# Patient Record
Sex: MALE | Race: WHITE | Employment: OTHER
[De-identification: names, ages, dates, MRNs, and addresses within clinical notes are randomized per-mention and may not be internally consistent; named-entity substitution may affect disease eponyms.]

---

## 2017-01-17 RX ORDER — MIDAZOLAM HYDROCHLORIDE 1 MG/ML
.5-6 INJECTION, SOLUTION INTRAMUSCULAR; INTRAVENOUS
Status: CANCELLED | OUTPATIENT
Start: 2017-01-18

## 2017-01-17 RX ORDER — SODIUM CHLORIDE 0.9 % (FLUSH) 0.9 %
5-10 SYRINGE (ML) INJECTION AS NEEDED
Status: CANCELLED | OUTPATIENT
Start: 2017-01-18

## 2017-01-18 ENCOUNTER — SURGERY (OUTPATIENT)
Age: 62
End: 2017-01-18

## 2017-01-18 ENCOUNTER — APPOINTMENT (OUTPATIENT)
Dept: GENERAL RADIOLOGY | Age: 62
End: 2017-01-18
Attending: PHYSICAL MEDICINE & REHABILITATION
Payer: OTHER MISCELLANEOUS

## 2017-01-18 ENCOUNTER — HOSPITAL ENCOUNTER (OUTPATIENT)
Age: 62
Setting detail: OUTPATIENT SURGERY
Discharge: HOME | End: 2017-01-18
Attending: PHYSICAL MEDICINE & REHABILITATION | Admitting: PHYSICAL MEDICINE & REHABILITATION
Payer: OTHER MISCELLANEOUS

## 2017-01-18 VITALS
DIASTOLIC BLOOD PRESSURE: 85 MMHG | WEIGHT: 305 LBS | HEART RATE: 72 BPM | BODY MASS INDEX: 39.14 KG/M2 | RESPIRATION RATE: 16 BRPM | OXYGEN SATURATION: 95 % | TEMPERATURE: 98.2 F | HEIGHT: 74 IN | SYSTOLIC BLOOD PRESSURE: 146 MMHG

## 2017-01-18 LAB — GLUCOSE BLD STRIP.AUTO-MCNC: 122 MG/DL (ref 70–110)

## 2017-01-18 PROCEDURE — 99144 HC MOD CS >5 YRS 1ST 30 MINS: CPT | Performed by: PHYSICAL MEDICINE & REHABILITATION

## 2017-01-18 PROCEDURE — 77030029505: Performed by: PHYSICAL MEDICINE & REHABILITATION

## 2017-01-18 PROCEDURE — 74011250636 HC RX REV CODE- 250/636

## 2017-01-18 PROCEDURE — 77030020508 HC PD GRND GENRTR BAYL -A: Performed by: PHYSICAL MEDICINE & REHABILITATION

## 2017-01-18 PROCEDURE — 99152 MOD SED SAME PHYS/QHP 5/>YRS: CPT | Performed by: PHYSICAL MEDICINE & REHABILITATION

## 2017-01-18 PROCEDURE — 82962 GLUCOSE BLOOD TEST: CPT

## 2017-01-18 PROCEDURE — 76010000009 HC PAIN MGT 0 TO 30 MIN PROC: Performed by: PHYSICAL MEDICINE & REHABILITATION

## 2017-01-18 PROCEDURE — 74011000250 HC RX REV CODE- 250: Performed by: PHYSICAL MEDICINE & REHABILITATION

## 2017-01-18 RX ORDER — SODIUM CHLORIDE 0.9 % (FLUSH) 0.9 %
5-10 SYRINGE (ML) INJECTION AS NEEDED
Status: DISCONTINUED | OUTPATIENT
Start: 2017-01-18 | End: 2017-01-18 | Stop reason: HOSPADM

## 2017-01-18 RX ORDER — LIDOCAINE HYDROCHLORIDE 10 MG/ML
INJECTION, SOLUTION EPIDURAL; INFILTRATION; INTRACAUDAL; PERINEURAL AS NEEDED
Status: DISCONTINUED | OUTPATIENT
Start: 2017-01-18 | End: 2017-01-18 | Stop reason: HOSPADM

## 2017-01-18 RX ORDER — FENTANYL CITRATE 50 UG/ML
25-400 INJECTION, SOLUTION INTRAMUSCULAR; INTRAVENOUS
Status: DISCONTINUED | OUTPATIENT
Start: 2017-01-18 | End: 2017-01-18 | Stop reason: HOSPADM

## 2017-01-18 RX ORDER — MIDAZOLAM HYDROCHLORIDE 1 MG/ML
.5-6 INJECTION, SOLUTION INTRAMUSCULAR; INTRAVENOUS
Status: DISCONTINUED | OUTPATIENT
Start: 2017-01-18 | End: 2017-01-18 | Stop reason: HOSPADM

## 2017-01-18 RX ADMIN — FENTANYL CITRATE 25 MCG: 50 INJECTION, SOLUTION INTRAMUSCULAR; INTRAVENOUS at 07:39

## 2017-01-18 RX ADMIN — Medication 5 ML: at 07:34

## 2017-01-18 RX ADMIN — LIDOCAINE HYDROCHLORIDE 10 ML: 10 INJECTION, SOLUTION EPIDURAL; INFILTRATION; INTRACAUDAL; PERINEURAL at 07:39

## 2017-01-18 RX ADMIN — MIDAZOLAM HYDROCHLORIDE 1 MG: 1 INJECTION, SOLUTION INTRAMUSCULAR; INTRAVENOUS at 07:34

## 2017-01-18 RX ADMIN — FENTANYL CITRATE 50 MCG: 50 INJECTION, SOLUTION INTRAMUSCULAR; INTRAVENOUS at 07:34

## 2017-01-18 NOTE — PERIOP NOTES
Patient tolerated procedure well without distress noted. No swelling, redness, or bleeding from injection sites. No redness or burns and skin intact from grounding pad. Patient transferred via wheelchair to recovery room in stable condition. VSS.

## 2017-01-18 NOTE — H&P
18 Jan 2017, 7:25AM.   Patient seen and examined prior to procedure. Chart and data reviewed. No significant interval changes from previous evaluation noted. Stable for procedure as intended and discussed.  [facility]

## 2017-01-18 NOTE — DISCHARGE INSTRUCTIONS
[facility]      Post Procedures Instructions    *Resume Diet and Activity as tolerated. Rest for the remainder of the day. *You may fell worse before you feel better as the numbing medications wear off before the steroids take effect if used for your procedures. *Do not use affected extremity until numbness or loss of sensation has completely resolved without assistance. *DO NOT DRIVE, operate machinery/heavey equipment for 24 hours. *DO NOT DRINK ALCOHOL for 24 hours as it may interact with the sedation if you received it and also thins your blood and may cause you to bleed. *WAIT 24 hours before starting back ANY Blood thinning medications:   (Heparin, Coumadin, Warfarin, Lovenox, Plavix, Aggrenox)    *Resume Pre-Procedure Medications as prescribed except Blood Thinners unless directed by your Physician or Cardiologist.     *Avoid Hot tubs and Heating pad for 24 hours to prevent dissipation of medications, you may shower to remove bandages and remaining prep residue on the skin. * If you develop a Headache, drink plenty of fluids including beverages with caffeine (Coffee, Mt. Dew etc.) and rest.  If the headache persists longer than 24 hoursor intensifies - Please call [facility] ([facility]) [phone]      * If you are DIABETIC, check your blood sugar three times a day for the next three days, the steroids will increase your blood sugar. If your blood sugar is greater than 400 have someone drive you to the nearest [address]. * If you experience any of the following problems, call the [facility] [phone] between 8:00 am - 4:30pm or After Hours [phone].     Shortness of breath    Fever of 101 F or higher    Nausea / Vomiting (not normal to you)    Increasing stiffness in the neck    Weakness or numbness in the arms or legs that is not resolving    Prolonged and increasing pain > than 4 days    ANYTHING OUT of the ORDINARY TO YOU    If YOU are experiencing a severe reaction / complication that you have never had before post procedure, call 911 or go to the nearest emergency room! All patients must have a  for transportation [place] regardless if you do or do not receive sedation. DISCHARGE SUMMARY from Nurse      PATIENT INSTRUCTIONS:    After Oral  or intravenous sedation, for 24 hours or while taking prescription Narcotics:  · Limit your activities  · Do not drive and operate hazardous machinery  · Do not make important personal or business decisions  · Do  not drink alcoholic beverages  · If you have not urinated within 8 hours after discharge, please contact your surgeon on call. Report the following to your surgeon:  · Excessive pain, swelling, redness or odor of or around the surgical area  · Temperature over 101  · Nausea and vomiting lasting longer than 4 hours or if unable to take medications  · Any signs of decreased circulation or nerve impairment to extremity: change in color, persistent  numbness, tingling, coldness or increase pain  · Any questions        What to do at Home:  Recommended activity: Activity as tolerated, NO DRIVING FOR 24 Hours post injection          *  Please give a list of your current medications to your Primary Care Provider. *  Please update this list whenever your medications are discontinued, doses are      changed, or new medications (including over-the-counter products) are added. *  Please carry medication information at all times in case of emergency situations. These are general instructions for a healthy lifestyle:    No smoking/ No tobacco products/ Avoid exposure to second hand smoke    Surgeon General's Warning:  Quitting smoking now greatly reduces serious risk to your health.     Obesity, smoking, and sedentary lifestyle greatly increases your risk for illness    A healthy diet, regular physical exercise & weight monitoring are important for maintaining a healthy lifestyle    You may be retaining fluid if you have a history of heart failure or if you experience any of the following symptoms:  Weight gain of 3 pounds or more overnight or 5 pounds in a week, increased swelling in our hands or feet or shortness of breath while lying flat in bed. Please call your doctor as soon as you notice any of these symptoms; do not wait until your next office visit. Recognize signs and symptoms of STROKE:    F-face looks uneven    A-arms unable to move or move unevenly    S-speech slurred or non-existent    T-time-call 911 as soon as signs and symptoms begin-DO NOT go       Back to bed or wait to see if you get better-TIME IS BRAIN.

## 2017-01-18 NOTE — PROCEDURES
[facility]    THERMOCOAGULATION OF LUMBAR MEDIAL BRANCH  PROCEDURE REPORT      PATIENT:  Aram William  YOB: 1955  DATE OF SERVICE:  1/18/2017  [facility]    PRE-PROCEDURE DIAGNOSIS:  See Above    POST-PROCEDURE DIAGNOSIS:  See Above    PROCEDURE:      1. Right radiofrequency thermocoagulation of lumbar medial branch nerves, L3/L4, L4/L5, L5/S1 (72291, 64636 x2)  2. Fluoroscopic needle guidance (spinal) (97256)  3. Supervision of moderate sedation (49433)  4. Additional 15 minutes of supervised moderate sedation (37130)    ANESTHESIA:  Local with moderate IV sedation. See Medication Administration Record for specific medications and dosage. COMPLICATIONS: None. PHYSICIAN:  Cari Del Real MD    PRE-PROCEDURE NOTE:  Pre-procedural assessment of the patient was performed including a limited history and physical examination. The details of the procedure were discussed with the patient, including the risks, benefits and alternative options and an informed consent was obtained. The patients NPO status, if necessary for the specific procedure and/or administration of moderate intravenous sedation, if utilized, and availability of a responsible adult to escort the patient following the procedure were confirmed. A peripheral intravenous cannula was placed without difficulty and lactated Ringers solution administered. See nursing notes for details. PROCEDURE NOTE:  The patient was brought to the procedure suite and positioned on the fluoroscopy table in the prone position. Physiologic monitors were applied and supplemental oxygen was administered via nasal cannula. The skin was prepped in the standard surgical fashion and sterile drapes were applied over the procedure site.  Please refer to the Flowsheet for documentation of the patients vital signs and the Medication Administration Record for any oral and/or intravenous sedation administered prior to or during the procedure. 1% Lidocaine was utilized for local anesthesia. Under 10-15 degree ipsilateral oblique fluoroscopic guidance a 15cm 18gauge radiofrequency needle with a 10 mm curved active tip was advanced to the junction of the superior articular process and transverse process of each vertebral level immediately inferior to the above-mentioned dorsal rami medial branch nerves. Under AP fluoroscopic guidance, a similar needle was then placed over the superior margin of the sacral ala to thermocoagulate the L5 medial branch nerve. After each individual needle was placed, sensory and motor testing, at 50 Hz and 2 Hz, respectively, were performed which elicited ipsilateral deep local back discomfort without evidence of motor stimulation in the ipsilateral gluteal muscles or extremity. Following this, 1 mL of lidocaine 2% was injected after the negative aspiration of blood, air or CSF. Final correct needle placement was then confirmed by viewing each needle in AP and lateral fluoroscopic views in addition to repeat sensory and motor testing which, again, elicited ipsilateral deep local back discomfort without evidence of motor stimulation in the ipsilateral gluteal muscles or extremity. Medial branch nerve radiofrequency thermocoagulation was then performed at each level for 120 seconds at 80° centigrade x 1 cycle. Following this, 1/3ml  to 1/2ml of a mixture of lidocaine 1% admixed with dexamethasone 2mg [10mg/ml] was injected through each radiofrequency needle after negative aspiration and before removing each needle. Then, all needles were removed intact. The area was thoroughly cleaned and sterile bandages applied as necessary. The patient tolerated the procedure well without complication and the vital signs remained stable throughout the procedure.     POST-PROCEDURE COURSE:   The patient was escorted from the procedure suite in satisfactory condition and recovered per facility protocol based on the type of procedure performed and/or the sedation utilized. The patient did not experience any adverse events and remained hemodynamically stable during the post-procedure period. DISCHARGE NOTE:  Upon discharge, the patient was able to tolerate fluids and was in no acute distress. The patient was oriented to person, place and time and vital signs were stable. Appropriate post-procedure instructions were provided and explained to the patient in detail and all questions were answered.                     Citlaly Burr MD 1/18/2017 10:10 AM

## 2017-01-18 NOTE — PERIOP NOTES
Armband removed and placed in shredder. Dressing to injection site dry and intact. Patient alert, oriented, denies complaints of pain. Able to ambulate with ride to vehicle.

## 2017-02-06 RX ORDER — ISOSORBIDE MONONITRATE 60 MG/1
TABLET, EXTENDED RELEASE ORAL
Qty: 90 TAB | Refills: 0 | Status: SHIPPED | OUTPATIENT
Start: 2017-02-06 | End: 2017-04-05 | Stop reason: SDUPTHER

## 2017-02-08 ENCOUNTER — OFFICE VISIT (OUTPATIENT)
Dept: PAIN MANAGEMENT | Age: 62
End: 2017-02-08

## 2017-02-08 VITALS
DIASTOLIC BLOOD PRESSURE: 75 MMHG | SYSTOLIC BLOOD PRESSURE: 148 MMHG | RESPIRATION RATE: 19 BRPM | WEIGHT: 314 LBS | HEIGHT: 74 IN | HEART RATE: 80 BPM | BODY MASS INDEX: 40.3 KG/M2

## 2017-02-08 DIAGNOSIS — G89.29 CHRONIC MIDLINE LOW BACK PAIN WITH SCIATICA, SCIATICA LATERALITY UNSPECIFIED: Primary | ICD-10-CM

## 2017-02-08 DIAGNOSIS — M96.1 POSTLAMINECTOMY SYNDROME, LUMBAR REGION: ICD-10-CM

## 2017-02-08 DIAGNOSIS — Z86.59 HISTORY OF DEPRESSION: ICD-10-CM

## 2017-02-08 DIAGNOSIS — M54.40 CHRONIC MIDLINE LOW BACK PAIN WITH SCIATICA, SCIATICA LATERALITY UNSPECIFIED: Primary | ICD-10-CM

## 2017-02-08 DIAGNOSIS — Z98.1 S/P LUMBAR SPINAL FUSION: ICD-10-CM

## 2017-02-08 DIAGNOSIS — M51.36 DDD (DEGENERATIVE DISC DISEASE), LUMBAR: ICD-10-CM

## 2017-02-08 DIAGNOSIS — Z98.890 S/P LUMBAR LAMINECTOMY: ICD-10-CM

## 2017-02-08 DIAGNOSIS — M54.16 LUMBAR NERVE ROOT IMPINGEMENT: ICD-10-CM

## 2017-02-08 DIAGNOSIS — M62.830 BACK MUSCLE SPASM: ICD-10-CM

## 2017-02-08 DIAGNOSIS — M51.34 DDD (DEGENERATIVE DISC DISEASE), THORACIC: ICD-10-CM

## 2017-02-08 DIAGNOSIS — G89.4 CHRONIC PAIN SYNDROME: ICD-10-CM

## 2017-02-08 DIAGNOSIS — M47.816 LUMBAR FACET ARTHROPATHY: ICD-10-CM

## 2017-02-08 DIAGNOSIS — K66.0 ABDOMINAL ADHESIONS: ICD-10-CM

## 2017-02-08 DIAGNOSIS — M54.17 LUMBOSACRAL RADICULOPATHY AT S1: ICD-10-CM

## 2017-02-08 DIAGNOSIS — S22.000S THORACIC COMPRESSION FRACTURE, SEQUELA: ICD-10-CM

## 2017-02-08 RX ORDER — DULOXETIN HYDROCHLORIDE 60 MG/1
60 CAPSULE, DELAYED RELEASE ORAL DAILY
Qty: 90 CAP | Refills: 2 | Status: SHIPPED | OUTPATIENT
Start: 2017-02-08 | End: 2017-05-04 | Stop reason: SDUPTHER

## 2017-02-08 RX ORDER — MORPHINE SULFATE 50 MG/1
50 CAPSULE, EXTENDED RELEASE ORAL EVERY 12 HOURS
Qty: 60 CAP | Refills: 0 | Status: SHIPPED | OUTPATIENT
Start: 2017-03-07 | End: 2017-02-08 | Stop reason: SDUPTHER

## 2017-02-08 RX ORDER — DIAZEPAM 5 MG/1
5 TABLET ORAL
Qty: 30 TAB | Refills: 2 | Status: SHIPPED | OUTPATIENT
Start: 2017-02-08 | End: 2017-05-04 | Stop reason: SDUPTHER

## 2017-02-08 RX ORDER — GABAPENTIN 800 MG/1
800 TABLET ORAL 3 TIMES DAILY
Qty: 90 TAB | Refills: 5 | Status: SHIPPED | OUTPATIENT
Start: 2017-02-08 | End: 2017-05-04 | Stop reason: SDUPTHER

## 2017-02-08 RX ORDER — MORPHINE SULFATE 50 MG/1
50 CAPSULE, EXTENDED RELEASE ORAL EVERY 12 HOURS
Qty: 60 CAP | Refills: 0 | Status: SHIPPED | OUTPATIENT
Start: 2017-04-06 | End: 2017-05-04 | Stop reason: SDUPTHER

## 2017-02-08 RX ORDER — MORPHINE SULFATE 50 MG/1
50 CAPSULE, EXTENDED RELEASE ORAL EVERY 12 HOURS
Qty: 60 CAP | Refills: 0 | Status: SHIPPED | OUTPATIENT
Start: 2017-02-08 | End: 2017-02-08 | Stop reason: SDUPTHER

## 2017-02-08 NOTE — PROGRESS NOTES
Nursing Notes    Patient presents to the office today in follow-up. Reviewed medications with counts as follows:    Rx Date filled Qty Dispensed Pill Count Last Dose Short   Diazepam 5 mg 12/19/16 30 8 Last night no   Morphine er 50 mg 2/2/17 60 59 This am no   nucynta 100 mg 2/2/17 120 119 This am no   Mr. William has a reminder for a \"due or due soon\" health maintenance. I have asked that he contact his primary care provider for follow-up on this health maintenance. POC UDS was not performed in office today    Any new labs or imaging since last appointment? NO    Have you been to an emergency room (ER) or urgent care clinic since your last visit? NO            Have you been hospitalized since your last visit? NO     If yes, where, when, and reason for visit? Have you seen or consulted any other health care providers outside of the [address] [place] [address]  since your last visit? NO     If yes, where, when, and reason for visit?

## 2017-02-08 NOTE — MR AVS SNAPSHOT
Visit Information Date & Time Provider Department Dept. Phone Encounter #  
 2/8/2017  9:00 AM Holli Leyva MD [facility] [id number] Follow-up Instructions Return in about 9 weeks (around 4/12/2017). Follow-up and Disposition History Your Appointments 4/5/2017  8:20 AM  
ROUTINE CARE with Tye Vang MD  
[facility] Appt Note: 4 m fu anxiety dm/htn  
 [address] [address] [address] [address] [place] [address] [id number] Upcoming Health Maintenance Date Due COLONOSCOPY 2/19/1973 ZOSTER VACCINE AGE 60> 5/24/2017* HEMOGLOBIN A1C Q6M 6/7/2017 MICROALBUMIN Q1 6/13/2017 FOOT EXAM Q1 8/8/2017 EYE EXAM RETINAL OR DILATED Q1 10/5/2017 LIPID PANEL Q1 12/7/2017 DTaP/Tdap/Td series (2 - Td) 8/8/2026 *Topic was postponed. The date shown is not the original due date. Allergies as of 2/8/2017  Review Complete On: 2/8/2017 By: Holli Leyva MD  
  
 Severity Noted Reaction Type Reactions Opana [Oxymorphone]  02/19/2013    Other (comments) Feels like bug crawling under skin and drowziness Current Immunizations  Reviewed on 12/7/2016 Name Date Influenza Vaccine (Quad) PF 12/7/2016 Pneumococcal Conjugate (PCV-13) 8/8/2016 Tdap 8/8/2016 Not reviewed this visit You Were Diagnosed With   
  
 Codes Comments Chronic midline low back pain with sciatica, sciatica laterality unspecified    -  Primary ICD-10-CM: M54.40, G89.29 ICD-9-CM: 724.2, 724.3, 338.29 Chronic pain syndrome     ICD-10-CM: G89.4 ICD-9-CM: 338.4 DDD (degenerative disc disease), lumbar     ICD-10-CM: M51.36 
ICD-9-CM: 722.52 Lumbar facet arthropathy (HCC)     ICD-10-CM: M12.88 ICD-9-CM: 721.3 S/P lumbar laminectomy     ICD-10-CM: B60.391 ICD-9-CM: V45.89   
 S/P lumbar spinal fusion     ICD-10-CM: Z98.1 ICD-9-CM: V45.4 Lumbar nerve root impingement     ICD-10-CM: M54.16 
ICD-9-CM: 724.4 Lumbosacral radiculopathy at S1     ICD-10-CM: M54.17 ICD-9-CM: 724.4 DDD (degenerative disc disease), thoracic     ICD-10-CM: M51.34 
ICD-9-CM: 722.51 Thoracic compression fracture, sequela     ICD-10-CM: S22.000S ICD-9-CM: 905.1 Back muscle spasm     ICD-10-CM: M18.226 ICD-9-CM: 724.8 Abdominal adhesions     ICD-10-CM: K66.0 ICD-9-CM: 568.0 Postlaminectomy syndrome, lumbar region     ICD-10-CM: M96.1 ICD-9-CM: 722.83 History of depression     ICD-10-CM: Z86.59 
ICD-9-CM: V11.8 Vitals BP Pulse Resp Height(growth percentile) Weight(growth percentile) BMI  
 148/75 80 19 6' 2\" (1.88 m) 314 lb (142.4 kg) 40.32 kg/m2 Smoking Status Never Smoker Vitals History BMI and BSA Data Body Mass Index Body Surface Area  
 40.32 kg/m 2 2.73 m 2 Preferred Pharmacy Pharmacy Name Phone [facility] [phone] Your Updated Medication List  
  
   
This list is accurate as of: 2/8/17  9:42 AM.  Always use your most recent med list.  
  
  
  
  
 ALPRAZolam 0.5 mg tablet Commonly known as:  Igor Cape Take 1 Tab by mouth every twelve (12) hours as needed for Anxiety (Do not take when Valium is taken). Max Daily Amount: 1 mg. Back Brace Misc 1 Device by Does Not Apply route daily as needed for Pain. One, lumbosacral orthosis/back brace with metal struts      Medically necessary buPROPion  mg SR tablet Commonly known as:  WELLBUTRIN SR  
TAKE 1 TABLET TWICE DAILY  
  
 diazePAM 5 mg tablet Commonly known as:  VALIUM Take 1 Tab by mouth nightly as needed for Sleep for up to 30 days. DULoxetine 60 mg capsule Commonly known as:  CYMBALTA Take 1 Cap by mouth daily. gabapentin 800 mg tablet Commonly known as:  NEURONTIN Take 1 Tab by mouth three (3) times daily for 30 days. isosorbide mononitrate ER 60 mg CR tablet Commonly known as:  IMDUR  
TAKE 1 TABLET EVERY MORNING  
  
 metFORMIN 500 mg tablet Commonly known as:  GLUCOPHAGE Take 1 Tab by mouth three (3) times daily (with meals). metoprolol succinate 50 mg XL tablet Commonly known as:  TOPROL-XL  
TAKE 1 TABLET EVERY DAY Morphine 50 mg ER capsule Commonly known as:  RACHAEL Take 1 Cap by mouth every twelve (12) hours for 30 days. Max Daily Amount: 2 Caps. For chronic pain. Start taking on:  3/7/2017  
  
 multivitamin tablet Commonly known as:  ONE A DAY Take 1 Tab by mouth daily. nitroglycerin 0.4 mg SL tablet Commonly known as:  NITROSTAT  
1 Tab by SubLINGual route every five (5) minutes as needed. Use for Chest Pain ; Call MD if > 3 tablets required  
  
 ondansetron hcl 4 mg tablet Commonly known as:  ZOFRAN (AS HYDROCHLORIDE) Take 1 Tab by mouth every eight (8) hours as needed for Nausea. pramipexole 0.5 mg tablet Commonly known as:  MIRAPEX TAKE 1 TABLET THREE TIMES DAILY  
  
 simvastatin 10 mg tablet Commonly known as:  ZOCOR  
TAKE 1 TABLET EVERY NIGHT  
  
 tapentadol 100 mg tablet Commonly known as:  Damion Jill Take 100 mg by mouth four (4) times daily as needed for Pain. Max Daily Amount: 400 mg. Start taking on:  3/7/2017  
  
 zolpidem 10 mg tablet Commonly known as:  AMBIEN Take 1 Tab by mouth nightly as needed for Sleep. Max Daily Amount: 10 mg.  
  
  
  
  
Prescriptions Printed Refills  
 diazePAM (VALIUM) 5 mg tablet 2 Sig: Take 1 Tab by mouth nightly as needed for Sleep for up to 30 days. Class: Print Route: Oral  
 tapentadol (NUCYNTA) 100 mg tablet 0 Starting on: 3/7/2017 Sig: Take 100 mg by mouth four (4) times daily as needed for Pain. Max Daily Amount: 400 mg. Class: Print  Route: Oral  
 Morphine (RACHAEL) 50 mg ER capsule 0  
 Starting on: 3/7/2017 Sig: Take 1 Cap by mouth every twelve (12) hours for 30 days. Max Daily Amount: 2 Caps. For chronic pain. Class: Print Route: Oral  
  
Prescriptions Sent to Pharmacy Refills DULoxetine (CYMBALTA) 60 mg capsule 2 Sig: Take 1 Cap by mouth daily. Class: Normal  
 Pharmacy: [address] [place] [address], [address] [place] [address] Ph #: [phone] Route: Oral  
 gabapentin (NEURONTIN) 800 mg tablet 5 Sig: Take 1 Tab by mouth three (3) times daily for 30 days. Class: Normal  
 Pharmacy: [address] [place] [address], [address] [place] [address] Ph #: [phone] Route: Oral  
  
Follow-up Instructions Return in about 9 weeks (around 4/12/2017). Introducing [facility] SERVICES! Dear Neeta Bryant: Thank you for requesting a [employer] account. Our records indicate that you already have an active [employer] account. You can access your account anytime at [email or web address] Did you know that you can access your hospital and ER discharge instructions at any time in [employer]? You can also review all of your test results from your hospital stay or ER visit. Additional Information If you have questions, please visit the Frequently Asked Questions section of the [employer] website at [email or web address] Remember, [employer] is NOT to be used for urgent needs. For medical emergencies, dial 911. Now available from your iPhone and Android! Please provide this summary of care documentation to your next provider. Your primary care clinician is listed as [address]. If you have any questions after today's visit, please call [phone].

## 2017-02-08 NOTE — PROGRESS NOTES
HISTORY OF PRESENT ILLNESS  Himanshu William is a 64 y.o. male. HPI Comments: Meds help with pain control and quality of life. No new side effects reported today. No new medical problems reported today. Visit survey reviewed, and will be scanned. Recent Average pain level (out of 10). --   9  Chief complaint low back pain  Chronic pain  Using Cymbalta 60 mg once a day  Diazepam 5 mg in the evening as needed  Nucynta 100 mg 4 times a day as needed  Extended release morphine 50 mg twice a day  Gabapentin 800 mg 3 times a day  Today's visit is a 3 month follow-up  Ambien, Xanax prescribed by primary care physician  Medication helps improve general activity, mood, walking, sleep, enjoyment of life  We reviewed medications again today. He cannot recall any specific medication, opioid or non-opioid which was more effective than her current medications therefore no plans on change. However, we did discuss possibly switching the Nucynta to a fast acting morphine equivalent. He will let me know at the next visit if he would like to make this change. Review of Systems   Constitutional: Negative for chills and fever. HENT: Negative for ear discharge and ear pain. Eyes: Negative for pain and discharge. Respiratory: Negative for hemoptysis and wheezing. Gastrointestinal: Negative for blood in stool and melena. Genitourinary: Negative for dysuria and hematuria. Musculoskeletal: Positive for back pain and myalgias. Negative for neck pain. Skin: Negative for itching and rash. Neurological: Negative for seizures and loss of consciousness. Psychiatric/Behavioral: Negative for hallucinations and suicidal ideas. Physical Exam   Constitutional: He appears well-developed and well-nourished. He is cooperative. He does not have a sickly appearance. HENT:   Head: Normocephalic and atraumatic. Right Ear: External ear normal. No drainage. Left Ear: External ear normal. No drainage.    Nose: Nose normal. Eyes: Lids are normal. Right eye exhibits no discharge. Left eye exhibits no discharge. Right conjunctiva has no hemorrhage. Left conjunctiva has no hemorrhage. Neck: Neck supple. No tracheal deviation present. No thyroid mass present. Pulmonary/Chest: Effort normal. No respiratory distress. Neurological: He is alert. No cranial nerve deficit. Skin: Skin is intact. No rash noted. Psychiatric: His speech is normal. His affect is not angry. He does not express inappropriate judgment. Nursing note and vitals reviewed. ASSESSMENT and PLAN  Encounter Diagnoses   Name Primary?  Chronic midline low back pain with sciatica, sciatica laterality unspecified Yes    Chronic pain syndrome     DDD (degenerative disc disease), lumbar     Lumbar facet arthropathy (HCC)     S/P lumbar laminectomy     S/P lumbar spinal fusion     Lumbar nerve root impingement     Lumbosacral radiculopathy at S1     DDD (degenerative disc disease), thoracic     Thoracic compression fracture, sequela     Back muscle spasm     Abdominal adhesions     Postlaminectomy syndrome, lumbar region     History of depression    No signs of addiction or diversion. The primary Dxes. ,including pain are controlled. Pain/Pain control/Meds and Quality Of Life have been reviewed. Nonpharmacologic therapy and non-opioid pharmacologic therapy are always considered. If opioid therapy is prescribed, this is only if the expected benefits for both pain and function are anticipated to outweigh risks. Possible changes to treatment plan considered. Support/education given as needed. Today-medications are as listed. No significant changes to medications. Follow up -- 3 months.

## 2017-04-05 ENCOUNTER — HOSPITAL ENCOUNTER (OUTPATIENT)
Dept: LAB | Age: 62
Discharge: HOME | End: 2017-04-05
Payer: MEDICARE

## 2017-04-05 ENCOUNTER — OFFICE VISIT (OUTPATIENT)
Dept: FAMILY MEDICINE CLINIC | Age: 62
End: 2017-04-05

## 2017-04-05 VITALS
TEMPERATURE: 98.2 F | HEIGHT: 74 IN | WEIGHT: 310 LBS | DIASTOLIC BLOOD PRESSURE: 70 MMHG | HEART RATE: 75 BPM | SYSTOLIC BLOOD PRESSURE: 136 MMHG | RESPIRATION RATE: 16 BRPM | OXYGEN SATURATION: 97 % | BODY MASS INDEX: 39.78 KG/M2

## 2017-04-05 DIAGNOSIS — I10 ESSENTIAL HYPERTENSION: ICD-10-CM

## 2017-04-05 DIAGNOSIS — E53.8 B12 DEFICIENCY: ICD-10-CM

## 2017-04-05 DIAGNOSIS — Z12.11 SCREENING FOR COLON CANCER: ICD-10-CM

## 2017-04-05 DIAGNOSIS — E11.9 DIABETES MELLITUS WITHOUT COMPLICATION (HCC): ICD-10-CM

## 2017-04-05 DIAGNOSIS — J18.9 CAP (COMMUNITY ACQUIRED PNEUMONIA): Primary | ICD-10-CM

## 2017-04-05 DIAGNOSIS — F41.9 ANXIETY: ICD-10-CM

## 2017-04-05 LAB
ALT SERPL-CCNC: 48 U/L (ref 16–61)
AST SERPL W P-5'-P-CCNC: 17 U/L (ref 15–37)
BASOPHILS # BLD AUTO: 0 K/UL (ref 0–0.06)
BASOPHILS # BLD: 0 % (ref 0–2)
CHOLEST SERPL-MCNC: 141 MG/DL
DIFFERENTIAL METHOD BLD: ABNORMAL
EOSINOPHIL # BLD: 0 K/UL (ref 0–0.4)
EOSINOPHIL NFR BLD: 0 % (ref 0–5)
ERYTHROCYTE [DISTWIDTH] IN BLOOD BY AUTOMATED COUNT: 15.6 % (ref 11.6–14.5)
EST. AVERAGE GLUCOSE BLD GHB EST-MCNC: 192 MG/DL
HBA1C MFR BLD: 8.3 % (ref 4.2–5.6)
HCT VFR BLD AUTO: 39 % (ref 36–48)
HDLC SERPL-MCNC: 61 MG/DL (ref 40–60)
HDLC SERPL: 2.3 {RATIO} (ref 0–5)
HGB BLD-MCNC: 11.8 G/DL (ref 13–16)
LDLC SERPL CALC-MCNC: 55.6 MG/DL (ref 0–100)
LIPID PROFILE,FLP: ABNORMAL
LYMPHOCYTES # BLD AUTO: 13 % (ref 21–52)
LYMPHOCYTES # BLD: 1.7 K/UL (ref 0.9–3.6)
MCH RBC QN AUTO: 26.8 PG (ref 24–34)
MCHC RBC AUTO-ENTMCNC: 30.3 G/DL (ref 31–37)
MCV RBC AUTO: 88.6 FL (ref 74–97)
MONOCYTES # BLD: 0.8 K/UL (ref 0.05–1.2)
MONOCYTES NFR BLD AUTO: 7 % (ref 3–10)
NEUTS SEG # BLD: 10.3 K/UL (ref 1.8–8)
NEUTS SEG NFR BLD AUTO: 80 % (ref 40–73)
PLATELET # BLD AUTO: 313 K/UL (ref 135–420)
PMV BLD AUTO: 10.1 FL (ref 9.2–11.8)
RBC # BLD AUTO: 4.4 M/UL (ref 4.7–5.5)
TRIGL SERPL-MCNC: 122 MG/DL (ref ?–150)
VIT B12 SERPL-MCNC: 875 PG/ML (ref 211–911)
VLDLC SERPL CALC-MCNC: 24.4 MG/DL
WBC # BLD AUTO: 12.8 K/UL (ref 4.6–13.2)

## 2017-04-05 PROCEDURE — 80061 LIPID PANEL: CPT | Performed by: FAMILY MEDICINE

## 2017-04-05 PROCEDURE — 85025 COMPLETE CBC W/AUTO DIFF WBC: CPT | Performed by: FAMILY MEDICINE

## 2017-04-05 PROCEDURE — 84460 ALANINE AMINO (ALT) (SGPT): CPT | Performed by: FAMILY MEDICINE

## 2017-04-05 PROCEDURE — 36415 COLL VENOUS BLD VENIPUNCTURE: CPT | Performed by: FAMILY MEDICINE

## 2017-04-05 PROCEDURE — 83036 HEMOGLOBIN GLYCOSYLATED A1C: CPT | Performed by: FAMILY MEDICINE

## 2017-04-05 PROCEDURE — 84450 TRANSFERASE (AST) (SGOT): CPT | Performed by: FAMILY MEDICINE

## 2017-04-05 PROCEDURE — 82607 VITAMIN B-12: CPT | Performed by: FAMILY MEDICINE

## 2017-04-05 RX ORDER — ISOSORBIDE MONONITRATE 60 MG/1
TABLET, EXTENDED RELEASE ORAL
Qty: 90 TAB | Refills: 3 | Status: SHIPPED | OUTPATIENT
Start: 2017-04-05 | End: 2017-04-14 | Stop reason: SDUPTHER

## 2017-04-05 RX ORDER — PRAMIPEXOLE DIHYDROCHLORIDE 0.5 MG/1
TABLET ORAL
Qty: 270 TAB | Refills: 3 | Status: SHIPPED | OUTPATIENT
Start: 2017-04-05 | End: 2017-04-14 | Stop reason: SDUPTHER

## 2017-04-05 RX ORDER — SIMVASTATIN 10 MG/1
TABLET, FILM COATED ORAL
Qty: 90 TAB | Refills: 3 | Status: SHIPPED | OUTPATIENT
Start: 2017-04-05 | End: 2017-04-14 | Stop reason: SDUPTHER

## 2017-04-05 RX ORDER — ONDANSETRON 4 MG/1
4 TABLET, FILM COATED ORAL
Qty: 20 TAB | Refills: 0 | Status: SHIPPED | OUTPATIENT
Start: 2017-04-05 | End: 2018-07-03

## 2017-04-05 RX ORDER — ZOLPIDEM TARTRATE 10 MG/1
10 TABLET ORAL
Qty: 90 TAB | Refills: 0 | Status: SHIPPED | OUTPATIENT
Start: 2017-04-05 | End: 2017-06-08 | Stop reason: SDUPTHER

## 2017-04-05 RX ORDER — METOPROLOL SUCCINATE 50 MG/1
TABLET, EXTENDED RELEASE ORAL
Qty: 90 TAB | Refills: 3 | Status: SHIPPED | OUTPATIENT
Start: 2017-04-05 | End: 2017-04-14 | Stop reason: SDUPTHER

## 2017-04-05 RX ORDER — METFORMIN HYDROCHLORIDE 500 MG/1
500 TABLET ORAL
Qty: 270 TAB | Refills: 3 | Status: SHIPPED | OUTPATIENT
Start: 2017-04-05 | End: 2017-04-14 | Stop reason: SDUPTHER

## 2017-04-05 RX ORDER — BUPROPION HYDROCHLORIDE 150 MG/1
TABLET, EXTENDED RELEASE ORAL
Qty: 180 TAB | Refills: 3 | Status: SHIPPED | OUTPATIENT
Start: 2017-04-05 | End: 2017-04-14 | Stop reason: SDUPTHER

## 2017-04-05 RX ORDER — ALPRAZOLAM 0.5 MG/1
0.5 TABLET ORAL
Qty: 60 TAB | Refills: 2 | Status: SHIPPED | OUTPATIENT
Start: 2017-04-05 | End: 2017-08-02 | Stop reason: ALTCHOICE

## 2017-04-05 NOTE — PATIENT INSTRUCTIONS
Orlistat (By mouth)   Orlistat (OR-li-stat)  Helps you reach and maintain a healthy weight. Brand Name(s):Christoph Xenical   There may be other brand names for this medicine. When This Medicine Should Not Be Used: This medicine is not right for everyone. Do not use this medicine if you had an allergic reaction to orlistat or if you are pregnant. How to Use This Medicine:   Capsule  · Follow the instructions on the medicine label if you are using this medicine without a prescription. · Take this medicine during each main meal or up to 1 hour after a meal.  · This medicine works by keeping your body from absorbing some of the fat in your meal. If you skip a meal or eat a meal that does not contain any fat, do not take the medicine. It will not do anything. · Eat a well-balanced, reduced-calorie meal plan. Divide the amount of protein, fats, and carbohydrates evenly among your 3 daily meals. · Read and follow the patient instructions that come with this medicine. Talk to your doctor or pharmacist if you have any questions. · Missed dose: You may take this medicine up to 1 hour after the meal. If more than 1 hour has passed, skip your dose and just take your next dose with your next meal. Do not use extra medicine to make up for a missed dose. · Store the medicine in a closed container at room temperature, away from heat, moisture, and direct light. Drugs and Foods to Avoid:   Ask your doctor or pharmacist before using any other medicine, including over-the-counter medicines, vitamins, and herbal products. · Some foods and medicines can affect how orlistat works. Tell your doctor if you use medicine to treat diabetes (such as insulin), medicine to treat seizures, or a blood thinner (such as warfarin). · Cyclosporine: Take at least 3 hours after you take orlistat. · Levothyroxine: Take at least 4 hours before or after you take orlistat.   · Multivitamin supplement: Take at least 2 hours before or after you take orlistat, such as at bedtime. Warnings While Using This Medicine:   · It is not safe to take this medicine during pregnancy. It could harm an unborn baby. Tell your doctor right away if you become pregnant. · Tell your doctor if you are breastfeeding, or if you have kidney disease, liver disease, digestion problems, diabetes, seizures, underactive thyroid, or an eating disorder (such as anorexia or bulimia). · This medicine may cause the following problems:  ¨ Liver damage  ¨ Higher risk of kidney stones  ¨ Gallbladder or bile flow problems, gallstones  · Bowel side effects can be worse if you eat a meal that is more than 30% fat. The undigested fat comes out in your bowel movements. To reduce side effects, divide your daily intake of fat evenly over your 3 main meals each day. · Take a multivitamin supplement that contains vitamins A, D, E, and K with beta carotene. Orlistat keeps your body from absorbing some of the vitamins from food. · Keep all medicine out of the reach of children. Never share your medicine with anyone.   Possible Side Effects While Using This Medicine:   Call your doctor right away if you notice any of these side effects:  · Allergic reaction: Itching or hives, swelling in your face or hands, swelling or tingling in your mouth or throat, chest tightness, trouble breathing  · Dark urine or pale stools, nausea, vomiting, loss of appetite, stomach pain, yellow skin or eyes  · Decrease in how much or how often you urinate, sharp back pain just below the ribs, or painful urination  · Severe diarrhea  · Severe stomach pain with nausea and vomiting  If you notice these less serious side effects, talk with your doctor:   · Fever, chills, cough, runny or stuffy nose, sore throat, and body aches  · Increases in bowel movements, loss of bowel control, or the need to have a bowel movement right away  · Passing gas with oily discharge  · Passing oil or fat with your bowel movements or between bowel movements  If you notice other side effects that you think are caused by this medicine, tell your doctor. Call your doctor for medical advice about side effects. You may report side effects to FDA at 8-594-ZAF-4581  © 2016 [address] is for End User's use only and may not be sold, redistributed or otherwise used for commercial purposes. The above information is an  only. It is not intended as medical advice for individual conditions or treatments. Talk to your doctor, nurse or pharmacist before following any medical regimen to see if it is safe and effective for you. Dextromethorphan (By mouth)   Dextromethorphan (dex-troe-meth-OR-fan)  Treats a cough. Brand Name(s): Babee Cof Syrup, Children's Robitussin Cough Long Acting, Children's Triaminic Long Acting Cough, Cough DM, Creo-Terpin, Creomulsion Cough, Creomulsion For Children, Delsym, Dexalone, Dextromethorphan Polistirex, EntexPAC, Father Jacob's Medicine, [facility] Cough DM ER, [facility] Cough Relief, [facility]ighbor Tussin   There may be other brand names for this medicine. When This Medicine Should Not Be Used: You should not use this medicine if you have had an allergic reaction to dextromethorphan. You should not use this medicine if you are also using an MAO inhibitor (such as Parnate®, Nardil®, or Marplan®) or if you have used one of these medicines in the past 2 weeks. Do not give any over-the-counter (OTC) cough and cold medicine to a baby or child under 3years old. Using these medicines in very young children might cause serious or possibly life-threatening side effects. How to Use This Medicine:   Chewable Tablet, Lozenge, Liquid, Thin Sheet, Tablet, Capsule, Liquid Filled Capsule  · Your doctor will tell you how much medicine to use. Do not use more than directed.   · Follow the instructions on the medicine label if you are using this medicine without a prescription. · You may take this medicine with or without food. · The chewable tablet can be chewed or crushed or swallowed whole. Drink plenty of water after swallowing the tablet. · Slowly dissolve the lozenge in your mouth. · Shake the oral liquid well just before each use. Measure the oral liquid medicine with a marked measuring spoon, oral syringe, or medicine cup. If a dose is missed:   · Take the missed dose as soon as you remember. It is not usually a concern if you miss one dose. · Skip the missed dose if it is almost time for your next dose. · You should not use two doses at the same time. How to Store and Dispose of This Medicine:   · Store at room temperature, away from heat, light, and moisture. Do not freeze the oral liquid. · Keep all medicine away from children. Drugs and Foods to Avoid:      Ask your doctor or pharmacist before using any other medicine, including over-the-counter medicines, vitamins, and herbal products. Warnings While Using This Medicine:   · Make sure your doctor knows if you are pregnant or breastfeeding, or if you have asthma or liver disease. · This medicine may make you dizzy or drowsy. Avoid driving, using machines, or doing anything else that could be dangerous if you are not alert. Possible Side Effects While Using This Medicine: If you notice these less serious side effects, talk with your doctor:  · Mild drowsiness, dizziness, nervousness, or restlessness  · Nausea, vomiting, stomach pain  If you notice other side effects that you think are caused by this medicine, tell your doctor. Call your doctor for medical advice about side effects. You may report side effects to FDA at 2-341-FDA-3754  © 2016 [address] is for End User's use only and may not be sold, redistributed or otherwise used for commercial purposes. The above information is an  only.  It is not intended as medical advice for individual conditions or treatments. Talk to your doctor, nurse or pharmacist before following any medical regimen to see if it is safe and effective for you.

## 2017-04-05 NOTE — PROGRESS NOTES
1. Have you been to the ER, urgent care clinic since your last visit? Hospitalized since your last visit? Yes [facility] Emergency Room    2. Have you seen or consulted any other health care providers outside of the [address] [place] [address] since your last visit? Include any pap smears or colon screening.  No

## 2017-04-05 NOTE — MR AVS SNAPSHOT
Visit Information Date & Time Provider Department Dept. Phone Encounter #  
 4/5/2017  8:20 AM Juanis Barrios, [address] [id number] Follow-up Instructions Return in about 4 months (around 8/5/2017) for dm/htn/chol. Your Appointments 5/4/2017  9:00 AM  
Follow Up with Theo Smith MD  
[facility] [address] [facility]) Appt Note: return in [facility] [address] [place] [id number]  
[phone] [facility] [id number] Upcoming Health Maintenance Date Due ZOSTER VACCINE AGE 60> 5/24/2017* COLONOSCOPY 8/23/2017* HEMOGLOBIN A1C Q6M 6/7/2017 MICROALBUMIN Q1 6/13/2017 FOOT EXAM Q1 8/8/2017 EYE EXAM RETINAL OR DILATED Q1 10/5/2017 LIPID PANEL Q1 12/7/2017 DTaP/Tdap/Td series (2 - Td) 8/8/2026 *Topic was postponed. The date shown is not the original due date. Allergies as of 4/5/2017  Review Complete On: 4/5/2017 By: Juanis Barrios MD  
  
 Severity Noted Reaction Type Reactions Opana [Oxymorphone]  02/19/2013    Other (comments) Feels like bug crawling under skin and drowziness Current Immunizations  Reviewed on 12/7/2016 Name Date Influenza Vaccine (Quad) PF 12/7/2016 Pneumococcal Conjugate (PCV-13) 8/8/2016 Tdap 8/8/2016 Not reviewed this visit You Were Diagnosed With   
  
 Codes Comments CAP (community acquired pneumonia)    -  Primary ICD-10-CM: J18.9 ICD-9-CM: 466 Anxiety     ICD-10-CM: F41.9 ICD-9-CM: 300.00 Essential hypertension     ICD-10-CM: I10 
ICD-9-CM: 401.9 Diabetes mellitus without complication ([facility]     QGH-91-HO: E11.9 ICD-9-CM: 250.00   
 B12 deficiency     ICD-10-CM: E53.8 ICD-9-CM: 266.2 Screening for colon cancer     ICD-10-CM: Z12.11 ICD-9-CM: V76.51 Vitals BP Pulse Temp Resp Height(growth percentile) Weight(growth percentile) 136/70 75 98.2 °F (36.8 °C) (Oral) 16 6' 2\" (1.88 m) 310 lb (140.6 kg) SpO2 BMI Smoking Status 97% 39.8 kg/m2 Never Smoker Vitals History BMI and BSA Data Body Mass Index Body Surface Area  
 39.8 kg/m 2 2.71 m 2 Preferred Pharmacy Pharmacy Name Phone [facility], [place]  [phone] Your Updated Medication List  
  
   
This list is accurate as of: 4/5/17  9:28 AM.  Always use your most recent med list.  
  
  
  
  
 ALPRAZolam 0.5 mg tablet Commonly known as:  Benny Fuse Take 1 Tab by mouth every twelve (12) hours as needed for Anxiety (Do not take when Valium is taken). Max Daily Amount: 1 mg. Back Brace Misc 1 Device by Does Not Apply route daily as needed for Pain. One, lumbosacral orthosis/back brace with metal struts      Medically necessary buPROPion  mg SR tablet Commonly known as:  WELLBUTRIN SR  
TAKE 1 TABLET TWICE DAILY DULoxetine 60 mg capsule Commonly known as:  CYMBALTA Take 1 Cap by mouth daily. IRON (DRIED) PO Take  by mouth.  
  
 isosorbide mononitrate ER 60 mg CR tablet Commonly known as:  IMDUR  
TAKE 1 TABLET EVERY MORNING  
  
 metFORMIN 500 mg tablet Commonly known as:  GLUCOPHAGE Take 1 Tab by mouth three (3) times daily (with meals). metoprolol succinate 50 mg XL tablet Commonly known as:  TOPROL-XL  
TAKE 1 TABLET EVERY DAY Morphine 50 mg ER capsule Commonly known as:  RACHAEL Take 1 Cap by mouth every twelve (12) hours for 30 days. Max Daily Amount: 2 Caps. For chronic pain. Start taking on:  4/6/2017  
  
 multivitamin tablet Commonly known as:  ONE A DAY Take 1 Tab by mouth daily. nitroglycerin 0.4 mg SL tablet Commonly known as:  NITROSTAT  
1 Tab by SubLINGual route every five (5) minutes as needed. Use for Chest Pain ; Call MD if > 3 tablets required  
  
 ondansetron hcl 4 mg tablet Commonly known as:  ZOFRAN (AS HYDROCHLORIDE) Take 1 Tab by mouth every eight (8) hours as needed for Nausea. pramipexole 0.5 mg tablet Commonly known as:  MIRAPEX TAKE 1 TABLET THREE TIMES DAILY  
  
 simvastatin 10 mg tablet Commonly known as:  ZOCOR  
TAKE 1 TABLET EVERY NIGHT  
  
 tapentadol 100 mg tablet Commonly known as:  Scarlet Edu Take 100 mg by mouth four (4) times daily as needed for Pain. Max Daily Amount: 400 mg. Start taking on:  4/6/2017  
  
 zolpidem 10 mg tablet Commonly known as:  AMBIEN Take 1 Tab by mouth nightly as needed for Sleep. Max Daily Amount: 10 mg.  
  
  
  
  
Prescriptions Printed Refills  
 zolpidem (AMBIEN) 10 mg tablet 0 Sig: Take 1 Tab by mouth nightly as needed for Sleep. Max Daily Amount: 10 mg.  
 Class: Print Route: Oral  
 ALPRAZolam (XANAX) 0.5 mg tablet 2 Sig: Take 1 Tab by mouth every twelve (12) hours as needed for Anxiety (Do not take when Valium is taken). Max Daily Amount: 1 mg. Class: Print Route: Oral  
  
Prescriptions Sent to Pharmacy Refills  
 isosorbide mononitrate ER (IMDUR) 60 mg CR tablet 3 Sig: TAKE 1 TABLET EVERY MORNING Class: Normal  
 Pharmacy: [facility] Ph #: [phone]  
 metFORMIN (GLUCOPHAGE) 500 mg tablet 3 Sig: Take 1 Tab by mouth three (3) times daily (with meals). Class: Normal  
 Pharmacy: [address] Ph #: [phone] Route: Oral  
 pramipexole (MIRAPEX) 0.5 mg tablet 3 Sig: TAKE 1 TABLET THREE TIMES DAILY Class: Normal  
 Pharmacy: [facility] Ph #: [phone]  
 simvastatin (ZOCOR) 10 mg tablet 3 Sig: TAKE 1 TABLET EVERY NIGHT Class: Normal  
 Pharmacy: [facility] Ph #: [phone]  
 buPROPion SR (WELLBUTRIN SR) 150 mg SR tablet 3 Sig: TAKE 1 TABLET TWICE DAILY Class: Normal  
 Pharmacy: [facility] [address] [place] [address] #: [phone]  
 metoprolol succinate (TOPROL-XL) 50 mg XL tablet 3 Sig: TAKE 1 TABLET EVERY DAY Class: Normal  
 Pharmacy: [facility] [address] [place] [address] #: [phone]  
 ondansetron hcl (ZOFRAN, AS HYDROCHLORIDE,) 4 mg tablet 0 Sig: Take 1 Tab by mouth every eight (8) hours as needed for Nausea. Class: Normal  
 Pharmacy: [address] #: [phone] Route: Oral  
  
We Performed the Following REFERRAL TO COLON AND RECTAL SURGERY [REF17 Custom] Comments:  
 Please evaluate patient for colonoscopy follow up. Follow-up Instructions Return in about 4 months (around 8/5/2017) for dm/htn/chol. To-Do List   
 04/05/2017 Lab:  ALT   
  
 04/05/2017 Lab:  AST   
  
 04/05/2017 Lab:  CBC WITH AUTOMATED DIFF   
  
 04/05/2017 Lab:  HEMOGLOBIN A1C WITH EAG   
  
 04/05/2017 Lab:  LIPID PANEL   
  
 04/05/2017 Lab:  VITAMIN B12   
  
 04/12/2017 Imaging:  XR CHEST PA LAT Referral Information Referral ID Referred By Referred To  
  
 [id number] MD [place] [id number] [address] [facility] [place], [address] [place] [address] Str. Phone: [phone] Fax: [phone] Visits Status Start Date End Date 1 New Request 4/5/17 4/5/18 If your referral has a status of pending review or denied, additional information will be sent to support the outcome of this decision. Patient Instructions Orlistat (By mouth) Orlistat (OR-li-stat) Helps you reach and maintain a healthy weight. Brand Name(s):Christoph, Xenical  
There may be other brand names for this medicine. When This Medicine Should Not Be Used: This medicine is not right for everyone.  Do not use this medicine if you had an allergic reaction to orlistat or if you are pregnant. How to Use This Medicine:  
Capsule · Follow the instructions on the medicine label if you are using this medicine without a prescription. · Take this medicine during each main meal or up to 1 hour after a meal. 
· This medicine works by keeping your body from absorbing some of the fat in your meal. If you skip a meal or eat a meal that does not contain any fat, do not take the medicine. It will not do anything. · Eat a well-balanced, reduced-calorie meal plan. Divide the amount of protein, fats, and carbohydrates evenly among your 3 daily meals. · Read and follow the patient instructions that come with this medicine. Talk to your doctor or pharmacist if you have any questions. · Missed dose: You may take this medicine up to 1 hour after the meal. If more than 1 hour has passed, skip your dose and just take your next dose with your next meal. Do not use extra medicine to make up for a missed dose. · Store the medicine in a closed container at room temperature, away from heat, moisture, and direct light. Drugs and Foods to Avoid: Ask your doctor or pharmacist before using any other medicine, including over-the-counter medicines, vitamins, and herbal products. · Some foods and medicines can affect how orlistat works. Tell your doctor if you use medicine to treat diabetes (such as insulin), medicine to treat seizures, or a blood thinner (such as warfarin). · Cyclosporine: Take at least 3 hours after you take orlistat. · Levothyroxine: Take at least 4 hours before or after you take orlistat. · Multivitamin supplement: Take at least 2 hours before or after you take orlistat, such as at bedtime. Warnings While Using This Medicine: · It is not safe to take this medicine during pregnancy. It could harm an unborn baby. Tell your doctor right away if you become pregnant.  
· Tell your doctor if you are breastfeeding, or if you have kidney disease, liver disease, digestion problems, diabetes, seizures, underactive thyroid, or an eating disorder (such as anorexia or bulimia). · This medicine may cause the following problems: 
¨ Liver damage ¨ Higher risk of kidney stones ¨ Gallbladder or bile flow problems, gallstones · Bowel side effects can be worse if you eat a meal that is more than 30% fat. The undigested fat comes out in your bowel movements. To reduce side effects, divide your daily intake of fat evenly over your 3 main meals each day. · Take a multivitamin supplement that contains vitamins A, D, E, and K with beta carotene. Orlistat keeps your body from absorbing some of the vitamins from food. · Keep all medicine out of the reach of children. Never share your medicine with anyone. Possible Side Effects While Using This Medicine:  
Call your doctor right away if you notice any of these side effects: · Allergic reaction: Itching or hives, swelling in your face or hands, swelling or tingling in your mouth or throat, chest tightness, trouble breathing · Dark urine or pale stools, nausea, vomiting, loss of appetite, stomach pain, yellow skin or eyes · Decrease in how much or how often you urinate, sharp back pain just below the ribs, or painful urination · Severe diarrhea · Severe stomach pain with nausea and vomiting If you notice these less serious side effects, talk with your doctor: · Fever, chills, cough, runny or stuffy nose, sore throat, and body aches · Increases in bowel movements, loss of bowel control, or the need to have a bowel movement right away · Passing gas with oily discharge · Passing oil or fat with your bowel movements or between bowel movements If you notice other side effects that you think are caused by this medicine, tell your doctor. Call your doctor for medical advice about side effects. You may report side effects to FDA at 5-321-QRO-7155 © 2016 [address] is for End User's use only and may not be sold, redistributed or otherwise used for commercial purposes. The above information is an  only. It is not intended as medical advice for individual conditions or treatments. Talk to your doctor, nurse or pharmacist before following any medical regimen to see if it is safe and effective for you. Dextromethorphan (By mouth) Dextromethorphan (dex-troe-meth-OR-fan) Treats a cough. Brand Name(s): Babee Cof Syrup, Children's Robitussin Cough Long Acting, Children's Triaminic Long Acting Cough, Cough DM, Creo-Terpin, Creomulsion Cough, Creomulsion For Children, Delsym, Dexalone, Dextromethorphan Polistirex, EntexPAC, Father Jacob's Medicine, [facility] Cough DM ER, [facility] Cough Relief, [facility] Tussin There may be other brand names for this medicine. When This Medicine Should Not Be Used: You should not use this medicine if you have had an allergic reaction to dextromethorphan. You should not use this medicine if you are also using an MAO inhibitor (such as Parnate®, Nardil®, or Marplan®) or if you have used one of these medicines in the past 2 weeks. Do not give any over-the-counter (OTC) cough and cold medicine to a baby or child under 3years old. Using these medicines in very young children might cause serious or possibly life-threatening side effects. How to Use This Medicine:  
Chewable Tablet, Lozenge, Liquid, Thin Sheet, Tablet, Capsule, Liquid Filled Capsule · Your doctor will tell you how much medicine to use. Do not use more than directed. · Follow the instructions on the medicine label if you are using this medicine without a prescription. · You may take this medicine with or without food. · The chewable tablet can be chewed or crushed or swallowed whole. Drink plenty of water after swallowing the tablet. · Slowly dissolve the lozenge in your mouth. · Shake the oral liquid well just before each use. Measure the oral liquid medicine with a marked measuring spoon, oral syringe, or medicine cup. If a dose is missed: · Take the missed dose as soon as you remember. It is not usually a concern if you miss one dose. · Skip the missed dose if it is almost time for your next dose. · You should not use two doses at the same time. How to Store and Dispose of This Medicine: · Store at room temperature, away from heat, light, and moisture. Do not freeze the oral liquid. · Keep all medicine away from children. Drugs and Foods to Avoid: Ask your doctor or pharmacist before using any other medicine, including over-the-counter medicines, vitamins, and herbal products. Warnings While Using This Medicine: · Make sure your doctor knows if you are pregnant or breastfeeding, or if you have asthma or liver disease. · This medicine may make you dizzy or drowsy. Avoid driving, using machines, or doing anything else that could be dangerous if you are not alert. Possible Side Effects While Using This Medicine: If you notice these less serious side effects, talk with your doctor: · Mild drowsiness, dizziness, nervousness, or restlessness · Nausea, vomiting, stomach pain If you notice other side effects that you think are caused by this medicine, tell your doctor. Call your doctor for medical advice about side effects. You may report side effects to FDA at 1-509-FDA-8188 © 2016 [address] is for End User's use only and may not be sold, redistributed or otherwise used for commercial purposes. The above information is an  only. It is not intended as medical advice for individual conditions or treatments. Talk to your doctor, nurse or pharmacist before following any medical regimen to see if it is safe and effective for you. Introducing [facility]! Dear Sunday Renner: Thank you for requesting a [employer] account. Our records indicate that you already have an active [employer] account. You can access your account anytime at [email or web address] [email or web address] Did you know that you can access your hospital and ER discharge instructions at any time in [employer]? You can also review all of your test results from your hospital stay or ER visit. Additional Information If you have questions, please visit the Frequently Asked Questions section of the [employer] website at [email or web address] Remember, [employer] is NOT to be used for urgent needs. For medical emergencies, dial 911. Now available from your iPhone and Android! Please provide this summary of care documentation to your next provider. Your primary care clinician is listed as [address]. If you have any questions after today's visit, please call [phone].

## 2017-04-05 NOTE — PROGRESS NOTES
HISTORY OF PRESENT ILLNESS  Dl William is a 58 y.o. male. HPI  Patient is here today for evaluation and treatment of: Pneumonia/Diabetes/Hypertension/Anxiety    Pneumonia: Pt develooped a pneumonia early march and was treated at [facility]. He is better but has a residual Cough; After his initial visit to [facility] he returned to [facility] for continued sx. He  was sent to [facility] to rule out a PE. He ruled out at that time. CTA reviewed. Does not think tessalon has helped with the residual cough. Diabetes:   Blood sugars have been stable; He has had values in to 90s- 100s; last A1c was controlled. Hypertension: BP is slightly elevated initially. Better at second check. BPs at pain management have been in the 845C systolic. Wants to lose weight;  States he lost 50 pounds with phentermine before he hurt his back. Anxiety: pt continues to have anxiety. On meds as listed below;  States that he has been told he has low B12;  Has started taking iron for a low blood count. Current Outpatient Prescriptions:     isosorbide mononitrate ER (IMDUR) 60 mg CR tablet, TAKE 1 TABLET EVERY MORNING, Disp: 90 Tab, Rfl: 3    zolpidem (AMBIEN) 10 mg tablet, Take 1 Tab by mouth nightly as needed for Sleep. Max Daily Amount: 10 mg., Disp: 90 Tab, Rfl: 0    ALPRAZolam (XANAX) 0.5 mg tablet, Take 1 Tab by mouth every twelve (12) hours as needed for Anxiety (Do not take when Valium is taken). Max Daily Amount: 1 mg., Disp: 60 Tab, Rfl: 2    metFORMIN (GLUCOPHAGE) 500 mg tablet, Take 1 Tab by mouth three (3) times daily (with meals). , Disp: 270 Tab, Rfl: 3    pramipexole (MIRAPEX) 0.5 mg tablet, TAKE 1 TABLET THREE TIMES DAILY, Disp: 270 Tab, Rfl: 3    simvastatin (ZOCOR) 10 mg tablet, TAKE 1 TABLET EVERY NIGHT, Disp: 90 Tab, Rfl: 3    buPROPion SR (WELLBUTRIN SR) 150 mg SR tablet, TAKE 1 TABLET TWICE DAILY, Disp: 180 Tab, Rfl: 3    metoprolol succinate (TOPROL-XL) 50 mg XL tablet, TAKE 1 TABLET EVERY DAY, Disp: 90 Tab, Rfl: 3    ondansetron hcl (ZOFRAN, AS HYDROCHLORIDE,) 4 mg tablet, Take 1 Tab by mouth every eight (8) hours as needed for Nausea., Disp: 20 Tab, Rfl: 0    FERROUS SULFATE, DRIED (IRON, DRIED, PO), Take  by mouth., Disp: , Rfl:     DULoxetine (CYMBALTA) 60 mg capsule, Take 1 Cap by mouth daily. , Disp: 90 Cap, Rfl: 2    [START ON 4/6/2017] tapentadol (NUCYNTA) 100 mg tablet, Take 100 mg by mouth four (4) times daily as needed for Pain. Max Daily Amount: 400 mg., Disp: 120 Tab, Rfl: 0    [START ON 4/6/2017] Morphine (RACHAEL) 50 mg ER capsule, Take 1 Cap by mouth every twelve (12) hours for 30 days. Max Daily Amount: 2 Caps. For chronic pain., Disp: 60 Cap, Rfl: 0    nitroglycerin (NITROSTAT) 0.4 mg SL tablet, 1 Tab by SubLINGual route every five (5) minutes as needed. Use for Chest Pain ; Call MD if > 3 tablets required, Disp: 1 Bottle, Rfl: 0    Back Brace misc, 1 Device by Does Not Apply route daily as needed for Pain. One, lumbosacral orthosis/back brace with metal struts      Medically necessary, Disp: 1 Device, Rfl: 0    multivitamin (ONE A DAY) tablet, Take 1 Tab by mouth daily. , Disp: , Rfl:       PMH,  Meds, Allergies, Family History, Social history reviewed    Review of Systems   Constitutional: Negative for chills and fever. Respiratory: Positive for cough. Negative for shortness of breath and wheezing. Cardiovascular: Negative for chest pain and palpitations.        Physical Exam   Visit Vitals    /70    Pulse 75    Temp 98.2 °F (36.8 °C) (Oral)    Resp 16    Ht 6' 2\" (1.88 m)    Wt 310 lb (140.6 kg)    SpO2 97%    BMI 39.8 kg/m2     General appearance: alert, cooperative, no distress, appears stated age  Neck: supple, symmetrical, trachea midline, no adenopathy, thyroid: not enlarged, symmetric, no tenderness/mass/nodules, no carotid bruit and no JVD  Lungs: clear to auscultation bilaterally  Heart: regular rate and rhythm, S1, S2 normal, no murmur, click, rub or gallop  Extremities: extremities normal, atraumatic, no edema      Lab Results   Component Value Date/Time    Glucose 149 03/12/2017 04:28 PM    Glucose (POC) 122 01/18/2017 07:03 AM     Lab Results   Component Value Date/Time    Hemoglobin A1c 6.6 12/07/2016 09:27 AM     Lab Results   Component Value Date/Time    Sodium 139 03/12/2017 04:28 PM    Potassium 4.8 03/12/2017 04:28 PM    Chloride 103 03/12/2017 04:28 PM    CO2 25 03/12/2017 04:05 PM    CO2, TOTAL 24 03/12/2017 04:28 PM    Anion gap 8 12/07/2016 09:27 AM    Glucose 149 03/12/2017 04:28 PM    BUN 20 03/12/2017 04:28 PM    Creatinine 1.1 03/12/2017 04:28 PM    BUN/Creatinine ratio 16 12/07/2016 09:27 AM    GFR est AA >60 03/12/2017 04:05 PM    GFR est non-AA >60 03/12/2017 04:05 PM    Calcium 9.5 03/12/2017 04:05 PM     Lab Results   Component Value Date/Time    Cholesterol, total 98 12/07/2016 09:27 AM    HDL Cholesterol 38 12/07/2016 09:27 AM    LDL, calculated 39 12/07/2016 09:27 AM    VLDL, calculated 21 12/07/2016 09:27 AM    Triglyceride 105 12/07/2016 09:27 AM    CHOL/HDL Ratio 2.6 12/07/2016 09:27 AM           ASSESSMENT and PLAN    ICD-10-CM ICD-9-CM    1. CAP (community acquired pneumonia)- new J18.9 486 XR CHEST PA LAT   2. Anxiety- stable F41.9 300.00    3. Essential hypertension- stable I10 401.9 LIPID PANEL   4. Diabetes mellitus without complication ([facility] [address]- stable E11.9 250.00 HEMOGLOBIN A1C WITH EAG      LIPID PANEL      AST      ALT   5. B12 deficiency- ? status E53.8 266.2 VITAMIN B12      CBC WITH AUTOMATED DIFF   6.  Screening for colon cancer Z12.11 V76.51 REFERRAL TO COLON AND RECTAL SURGERY       As above,    treatment plan as listed below  Orders Placed This Encounter    XR CHEST PA LAT    HEMOGLOBIN A1C WITH EAG    LIPID PANEL    AST    ALT    VITAMIN B12    CBC WITH AUTOMATED DIFF    REFERRAL TO COLON AND RECTAL SURGERY    isosorbide mononitrate ER (IMDUR) 60 mg CR tablet    zolpidem (AMBIEN) 10 mg tablet    ALPRAZolam Molly Garcia) 0.5 mg tablet    metFORMIN (GLUCOPHAGE) 500 mg tablet    pramipexole (MIRAPEX) 0.5 mg tablet    simvastatin (ZOCOR) 10 mg tablet    buPROPion SR (WELLBUTRIN SR) 150 mg SR tablet    metoprolol succinate (TOPROL-XL) 50 mg XL tablet    ondansetron hcl (ZOFRAN, AS HYDROCHLORIDE,) 4 mg tablet    FERROUS SULFATE, DRIED (IRON, DRIED, PO)     Consider Christoph otc  Consider delsym for residual cough from pneumonia  meds and labs as ordered  Follow-up Disposition:  Return in about 4 months (around 8/5/2017) for dm/htn/chol. An After Visit Summary was printed and given to the patient. Colonoscopy/ colo rectal referral  This has been fully explained to the patient, who indicates understanding.

## 2017-04-17 RX ORDER — SIMVASTATIN 10 MG/1
TABLET, FILM COATED ORAL
Qty: 90 TAB | Refills: 1 | Status: SHIPPED | OUTPATIENT
Start: 2017-04-17 | End: 2017-08-02 | Stop reason: SDUPTHER

## 2017-04-17 RX ORDER — METFORMIN HYDROCHLORIDE 500 MG/1
TABLET ORAL
Qty: 270 TAB | Refills: 1 | Status: SHIPPED | OUTPATIENT
Start: 2017-04-17 | End: 2017-08-02 | Stop reason: SDUPTHER

## 2017-04-17 RX ORDER — ISOSORBIDE MONONITRATE 60 MG/1
TABLET, EXTENDED RELEASE ORAL
Qty: 90 TAB | Refills: 0 | Status: SHIPPED | OUTPATIENT
Start: 2017-04-17 | End: 2017-06-20 | Stop reason: SDUPTHER

## 2017-04-17 RX ORDER — BUPROPION HYDROCHLORIDE 150 MG/1
TABLET, EXTENDED RELEASE ORAL
Qty: 180 TAB | Refills: 1 | Status: SHIPPED | OUTPATIENT
Start: 2017-04-17 | End: 2017-08-02 | Stop reason: SDUPTHER

## 2017-04-17 RX ORDER — METOPROLOL SUCCINATE 50 MG/1
TABLET, EXTENDED RELEASE ORAL
Qty: 90 TAB | Refills: 1 | Status: SHIPPED | OUTPATIENT
Start: 2017-04-17 | End: 2017-08-02 | Stop reason: SDUPTHER

## 2017-04-17 RX ORDER — PRAMIPEXOLE DIHYDROCHLORIDE 0.5 MG/1
TABLET ORAL
Qty: 270 TAB | Refills: 1 | Status: SHIPPED | OUTPATIENT
Start: 2017-04-17 | End: 2017-08-02 | Stop reason: SDUPTHER

## 2017-04-18 ENCOUNTER — TELEPHONE (OUTPATIENT)
Dept: FAMILY MEDICINE CLINIC | Age: 62
End: 2017-04-18

## 2017-04-20 ENCOUNTER — HOSPITAL ENCOUNTER (OUTPATIENT)
Dept: GENERAL RADIOLOGY | Age: 62
Discharge: HOME | End: 2017-04-20
Payer: MEDICARE

## 2017-04-20 DIAGNOSIS — J18.9 CAP (COMMUNITY ACQUIRED PNEUMONIA): ICD-10-CM

## 2017-04-20 PROCEDURE — 71020 XR CHEST PA LAT: CPT

## 2017-04-28 ENCOUNTER — TELEPHONE (OUTPATIENT)
Dept: FAMILY MEDICINE CLINIC | Age: 62
End: 2017-04-28

## 2017-04-28 NOTE — TELEPHONE ENCOUNTER
Pt called stating they need the nurse of Dr. Yareli Coello to call  [phone] they need to ok getting his food from [facility].  Please call pt if any questions [phone]

## 2017-05-01 NOTE — TELEPHONE ENCOUNTER
Spoke with patient to clarify request. Patient is approved for a program through insurance company to receive 20 days worth of food a month due to patient is a diabetic. Patient states that program representative needs the provider to confirm that patient is a diabetic. Will inform Dr. Shannan Sheldon. Patient verbalized understanding.

## 2017-05-02 NOTE — TELEPHONE ENCOUNTER
Spoke with Irene Russell, permission to release on file, to inform that phone number [phone] is for [facility] and representative could not find an order for the patient. Representative stated no food items were processed from this company. Mrs. William verbalized understanding and will call back with needed information.

## 2017-05-02 NOTE — TELEPHONE ENCOUNTER
Spoke with Margarita Lopez with [employer] that could not find an order for patient and stated that no meal plans are provided with this company. Will inform patient.

## 2017-05-04 ENCOUNTER — OFFICE VISIT (OUTPATIENT)
Dept: PAIN MANAGEMENT | Age: 62
End: 2017-05-04

## 2017-05-04 VITALS — HEART RATE: 84 BPM | DIASTOLIC BLOOD PRESSURE: 78 MMHG | RESPIRATION RATE: 19 BRPM | SYSTOLIC BLOOD PRESSURE: 130 MMHG

## 2017-05-04 DIAGNOSIS — G89.29 CHRONIC MIDLINE LOW BACK PAIN WITH SCIATICA, SCIATICA LATERALITY UNSPECIFIED: ICD-10-CM

## 2017-05-04 DIAGNOSIS — Z79.899 ENCOUNTER FOR LONG-TERM (CURRENT) USE OF MEDICATIONS: ICD-10-CM

## 2017-05-04 DIAGNOSIS — Z98.890 S/P LUMBAR LAMINECTOMY: ICD-10-CM

## 2017-05-04 DIAGNOSIS — Z98.1 S/P LUMBAR SPINAL FUSION: ICD-10-CM

## 2017-05-04 DIAGNOSIS — M47.816 LUMBAR FACET ARTHROPATHY: ICD-10-CM

## 2017-05-04 DIAGNOSIS — M51.36 DDD (DEGENERATIVE DISC DISEASE), LUMBAR: ICD-10-CM

## 2017-05-04 DIAGNOSIS — M54.16 LUMBAR NERVE ROOT IMPINGEMENT: ICD-10-CM

## 2017-05-04 DIAGNOSIS — G89.4 CHRONIC PAIN SYNDROME: Primary | ICD-10-CM

## 2017-05-04 DIAGNOSIS — F41.1 GAD (GENERALIZED ANXIETY DISORDER): ICD-10-CM

## 2017-05-04 DIAGNOSIS — M51.34 DDD (DEGENERATIVE DISC DISEASE), THORACIC: ICD-10-CM

## 2017-05-04 DIAGNOSIS — K59.09 CONSTIPATION, CHRONIC: ICD-10-CM

## 2017-05-04 DIAGNOSIS — M96.1 POSTLAMINECTOMY SYNDROME, LUMBAR REGION: ICD-10-CM

## 2017-05-04 DIAGNOSIS — M54.17 LUMBOSACRAL RADICULOPATHY AT S1: ICD-10-CM

## 2017-05-04 DIAGNOSIS — Z86.59 HISTORY OF DEPRESSION: ICD-10-CM

## 2017-05-04 DIAGNOSIS — M54.40 CHRONIC MIDLINE LOW BACK PAIN WITH SCIATICA, SCIATICA LATERALITY UNSPECIFIED: ICD-10-CM

## 2017-05-04 LAB
ALCOHOL UR POC: NORMAL
AMPHETAMINES UR POC: NEGATIVE
BARBITURATES UR POC: NEGATIVE
BENZODIAZEPINES UR POC: NORMAL
BUPRENORPHINE UR POC: NORMAL
CANNABINOIDS UR POC: NEGATIVE
CARISOPRODOL UR POC: NORMAL
COCAINE UR POC: NEGATIVE
FENTANYL UR POC: NORMAL
MDMA/ECSTASY UR POC: NEGATIVE
METHADONE UR POC: NEGATIVE
METHAMPHETAMINE UR POC: NEGATIVE
METHYLPHENIDATE UR POC: NEGATIVE
OPIATES UR POC: NORMAL
OXYCODONE UR POC: NEGATIVE
PHENCYCLIDINE UR POC: NEGATIVE
PROPOXYPHENE UR POC: NORMAL
TRAMADOL UR POC: NORMAL
TRICYCLICS UR POC: NEGATIVE

## 2017-05-04 RX ORDER — MORPHINE SULFATE 15 MG/1
15 TABLET ORAL
Qty: 180 TAB | Refills: 0 | Status: SHIPPED | OUTPATIENT
Start: 2017-05-04 | End: 2017-05-04

## 2017-05-04 RX ORDER — MORPHINE SULFATE 15 MG/1
15 TABLET ORAL
Qty: 120 TAB | Refills: 0 | Status: SHIPPED | OUTPATIENT
Start: 2017-05-04 | End: 2017-05-04

## 2017-05-04 RX ORDER — NALOXONE HYDROCHLORIDE 4 MG/.1ML
4 SPRAY NASAL AS NEEDED
Qty: 1 BOX | Refills: 0 | Status: SHIPPED | OUTPATIENT
Start: 2017-05-04

## 2017-05-04 RX ORDER — MORPHINE SULFATE 15 MG/1
15 TABLET ORAL
Qty: 120 TAB | Refills: 0 | Status: SHIPPED | OUTPATIENT
Start: 2017-06-03 | End: 2017-05-04 | Stop reason: SDUPTHER

## 2017-05-04 RX ORDER — MORPHINE SULFATE 15 MG/1
15 TABLET ORAL
Qty: 120 TAB | Refills: 0 | Status: SHIPPED | OUTPATIENT
Start: 2017-07-02 | End: 2017-07-27 | Stop reason: SDUPTHER

## 2017-05-04 RX ORDER — MORPHINE SULFATE 50 MG/1
50 CAPSULE, EXTENDED RELEASE ORAL EVERY 12 HOURS
Qty: 60 CAP | Refills: 0 | Status: SHIPPED | OUTPATIENT
Start: 2017-07-02 | End: 2017-07-27 | Stop reason: SDUPTHER

## 2017-05-04 RX ORDER — DULOXETIN HYDROCHLORIDE 60 MG/1
60 CAPSULE, DELAYED RELEASE ORAL DAILY
Qty: 90 CAP | Refills: 2 | Status: SHIPPED | OUTPATIENT
Start: 2017-05-04 | End: 2017-07-27 | Stop reason: SDUPTHER

## 2017-05-04 RX ORDER — DIAZEPAM 5 MG/1
5 TABLET ORAL
Qty: 30 TAB | Refills: 2 | Status: SHIPPED | OUTPATIENT
Start: 2017-05-04 | End: 2017-07-27 | Stop reason: SDUPTHER

## 2017-05-04 RX ORDER — GABAPENTIN 800 MG/1
800 TABLET ORAL 3 TIMES DAILY
Qty: 90 TAB | Refills: 5 | Status: SHIPPED | OUTPATIENT
Start: 2017-05-04 | End: 2017-07-27 | Stop reason: SDUPTHER

## 2017-05-04 RX ORDER — MORPHINE SULFATE 50 MG/1
50 CAPSULE, EXTENDED RELEASE ORAL EVERY 12 HOURS
Qty: 60 CAP | Refills: 0 | Status: SHIPPED | OUTPATIENT
Start: 2017-06-03 | End: 2017-07-03

## 2017-05-04 RX ORDER — MORPHINE SULFATE 50 MG/1
50 CAPSULE, EXTENDED RELEASE ORAL EVERY 12 HOURS
Qty: 60 CAP | Refills: 0 | Status: SHIPPED | OUTPATIENT
Start: 2017-05-04 | End: 2017-07-27 | Stop reason: SDUPTHER

## 2017-05-04 NOTE — MR AVS SNAPSHOT
Visit Information Date & Time Provider Department Dept. Phone Encounter #  
 5/4/2017  9:00 AM Justino Cristobal MD [address] [place] [address] for Pain Management [id number] Follow-up Instructions Return in about 3 months (around 8/4/2017). Your Appointments 8/2/2017  8:20 AM  
[facility] Appt Note: fu on dm ,htn, chol [address] [address] 2 [place] [address] [address] Upcoming Health Maintenance Date Due ZOSTER VACCINE AGE 60> 5/24/2017* COLONOSCOPY 8/23/2017* MICROALBUMIN Q1 6/13/2017 INFLUENZA AGE 9 TO ADULT 8/1/2017 FOOT EXAM Q1 8/8/2017 HEMOGLOBIN A1C Q6M 10/5/2017 EYE EXAM RETINAL OR DILATED Q1 10/5/2017 LIPID PANEL Q1 4/5/2018 DTaP/Tdap/Td series (2 - Td) 8/8/2026 *Topic was postponed. The date shown is not the original due date. Allergies as of 5/4/2017  Review Complete On: 5/4/2017 By: Justino Cristobal MD  
  
 Severity Noted Reaction Type Reactions Opana [Oxymorphone]  02/19/2013    Other (comments) Feels like bug crawling under skin and drowziness Current Immunizations  Reviewed on 12/7/2016 Name Date Influenza Vaccine (Quad) PF 12/7/2016 Pneumococcal Conjugate (PCV-13) 8/8/2016 Tdap 8/8/2016 Not reviewed this visit You Were Diagnosed With   
  
 Codes Comments Chronic pain syndrome    -  Primary ICD-10-CM: G89.4 ICD-9-CM: 338.4 Encounter for long-term (current) use of medications     ICD-10-CM: Z79.899 ICD-9-CM: V58.69   
 DDD (degenerative disc disease), lumbar     ICD-10-CM: M51.36 
ICD-9-CM: 722.52 Lumbar facet arthropathy (HCC)     ICD-10-CM: M12.88 ICD-9-CM: 721.3 S/P lumbar laminectomy     ICD-10-CM: H53.152 ICD-9-CM: V45.89 S/P lumbar spinal fusion     ICD-10-CM: Z98.1 ICD-9-CM: V45.4 Lumbar nerve root impingement     ICD-10-CM: M54.16 
ICD-9-CM: 724.4 Lumbosacral radiculopathy at S1     ICD-10-CM: M54.17 ICD-9-CM: 724.4 DDD (degenerative disc disease), thoracic     ICD-10-CM: M51.34 
ICD-9-CM: 722.51   
 TRUE (generalized anxiety disorder)     ICD-10-CM: F41.1 ICD-9-CM: 300.02 Constipation, chronic     ICD-10-CM: K59.09 
ICD-9-CM: 564.00 Postlaminectomy syndrome, lumbar region     ICD-10-CM: M96.1 ICD-9-CM: 722.83 History of depression     ICD-10-CM: Z86.59 
ICD-9-CM: V11.8 Chronic midline low back pain with sciatica, sciatica laterality unspecified     ICD-10-CM: M54.40, G89.29 ICD-9-CM: 724.2, 724.3, 338.29 Vitals BP Pulse Resp Smoking Status 130/78 84 19 Never Smoker Vitals History Preferred Pharmacy Pharmacy Name Phone [address]  [address] [place] [address] [phone] Your Updated Medication List  
  
   
This list is accurate as of: 5/4/17 10:33 AM.  Always use your most recent med list.  
  
  
  
  
 ALPRAZolam 0.5 mg tablet Commonly known as:  [place] Agnieszka Take 1 Tab by mouth every twelve (12) hours as needed for Anxiety (Do not take when Valium is taken). Max Daily Amount: 1 mg. Back Brace Misc 1 Device by Does Not Apply route daily as needed for Pain. One, lumbosacral orthosis/back brace with metal struts      Medically necessary buPROPion  mg SR tablet Commonly known as:  WELLBUTRIN SR  
TAKE 1 TABLET TWICE DAILY  
  
 diazePAM 5 mg tablet Commonly known as:  VALIUM Take 1 Tab by mouth nightly as needed for Sleep for up to 30 days. DULoxetine 60 mg capsule Commonly known as:  CYMBALTA Take 1 Cap by mouth daily. gabapentin 800 mg tablet Commonly known as:  NEURONTIN Take 1 Tab by mouth three (3) times daily for 30 days. IRON (DRIED) PO Take  by mouth.  
  
 isosorbide mononitrate ER 60 mg CR tablet Commonly known as:  IMDUR  
 TAKE 1 TABLET EVERY MORNING  
  
 metFORMIN 500 mg tablet Commonly known as:  GLUCOPHAGE  
TAKE 1 TABLET THREE TIMES DAILY WITH MEALS  
  
 metoprolol succinate 50 mg XL tablet Commonly known as:  TOPROL-XL  
TAKE 1 TABLET EVERY DAY  
  
 * Morphine 50 mg ER capsule Commonly known as:  RACHAEL Take 1 Cap by mouth every twelve (12) hours for 30 days. Max Daily Amount: 2 Caps. For chronic pain. * Morphine 50 mg ER capsule Commonly known as:  RACHAEL Take 1 Cap by mouth every twelve (12) hours for 30 days. Max Daily Amount: 2 Caps. For chronic pain. Start taking on:  6/3/2017 * Morphine 50 mg ER capsule Commonly known as:  RACHAEL Take 1 Cap by mouth every twelve (12) hours for 30 days. Max Daily Amount: 2 Caps. For chronic pain. Start taking on:  7/2/2017 * morphine IR 15 mg tablet Commonly known as:  MS IR Take 1 Tab by mouth four (4) times daily as needed for Pain. Max Daily Amount: 60 mg. Start taking on:  7/2/2017  
  
 multivitamin tablet Commonly known as:  ONE A DAY Take 1 Tab by mouth daily. naloxone 4 mg/actuation Spry 4 mg by Nasal route as needed. nitroglycerin 0.4 mg SL tablet Commonly known as:  NITROSTAT  
1 Tab by SubLINGual route every five (5) minutes as needed. Use for Chest Pain ; Call MD if > 3 tablets required  
  
 ondansetron hcl 4 mg tablet Commonly known as:  ZOFRAN (AS HYDROCHLORIDE) Take 1 Tab by mouth every eight (8) hours as needed for Nausea. pramipexole 0.5 mg tablet Commonly known as:  MIRAPEX TAKE 1 TABLET THREE TIMES DAILY  
  
 simvastatin 10 mg tablet Commonly known as:  ZOCOR  
TAKE 1 TABLET EVERY NIGHT  
  
 tapentadol 100 mg tablet Commonly known as:  Pat Mayans Take 100 mg by mouth four (4) times daily as needed for Pain. Max Daily Amount: 400 mg.  
  
 zolpidem 10 mg tablet Commonly known as:  AMBIEN Take 1 Tab by mouth nightly as needed for Sleep. Max Daily Amount: 10 mg. * Notice: This list has 4 medication(s) that are the same as other medications prescribed for you. Read the directions carefully, and ask your doctor or other care provider to review them with you. Prescriptions Printed Refills  
 diazePAM (VALIUM) 5 mg tablet 2 Sig: Take 1 Tab by mouth nightly as needed for Sleep for up to 30 days. Class: Print Route: Oral  
 Morphine (RACHAEL) 50 mg ER capsule 0 Sig: Take 1 Cap by mouth every twelve (12) hours for 30 days. Max Daily Amount: 2 Caps. For chronic pain. Class: Print Route: Oral  
 Morphine (RACHAEL) 50 mg ER capsule 0 Starting on: 2017 Sig: Take 1 Cap by mouth every twelve (12) hours for 30 days. Max Daily Amount: 2 Caps. For chronic pain. Class: Print Route: Oral  
 Morphine (RACHAEL) 50 mg ER capsule 0 Starting on: 6/3/2017 Sig: Take 1 Cap by mouth every twelve (12) hours for 30 days. Max Daily Amount: 2 Caps. For chronic pain. Class: Print Route: Oral  
 naloxone 4 mg/actuation spry 0 Si mg by Nasal route as needed. Class: Print Route: Nasal  
 morphine IR (MS IR) 15 mg tablet 0 Starting on: 2017 Sig: Take 1 Tab by mouth four (4) times daily as needed for Pain. Max Daily Amount: 60 mg.  
 Class: Print Route: Oral  
  
Prescriptions Sent to Pharmacy Refills DULoxetine (CYMBALTA) 60 mg capsule 2 Sig: Take 1 Cap by mouth daily. Class: Normal  
 Pharmacy: [address] [place] [address] Ph #: [phone] Route: Oral  
 gabapentin (NEURONTIN) 800 mg tablet 5 Sig: Take 1 Tab by mouth three (3) times daily for 30 days. Class: Normal  
 Pharmacy: [address] [place] [address] Ph #: [phone] Route: Oral  
  
We Performed the Following AMB POC DRUG SCREEN () [ [facility]] DRUG SCREEN [[id number] Custom] Follow-up Instructions Return in about 3 months (around 8/4/2017). Introducing [facility]! Dear Marely Giraldo: Thank you for requesting a [employer] account. Our records indicate that you have previously registered for a [employer] account but its currently inactive. Please call our [employer] support line at [phone]. Additional Information If you have questions, please visit the Frequently Asked Questions section of the [employer] website at [email or web address] Remember, [employer] is NOT to be used for urgent needs. For medical emergencies, dial 911. Now available from your iPhone and Android! Please provide this summary of care documentation to your next provider. Your primary care clinician is listed as [address]. If you have any questions after today's visit, please call [phone].

## 2017-05-04 NOTE — PROGRESS NOTES
Nursing Notes    Patient presents to the office today in follow-up. Reviewed medications with counts as follows:    Rx Date filled Qty Dispensed Pill Count Last Dose Short   nucynta 100 mg 4/13/17 120 120 Last night no   Morphine er 50mg 4/13/17 60 25 today no   Diazepam 5 mg 3/15/17 30 48 2 days ago no   Mr. William has a reminder for a \"due or due soon\" health maintenance. I have asked that he contact his primary care provider for follow-up on this health maintenance. POC UDS was performed in office today    Any new labs or imaging since last appointment? YES  Chest xray     Have you been to an emergency room (ER) or urgent care clinic since your last visit? YES          [facility] ER for an acute cough    Have you been hospitalized since your last visit? NO     If yes, where, when, and reason for visit? Have you seen or consulted any other health care providers outside of the [address] [place] [address]  since your last visit?   YES     If yes, where, when, and reason for visit?   [facility]

## 2017-05-04 NOTE — PROGRESS NOTES
HISTORY OF PRESENT ILLNESS  Trace William is a 58 y.o. male. HPI Comments: Meds help with pain control and quality of life. No new side effects reported today. Visit survey reviewed and will be scanned.  reviewed. Recent average level of pain(out of 10)-9  Chief complaint chronic pain  Low back pain with symptoms in the both legs, left greater than right  We have previously discussed some changes and he would like to follow through with those changes at this time  He has been using Nucynta 100 mg 4 times a day as needed but does not feel it is helpful. We previously discussed and we will make to change today. Stop Nucynta, start immediate release morphine 15 mg 4 times a day as needed  Continue long-acting morphine 50 mg twice a day  Continue Cymbalta and gabapentin  Continue diazepam 5 mg, to help with muscle spasms and sleep in the evening/nighttime  He previously had mentioned some urinary incontinence issues, he had mentioned this quite a while ago. Today he reminded me that these problems started after spine surgery. It is my understanding that surgeon and other physicians were aware of this. Today he told me his urinary incontinence is progressively deteriorated. I emphasized the importance that he should see a spine surgeon and a neurologist as soon as possible. Alprazolam, Ambien prescribed by different clinic            Review of Systems   Constitutional: Negative for chills and fever. HENT: Negative for ear discharge and ear pain. Eyes: Negative for pain and discharge. Respiratory: Negative for hemoptysis and wheezing. Gastrointestinal: Negative for blood in stool and melena. Genitourinary: Negative for dysuria and hematuria. Musculoskeletal: Positive for back pain and myalgias. Negative for neck pain. Skin: Negative for itching and rash. Neurological: Negative for seizures and loss of consciousness.    Psychiatric/Behavioral: Negative for hallucinations and suicidal ideas.       Physical Exam   Constitutional: He appears well-developed and well-nourished. He is cooperative. He does not have a sickly appearance. HENT:   Head: Normocephalic and atraumatic. Right Ear: External ear normal. No drainage. Left Ear: External ear normal. No drainage. Nose: Nose normal.   Eyes: Lids are normal. Right eye exhibits no discharge. Left eye exhibits no discharge. Right conjunctiva has no hemorrhage. Left conjunctiva has no hemorrhage. Neck: Neck supple. No tracheal deviation present. No thyroid mass present. Pulmonary/Chest: Effort normal. No respiratory distress. Neurological: He is alert. No cranial nerve deficit. Skin: Skin is intact. No rash noted. Psychiatric: His speech is normal. His affect is not angry. He does not express inappropriate judgment. Nursing note and vitals reviewed. ASSESSMENT and PLAN  Encounter Diagnoses   Name Primary?  Chronic pain syndrome Yes    Encounter for long-term (current) use of medications     DDD (degenerative disc disease), lumbar     Lumbar facet arthropathy (HCC)     S/P lumbar laminectomy     S/P lumbar spinal fusion     Lumbar nerve root impingement     Lumbosacral radiculopathy at S1     DDD (degenerative disc disease), thoracic     TRUE (generalized anxiety disorder)     Constipation, chronic     Postlaminectomy syndrome, lumbar region     History of depression     Chronic midline low back pain with sciatica, sciatica laterality unspecified     medications including changes as reviewed above  Follow-up 2 months  -Because the patient's current regimen places him/her at increased risk for possible overdose, a prescription for naloxone is being provided. The patient understands that this medication is only to be used in the setting of a possible overdose and that inadvertent use of this medication could precipitate overt withdrawal.  I discussed naloxone with the patient today.   In addition, the patient has received the naloxone instruction sheet. --Urine test or oral swab today. Also, the prescription monitoring program was reviewed today. The majority of today's visit was spent counseling and coordinating care. Total visit time-40 minutes. -Dragon medical dictation software was used for portions of this report. Unintended errors may occur.

## 2017-05-31 NOTE — TELEPHONE ENCOUNTER
Pt called stating he needs test strips for one touch ultra he is checking his blood sugar twice a day since it had been high, pt wants to use [employer] a 90 day supply if possible please advise [phone] (Routing comment)

## 2017-06-01 NOTE — TELEPHONE ENCOUNTER
Spoke with patient to inform that medication order for test strips was sent to [facility]. Patient verbalized understanding.

## 2017-06-11 RX ORDER — ZOLPIDEM TARTRATE 10 MG/1
TABLET ORAL
Qty: 90 TAB | Refills: 0 | Status: SHIPPED | OUTPATIENT
Start: 2017-06-11 | End: 2017-10-23 | Stop reason: SDUPTHER

## 2017-06-13 ENCOUNTER — TELEPHONE (OUTPATIENT)
Dept: FAMILY MEDICINE CLINIC | Age: 62
End: 2017-06-13

## 2017-06-14 DIAGNOSIS — E11.9 CONTROLLED TYPE 2 DIABETES MELLITUS WITHOUT COMPLICATION, UNSPECIFIED LONG TERM INSULIN USE STATUS: Primary | ICD-10-CM

## 2017-06-15 ENCOUNTER — OFFICE VISIT (OUTPATIENT)
Dept: PAIN MANAGEMENT | Age: 62
End: 2017-06-15

## 2017-06-15 VITALS
SYSTOLIC BLOOD PRESSURE: 132 MMHG | WEIGHT: 300 LBS | BODY MASS INDEX: 38.52 KG/M2 | DIASTOLIC BLOOD PRESSURE: 64 MMHG | HEART RATE: 61 BPM

## 2017-06-15 DIAGNOSIS — G89.4 CHRONIC PAIN SYNDROME: Primary | ICD-10-CM

## 2017-06-15 DIAGNOSIS — M47.816 LUMBAR FACET ARTHROPATHY: ICD-10-CM

## 2017-06-15 DIAGNOSIS — M96.1 LUMBAR POST-LAMINECTOMY SYNDROME: ICD-10-CM

## 2017-06-15 DIAGNOSIS — M51.36 DDD (DEGENERATIVE DISC DISEASE), LUMBAR: ICD-10-CM

## 2017-06-15 DIAGNOSIS — M47.817 LUMBAR AND SACRAL OSTEOARTHRITIS: ICD-10-CM

## 2017-06-15 RX ORDER — BLOOD-GLUCOSE METER
EACH MISCELLANEOUS
Qty: 1 EACH | Refills: 0 | Status: SHIPPED | OUTPATIENT
Start: 2017-06-15 | End: 2018-06-07 | Stop reason: SDUPTHER

## 2017-06-15 RX ORDER — LANCETS
EACH MISCELLANEOUS
Qty: 1 EACH | Refills: 11 | Status: SHIPPED | OUTPATIENT
Start: 2017-06-15 | End: 2018-08-29 | Stop reason: SDUPTHER

## 2017-06-15 NOTE — PROGRESS NOTES
[facility]  Interventional Pain Management Consultation History & Physical    PATIENT NAME:  Adelso William     YOB: 1955    DATE OF SERVICE:   6/15/2017      CHIEF COMPLAINT:  No chief complaint on file. REASON FOR VISIT:   Foster Slaughter presents to the pain clinic today for follow on evaluation and to consider interventional pain management options as indicated for the type and location of the pain the patient is presenting with. HISTORY OF PRESENT ILLNESS: Patient returns for follow-up evaluation and to discuss interventional procedures as we may be able to offer him at this time. He continues to have recurring severe low back pain and bilateral radiating leg pain. Pain radiates down the right leg to the level of the knee, pain radiates down left leg to the level of the foot. He endorses aching throbbing burning sharp shooting pain. Pain is constant. Pain is increased with any axial loading including prolonged sitting standing and walking. Pain is also increased with lumbar facet loading including twisting and turning his low back, extension and flexion of his low back. He has had a number of interventional procedures in the past.  We have done a series of up to 5 lumbar radiofrequency neurotomy procedures which gives him very good relief, although for rather limited. Of time. He is also previously had a number of epidural steroid injections and other interventional pain procedures that have not provided any durable pain relief. He underwent L4-5 decompression and fusion in the past.  He has hardware at these levels. He is also previously participated in physical therapy with little benefit. He states that his medication regimen is not offering sufficient pain relief at this point. In January 2015, he underwent spinal cord stimulator trial with Dr. Jesica Delgado.   This was a single 16 contact [employer] lead placed to the top of T7, just to the left of midline. This reportedly covered all of his pain areas at that time, which was January 2015. However he reported after his trial that he only obtained 30% pain relief, and so permanent implant was not offered at that time. He has had continued unrelenting low back and leg pain. His low back and leg pain have progressed. He is now incapacitated with low back and bilateral radiating leg pain. Lumbar radiofrequency neurotomy procedures are offering relief, however rather short-lived relief. We reviewed his lumbar MRI, this was dated June 23, 2015. This is significant for posterolateral interbody fusion at L4-5 via midline laminectomy. Bulging of lumbar disc is noted, disc protrusions, facet and ligamentum hypertrophy. Moderate neuroforaminal stenoses are noted. Scar tissues look noted at the L4-5 laminectomy site and along the left lateral thecal sac. Contacting dorsal margin of the left L5 nerve root. Progressive degenerative changes L3-4 since previous lumbar MRI in 2010 are noted. Moderate constriction of the thecal sac and moderate left foraminal stenosis is noted at L3-4. ASSESSMENT/OPTIONS: as follows. We discussed options. This gentleman has intractable and severe, incapacitating, low back and bilateral radiating leg pain. His pain symptoms have progressed, especially since the last time he had spinal cord stimulator trial in January 2015 per. We have been able to temporize his pain of his low back with a series of lumbar radiofrequency neurotomy procedures at bilateral L3-4, L4-5, L5-S1 levels. However, this offers temporary relief, and his pain has always returned. His low back and leg pain are progressing, they are now constant and severe as well as incapacitating as previously stated. I have reviewed this gentlemen's previous thoracolumbar spinal cord stimulator trial by Dr. Jostin Evans.   Dr. Jostin Evans inserted 16 contact [employer] lead to the top of T7 to the left of midline. This covered this gentlemen's pain entirely, although it offered only 30% pain relief. Therefore permanent implant at that time was not considered. However in the interval period, since January 2015, this gentlemen's pain symptoms have progressively worsened. I believe he would do better with 216 contact [employer] leads placed to the top of T7 approximately midline. I believe to leads would offer more comprehensive and complete pain relief, and this should push him over the 50% pain relief threshold to warrant spinal cord stimulator permanent placement. I believe this is the best alternative insofar as interventional procedures are concerned that I can offer him that may offer a durable amount of pain relief. We will try to get this procedure approved and covered by his insurance company. I will also in anticipation of possible spinal cord stimulator retrial, place a pain psychology consult with Dr. Jourdan Montemayor. MRI Results (most recent):    Results from [place] encounter on 06/23/15   MRI LUMB SPINE W WO CONT   Narrative MR LUMBAR SPINE WITH AND WITHOUT CONTRAST    CPT CODE: 83512    HISTORY: Back pain, left leg pain, prior back surgery 2012. COMPARISON: MRI lumbar spine 4/15/2010. TECHNIQUE: Lumbar spine scanned with axial and sagittal T1W scans, axial and  sagittal T2W scans, and with post gadolinium axial and sagittal T1W scans. 20 cc  intravenous Magnevist was administered for this exam.        FINDINGS:     Patient has had posterolateral interbody fusion at L4/L5 and midline laminectomy  at the previous examination. Resultant decompression of the spinal canal at the  L4/L5 level. No evidence of postoperative collection or arachnoiditis. Mildly exaggerated lumbar lordosis. Small vertebral body hemangiomata are again  noted at L5. Mild loss of height at T12, nonacute.  Minimal degenerative type I  endplate change anteriorly at T11/T12. Conus medullaris is normal in caliber and signal intensity and terminates  normally at lower T12/L1 level. On the sagittal images, there is a disc protrusion with deformation of the  ventral conus at T11/T12. Deretha Mates  may be mildly stenotic. T12/L1: Central canal and neural foramina are patent. L1/L2: Mild bulging of the disc. Mild degenerative facet hypertrophy and mild  ligamentum flavum hypertrophy    . Mild encroachment upon the central canal and  foramina. L2/L3: Mild bulging of the disc with eccentric protrusion towards the far left  lateral margin of the disc. Mild to moderate degenerative facet and ligamentum  flavum hypertrophy. Mild constriction of the thecal sac. Mild to moderate left  and mild right foraminal stenosis. L3/L4: Broad-based disc protrusion and moderate degenerative facet and  ligamentum flavum hypertrophy. Moderate constriction of the thecal sac. Moderate  left and mild right foraminal stenosis. L4/L5: Fused segment. Central canal is patent at midline laminectomy. Foramina  are adequate. There are small amount of scar tissue at the laminectomy bed and  along the left lateral thecal sac, contacting the dorsal margin of the left L5  nerve root. No mass effect. L5/S1: Minimal disc protrusion without impact upon the thecal sac. Central canal  and foramina are patent. Impression IMPRESSION:    Interval improvement of spinal stenosis at L4/L5 status post posterolateral  interbody fusion and laminectomy. Minimal benign-appearing scar tissue. Progressive degenerative changes at L3/L4 since 2010 with moderate constriction  of the thecal sac and moderate left foraminal stenosis. Degenerative changes at additional levels are similar to the prior examination,  as detailed above.                     PAST MEDICAL HISTORY:   The patient  has a past medical history of Abdominal adhesions (7/30/2014); Back muscle spasm (7/30/2014); Back pain, lumbosacral; Cellulitis; Constipation, chronic (7/30/2014); DDD (degenerative disc disease), lumbar (7/30/2014); DDD (degenerative disc disease), thoracic (7/30/2014); Depression; Diabetes ([facility] [address] DM (diabetes mellitus) ([facility] [address] (7/30/2014); Frequent falls (7/30/2014); TRUE (generalized anxiety disorder) (7/30/2014); High cholesterol; HTN (hypertension) (7/30/2014); Hyperlipidemia (7/30/2014); Hypertension; Insomnia secondary to chronic pain (7/30/2014); LBP radiating to both legs (7/30/2014); Lumbar facet arthropathy ([facility] [address] (7/30/2014); Lumbar nerve root impingement (7/30/2014); Lumbosacral radiculopathy at S1 (7/30/2014); Major depression (7/30/2014); Nausea & vomiting; Neurological disorder; MANISH (obstructive sleep apnea) (7/30/2014); S/P lumbar laminectomy (7/30/2014); S/P lumbar spinal fusion (7/30/2014); Small vessel disease ([facility] [address] Thoracic compression fracture ([facility] [address] (7/30/2014); and Unspecified sleep apnea. PAST SURGICAL HISTORY:   The patient  has a past surgical history that includes hernia repair (1992); vasectomy (1985); appendectomy; back surgery; and other surgical.    CURRENT MEDICATIONS:   The patient has a current medication list which includes the following prescription(s): zolpidem, glucose blood vi test strips, duloxetine, morphine, morphine, naloxone, morphine ir, pramipexole, simvastatin, metoprolol succinate, bupropion sr, metformin, isosorbide mononitrate er, alprazolam, ondansetron hcl, ferrous sulfate, dried, tapentadol, nitroglycerin, back brace, and multivitamin. ALLERGIES:     Allergies   Allergen Reactions    Opana [Oxymorphone] Other (comments)     Feels like bug crawling under skin and drowziness       FAMILY HISTORY:   The patient family history includes Diabetes in his mother and sister; Heart Attack in his father; Heart Failure in his mother; Stroke in his brother. SOCIAL HISTORY:   The patient  reports that he has never smoked.  He has never used smokeless tobacco. The patient  reports that he does not drink alcohol. He also  reports that he does not use illicit drugs. REVIEW OF SYSTEMS:    The patient denies fever, chills, weight loss (Constitutional), rash, itching (Skin), tinnitus, congestion (HENT), blurred vision, photophobia (Eyes), palpitations, orthopnea (Cardiovascular), hemoptysis, wheezing (Respiratory), nausea, vomiting, diarrhea (Gastrointestinal), dysuria, hematuria, urgency (Genitourinary), bowel or bladder incontinence, loss of consciousness (Neurologic), suicidal or homicidal ideation or hallucinations (Psychiatric). Denies swelling, axillary or groin masses (Lymphatic). PHYSICAL EXAM:  VS:   Visit Vitals    /64    Pulse 61    Wt 136.1 kg (300 lb)    BMI 38.52 kg/m2     General: Heavyset, body habitus consistent with recorded height and weight and the calculated BMI. Apparent distress due to low back and bilateral leg pain. .   Head: Normocephalic, atraumatic. Skin: Inspection of the skin reveals no rashes, lesions or infection. CV: Regular rate. No murmurs or rubs noted. No peripheral edema noted. Pulm: Respirations are even and unlabored. Extr: No clubbing, cyanosis, or edema noted. Musculoskeletal:  1. Cervical spine - Full ROM. No paraspinous tenderness at any level. There is no scoliosis, asymmetry, or musculoskeletal defect. 2. Thoracic spine - Full ROM. No paraspinous tenderness at any level. There is no scoliosis, asymmetry, or musculoskeletal defect. 3. Lumbar spine -markedly decreased range of motion all axes . Paraspinous tenderness throughout the lumbar spine . SI joints are nontender bilaterally. There is no scoliosis, asymmetry, or musculoskeletal defect. 4. Right upper extremity - Full ROM. 5/5 muscle strength in all muscle groups. No pain or tenderness in shoulder, elbow, wrist, or hand. 5. Left upper extremity - Full ROM. 5/5 muscle strength in all muscle groups.   No pain or tenderness in shoulder, elbow, wrist, or hand. 6. Right lower extremity - Full ROM. 5/5 muscle strength in all muscle groups. No pain, tenderness, or swelling in the hip, knee, ankle or foot. 7. Left lower extremity - Full ROM. 5/5 muscle strength in all muscle groups. No pain, tenderness, or swelling in the hip, knee, ankle or foot. Neurological:  1. Mental Status - Alert, awake and oriented. Speech is clear and appropriate. 2. Cranial Nerves - Extraocular muscles intact bilaterally. Cranial nerves II-XII grossly intact bilaterally. 3. Gait - antalgic   4. Reflexes - 2+ and symmetric throughout. 5. Sensation - Intact to light touch and pin prick. 6. Provocative Tests -  Straight leg raise negative bilaterally. Psychological:  1. Mood and affect - Appropriate. 2. Speech - Appropriate. 3. Though content - Appropriate. 4. Judgment - Appropriate. ASSESSMENT:      ICD-10-CM ICD-9-CM    1. Chronic pain syndrome G89.4 338.4    2. Lumbar post-laminectomy syndrome M96.1 722.83    3. DDD (degenerative disc disease), lumbar M51.36 722.52    4. Lumbar facet arthropathy (HCC) M12.88 721.3    5. Lumbar and sacral osteoarthritis M47.817 721.3            PLAN:    1.    I have thoroughly discussed the risks and benefits, indications, contraindications, and side effects of any and procedures that were mentioned at today's patient visit. I have used a skeleton model to explain all procedures, as well as to provide added emphasis regarding procedures and as well for patient education purposes. I have answered all questions in great detail, and I have obtained verbal confirmation for all procedures planned with the patient. 3.    I have reviewed in great detail today the patient's MRI and other imaging studies with the patient. I have explained to the patient their condition using both actual recent and relevant images insofar as I am able to obtain actual images.  I have used a skeleton model for added emphasis as well as patient education. 4.    I have advised patient to have a primary care provider continue to care for their health maintenance and general medical conditions. 5,    I have placed appropriate referrals to specialty care providers as I have deemed necessary through today's clinical consultation with the patient. 5.    I have explained to the patient that if any significant side effects, issues, problems, concerns, or perceived complications may have arisen at around the time of the patient's procedures, they should either call the pain management clinic or go to the emergency room immediately for medical provider evaluation. 6.   I have encouraged all patients to call the pain management clinic with any questions or concerns that they may have pertaining to their procedures. DISPOSITION:   The patients condition and plan were discussed at length and all questions were answered. The patient agrees with the plan. A total of 25 minutes was spent with the patient of which over half of the time was spent counseling the patient. Malu Resendiz MD 6/15/2017 9:17 AM    Note: Although these clinic notes were documented by the provider at the time of the exam, they have not been proofed and are subject to transcription variance.

## 2017-06-16 ENCOUNTER — TELEPHONE (OUTPATIENT)
Dept: FAMILY MEDICINE CLINIC | Age: 62
End: 2017-06-16

## 2017-06-16 NOTE — TELEPHONE ENCOUNTER
Pt in office to pu rx and is requesting a new rx be sent out for the accucheck. Insurance does not cover and he is also taking it 2x daily written for once daily.

## 2017-06-16 NOTE — TELEPHONE ENCOUNTER
Spoke with patient wife. Requesting new dm supplies for accuchek brand. Advised that accuchek brand was sent to the mail order yesterday. She verbalized understanding.

## 2017-06-20 RX ORDER — ISOSORBIDE MONONITRATE 60 MG/1
TABLET, EXTENDED RELEASE ORAL
Qty: 90 TAB | Refills: 0 | Status: SHIPPED | OUTPATIENT
Start: 2017-06-20 | End: 2017-08-02 | Stop reason: SDUPTHER

## 2017-07-27 ENCOUNTER — OFFICE VISIT (OUTPATIENT)
Dept: FAMILY MEDICINE CLINIC | Age: 62
End: 2017-07-27

## 2017-07-27 ENCOUNTER — TELEPHONE (OUTPATIENT)
Dept: FAMILY MEDICINE CLINIC | Age: 62
End: 2017-07-27

## 2017-07-27 ENCOUNTER — OFFICE VISIT (OUTPATIENT)
Dept: PAIN MANAGEMENT | Age: 62
End: 2017-07-27

## 2017-07-27 VITALS
BODY MASS INDEX: 39.53 KG/M2 | WEIGHT: 308 LBS | HEART RATE: 77 BPM | HEIGHT: 74 IN | SYSTOLIC BLOOD PRESSURE: 122 MMHG | TEMPERATURE: 98.3 F | OXYGEN SATURATION: 92 % | RESPIRATION RATE: 16 BRPM | DIASTOLIC BLOOD PRESSURE: 82 MMHG

## 2017-07-27 VITALS
HEIGHT: 74 IN | DIASTOLIC BLOOD PRESSURE: 78 MMHG | WEIGHT: 308 LBS | BODY MASS INDEX: 39.53 KG/M2 | SYSTOLIC BLOOD PRESSURE: 123 MMHG | TEMPERATURE: 97.8 F | RESPIRATION RATE: 18 BRPM | HEART RATE: 76 BPM

## 2017-07-27 DIAGNOSIS — M96.1 POSTLAMINECTOMY SYNDROME, LUMBAR REGION: ICD-10-CM

## 2017-07-27 DIAGNOSIS — M54.17 LUMBOSACRAL RADICULOPATHY AT S1: ICD-10-CM

## 2017-07-27 DIAGNOSIS — M47.816 LUMBAR FACET ARTHROPATHY: ICD-10-CM

## 2017-07-27 DIAGNOSIS — M51.34 DDD (DEGENERATIVE DISC DISEASE), THORACIC: ICD-10-CM

## 2017-07-27 DIAGNOSIS — M96.1 LUMBAR POST-LAMINECTOMY SYNDROME: ICD-10-CM

## 2017-07-27 DIAGNOSIS — M51.36 DDD (DEGENERATIVE DISC DISEASE), LUMBAR: ICD-10-CM

## 2017-07-27 DIAGNOSIS — J01.00 ACUTE NON-RECURRENT MAXILLARY SINUSITIS: ICD-10-CM

## 2017-07-27 DIAGNOSIS — G89.4 CHRONIC PAIN SYNDROME: ICD-10-CM

## 2017-07-27 DIAGNOSIS — H10.011 ACUTE FOLLICULAR CONJUNCTIVITIS OF RIGHT EYE: Primary | ICD-10-CM

## 2017-07-27 DIAGNOSIS — Z98.1 S/P LUMBAR SPINAL FUSION: ICD-10-CM

## 2017-07-27 DIAGNOSIS — Z98.890 S/P LUMBAR LAMINECTOMY: ICD-10-CM

## 2017-07-27 DIAGNOSIS — M47.817 LUMBAR AND SACRAL OSTEOARTHRITIS: ICD-10-CM

## 2017-07-27 DIAGNOSIS — G89.29 CHRONIC MIDLINE LOW BACK PAIN WITH SCIATICA, SCIATICA LATERALITY UNSPECIFIED: Primary | ICD-10-CM

## 2017-07-27 DIAGNOSIS — J40 BRONCHITIS: ICD-10-CM

## 2017-07-27 DIAGNOSIS — Z86.59 HISTORY OF DEPRESSION: ICD-10-CM

## 2017-07-27 DIAGNOSIS — M54.16 LUMBAR NERVE ROOT IMPINGEMENT: ICD-10-CM

## 2017-07-27 DIAGNOSIS — M54.40 CHRONIC MIDLINE LOW BACK PAIN WITH SCIATICA, SCIATICA LATERALITY UNSPECIFIED: Primary | ICD-10-CM

## 2017-07-27 RX ORDER — GENTAMICIN SULFATE 3 MG/ML
1 SOLUTION/ DROPS OPHTHALMIC 3 TIMES DAILY
Qty: 1 BOTTLE | Refills: 0 | Status: SHIPPED | OUTPATIENT
Start: 2017-07-27 | End: 2017-08-06

## 2017-07-27 RX ORDER — DIAZEPAM 5 MG/1
5 TABLET ORAL
Qty: 30 TAB | Refills: 1 | Status: SHIPPED | OUTPATIENT
Start: 2017-08-26 | End: 2017-08-02 | Stop reason: ALTCHOICE

## 2017-07-27 RX ORDER — CEFUROXIME AXETIL 500 MG/1
500 TABLET ORAL 2 TIMES DAILY
Qty: 20 TAB | Refills: 0 | Status: SHIPPED | OUTPATIENT
Start: 2017-07-27 | End: 2017-08-02 | Stop reason: ALTCHOICE

## 2017-07-27 RX ORDER — GABAPENTIN 800 MG/1
800 TABLET ORAL 3 TIMES DAILY
Qty: 90 TAB | Refills: 5 | Status: SHIPPED | OUTPATIENT
Start: 2017-07-27 | End: 2017-10-26 | Stop reason: SDUPTHER

## 2017-07-27 RX ORDER — DULOXETIN HYDROCHLORIDE 60 MG/1
60 CAPSULE, DELAYED RELEASE ORAL DAILY
Qty: 90 CAP | Refills: 2 | Status: SHIPPED | OUTPATIENT
Start: 2017-07-27 | End: 2017-10-26 | Stop reason: SDUPTHER

## 2017-07-27 RX ORDER — MORPHINE SULFATE 50 MG/1
50 CAPSULE, EXTENDED RELEASE ORAL EVERY 12 HOURS
Qty: 60 CAP | Refills: 0 | Status: SHIPPED | OUTPATIENT
Start: 2017-09-25 | End: 2017-10-25

## 2017-07-27 RX ORDER — MORPHINE SULFATE 50 MG/1
50 CAPSULE, EXTENDED RELEASE ORAL EVERY 12 HOURS
Qty: 60 CAP | Refills: 0 | Status: SHIPPED | OUTPATIENT
Start: 2017-08-26 | End: 2017-09-25

## 2017-07-27 RX ORDER — MORPHINE SULFATE 15 MG/1
15 TABLET ORAL
Qty: 180 TAB | Refills: 0 | Status: SHIPPED | OUTPATIENT
Start: 2017-08-26 | End: 2018-11-01 | Stop reason: ALTCHOICE

## 2017-07-27 RX ORDER — MORPHINE SULFATE 15 MG/1
15 TABLET ORAL
Qty: 120 TAB | Refills: 0 | Status: SHIPPED | OUTPATIENT
Start: 2017-09-25 | End: 2017-10-26 | Stop reason: SDUPTHER

## 2017-07-27 RX ORDER — HYDROCODONE POLISTIREX AND CHLORPHENIRAMINE POLISTIREX 10; 8 MG/5ML; MG/5ML
5 SUSPENSION, EXTENDED RELEASE ORAL
Qty: 120 ML | Refills: 0 | Status: SHIPPED | OUTPATIENT
Start: 2017-07-27 | End: 2017-08-02 | Stop reason: ALTCHOICE

## 2017-07-27 RX ORDER — PREDNISONE 20 MG/1
20 TABLET ORAL 2 TIMES DAILY
Qty: 10 TAB | Refills: 0 | Status: SHIPPED | OUTPATIENT
Start: 2017-07-27 | End: 2017-08-02 | Stop reason: ALTCHOICE

## 2017-07-27 NOTE — PROGRESS NOTES
HISTORY OF PRESENT ILLNESS  Hedy William is a 58 y.o. male. Pt presents today with a very red eye and productive cough. He is coughing up very dark green sputum. His eyes are crusted over in the am.  The cough is keeping him awake. HPI    Review of Systems   Constitutional: Negative for chills and fever. HENT: Positive for congestion. Respiratory: Positive for cough, sputum production and shortness of breath. Negative for wheezing. Cardiovascular: Negative. Musculoskeletal: Positive for back pain. Skin: Negative. Visit Vitals    /82 (BP 1 Location: Left arm, BP Patient Position: Sitting)    Pulse 77    Temp 98.3 °F (36.8 °C) (Oral)    Resp 16    Ht 6' 2\" (1.88 m)    Wt 308 lb (139.7 kg)    SpO2 92%    BMI 39.54 kg/m2       Physical Exam   Constitutional: He appears well-developed. No distress. Eyes: Right eye exhibits hordeolum (right lower eyelid at the medial aspect. The erythema radiates down the cheek along the nasal fold. ). Right eye exhibits no discharge. Left eye exhibits no discharge. No scleral icterus. Neck: Normal range of motion. Cardiovascular: Normal rate, regular rhythm and normal heart sounds. No murmur heard. Pulmonary/Chest: Effort normal. No respiratory distress. He has decreased breath sounds in the right lower field and the left lower field. ASSESSMENT and PLAN    ICD-10-CM ICD-9-CM    1. Acute follicular conjunctivitis of right eye H10.011 372.02 gentamicin (GARAMYCIN) 0.3 % ophthalmic solution   2. Acute non-recurrent maxillary sinusitis J01.00 461.0 cefUROXime (CEFTIN) 500 mg tablet   3. Bronchitis J40 490 predniSONE (DELTASONE) 20 mg tablet     PLAN:  Eyes: warm compresses. Gentamycin 1 gtt three times a day. He is to call if the redness beneath his eye persist or worsens. Cough:  Pt advised to take both prednisone tablets for a total of 40mg in the am with food for 5 days.     Pt understands on how to take all medications as ordered. He will call if he has any concerns. He states he is Not allergic to any codiene products. Pt given after visit summary.

## 2017-07-27 NOTE — MR AVS SNAPSHOT
Visit Information Date & Time Provider Department Dept. Phone Encounter #  
 7/27/2017 12:15 PM Mattie Zuñiga NP [address] [place] [address] [phone] Follow-up Instructions Return if symptoms worsen or fail to improve. Your Appointments 8/2/2017  8:20 AM  
ROUTINE CARE with Coleman Wiseman MD  
9[facility] ([facility]) Appt Note: fu on dm ,htn, chol [address] [address] 2 Ja Rector [address] [address]  
  
    
 10/26/2017  8:30 AM  
Follow Up with Feli Wen MD  
[address] for Pain Management [facility] Appt Note: Return in about 3 months (around 10/27/2017). [address] [place] [id number]  
[phone] [facility] [phone] Upcoming Health Maintenance Date Due ZOSTER VACCINE AGE 60> 12/19/2014 MICROALBUMIN Q1 6/13/2017 FOOT EXAM Q1 8/8/2017 COLONOSCOPY 8/23/2017* INFLUENZA AGE 9 TO ADULT 8/1/2017 HEMOGLOBIN A1C Q6M 10/5/2017 EYE EXAM RETINAL OR DILATED Q1 10/5/2017 LIPID PANEL Q1 4/5/2018 DTaP/Tdap/Td series (2 - Td) 8/8/2026 *Topic was postponed. The date shown is not the original due date. Allergies as of 7/27/2017  Review Complete On: 7/27/2017 By: Mattie Zuñiga NP Severity Noted Reaction Type Reactions Opana [Oxymorphone]  02/19/2013    Other (comments) Feels like bug crawling under skin and drowziness Current Immunizations  Reviewed on 12/7/2016 Name Date Influenza Vaccine (Quad) PF 12/7/2016 Pneumococcal Conjugate (PCV-13) 8/8/2016 Tdap 8/8/2016 Not reviewed this visit You Were Diagnosed With   
  
 Codes Comments Acute follicular conjunctivitis of right eye    -  Primary ICD-10-CM: H10.011 ICD-9-CM: 372.02 Acute non-recurrent maxillary sinusitis     ICD-10-CM: J01.00 ICD-9-CM: 461.0 Bronchitis     ICD-10-CM: J40 ICD-9-CM: 744 Vitals BP Pulse Temp Resp Height(growth percentile) Weight(growth percentile) 122/82 (BP 1 Location: Left arm, BP Patient Position: Sitting) 77 98.3 °F (36.8 °C) (Oral) 16 6' 2\" (1.88 m) 308 lb (139.7 kg) SpO2 BMI Smoking Status 92% 39.54 kg/m2 Never Smoker Vitals History BMI and BSA Data Body Mass Index Body Surface Area  
 39.54 kg/m 2 2.7 m 2 Preferred Pharmacy Pharmacy Name Phone Isa Santana [address] [place] [phone] [facility] [address] [phone] Your Updated Medication List  
  
   
This list is accurate as of: 7/27/17  1:12 PM.  Always use your most recent med list.  
  
  
  
  
 ALPRAZolam 0.5 mg tablet Commonly known as:  Mary Stai Take 1 Tab by mouth every twelve (12) hours as needed for Anxiety (Do not take when Valium is taken). Max Daily Amount: 1 mg. Back Brace Misc 1 Device by Does Not Apply route daily as needed for Pain. One, lumbosacral orthosis/back brace with metal struts      Medically necessary Blood-Glucose Meter Misc Commonly known as:  Kat Plush Check blood sugar daily buPROPion  mg SR tablet Commonly known as:  WELLBUTRIN SR  
TAKE 1 TABLET TWICE DAILY  
  
 cefUROXime 500 mg tablet Commonly known as:  CEFTIN Take 1 Tab by mouth two (2) times a day. diazePAM 5 mg tablet Commonly known as:  VALIUM Take 1 Tab by mouth nightly as needed for Sleep for up to 30 days. Start taking on:  8/26/2017 DULoxetine 60 mg capsule Commonly known as:  CYMBALTA Take 1 Cap by mouth daily. gabapentin 800 mg tablet Commonly known as:  NEURONTIN Take 1 Tab by mouth three (3) times daily for 30 days. gentamicin 0.3 % ophthalmic solution Commonly known as:  GARAMYCIN Administer 1 Drop to both eyes three (3) times daily for 10 days. * glucose blood VI test strips strip Commonly known as:  ASCENSIA AUTODISC VI, ONE TOUCH ULTRA TEST VI Blood sugar check daily * glucose blood VI test strips strip Commonly known as:  [address] Check blood sugar daily IRON (DRIED) PO Take  by mouth.  
  
 isosorbide mononitrate ER 60 mg CR tablet Commonly known as:  IMDUR  
TAKE 1 TABLET EVERY MORNING Lancets Misc Commonly known as:  ACCU-CHEK FASTCLIX Check blood sugar daily  
  
 metFORMIN 500 mg tablet Commonly known as:  GLUCOPHAGE  
TAKE 1 TABLET THREE TIMES DAILY WITH MEALS  
  
 metoprolol succinate 50 mg XL tablet Commonly known as:  TOPROL-XL  
TAKE 1 TABLET EVERY DAY  
  
 * Morphine 50 mg ER capsule Commonly known as:  RACHAEL Take 1 Cap by mouth every twelve (12) hours for 30 days. Max Daily Amount: 2 Caps. For chronic pain. Start taking on:  8/26/2017 * morphine IR 15 mg tablet Commonly known as:  MS IR Take 1 Tab by mouth four (4) times daily as needed for Pain. Max Daily Amount: 60 mg. Start taking on:  8/26/2017 * Morphine 50 mg ER capsule Commonly known as:  RACHAEL Take 1 Cap by mouth every twelve (12) hours for 30 days. Max Daily Amount: 2 Caps. For chronic pain. Start taking on:  9/25/2017 * morphine IR 15 mg tablet Commonly known as:  MS IR Take 1 Tab by mouth four (4) times daily as needed for Pain. Max Daily Amount: 60 mg. Start taking on:  9/25/2017  
  
 multivitamin tablet Commonly known as:  ONE A DAY Take 1 Tab by mouth daily. naloxone 4 mg/actuation Spry 4 mg by Nasal route as needed. nitroglycerin 0.4 mg SL tablet Commonly known as:  NITROSTAT  
1 Tab by SubLINGual route every five (5) minutes as needed. Use for Chest Pain ; Call MD if > 3 tablets required OMEPRAZOLE (BULK)  
  
 ondansetron hcl 4 mg tablet Commonly known as:  ZOFRAN (AS HYDROCHLORIDE) Take 1 Tab by mouth every eight (8) hours as needed for Nausea. pramipexole 0.5 mg tablet Commonly known as:  MIRAPEX TAKE 1 TABLET THREE TIMES DAILY predniSONE 20 mg tablet Commonly known as:  Lennart Spearing Take 1 Tab by mouth two (2) times a day. simvastatin 10 mg tablet Commonly known as:  ZOCOR  
TAKE 1 TABLET EVERY NIGHT  
  
 tapentadol 100 mg tablet Commonly known as:  Hobert Miguel Angel Take 100 mg by mouth four (4) times daily as needed for Pain. Max Daily Amount: 400 mg.  
  
 zolpidem 10 mg tablet Commonly known as:  AMBIEN  
TAKE 1 TABLET BY MOUTH AT BEDTIME AS NEEDED FOR SLEEP * Notice: This list has 6 medication(s) that are the same as other medications prescribed for you. Read the directions carefully, and ask your doctor or other care provider to review them with you. Prescriptions Sent to Pharmacy Refills  
 gentamicin (GARAMYCIN) 0.3 % ophthalmic solution 0 Sig: Administer 1 Drop to both eyes three (3) times daily for 10 days. Class: Normal  
 Pharmacy: [address] [place] [address] Ph #: [phone] Route: Both Eyes  
 cefUROXime (CEFTIN) 500 mg tablet 0 Sig: Take 1 Tab by mouth two (2) times a day. Class: Normal  
 Pharmacy: [address] [place] [address] Ph #: [phone] Route: Oral  
 predniSONE (DELTASONE) 20 mg tablet 0 Sig: Take 1 Tab by mouth two (2) times a day. Class: Normal  
 Pharmacy: [address] [place] [address] Ph #: [phone] Route: Oral  
  
Follow-up Instructions Return if symptoms worsen or fail to improve. Patient Instructions Eyes:  Warm compresses. Gentamycin 1 drop three times a day for 10 days. Prednisone 20mg tablet: take both tablets in the morning for 5 days. This will help the cough. Call with any concerns. Return to the office if symptoms worsen. Introducing [facility]! Dear Darin Line: Thank you for requesting a [employer] account. Our records indicate that you have previously registered for a [employer] account but its currently inactive. Please call our [employer] support line at [phone]. Additional Information If you have questions, please visit the Frequently Asked Questions section of the [employer] website at [email or web address] Remember, [employer] is NOT to be used for urgent needs. For medical emergencies, dial 911. Now available from your iPhone and Android! Please provide this summary of care documentation to your next provider. Your primary care clinician is listed as [address]. If you have any questions after today's visit, please call [phone].

## 2017-07-27 NOTE — TELEPHONE ENCOUNTER
[facility] needs to have Dx code for Tussinex script. Also they need to have scripts that were sent to mail order pharmacy sent to them instead. [phone].

## 2017-07-27 NOTE — PROGRESS NOTES
HISTORY OF PRESENT ILLNESS  Deena William is a 58 y.o. male. HPI Comments: Meds help with pain control and quality of life. No new side effects reported today. Visit survey reviewed and will be scanned.  reviewed. Recent average level of pain(out of 10)-9  Chief complaint low back pain with symptoms in the legs  Chronic pain syndrome  Using Cymbalta, gabapentin  Diazepam as needed  Extended release morphine 50 mg twice a day  Immediate release morphine 15 mg 4 times a day as needed  Xanax Ambien prescribed by his primary care physician. He reports he does not use these every day. Medication helps improve general activity, mood, walking, sleep, enjoyment of life  He also reports, he currently has a month's supply of morphine exam the Valium. Measuring clinical outcomes of chronic pain patients. This was reviewed today. The survey will be scanned. Please see the survey for details. Total score-8    Back Pain    Associated symptoms include leg pain. Pertinent negatives include no fever and no dysuria. Leg Pain    Associated symptoms include back pain. Pertinent negatives include no itching and no neck pain. Knee Pain         Review of Systems   Constitutional: Negative for chills and fever. HENT: Negative for ear discharge and ear pain. Eyes: Negative for pain and discharge. Respiratory: Negative for hemoptysis and wheezing. Gastrointestinal: Negative for blood in stool and melena. Genitourinary: Negative for dysuria and hematuria. Musculoskeletal: Positive for back pain and myalgias. Negative for neck pain. Skin: Negative for itching and rash. Neurological: Negative for seizures and loss of consciousness. Psychiatric/Behavioral: Negative for hallucinations and suicidal ideas. Physical Exam   Constitutional: He appears well-developed and well-nourished. He is cooperative. He does not have a sickly appearance. HENT:   Head: Normocephalic and atraumatic.    Right Ear: External ear normal. No drainage. Left Ear: External ear normal. No drainage. Nose: Nose normal.   Eyes: Lids are normal. Right eye exhibits no discharge. Left eye exhibits no discharge. Right conjunctiva has no hemorrhage. Left conjunctiva has no hemorrhage. Neck: Neck supple. No tracheal deviation present. No thyroid mass present. Pulmonary/Chest: Effort normal. No respiratory distress. Neurological: He is alert. No cranial nerve deficit. Skin: Skin is intact. No rash noted. Psychiatric: His speech is normal. His affect is not angry. He does not express inappropriate judgment. Nursing note and vitals reviewed. ASSESSMENT and PLAN  Encounter Diagnoses   Name Primary?  Chronic midline low back pain with sciatica, sciatica laterality unspecified Yes    Chronic pain syndrome     DDD (degenerative disc disease), lumbar     Lumbar facet arthropathy     S/P lumbar laminectomy     S/P lumbar spinal fusion     Lumbar nerve root impingement     Lumbosacral radiculopathy at S1     DDD (degenerative disc disease), thoracic     Postlaminectomy syndrome, lumbar region     History of depression     Lumbar post-laminectomy syndrome     Lumbar and sacral osteoarthritis    No indicators for recent medication misuse.  reviewed. Pain Meds and Quality Of Life have been reviewed. Nonpharmacologic therapy and non-opioid pharmacologic therapy were considered. If opioid therapy is prescribed, this is only if the expected benefits are anticipated to outweigh risks. Possible changes to treatment plan considered. Support/education given as needed. Today-medications are as listed. No significant changes to medications. Follow up --3 months.

## 2017-07-27 NOTE — PATIENT INSTRUCTIONS
Eyes:  Warm compresses. Gentamycin 1 drop three times a day for 10 days. Prednisone 20mg tablet: take both tablets in the morning for 5 days. This will help the cough. Call with any concerns. Return to the office if symptoms worsen.

## 2017-07-27 NOTE — PROGRESS NOTES
Nursing Notes    Patient presents to the office today in follow-up. Reviewed medications with counts as follows:    Rx Date filled Qty Dispensed Pill Count Last Dose Short   dizepam 5 mg 5/5/17 30 96 2 nights ago No. 1 refill remaining   Morphine ir 15 mg 7/26/17 120 130 yesterday no   Morphine er 50 mg 7/26/17 60 65 This am no   Mr. William has a reminder for a \"due or due soon\" health maintenance. I have asked that he contact his primary care provider for follow-up on this health maintenance. POC UDS was not performed in office today    Any new labs or imaging since last appointment? NO    Have you been to an emergency room (ER) or urgent care clinic since your last visit? NO            Have you been hospitalized since your last visit? NO     If yes, where, when, and reason for visit? Have you seen or consulted any other health care providers outside of the [address] [place] [address]  since your last visit? YES     If yes, where, when, and reason for visit?    psychologist

## 2017-07-27 NOTE — MR AVS SNAPSHOT
Visit Information Date & Time Provider Department Dept. Phone Encounter #  
 7/27/2017  8:30 AM Marques Vasquez MD [facility] [phone] Follow-up Instructions Return in about 3 months (around 10/27/2017). Follow-up and Disposition History Your Appointments 8/2/2017  8:20 AM  
ROUTINE CARE with Javier Talbot MD  
975 [facility] ([facility]) Appt Note: fu on dm ,htn, chol [address] [address] 2 Ja Nebo [address] [address] Upcoming Health Maintenance Date Due ZOSTER VACCINE AGE 60> 12/19/2014 MICROALBUMIN Q1 6/13/2017 FOOT EXAM Q1 8/8/2017 COLONOSCOPY 8/23/2017* INFLUENZA AGE 9 TO ADULT 8/1/2017 HEMOGLOBIN A1C Q6M 10/5/2017 EYE EXAM RETINAL OR DILATED Q1 10/5/2017 LIPID PANEL Q1 4/5/2018 DTaP/Tdap/Td series (2 - Td) 8/8/2026 *Topic was postponed. The date shown is not the original due date. Allergies as of 7/27/2017  Review Complete On: 7/27/2017 By: Marques Vasquez MD  
  
 Severity Noted Reaction Type Reactions Opana [Oxymorphone]  02/19/2013    Other (comments) Feels like bug crawling under skin and drowziness Current Immunizations  Reviewed on 12/7/2016 Name Date Influenza Vaccine (Quad) PF 12/7/2016 Pneumococcal Conjugate (PCV-13) 8/8/2016 Tdap 8/8/2016 Not reviewed this visit You Were Diagnosed With   
  
 Codes Comments Chronic midline low back pain with sciatica, sciatica laterality unspecified    -  Primary ICD-10-CM: M54.40, G89.29 ICD-9-CM: 724.2, 724.3, 338.29 Chronic pain syndrome     ICD-10-CM: G89.4 ICD-9-CM: 338.4 DDD (degenerative disc disease), lumbar     ICD-10-CM: M51.36 
ICD-9-CM: 722.52 Lumbar facet arthropathy     ICD-10-CM: M12.88 ICD-9-CM: 721.3 S/P lumbar laminectomy     ICD-10-CM: T73.275 ICD-9-CM: V45.89   
 S/P lumbar spinal fusion     ICD-10-CM: Z98.1 ICD-9-CM: V45.4 Lumbar nerve root impingement     ICD-10-CM: M54.16 
ICD-9-CM: 724.4 Lumbosacral radiculopathy at S1     ICD-10-CM: M54.17 ICD-9-CM: 724.4 DDD (degenerative disc disease), thoracic     ICD-10-CM: M51.34 
ICD-9-CM: 722.51 Postlaminectomy syndrome, lumbar region     ICD-10-CM: M96.1 ICD-9-CM: 722.83 History of depression     ICD-10-CM: Z86.59 
ICD-9-CM: V11.8 Lumbar post-laminectomy syndrome     ICD-10-CM: M96.1 ICD-9-CM: 722.83 Lumbar and sacral osteoarthritis     ICD-10-CM: U12.395 ICD-9-CM: 721.3 Vitals BP Pulse Temp Resp Height(growth percentile) Weight(growth percentile) 123/78 76 97.8 °F (36.6 °C) 18 6' 2\" (1.88 m) 308 lb (139.7 kg) BMI Smoking Status 39.54 kg/m2 Never Smoker Vitals History BMI and BSA Data Body Mass Index Body Surface Area  
 39.54 kg/m 2 2.7 m 2 Preferred Pharmacy Pharmacy Name [address] - [facility] [address] [place] [address] [phone] Your Updated Medication List  
  
   
This list is accurate as of: 7/27/17  8:50 AM.  Always use your most recent med list.  
  
  
  
  
 ALPRAZolam 0.5 mg tablet Commonly known as:  Donata Carton Take 1 Tab by mouth every twelve (12) hours as needed for Anxiety (Do not take when Valium is taken). Max Daily Amount: 1 mg. Back Brace Misc 1 Device by Does Not Apply route daily as needed for Pain. One, lumbosacral orthosis/back brace with metal struts      Medically necessary Blood-Glucose Meter Misc Commonly known as:  Abhijit Brambila Check blood sugar daily buPROPion  mg SR tablet Commonly known as:  WELLBUTRIN SR  
TAKE 1 TABLET TWICE DAILY  
  
 diazePAM 5 mg tablet Commonly known as:  VALIUM Take 1 Tab by mouth nightly as needed for Sleep for up to 30 days. Start taking on:  8/26/2017 DULoxetine 60 mg capsule Commonly known as:  CYMBALTA Take 1 Cap by mouth daily. gabapentin 800 mg tablet Commonly known as:  NEURONTIN Take 1 Tab by mouth three (3) times daily for 30 days. * glucose blood VI test strips strip Commonly known as:  ASCENSIA AUTODISC VI, ONE TOUCH ULTRA TEST VI Blood sugar check daily * glucose blood VI test strips strip Commonly known as:  [address] Check blood sugar daily IRON (DRIED) PO Take  by mouth.  
  
 isosorbide mononitrate ER 60 mg CR tablet Commonly known as:  IMDUR  
TAKE 1 TABLET EVERY MORNING Lancets Misc Commonly known as:  ACCU-CHEK FASTCLIX Check blood sugar daily  
  
 metFORMIN 500 mg tablet Commonly known as:  GLUCOPHAGE  
TAKE 1 TABLET THREE TIMES DAILY WITH MEALS  
  
 metoprolol succinate 50 mg XL tablet Commonly known as:  TOPROL-XL  
TAKE 1 TABLET EVERY DAY  
  
 * Morphine 50 mg ER capsule Commonly known as:  RACHAEL Take 1 Cap by mouth every twelve (12) hours for 30 days. Max Daily Amount: 2 Caps. For chronic pain. Start taking on:  8/26/2017 * morphine IR 15 mg tablet Commonly known as:  MS IR Take 1 Tab by mouth four (4) times daily as needed for Pain. Max Daily Amount: 60 mg. Start taking on:  8/26/2017 * Morphine 50 mg ER capsule Commonly known as:  RACHAEL Take 1 Cap by mouth every twelve (12) hours for 30 days. Max Daily Amount: 2 Caps. For chronic pain. Start taking on:  9/25/2017 * morphine IR 15 mg tablet Commonly known as:  MS IR Take 1 Tab by mouth four (4) times daily as needed for Pain. Max Daily Amount: 60 mg. Start taking on:  9/25/2017  
  
 multivitamin tablet Commonly known as:  ONE A DAY Take 1 Tab by mouth daily. naloxone 4 mg/actuation Spry 4 mg by Nasal route as needed. nitroglycerin 0.4 mg SL tablet Commonly known as:  NITROSTAT  
1 Tab by SubLINGual route every five (5) minutes as needed.   Use for Chest Pain ; Call MD if > 3 tablets required  
  
 ondansetron hcl 4 mg tablet Commonly known as:  ZOFRAN (AS HYDROCHLORIDE) Take 1 Tab by mouth every eight (8) hours as needed for Nausea. pramipexole 0.5 mg tablet Commonly known as:  MIRAPEX TAKE 1 TABLET THREE TIMES DAILY  
  
 simvastatin 10 mg tablet Commonly known as:  ZOCOR  
TAKE 1 TABLET EVERY NIGHT  
  
 tapentadol 100 mg tablet Commonly known as:  Roberto Buchanan Take 100 mg by mouth four (4) times daily as needed for Pain. Max Daily Amount: 400 mg.  
  
 zolpidem 10 mg tablet Commonly known as:  AMBIEN  
TAKE 1 TABLET BY MOUTH AT BEDTIME AS NEEDED FOR SLEEP * Notice: This list has 6 medication(s) that are the same as other medications prescribed for you. Read the directions carefully, and ask your doctor or other care provider to review them with you. Prescriptions Printed Refills Morphine (RACHAEL) 50 mg ER capsule 0 Starting on: 9/25/2017 Sig: Take 1 Cap by mouth every twelve (12) hours for 30 days. Max Daily Amount: 2 Caps. For chronic pain. Class: Print Route: Oral  
 morphine IR (MS IR) 15 mg tablet 0 Starting on: 9/25/2017 Sig: Take 1 Tab by mouth four (4) times daily as needed for Pain. Max Daily Amount: 60 mg.  
 Class: Print Route: Oral  
 diazePAM (VALIUM) 5 mg tablet 1 Starting on: 8/26/2017 Sig: Take 1 Tab by mouth nightly as needed for Sleep for up to 30 days. Class: Print Route: Oral  
 Morphine (RACHAEL) 50 mg ER capsule 0 Starting on: 8/26/2017 Sig: Take 1 Cap by mouth every twelve (12) hours for 30 days. Max Daily Amount: 2 Caps. For chronic pain. Class: Print Route: Oral  
 morphine IR (MS IR) 15 mg tablet 0 Starting on: 8/26/2017 Sig: Take 1 Tab by mouth four (4) times daily as needed for Pain. Max Daily Amount: 60 mg.  
 Class: Print Route: Oral  
  
Prescriptions Sent to Pharmacy Refills  DULoxetine (CYMBALTA) 60 mg capsule 2  
 Sig: Take 1 Cap by mouth daily. Class: Normal  
 Pharmacy: [address] [place] [address], [address] [place] [address] Ph #: [phone] Route: Oral  
 gabapentin (NEURONTIN) 800 mg tablet 5 Sig: Take 1 Tab by mouth three (3) times daily for 30 days. Class: Normal  
 Pharmacy: [address] [place] [address], [address] [place] [address] Ph #: [phone] Route: Oral  
  
Follow-up Instructions Return in about 3 months (around 10/27/2017). Introducing [facility]! Dear Brielle Reaves: Thank you for requesting a [employer] account. Our records indicate that you have previously registered for a [employer] account but its currently inactive. Please call our [employer] support line at [phone]. Additional Information If you have questions, please visit the Frequently Asked Questions section of the [employer] website at [email or web address] Remember, [employer] is NOT to be used for urgent needs. For medical emergencies, dial 911. Now available from your iPhone and Android! Please provide this summary of care documentation to your next provider. Your primary care clinician is listed as [address]. If you have any questions after today's visit, please call [phone].

## 2017-07-27 NOTE — PROGRESS NOTES
1. Have you been to the ER, urgent care clinic since your last visit? Hospitalized since your last visit? NO    2. Have you seen or consulted any other health care providers outside of the [facility] since your last visit? Include any pap smears or colon screening. NO    3. Vaccines? dNO    4.  Rx updated yes

## 2017-08-02 ENCOUNTER — OFFICE VISIT (OUTPATIENT)
Dept: FAMILY MEDICINE CLINIC | Age: 62
End: 2017-08-02

## 2017-08-02 ENCOUNTER — HOSPITAL ENCOUNTER (OUTPATIENT)
Dept: LAB | Age: 62
Discharge: HOME | End: 2017-08-02
Payer: MEDICARE

## 2017-08-02 VITALS
TEMPERATURE: 98.5 F | OXYGEN SATURATION: 97 % | RESPIRATION RATE: 16 BRPM | BODY MASS INDEX: 39.78 KG/M2 | DIASTOLIC BLOOD PRESSURE: 72 MMHG | HEIGHT: 74 IN | HEART RATE: 65 BPM | SYSTOLIC BLOOD PRESSURE: 138 MMHG | WEIGHT: 310 LBS

## 2017-08-02 DIAGNOSIS — M25.562 CHRONIC PAIN OF BOTH KNEES: ICD-10-CM

## 2017-08-02 DIAGNOSIS — I10 ESSENTIAL HYPERTENSION: ICD-10-CM

## 2017-08-02 DIAGNOSIS — E11.9 DIABETES MELLITUS WITHOUT COMPLICATION (HCC): ICD-10-CM

## 2017-08-02 DIAGNOSIS — E78.00 PURE HYPERCHOLESTEROLEMIA: ICD-10-CM

## 2017-08-02 DIAGNOSIS — F41.1 GAD (GENERALIZED ANXIETY DISORDER): ICD-10-CM

## 2017-08-02 DIAGNOSIS — G89.29 CHRONIC PAIN OF BOTH KNEES: ICD-10-CM

## 2017-08-02 DIAGNOSIS — M25.561 CHRONIC PAIN OF BOTH KNEES: ICD-10-CM

## 2017-08-02 DIAGNOSIS — E11.9 DIABETES MELLITUS WITHOUT COMPLICATION (HCC): Primary | ICD-10-CM

## 2017-08-02 LAB
ALT SERPL-CCNC: 37 U/L (ref 16–61)
ANION GAP BLD CALC-SCNC: 10 MMOL/L (ref 3–18)
AST SERPL W P-5'-P-CCNC: 20 U/L (ref 15–37)
BUN SERPL-MCNC: 26 MG/DL (ref 7–18)
BUN/CREAT SERPL: 21 (ref 12–20)
CALCIUM SERPL-MCNC: 10 MG/DL (ref 8.5–10.1)
CHLORIDE SERPL-SCNC: 100 MMOL/L (ref 100–108)
CHOLEST SERPL-MCNC: 116 MG/DL
CO2 SERPL-SCNC: 30 MMOL/L (ref 21–32)
CREAT SERPL-MCNC: 1.22 MG/DL (ref 0.6–1.3)
EST. AVERAGE GLUCOSE BLD GHB EST-MCNC: 183 MG/DL
GLUCOSE SERPL-MCNC: 138 MG/DL (ref 74–99)
HBA1C MFR BLD: 8 % (ref 4.2–5.6)
HDLC SERPL-MCNC: 48 MG/DL (ref 40–60)
HDLC SERPL: 2.4 {RATIO} (ref 0–5)
LDLC SERPL CALC-MCNC: 39.2 MG/DL (ref 0–100)
LIPID PROFILE,FLP: NORMAL
POTASSIUM SERPL-SCNC: 5.1 MMOL/L (ref 3.5–5.5)
SODIUM SERPL-SCNC: 140 MMOL/L (ref 136–145)
TRIGL SERPL-MCNC: 144 MG/DL (ref ?–150)
VLDLC SERPL CALC-MCNC: 28.8 MG/DL

## 2017-08-02 PROCEDURE — 84450 TRANSFERASE (AST) (SGOT): CPT | Performed by: FAMILY MEDICINE

## 2017-08-02 PROCEDURE — 83036 HEMOGLOBIN GLYCOSYLATED A1C: CPT | Performed by: FAMILY MEDICINE

## 2017-08-02 PROCEDURE — 80061 LIPID PANEL: CPT | Performed by: FAMILY MEDICINE

## 2017-08-02 PROCEDURE — 80048 BASIC METABOLIC PNL TOTAL CA: CPT | Performed by: FAMILY MEDICINE

## 2017-08-02 PROCEDURE — 84460 ALANINE AMINO (ALT) (SGPT): CPT | Performed by: FAMILY MEDICINE

## 2017-08-02 PROCEDURE — 82043 UR ALBUMIN QUANTITATIVE: CPT | Performed by: FAMILY MEDICINE

## 2017-08-02 PROCEDURE — 36415 COLL VENOUS BLD VENIPUNCTURE: CPT | Performed by: FAMILY MEDICINE

## 2017-08-02 RX ORDER — CEFUROXIME AXETIL 500 MG/1
500 TABLET ORAL 2 TIMES DAILY
COMMUNITY
Start: 2017-07-27 | End: 2017-08-02

## 2017-08-02 RX ORDER — BUPROPION HYDROCHLORIDE 150 MG/1
TABLET, EXTENDED RELEASE ORAL
Qty: 180 TAB | Refills: 1 | Status: SHIPPED | OUTPATIENT
Start: 2017-08-02 | End: 2018-06-06 | Stop reason: SDUPTHER

## 2017-08-02 RX ORDER — ISOSORBIDE MONONITRATE 60 MG/1
TABLET, EXTENDED RELEASE ORAL
Qty: 90 TAB | Refills: 0 | Status: SHIPPED | OUTPATIENT
Start: 2017-08-02 | End: 2017-12-15 | Stop reason: SDUPTHER

## 2017-08-02 RX ORDER — PHENTERMINE HYDROCHLORIDE 37.5 MG/1
37.5 TABLET ORAL
Qty: 30 TAB | Refills: 2 | Status: SHIPPED | OUTPATIENT
Start: 2017-08-02 | End: 2019-01-18

## 2017-08-02 RX ORDER — METFORMIN HYDROCHLORIDE 500 MG/1
TABLET ORAL
Qty: 270 TAB | Refills: 1 | Status: SHIPPED | OUTPATIENT
Start: 2017-08-02 | End: 2017-10-25 | Stop reason: DRUGHIGH

## 2017-08-02 RX ORDER — SIMVASTATIN 10 MG/1
TABLET, FILM COATED ORAL
Qty: 90 TAB | Refills: 1 | Status: SHIPPED | OUTPATIENT
Start: 2017-08-02 | End: 2018-02-20 | Stop reason: SDUPTHER

## 2017-08-02 RX ORDER — PRAMIPEXOLE DIHYDROCHLORIDE 0.5 MG/1
TABLET ORAL
Qty: 270 TAB | Refills: 1 | Status: SHIPPED | OUTPATIENT
Start: 2017-08-02 | End: 2018-08-29 | Stop reason: SDUPTHER

## 2017-08-02 RX ORDER — BUSPIRONE HYDROCHLORIDE 10 MG/1
10 TABLET ORAL 2 TIMES DAILY
Qty: 60 TAB | Refills: 6 | Status: SHIPPED | OUTPATIENT
Start: 2017-08-02 | End: 2018-08-29 | Stop reason: SDUPTHER

## 2017-08-02 RX ORDER — METOPROLOL SUCCINATE 50 MG/1
TABLET, EXTENDED RELEASE ORAL
Qty: 90 TAB | Refills: 1 | Status: SHIPPED | OUTPATIENT
Start: 2017-08-02 | End: 2018-07-17 | Stop reason: SDUPTHER

## 2017-08-02 NOTE — PROGRESS NOTES
HISTORY OF PRESENT ILLNESS  Mauri William is a 58 y.o. male. HPI  Patient is here today for evaluation and treatment of: Diabetes/Hypertension/Cholesterol problem    Diabetes:  States blood sugars are controlled at home; Last A1c was as below. ( was on steroids at the time )  States home readings have been in the 90s to low one hundreds. Lab Results   Component Value Date/Time    Hemoglobin A1c 8.3 04/05/2017 09:30 AM     Has recently been on steroids as well;     Hypertension:  Likely more elevated due to current increase in pain as below. Cholesterol: he is on zocor, tolerates med well;     Has had a lot of pain recently. He is having a nerve root stimulator on 8/23/2017. He is on chronic pain meds. He is also on 2 anxiolytics. I have informed pt that a change in treatment plan is in order. He apparently has been aware as well. He has been placed on narcan. Apparently has had  Recent Psych eval in preparation for his spinal cord stimulator to be placed. Requests  a referral to Dr. Yuridia Paredes. Has bilateral knee pain; may swell at times;  Gives way. Pt has fallen due to knee instability; Sx present X 6 months or more. Pt also requests med to help with weight loss; Pt tried kristopher OTC states this was not helpful;  BP is better controlled today. PMH,  Meds, Allergies, Family History, Social history reviewed        Current Outpatient Prescriptions:     cefUROXime (CEFTIN) 500 mg tablet, Take 500 mg by mouth two (2) times a day., Disp: , Rfl:     DULoxetine (CYMBALTA) 60 mg capsule, Take 1 Cap by mouth daily. , Disp: 90 Cap, Rfl: 2    [START ON 9/25/2017] Morphine (RACHAEL) 50 mg ER capsule, Take 1 Cap by mouth every twelve (12) hours for 30 days. Max Daily Amount: 2 Caps. For chronic pain., Disp: 60 Cap, Rfl: 0    [START ON 9/25/2017] morphine IR (MS IR) 15 mg tablet, Take 1 Tab by mouth four (4) times daily as needed for Pain.  Max Daily Amount: 60 mg., Disp: 120 Tab, Rfl: 0    gabapentin (NEURONTIN) 800 mg tablet, Take 1 Tab by mouth three (3) times daily for 30 days. , Disp: 90 Tab, Rfl: 5    [START ON 8/26/2017] diazePAM (VALIUM) 5 mg tablet, Take 1 Tab by mouth nightly as needed for Sleep for up to 30 days. , Disp: 30 Tab, Rfl: 1    [START ON 8/26/2017] Morphine (RACHAEL) 50 mg ER capsule, Take 1 Cap by mouth every twelve (12) hours for 30 days. Max Daily Amount: 2 Caps. For chronic pain., Disp: 60 Cap, Rfl: 0    [START ON 8/26/2017] morphine IR (MS IR) 15 mg tablet, Take 1 Tab by mouth four (4) times daily as needed for Pain. Max Daily Amount: 60 mg., Disp: 180 Tab, Rfl: 0    OMEPRAZOLE, BULK,, , Disp: , Rfl:     gentamicin (GARAMYCIN) 0.3 % ophthalmic solution, Administer 1 Drop to both eyes three (3) times daily for 10 days. , Disp: 1 Bottle, Rfl: 0    isosorbide mononitrate ER (IMDUR) 60 mg CR tablet, TAKE 1 TABLET EVERY MORNING, Disp: 90 Tab, Rfl: 0    glucose blood VI test strips (ACCU-CHEK SMARTVIEW TEST STRIP) strip, Check blood sugar daily, Disp: 100 Strip, Rfl: 3    Blood-Glucose Meter (ACCU-CHEK GABRIELLE) misc, Check blood sugar daily, Disp: 1 Each, Rfl: 0    Lancets (ACCU-CHEK FASTCLIX) misc, Check blood sugar daily, Disp: 1 Each, Rfl: 11    zolpidem (AMBIEN) 10 mg tablet, TAKE 1 TABLET BY MOUTH AT BEDTIME AS NEEDED FOR SLEEP, Disp: 90 Tab, Rfl: 0    naloxone 4 mg/actuation spry, 4 mg by Nasal route as needed. , Disp: 1 Box, Rfl: 0    pramipexole (MIRAPEX) 0.5 mg tablet, TAKE 1 TABLET THREE TIMES DAILY, Disp: 270 Tab, Rfl: 1    simvastatin (ZOCOR) 10 mg tablet, TAKE 1 TABLET EVERY NIGHT, Disp: 90 Tab, Rfl: 1    metoprolol succinate (TOPROL-XL) 50 mg XL tablet, TAKE 1 TABLET EVERY DAY, Disp: 90 Tab, Rfl: 1    buPROPion SR (WELLBUTRIN SR) 150 mg SR tablet, TAKE 1 TABLET TWICE DAILY, Disp: 180 Tab, Rfl: 1    metFORMIN (GLUCOPHAGE) 500 mg tablet, TAKE 1 TABLET THREE TIMES DAILY WITH MEALS, Disp: 270 Tab, Rfl: 1    ALPRAZolam (XANAX) 0.5 mg tablet, Take 1 Tab by mouth every twelve (12) hours as needed for Anxiety (Do not take when Valium is taken). Max Daily Amount: 1 mg., Disp: 60 Tab, Rfl: 2    ondansetron hcl (ZOFRAN, AS HYDROCHLORIDE,) 4 mg tablet, Take 1 Tab by mouth every eight (8) hours as needed for Nausea., Disp: 20 Tab, Rfl: 0    FERROUS SULFATE, DRIED (IRON, DRIED, PO), Take  by mouth., Disp: , Rfl:     nitroglycerin (NITROSTAT) 0.4 mg SL tablet, 1 Tab by SubLINGual route every five (5) minutes as needed. Use for Chest Pain ; Call MD if > 3 tablets required, Disp: 1 Bottle, Rfl: 0    Back Brace misc, 1 Device by Does Not Apply route daily as needed for Pain. One, lumbosacral orthosis/back brace with metal struts      Medically necessary, Disp: 1 Device, Rfl: 0    multivitamin (ONE A DAY) tablet, Take 1 Tab by mouth daily. , Disp: , Rfl:     glucose blood VI test strips (ASCENSIA AUTODISC VI, ONE TOUCH ULTRA TEST VI) strip, Blood sugar check daily, Disp: 100 Strip, Rfl: 3    tapentadol (NUCYNTA) 100 mg tablet, Take 100 mg by mouth four (4) times daily as needed for Pain. Max Daily Amount: 400 mg., Disp: 120 Tab, Rfl: 0    Review of Systems   Constitutional: Negative for chills and fever. Cardiovascular: Negative for chest pain and palpitations. Musculoskeletal: Positive for joint pain (knees, back). Physical Exam   Visit Vitals    /72    Pulse 65    Temp 98.5 °F (36.9 °C) (Oral)    Resp 16    Ht 6' 2\" (1.88 m)    Wt 310 lb (140.6 kg)    SpO2 97%    BMI 39.8 kg/m2     General appearance: alert, cooperative, no distress, appears stated age  Neck: supple, symmetrical, trachea midline, no adenopathy, thyroid: not enlarged, symmetric, no tenderness/mass/nodules, no carotid bruit and no JVD  Lungs: clear to auscultation bilaterally  Heart: regular rate and rhythm, S1, S2 normal, no murmur, click, rub or gallop  Extremities: extremities normal, atraumatic, no cyanosis or edema; some crepitus at bilateral knees; No joint laxity appreciated.    Lab Results Component Value Date/Time    Cholesterol, total 141 04/05/2017 09:30 AM    HDL Cholesterol 61 04/05/2017 09:30 AM    LDL, calculated 55.6 04/05/2017 09:30 AM    VLDL, calculated 24.4 04/05/2017 09:30 AM    Triglyceride 122 04/05/2017 09:30 AM    CHOL/HDL Ratio 2.3 04/05/2017 09:30 AM       Lab Results   Component Value Date/Time    Sodium 139 03/12/2017 04:28 PM    Potassium 4.8 03/12/2017 04:28 PM    Chloride 103 03/12/2017 04:28 PM    CO2 25 03/12/2017 04:05 PM    CO2, TOTAL 24 03/12/2017 04:28 PM    Anion gap 8 12/07/2016 09:27 AM    Glucose 149 03/12/2017 04:28 PM    BUN 20 03/12/2017 04:28 PM    Creatinine 1.1 03/12/2017 04:28 PM    BUN/Creatinine ratio 16 12/07/2016 09:27 AM    GFR est AA >60 03/12/2017 04:05 PM    GFR est non-AA >60 03/12/2017 04:05 PM    Calcium 9.5 03/12/2017 04:05 PM             ASSESSMENT and PLAN    ICD-10-CM ICD-9-CM    1. Diabetes mellitus without complication (HCC) Q69.1 250.00  DIABETES FOOT EXAM      HEMOGLOBIN A1C WITH EAG      MICROALBUMIN, UR, RAND W/ MICROALBUMIN/CREA RATIO   2. Essential hypertension O60 148.3 METABOLIC PANEL, BASIC   3. Pure hypercholesterolemia E78.00 272.0 LIPID PANEL      AST      ALT   4. Chronic pain of both knees- new M25.561 719.46 REFERRAL TO ORTHOPEDICS    M25.562 338.29     G89.29     5.  TRUE (generalized anxiety disorder)- stable F41.1 300.02 busPIRone (BUSPAR) 10 mg tablet     As above:    above all stable unless otherwise noted   treatment plan as listed below  Orders Placed This Encounter    HEMOGLOBIN A1C WITH EAG    MICROALBUMIN, UR, RAND W/ MICROALBUMIN/CREA RATIO    LIPID PANEL    AST    METABOLIC PANEL, BASIC    ALT    REFERRAL TO ORTHOPEDICS     DIABETES FOOT EXAM    cefUROXime (CEFTIN) 500 mg tablet    isosorbide mononitrate ER (IMDUR) 60 mg CR tablet    pramipexole (MIRAPEX) 0.5 mg tablet    simvastatin (ZOCOR) 10 mg tablet    metoprolol succinate (TOPROL-XL) 50 mg XL tablet    buPROPion SR (WELLBUTRIN SR) 150 mg SR tablet    metFORMIN (GLUCOPHAGE) 500 mg tablet    busPIRone (BUSPAR) 10 mg tablet    phentermine (ADIPEX-P) 37.5 mg tablet     Wean valium and xanax; last xanax was 2 days ago; may take may Q3d x1 then Q4 days X1 then stop  Ortho referral as ordered  Phentermine ordered for temporary use to assist with weight loss; Add buspar as ordered. Labs as ordered  Follow-up Disposition:  Return in about 3 months (around 11/2/2017) for knees, anxiety. An After Visit Summary was printed and given to the patient. This has been fully explained to the patient, who indicates understanding.

## 2017-08-02 NOTE — MR AVS SNAPSHOT
Visit Information Date & Time Provider Department Dept. Phone Encounter #  
 8/2/2017  8:20 AM Darin Groves, [address] [id number] Follow-up Instructions Return in about 3 months (around 11/2/2017) for knees, anxiety. Your Appointments 10/26/2017  8:30 AM  
Follow Up with Kai Norton MD  
[facility] [address] [facility]) Appt Note: Return in about 3 months (around 10/27/2017). [address] [place] [id number]  
[phone] [facility] [address] [id number] Upcoming Health Maintenance Date Due  
 FOOT EXAM Q1 8/8/2017 COLONOSCOPY 8/23/2017* INFLUENZA AGE 9 TO ADULT 10/24/2017* ZOSTER VACCINE AGE 60> 12/21/2017* HEMOGLOBIN A1C Q6M 10/5/2017 EYE EXAM RETINAL OR DILATED Q1 10/5/2017 LIPID PANEL Q1 4/5/2018 MICROALBUMIN Q1 8/2/2018 DTaP/Tdap/Td series (2 - Td) 8/8/2026 *Topic was postponed. The date shown is not the original due date. Allergies as of 8/2/2017  Review Complete On: 8/2/2017 By: Tammy Edwards LPN Severity Noted Reaction Type Reactions Opana [Oxymorphone]  02/19/2013    Other (comments) Feels like bug crawling under skin and drowziness Current Immunizations  Reviewed on 12/7/2016 Name Date Influenza Vaccine (Quad) PF 12/7/2016 Pneumococcal Conjugate (PCV-13) 8/8/2016 Tdap 8/8/2016 Not reviewed this visit You Were Diagnosed With   
  
 Codes Comments Diabetes mellitus without complication ([facility] [address]    -  Primary ICD-10-CM: E11.9 ICD-9-CM: 250.00 Essential hypertension     ICD-10-CM: I10 
ICD-9-CM: 401.9 Pure hypercholesterolemia     ICD-10-CM: E78.00 ICD-9-CM: 272.0 Chronic pain of both knees     ICD-10-CM: M25.561, M25.562, G89.29 ICD-9-CM: 719.46, 338.29 TRUE (generalized anxiety disorder)     ICD-10-CM: F41.1 ICD-9-CM: 300.02 Vitals BP Pulse Temp Resp Height(growth percentile) Weight(growth percentile) 138/72 65 98.5 °F (36.9 °C) (Oral) 16 6' 2\" (1.88 m) 310 lb (140.6 kg) SpO2 BMI Smoking Status 97% 39.8 kg/m2 Never Smoker Vitals History BMI and BSA Data Body Mass Index Body Surface Area  
 39.8 kg/m 2 2.71 m 2 Preferred Pharmacy Pharmacy Name Phone Isa Santana [address] [place] [address] [facility] [address] [phone] Your Updated Medication List  
  
   
This list is accurate as of: 8/2/17  9:16 AM.  Always use your most recent med list.  
  
  
  
  
 Back Brace Misc 1 Device by Does Not Apply route daily as needed for Pain. One, lumbosacral orthosis/back brace with metal struts      Medically necessary Blood-Glucose Meter Misc Commonly known as:  Cleotha Marus Check blood sugar daily buPROPion  mg SR tablet Commonly known as:  WELLBUTRIN SR  
TAKE 1 TABLET TWICE DAILY  
  
 busPIRone 10 mg tablet Commonly known as:  BUSPAR Take 1 Tab by mouth two (2) times a day. CEFTIN 500 mg tablet Generic drug:  cefUROXime Take 500 mg by mouth two (2) times a day. DULoxetine 60 mg capsule Commonly known as:  CYMBALTA Take 1 Cap by mouth daily. gabapentin 800 mg tablet Commonly known as:  NEURONTIN Take 1 Tab by mouth three (3) times daily for 30 days. gentamicin 0.3 % ophthalmic solution Commonly known as:  GARAMYCIN Administer 1 Drop to both eyes three (3) times daily for 10 days. glucose blood VI test strips strip Commonly known as:  [address] Check blood sugar daily IRON (DRIED) PO Take  by mouth.  
  
 isosorbide mononitrate ER 60 mg CR tablet Commonly known as:  IMDUR  
TAKE 1 TABLET EVERY MORNING Lancets Misc Commonly known as:  ACCU-CHEK FASTCLIX Check blood sugar daily  
  
 metFORMIN 500 mg tablet Commonly known as:  GLUCOPHAGE  
 TAKE 1 TABLET THREE TIMES DAILY WITH MEALS  
  
 metoprolol succinate 50 mg XL tablet Commonly known as:  TOPROL-XL  
TAKE 1 TABLET EVERY DAY  
  
 * Morphine 50 mg ER capsule Commonly known as:  RACHAEL Take 1 Cap by mouth every twelve (12) hours for 30 days. Max Daily Amount: 2 Caps. For chronic pain. Start taking on:  8/26/2017 * morphine IR 15 mg tablet Commonly known as:  MS IR Take 1 Tab by mouth four (4) times daily as needed for Pain. Max Daily Amount: 60 mg. Start taking on:  8/26/2017 * Morphine 50 mg ER capsule Commonly known as:  RACHAEL Take 1 Cap by mouth every twelve (12) hours for 30 days. Max Daily Amount: 2 Caps. For chronic pain. Start taking on:  9/25/2017 * morphine IR 15 mg tablet Commonly known as:  MS IR Take 1 Tab by mouth four (4) times daily as needed for Pain. Max Daily Amount: 60 mg. Start taking on:  9/25/2017  
  
 multivitamin tablet Commonly known as:  ONE A DAY Take 1 Tab by mouth daily. naloxone 4 mg/actuation Spry 4 mg by Nasal route as needed. nitroglycerin 0.4 mg SL tablet Commonly known as:  NITROSTAT  
1 Tab by SubLINGual route every five (5) minutes as needed. Use for Chest Pain ; Call MD if > 3 tablets required OMEPRAZOLE (BULK)  
  
 ondansetron hcl 4 mg tablet Commonly known as:  ZOFRAN (AS HYDROCHLORIDE) Take 1 Tab by mouth every eight (8) hours as needed for Nausea. phentermine 37.5 mg tablet Commonly known as:  ADIPEX-P Take 1 Tab by mouth every morning. Max Daily Amount: 37.5 mg.  
  
 pramipexole 0.5 mg tablet Commonly known as:  MIRAPEX TAKE 1 TABLET THREE TIMES DAILY  
  
 simvastatin 10 mg tablet Commonly known as:  ZOCOR  
TAKE 1 TABLET EVERY NIGHT  
  
 zolpidem 10 mg tablet Commonly known as:  AMBIEN  
TAKE 1 TABLET BY MOUTH AT BEDTIME AS NEEDED FOR SLEEP * Notice:   This list has 4 medication(s) that are the same as other medications prescribed for you. Read the directions carefully, and ask your doctor or other care provider to review them with you. Prescriptions Printed Refills  
 phentermine (ADIPEX-P) 37.5 mg tablet 2 Sig: Take 1 Tab by mouth every morning. Max Daily Amount: 37.5 mg.  
 Class: Print Route: Oral  
  
Prescriptions Sent to Pharmacy Refills  
 isosorbide mononitrate ER (IMDUR) 60 mg CR tablet 0 Sig: TAKE 1 TABLET EVERY MORNING Class: Normal  
 Pharmacy: [address] [place] [address] Ph #: [phone]  
 pramipexole (MIRAPEX) 0.5 mg tablet 1 Sig: TAKE 1 TABLET THREE TIMES DAILY Class: Normal  
 Pharmacy: [address] [place] [address] Ph #: [phone]  
 simvastatin (ZOCOR) 10 mg tablet 1 Sig: TAKE 1 TABLET EVERY NIGHT Class: Normal  
 Pharmacy: [address] [place] [address] Ph #: [phone]  
 metoprolol succinate (TOPROL-XL) 50 mg XL tablet 1 Sig: TAKE 1 TABLET EVERY DAY Class: Normal  
 Pharmacy: [address] [place] [address] Ph #: [phone]  
 buPROPion SR [facility] SR) 150 mg SR tablet 1 Sig: TAKE 1 TABLET TWICE DAILY Class: Normal  
 Pharmacy: [address] [place] [address] Ph #: [phone]  
 metFORMIN (GLUCOPHAGE) 500 mg tablet 1 Sig: TAKE 1 TABLET THREE TIMES DAILY WITH MEALS Class: Normal  
 Pharmacy: [address] [place] [address] Ph #: [phone]  
 busPIRone (BUSPAR) 10 mg tablet 6 Sig: Take 1 Tab by mouth two (2) times a day. Class: Normal  
 Pharmacy: [address] [place] [address] Ph #: [phone] Route: Oral  
  
We Performed the Following  DIABETES FOOT EXAM [7 Custom] REFERRAL TO ORTHOPEDICS [GRR678 Custom] Comments: Please evaluate for knee pain Follow-up Instructions Return in about 3 months (around 11/2/2017) for knees, anxiety. To-Do List   
 08/02/2017 Lab:  ALT   
  
 08/02/2017 Lab:  AST   
  
 08/02/2017 Lab:  HEMOGLOBIN A1C WITH EAG   
  
 08/02/2017 Lab:  LIPID PANEL   
  
 08/02/2017 Lab:  METABOLIC PANEL, BASIC   
  
 08/02/2017 Lab:  MICROALBUMIN, UR, RAND W/ MICROALBUMIN/CREA RATIO Referral Information Referral ID Referred By Referred To  
  
 [id number] Comfort Holm MD   
   [address] [facility]   
   [address] [facility] Rosalia Verao, [address] Phone: [phone] Fax: [phone] Visits Status Start Date End Date 1 New Request 8/2/17 8/2/18 If your referral has a status of pending review or denied, additional information will be sent to support the outcome of this decision. Patient Instructions Anxiety Disorder: Care Instructions Your Care Instructions Anxiety is a normal reaction to stress. Difficult situations can cause you to have symptoms such as sweaty palms and a nervous feeling. In an anxiety disorder, the symptoms are far more severe. Constant worry, muscle tension, trouble sleeping, nausea and diarrhea, and other symptoms can make normal daily activities difficult or impossible. These symptoms may occur for no reason, and they can affect your work, school, or social life. Medicines, counseling, and self-care can all help. Follow-up care is a key part of your treatment and safety. Be sure to make and go to all appointments, and call your doctor if you are having problems. It's also a good idea to know your test results and keep a list of the medicines you take. How can you care for yourself at home? · Take medicines exactly as directed. Call your doctor if you think you are having a problem with your medicine. · Go to your counseling sessions and follow-up appointments. · Recognize and accept your anxiety. Then, when you are in a situation that makes you anxious, say to yourself, \"This is not an emergency. I feel uncomfortable, but I am not in danger. I can keep going even if I feel anxious. \" · Be kind to your body: ¨ Relieve tension with exercise or a massage. ¨ Get enough rest. 
¨ Avoid alcohol, caffeine, nicotine, and illegal drugs. They can increase your anxiety level and cause sleep problems. ¨ Learn and do relaxation techniques. See below for more about these techniques. · Engage your mind. Get out and do something you enjoy. Go to a funny movie, or take a walk or hike. Plan your day. Having too much or too little to do can make you anxious. · Keep a record of your symptoms. Discuss your fears with a good friend or family member, or join a support group for people with similar problems. Talking to others sometimes relieves stress. · Get involved in social groups, or volunteer to help others. Being alone sometimes makes things seem worse than they are. · Get at least 30 minutes of exercise on most days of the week to relieve stress. Walking is a good choice. You also may want to do other activities, such as running, swimming, cycling, or playing tennis or team sports. Relaxation techniques Do relaxation exercises 10 to 20 minutes a day. You can play soothing, relaxing music while you do them, if you wish. · Tell others in your house that you are going to do your relaxation exercises. Ask them not to disturb you. · Find a comfortable place, away from all distractions and noise. · Lie down on your back, or sit with your back straight. · Focus on your breathing. Make it slow and steady. · Breathe in through your nose. Breathe out through either your nose or mouth. · Breathe deeply, filling up the area between your navel and your rib cage. Breathe so that your belly goes up and down. · Do not hold your breath. · Breathe like this for 5 to 10 minutes. Notice the feeling of calmness throughout your whole body. As you continue to breathe slowly and deeply, relax by doing the following for another 5 to 10 minutes: · Tighten and relax each muscle group in your body. You can begin at your toes and work your way up to your head. · Imagine your muscle groups relaxing and becoming heavy. · Empty your mind of all thoughts. · Let yourself relax more and more deeply. · Become aware of the state of calmness that surrounds you. · When your relaxation time is over, you can bring yourself back to alertness by moving your fingers and toes and then your hands and feet and then stretching and moving your entire body. Sometimes people fall asleep during relaxation, but they usually wake up shortly afterward. · Always give yourself time to return to full alertness before you drive a car or do anything that might cause an accident if you are not fully alert. Never play a relaxation tape while you drive a car. When should you call for help? Call 911 anytime you think you may need emergency care. For example, call if: 
· You feel you cannot stop from hurting yourself or someone else. Keep the numbers for these national suicide hotlines: 7-507-606-TALK (2-141.190.9793) and 4-178-ACBICXI (9-552.834.3153). If you or someone you know talks about suicide or feeling hopeless, get help right away. Watch closely for changes in your health, and be sure to contact your doctor if: 
· You have anxiety or fear that affects your life. · You have symptoms of anxiety that are new or different from those you had before. Where can you learn more? Go to http://iza-poornima.info/. Enter P754 in the search box to learn more about \"Anxiety Disorder: Care Instructions. \" Current as of: July 26, 2016 Content Version: 11.3 © 5426-7989 [employer].  Care instructions adapted under license by [address] (which disclaims liability or warranty for this information). If you have questions about a medical condition or this instruction, always ask your healthcare professional. [address] any warranty or liability for your use of this information. Introducing [facility]! Dear Estevan Pandey: Thank you for requesting a Upptalk account. Our records indicate that you have previously registered for a Upptalk account but its currently inactive. Please call our Upptalk support line at [phone]. Additional Information If you have questions, please visit the Frequently Asked Questions section of the Upptalk website at [email or web address] Remember, Upptalk is NOT to be used for urgent needs. For medical emergencies, dial 911. Now available from your iPhone and Android! Please provide this summary of care documentation to your next provider. Your primary care clinician is listed as [address]. If you have any questions after today's visit, please call [phone].

## 2017-08-02 NOTE — PATIENT INSTRUCTIONS

## 2017-08-02 NOTE — PROGRESS NOTES
1. Have you been to the ER, urgent care clinic since your last visit? Hospitalized since your last visit? No    2. Have you seen or consulted any other health care providers outside of the [address] [place] [address] since your last visit? Include any pap smears or colon screening.  No

## 2017-08-03 LAB
CREAT UR-MCNC: 177.73 MG/DL (ref 30–125)
MICROALBUMIN UR-MCNC: 0.8 MG/DL (ref 0–3)
MICROALBUMIN/CREAT UR-RTO: 5 MG/G (ref 0–30)

## 2017-08-11 RX ORDER — MIDAZOLAM HYDROCHLORIDE 1 MG/ML
.5-6 INJECTION, SOLUTION INTRAMUSCULAR; INTRAVENOUS
Status: CANCELLED | OUTPATIENT
Start: 2017-08-23

## 2017-08-11 RX ORDER — SODIUM CHLORIDE 0.9 % (FLUSH) 0.9 %
5-10 SYRINGE (ML) INJECTION AS NEEDED
Status: CANCELLED | OUTPATIENT
Start: 2017-08-23

## 2017-08-23 ENCOUNTER — HOSPITAL ENCOUNTER (OUTPATIENT)
Age: 62
Setting detail: OUTPATIENT SURGERY
Discharge: HOME | End: 2017-08-23
Attending: PHYSICAL MEDICINE & REHABILITATION | Admitting: PHYSICAL MEDICINE & REHABILITATION
Payer: OTHER MISCELLANEOUS

## 2017-08-23 ENCOUNTER — APPOINTMENT (OUTPATIENT)
Dept: GENERAL RADIOLOGY | Age: 62
End: 2017-08-23
Attending: PHYSICAL MEDICINE & REHABILITATION
Payer: OTHER MISCELLANEOUS

## 2017-08-23 VITALS
SYSTOLIC BLOOD PRESSURE: 112 MMHG | TEMPERATURE: 98.3 F | HEART RATE: 70 BPM | DIASTOLIC BLOOD PRESSURE: 62 MMHG | RESPIRATION RATE: 16 BRPM | OXYGEN SATURATION: 93 % | BODY MASS INDEX: 38.76 KG/M2 | WEIGHT: 302 LBS | HEIGHT: 74 IN

## 2017-08-23 LAB — GLUCOSE BLD STRIP.AUTO-MCNC: 111 MG/DL (ref 70–110)

## 2017-08-23 PROCEDURE — 77030031139 HC SUT VCRL2 J&J -A: Performed by: PHYSICAL MEDICINE & REHABILITATION

## 2017-08-23 PROCEDURE — C1778 LEAD, NEUROSTIMULATOR: HCPCS | Performed by: PHYSICAL MEDICINE & REHABILITATION

## 2017-08-23 PROCEDURE — 74011250636 HC RX REV CODE- 250/636: Performed by: PHYSICAL MEDICINE & REHABILITATION

## 2017-08-23 PROCEDURE — 77030003029 HC SUT VCRL J&J -B: Performed by: PHYSICAL MEDICINE & REHABILITATION

## 2017-08-23 PROCEDURE — 76010000011 HC PAIN MGT 61 TO 90 MIN PROC: Performed by: PHYSICAL MEDICINE & REHABILITATION

## 2017-08-23 PROCEDURE — 77030015107 HC KT TRL STIM BLT BATTRY BSC -A: Performed by: PHYSICAL MEDICINE & REHABILITATION

## 2017-08-23 PROCEDURE — 77030003679 HC NDL SPN TERU -A: Performed by: PHYSICAL MEDICINE & REHABILITATION

## 2017-08-23 PROCEDURE — 77030003672 HC NDL SPN HALY -A: Performed by: PHYSICAL MEDICINE & REHABILITATION

## 2017-08-23 PROCEDURE — 77030029177: Performed by: PHYSICAL MEDICINE & REHABILITATION

## 2017-08-23 PROCEDURE — 74011250636 HC RX REV CODE- 250/636

## 2017-08-23 PROCEDURE — 77030002933 HC SUT MCRYL J&J -A: Performed by: PHYSICAL MEDICINE & REHABILITATION

## 2017-08-23 PROCEDURE — 77030018673: Performed by: PHYSICAL MEDICINE & REHABILITATION

## 2017-08-23 PROCEDURE — 77030002996 HC SUT SLK J&J -A: Performed by: PHYSICAL MEDICINE & REHABILITATION

## 2017-08-23 PROCEDURE — 82962 GLUCOSE BLOOD TEST: CPT

## 2017-08-23 PROCEDURE — 99153 MOD SED SAME PHYS/QHP EA: CPT

## 2017-08-23 PROCEDURE — 77030002986 HC SUT PROL J&J -A: Performed by: PHYSICAL MEDICINE & REHABILITATION

## 2017-08-23 PROCEDURE — 99152 MOD SED SAME PHYS/QHP 5/>YRS: CPT

## 2017-08-23 DEVICE — LEAD TRIAL KT PERC 1X16 -- SC-2316-50E: Type: IMPLANTABLE DEVICE | Status: FUNCTIONAL

## 2017-08-23 RX ORDER — LIDOCAINE HYDROCHLORIDE 10 MG/ML
INJECTION, SOLUTION EPIDURAL; INFILTRATION; INTRACAUDAL; PERINEURAL AS NEEDED
Status: DISCONTINUED | OUTPATIENT
Start: 2017-08-23 | End: 2017-08-23 | Stop reason: HOSPADM

## 2017-08-23 RX ORDER — MIDAZOLAM HYDROCHLORIDE 1 MG/ML
.5-6 INJECTION, SOLUTION INTRAMUSCULAR; INTRAVENOUS
Status: DISCONTINUED | OUTPATIENT
Start: 2017-08-23 | End: 2017-08-23 | Stop reason: HOSPADM

## 2017-08-23 RX ORDER — MIDAZOLAM HYDROCHLORIDE 1 MG/ML
INJECTION, SOLUTION INTRAMUSCULAR; INTRAVENOUS AS NEEDED
Status: DISCONTINUED | OUTPATIENT
Start: 2017-08-23 | End: 2017-08-23 | Stop reason: HOSPADM

## 2017-08-23 RX ORDER — SODIUM CHLORIDE 0.9 % (FLUSH) 0.9 %
5-10 SYRINGE (ML) INJECTION AS NEEDED
Status: DISCONTINUED | OUTPATIENT
Start: 2017-08-23 | End: 2017-08-23 | Stop reason: HOSPADM

## 2017-08-23 RX ORDER — FENTANYL CITRATE 50 UG/ML
INJECTION, SOLUTION INTRAMUSCULAR; INTRAVENOUS AS NEEDED
Status: DISCONTINUED | OUTPATIENT
Start: 2017-08-23 | End: 2017-08-23 | Stop reason: HOSPADM

## 2017-08-23 RX ORDER — METHYLPREDNISOLONE 4 MG/1
TABLET ORAL
Qty: 1 DOSE PACK | Refills: 0 | Status: SHIPPED | OUTPATIENT
Start: 2017-08-23 | End: 2017-08-23 | Stop reason: CLARIF

## 2017-08-23 RX ORDER — METHYLPREDNISOLONE 4 MG/1
TABLET ORAL
Qty: 1 DOSE PACK | Refills: 0 | Status: SHIPPED | OUTPATIENT
Start: 2017-08-23 | End: 2017-11-02 | Stop reason: ALTCHOICE

## 2017-08-23 NOTE — H&P (VIEW-ONLY)
HISTORY OF PRESENT ILLNESS  Chetan William is a 58 y.o. male. HPI  Patient is here today for evaluation and treatment of: Diabetes/Hypertension/Cholesterol problem    Diabetes:  States blood sugars are controlled at home; Last A1c was as below. ( was on steroids at the time )  States home readings have been in the 90s to low one hundreds. Lab Results   Component Value Date/Time    Hemoglobin A1c 8.3 04/05/2017 09:30 AM     Has recently been on steroids as well;     Hypertension:  Likely more elevated due to current increase in pain as below. Cholesterol: he is on zocor, tolerates med well;     Has had a lot of pain recently. He is having a nerve root stimulator on 8/23/2017. He is on chronic pain meds. He is also on 2 anxiolytics. I have informed pt that a change in treatment plan is in order. He apparently has been aware as well. He has been placed on narcan. Apparently has had  Recent Psych eval in preparation for his spinal cord stimulator to be placed. Requests  a referral to Dr. Gale Burkitt. Has bilateral knee pain; may swell at times;  Gives way. Pt has fallen due to knee instability; Sx present X 6 months or more. Pt also requests med to help with weight loss; Pt tried kristopher OTC states this was not helpful;  BP is better controlled today. PMH,  Meds, Allergies, Family History, Social history reviewed        Current Outpatient Prescriptions:     cefUROXime (CEFTIN) 500 mg tablet, Take 500 mg by mouth two (2) times a day., Disp: , Rfl:     DULoxetine (CYMBALTA) 60 mg capsule, Take 1 Cap by mouth daily. , Disp: 90 Cap, Rfl: 2    [START ON 9/25/2017] Morphine (RACHAEL) 50 mg ER capsule, Take 1 Cap by mouth every twelve (12) hours for 30 days. Max Daily Amount: 2 Caps. For chronic pain., Disp: 60 Cap, Rfl: 0    [START ON 9/25/2017] morphine IR (MS IR) 15 mg tablet, Take 1 Tab by mouth four (4) times daily as needed for Pain.  Max Daily Amount: 60 mg., Disp: 120 Tab, Rfl: 0    gabapentin (NEURONTIN) 800 mg tablet, Take 1 Tab by mouth three (3) times daily for 30 days. , Disp: 90 Tab, Rfl: 5    [START ON 8/26/2017] diazePAM (VALIUM) 5 mg tablet, Take 1 Tab by mouth nightly as needed for Sleep for up to 30 days. , Disp: 30 Tab, Rfl: 1    [START ON 8/26/2017] Morphine (RACHAEL) 50 mg ER capsule, Take 1 Cap by mouth every twelve (12) hours for 30 days. Max Daily Amount: 2 Caps. For chronic pain., Disp: 60 Cap, Rfl: 0    [START ON 8/26/2017] morphine IR (MS IR) 15 mg tablet, Take 1 Tab by mouth four (4) times daily as needed for Pain. Max Daily Amount: 60 mg., Disp: 180 Tab, Rfl: 0    OMEPRAZOLE, BULK,, , Disp: , Rfl:     gentamicin (GARAMYCIN) 0.3 % ophthalmic solution, Administer 1 Drop to both eyes three (3) times daily for 10 days. , Disp: 1 Bottle, Rfl: 0    isosorbide mononitrate ER (IMDUR) 60 mg CR tablet, TAKE 1 TABLET EVERY MORNING, Disp: 90 Tab, Rfl: 0    glucose blood VI test strips (ACCU-CHEK SMARTVIEW TEST STRIP) strip, Check blood sugar daily, Disp: 100 Strip, Rfl: 3    Blood-Glucose Meter (ACCU-CHEK GABRIELLE) misc, Check blood sugar daily, Disp: 1 Each, Rfl: 0    Lancets (ACCU-CHEK FASTCLIX) misc, Check blood sugar daily, Disp: 1 Each, Rfl: 11    zolpidem (AMBIEN) 10 mg tablet, TAKE 1 TABLET BY MOUTH AT BEDTIME AS NEEDED FOR SLEEP, Disp: 90 Tab, Rfl: 0    naloxone 4 mg/actuation spry, 4 mg by Nasal route as needed. , Disp: 1 Box, Rfl: 0    pramipexole (MIRAPEX) 0.5 mg tablet, TAKE 1 TABLET THREE TIMES DAILY, Disp: 270 Tab, Rfl: 1    simvastatin (ZOCOR) 10 mg tablet, TAKE 1 TABLET EVERY NIGHT, Disp: 90 Tab, Rfl: 1    metoprolol succinate (TOPROL-XL) 50 mg XL tablet, TAKE 1 TABLET EVERY DAY, Disp: 90 Tab, Rfl: 1    buPROPion SR (WELLBUTRIN SR) 150 mg SR tablet, TAKE 1 TABLET TWICE DAILY, Disp: 180 Tab, Rfl: 1    metFORMIN (GLUCOPHAGE) 500 mg tablet, TAKE 1 TABLET THREE TIMES DAILY WITH MEALS, Disp: 270 Tab, Rfl: 1    ALPRAZolam (XANAX) 0.5 mg tablet, Take 1 Tab by mouth every twelve (12) hours as needed for Anxiety (Do not take when Valium is taken). Max Daily Amount: 1 mg., Disp: 60 Tab, Rfl: 2    ondansetron hcl (ZOFRAN, AS HYDROCHLORIDE,) 4 mg tablet, Take 1 Tab by mouth every eight (8) hours as needed for Nausea., Disp: 20 Tab, Rfl: 0    FERROUS SULFATE, DRIED (IRON, DRIED, PO), Take  by mouth., Disp: , Rfl:     nitroglycerin (NITROSTAT) 0.4 mg SL tablet, 1 Tab by SubLINGual route every five (5) minutes as needed. Use for Chest Pain ; Call MD if > 3 tablets required, Disp: 1 Bottle, Rfl: 0    Back Brace misc, 1 Device by Does Not Apply route daily as needed for Pain. One, lumbosacral orthosis/back brace with metal struts      Medically necessary, Disp: 1 Device, Rfl: 0    multivitamin (ONE A DAY) tablet, Take 1 Tab by mouth daily. , Disp: , Rfl:     glucose blood VI test strips (ASCENSIA AUTODISC VI, ONE TOUCH ULTRA TEST VI) strip, Blood sugar check daily, Disp: 100 Strip, Rfl: 3    tapentadol (NUCYNTA) 100 mg tablet, Take 100 mg by mouth four (4) times daily as needed for Pain. Max Daily Amount: 400 mg., Disp: 120 Tab, Rfl: 0    Review of Systems   Constitutional: Negative for chills and fever. Cardiovascular: Negative for chest pain and palpitations. Musculoskeletal: Positive for joint pain (knees, back). Physical Exam   Visit Vitals    /72    Pulse 65    Temp 98.5 °F (36.9 °C) (Oral)    Resp 16    Ht 6' 2\" (1.88 m)    Wt 310 lb (140.6 kg)    SpO2 97%    BMI 39.8 kg/m2     General appearance: alert, cooperative, no distress, appears stated age  Neck: supple, symmetrical, trachea midline, no adenopathy, thyroid: not enlarged, symmetric, no tenderness/mass/nodules, no carotid bruit and no JVD  Lungs: clear to auscultation bilaterally  Heart: regular rate and rhythm, S1, S2 normal, no murmur, click, rub or gallop  Extremities: extremities normal, atraumatic, no cyanosis or edema; some crepitus at bilateral knees; No joint laxity appreciated.    Lab Results Component Value Date/Time    Cholesterol, total 141 04/05/2017 09:30 AM    HDL Cholesterol 61 04/05/2017 09:30 AM    LDL, calculated 55.6 04/05/2017 09:30 AM    VLDL, calculated 24.4 04/05/2017 09:30 AM    Triglyceride 122 04/05/2017 09:30 AM    CHOL/HDL Ratio 2.3 04/05/2017 09:30 AM       Lab Results   Component Value Date/Time    Sodium 139 03/12/2017 04:28 PM    Potassium 4.8 03/12/2017 04:28 PM    Chloride 103 03/12/2017 04:28 PM    CO2 25 03/12/2017 04:05 PM    CO2, TOTAL 24 03/12/2017 04:28 PM    Anion gap 8 12/07/2016 09:27 AM    Glucose 149 03/12/2017 04:28 PM    BUN 20 03/12/2017 04:28 PM    Creatinine 1.1 03/12/2017 04:28 PM    BUN/Creatinine ratio 16 12/07/2016 09:27 AM    GFR est AA >60 03/12/2017 04:05 PM    GFR est non-AA >60 03/12/2017 04:05 PM    Calcium 9.5 03/12/2017 04:05 PM             ASSESSMENT and PLAN    ICD-10-CM ICD-9-CM    1. Diabetes mellitus without complication (HCC) L58.9 250.00  DIABETES FOOT EXAM      HEMOGLOBIN A1C WITH EAG      MICROALBUMIN, UR, RAND W/ MICROALBUMIN/CREA RATIO   2. Essential hypertension Q25 159.9 METABOLIC PANEL, BASIC   3. Pure hypercholesterolemia E78.00 272.0 LIPID PANEL      AST      ALT   4. Chronic pain of both knees- new M25.561 719.46 REFERRAL TO ORTHOPEDICS    M25.562 338.29     G89.29     5.  TRUE (generalized anxiety disorder)- stable F41.1 300.02 busPIRone (BUSPAR) 10 mg tablet     As above:    above all stable unless otherwise noted   treatment plan as listed below  Orders Placed This Encounter    HEMOGLOBIN A1C WITH EAG    MICROALBUMIN, UR, RAND W/ MICROALBUMIN/CREA RATIO    LIPID PANEL    AST    METABOLIC PANEL, BASIC    ALT    REFERRAL TO ORTHOPEDICS     DIABETES FOOT EXAM    cefUROXime (CEFTIN) 500 mg tablet    isosorbide mononitrate ER (IMDUR) 60 mg CR tablet    pramipexole (MIRAPEX) 0.5 mg tablet    simvastatin (ZOCOR) 10 mg tablet    metoprolol succinate (TOPROL-XL) 50 mg XL tablet    buPROPion SR (WELLBUTRIN SR) 150 mg SR tablet    metFORMIN (GLUCOPHAGE) 500 mg tablet    busPIRone (BUSPAR) 10 mg tablet    phentermine (ADIPEX-P) 37.5 mg tablet     Wean valium and xanax; last xanax was 2 days ago; may take may Q3d x1 then Q4 days X1 then stop  Ortho referral as ordered  Phentermine ordered for temporary use to assist with weight loss; Add buspar as ordered. Labs as ordered  Follow-up Disposition:  Return in about 3 months (around 11/2/2017) for knees, anxiety. An After Visit Summary was printed and given to the patient. This has been fully explained to the patient, who indicates understanding.

## 2017-08-23 NOTE — DISCHARGE INSTRUCTIONS
[address] [place] [address] for Pain Management      Post Procedures Instructions    *Resume Diet and Activity as tolerated. Rest for the remainder of the day. *You may fell worse before you feel better as the numbing medications wear off before the steroids take effect if used for your procedures. *Do not use affected extremity until numbness or loss of sensation has completely resolved without assistance. *DO NOT DRIVE, operate machinery/heavey equipment for 24 hours. *DO NOT DRINK ALCOHOL for 24 hours as it may interact with the sedation if you received it and also thins your blood and may cause you to bleed. *WAIT 24 hours before starting back ANY Blood thinning medications:   (Heparin, Coumadin, Warfarin, Lovenox, Plavix, Aggrenox)    *Resume Pre-Procedure Medications as prescribed except Blood Thinners unless directed by your Physician or Cardiologist.     *Avoid Hot tubs and Heating pad for 24 hours to prevent dissipation of medications, you may shower to remove bandages and remaining prep residue on the skin. * If you develop a Headache, drink plenty of fluids including beverages with caffeine (Coffee, Mt. Dew etc.) and rest.  If the headache persists longer than 24 hoursor intensifies - Please call [facility] ([facility]) [phone]      * If you are DIABETIC, check your blood sugar three times a day for the next three days, the steroids will increase your blood sugar. If your blood sugar is greater than 400 have someone drive you to the nearest [address]. * If you experience any of the following problems, call the [facility] [phone] between 8:00 am - 4:30pm or After Hours [phone].     Shortness of breath    Fever of 101 F or higher    Nausea / Vomiting (not normal to you)    Increasing stiffness in the neck    Weakness or numbness in the arms or legs that is not resolving    Prolonged and increasing pain > than 4 days    ANYTHING OUT of the ORDINARY TO YOU    If YOU are experiencing a severe reaction / complication that you have never had before post procedure, call 911 or go to the nearest emergency room! All patients must have a  for transportation [place] regardless if you do or do not receive sedation. DISCHARGE SUMMARY from Nurse      PATIENT INSTRUCTIONS:    After Oral  or intravenous sedation, for 24 hours or while taking prescription Narcotics:  · Limit your activities  · Do not drive and operate hazardous machinery  · Do not make important personal or business decisions  · Do  not drink alcoholic beverages  · If you have not urinated within 8 hours after discharge, please contact your surgeon on call. Report the following to your surgeon:  · Excessive pain, swelling, redness or odor of or around the surgical area  · Temperature over 101  · Nausea and vomiting lasting longer than 4 hours or if unable to take medications  · Any signs of decreased circulation or nerve impairment to extremity: change in color, persistent  numbness, tingling, coldness or increase pain  · Any questions        What to do at Home:  Recommended activity: Activity as tolerated, NO DRIVING FOR 24 Hours post injection          *  Please give a list of your current medications to your Primary Care Provider. *  Please update this list whenever your medications are discontinued, doses are      changed, or new medications (including over-the-counter products) are added. *  Please carry medication information at all times in case of emergency situations. These are general instructions for a healthy lifestyle:    No smoking/ No tobacco products/ Avoid exposure to second hand smoke    Surgeon General's Warning:  Quitting smoking now greatly reduces serious risk to your health.     Obesity, smoking, and sedentary lifestyle greatly increases your risk for illness    A healthy diet, regular physical exercise & weight monitoring are important for maintaining a healthy lifestyle    You may be retaining fluid if you have a history of heart failure or if you experience any of the following symptoms:  Weight gain of 3 pounds or more overnight or 5 pounds in a week, increased swelling in our hands or feet or shortness of breath while lying flat in bed. Please call your doctor as soon as you notice any of these symptoms; do not wait until your next office visit. Recognize signs and symptoms of STROKE:    F-face looks uneven    A-arms unable to move or move unevenly    S-speech slurred or non-existent    T-time-call 911 as soon as signs and symptoms begin-DO NOT go       Back to bed or wait to see if you get better-TIME IS BRAIN. [employer] Activation    Thank you for requesting access to [employer]. Please follow the instructions below to securely access and download your online medical record. [employer] allows you to send messages to your doctor, view your test results, renew your prescriptions, schedule appointments, and more. How Do I Sign Up? 1. In your internet browser, go to [email or web address]  2. Click on the First Time User? Click Here link in the Sign In box. You will be redirect to the New Member Sign Up page. 3. Enter your [employer] Access Code exactly as it appears below. You will not need to use this code after youve completed the sign-up process. If you do not sign up before the expiration date, you must request a new code. [employer] Access Code: [email or web address]  Expires: 2017 10:30 AM (This is the date your [employer] access code will )    4. Enter the last four digits of your Social Security Number (xxxx) and Date of Birth (mm/dd/yyyy) as indicated and click Submit. You will be taken to the next sign-up page. 5. Create a [employer] ID. This will be your [employer] login ID and cannot be changed, so think of one that is secure and easy to remember. 6. Create a [employer] password.  You can change your password at any time. 7. Enter your Password Reset Question and Answer. This can be used at a later time if you forget your password. 8. Enter your e-mail address. You will receive e-mail notification when new information is available in [address] 9. Click Sign Up. You can now view and download portions of your medical record. 10. Click the Download Summary menu link to download a portable copy of your medical information. Additional Information    If you have questions, please visit the Frequently Asked Questions section of the [employer] website at [email or web address] Remember, [employer] is NOT to be used for urgent needs. For medical emergencies, dial 911.

## 2017-08-23 NOTE — PROCEDURES
[facility]  SPINAL CORD STIMULATOR TRIAL    PROCEDURE REPORT      PATIENT:  Nikita William  YOB: 1955  DATE OF SERVICE:  8/23/2017  SITE:  [facility]. [facility]ARCHI [facility] Special Procedures Suite    PRE-PROCEDURE DIAGNOSIS:  See Above    POST-PROCEDURE DIAGNOSIS:  See Above                PROCEDURE:    1. Percutaneous implantation of epidural spinal cord stimulator neuroelectrode lead trial (63650 x1)  2. Electronic analysis of spinal cord stimulator with programming first hour (93117)  3. Supervision of moderate sedation (first 30 minutes) (07578)  4. Supervision of moderate sedation (each additional 15 minutes) (99145 x4)    ANESTHESIA:  Local with moderate IV sedation. See Medication Administration Record for specific medications and dosage. PREOPERATIVE ANTIBIOTICS:  Cefazolin 3 gm IV   COMPLICATIONS: None. PHYSICIAN:  Guru Guan MD    PRE-PROCEDURE NOTE:  Pre-procedural assessment of the patient was performed including a limited history and physical examination. The details of the procedure were discussed with the patient, including the risks, benefits and alternative options and an informed consent was obtained. The patients NPO status, if necessary for the specific procedure and/or administration of moderate intravenous sedation, if utilized, and availability of a responsible adult to escort the patient following the procedure were confirmed. A peripheral intravenous cannula was placed without difficulty and lactated Ringers solution administered. See nursing notes for details. PROCEDURE NOTE:  The patient was brought to the procedure suite and positioned on the fluoroscopy table in the prone position. Physiologic monitors were applied and supplemental oxygen was administered via nasal cannula. The skin was prepped in the standard surgical fashion and sterile drapes were applied over the procedure site.  Please refer to the Flowsheet for documentation of the patients vital signs and the Medication Administration Record for any oral and/or intravenous sedation administered prior to or during the procedure. 1% Lidocaine was utilized for local subcutaneous anesthesia and also used for anesthesia of deeper tissue planes. Under AP fluoroscopic guidance, a 14-gauge 3.5-inch modified curved Tuohy RX Coude needle was advanced from a paramedian approach toward the L1-2 vertebral interspace until the ligamentum flavum was engaged. The epidural space was entered using a loss of resistance technique with preservative-free normal saline. Aspiration was negative for blood and CSF. Next, a 16-contact [employer] neuroelectrode was advanced without resistance under AP and lateral fluoroscopic guidance to the top of T7 interspace midline. Stimulation of the desired neural elements was undertaken with the assistance of the company representative. Appropriate coverage was reported in all of the patients painful areas. Stimulation was reported to be pleasant and tolerable and not noted in any anterior portions of the thorax or abdomen. Next, the Tuohy needles were removed under AP fluoroscopic guidance confirming no movement of the neuroelectrode lead within the epidural space. The lead was anchored in place with a 'Stayfix' neuroelectrode adhesive pad followed by sterile Benzoin and multiple Steri-strips. The area was covered with sterile 4x4 gauze dressings and two large Tegaderms. The patient tolerated the procedure well, vital signs remained stable throughout the procedure and there were no complications. POST-PROCEDURE COURSE:  The patient was escorted from the procedure suite in satisfactory condition and recovered per facility protocol based on the type of procedure performed and/or the sedation utilized.  The patient did not experience any adverse events and remained hemodynamically stable during the post-procedure period. The patient was observed in the recovery area for approximately 60 minutes prior to discharge. During that recovery period, neurostimulator programming was performed over 45 minutes. One to four programs were created utilizing 2 to 8 neuroelectrode contacts to provide neurostimulation. Throughout those programs, the pulse frequency ranged from 40-80 Hz, the pulse amplitude ranged from 0.1 to 10.5 mV, and the pulse duration (width) ranged from 330 to 450 microseconds. The patient was discharged in good condition with a prescription for oral antibiotics and with appropriate post procedure instructions. The patient will be followed-up as directed. Prior to discharge, patient was given a script for Kelfex 500mg tabs, 1 tab po bid for 7 days. #14 tabs, 0 refills. DISCHARGE NOTE:  Upon discharge, the patient was able to tolerate fluids and was in no acute distress. The patient was oriented to person, place and time and vital signs were stable. Appropriate post-procedure instructions were provided and explained to the patient in detail and all questions were answered. Prior to discharge from the recovery room, patient did complain of right posterior and inferior foot pain which he described as a burning in his bottom of the right foot. This is a new pain for him. In retrospect, as I was initially advancing the spinal cord stimulator lead, I did note that the lead inadvertently advanced into the L5-S1 right neural foramen. I promptly backed out the stimulator lead and readvanced it in a more midline fashion. I suspect that this has caused his right foot burning pain. I believe this is more of a nerve root irritation or a transient neuropraxia. Patient does take gabapentin 800 mg tablet 3-4 times daily. I have asked him for the time being to assure that he is taking 4 tablets daily. I will also call in a Medrol Dosepak for him, and follow-up closely.   He is otherwise doing well and with no apparent complication at this time.      Wendy Stephenson MD 8/23/2017 2:28 PM

## 2017-08-23 NOTE — INTERVAL H&P NOTE
H&P Update:  Mauri William was seen and examined. History and physical has been reviewed. The patient has been examined.  There have been no significant clinical changes since the completion of the originally dated History and Physical.    Signed By: Max Trejo MD     August 23, 2017 10:18 AM

## 2017-08-24 ENCOUNTER — TELEPHONE (OUTPATIENT)
Dept: PAIN MANAGEMENT | Age: 62
End: 2017-08-24

## 2017-08-24 NOTE — TELEPHONE ENCOUNTER
24 Aug 2017, 18:30PM.  I just spoke to Mr. William. He states that he feels a spinal cord stimulator trial is going better for him this time than his previous spinal cord stimulator trial.  He is getting greater than 50% relief of his low back and leg pain at this point, with the exception of his right foot that is still severely bothering him. His right foot has burning lancinating pain which has essentially persisted since I placed a spinal cord stimulator lead. As I have stated to him, I feel that when I initially advanced the stimulator lead, it veered off to the right and irritated the right sacral nerve root S1. He has been able to start his Medrol Dosepak at prescribed dosage recommendations. He has increased his gabapentin 800 mg tablets from 3 to 4 tablets daily. I believe his right-sided S1 neuritis will improve over the next day or so. He otherwise denies bowel or bladder changes, he denies increase in low back pain. He denies nausea vomiting, numbness or weakness of the legs. He otherwise feels that he is doing very well with spinal cord stimulator trial.  No complications noted at this time other than right foot neuritis as stated above. We will follow.  [facility]

## 2017-08-28 ENCOUNTER — TELEPHONE (OUTPATIENT)
Dept: PAIN MANAGEMENT | Age: 62
End: 2017-08-28

## 2017-08-28 NOTE — TELEPHONE ENCOUNTER
28 Aug 2017, 17:15PM. I just spoke to Mr William. He is feeling much better, much greater than 50% improvement of his low back pain. Very excited re stim trial this time around. His right foot burning pian is almost completely gone. Denies any significant complications or issues, ie- no n/v, fevers or chills, b/b changes, numbness or weakness. He will have stim pulled on Wednesday afternoon. Doing well and with no complcitions at this point.  [facility]

## 2017-08-30 ENCOUNTER — OFFICE VISIT (OUTPATIENT)
Dept: PAIN MANAGEMENT | Age: 62
End: 2017-08-30

## 2017-08-30 VITALS
WEIGHT: 300 LBS | HEART RATE: 67 BPM | DIASTOLIC BLOOD PRESSURE: 74 MMHG | BODY MASS INDEX: 38.5 KG/M2 | HEIGHT: 74 IN | RESPIRATION RATE: 14 BRPM | SYSTOLIC BLOOD PRESSURE: 140 MMHG | TEMPERATURE: 98.4 F

## 2017-08-30 DIAGNOSIS — M47.816 LUMBAR FACET ARTHROPATHY: ICD-10-CM

## 2017-08-30 DIAGNOSIS — M54.17 LUMBOSACRAL RADICULOPATHY AT S1: ICD-10-CM

## 2017-08-30 DIAGNOSIS — G89.4 CHRONIC PAIN SYNDROME: Primary | ICD-10-CM

## 2017-08-30 DIAGNOSIS — M51.36 DDD (DEGENERATIVE DISC DISEASE), LUMBAR: ICD-10-CM

## 2017-08-30 DIAGNOSIS — M47.817 LUMBAR AND SACRAL OSTEOARTHRITIS: ICD-10-CM

## 2017-08-30 DIAGNOSIS — M54.40 CHRONIC MIDLINE LOW BACK PAIN WITH SCIATICA, SCIATICA LATERALITY UNSPECIFIED: ICD-10-CM

## 2017-08-30 DIAGNOSIS — M96.1 LUMBAR POST-LAMINECTOMY SYNDROME: ICD-10-CM

## 2017-08-30 DIAGNOSIS — G89.29 CHRONIC MIDLINE LOW BACK PAIN WITH SCIATICA, SCIATICA LATERALITY UNSPECIFIED: ICD-10-CM

## 2017-08-30 NOTE — PATIENT INSTRUCTIONS

## 2017-08-30 NOTE — PROGRESS NOTES
[address] [facility]  Interventional Pain Management Consultation History & Physical    PATIENT NAME:  Fidel William     YOB: 1955    DATE OF SERVICE:   8/30/2017      CHIEF COMPLAINT:  Back Pain      REASON FOR VISIT:   Fidel William presents to the pain clinic today for follow on evaluation and to consider interventional pain management options as indicated for the type and location of the pain the patient is presenting with. HISTORY OF PRESENT ILLNESS:   Patient returns for follow-up evaluation after his spinal cord stimulator trial which has just completed today. He states that he had up to 70% pain relief of his low back and leg pain, bilateral leg pain. He is very pleased with the results of spinal cord stimulator trial and requests permanent placement. He denies any complications or side effects, including nausea vomiting, fevers chills, numbness or weakness of the legs, excessive back pain, or bowel or bladder changes. Stimulator lead removed, tip intact. No signs or symptoms of induration erythema or infection noted. Sterile bandage applied. ASSESSMENT/OPTIONS: as follows. I will place a consult to Dr. Irene Hall MD, [facility], for permanent placement per patient request.  He can otherwise follow-up back in our clinic at any time. He is doing quite well and with no complications noted at this time. MRI Results (most recent):    Results from [place] encounter on 06/23/15   MRI LUMB SPINE W WO CONT   Narrative MR LUMBAR SPINE WITH AND WITHOUT CONTRAST    CPT CODE: 59059    HISTORY: Back pain, left leg pain, prior back surgery 2012. COMPARISON: MRI lumbar spine 4/15/2010. TECHNIQUE: Lumbar spine scanned with axial and sagittal T1W scans, axial and  sagittal T2W scans, and with post gadolinium axial and sagittal T1W scans.  20 cc  intravenous Magnevist was administered for this exam.        FINDINGS:     Patient has had posterolateral interbody fusion at L4/L5 and midline laminectomy  at the previous examination. Resultant decompression of the spinal canal at the  L4/L5 level. No evidence of postoperative collection or arachnoiditis. Mildly exaggerated lumbar lordosis. Small vertebral body hemangiomata are again  noted at L5. Mild loss of height at T12, nonacute. Minimal degenerative type I  endplate change anteriorly at T11/T12. Conus medullaris is normal in caliber and signal intensity and terminates  normally at lower T12/L1 level. On the sagittal images, there is a disc protrusion with deformation of the  ventral conus at T11/T12. Serjio Stager  may be mildly stenotic. T12/L1: Central canal and neural foramina are patent. L1/L2: Mild bulging of the disc. Mild degenerative facet hypertrophy and mild  ligamentum flavum hypertrophy    . Mild encroachment upon the central canal and  foramina. L2/L3: Mild bulging of the disc with eccentric protrusion towards the far left  lateral margin of the disc. Mild to moderate degenerative facet and ligamentum  flavum hypertrophy. Mild constriction of the thecal sac. Mild to moderate left  and mild right foraminal stenosis. L3/L4: Broad-based disc protrusion and moderate degenerative facet and  ligamentum flavum hypertrophy. Moderate constriction of the thecal sac. Moderate  left and mild right foraminal stenosis. L4/L5: Fused segment. Central canal is patent at midline laminectomy. Foramina  are adequate. There are small amount of scar tissue at the laminectomy bed and  along the left lateral thecal sac, contacting the dorsal margin of the left L5  nerve root. No mass effect. L5/S1: Minimal disc protrusion without impact upon the thecal sac. Central canal  and foramina are patent.                  Impression IMPRESSION:    Interval improvement of spinal stenosis at L4/L5 status post posterolateral  interbody fusion and laminectomy. Minimal benign-appearing scar tissue. Progressive degenerative changes at L3/L4 since 2010 with moderate constriction  of the thecal sac and moderate left foraminal stenosis. Degenerative changes at additional levels are similar to the prior examination,  as detailed above. PAST MEDICAL HISTORY:   The patient  has a past medical history of Abdominal adhesions (7/30/2014); Back muscle spasm (7/30/2014); Back pain, lumbosacral; Cellulitis; Constipation, chronic (7/30/2014); DDD (degenerative disc disease), lumbar (7/30/2014); DDD (degenerative disc disease), thoracic (7/30/2014); Depression; Diabetes ([facility] [address] DM (diabetes mellitus) ([facility] [address] (7/30/2014); Frequent falls (7/30/2014); TRUE (generalized anxiety disorder) (7/30/2014); High cholesterol; HTN (hypertension) (7/30/2014); Hyperlipidemia (7/30/2014); Hypertension; Insomnia secondary to chronic pain (7/30/2014); LBP radiating to both legs (7/30/2014); Lumbar facet arthropathy (7/30/2014); Lumbar nerve root impingement (7/30/2014); Lumbosacral radiculopathy at S1 (7/30/2014); Major depression (7/30/2014); Nausea & vomiting; Neurological disorder; MANISH (obstructive sleep apnea) (7/30/2014); S/P lumbar laminectomy (7/30/2014); S/P lumbar spinal fusion (7/30/2014); Small vessel disease; Thoracic compression fracture ([facility] [address] (7/30/2014); and Unspecified sleep apnea.     PAST SURGICAL HISTORY:   The patient  has a past surgical history that includes hernia repair (1992); vasectomy (1985); appendectomy; back surgery; and other surgical.    CURRENT MEDICATIONS:   The patient has a current medication list which includes the following prescription(s): methylprednisolone, isosorbide mononitrate er, pramipexole, simvastatin, metoprolol succinate, bupropion sr, metformin, buspirone, phentermine, duloxetine, morphine, morphine ir, morphine, morphine ir, omeprazole, glucose blood vi test strips, blood-glucose meter, lancets, zolpidem, naloxone, ondansetron hcl, ferrous sulfate, dried, nitroglycerin, back brace, and multivitamin. ALLERGIES:     Allergies   Allergen Reactions    Opana [Oxymorphone] Other (comments)     Feels like bug crawling under skin and drowziness       FAMILY HISTORY:   The patient family history includes Diabetes in his mother and sister; Heart Attack in his father; Heart Failure in his mother; Stroke in his brother. SOCIAL HISTORY:   The patient  reports that he has never smoked. He has never used smokeless tobacco. The patient  reports that he does not drink alcohol. He also  reports that he does not use illicit drugs. REVIEW OF SYSTEMS:    The patient denies fever, chills, weight loss (Constitutional), rash, itching (Skin), tinnitus, congestion (HENT), blurred vision, photophobia (Eyes), palpitations, orthopnea (Cardiovascular), hemoptysis, wheezing (Respiratory), nausea, vomiting, diarrhea (Gastrointestinal), dysuria, hematuria, urgency (Genitourinary), bowel or bladder incontinence, loss of consciousness (Neurologic), suicidal or homicidal ideation or hallucinations (Psychiatric). Denies swelling, axillary or groin masses (Lymphatic). PHYSICAL EXAM:  VS:   Visit Vitals    /74    Pulse 67    Temp 98.4 °F (36.9 °C)    Resp 14    Ht 6' 2\" (1.88 m)    Wt 136.1 kg (300 lb)    BMI 38.52 kg/m2     General: Well-developed and well-nourished. Body habitus consistent with recorded height and weight and the calculated BMI. Apparent distress due to low back and leg pain. Head: Normocephalic, atraumatic. Skin: Inspection of the skin reveals no rashes, lesions or infection. CV: Regular rate. No murmurs or rubs noted. No peripheral edema noted. Pulm: Respirations are even and unlabored. Extr: No clubbing, cyanosis, or edema noted. Musculoskeletal:  1. Cervical spine -decreased ROM. No paraspinous tenderness at any level. There is no scoliosis, asymmetry, or musculoskeletal defect.   2. Thoracic spine -decreased ROM. No paraspinous tenderness at any level. There is no scoliosis, asymmetry, or musculoskeletal defect. 3. Lumbar spine -markedly decreased ROM. Paraspinous tenderness bilaterally . SI joints are nontender bilaterally. There is no scoliosis, asymmetry, or musculoskeletal defect. 4. Right upper extremity - Full ROM. 5/5 muscle strength in all muscle groups. No pain or tenderness in shoulder, elbow, wrist, or hand. 5. Left upper extremity - Full ROM. 5/5 muscle strength in all muscle groups. No pain or tenderness in shoulder, elbow, wrist, or hand. 6. Right lower extremity - Full ROM. 5/5 muscle strength in all muscle groups. No pain, tenderness, or swelling in the hip, knee, ankle or foot. 7. Left lower extremity - Full ROM. 5/5 muscle strength in all muscle groups. No pain, tenderness, or swelling in the hip, knee, ankle or foot. Neurological:  1. Mental Status - Alert, awake and oriented. Speech is clear and appropriate. 2. Cranial Nerves - Extraocular muscles intact bilaterally. Cranial nerves II-XII grossly intact bilaterally. 3. Gait - antalgic   4. Reflexes - 2+ and symmetric throughout. 5. Sensation - Intact to light touch and pin prick. 6. Provocative Tests - Straight leg raise negative bilaterally. Psychological:  1. Mood and affect - Appropriate. 2. Speech - Appropriate. 3. Though content - Appropriate. 4. Judgment - Appropriate. ASSESSMENT:      ICD-10-CM ICD-9-CM    1. Chronic pain syndrome G89.4 338.4 REFERRAL TO NEUROSURGERY   2. Lumbar post-laminectomy syndrome M96.1 722.83 REFERRAL TO NEUROSURGERY   3. Lumbar and sacral osteoarthritis M47.817 721.3 REFERRAL TO NEUROSURGERY   4. DDD (degenerative disc disease), lumbar M51.36 722.52 REFERRAL TO NEUROSURGERY   5. Lumbar facet arthropathy M12.88 721.3 REFERRAL TO NEUROSURGERY   6. Lumbosacral radiculopathy at S1 M54.17 724.4 REFERRAL TO NEUROSURGERY   7.  Chronic midline low back pain with sciatica, sciatica laterality unspecified M54.40 724.2 REFERRAL TO NEUROSURGERY    G89.29 724.3      338.29            PLAN:    1.    I have thoroughly discussed the risks and benefits, indications, contraindications, and side effects of any and procedures that were mentioned at today's patient visit. I have used a skeleton model to explain all procedures, as well as to provide added emphasis regarding procedures and as well for patient education purposes. I have answered all questions in great detail, and I have obtained verbal confirmation for all procedures planned with the patient. 3.    I have reviewed in great detail today the patient's MRI and other imaging studies with the patient. I have explained to the patient their condition using both actual recent and relevant images insofar as I am able to obtain actual images. I have used a skeleton model for added emphasis as well as patient education. 4.    I have advised patient to have a primary care provider continue to care for their health maintenance and general medical conditions. 5,    I have placed appropriate referrals to specialty care providers as I have deemed necessary through today's clinical consultation with the patient. 5.    I have explained to the patient that if any significant side effects, issues, problems, concerns, or perceived complications may have arisen at around the time of the patient's procedures, they should either call the pain management clinic or go to the emergency room immediately for medical provider evaluation. 6.   I have encouraged all patients to call the pain management clinic with any questions or concerns that they may have pertaining to their procedures. DISPOSITION:   The patients condition and plan were discussed at length and all questions were answered. The patient agrees with the plan. A total of 15 minutes was spent with the patient of which over half of the time was spent counseling the patient.      Rafi Gallegos Daniel Almanzar MD 8/30/2017 5:05 PM    Note: Although these clinic notes were documented by the provider at the time of the exam, they have not been proofed and are subject to transcription variance.

## 2017-10-23 ENCOUNTER — TELEPHONE (OUTPATIENT)
Dept: FAMILY MEDICINE CLINIC | Age: 62
End: 2017-10-23

## 2017-10-23 NOTE — TELEPHONE ENCOUNTER
Pt has the wrong metformin rx. He needs metformin ER 500mg tablet. Stated that no one has contacted him back but there is no encounter in system. Also needs clarification on how many he should be taking a day,   Please advise.

## 2017-10-24 RX ORDER — ZOLPIDEM TARTRATE 10 MG/1
TABLET ORAL
Qty: 90 TAB | Refills: 0 | Status: SHIPPED | OUTPATIENT
Start: 2017-10-24 | End: 2017-11-02 | Stop reason: SDUPTHER

## 2017-10-25 RX ORDER — METFORMIN HYDROCHLORIDE 500 MG/1
1500 TABLET, EXTENDED RELEASE ORAL
Qty: 90 TAB | Refills: 6 | Status: SHIPPED | OUTPATIENT
Start: 2017-10-25 | End: 2017-11-02 | Stop reason: SDUPTHER

## 2017-10-25 NOTE — PROGRESS NOTES
Per chart review, pt was on metformin ER in 2012. Will change metformin rx to 500 mg; 3 tabs po at dinner as per orders.  To be notified by nursing

## 2017-10-26 ENCOUNTER — OFFICE VISIT (OUTPATIENT)
Dept: PAIN MANAGEMENT | Age: 62
End: 2017-10-26

## 2017-10-26 VITALS
DIASTOLIC BLOOD PRESSURE: 71 MMHG | SYSTOLIC BLOOD PRESSURE: 116 MMHG | WEIGHT: 300 LBS | HEART RATE: 80 BPM | BODY MASS INDEX: 38.52 KG/M2 | RESPIRATION RATE: 20 BRPM | TEMPERATURE: 96 F

## 2017-10-26 DIAGNOSIS — M51.36 DDD (DEGENERATIVE DISC DISEASE), LUMBAR: ICD-10-CM

## 2017-10-26 DIAGNOSIS — M54.16 LUMBAR NERVE ROOT IMPINGEMENT: ICD-10-CM

## 2017-10-26 DIAGNOSIS — M51.34 DDD (DEGENERATIVE DISC DISEASE), THORACIC: ICD-10-CM

## 2017-10-26 DIAGNOSIS — M96.1 POSTLAMINECTOMY SYNDROME, LUMBAR REGION: ICD-10-CM

## 2017-10-26 DIAGNOSIS — M96.1 LUMBAR POST-LAMINECTOMY SYNDROME: ICD-10-CM

## 2017-10-26 DIAGNOSIS — Z86.59 HISTORY OF DEPRESSION: ICD-10-CM

## 2017-10-26 DIAGNOSIS — G89.29 CHRONIC MIDLINE LOW BACK PAIN WITH SCIATICA, SCIATICA LATERALITY UNSPECIFIED: Primary | ICD-10-CM

## 2017-10-26 DIAGNOSIS — Z79.899 ENCOUNTER FOR LONG-TERM (CURRENT) USE OF HIGH-RISK MEDICATION: ICD-10-CM

## 2017-10-26 DIAGNOSIS — M47.816 LUMBAR FACET ARTHROPATHY: ICD-10-CM

## 2017-10-26 DIAGNOSIS — G89.4 CHRONIC PAIN SYNDROME: ICD-10-CM

## 2017-10-26 DIAGNOSIS — Z98.890 S/P LUMBAR LAMINECTOMY: ICD-10-CM

## 2017-10-26 DIAGNOSIS — M54.40 CHRONIC MIDLINE LOW BACK PAIN WITH SCIATICA, SCIATICA LATERALITY UNSPECIFIED: Primary | ICD-10-CM

## 2017-10-26 DIAGNOSIS — M47.817 LUMBAR AND SACRAL OSTEOARTHRITIS: ICD-10-CM

## 2017-10-26 DIAGNOSIS — M54.17 LUMBOSACRAL RADICULOPATHY AT S1: ICD-10-CM

## 2017-10-26 DIAGNOSIS — Z98.1 S/P LUMBAR SPINAL FUSION: ICD-10-CM

## 2017-10-26 LAB
ALCOHOL UR POC: NORMAL
AMPHETAMINES UR POC: NEGATIVE
BARBITURATES UR POC: NEGATIVE
BENZODIAZEPINES UR POC: NORMAL
BUPRENORPHINE UR POC: NORMAL
CANNABINOIDS UR POC: NEGATIVE
CARISOPRODOL UR POC: NORMAL
COCAINE UR POC: NEGATIVE
FENTANYL UR POC: NORMAL
MDMA/ECSTASY UR POC: NEGATIVE
METHADONE UR POC: NEGATIVE
METHAMPHETAMINE UR POC: NEGATIVE
METHYLPHENIDATE UR POC: NEGATIVE
OPIATES UR POC: NORMAL
OXYCODONE UR POC: NEGATIVE
PHENCYCLIDINE UR POC: NORMAL
PROPOXYPHENE UR POC: NORMAL
TRAMADOL UR POC: NORMAL
TRICYCLICS UR POC: NEGATIVE

## 2017-10-26 RX ORDER — MORPHINE SULFATE 30 MG/1
30 TABLET, FILM COATED, EXTENDED RELEASE ORAL EVERY 12 HOURS
Qty: 60 TAB | Refills: 0 | Status: SHIPPED | OUTPATIENT
Start: 2017-11-25 | End: 2017-10-26 | Stop reason: SDUPTHER

## 2017-10-26 RX ORDER — MORPHINE SULFATE 15 MG/1
15 TABLET ORAL
Qty: 90 TAB | Refills: 0 | Status: SHIPPED | OUTPATIENT
Start: 2017-12-24 | End: 2017-11-02 | Stop reason: SDUPTHER

## 2017-10-26 RX ORDER — MORPHINE SULFATE 30 MG/1
30 TABLET, FILM COATED, EXTENDED RELEASE ORAL EVERY 12 HOURS
Qty: 60 TAB | Refills: 0 | Status: SHIPPED | OUTPATIENT
Start: 2017-10-26 | End: 2017-10-26 | Stop reason: SDUPTHER

## 2017-10-26 RX ORDER — DULOXETIN HYDROCHLORIDE 60 MG/1
60 CAPSULE, DELAYED RELEASE ORAL DAILY
Qty: 90 CAP | Refills: 2 | Status: SHIPPED | OUTPATIENT
Start: 2017-10-26 | End: 2020-04-14

## 2017-10-26 RX ORDER — MORPHINE SULFATE 15 MG/1
15 TABLET ORAL
Qty: 90 TAB | Refills: 0 | Status: SHIPPED | OUTPATIENT
Start: 2017-10-26 | End: 2017-10-26 | Stop reason: SDUPTHER

## 2017-10-26 RX ORDER — MORPHINE SULFATE 15 MG/1
15 TABLET ORAL
Qty: 90 TAB | Refills: 0 | Status: SHIPPED | OUTPATIENT
Start: 2017-11-25 | End: 2017-10-26 | Stop reason: SDUPTHER

## 2017-10-26 RX ORDER — GABAPENTIN 800 MG/1
800 TABLET ORAL 3 TIMES DAILY
Qty: 90 TAB | Refills: 5 | Status: SHIPPED | OUTPATIENT
Start: 2017-10-26 | End: 2018-02-12 | Stop reason: SDUPTHER

## 2017-10-26 RX ORDER — MORPHINE SULFATE 30 MG/1
30 TABLET, FILM COATED, EXTENDED RELEASE ORAL EVERY 12 HOURS
Qty: 60 TAB | Refills: 0 | Status: SHIPPED | OUTPATIENT
Start: 2017-12-24 | End: 2018-02-12 | Stop reason: SDUPTHER

## 2017-10-26 NOTE — PROGRESS NOTES
HISTORY OF PRESENT ILLNESS  Cora William is a 58 y.o. male. HPI Comments: Visit survey reviewed  Chief complaint- chronic pain syndrome- low back pain  Recent placement of spinal cord stimulator. He reports some improvement and would like to decrease, slightly, long-acting and short-acting morphine. I have made adjustments, decreasing. Please see prescriptions for details. Gabapentin and Cymbalta remain the same. He is usually seen about every 3 months    No new significant side effects reported  Medication continues to help improve quality of life. Medications reviewed including risk and benefits. Recent average level of pain-7    Measurement of clinical outcome for chronic pain patient/ SPAASMS Score Card-            Information reviewed and will be scanned. Total score today-9      Review of Systems   Constitutional: Negative for chills and fever. HENT: Negative for ear discharge and ear pain. Eyes: Negative for pain and discharge. Respiratory: Negative for hemoptysis and wheezing. Gastrointestinal: Negative for blood in stool and melena. Genitourinary: Negative for dysuria and hematuria. Musculoskeletal: Positive for back pain and myalgias. Negative for neck pain. Skin: Negative for itching and rash. Neurological: Negative for seizures and loss of consciousness. Psychiatric/Behavioral: Negative for hallucinations and suicidal ideas. Physical Exam   Constitutional: He appears well-developed and well-nourished. He is cooperative. He does not have a sickly appearance. HENT:   Head: Normocephalic and atraumatic. Right Ear: External ear normal. No drainage. Left Ear: External ear normal. No drainage. Nose: Nose normal.   Eyes: Lids are normal. Right eye exhibits no discharge. Left eye exhibits no discharge. Right conjunctiva has no hemorrhage. Left conjunctiva has no hemorrhage. Neck: Neck supple. No tracheal deviation present. No thyroid mass present. Pulmonary/Chest: Effort normal. No respiratory distress. Neurological: He is alert. No cranial nerve deficit. Skin: Skin is intact. No rash noted. Psychiatric: His speech is normal. His affect is not angry. He does not express inappropriate judgment. Nursing note and vitals reviewed. ASSESSMENT and PLAN  Encounter Diagnoses   Name Primary?  Chronic midline low back pain with sciatica, sciatica laterality unspecified Yes    Encounter for long-term (current) use of high-risk medication     Chronic pain syndrome     Lumbar post-laminectomy syndrome     Lumbar and sacral osteoarthritis     History of depression     Postlaminectomy syndrome, lumbar region     DDD (degenerative disc disease), lumbar     Lumbar facet arthropathy     S/P lumbar laminectomy     S/P lumbar spinal fusion     Lumbar nerve root impingement     Lumbosacral radiculopathy at S1     DDD (degenerative disc disease), thoracic    No indicators for recent medication misuse.  reviewed. Pain Meds and Quality Of Life have been reviewed. Nonpharmacologic therapy and non-opioid pharmacologic therapy were considered. If opioid therapy is prescribed, this is only if the expected benefits are anticipated to outweigh risks. Possible changes to treatment plan considered. Support/education given as needed. Today-medications are as listed. No significant changes to medications. Follow up -- 3 months.    --Urine test or oral swab today. Also, the prescription monitoring program was reviewed today. The majority of today's visit was spent counseling and coordinating care. Total visit time-40 minutes. -Dragon medical dictation software was used for portions of this report. Unintended errors may occur.

## 2017-10-26 NOTE — TELEPHONE ENCOUNTER
Spoke with Dr. Brittany Valdivia on this. This patient's previous PCP (Dr. Emmanuel Burton) was prescribing metformin XR for him. The patient did not notice that he had been getting the regular from our office for a long time until just recently and wanted it changed. I spoke with Dr. Brittany Valdivia about this and per Dr. Brittany Valdivia, the medication was changed and sent to pt pharmacy. This matter is resolved.

## 2017-10-26 NOTE — MR AVS SNAPSHOT
Visit Information Date & Time Provider Department Dept. Phone Encounter #  
 10/26/2017  8:30 AM Christiano Daley MD [facility] [phone] Follow-up Instructions Return in about 3 months (around 1/26/2018). Your Appointments 11/2/2017  8:00 AM  
Office Visit with Camilo Lui NP [facility] ([facility]SEFIN[facility]) Appt Note: fu on knees, anxiety [address] [address] [facility] [address]  
[phone]  
  
   
 [address] Upcoming Health Maintenance Date Due COLONOSCOPY 2/19/1973 INFLUENZA AGE 9 TO ADULT 8/1/2017 EYE EXAM RETINAL OR DILATED Q1 10/5/2017 ZOSTER VACCINE AGE 60> 12/21/2017* HEMOGLOBIN A1C Q6M 2/2/2018 FOOT EXAM Q1 8/2/2018 MICROALBUMIN Q1 8/2/2018 LIPID PANEL Q1 8/2/2018 DTaP/Tdap/Td series (2 - Td) 8/8/2026 *Topic was postponed. The date shown is not the original due date. Allergies as of 10/26/2017  Review Complete On: 10/26/2017 By: Christiano Daley MD  
  
 Severity Noted Reaction Type Reactions Opana [Oxymorphone]  02/19/2013    Other (comments) Feels like bug crawling under skin and drowziness Current Immunizations  Reviewed on 12/7/2016 Name Date Influenza Vaccine (Quad) PF 12/7/2016 Pneumococcal Conjugate (PCV-13) 8/8/2016 Tdap 8/8/2016 Not reviewed this visit You Were Diagnosed With   
  
 Codes Comments Chronic midline low back pain with sciatica, sciatica laterality unspecified    -  Primary ICD-10-CM: M54.40, G89.29 ICD-9-CM: 724.2, 724.3, 338.29 Encounter for long-term (current) use of high-risk medication     ICD-10-CM: Z79.899 ICD-9-CM: V58.69 Chronic pain syndrome     ICD-10-CM: G89.4 ICD-9-CM: 338.4 Lumbar post-laminectomy syndrome     ICD-10-CM: M96.1 ICD-9-CM: 722.83 Lumbar and sacral osteoarthritis     ICD-10-CM: J57.095 ICD-9-CM: 721.3 History of depression     ICD-10-CM: Z86.59 
ICD-9-CM: V11.8 Postlaminectomy syndrome, lumbar region     ICD-10-CM: M96.1 ICD-9-CM: 722.83 DDD (degenerative disc disease), lumbar     ICD-10-CM: M51.36 
ICD-9-CM: 722.52 Lumbar facet arthropathy     ICD-10-CM: M12.88 ICD-9-CM: 721.3 S/P lumbar laminectomy     ICD-10-CM: Z03.580 ICD-9-CM: V45.89 S/P lumbar spinal fusion     ICD-10-CM: Z98.1 ICD-9-CM: V45.4 Lumbar nerve root impingement     ICD-10-CM: M54.16 
ICD-9-CM: 724.4 Lumbosacral radiculopathy at S1     ICD-10-CM: M54.17 ICD-9-CM: 724.4 DDD (degenerative disc disease), thoracic     ICD-10-CM: M51.34 
ICD-9-CM: 722.51 Vitals BP Pulse Temp Resp Weight(growth percentile) BMI  
 116/71 80 96 °F (35.6 °C) 20 300 lb (136.1 kg) 38.52 kg/m2 Smoking Status Never Smoker BMI and BSA Data Body Mass Index Body Surface Area 38.52 kg/m 2 2.67 m 2 Preferred Pharmacy Pharmacy Name Phone [facility], [address] [place] [address] [phone] Your Updated Medication List  
  
   
This list is accurate as of: 10/26/17  9:32 AM.  Always use your most recent med list.  
  
  
  
  
 Back Brace Misc 1 Device by Does Not Apply route daily as needed for Pain. One, lumbosacral orthosis/back brace with metal struts      Medically necessary Blood-Glucose Meter Misc Commonly known as:  Deljamiee Edu Check blood sugar daily buPROPion  mg SR tablet Commonly known as:  WELLBUTRIN SR  
TAKE 1 TABLET TWICE DAILY  
  
 busPIRone 10 mg tablet Commonly known as:  BUSPAR Take 1 Tab by mouth two (2) times a day. DULoxetine 60 mg capsule Commonly known as:  CYMBALTA Take 1 Cap by mouth daily. gabapentin 800 mg tablet Commonly known as:  NEURONTIN Take 1 Tab by mouth three (3) times daily for 30 days. glucose blood VI test strips strip Commonly known as:  [address] Check blood sugar daily IRON (DRIED) PO Take  by mouth.  
  
 isosorbide mononitrate ER 60 mg CR tablet Commonly known as:  IMDUR  
TAKE 1 TABLET EVERY MORNING Lancets Misc Commonly known as:  ACCU-CHEK FASTCLIX Check blood sugar daily  
  
 metFORMIN  mg tablet Commonly known as:  GLUCOPHAGE XR Take 3 Tabs by mouth daily (with dinner). methylPREDNISolone 4 mg tablet Commonly known as:  Vielka Treynor 1 Medrol Dosepak as directed.   
  
 metoprolol succinate 50 mg XL tablet Commonly known as:  TOPROL-XL  
TAKE 1 TABLET EVERY DAY  
  
 * morphine IR 15 mg tablet Commonly known as:  MS IR Take 1 Tab by mouth four (4) times daily as needed for Pain. Max Daily Amount: 60 mg.  
  
 * morphine IR 15 mg tablet Commonly known as:  MS IR Take 1 Tab by mouth three (3) times daily as needed for Pain. Max Daily Amount: 45 mg. Start taking on:  12/24/2017 * morphine CR 30 mg CR tablet Commonly known as:  MS CONTIN Take 1 Tab by mouth every twelve (12) hours. Max Daily Amount: 60 mg. Start taking on:  12/24/2017  
  
 multivitamin tablet Commonly known as:  ONE A DAY Take 1 Tab by mouth daily. naloxone 4 mg/actuation nasal spray Commonly known as:  NARCAN  
4 mg by Nasal route as needed. nitroglycerin 0.4 mg SL tablet Commonly known as:  NITROSTAT  
1 Tab by SubLINGual route every five (5) minutes as needed. Use for Chest Pain ; Call MD if > 3 tablets required OMEPRAZOLE (BULK)  
  
 ondansetron hcl 4 mg tablet Commonly known as:  ZOFRAN (AS HYDROCHLORIDE) Take 1 Tab by mouth every eight (8) hours as needed for Nausea. phentermine 37.5 mg tablet Commonly known as:  ADIPEX-P Take 1 Tab by mouth every morning. Max Daily Amount: 37.5 mg.  
  
 pramipexole 0.5 mg tablet Commonly known as:  MIRAPEX TAKE 1 TABLET THREE TIMES DAILY  
  
 simvastatin 10 mg tablet Commonly known as:  ZOCOR  
TAKE 1 TABLET EVERY NIGHT  
  
 zolpidem 10 mg tablet Commonly known as:  AMBIEN  
TAKE 1 TABLET BY MOUTH EVERY NIGHT AS NEEDED FOR SLEEP. MAX DAILY AMOUNT: 10 MG  
  
 * Notice: This list has 3 medication(s) that are the same as other medications prescribed for you. Read the directions carefully, and ask your doctor or other care provider to review them with you. Prescriptions Printed Refills  
 morphine IR (MS IR) 15 mg tablet 0 Starting on: 12/24/2017 Sig: Take 1 Tab by mouth three (3) times daily as needed for Pain. Max Daily Amount: 45 mg.  
 Class: Print Route: Oral  
 morphine CR (MS CONTIN) 30 mg CR tablet 0 Starting on: 12/24/2017 Sig: Take 1 Tab by mouth every twelve (12) hours. Max Daily Amount: 60 mg.  
 Class: Print Route: Oral  
  
Prescriptions Sent to Pharmacy Refills DULoxetine (CYMBALTA) 60 mg capsule 2 Sig: Take 1 Cap by mouth daily. Class: Normal  
 Pharmacy: [address] #: [phone] Route: Oral  
 gabapentin (NEURONTIN) 800 mg tablet 5 Sig: Take 1 Tab by mouth three (3) times daily for 30 days. Class: Normal  
 Pharmacy: [address] #: [phone] Route: Oral  
  
We Performed the Following AMB POC DRUG SCREEN () [ [facility]] DRUG SCREEN [[id number] Custom] Follow-up Instructions Return in about 3 months (around 1/26/2018). Introducing [facility]! Altagracia Reyes introduces [employer] patient portal. Now you can access parts of your medical record, email your doctor's office, and request medication refills online. 1. In your internet browser, go to [email or web address] 2. Click on the First Time User? Click Here link in the Sign In box. You will see the New Member Sign Up page. 3. Enter your Cashually Access Code exactly as it appears below. You will not need to use this code after youve completed the sign-up process. If you do not sign up before the expiration date, you must request a new code. · Cashually Access Code: [email or web address] Expires: 11/21/2017 10:30 AM 
 
4. Enter the last four digits of your Social Security Number (xxxx) and Date of Birth (mm/dd/yyyy) as indicated and click Submit. You will be taken to the next sign-up page. 5. Create a Cashually ID. This will be your Cashually login ID and cannot be changed, so think of one that is secure and easy to remember. 6. Create a Cashually password. You can change your password at any time. 7. Enter your Password Reset Question and Answer. This can be used at a later time if you forget your password. 8. Enter your e-mail address. You will receive e-mail notification when new information is available in [address] 9. Click Sign Up. You can now view and download portions of your medical record. 10. Click the Download Summary menu link to download a portable copy of your medical information. If you have questions, please visit the Frequently Asked Questions section of the Cashually website. Remember, Cashually is NOT to be used for urgent needs. For medical emergencies, dial 911. Now available from your iPhone and Android! Please provide this summary of care documentation to your next provider. Your primary care clinician is listed as [address]. If you have any questions after today's visit, please call [phone].

## 2017-10-26 NOTE — PROGRESS NOTES
Nursing Notes    Patient presents to the office today in follow-up. Patient rates his pain at 7/10 on the numerical pain scale. Reviewed medications with counts as follows:    Rx Date filled Qty Dispensed Pill Count Last Dose Short     Valium 5mg 10/10/17 30 27 Last night  No    Morphine sulfate 15mg IR 09/30/17 120 56 This am  No    Morphine sulfate 50mg ER 09/30/17 60 20 This am  No                             Comments:     POC UDS was performed in office today    Any new labs or imaging since last appointment? YES; back x-ray     Have you been to an emergency room (ER) or urgent care clinic since your last visit? NO            Have you been hospitalized since your last visit? NO     If yes, where, when, and reason for visit? Have you seen or consulted any other health care providers outside of the [address] [place] [address]  since your last visit? YES     If yes, where, when, and reason for visit? Anesthesiology         HM deferred to pcp.

## 2017-11-02 ENCOUNTER — OFFICE VISIT (OUTPATIENT)
Dept: FAMILY MEDICINE CLINIC | Age: 62
End: 2017-11-02

## 2017-11-02 VITALS
WEIGHT: 305.6 LBS | BODY MASS INDEX: 39.22 KG/M2 | HEIGHT: 74 IN | TEMPERATURE: 98 F | DIASTOLIC BLOOD PRESSURE: 76 MMHG | RESPIRATION RATE: 20 BRPM | OXYGEN SATURATION: 98 % | SYSTOLIC BLOOD PRESSURE: 137 MMHG | HEART RATE: 77 BPM

## 2017-11-02 DIAGNOSIS — Z23 ENCOUNTER FOR IMMUNIZATION: ICD-10-CM

## 2017-11-02 DIAGNOSIS — F51.01 PRIMARY INSOMNIA: Primary | ICD-10-CM

## 2017-11-02 DIAGNOSIS — E11.9 DIABETES MELLITUS WITHOUT COMPLICATION (HCC): ICD-10-CM

## 2017-11-02 RX ORDER — ZOLPIDEM TARTRATE 10 MG/1
10 TABLET ORAL
Qty: 90 TAB | Refills: 0 | Status: SHIPPED | OUTPATIENT
Start: 2017-11-02 | End: 2018-11-01 | Stop reason: SDUPTHER

## 2017-11-02 RX ORDER — DIAZEPAM 5 MG/1
5 TABLET ORAL
COMMUNITY
End: 2019-01-14

## 2017-11-02 RX ORDER — METFORMIN HYDROCHLORIDE 500 MG/1
1500 TABLET, EXTENDED RELEASE ORAL
Qty: 270 TAB | Refills: 0 | Status: SHIPPED | OUTPATIENT
Start: 2017-11-02 | End: 2018-02-20 | Stop reason: SDUPTHER

## 2017-11-02 NOTE — MR AVS SNAPSHOT
Visit Information Date & Time Provider Department Dept. Phone Encounter #  
 11/2/2017  8:00 AM Kayla Atkinson  [facility] [id number] Your Appointments 1/25/2018  8:00 AM  
Office Visit with Valentín Mendoza MD  
[facility]) Appt Note: Return in about 3 months (around 1/26/2018). [address] [facility] [address]  
[phone]  [phone] Upcoming Health Maintenance Date Due COLONOSCOPY 2/19/1973 INFLUENZA AGE 9 TO ADULT 8/1/2017 EYE EXAM RETINAL OR DILATED Q1 10/5/2017 ZOSTER VACCINE AGE 60> 12/21/2017* HEMOGLOBIN A1C Q6M 2/2/2018 FOOT EXAM Q1 8/2/2018 MICROALBUMIN Q1 8/2/2018 LIPID PANEL Q1 8/2/2018 DTaP/Tdap/Td series (2 - Td) 8/8/2026 *Topic was postponed. The date shown is not the original due date. Allergies as of 11/2/2017  Review Complete On: 11/2/2017 By: Kayla Atkinson NP Severity Noted Reaction Type Reactions Opana [Oxymorphone]  02/19/2013    Other (comments) Feels like bug crawling under skin and drowziness Current Immunizations  Reviewed on 12/7/2016 Name Date Influenza Vaccine (Quad) PF  Incomplete, 12/7/2016 Pneumococcal Conjugate (PCV-13) 8/8/2016 Tdap 8/8/2016 Not reviewed this visit You Were Diagnosed With   
  
 Codes Comments Primary insomnia    -  Primary ICD-10-CM: F51.01 
ICD-9-CM: 307.42 Encounter for immunization     ICD-10-CM: A18 ICD-9-CM: V03.89 Diabetes mellitus without complication ([facility]     RWJ-26-EQ: E11.9 ICD-9-CM: 250.00 Vitals BP Pulse Temp Resp Height(growth percentile) Weight(growth percentile)  
 137/76 (BP 1 Location: Left arm, BP Patient Position: Sitting) 77 98 °F (36.7 °C) (Oral) 20 6' 2\" (1.88 m) 305 lb 9.6 oz (138.6 kg) SpO2 BMI Smoking Status 98% 39.24 kg/m2 Never Smoker BMI and BSA Data Body Mass Index Body Surface Area  
 39.24 kg/m 2 2.69 m 2 Preferred Pharmacy Pharmacy Name Phone [facility] Santana [address] [place] [address] [address] [phone] Your Updated Medication List  
  
   
This list is accurate as of: 11/2/17  8:53 AM.  Always use your most recent med list.  
  
  
  
  
 Back Brace Misc 1 Device by Does Not Apply route daily as needed for Pain. One, lumbosacral orthosis/back brace with metal struts      Medically necessary Blood-Glucose Meter Misc Commonly known as:  Malika Norman Check blood sugar daily buPROPion  mg SR tablet Commonly known as:  WELLBUTRIN SR  
TAKE 1 TABLET TWICE DAILY  
  
 busPIRone 10 mg tablet Commonly known as:  BUSPAR Take 1 Tab by mouth two (2) times a day. diazePAM 5 mg tablet Commonly known as:  VALIUM Take 5 mg by mouth every six (6) hours as needed for Anxiety. DULoxetine 60 mg capsule Commonly known as:  CYMBALTA Take 1 Cap by mouth daily. gabapentin 800 mg tablet Commonly known as:  NEURONTIN Take 1 Tab by mouth three (3) times daily for 30 days. glucose blood VI test strips strip Commonly known as:  [address] Check blood sugar daily IRON (DRIED) PO Take  by mouth.  
  
 isosorbide mononitrate ER 60 mg CR tablet Commonly known as:  IMDUR  
TAKE 1 TABLET EVERY MORNING Lancets Misc Commonly known as:  ACCU-CHEK FASTCLIX Check blood sugar daily  
  
 metFORMIN  mg tablet Commonly known as:  GLUCOPHAGE XR Take 3 Tabs by mouth daily (with dinner). metoprolol succinate 50 mg XL tablet Commonly known as:  TOPROL-XL  
TAKE 1 TABLET EVERY DAY  
  
 * morphine IR 15 mg tablet Commonly known as:  MS IR Take 1 Tab by mouth four (4) times daily as needed for Pain. Max Daily Amount: 60 mg.  
  
 * morphine CR 30 mg CR tablet Commonly known as:  MS CONTIN  
 Take 1 Tab by mouth every twelve (12) hours. Max Daily Amount: 60 mg. Start taking on:  12/24/2017  
  
 multivitamin tablet Commonly known as:  ONE A DAY Take 1 Tab by mouth daily. naloxone 4 mg/actuation nasal spray Commonly known as:  NARCAN  
4 mg by Nasal route as needed. nitroglycerin 0.4 mg SL tablet Commonly known as:  NITROSTAT  
1 Tab by SubLINGual route every five (5) minutes as needed. Use for Chest Pain ; Call MD if > 3 tablets required OMEPRAZOLE (BULK)  
  
 ondansetron hcl 4 mg tablet Commonly known as:  ZOFRAN (AS HYDROCHLORIDE) Take 1 Tab by mouth every eight (8) hours as needed for Nausea. phentermine 37.5 mg tablet Commonly known as:  ADIPEX-P Take 1 Tab by mouth every morning. Max Daily Amount: 37.5 mg.  
  
 pramipexole 0.5 mg tablet Commonly known as:  MIRAPEX TAKE 1 TABLET THREE TIMES DAILY  
  
 simvastatin 10 mg tablet Commonly known as:  ZOCOR  
TAKE 1 TABLET EVERY NIGHT  
  
 zolpidem 10 mg tablet Commonly known as:  AMBIEN Take 1 Tab by mouth nightly. Max Daily Amount: 10 mg.  
  
 * Notice: This list has 2 medication(s) that are the same as other medications prescribed for you. Read the directions carefully, and ask your doctor or other care provider to review them with you. Prescriptions Printed Refills  
 zolpidem (AMBIEN) 10 mg tablet 0 Sig: Take 1 Tab by mouth nightly. Max Daily Amount: 10 mg.  
 Class: Print Route: Oral  
  
Prescriptions Sent to Pharmacy Refills  
 metFORMIN ER (GLUCOPHAGE XR) 500 mg tablet 0 Sig: Take 3 Tabs by mouth daily (with dinner). Class: Normal  
 Pharmacy: [address] [place] [address], [address] [place] [address] #: [phone] Route: Oral  
  
We Performed the Following INFLUENZA VIRUS VAC QUAD,SPLIT,PRESV FREE SYRINGE IM [id number] CPT(R)] Introducing [facility]!    
 Mateo Martínez introduces [employer] patient portal. Now you can access parts of your medical record, email your doctor's office, and request medication refills online. 1. In your internet browser, go to [email or web address] 2. Click on the First Time User? Click Here link in the Sign In box. You will see the New Member Sign Up page. 3. Enter your Fluid Access Code exactly as it appears below. You will not need to use this code after youve completed the sign-up process. If you do not sign up before the expiration date, you must request a new code. · Fluid Access Code: [email or web address] Expires: 11/21/2017 10:30 AM 
 
4. Enter the last four digits of your Social Security Number (xxxx) and Date of Birth (mm/dd/yyyy) as indicated and click Submit. You will be taken to the next sign-up page. 5. Create a Fluid ID. This will be your Fluid login ID and cannot be changed, so think of one that is secure and easy to remember. 6. Create a Fluid password. You can change your password at any time. 7. Enter your Password Reset Question and Answer. This can be used at a later time if you forget your password. 8. Enter your e-mail address. You will receive e-mail notification when new information is available in [address] 9. Click Sign Up. You can now view and download portions of your medical record. 10. Click the Download Summary menu link to download a portable copy of your medical information. If you have questions, please visit the Frequently Asked Questions section of the Fluid website. Remember, Fluid is NOT to be used for urgent needs. For medical emergencies, dial 911. Now available from your iPhone and Android! Please provide this summary of care documentation to your next provider. Your primary care clinician is listed as [address]. If you have any questions after today's visit, please call [phone].

## 2017-11-02 NOTE — PROGRESS NOTES
1. Have you been to the ER, urgent care clinic since your last visit? Hospitalized since your last visit? No    2. Have you seen or consulted any other health care providers outside of the [address] [place] [address] since your last visit? Include any pap smears or colon screening.  No

## 2017-11-02 NOTE — PROGRESS NOTES
HISTORY OF PRESENT ILLNESS  Uday William is a 58 y.o. male. Patient presents for refill on medications. He has questions regarding Prostate    HPI  Pt states he is stable on his medications. He states his glucose was 120  His wife has a recent MI    ROS  Visit Vitals    /76 (BP 1 Location: Left arm, BP Patient Position: Sitting)    Pulse 77    Temp 98 °F (36.7 °C) (Oral)    Resp 20    Ht 6' 2\" (1.88 m)    Wt 305 lb 9.6 oz (138.6 kg)    SpO2 98%    BMI 39.24 kg/m2       Physical Exam    ASSESSMENT and PLAN    ICD-10-CM ICD-9-CM    1. Primary insomnia F51.01 307.42 zolpidem (AMBIEN) 10 mg tablet   2. Encounter for immunization Z23 V03.89 INFLUENZA VIRUS VAC QUAD,SPLIT,PRESV FREE SYRINGE IM   3. Diabetes mellitus without complication (HCC) P11.7 250.00 metFORMIN ER (GLUCOPHAGE XR) 500 mg tablet     PLAN:  We discussed prostate, BPH and his concerns regarding prostate cancer. I reviewed his labs and did not see that he has had this done. He is scheduled for OV and labs in February. He will ask Dr Jeremy Pinto then if this should be done. Pt was given his script for his sleep medicine. He will schedule an appointment for his weight loss medicine with Dr Jeremy Pinto. Pt was given after visit summary.

## 2017-12-19 RX ORDER — ISOSORBIDE MONONITRATE 60 MG/1
TABLET, EXTENDED RELEASE ORAL
Qty: 90 TAB | Refills: 0 | Status: SHIPPED | OUTPATIENT
Start: 2017-12-19 | End: 2018-02-20 | Stop reason: SDUPTHER

## 2018-02-12 ENCOUNTER — OFFICE VISIT (OUTPATIENT)
Dept: PAIN MANAGEMENT | Age: 63
End: 2018-02-12

## 2018-02-12 VITALS
DIASTOLIC BLOOD PRESSURE: 80 MMHG | RESPIRATION RATE: 16 BRPM | TEMPERATURE: 97.8 F | SYSTOLIC BLOOD PRESSURE: 135 MMHG | HEART RATE: 92 BPM | WEIGHT: 305 LBS | BODY MASS INDEX: 39.16 KG/M2

## 2018-02-12 DIAGNOSIS — M47.816 LUMBAR FACET ARTHROPATHY: ICD-10-CM

## 2018-02-12 DIAGNOSIS — M51.34 DDD (DEGENERATIVE DISC DISEASE), THORACIC: ICD-10-CM

## 2018-02-12 DIAGNOSIS — M54.40 CHRONIC MIDLINE LOW BACK PAIN WITH SCIATICA, SCIATICA LATERALITY UNSPECIFIED: ICD-10-CM

## 2018-02-12 DIAGNOSIS — G89.4 CHRONIC PAIN SYNDROME: ICD-10-CM

## 2018-02-12 DIAGNOSIS — M54.16 LUMBAR NERVE ROOT IMPINGEMENT: ICD-10-CM

## 2018-02-12 DIAGNOSIS — M47.817 LUMBAR AND SACRAL OSTEOARTHRITIS: ICD-10-CM

## 2018-02-12 DIAGNOSIS — G89.29 CHRONIC MIDLINE LOW BACK PAIN WITH SCIATICA, SCIATICA LATERALITY UNSPECIFIED: ICD-10-CM

## 2018-02-12 DIAGNOSIS — M54.17 LUMBOSACRAL RADICULOPATHY AT S1: ICD-10-CM

## 2018-02-12 DIAGNOSIS — M51.36 DDD (DEGENERATIVE DISC DISEASE), LUMBAR: ICD-10-CM

## 2018-02-12 DIAGNOSIS — M96.1 LUMBAR POST-LAMINECTOMY SYNDROME: ICD-10-CM

## 2018-02-12 RX ORDER — MORPHINE SULFATE 15 MG/1
15 TABLET, FILM COATED, EXTENDED RELEASE ORAL EVERY 12 HOURS
Qty: 60 TAB | Refills: 0 | Status: SHIPPED | OUTPATIENT
Start: 2018-04-12 | End: 2018-05-15 | Stop reason: SDUPTHER

## 2018-02-12 RX ORDER — MORPHINE SULFATE 15 MG/1
15 TABLET ORAL
Qty: 90 TAB | Refills: 0 | Status: SHIPPED | OUTPATIENT
Start: 2018-02-12 | End: 2018-11-01 | Stop reason: ALTCHOICE

## 2018-02-12 RX ORDER — MORPHINE SULFATE 15 MG/1
15 TABLET, FILM COATED, EXTENDED RELEASE ORAL EVERY 12 HOURS
Qty: 60 TAB | Refills: 0 | Status: SHIPPED | OUTPATIENT
Start: 2018-03-13 | End: 2018-04-12

## 2018-02-12 RX ORDER — GABAPENTIN 800 MG/1
800 TABLET ORAL 3 TIMES DAILY
Qty: 90 TAB | Refills: 5 | Status: SHIPPED | OUTPATIENT
Start: 2018-02-12 | End: 2018-03-14

## 2018-02-12 RX ORDER — MORPHINE SULFATE 15 MG/1
15 TABLET ORAL
Qty: 90 TAB | Refills: 0 | Status: SHIPPED | OUTPATIENT
Start: 2018-04-12 | End: 2018-05-15 | Stop reason: SDUPTHER

## 2018-02-12 RX ORDER — MORPHINE SULFATE 15 MG/1
15 TABLET, FILM COATED, EXTENDED RELEASE ORAL EVERY 12 HOURS
Qty: 60 TAB | Refills: 0 | Status: SHIPPED | OUTPATIENT
Start: 2018-02-12 | End: 2018-03-14

## 2018-02-12 RX ORDER — MORPHINE SULFATE 15 MG/1
15 TABLET ORAL
Qty: 90 TAB | Refills: 0 | Status: SHIPPED | OUTPATIENT
Start: 2018-03-13 | End: 2018-11-01 | Stop reason: ALTCHOICE

## 2018-02-12 NOTE — PATIENT INSTRUCTIONS
1. Continue current plan with no evidence of addiction or diversion. Stable on current medication without adverse events. 2. Refill and adjust morphine ER 30 mg down to 15 mg every 12 hours. 3. Refill morphine IR 15 mg up to 3 times daily on an as-needed basis only. 4. Discontinue Valium. Please discuss further treatment/recommendations regarding sleep and anxiety with your PCP. 5. Naloxone 4 mg nasal spray for opioid induced respiratory depression emergency only. 6. Discussed risks of addiction, dependency, and opioid induced hyperalgesia.    7. Return to clinic in 3 months

## 2018-02-12 NOTE — MR AVS SNAPSHOT
[address] [facility] [address] 
[phone] Patient: Inez Ramirez MRN: [id number] WHK:[id number] Visit Information Date & Time Provider Department Dept. Phone Encounter #  
 2/12/2018  8:40 AM Mich Lechuga [address] [place] [address] for Pain Management  Follow-up Instructions Return in about 3 months (around 5/12/2018). Upcoming Health Maintenance Date Due COLONOSCOPY 2/19/1973 ZOSTER VACCINE AGE 60> 12/19/2014 EYE EXAM RETINAL OR DILATED Q1 10/5/2017 HEMOGLOBIN A1C Q6M 2/2/2018 FOOT EXAM Q1 8/2/2018 MICROALBUMIN Q1 8/2/2018 LIPID PANEL Q1 8/2/2018 DTaP/Tdap/Td series (2 - Td) 8/8/2026 Allergies as of 2/12/2018  Review Complete On: 2/12/2018 By: MOHSEN Jewell Severity Noted Reaction Type Reactions Opana [Oxymorphone]  02/19/2013    Other (comments) Feels like bug crawling under skin and drowziness Current Immunizations  Reviewed on 12/7/2016 Name Date Influenza Vaccine (Quad) PF 11/2/2017, 12/7/2016 Pneumococcal Conjugate (PCV-13) 8/8/2016 Tdap 8/8/2016 Not reviewed this visit You Were Diagnosed With   
  
 Codes Comments DDD (degenerative disc disease), lumbar     ICD-10-CM: M51.36 
ICD-9-CM: 722.52 Lumbar facet arthropathy     ICD-10-CM: M12.88 ICD-9-CM: 721.3 Lumbar nerve root impingement     ICD-10-CM: M54.16 
ICD-9-CM: 724.4 Lumbosacral radiculopathy at S1     ICD-10-CM: M54.17 ICD-9-CM: 724.4 DDD (degenerative disc disease), thoracic     ICD-10-CM: M51.34 
ICD-9-CM: 722.51 Chronic midline low back pain with sciatica, sciatica laterality unspecified     ICD-10-CM: M54.40, G89.29 ICD-9-CM: 724.2, 724.3, 338.29 Chronic pain syndrome     ICD-10-CM: G89.4 ICD-9-CM: 338.4 Lumbar post-laminectomy syndrome     ICD-10-CM: M96.1 ICD-9-CM: 722.83   
 Lumbar and sacral osteoarthritis     ICD-10-CM: U79.156 ICD-9-CM: 721.3 Vitals BP Pulse Temp Resp Weight(growth percentile) BMI  
 135/80 (BP 1 Location: Right arm, BP Patient Position: Sitting) 92 97.8 °F (36.6 °C) 16 305 lb (138.3 kg) 39.16 kg/m2 Smoking Status Never Smoker Vitals History BMI and BSA Data Body Mass Index Body Surface Area  
 39.16 kg/m 2 2.69 m 2 Preferred Pharmacy Pharmacy Name Phone Isa Santana [address] [place], [place] [address] [facility] [address] [place] [address] [phone] Your Updated Medication List  
  
   
This list is accurate as of: 2/12/18  9:33 AM.  Always use your most recent med list.  
  
  
  
  
 Back Brace Misc 1 Device by Does Not Apply route daily as needed for Pain. One, lumbosacral orthosis/back brace with metal struts      Medically necessary Blood-Glucose Meter Misc Commonly known as:  Kenn Reyna Check blood sugar daily buPROPion  mg SR tablet Commonly known as:  WELLBUTRIN SR  
TAKE 1 TABLET TWICE DAILY  
  
 busPIRone 10 mg tablet Commonly known as:  BUSPAR Take 1 Tab by mouth two (2) times a day. diazePAM 5 mg tablet Commonly known as:  VALIUM Take 5 mg by mouth every six (6) hours as needed for Anxiety. DULoxetine 60 mg capsule Commonly known as:  CYMBALTA Take 1 Cap by mouth daily. gabapentin 800 mg tablet Commonly known as:  NEURONTIN Take 1 Tab by mouth three (3) times daily for 30 days. glucose blood VI test strips strip Commonly known as:  [address] Check blood sugar daily IRON (DRIED) PO Take  by mouth.  
  
 isosorbide mononitrate ER 60 mg CR tablet Commonly known as:  IMDUR  
TAKE 1 TABLET EVERY MORNING Lancets Misc Commonly known as:  ACCU-CHEK FASTCLIX Check blood sugar daily  
  
 metFORMIN  mg tablet Commonly known as:  GLUCOPHAGE XR  
 Take 3 Tabs by mouth daily (with dinner). metoprolol succinate 50 mg XL tablet Commonly known as:  TOPROL-XL  
TAKE 1 TABLET EVERY DAY  
  
 * morphine IR 15 mg tablet Commonly known as:  MS IR Take 1 Tab by mouth four (4) times daily as needed for Pain. Max Daily Amount: 60 mg.  
  
 * morphine CR 15 mg CR tablet Commonly known as:  MS CONTIN Take 1 Tab by mouth every twelve (12) hours for 30 days. Max Daily Amount: 30 mg.  
  
 * morphine IR 15 mg tablet Commonly known as:  MS IR Take 1 Tab by mouth three (3) times daily as needed for Pain. Max Daily Amount: 45 mg.  
  
 * morphine CR 15 mg CR tablet Commonly known as:  MS CONTIN Take 1 Tab by mouth every twelve (12) hours for 30 days. Max Daily Amount: 30 mg. Start taking on:  3/13/2018 * morphine IR 15 mg tablet Commonly known as:  MS IR Take 1 Tab by mouth three (3) times daily as needed for Pain. Max Daily Amount: 45 mg. Start taking on:  3/13/2018 * morphine CR 15 mg CR tablet Commonly known as:  MS CONTIN Take 1 Tab by mouth every twelve (12) hours for 30 days. Max Daily Amount: 30 mg. Start taking on:  4/12/2018 * morphine IR 15 mg tablet Commonly known as:  MS IR Take 1 Tab by mouth three (3) times daily as needed for Pain. Max Daily Amount: 45 mg. Start taking on:  4/12/2018  
  
 multivitamin tablet Commonly known as:  ONE A DAY Take 1 Tab by mouth daily. naloxone 4 mg/actuation nasal spray Commonly known as:  NARCAN  
4 mg by Nasal route as needed. nitroglycerin 0.4 mg SL tablet Commonly known as:  NITROSTAT  
1 Tab by SubLINGual route every five (5) minutes as needed. Use for Chest Pain ; Call MD if > 3 tablets required OMEPRAZOLE (BULK)  
  
 ondansetron hcl 4 mg tablet Commonly known as:  ZOFRAN (AS HYDROCHLORIDE) Take 1 Tab by mouth every eight (8) hours as needed for Nausea. phentermine 37.5 mg tablet Commonly known as:  ADIPEX-P  
 Take 1 Tab by mouth every morning. Max Daily Amount: 37.5 mg.  
  
 pramipexole 0.5 mg tablet Commonly known as:  MIRAPEX TAKE 1 TABLET THREE TIMES DAILY  
  
 simvastatin 10 mg tablet Commonly known as:  ZOCOR  
TAKE 1 TABLET EVERY NIGHT  
  
 zolpidem 10 mg tablet Commonly known as:  AMBIEN Take 1 Tab by mouth nightly. Max Daily Amount: 10 mg.  
  
 * Notice: This list has 7 medication(s) that are the same as other medications prescribed for you. Read the directions carefully, and ask your doctor or other care provider to review them with you. Prescriptions Printed Refills  
 morphine CR (MS CONTIN) 15 mg CR tablet 0 Sig: Take 1 Tab by mouth every twelve (12) hours for 30 days. Max Daily Amount: 30 mg.  
 Class: Print Route: Oral  
 morphine CR (MS CONTIN) 15 mg CR tablet 0 Starting on: 3/13/2018 Sig: Take 1 Tab by mouth every twelve (12) hours for 30 days. Max Daily Amount: 30 mg.  
 Class: Print Route: Oral  
 morphine CR (MS CONTIN) 15 mg CR tablet 0 Starting on: 4/12/2018 Sig: Take 1 Tab by mouth every twelve (12) hours for 30 days. Max Daily Amount: 30 mg.  
 Class: Print Route: Oral  
 morphine IR (MS IR) 15 mg tablet 0 Sig: Take 1 Tab by mouth three (3) times daily as needed for Pain. Max Daily Amount: 45 mg.  
 Class: Print Route: Oral  
 morphine IR (MS IR) 15 mg tablet 0 Starting on: 3/13/2018 Sig: Take 1 Tab by mouth three (3) times daily as needed for Pain. Max Daily Amount: 45 mg.  
 Class: Print Route: Oral  
 morphine IR (MS IR) 15 mg tablet 0 Starting on: 4/12/2018 Sig: Take 1 Tab by mouth three (3) times daily as needed for Pain. Max Daily Amount: 45 mg.  
 Class: Print Route: Oral  
  
Prescriptions Sent to Pharmacy Refills  
 gabapentin (NEURONTIN) 800 mg tablet 5 Sig: Take 1 Tab by mouth three (3) times daily for 30 days.   
 Class: Normal  
 Pharmacy: [address]  #: [phone] Route: Oral  
  
Follow-up Instructions Return in about 3 months (around 5/12/2018). Patient Instructions 1. Continue current plan with no evidence of addiction or diversion. Stable on current medication without adverse events. 2. Refill and adjust morphine ER 30 mg down to 50 mg every 12 hours. 3. Refill morphine IR 15 mg up to 3 times daily on an as-needed basis only. 4. Discontinue Valium. Please discuss further treatment/recommendations regarding sleep and anxiety with your PCP. 5. Naloxone 4 mg nasal spray for opioid induced respiratory depression emergency only. 6. Discussed risks of addiction, dependency, and opioid induced hyperalgesia. 7. Return to clinic in 3 months Introducing [facility]! Dear Brit Morrow: Thank you for requesting a [email or web address] account. Our records indicate that you already have an active [email or web address] account. You can access your account anytime at [email or web address] Did you know that you can access your hospital and ER discharge instructions at any time in [email or web address]? You can also review all of your test results from your hospital stay or ER visit. Additional Information If you have questions, please visit the Frequently Asked Questions section of the [email or web address] website at [email or web address] Remember, [email or web address] is NOT to be used for urgent needs. For medical emergencies, dial 911. Now available from your iPhone and Android! Please provide this summary of care documentation to your next provider. Your primary care clinician is listed as [address]. If you have any questions after today's visit, please call [phone].

## 2018-02-12 NOTE — PROGRESS NOTES
Nursing Notes    Patient presents to the office today in follow-up. Patient rates his pain at 8/10 on the numerical pain scale. Reviewed medications with counts as follows:    Rx Date filled Qty Dispensed Pill Count Last Dose Short   MS. Contin 30 mg ER 12/30/17 60 0 Last pm no   Valium 5 mg 10/10/17 30 8 Sat pm no   Ms. Contin IR 12/30/17 90 0 Sat pm no         Comments: Patient is here today for a follow up appt today he states his pain level today is a 8  He states he left his MS. Contin Ir bottle at home and it is sd empty     POC UDS was not performed in office today    Any new labs or imaging since last appointment? NO    Have you been to an emergency room (ER) or urgent care clinic since your last visit? NO            Have you been hospitalized since your last visit? NO     If yes, where, when, and reason for visit? Have you seen or consulted any other health care providers outside of the [facility]  since your last visit? NO     If yes, where, when, and reason for visit? HM deferred to pcp.

## 2018-02-12 NOTE — PROGRESS NOTES
HISTORY OF PRESENT ILLNESS  [place] William is a 58 y.o. male    HPI: Mr. Wali Friedman  returns today for f/u of Chronic low back pain. Prior lumbar fusion 2012. Current spinal cord stimulator with good improvement. Prior lumbar injection with some improvement. Today is my first visit with Mr. Wali Friedman. He  Reports that his pain has increased since his last visit. His pain began to increase in December. He has f/u with [place] who made some adjustments to his SCS with minimal improvement so far. He report significant sweating with his morphine. He has tried Dilaudid in the past which worked well at first but then became tolerant. We will adjust his morphine ER down to 15 mg every 12 hours. We discussed other options if this adjustment fails to improve his symptoms. He is otherwise doing well. I will have him follow-up in 3 months for further evaluation recommendation. Current medication management includes Morphine ER 15 mg every 12 hours and morphine IR 15 mg 4 times daily as needed. Ambien from his PCP. Medications are helping with pain control and quality of life. His pain is 8/10 with medication and 10/10 without. Pt describes pain as stabbing and aching. Aggravating factors include standing and most movements. Relieved with rest, medication, and avoiding painful activities. Current treatment is helping to improve general activity, mood, sleep, and enjoyment of life    Mr. William is tolerating medications well. He is able to stay more active with less discomfort with these current doses. The patient reports an average of 20% pain relief with current treatment/medications. Pill counts are appropriate. He is informed of side effects, risks, and benefits of this regimen, and emphasizes that he derives a significant improvement in functionality and quality of life, and notes that non-opioid medications and therapies in the past have not offered significant benefit.     He does report some sweating. He  is otherwise doing well with no other complaints today. He denies any adverse events including nausea, vomiting, dizziness, increased constipation, hallucinations, or seizures. Because the patient's current regimen places him/her at increased risk for possible overdose, a prescription for naloxone nasal spray has been provided. The patient understands that this medication is only to be used in the setting of a possible overdose and that inadvertent use of this medication could precipitate overt withdrawal.         Allergies   Allergen Reactions    Opana [Oxymorphone] Other (comments)     Feels like bug crawling under skin and drowziness       Past Surgical History:   Procedure Laterality Date    HX APPENDECTOMY      HX BACK SURGERY      Lumbar fusion    HX HERNIA REPAIR  1992    HX OTHER SURGICAL      EMG - S1 disorder    HX VASECTOMY  1985         Review of Systems   Constitutional: Positive for diaphoresis. Negative for chills and fever. HENT: Negative for ear discharge and ear pain. Eyes: Negative for pain and discharge. Respiratory: Negative for hemoptysis and wheezing. Gastrointestinal: Negative for blood in stool and melena. Genitourinary: Negative for dysuria and hematuria. Musculoskeletal: Positive for back pain and myalgias. Negative for neck pain. Skin: Negative for itching and rash. Neurological: Negative for seizures and loss of consciousness. Psychiatric/Behavioral: Negative for hallucinations and suicidal ideas. Physical Exam   Constitutional: He is oriented to person, place, and time and well-developed, well-nourished, and in no distress. No distress. obese   HENT:   Head: Normocephalic and atraumatic. Eyes: EOM are normal.   Pulmonary/Chest: Effort normal.   Neurological: He is alert and oriented to person, place, and time. Gait (using a cane) abnormal.   Skin: Skin is dry. No rash noted. No erythema.    Psychiatric: Mood, memory, affect and judgment normal.   Nursing note and vitals reviewed. ASSESSMENT:    1. DDD (degenerative disc disease), lumbar    2. Lumbar facet arthropathy    3. Lumbar nerve root impingement    4. Lumbosacral radiculopathy at S1    5. DDD (degenerative disc disease), thoracic    6. Chronic midline low back pain with sciatica, sciatica laterality unspecified    7. Chronic pain syndrome    8. Lumbar post-laminectomy syndrome    9. Lumbar and sacral osteoarthritis         [place] Prescription Monitoring Program was reviewed which does not demonstrate aberrancies and/or inconsistencies with regard to the historical prescribing of controlled medications to this patient by other providers. PLAN / Pt Instructions:  1. Continue current plan with no evidence of addiction or diversion. Stable on current medication without adverse events. 2. Refill and adjust morphine ER 30 mg down to 15 mg every 12 hours. 3. Refill morphine IR 15 mg up to 3 times daily on an as-needed basis only. 4. Refill gabapentin 800 mg every 8 hours. 5. Discontinue Valium. Please discuss further treatment/recommendations regarding sleep and anxiety with your PCP. 6. Naloxone 4 mg nasal spray for opioid induced respiratory depression emergency only. 7. Discussed risks of addiction, dependency, and opioid induced hyperalgesia. 8. Return to clinic in 3 months      Medications Ordered Today   Medications    morphine CR (MS CONTIN) 15 mg CR tablet     Sig: Take 1 Tab by mouth every twelve (12) hours for 30 days. Max Daily Amount: 30 mg. Dispense:  60 Tab     Refill:  0    morphine CR (MS CONTIN) 15 mg CR tablet     Sig: Take 1 Tab by mouth every twelve (12) hours for 30 days. Max Daily Amount: 30 mg. Dispense:  60 Tab     Refill:  0    morphine CR (MS CONTIN) 15 mg CR tablet     Sig: Take 1 Tab by mouth every twelve (12) hours for 30 days. Max Daily Amount: 30 mg.      Dispense:  60 Tab     Refill:  0    morphine IR (MS IR) 15 mg tablet Sig: Take 1 Tab by mouth three (3) times daily as needed for Pain. Max Daily Amount: 45 mg. Dispense:  90 Tab     Refill:  0    morphine IR (MS IR) 15 mg tablet     Sig: Take 1 Tab by mouth three (3) times daily as needed for Pain. Max Daily Amount: 45 mg. Dispense:  90 Tab     Refill:  0    morphine IR (MS IR) 15 mg tablet     Sig: Take 1 Tab by mouth three (3) times daily as needed for Pain. Max Daily Amount: 45 mg. Dispense:  90 Tab     Refill:  0    gabapentin (NEURONTIN) 800 mg tablet     Sig: Take 1 Tab by mouth three (3) times daily for 30 days. Dispense:  90 Tab     Refill:  5         DISPOSITION     Pain medications are prescribed with the objective of pain relief and improved physical and psychosocial function in this patient.  Pain Meds and Quality Of Life have been reviewed. Nonpharmacologic therapy and non-opioid pharmacologic therapy have been considered. Opioid therapy is only prescribed if the expected benefits are anticipated to outweigh risks.  Counseled patient on proper use of prescribed medications.  Reviewed with patient self-help tools, home exercise, and lifestyle changes to assist the patient in self-management of symptoms.  Reviewed with patient the treatment plan, goals of treatment plan, and limitations of treatment plan, to include the potential for side effects from medications and procedures. If side effects occur, it is the responsibility of the patient to inform the clinic so that a change in the treatment plan can be made in a safe manner. The patient is advised that stopping prescribed medication may cause an increase in symptoms and possible medication withdrawal symptoms. The patient is informed an emergency room evaluation may be necessary if this occurs. Spent 25 minutes with patient today which more than 50% of that time was spent on counseling and coordination of care.         Dylan Leonard 2/12/2018        Note: Please excuse any typographical errors. Voice recognition software was used for this note and may cause mistakes.

## 2018-02-20 DIAGNOSIS — E11.9 DIABETES MELLITUS WITHOUT COMPLICATION (HCC): ICD-10-CM

## 2018-02-20 RX ORDER — ISOSORBIDE MONONITRATE 60 MG/1
TABLET, EXTENDED RELEASE ORAL
Qty: 90 TAB | Refills: 0 | Status: SHIPPED | OUTPATIENT
Start: 2018-02-20 | End: 2018-06-06 | Stop reason: SDUPTHER

## 2018-02-20 RX ORDER — METFORMIN HYDROCHLORIDE 500 MG/1
1500 TABLET, EXTENDED RELEASE ORAL
Qty: 270 TAB | Refills: 3 | Status: SHIPPED | OUTPATIENT
Start: 2018-02-20 | End: 2018-11-01 | Stop reason: SDUPTHER

## 2018-02-20 RX ORDER — METFORMIN HYDROCHLORIDE 500 MG/1
TABLET ORAL
Qty: 270 TAB | Refills: 1 | OUTPATIENT
Start: 2018-02-20

## 2018-02-20 RX ORDER — SIMVASTATIN 10 MG/1
TABLET, FILM COATED ORAL
Qty: 90 TAB | Refills: 1 | Status: SHIPPED | OUTPATIENT
Start: 2018-02-20 | End: 2018-08-22 | Stop reason: SDUPTHER

## 2018-05-15 ENCOUNTER — TELEPHONE (OUTPATIENT)
Dept: PAIN MANAGEMENT | Age: 63
End: 2018-05-15

## 2018-05-15 ENCOUNTER — OFFICE VISIT (OUTPATIENT)
Dept: PAIN MANAGEMENT | Age: 63
End: 2018-05-15

## 2018-05-15 VITALS
WEIGHT: 301 LBS | TEMPERATURE: 97.4 F | HEART RATE: 88 BPM | RESPIRATION RATE: 18 BRPM | BODY MASS INDEX: 38.63 KG/M2 | DIASTOLIC BLOOD PRESSURE: 85 MMHG | SYSTOLIC BLOOD PRESSURE: 149 MMHG | HEIGHT: 74 IN

## 2018-05-15 DIAGNOSIS — M51.34 DDD (DEGENERATIVE DISC DISEASE), THORACIC: ICD-10-CM

## 2018-05-15 DIAGNOSIS — M54.40 CHRONIC MIDLINE LOW BACK PAIN WITH SCIATICA, SCIATICA LATERALITY UNSPECIFIED: ICD-10-CM

## 2018-05-15 DIAGNOSIS — M47.817 LUMBAR AND SACRAL OSTEOARTHRITIS: ICD-10-CM

## 2018-05-15 DIAGNOSIS — M54.17 LUMBOSACRAL RADICULOPATHY AT S1: ICD-10-CM

## 2018-05-15 DIAGNOSIS — M96.1 POSTLAMINECTOMY SYNDROME, LUMBAR REGION: ICD-10-CM

## 2018-05-15 DIAGNOSIS — G89.4 CHRONIC PAIN SYNDROME: ICD-10-CM

## 2018-05-15 DIAGNOSIS — G89.29 CHRONIC MIDLINE LOW BACK PAIN WITH SCIATICA, SCIATICA LATERALITY UNSPECIFIED: ICD-10-CM

## 2018-05-15 DIAGNOSIS — M47.816 LUMBAR FACET ARTHROPATHY: ICD-10-CM

## 2018-05-15 DIAGNOSIS — M96.1 LUMBAR POST-LAMINECTOMY SYNDROME: ICD-10-CM

## 2018-05-15 DIAGNOSIS — M51.36 DDD (DEGENERATIVE DISC DISEASE), LUMBAR: ICD-10-CM

## 2018-05-15 DIAGNOSIS — Z79.899 ENCOUNTER FOR LONG-TERM (CURRENT) USE OF HIGH-RISK MEDICATION: Primary | ICD-10-CM

## 2018-05-15 DIAGNOSIS — M54.16 LUMBAR NERVE ROOT IMPINGEMENT: ICD-10-CM

## 2018-05-15 LAB
ALCOHOL UR POC: NORMAL
AMPHETAMINES UR POC: NEGATIVE
BARBITURATES UR POC: NORMAL
BENZODIAZEPINES UR POC: NORMAL
BUPRENORPHINE UR POC: NEGATIVE
CANNABINOIDS UR POC: NEGATIVE
CARISOPRODOL UR POC: NORMAL
COCAINE UR POC: NEGATIVE
FENTANYL UR POC: NORMAL
MDMA/ECSTASY UR POC: NORMAL
METHADONE UR POC: NEGATIVE
METHAMPHETAMINE UR POC: NORMAL
METHYLPHENIDATE UR POC: NORMAL
OPIATES UR POC: NORMAL
OXYCODONE UR POC: NEGATIVE
PHENCYCLIDINE UR POC: NORMAL
PROPOXYPHENE UR POC: NORMAL
TRAMADOL UR POC: NORMAL
TRICYCLICS UR POC: NORMAL

## 2018-05-15 RX ORDER — MORPHINE SULFATE 15 MG/1
15 TABLET, FILM COATED, EXTENDED RELEASE ORAL EVERY 12 HOURS
Qty: 60 TAB | Refills: 0 | Status: SHIPPED | OUTPATIENT
Start: 2018-06-30 | End: 2018-05-15 | Stop reason: SDUPTHER

## 2018-05-15 RX ORDER — MORPHINE SULFATE 15 MG/1
15 TABLET, FILM COATED, EXTENDED RELEASE ORAL EVERY 12 HOURS
Qty: 60 TAB | Refills: 0 | Status: SHIPPED | OUTPATIENT
Start: 2018-06-01 | End: 2018-05-15 | Stop reason: SDUPTHER

## 2018-05-15 RX ORDER — MORPHINE SULFATE 15 MG/1
15 TABLET ORAL
Qty: 90 TAB | Refills: 0 | Status: SHIPPED | OUTPATIENT
Start: 2018-06-01 | End: 2018-05-15 | Stop reason: SDUPTHER

## 2018-05-15 RX ORDER — MORPHINE SULFATE 15 MG/1
15 TABLET ORAL
Qty: 90 TAB | Refills: 0 | Status: SHIPPED | OUTPATIENT
Start: 2018-07-29 | End: 2018-11-01 | Stop reason: ALTCHOICE

## 2018-05-15 RX ORDER — MORPHINE SULFATE 15 MG/1
15 TABLET, FILM COATED, EXTENDED RELEASE ORAL EVERY 12 HOURS
Qty: 60 TAB | Refills: 0 | Status: SHIPPED | OUTPATIENT
Start: 2018-07-29 | End: 2018-08-28

## 2018-05-15 RX ORDER — MORPHINE SULFATE 15 MG/1
15 TABLET ORAL
Qty: 90 TAB | Refills: 0 | Status: SHIPPED | OUTPATIENT
Start: 2018-06-30 | End: 2018-05-15 | Stop reason: SDUPTHER

## 2018-05-15 NOTE — MR AVS SNAPSHOT
[address] [facility] [address] 
[phone] Patient: Katie Leigh MRN: [id number] HQS:[id number] Visit Information Date & Time Provider Department Dept. Phone Encounter #  
 5/15/2018  8:40 AM [facility] [phone] Follow-up Instructions Return in about 3 months (around 8/15/2018). Upcoming Health Maintenance Date Due COLONOSCOPY 2/19/1973 ZOSTER VACCINE AGE 60> 12/19/2014 EYE EXAM RETINAL OR DILATED Q1 10/5/2017 HEMOGLOBIN A1C Q6M 2/2/2018 MEDICARE YEARLY EXAM 4/12/2018 Influenza Age 5 to Adult 8/1/2018 FOOT EXAM Q1 8/2/2018 MICROALBUMIN Q1 8/2/2018 LIPID PANEL Q1 8/2/2018 DTaP/Tdap/Td series (2 - Td) 8/8/2026 Allergies as of 5/15/2018  Review Complete On: 5/15/2018 By: MOHSEN Mora Severity Noted Reaction Type Reactions Opana [Oxymorphone]  02/19/2013    Other (comments) Feels like bug crawling under skin and drowziness Current Immunizations  Reviewed on 12/7/2016 Name Date Influenza Vaccine (Quad) PF 11/2/2017, 12/7/2016 Pneumococcal Conjugate (PCV-13) 8/8/2016 Tdap 8/8/2016 Not reviewed this visit You Were Diagnosed With   
  
 Codes Comments Encounter for long-term (current) use of high-risk medication    -  Primary ICD-10-CM: Q03.359 ICD-9-CM: V58.69 Chronic midline low back pain with sciatica, sciatica laterality unspecified     ICD-10-CM: M54.40, G89.29 ICD-9-CM: 724.2, 724.3, 338.29   
 DDD (degenerative disc disease), lumbar     ICD-10-CM: M51.36 
ICD-9-CM: 722.52 Lumbar facet arthropathy (HCC)     ICD-10-CM: M46.96 
ICD-9-CM: 721.3 Lumbar nerve root impingement     ICD-10-CM: M54.16 
ICD-9-CM: 724.4 Lumbosacral radiculopathy at S1     ICD-10-CM: M54.17 ICD-9-CM: 724.4  DDD (degenerative disc disease), thoracic     ICD-10-CM: M51.34 
 ICD-9-CM: 722.51 Postlaminectomy syndrome, lumbar region     ICD-10-CM: M96.1 ICD-9-CM: 722.83 Chronic pain syndrome     ICD-10-CM: G89.4 ICD-9-CM: 338.4 Lumbar post-laminectomy syndrome     ICD-10-CM: M96.1 ICD-9-CM: 722.83 Lumbar and sacral osteoarthritis     ICD-10-CM: I80.900 ICD-9-CM: 721.3 Vitals BP Pulse Temp Resp Height(growth percentile) Weight(growth percentile) 149/85 (BP 1 Location: Right arm, BP Patient Position: Sitting) 88 97.4 °F (36.3 °C) 18 6' 2\" (1.88 m) 301 lb (136.5 kg) BMI Smoking Status 38.65 kg/m2 Never Smoker BMI and BSA Data Body Mass Index Body Surface Area  
 38.65 kg/m 2 2.67 m 2 Preferred Pharmacy Pharmacy Name Phone [address] [place] [address] [phone] Your Updated Medication List  
  
   
This list is accurate as of 5/15/18  9:45 AM.  Always use your most recent med list.  
  
  
  
  
 Back Brace Misc 1 Device by Does Not Apply route daily as needed for Pain. One, lumbosacral orthosis/back brace with metal struts      Medically necessary Blood-Glucose Meter Misc Commonly known as:  Mayur Acuna Check blood sugar daily buPROPion  mg SR tablet Commonly known as:  WELLBUTRIN SR  
TAKE 1 TABLET TWICE DAILY  
  
 busPIRone 10 mg tablet Commonly known as:  BUSPAR Take 1 Tab by mouth two (2) times a day. diazePAM 5 mg tablet Commonly known as:  VALIUM Take 5 mg by mouth every six (6) hours as needed for Anxiety. DULoxetine 60 mg capsule Commonly known as:  CYMBALTA Take 1 Cap by mouth daily. glucose blood VI test strips strip Commonly known as:  [address] Check blood sugar daily IRON (DRIED) PO Take  by mouth.  
  
 isosorbide mononitrate ER 60 mg CR tablet Commonly known as:  IMDUR  
TAKE 1 TABLET EVERY MORNING Lancets Misc Commonly known as:  ACCU-CHEK FASTCLIX Check blood sugar daily  
  
 metFORMIN  mg tablet Commonly known as:  GLUCOPHAGE XR Take 3 Tabs by mouth daily (with dinner). metoprolol succinate 50 mg XL tablet Commonly known as:  TOPROL-XL  
TAKE 1 TABLET EVERY DAY  
  
 * morphine IR 15 mg tablet Commonly known as:  MS IR Take 1 Tab by mouth four (4) times daily as needed for Pain. Max Daily Amount: 60 mg.  
  
 * morphine IR 15 mg tablet Commonly known as:  MS IR Take 1 Tab by mouth three (3) times daily as needed for Pain. Max Daily Amount: 45 mg.  
  
 * morphine IR 15 mg tablet Commonly known as:  MS IR Take 1 Tab by mouth three (3) times daily as needed for Pain. Max Daily Amount: 45 mg.  
  
 * morphine CR 15 mg CR tablet Commonly known as:  MS CONTIN Take 1 Tab by mouth every twelve (12) hours for 30 days. Max Daily Amount: 30 mg. Start taking on:  7/29/2018 * morphine IR 15 mg tablet Commonly known as:  MS IR Take 1 Tab by mouth three (3) times daily as needed for Pain. Max Daily Amount: 45 mg. Start taking on:  7/29/2018  
  
 multivitamin tablet Commonly known as:  ONE A DAY Take 1 Tab by mouth daily. naloxone 4 mg/actuation nasal spray Commonly known as:  NARCAN  
4 mg by Nasal route as needed. nitroglycerin 0.4 mg SL tablet Commonly known as:  NITROSTAT  
1 Tab by SubLINGual route every five (5) minutes as needed. Use for Chest Pain ; Call MD if > 3 tablets required OMEPRAZOLE (BULK)  
  
 ondansetron hcl 4 mg tablet Commonly known as:  Nolia Folk Take 1 Tab by mouth every eight (8) hours as needed for Nausea. phentermine 37.5 mg tablet Commonly known as:  ADIPEX-P Take 1 Tab by mouth every morning. Max Daily Amount: 37.5 mg.  
  
 pramipexole 0.5 mg tablet Commonly known as:  MIRAPEX TAKE 1 TABLET THREE TIMES DAILY  
  
 simvastatin 10 mg tablet Commonly known as:  ZOCOR  
TAKE 1 TABLET EVERY NIGHT zolpidem 10 mg tablet Commonly known as:  AMBIEN Take 1 Tab by mouth nightly. Max Daily Amount: 10 mg.  
  
 * Notice: This list has 5 medication(s) that are the same as other medications prescribed for you. Read the directions carefully, and ask your doctor or other care provider to review them with you. Prescriptions Printed Refills  
 morphine CR (MS CONTIN) 15 mg CR tablet 0 Starting on: 7/29/2018 Sig: Take 1 Tab by mouth every twelve (12) hours for 30 days. Max Daily Amount: 30 mg.  
 Class: Print Route: Oral  
 morphine IR (MS IR) 15 mg tablet 0 Starting on: 7/29/2018 Sig: Take 1 Tab by mouth three (3) times daily as needed for Pain. Max Daily Amount: 45 mg.  
 Class: Print Route: Oral  
  
We Performed the Following AMB POC DRUG SCREEN () [ [facility]] DRUG SCREEN [[id number] Custom] Follow-up Instructions Return in about 3 months (around 8/15/2018). Patient Instructions 1. Continue current plan with no evidence of addiction or diversion. Stable on current medication without adverse events. 2. Refill  morphine ER 15 mg every 12 hours. 3. Refill morphine IR 15 mg up to 3 times daily on an as-needed basis only. Plan to begin tapering down next visit. 4. Continue gabapentin 800 mg every 8 hours. 5 refills 5. Please discuss Xanax with your PCP as soon as possible as we were no longer be able to prescribe opioid medications while you are concurrently using benzodiazepine. 6. Naloxone 4 mg nasal spray for opioid induced respiratory depression emergency only. 7. Discussed risks of addiction, dependency, and opioid induced hyperalgesia. 8. Please remember to call at least 4-5 business days prior to your medication refill. 9. Return to clinic in 3 months. Please call and cancel your appointment and pain management agreement if you do decide to transfer your care. Introducing [facility]! Dear Ike Joseph: Thank you for requesting a [email or web address] account. Our records indicate that you already have an active [email or web address] account. You can access your account anytime at [email or web address] Did you know that you can access your hospital and ER discharge instructions at any time in [email or web address]? You can also review all of your test results from your hospital stay or ER visit. Additional Information If you have questions, please visit the Frequently Asked Questions section of the [email or web address] website at [email or web address] Remember, [email or web address] is NOT to be used for urgent needs. For medical emergencies, dial 911. Now available from your iPhone and Android! Please provide this summary of care documentation to your next provider. Your primary care clinician is listed as [address]. If you have any questions after today's visit, please call [phone].

## 2018-05-15 NOTE — PROGRESS NOTES
Nursing Notes    Patient presents to the office today in follow-up. Patient rates his pain at 8/10 on the numerical pain scale. Reviewed medications with counts as follows:    Rx Date filled Qty Dispensed Pill Count Last Dose Short   Ms. Contin  ER 15 mg 05/02/18 60 32 This am  no   Ms. Contin IR 15 mg 05/02/18 90 47 This am no         Comments: Patient is here today for a follow up le today he states his pain level today is an 8  He states the pain in his leg is unbearable and he thinks he had built up a tolerance to the Morphine. POC UDS was performed in office today per verbal order per [facility]    Any new labs or imaging since last appointment? NO    Have you been to an emergency room (ER) or urgent care clinic since your last visit? NO            Have you been hospitalized since your last visit? NO     If yes, where, when, and reason for visit? Have you seen or consulted any other health care providers outside of the [address] [place] [address]  since your last visit? NO     If yes, where, when, and reason for visit? Mr. Mamie Oswald has a reminder for a \"due or due soon\" health maintenance. I have asked that he contact his primary care provider for follow-up on this health maintenance.

## 2018-05-15 NOTE — PROGRESS NOTES
HISTORY OF PRESENT ILLNESS  Mauri William is a 61 y.o. male    HPI: Mr. Sondra Mendoza  returns today for f/u of chronic low back pain. Prior lumbar fusion 2012. Current spinal cord stimulator with good improvement. Prior lumbar injection with some improvement. Mr. Sondra Mendoza continues to do well with his recent spinal cord stimulator. He does still complain of minimal improvement with his current regimen. He also reports new symptoms the recent began in his right lower extremity. He reports that he is overcompensating on his right side which is causing pain in his right lower extremity. He is using a cane which we discussed in detail today. I have recommended that he follow-up with Dr. Bruce Belle next visit for further evaluation and recommendation. Currently receives  Xanax from his PCP for sleep. This was previously switched from Ambien. I have asked him to discuss alternative options for sleep with his PCP as soon as possible. We had a lengthy conversation about the departure of his previous providers who recently left our practice. I explained to him that moving forward we will be focusing on a more conservative and non-opioid plan of care. This will result in changes to his treatment. We will begin tapering his morphine IR next visit. Current medication management includes Morphine ER 15 mg every 12 hours and morphine IR 15 mg 4 times daily as needed. Xanax from his PCP. Medications are helping with pain control and quality of life. His pain is 8/10 with medication and 10/10 without. Pt describes pain as stabbing and aching. Aggravating factors include standing and most movements. Relieved with rest, medication, and avoiding painful activities. Current treatment is helping to improve general activity, mood, sleep, and enjoyment of life    Mr. William is tolerating medications well. He is able to stay more active with less discomfort with these current doses.  The patient reports an average of 20% pain relief with current treatment/medications. He is informed of side effects, risks, and benefits of this regimen, and emphasizes that he derives a significant improvement in functionality and quality of life, and notes that non-opioid medications and therapies in the past have not offered significant benefit. He does report some sweating. He  is otherwise doing well with no other complaints today. He denies any adverse events including nausea, vomiting, dizziness, increased constipation, hallucinations, or seizures. Because the patient's current regimen places him/her at increased risk for possible overdose, a prescription for naloxone nasal spray has been provided. The patient understands that this medication is only to be used in the setting of a possible overdose and that inadvertent use of this medication could precipitate overt withdrawal.    POC UDS today. Confirmation pending. Allergies   Allergen Reactions    Opana [Oxymorphone] Other (comments)     Feels like bug crawling under skin and drowziness       Past Surgical History:   Procedure Laterality Date    HX APPENDECTOMY      HX BACK SURGERY      Lumbar fusion    HX HERNIA REPAIR  1992    HX OTHER SURGICAL      EMG - S1 disorder    HX VASECTOMY  1985         Review of Systems   Constitutional: Positive for diaphoresis. Negative for chills and fever. HENT: Negative for ear discharge and ear pain. Eyes: Negative for pain and discharge. Respiratory: Negative for hemoptysis and wheezing. Gastrointestinal: Negative for blood in stool and melena. Genitourinary: Negative for dysuria and hematuria. Musculoskeletal: Positive for back pain and myalgias. Negative for neck pain. Skin: Negative for itching and rash. Neurological: Negative for seizures and loss of consciousness. Psychiatric/Behavioral: Negative for hallucinations and suicidal ideas.        Physical Exam   Constitutional: He is oriented to person, place, and time and well-developed, well-nourished, and in no distress. No distress. obese   HENT:   Head: Normocephalic and atraumatic. Eyes: EOM are normal.   Pulmonary/Chest: Effort normal.   Neurological: He is alert and oriented to person, place, and time. Gait (using a cane) abnormal.   Skin: Skin is dry. No rash noted. No erythema. Psychiatric: Mood, memory, affect and judgment normal.   Nursing note and vitals reviewed. ASSESSMENT:    1. Encounter for long-term (current) use of high-risk medication    2. Chronic midline low back pain with sciatica, sciatica laterality unspecified    3. DDD (degenerative disc disease), lumbar    4. Lumbar facet arthropathy ([address]    5. Lumbar nerve root impingement    6. Lumbosacral radiculopathy at S1    7. DDD (degenerative disc disease), thoracic    8. Postlaminectomy syndrome, lumbar region    9. Chronic pain syndrome    10. Lumbar post-laminectomy syndrome    11. Lumbar and sacral osteoarthritis         [place] Prescription Monitoring Program was reviewed which does not demonstrate aberrancies and/or inconsistencies with regard to the historical prescribing of controlled medications to this patient by other providers. PLAN / Pt Instructions:  1. Continue current plan with no evidence of addiction or diversion. Stable on current medication without adverse events. 2. Refill  morphine ER 15 mg every 12 hours. 3. Refill morphine IR 15 mg up to 3 times daily on an as-needed basis only. Plan to begin tapering down next visit. 4. Continue gabapentin 800 mg every 8 hours. 5 refills  5. Please discuss Xanax with your PCP as soon as possible as we were no longer be able to prescribe opioid medications while you are concurrently using benzodiazepine. 6. Naloxone 4 mg nasal spray for opioid induced respiratory depression emergency only. 7. Discussed risks of addiction, dependency, and opioid induced hyperalgesia.    8. Please remember to call at least 4-5 business days prior to your medication refill. 9. Return to clinic in 3 months with Dr. Samy Emmanuel. Medications Ordered Today   Medications    DISCONTD: morphine CR (MS CONTIN) 15 mg CR tablet     Sig: Take 1 Tab by mouth every twelve (12) hours for 30 days. Max Daily Amount: 30 mg. Dispense:  60 Tab     Refill:  0    DISCONTD: morphine IR (MS IR) 15 mg tablet     Sig: Take 1 Tab by mouth three (3) times daily as needed for Pain. Max Daily Amount: 45 mg. Dispense:  90 Tab     Refill:  0    DISCONTD: morphine CR (MS CONTIN) 15 mg CR tablet     Sig: Take 1 Tab by mouth every twelve (12) hours for 30 days. Max Daily Amount: 30 mg. Dispense:  60 Tab     Refill:  0    DISCONTD: morphine IR (MS IR) 15 mg tablet     Sig: Take 1 Tab by mouth three (3) times daily as needed for Pain. Max Daily Amount: 45 mg. Dispense:  90 Tab     Refill:  0    morphine CR (MS CONTIN) 15 mg CR tablet     Sig: Take 1 Tab by mouth every twelve (12) hours for 30 days. Max Daily Amount: 30 mg. Dispense:  60 Tab     Refill:  0    morphine IR (MS IR) 15 mg tablet     Sig: Take 1 Tab by mouth three (3) times daily as needed for Pain. Max Daily Amount: 45 mg. Dispense:  90 Tab     Refill:  0         DISPOSITION     Pain medications are prescribed with the objective of pain relief and improved physical and psychosocial function in this patient.  Pain Meds and Quality Of Life have been reviewed. Nonpharmacologic therapy and non-opioid pharmacologic therapy have been considered. Opioid therapy is only prescribed if the expected benefits are anticipated to outweigh risks.  Counseled patient on proper use of prescribed medications.  Reviewed with patient self-help tools, home exercise, and lifestyle changes to assist the patient in self-management of symptoms.    Reviewed with patient the treatment plan, goals of treatment plan, and limitations of treatment plan, to include the potential for side effects from medications and procedures. If side effects occur, it is the responsibility of the patient to inform the clinic so that a change in the treatment plan can be made in a safe manner. The patient is advised that stopping prescribed medication may cause an increase in symptoms and possible medication withdrawal symptoms. The patient is informed an emergency room evaluation may be necessary if this occurs. Spent 25 minutes with patient today which more than 50% of that time was spent on counseling and coordination of care. Dylan Baker 5/15/2018        Note: Please excuse any typographical errors. Voice recognition software was used for this note and may cause mistakes.

## 2018-05-15 NOTE — TELEPHONE ENCOUNTER
Patient called the Nurse triage line this morning, requesting a referral to  for his knee. Will route to provider. Please advise.

## 2018-05-15 NOTE — PATIENT INSTRUCTIONS
1. Continue current plan with no evidence of addiction or diversion. Stable on current medication without adverse events. 2. Refill  morphine ER 15 mg every 12 hours. 3. Refill morphine IR 15 mg up to 3 times daily on an as-needed basis only. Plan to begin tapering down next visit. 4. Continue gabapentin 800 mg every 8 hours. 5 refills  5. Please discuss Xanax with your PCP as soon as possible as we were no longer be able to prescribe opioid medications while you are concurrently using benzodiazepine. 6. Naloxone 4 mg nasal spray for opioid induced respiratory depression emergency only. 7. Discussed risks of addiction, dependency, and opioid induced hyperalgesia. 8. Please remember to call at least 4-5 business days prior to your medication refill. 9. Return to clinic in 3 months. Please call and cancel your appointment and pain management agreement if you do decide to transfer your care.

## 2018-06-06 DIAGNOSIS — E11.9 CONTROLLED TYPE 2 DIABETES MELLITUS WITHOUT COMPLICATION (HCC): ICD-10-CM

## 2018-06-07 RX ORDER — BLOOD-GLUCOSE METER
EACH MISCELLANEOUS
Qty: 1 EACH | Refills: 0 | Status: SHIPPED | OUTPATIENT
Start: 2018-06-07 | End: 2020-06-17 | Stop reason: CLARIF

## 2018-06-08 RX ORDER — BUPROPION HYDROCHLORIDE 150 MG/1
TABLET, EXTENDED RELEASE ORAL
Qty: 180 TAB | Refills: 1 | Status: SHIPPED | OUTPATIENT
Start: 2018-06-08 | End: 2018-11-01 | Stop reason: SDUPTHER

## 2018-06-08 RX ORDER — ISOSORBIDE MONONITRATE 60 MG/1
TABLET, EXTENDED RELEASE ORAL
Qty: 90 TAB | Refills: 1 | Status: SHIPPED | OUTPATIENT
Start: 2018-06-08 | End: 2018-11-01 | Stop reason: SDUPTHER

## 2018-06-08 RX ORDER — BLOOD SUGAR DIAGNOSTIC
STRIP MISCELLANEOUS
Qty: 100 STRIP | Refills: 3 | Status: SHIPPED | OUTPATIENT
Start: 2018-06-08 | End: 2019-12-06 | Stop reason: SDUPTHER

## 2018-07-03 ENCOUNTER — HOSPITAL ENCOUNTER (EMERGENCY)
Age: 63
Discharge: HOME | End: 2018-07-03
Attending: EMERGENCY MEDICINE | Admitting: EMERGENCY MEDICINE
Payer: MEDICARE

## 2018-07-03 ENCOUNTER — APPOINTMENT (OUTPATIENT)
Dept: CT IMAGING | Age: 63
End: 2018-07-03
Attending: PHYSICIAN ASSISTANT
Payer: MEDICARE

## 2018-07-03 VITALS
RESPIRATION RATE: 16 BRPM | WEIGHT: 300 LBS | HEART RATE: 87 BPM | HEIGHT: 74 IN | BODY MASS INDEX: 38.5 KG/M2 | TEMPERATURE: 97.5 F | OXYGEN SATURATION: 97 % | DIASTOLIC BLOOD PRESSURE: 74 MMHG | SYSTOLIC BLOOD PRESSURE: 122 MMHG

## 2018-07-03 DIAGNOSIS — R19.7 VOMITING AND DIARRHEA: Primary | ICD-10-CM

## 2018-07-03 DIAGNOSIS — R79.89 ELEVATED SERUM CREATININE: ICD-10-CM

## 2018-07-03 DIAGNOSIS — R11.10 VOMITING AND DIARRHEA: Primary | ICD-10-CM

## 2018-07-03 DIAGNOSIS — R73.9 HYPERGLYCEMIA: ICD-10-CM

## 2018-07-03 DIAGNOSIS — K76.0 HEPATIC STEATOSIS: ICD-10-CM

## 2018-07-03 LAB
ALBUMIN SERPL-MCNC: 3.9 G/DL (ref 3.4–5)
ALBUMIN/GLOB SERPL: 0.9 {RATIO} (ref 0.8–1.7)
ALP SERPL-CCNC: 121 U/L (ref 45–117)
ALT SERPL-CCNC: 41 U/L (ref 16–61)
ANION GAP SERPL CALC-SCNC: 9 MMOL/L (ref 3–18)
APPEARANCE UR: CLEAR
AST SERPL-CCNC: 27 U/L (ref 15–37)
BACTERIA URNS QL MICRO: ABNORMAL /HPF
BASOPHILS # BLD: 0 K/UL (ref 0–0.1)
BASOPHILS NFR BLD: 0 % (ref 0–2)
BILIRUB SERPL-MCNC: 0.7 MG/DL (ref 0.2–1)
BILIRUB UR QL: NEGATIVE
BUN SERPL-MCNC: 37 MG/DL (ref 7–18)
BUN/CREAT SERPL: 23 (ref 12–20)
CALCIUM SERPL-MCNC: 8.7 MG/DL (ref 8.5–10.1)
CHLORIDE SERPL-SCNC: 103 MMOL/L (ref 100–108)
CO2 SERPL-SCNC: 23 MMOL/L (ref 21–32)
COLOR UR: YELLOW
CREAT SERPL-MCNC: 1.62 MG/DL (ref 0.6–1.3)
DIFFERENTIAL METHOD BLD: ABNORMAL
EOSINOPHIL # BLD: 0.1 K/UL (ref 0–0.4)
EOSINOPHIL NFR BLD: 1 % (ref 0–5)
EPITH CASTS URNS QL MICRO: ABNORMAL /LPF (ref 0–5)
ERYTHROCYTE [DISTWIDTH] IN BLOOD BY AUTOMATED COUNT: 14 % (ref 11.6–14.5)
GLOBULIN SER CALC-MCNC: 4.5 G/DL (ref 2–4)
GLUCOSE BLD STRIP.AUTO-MCNC: 257 MG/DL (ref 70–110)
GLUCOSE SERPL-MCNC: 262 MG/DL (ref 74–99)
GLUCOSE UR STRIP.AUTO-MCNC: >1000 MG/DL
HCT VFR BLD AUTO: 40.6 % (ref 36–48)
HGB BLD-MCNC: 13.6 G/DL (ref 13–16)
HGB UR QL STRIP: NEGATIVE
KETONES UR QL STRIP.AUTO: NEGATIVE MG/DL
LEUKOCYTE ESTERASE UR QL STRIP.AUTO: NEGATIVE
LIPASE SERPL-CCNC: 108 U/L (ref 73–393)
LYMPHOCYTES # BLD: 1.5 K/UL (ref 0.9–3.6)
LYMPHOCYTES NFR BLD: 15 % (ref 21–52)
MCH RBC QN AUTO: 27.8 PG (ref 24–34)
MCHC RBC AUTO-ENTMCNC: 33.5 G/DL (ref 31–37)
MCV RBC AUTO: 82.9 FL (ref 74–97)
MONOCYTES # BLD: 1.3 K/UL (ref 0.05–1.2)
MONOCYTES NFR BLD: 13 % (ref 3–10)
NEUTS SEG # BLD: 7 K/UL (ref 1.8–8)
NEUTS SEG NFR BLD: 71 % (ref 40–73)
NITRITE UR QL STRIP.AUTO: NEGATIVE
PH UR STRIP: 5 [PH] (ref 5–8)
PLATELET # BLD AUTO: 269 K/UL (ref 135–420)
PMV BLD AUTO: 9.9 FL (ref 9.2–11.8)
POTASSIUM SERPL-SCNC: 4.2 MMOL/L (ref 3.5–5.5)
PROT SERPL-MCNC: 8.4 G/DL (ref 6.4–8.2)
PROT UR STRIP-MCNC: ABNORMAL MG/DL
RBC # BLD AUTO: 4.9 M/UL (ref 4.7–5.5)
RBC #/AREA URNS HPF: ABNORMAL /HPF (ref 0–5)
SODIUM SERPL-SCNC: 135 MMOL/L (ref 136–145)
SP GR UR REFRACTOMETRY: >1.03 (ref 1–1.03)
UROBILINOGEN UR QL STRIP.AUTO: 0.2 EU/DL (ref 0.2–1)
WBC # BLD AUTO: 9.8 K/UL (ref 4.6–13.2)
WBC URNS QL MICRO: ABNORMAL /HPF (ref 0–4)

## 2018-07-03 PROCEDURE — 74177 CT ABD & PELVIS W/CONTRAST: CPT

## 2018-07-03 PROCEDURE — 96374 THER/PROPH/DIAG INJ IV PUSH: CPT

## 2018-07-03 PROCEDURE — 96361 HYDRATE IV INFUSION ADD-ON: CPT

## 2018-07-03 PROCEDURE — 87086 URINE CULTURE/COLONY COUNT: CPT | Performed by: PHYSICIAN ASSISTANT

## 2018-07-03 PROCEDURE — 74011636320 HC RX REV CODE- 636/320: Performed by: PHYSICIAN ASSISTANT

## 2018-07-03 PROCEDURE — 82962 GLUCOSE BLOOD TEST: CPT

## 2018-07-03 PROCEDURE — 74011250636 HC RX REV CODE- 250/636: Performed by: PHYSICIAN ASSISTANT

## 2018-07-03 PROCEDURE — 80053 COMPREHEN METABOLIC PANEL: CPT | Performed by: PHYSICIAN ASSISTANT

## 2018-07-03 PROCEDURE — 74011636320 HC RX REV CODE- 636/320: Performed by: EMERGENCY MEDICINE

## 2018-07-03 PROCEDURE — 81001 URINALYSIS AUTO W/SCOPE: CPT | Performed by: PHYSICIAN ASSISTANT

## 2018-07-03 PROCEDURE — 83690 ASSAY OF LIPASE: CPT | Performed by: PHYSICIAN ASSISTANT

## 2018-07-03 PROCEDURE — 99283 EMERGENCY DEPT VISIT LOW MDM: CPT

## 2018-07-03 PROCEDURE — 85025 COMPLETE CBC W/AUTO DIFF WBC: CPT | Performed by: PHYSICIAN ASSISTANT

## 2018-07-03 RX ORDER — ONDANSETRON 2 MG/ML
4 INJECTION INTRAMUSCULAR; INTRAVENOUS
Status: COMPLETED | OUTPATIENT
Start: 2018-07-03 | End: 2018-07-03

## 2018-07-03 RX ORDER — ONDANSETRON 4 MG/1
4 TABLET, FILM COATED ORAL
Qty: 12 TAB | Refills: 0 | Status: SHIPPED | OUTPATIENT
Start: 2018-07-03 | End: 2018-11-01 | Stop reason: SDUPTHER

## 2018-07-03 RX ADMIN — ONDANSETRON 4 MG: 2 INJECTION, SOLUTION INTRAMUSCULAR; INTRAVENOUS at 09:48

## 2018-07-03 RX ADMIN — SODIUM CHLORIDE 1000 ML: 900 INJECTION, SOLUTION INTRAVENOUS at 09:48

## 2018-07-03 RX ADMIN — IOPAMIDOL 80 ML: 612 INJECTION, SOLUTION INTRAVENOUS at 11:51

## 2018-07-03 RX ADMIN — SODIUM CHLORIDE 1000 ML: 900 INJECTION, SOLUTION INTRAVENOUS at 12:05

## 2018-07-03 RX ADMIN — DIATRIZOATE MEGLUMINE AND DIATRIZOATE SODIUM 30 ML: 660; 100 LIQUID ORAL; RECTAL at 09:57

## 2018-07-03 NOTE — ED TRIAGE NOTES
The patient presents for evaluation of intermittent vomiting x two weeks and diarrhea x three days. He is also complaining of generalized weakness.

## 2018-07-03 NOTE — ED PROVIDER NOTES
EMERGENCY DEPARTMENT HISTORY AND PHYSICAL EXAM    9:30 AM      Date: 7/3/2018  Patient Name: Rocael Martinez    History of Presenting Illness     Chief Complaint   Patient presents with    Vomiting    Diarrhea         History Provided By: Patient    Chief Complaint: abdominal pain, n/v/d  Duration:  Weeks  Timing:  Progressive  Location: diffuse abdomen  Quality: Cramping  Severity: Moderate  Modifying Factors: none  Associated Symptoms: fatigue      Additional History (Context): Rocael Martinez is a 61 y.o. male with hx of HTN, HLD, DM, TRUE, depression, chronic back pain; past surgical hx of appendectomy, bowel resection who presents with c/o diffuse abdominal pain associated with intermittent emesis and diarrhea x 2 weeks with progression of symptoms x 3 days. Pt notes between 4-5 episodes of emesis daily, worse first thing in the morning. Notes diarrhea since Sunday \"every time I move\". Notes \"it feels like when I had a bowel obstruction\". Denies fever/chills, chest pain, dyspnea, dysuria, hematuria, constipation. Denies recent abx use or sick contacts. Notes he has tried oral Zofran without relief. PCP: Kevin Wetzel MD    Current Facility-Administered Medications   Medication Dose Route Frequency Provider Last Rate Last Dose    sodium chloride 0.9 % bolus infusion 1,000 mL  1,000 mL IntraVENous ONCE Carolina Szymanski 1,000 mL/hr at 07/03/18 1205 1,000 mL at 07/03/18 1205     Current Outpatient Prescriptions   Medication Sig Dispense Refill    ACCU-CHEK SMARTVIEW TEST STRIP strip CHECK BLOOD SUGAR EVERY  Strip 3    buPROPion SR (WELLBUTRIN SR) 150 mg SR tablet TAKE 1 TABLET TWICE DAILY 180 Tab 1    isosorbide mononitrate ER (IMDUR) 60 mg CR tablet TAKE 1 TABLET EVERY MORNING 90 Tab 1    Blood-Glucose Meter (ACCU-CHEK GABRIELLE) misc Check blood sugar daily 1 Each 0    [START ON 7/29/2018] morphine CR (MS CONTIN) 15 mg CR tablet Take 1 Tab by mouth every twelve (12) hours for 30 days.  Max Daily Amount: 30 mg. 60 Tab 0    [START ON 7/29/2018] morphine IR (MS IR) 15 mg tablet Take 1 Tab by mouth three (3) times daily as needed for Pain. Max Daily Amount: 45 mg. 90 Tab 0    simvastatin (ZOCOR) 10 mg tablet TAKE 1 TABLET EVERY NIGHT 90 Tab 1    metFORMIN ER (GLUCOPHAGE XR) 500 mg tablet Take 3 Tabs by mouth daily (with dinner). 270 Tab 3    morphine IR (MS IR) 15 mg tablet Take 1 Tab by mouth three (3) times daily as needed for Pain. Max Daily Amount: 45 mg. 90 Tab 0    morphine IR (MS IR) 15 mg tablet Take 1 Tab by mouth three (3) times daily as needed for Pain. Max Daily Amount: 45 mg. 90 Tab 0    diazePAM (VALIUM) 5 mg tablet Take 5 mg by mouth every six (6) hours as needed for Anxiety.  zolpidem (AMBIEN) 10 mg tablet Take 1 Tab by mouth nightly. Max Daily Amount: 10 mg. 90 Tab 0    DULoxetine (CYMBALTA) 60 mg capsule Take 1 Cap by mouth daily. 90 Cap 2    pramipexole (MIRAPEX) 0.5 mg tablet TAKE 1 TABLET THREE TIMES DAILY 270 Tab 1    metoprolol succinate (TOPROL-XL) 50 mg XL tablet TAKE 1 TABLET EVERY DAY 90 Tab 1    busPIRone (BUSPAR) 10 mg tablet Take 1 Tab by mouth two (2) times a day. 60 Tab 6    phentermine (ADIPEX-P) 37.5 mg tablet Take 1 Tab by mouth every morning. Max Daily Amount: 37.5 mg. 30 Tab 2    morphine IR (MS IR) 15 mg tablet Take 1 Tab by mouth four (4) times daily as needed for Pain. Max Daily Amount: 60 mg. 180 Tab 0    OMEPRAZOLE, BULK,       Lancets (ACCU-CHEK FASTCLIX) misc Check blood sugar daily 1 Each 11    naloxone 4 mg/actuation spry 4 mg by Nasal route as needed. 1 Box 0    ondansetron hcl (ZOFRAN, AS HYDROCHLORIDE,) 4 mg tablet Take 1 Tab by mouth every eight (8) hours as needed for Nausea. 20 Tab 0    FERROUS SULFATE, DRIED (IRON, DRIED, PO) Take  by mouth.  nitroglycerin (NITROSTAT) 0.4 mg SL tablet 1 Tab by SubLINGual route every five (5) minutes as needed.   Use for Chest Pain ; Call MD if > 3 tablets required 1 Bottle 0    Back Brace mis 1 Device by Does Not Apply route daily as needed for Pain. One, lumbosacral orthosis/back brace with metal struts      Medically necessary 1 Device 0    multivitamin (ONE A DAY) tablet Take 1 Tab by mouth daily.          Past History     Past Medical History:  Past Medical History:   Diagnosis Date    Abdominal adhesions 7/30/2014    Back muscle spasm 7/30/2014    Back pain, lumbosacral     Cellulitis     Constipation, chronic 7/30/2014    DDD (degenerative disc disease), lumbar 7/30/2014    DDD (degenerative disc disease), thoracic 7/30/2014    Depression     Diabetes ([address]     DM (diabetes mellitus) ([address] 7/30/2014    Frequent falls 7/30/2014    TRUE (generalized anxiety disorder) 7/30/2014    High cholesterol     HTN (hypertension) 7/30/2014    Hyperlipidemia 7/30/2014    Hypertension     Insomnia secondary to chronic pain 7/30/2014    LBP radiating to both legs 7/30/2014    Lumbar facet arthropathy (HCC) 7/30/2014    Lumbar nerve root impingement 7/30/2014    Lumbosacral radiculopathy at S1 7/30/2014    Major depression 7/30/2014    Nausea & vomiting     Neurological disorder     sciatica    MANISH (obstructive sleep apnea) 7/30/2014    S/P lumbar laminectomy 7/30/2014    S/P lumbar spinal fusion 7/30/2014    Small vessel disease     Thoracic compression fracture ([address] 7/30/2014    Unspecified sleep apnea     USES CPAP EVERY NIGHT       Past Surgical History:  Past Surgical History:   Procedure Laterality Date    HX APPENDECTOMY      HX BACK SURGERY      Lumbar fusion    HX HERNIA REPAIR  1992    HX OTHER SURGICAL      EMG - S1 disorder    HX VASECTOMY  1985       Family History:  Family History   Problem Relation Age of Onset    Diabetes Mother     Heart Failure Mother     Heart Attack Father     Diabetes Sister     Stroke Brother        Social History:  Social History   Substance Use Topics    Smoking status: Never Smoker    Smokeless tobacco: Never Used    Alcohol use No Allergies: Allergies   Allergen Reactions    Opana [Oxymorphone] Other (comments)     Feels like bug crawling under skin and drowziness         Review of Systems       Review of Systems   Constitutional: Positive for fatigue. Negative for chills and fever. Respiratory: Negative for shortness of breath. Cardiovascular: Negative for chest pain. Gastrointestinal: Positive for abdominal pain, diarrhea, nausea and vomiting. Negative for anal bleeding and blood in stool. Skin: Negative for rash. Neurological: Negative for weakness. All other systems reviewed and are negative. Physical Exam     Visit Vitals    /88 (BP 1 Location: Left arm, BP Patient Position: At rest)    Pulse (!) 102    Temp 96.8 °F (36 °C)    Resp 18    Ht 6' 2\" (1.88 m)    Wt 136.1 kg (300 lb)    SpO2 95%    BMI 38.52 kg/m2         Physical Exam   Constitutional: He appears well-developed and well-nourished. No distress. HENT:   Head: Normocephalic and atraumatic. Neck: Normal range of motion. Neck supple. Cardiovascular: Normal rate, regular rhythm and normal heart sounds. Exam reveals no gallop and no friction rub. No murmur heard. Pulmonary/Chest: Effort normal and breath sounds normal. No respiratory distress. He has no wheezes. He has no rales. Abdominal: Soft. Bowel sounds are normal. He exhibits no distension and no mass. There is tenderness (mild diffuse tenderness throughout abdomen). There is no rebound and no guarding. Midline scar below umbilicus     Neurological: He is alert. Skin: Skin is warm. No rash noted. He is not diaphoretic. Nursing note and vitals reviewed.         Diagnostic Study Results     Labs -  Recent Results (from the past 12 hour(s))   GLUCOSE, POC    Collection Time: 07/03/18  9:37 AM   Result Value Ref Range    Glucose (POC) 257 (H) 70 - 110 mg/dL   CBC WITH AUTOMATED DIFF    Collection Time: 07/03/18  9:38 AM   Result Value Ref Range    WBC 9.8 4.6 - 13.2 K/uL RBC 4.90 4.70 - 5.50 M/uL    HGB 13.6 13.0 - 16.0 g/dL    HCT 40.6 36.0 - 48.0 %    MCV 82.9 74.0 - 97.0 FL    MCH 27.8 24.0 - 34.0 PG    MCHC 33.5 31.0 - 37.0 g/dL    RDW 14.0 11.6 - 14.5 %    PLATELET 000 666 - 133 K/uL    MPV 9.9 9.2 - 11.8 FL    NEUTROPHILS 71 40 - 73 %    LYMPHOCYTES 15 (L) 21 - 52 %    MONOCYTES 13 (H) 3 - 10 %    EOSINOPHILS 1 0 - 5 %    BASOPHILS 0 0 - 2 %    ABS. NEUTROPHILS 7.0 1.8 - 8.0 K/UL    ABS. LYMPHOCYTES 1.5 0.9 - 3.6 K/UL    ABS. MONOCYTES 1.3 (H) 0.05 - 1.2 K/UL    ABS. EOSINOPHILS 0.1 0.0 - 0.4 K/UL    ABS. BASOPHILS 0.0 0.0 - 0.1 K/UL    DF AUTOMATED     METABOLIC PANEL, COMPREHENSIVE    Collection Time: 07/03/18  9:38 AM   Result Value Ref Range    Sodium 135 (L) 136 - 145 mmol/L    Potassium 4.2 3.5 - 5.5 mmol/L    Chloride 103 100 - 108 mmol/L    CO2 23 21 - 32 mmol/L    Anion gap 9 3.0 - 18 mmol/L    Glucose 262 (H) 74 - 99 mg/dL    BUN 37 (H) 7.0 - 18 MG/DL    Creatinine 1.62 (H) 0.6 - 1.3 MG/DL    BUN/Creatinine ratio 23 (H) 12 - 20      GFR est AA 52 (L) >60 ml/min/1.73m2    GFR est non-AA 43 (L) >60 ml/min/1.73m2    Calcium 8.7 8.5 - 10.1 MG/DL    Bilirubin, total 0.7 0.2 - 1.0 MG/DL    ALT (SGPT) 41 16 - 61 U/L    AST (SGOT) 27 15 - 37 U/L    Alk.  phosphatase 121 (H) 45 - 117 U/L    Protein, total 8.4 (H) 6.4 - 8.2 g/dL    Albumin 3.9 3.4 - 5.0 g/dL    Globulin 4.5 (H) 2.0 - 4.0 g/dL    A-G Ratio 0.9 0.8 - 1.7     LIPASE    Collection Time: 07/03/18  9:38 AM   Result Value Ref Range    Lipase 108 73 - 393 U/L   URINALYSIS W/ RFLX MICROSCOPIC    Collection Time: 07/03/18 12:07 PM   Result Value Ref Range    Color YELLOW      Appearance CLEAR      Specific gravity >1.030 (H) 1.005 - 1.030    pH (UA) 5.0 5.0 - 8.0      Protein TRACE (A) NEG mg/dL    Glucose >1000 (A) NEG mg/dL    Ketone NEGATIVE  NEG mg/dL    Bilirubin NEGATIVE  NEG      Blood NEGATIVE  NEG      Urobilinogen 0.2 0.2 - 1.0 EU/dL    Nitrites NEGATIVE  NEG      Leukocyte Esterase NEGATIVE  NEG     URINE MICROSCOPIC ONLY    Collection Time: 07/03/18 12:07 PM   Result Value Ref Range    WBC 4 to 6 0 - 4 /hpf    RBC NONE 0 - 5 /hpf    Epithelial cells 1+ 0 - 5 /lpf    Bacteria FEW (A) NEG /hpf       Radiologic Studies -   CT ABD PELV W CONT   IMPRESSION:     1. No acute diagnostic abnormality to explain patient's pain. 2. Moderate hepatic steatosis. 3. No CT evidence of cholelithiasis or biliary dilatation. 4. Small hiatal hernia. Medical Decision Making   I am the first provider for this patient. I reviewed the vital signs, available nursing notes, past medical history, past surgical history, family history and social history. Vital Signs-Reviewed the patient's vital signs. Pulse Oximetry Analysis -  95 on room air     Records Reviewed: Nursing Notes and Old Medical Records (Time of Review: 9:30 AM)    ED Course: Progress Notes, Reevaluation, and Consults:  12:49 PM Reviewed results with patient. Resting comfortably, tolerating PO. Discussed need for close outpatient follow-up with PMD and GI physician for further evaluation, referral provided. Discussed strict return precautions, including fever, vomiting, or any other medical concerns. Provider Notes (Medical Decision Making): 60 yo M who presents due to intermittent nausea/emesis x 2 weeks associated with diarrhea x days. Afebrile, VS stable, looks well. Mild diffuse tenderness on examination. No rebound/guarding. No leukocytosis. Noted elevated Creatinine, 2L IVF administered in ED. Discussed importance of outpatient follow-up for repeat BMP. CT abd/pelvis with oral and IV contrast demonstrates no sign of obstruction or acute process. Urine culture sent, patient asymptomatic. Stable for d/c with close outpatient follow-up with PMD and GI physician. Diagnosis     Clinical Impression:   1. Vomiting and diarrhea    2. Elevated serum creatinine    3. Hyperglycemia    4.  Hepatic steatosis        Disposition: home     Follow-up Information     Follow up With Details Comments Contact Info    [facility] EMERGENCY DEPT  If symptoms worsen [address], MD In 2 days  [id number]  [facility] [address] [facility]      Jennifer Jackson MD Schedule an appointment as soon as possible for a visit gastroenterologist  [address] [place] [address]  [address]  [address] [facility]  [phone]             Patient's Medications   Start Taking    No medications on file   Continue Taking    ACCU-CHEK SMARTVIEW TEST STRIP STRIP    CHECK BLOOD SUGAR EVERY DAY    BACK BRACE MISC    1 Device by Does Not Apply route daily as needed for Pain. One, lumbosacral orthosis/back brace with metal struts      Medically necessary    BLOOD-GLUCOSE METER (ACCU-CHEK GABRIELLE) MISC    Check blood sugar daily    BUPROPION SR (WELLBUTRIN SR) 150 MG SR TABLET    TAKE 1 TABLET TWICE DAILY    BUSPIRONE (BUSPAR) 10 MG TABLET    Take 1 Tab by mouth two (2) times a day. DIAZEPAM (VALIUM) 5 MG TABLET    Take 5 mg by mouth every six (6) hours as needed for Anxiety. DULOXETINE (CYMBALTA) 60 MG CAPSULE    Take 1 Cap by mouth daily. FERROUS SULFATE, DRIED (IRON, DRIED, PO)    Take  by mouth. ISOSORBIDE MONONITRATE ER (IMDUR) 60 MG CR TABLET    TAKE 1 TABLET EVERY MORNING    LANCETS (ACCU-CHEK FASTCLIX) MISC    Check blood sugar daily    METFORMIN ER (GLUCOPHAGE XR) 500 MG TABLET    Take 3 Tabs by mouth daily (with dinner). METOPROLOL SUCCINATE (TOPROL-XL) 50 MG XL TABLET    TAKE 1 TABLET EVERY DAY    MORPHINE CR (MS CONTIN) 15 MG CR TABLET    Take 1 Tab by mouth every twelve (12) hours for 30 days. Max Daily Amount: 30 mg. MORPHINE IR (MS IR) 15 MG TABLET    Take 1 Tab by mouth four (4) times daily as needed for Pain. Max Daily Amount: 60 mg. MORPHINE IR (MS IR) 15 MG TABLET    Take 1 Tab by mouth three (3) times daily as needed for Pain. Max Daily Amount: 45 mg. MORPHINE IR (MS IR) 15 MG TABLET    Take 1 Tab by mouth three (3) times daily as needed for Pain. Max Daily Amount: 45 mg. MORPHINE IR (MS IR) 15 MG TABLET    Take 1 Tab by mouth three (3) times daily as needed for Pain. Max Daily Amount: 45 mg. MULTIVITAMIN (ONE A DAY) TABLET    Take 1 Tab by mouth daily. NALOXONE 4 MG/ACTUATION SPRY    4 mg by Nasal route as needed. NITROGLYCERIN (NITROSTAT) 0.4 MG SL TABLET    1 Tab by SubLINGual route every five (5) minutes as needed. Use for Chest Pain ; Call MD if > 3 tablets required    OMEPRAZOLE, BULK,        ONDANSETRON HCL (ZOFRAN, AS HYDROCHLORIDE,) 4 MG TABLET    Take 1 Tab by mouth every eight (8) hours as needed for Nausea. PHENTERMINE (ADIPEX-P) 37.5 MG TABLET    Take 1 Tab by mouth every morning. Max Daily Amount: 37.5 mg. PRAMIPEXOLE (MIRAPEX) 0.5 MG TABLET    TAKE 1 TABLET THREE TIMES DAILY    SIMVASTATIN (ZOCOR) 10 MG TABLET    TAKE 1 TABLET EVERY NIGHT    ZOLPIDEM (AMBIEN) 10 MG TABLET    Take 1 Tab by mouth nightly. Max Daily Amount: 10 mg. These Medications have changed    No medications on file   Stop Taking    No medications on file     [address] acting as a scribe for and in the presence of Rocky Arellano PA-C        July 03, 2018 at 12:30 PM       Provider Attestation:      I personally performed the services described in the documentation, reviewed the documentation, as recorded by the scribe in my presence, and it accurately and completely records my words and actions.  July 03, 2018 at 12:30 PM -  Rocky Arellano PA-C

## 2018-07-03 NOTE — Clinical Note
Take medication as prescribed. Follow-up with your primary care physician in 2 days for reassessment. Bring the results from this visit with your for their review. Return to the ED for any new, worsening, or persistent symptoms, including fever, vomiting , or any other medical concerns.

## 2018-07-03 NOTE — DISCHARGE INSTRUCTIONS
Diarrhea: Care Instructions  Your Care Instructions    Diarrhea is loose, watery stools (bowel movements). The exact cause is often hard to find. Sometimes diarrhea is your body's way of getting rid of what caused an upset stomach. Viruses, food poisoning, and many medicines can cause diarrhea. Some people get diarrhea in response to emotional stress, anxiety, or certain foods. Almost everyone has diarrhea now and then. It usually isn't serious, and your stools will return to normal soon. The important thing to do is replace the fluids you have lost, so you can prevent dehydration. The doctor has checked you carefully, but problems can develop later. If you notice any problems or new symptoms, get medical treatment right away. Follow-up care is a key part of your treatment and safety. Be sure to make and go to all appointments, and call your doctor if you are having problems. It's also a good idea to know your test results and keep a list of the medicines you take. How can you care for yourself at home? · Watch for signs of dehydration, which means your body has lost too much water. Dehydration is a serious condition and should be treated right away. Signs of dehydration are:  ¨ Increasing thirst and dry eyes and mouth. ¨ Feeling faint or lightheaded. ¨ Darker urine, and a smaller amount of urine than normal.  · To prevent dehydration, drink plenty of fluids, enough so that your urine is light yellow or clear like water. Choose water and other caffeine-free clear liquids until you feel better. If you have kidney, heart, or liver disease and have to limit fluids, talk with your doctor before you increase the amount of fluids you drink. · Begin eating small amounts of mild foods the next day, if you feel like it. ¨ Try yogurt that has live cultures of Lactobacillus. (Check the label.)  ¨ Avoid spicy foods, fruits, alcohol, and caffeine until 48 hours after all symptoms are gone.   ¨ Avoid chewing gum that contains sorbitol. ¨ Avoid dairy products (except for yogurt with Lactobacillus) while you have diarrhea and for 3 days after symptoms are gone. · The doctor may recommend that you take over-the-counter medicine, such as loperamide (Imodium), if you still have diarrhea after 6 hours. Read and follow all instructions on the label. Do not use this medicine if you have bloody diarrhea, a high fever, or other signs of serious illness. Call your doctor if you think you are having a problem with your medicine. When should you call for help? Call 911 anytime you think you may need emergency care. For example, call if:  ? · You passed out (lost consciousness). ? · Your stools are maroon or very bloody. ?Call your doctor now or seek immediate medical care if:  ? · You are dizzy or lightheaded, or you feel like you may faint. ? · Your stools are black and look like tar, or they have streaks of blood. ? · You have new or worse belly pain. ? · You have symptoms of dehydration, such as:  ¨ Dry eyes and a dry mouth. ¨ Passing only a little dark urine. ¨ Feeling thirstier than usual.   ? · You have a new or higher fever. ? Watch closely for changes in your health, and be sure to contact your doctor if:  ? · Your diarrhea is getting worse. ? · You see pus in the diarrhea. ? · You are not getting better after 2 days (48 hours). Where can you learn more? Go to http://iza-poornima.info/. Enter M087 in the search box to learn more about \"Diarrhea: Care Instructions. \"  Current as of: March 20, 2017  Content Version: 11.4  © 6308-9459 [employer]. Care instructions adapted under license by [employer] (which disclaims liability or warranty for this information). If you have questions about a medical condition or this instruction, always ask your healthcare professional. [address] any warranty or liability for your use of this information. Nausea and Vomiting: Care Instructions  Your Care Instructions    When you are nauseated, you may feel weak and sweaty and notice a lot of saliva in your mouth. Nausea often leads to vomiting. Most of the time you do not need to worry about nausea and vomiting, but they can be signs of other illnesses. Two common causes of nausea and vomiting are stomach flu and food poisoning. Nausea and vomiting from viral stomach flu will usually start to improve within 24 hours. Nausea and vomiting from food poisoning may last from 12 to 48 hours. The doctor has checked you carefully, but problems can develop later. If you notice any problems or new symptoms, get medical treatment right away. Follow-up care is a key part of your treatment and safety. Be sure to make and go to all appointments, and call your doctor if you are having problems. It's also a good idea to know your test results and keep a list of the medicines you take. How can you care for yourself at home? · To prevent dehydration, drink plenty of fluids, enough so that your urine is light yellow or clear like water. Choose water and other caffeine-free clear liquids until you feel better. If you have kidney, heart, or liver disease and have to limit fluids, talk with your doctor before you increase the amount of fluids you drink. · Rest in bed until you feel better. · When you are able to eat, try clear soups, mild foods, and liquids until all symptoms are gone for 12 to 48 hours. Other good choices include dry toast, crackers, cooked cereal, and gelatin dessert, such as Jell-O. When should you call for help? Call 911 anytime you think you may need emergency care. For example, call if:  ? · You passed out (lost consciousness). ?Call your doctor now or seek immediate medical care if:  ? · You have symptoms of dehydration, such as:  ¨ Dry eyes and a dry mouth. ¨ Passing only a little dark urine.   ¨ Feeling thirstier than usual.   ? · You have new or worsening belly pain. ? · You have a new or higher fever. ? · You vomit blood or what looks like coffee grounds. ? Watch closely for changes in your health, and be sure to contact your doctor if:  ? · You have ongoing nausea and vomiting. ? · Your vomiting is getting worse. ? · Your vomiting lasts longer than 2 days. ? · You are not getting better as expected. Where can you learn more? Go to http://iza-poornima.info/. Enter 25 [id number] in the search box to learn more about \"Nausea and Vomiting: Care Instructions. \"  Current as of: March 20, 2017  Content Version: 11.4  © 7459-4770 [employer]. Care instructions adapted under license by [employer] (which disclaims liability or warranty for this information). If you have questions about a medical condition or this instruction, always ask your healthcare professional. [place] [address] any warranty or liability for your use of this information. Learning About High Blood Sugar  What is high blood sugar? Your body turns the food you eat into glucose (sugar), which it uses for energy. But if your body isn't able to use the sugar right away, it can build up in your blood and lead to high blood sugar. When the amount of sugar in your blood stays too high for too much of the time, you may have diabetes. Diabetes is a disease that can cause serious health problems. The good news is that lifestyle changes may help you get your blood sugar back to normal and avoid or delay diabetes. What causes high blood sugar? Sugar (glucose) can build up in your blood if you:  · Are overweight. · Have a family history of diabetes. · Take certain medicines, such as steroids. What are the symptoms? Having high blood sugar may not cause any symptoms at all. Or it may make you feel very thirsty or very hungry. You may also urinate more often than usual, have blurry vision, or lose weight without trying.   How is high blood sugar treated? You can take steps to lower your blood sugar level if you understand what makes it get higher. Your doctor may want you to learn how to test your blood sugar level at home. Then you can see how illness, stress, or different kinds of food or medicine raise or lower your blood sugar level. Other tests may be needed to see if you have diabetes. How can you prevent high blood sugar? · Watch your weight. If you're overweight, losing just a small amount of weight may help. Reducing fat around your waist is most important. · Limit the amount of calories, sweets, and unhealthy fat you eat. Ask your doctor if a dietitian can help you. A registered dietitian can help you create meal plans that fit your lifestyle. · Get at least 30 minutes of exercise on most days of the week. Exercise helps control your blood sugar. It also helps you maintain a healthy weight. Walking is a good choice. You also may want to do other activities, such as running, swimming, cycling, or playing tennis or team sports. · If your doctor prescribed medicines, take them exactly as prescribed. Call your doctor if you think you are having a problem with your medicine. You will get more details on the specific medicines your doctor prescribes. Follow-up care is a key part of your treatment and safety. Be sure to make and go to all appointments, and call your doctor if you are having problems. It's also a good idea to know your test results and keep a list of the medicines you take. Where can you learn more? Go to http://iza-poornima.info/. Enter O108 in the search box to learn more about \"Learning About High Blood Sugar. \"  Current as of: March 13, 2017  Content Version: 11.4  © 9272-0339 [employer]. Care instructions adapted under license by [employer] (which disclaims liability or warranty for this information).  If you have questions about a medical condition or this instruction, always ask your healthcare professional. [place] [address] any warranty or liability for your use of this information. T-VIPSharOurpalm Activation    Thank you for requesting access to Immedia. Please follow the instructions below to securely access and download your online medical record. Immedia allows you to send messages to your doctor, view your test results, renew your prescriptions, schedule appointments, and more. How Do I Sign Up? 1. In your internet browser, go to [email or web address]  2. Click on the First Time User? Click Here link in the Sign In box. You will be redirect to the New Member Sign Up page. 3. Enter your Immedia Access Code exactly as it appears below. You will not need to use this code after youve completed the sign-up process. If you do not sign up before the expiration date, you must request a new code. Immedia Access Code: Activation code not generated  Current Immedia Status: Active (This is the date your Immedia access code will )    4. Enter the last four digits of your Social Security Number (xxxx) and Date of Birth (mm/dd/yyyy) as indicated and click Submit. You will be taken to the next sign-up page. 5. Create a Immedia ID. This will be your Immedia login ID and cannot be changed, so think of one that is secure and easy to remember. 6. Create a Immedia password. You can change your password at any time. 7. Enter your Password Reset Question and Answer. This can be used at a later time if you forget your password. 8. Enter your e-mail address. You will receive e-mail notification when new information is available in [address] 9. Click Sign Up. You can now view and download portions of your medical record. 10. Click the Download Summary menu link to download a portable copy of your medical information.     Additional Information    If you have questions, please visit the Frequently Asked Questions section of the Immedia website at [email or web address] Remember, [employer] is NOT to be used for urgent needs. For medical emergencies, dial 911.

## 2018-07-04 LAB
BACTERIA SPEC CULT: NORMAL
SERVICE CMNT-IMP: NORMAL

## 2018-08-16 ENCOUNTER — OFFICE VISIT (OUTPATIENT)
Dept: VASCULAR SURGERY | Age: 63
End: 2018-08-16

## 2018-08-16 VITALS
HEART RATE: 100 BPM | RESPIRATION RATE: 20 BRPM | HEIGHT: 74 IN | WEIGHT: 300 LBS | SYSTOLIC BLOOD PRESSURE: 140 MMHG | DIASTOLIC BLOOD PRESSURE: 80 MMHG | BODY MASS INDEX: 38.5 KG/M2

## 2018-08-16 DIAGNOSIS — M79.605 LEG PAIN, BILATERAL: ICD-10-CM

## 2018-08-16 DIAGNOSIS — M79.604 LEG PAIN, BILATERAL: ICD-10-CM

## 2018-08-16 DIAGNOSIS — I73.9 PAD (PERIPHERAL ARTERY DISEASE) (HCC): Primary | ICD-10-CM

## 2018-08-16 RX ORDER — GABAPENTIN 800 MG/1
TABLET ORAL
COMMUNITY
End: 2020-04-14

## 2018-08-16 NOTE — PROGRESS NOTES
1. Have you been to an emergency room or urgent care clinic since your last visit? no    Hospitalized since your last visit? If yes, where, when, and reason for visit? no  2. Have you seen or consulted any other health care providers outside of the [facility] since your last visit including any procedures, health maintenance items.  If yes, where, when and reason for visit? no

## 2018-08-16 NOTE — MR AVS SNAPSHOT
[address] [facility] [address] 
[phone] Patient: Naa Woodward MRN: [id number] ZBH:[id number] Visit Information Date & Time Provider Department Dept. Phone Encounter #  
 8/16/2018  2:30 PM Todd Coronado, [address] [facility] [phone] Your Appointments 8/21/2018  2:45 PM  
PROCEDURE with [facility] NONIMAGING [facility] ([place] [facility]) Appt Note: leg art knaak [facility] [facility] [address] [facility]  
[phone] 2300 [facility] [address] [facility]  
  
    
 9/6/2018  9:15 AM  
Follow Up with [facility] ([place] [facility]) Appt Note: follow up after leg art [facility] [facility] [address] [address] [facility]  
[phone] 2300 [facility] [place] [address] [facility] [address] Upcoming Health Maintenance Date Due COLONOSCOPY 2/19/1973 ZOSTER VACCINE AGE 60> 12/19/2014 EYE EXAM RETINAL OR DILATED Q1 10/5/2017 HEMOGLOBIN A1C Q6M 2/2/2018 Influenza Age 5 to Adult 8/1/2018 FOOT EXAM Q1 8/2/2018 MICROALBUMIN Q1 8/2/2018 LIPID PANEL Q1 8/2/2018 DTaP/Tdap/Td series (2 - Td) 8/8/2026 Allergies as of 8/16/2018  Review Complete On: 8/16/2018 By: Gene Connolly Severity Noted Reaction Type Reactions Opana [Oxymorphone]  02/19/2013    Other (comments) Feels like bug crawling under skin and drowziness Current Immunizations  Reviewed on 12/7/2016 Name Date Influenza Vaccine (Quad) PF 11/2/2017, 12/7/2016 Pneumococcal Conjugate (PCV-13) 8/8/2016 Tdap 8/8/2016 Not reviewed this visit You Were Diagnosed With   
  
 Codes Comments PAD (peripheral artery disease) (HCC)    -  Primary ICD-10-CM: I73.9 ICD-9-CM: 443.9 Leg pain, bilateral     ICD-10-CM: M79.604, M79.605 ICD-9-CM: 729.5 Vitals BP Pulse Resp Height(growth percentile) Weight(growth percentile) BMI  
 140/80 (BP 1 Location: Left arm, BP Patient Position: Sitting) 100 20 6' 2\" (1.88 m) 300 lb (136.1 kg) 38.52 kg/m2 Smoking Status Never Smoker Vitals History BMI and BSA Data Body Mass Index Body Surface Area 38.52 kg/m 2 2.67 m 2 Preferred Pharmacy Pharmacy Name Phone Isa Santana [address] [place] [address] [address] [place] [address] [phone] Your Updated Medication List  
  
   
This list is accurate as of 8/16/18  3:19 PM.  Always use your most recent med list.  
  
  
  
  
 ACCU-CHEK SMARTVIEW TEST STRIP strip Generic drug:  glucose blood VI test strips CHECK BLOOD SUGAR EVERY DAY Back Brace Misc 1 Device by Does Not Apply route daily as needed for Pain. One, lumbosacral orthosis/back brace with metal struts      Medically necessary Blood-Glucose Meter Misc Commonly known as:  Jesus Chuck Check blood sugar daily buPROPion  mg SR tablet Commonly known as:  WELLBUTRIN SR  
TAKE 1 TABLET TWICE DAILY  
  
 busPIRone 10 mg tablet Commonly known as:  BUSPAR Take 1 Tab by mouth two (2) times a day. diazePAM 5 mg tablet Commonly known as:  VALIUM Take 5 mg by mouth every six (6) hours as needed for Anxiety. DULoxetine 60 mg capsule Commonly known as:  CYMBALTA Take 1 Cap by mouth daily. gabapentin 800 mg tablet Commonly known as:  NEURONTIN Take  by mouth three (3) times daily. IRON (DRIED) PO Take  by mouth.  
  
 isosorbide mononitrate ER 60 mg CR tablet Commonly known as:  IMDUR  
TAKE 1 TABLET EVERY MORNING Lancets Misc Commonly known as:  ACCU-CHEK FASTCLIX LANCING DEV Check blood sugar daily  
  
 metFORMIN  mg tablet Commonly known as:  GLUCOPHAGE XR Take 3 Tabs by mouth daily (with dinner). metoprolol succinate 50 mg XL tablet Commonly known as:  TOPROL-XL Take 1 Tab by mouth daily. APPOINTMENT REQUIRED BEFORE NEXT REFILL. * morphine IR 15 mg tablet Commonly known as:  MS IR Take 1 Tab by mouth four (4) times daily as needed for Pain. Max Daily Amount: 60 mg.  
  
 * morphine IR 15 mg tablet Commonly known as:  MS IR Take 1 Tab by mouth three (3) times daily as needed for Pain. Max Daily Amount: 45 mg.  
  
 * morphine IR 15 mg tablet Commonly known as:  MS IR Take 1 Tab by mouth three (3) times daily as needed for Pain. Max Daily Amount: 45 mg.  
  
 * morphine CR 15 mg CR tablet Commonly known as:  MS CONTIN Take 1 Tab by mouth every twelve (12) hours for 30 days. Max Daily Amount: 30 mg.  
  
 * morphine IR 15 mg tablet Commonly known as:  MS IR Take 1 Tab by mouth three (3) times daily as needed for Pain. Max Daily Amount: 45 mg.  
  
 multivitamin tablet Commonly known as:  ONE A DAY Take 1 Tab by mouth daily. naloxone 4 mg/actuation nasal spray Commonly known as:  NARCAN  
4 mg by Nasal route as needed. nitroglycerin 0.4 mg SL tablet Commonly known as:  NITROSTAT  
1 Tab by SubLINGual route every five (5) minutes as needed. Use for Chest Pain ; Call MD if > 3 tablets required OMEPRAZOLE (BULK)  
  
 ondansetron hcl 4 mg tablet Commonly known as:  Glorianne Ou Take 1 Tab by mouth every eight (8) hours as needed for Nausea. phentermine 37.5 mg tablet Commonly known as:  ADIPEX-P Take 1 Tab by mouth every morning. Max Daily Amount: 37.5 mg.  
  
 pramipexole 0.5 mg tablet Commonly known as:  MIRAPEX TAKE 1 TABLET THREE TIMES DAILY  
  
 simvastatin 10 mg tablet Commonly known as:  ZOCOR  
TAKE 1 TABLET EVERY NIGHT  
  
 zolpidem 10 mg tablet Commonly known as:  AMBIEN Take 1 Tab by mouth nightly. Max Daily Amount: 10 mg.  
  
 * Notice: This list has 5 medication(s) that are the same as other medications prescribed for you.  Read the directions carefully, and ask your doctor or other care provider to review them with you. To-Do List   
 08/16/2018 Vascular/US:  LOWER EXT ART PVR MULT LEVEL SEG PRESSURES Introducing [facility]! Dear Sophie Kirk: Thank you for requesting a Squrl account. Our records indicate that you already have an active Squrl account. You can access your account anytime at [email or web address] Did you know that you can access your hospital and ER discharge instructions at any time in Squrl? You can also review all of your test results from your hospital stay or ER visit. Additional Information If you have questions, please visit the Frequently Asked Questions section of the Squrl website at [email or web address] Remember, Squrl is NOT to be used for urgent needs. For medical emergencies, dial 911. Now available from your iPhone and Android! Please provide this summary of care documentation to your next provider. Your primary care clinician is listed as [address]. If you have any questions after today's visit, please call [phone].

## 2018-08-27 RX ORDER — SIMVASTATIN 10 MG/1
TABLET, FILM COATED ORAL
Qty: 90 TAB | Refills: 1 | Status: SHIPPED | OUTPATIENT
Start: 2018-08-27 | End: 2019-04-04 | Stop reason: SDUPTHER

## 2018-09-06 ENCOUNTER — OFFICE VISIT (OUTPATIENT)
Dept: VASCULAR SURGERY | Age: 63
End: 2018-09-06

## 2018-09-06 VITALS
WEIGHT: 300 LBS | RESPIRATION RATE: 20 BRPM | HEART RATE: 90 BPM | HEIGHT: 74 IN | BODY MASS INDEX: 38.5 KG/M2 | DIASTOLIC BLOOD PRESSURE: 86 MMHG | SYSTOLIC BLOOD PRESSURE: 144 MMHG

## 2018-09-06 DIAGNOSIS — I87.2 STASIS DERMATITIS OF BOTH LEGS: ICD-10-CM

## 2018-09-06 DIAGNOSIS — R60.0 BILATERAL LEG EDEMA: ICD-10-CM

## 2018-09-06 DIAGNOSIS — I87.2 VENOUS (PERIPHERAL) INSUFFICIENCY: Primary | ICD-10-CM

## 2018-09-06 NOTE — PROGRESS NOTES
Mr. Greg Soriano a self-referral a couple of weeks ago. He has had chronic leg pain, which we do know there is a lot related to his spinal disease. But now with a spinal stimulator, it is really been difficult to discern or differentiate leg pain. He has had a general description of pain with walking, relieved with rest.  He has numbness, he has been told he has small vessel disease. He is a diabetic. But he also has days where he will have a fair amount of leg edema, and then has noticed some discoloration, described as either redness or brownish discoloration, mostly in the lower legs. He has had on and off small sores on his legs and feet, which have delayed healing. Though he has not given it as much concern as his wife, they both just have an awareness to want to be sure circulation is okay, as he does have direct family members who have had PAD and limb loss. And with his notable risk factors, they just want reassurance of his perfusion status. I do note there are a couple of venous Dopplers on file, those of which have been negative for DVT and no signs of any type of obvious reflux. He himself also has not had any problems with varicose veins. He does have compression stockings that he can wear, but admits he does not use them regularly      So we did send him for arterial testing which he is here for discussion of results today, as follows: Lower Extremity Arterial Findings   VARSHA   The right resting VARSHA is normal. The left resting VARSHA is normal. The right anterior tibial artery and posterior tibial artery has biphasic waveforms. The right common femoral artery and popliteal artery has triphasic waveforms. Right toe PPG is normal. The left anterior tibial artery and posterior tibial artery has biphasic waveforms. The left common femoral artery and popliteal artery has triphasic waveforms. Left toe PPG is normal.        So I did reassure him this is a normal arterial study.   Since he is a diabetic, I have recommended getting established with a podiatrist for preventative care, of which she has not yet done, but acknowledges the need to do so. I gave him a couple of names of practices to try to seek out    I did reinforce the benefits of regular compression therapy, regular elevation, regular range of motion exercises, and a skin care regimen, to help with his leg swelling and subsequent symptoms. I did note that swelling can be multifactorial.  There does not appear to be a direct vascular cause. But nonetheless management is equally the same, as the regimen stated above. If symptoms get worse or unmanageable with the above modalities, I told him we can reevaluate and consider in abuse and consider repeat ultrasound.     At this point, we will suggest follow-up as needed

## 2018-09-06 NOTE — PROGRESS NOTES
1. Have you been to an emergency room or urgent care clinic since your last visit? No  Hospitalized since your last visit? If yes, where, when, and reason for visit? No   2. Have you seen or consulted any other health care providers outside of the [facility] since your last visit including any procedures, health maintenance items. If yes, where, when and reason for visit?

## 2018-09-06 NOTE — MR AVS SNAPSHOT
[address] [id number] [address] 
[phone] Patient: Jerry Thornton MRN: [id number] KED:0/23/1816 Visit Information Date & Time Provider Department Dept. Phone Encounter #  
 9/6/2018  9:15 AM [facility] [phone] [id number] Follow-up Instructions Return if symptoms worsen or fail to improve. Upcoming Health Maintenance Date Due COLONOSCOPY 2/19/1973 ZOSTER VACCINE AGE 60> 12/19/2014 EYE EXAM RETINAL OR DILATED Q1 10/5/2017 HEMOGLOBIN A1C Q6M 2/2/2018 Influenza Age 5 to Adult 8/1/2018 FOOT EXAM Q1 8/2/2018 MICROALBUMIN Q1 8/2/2018 LIPID PANEL Q1 8/2/2018 MEDICARE YEARLY EXAM 8/16/2018 DTaP/Tdap/Td series (2 - Td) 8/8/2026 Allergies as of 9/6/2018  Review Complete On: 9/6/2018 By: Antolin Richards LPN Severity Noted Reaction Type Reactions Opana [Oxymorphone]  02/19/2013    Other (comments) Feels like bug crawling under skin and drowziness Current Immunizations  Reviewed on 12/7/2016 Name Date Influenza Vaccine (Quad) PF 11/2/2017, 12/7/2016 Pneumococcal Conjugate (PCV-13) 8/8/2016 Tdap 8/8/2016 Not reviewed this visit Vitals BP Pulse Resp Height(growth percentile) Weight(growth percentile) BMI  
 144/86 (BP 1 Location: Left arm, BP Patient Position: Sitting) 90 20 6' 2\" (1.88 m) 300 lb (136.1 kg) 38.52 kg/m2 Smoking Status Never Smoker Vitals History BMI and BSA Data Body Mass Index Body Surface Area 38.52 kg/m 2 2.67 m 2 Preferred Pharmacy Pharmacy Name Phone [address] [facility] [address] [phone] Your Updated Medication List  
  
   
This list is accurate as of 9/6/18  9:43 AM.  Always use your most recent med list.  
  
  
  
  
 [address] Generic drug:  Lancets CHECK BLOOD SUGAR EVERY DAY  
  
 ACCU-CHEK SMARTVIEW TEST STRIP strip Generic drug:  glucose blood VI test strips CHECK BLOOD SUGAR EVERY DAY Back Brace Misc 1 Device by Does Not Apply route daily as needed for Pain. One, lumbosacral orthosis/back brace with metal struts      Medically necessary Blood-Glucose Meter Misc Commonly known as:  Othelia Diaz Check blood sugar daily buPROPion  mg SR tablet Commonly known as:  WELLBUTRIN SR  
TAKE 1 TABLET TWICE DAILY  
  
 busPIRone 10 mg tablet Commonly known as:  BUSPAR Take 1 Tab by mouth two (2) times a day. diazePAM 5 mg tablet Commonly known as:  VALIUM Take 5 mg by mouth every six (6) hours as needed for Anxiety. DULoxetine 60 mg capsule Commonly known as:  CYMBALTA Take 1 Cap by mouth daily. gabapentin 800 mg tablet Commonly known as:  NEURONTIN Take  by mouth three (3) times daily. IRON (DRIED) PO Take  by mouth.  
  
 isosorbide mononitrate ER 60 mg CR tablet Commonly known as:  IMDUR  
TAKE 1 TABLET EVERY MORNING  
  
 metFORMIN  mg tablet Commonly known as:  GLUCOPHAGE XR Take 3 Tabs by mouth daily (with dinner). metoprolol succinate 50 mg XL tablet Commonly known as:  TOPROL-XL Take 1 Tab by mouth daily. APPOINTMENT REQUIRED BEFORE NEXT REFILL. * morphine IR 15 mg tablet Commonly known as:  MS IR Take 1 Tab by mouth four (4) times daily as needed for Pain. Max Daily Amount: 60 mg.  
  
 * morphine IR 15 mg tablet Commonly known as:  MS IR Take 1 Tab by mouth three (3) times daily as needed for Pain. Max Daily Amount: 45 mg.  
  
 * morphine IR 15 mg tablet Commonly known as:  MS IR Take 1 Tab by mouth three (3) times daily as needed for Pain. Max Daily Amount: 45 mg.  
  
 * morphine IR 15 mg tablet Commonly known as:  MS IR Take 1 Tab by mouth three (3) times daily as needed for Pain. Max Daily Amount: 45 mg. multivitamin tablet Commonly known as:  ONE A DAY Take 1 Tab by mouth daily. naloxone 4 mg/actuation nasal spray Commonly known as:  NARCAN  
4 mg by Nasal route as needed. nitroglycerin 0.4 mg SL tablet Commonly known as:  NITROSTAT  
1 Tab by SubLINGual route every five (5) minutes as needed. Use for Chest Pain ; Call MD if > 3 tablets required OMEPRAZOLE (BULK)  
  
 ondansetron hcl 4 mg tablet Commonly known as:  Eveline Awad Take 1 Tab by mouth every eight (8) hours as needed for Nausea. phentermine 37.5 mg tablet Commonly known as:  ADIPEX-P Take 1 Tab by mouth every morning. Max Daily Amount: 37.5 mg.  
  
 pramipexole 0.5 mg tablet Commonly known as:  MIRAPEX TAKE 1 TABLET THREE TIMES DAILY  
  
 simvastatin 10 mg tablet Commonly known as:  ZOCOR  
TAKE 1 TABLET EVERY NIGHT  
  
 zolpidem 10 mg tablet Commonly known as:  AMBIEN Take 1 Tab by mouth nightly. Max Daily Amount: 10 mg.  
  
 * Notice: This list has 4 medication(s) that are the same as other medications prescribed for you. Read the directions carefully, and ask your doctor or other care provider to review them with you. Follow-up Instructions Return if symptoms worsen or fail to improve. Patient Instructions For management of leg swelling/pain, incorporate these 4 \"E's\" into your daily routine: 
 
 
· Elastic: Wear compression stockings during the day to help relieve symptoms. Talk to your doctor about which ones to get and where to get them. · Elevate: Prop up your legs at or above the level of your heart when possible. This helps keep the blood from pooling in your lower legs and improves blood flow to the rest of your body. I generally encourage 5 minute intervals every 2 hours, or 15-20 minutes every 3-4 hours. · Avoid sitting and standing for long periods. This puts added stress on your veins. · Exercise: Get regular exercise, and control your weight. Walk, bicycle, or swim to improve blood flow in your legs. Even do simple range of motion exercises including foot/ankle circles, flexion/extension. · Emollient: as you experience swelling, the skin often becomes dry. Keep legs moisturized daily. Good products include Gold Bond, Aveeno, Eucerin, Neutrogena. You may also use petroleum base such as vaseline or bag balm. Some patients choose oils such as olive, E, lanolin. Cocoa Butter is another favorite. Look for labels such as cream or ointment, as lotions do not provide as good hydration. For times of itching, may use hydrocortisone cream (cortaid). Introducing [facility]! Dear Alyssa Valera: Thank you for requesting a Monitor account. Our records indicate that you already have an active Monitor account. You can access your account anytime at [email or web address] Did you know that you can access your hospital and ER discharge instructions at any time in Monitor? You can also review all of your test results from your hospital stay or ER visit. Additional Information If you have questions, please visit the Frequently Asked Questions section of the Monitor website at [email or web address] Remember, Monitor is NOT to be used for urgent needs. For medical emergencies, dial 911. Now available from your iPhone and Android! Please provide this summary of care documentation to your next provider. Your primary care clinician is listed as [address]. If you have any questions after today's visit, please call [phone].

## 2018-10-25 ENCOUNTER — CLINICAL SUPPORT (OUTPATIENT)
Dept: FAMILY MEDICINE CLINIC | Age: 63
End: 2018-10-25

## 2018-10-25 DIAGNOSIS — Z23 ENCOUNTER FOR IMMUNIZATION: ICD-10-CM

## 2018-10-25 NOTE — PROGRESS NOTES
VORB: Administer Flu./Malaika Lemus MD  [address] , [facility]  Patient received FLU vaccine 0.5ml in Right deltoid. Tolerated well. No signs or symptoms of distress noted. Most current VIS given and consent signed. he was observed for 10 min post injection. There were no reactions observed.

## 2018-11-01 ENCOUNTER — HOSPITAL ENCOUNTER (OUTPATIENT)
Dept: LAB | Age: 63
Discharge: HOME | End: 2018-11-01

## 2018-11-01 ENCOUNTER — HOSPITAL ENCOUNTER (OUTPATIENT)
Dept: LAB | Age: 63
Discharge: HOME | End: 2018-11-01
Payer: MEDICARE

## 2018-11-01 ENCOUNTER — OFFICE VISIT (OUTPATIENT)
Dept: FAMILY MEDICINE CLINIC | Age: 63
End: 2018-11-01

## 2018-11-01 VITALS
TEMPERATURE: 99 F | RESPIRATION RATE: 20 BRPM | SYSTOLIC BLOOD PRESSURE: 151 MMHG | OXYGEN SATURATION: 96 % | WEIGHT: 300 LBS | DIASTOLIC BLOOD PRESSURE: 84 MMHG | HEART RATE: 76 BPM | BODY MASS INDEX: 38.5 KG/M2 | HEIGHT: 74 IN

## 2018-11-01 DIAGNOSIS — F51.01 PRIMARY INSOMNIA: ICD-10-CM

## 2018-11-01 DIAGNOSIS — R06.09 DYSPNEA ON EXERTION: Primary | ICD-10-CM

## 2018-11-01 DIAGNOSIS — Z86.59 HISTORY OF DEPRESSION: ICD-10-CM

## 2018-11-01 DIAGNOSIS — I10 ESSENTIAL HYPERTENSION: ICD-10-CM

## 2018-11-01 DIAGNOSIS — E11.9 DIABETES MELLITUS WITHOUT COMPLICATION (HCC): ICD-10-CM

## 2018-11-01 PROCEDURE — 82043 UR ALBUMIN QUANTITATIVE: CPT | Performed by: NURSE PRACTITIONER

## 2018-11-01 PROCEDURE — 99001 SPECIMEN HANDLING PT-LAB: CPT | Performed by: NURSE PRACTITIONER

## 2018-11-01 RX ORDER — ISOSORBIDE MONONITRATE 60 MG/1
TABLET, EXTENDED RELEASE ORAL
Qty: 90 TAB | Refills: 2 | Status: SHIPPED | OUTPATIENT
Start: 2018-11-01 | End: 2020-02-25 | Stop reason: SDUPTHER

## 2018-11-01 RX ORDER — METFORMIN HYDROCHLORIDE 500 MG/1
1500 TABLET, EXTENDED RELEASE ORAL
Qty: 270 TAB | Refills: 3 | Status: SHIPPED | OUTPATIENT
Start: 2018-11-01 | End: 2018-12-07 | Stop reason: SDUPTHER

## 2018-11-01 RX ORDER — ONDANSETRON 4 MG/1
4 TABLET, FILM COATED ORAL
Qty: 12 TAB | Refills: 0 | Status: SHIPPED | OUTPATIENT
Start: 2018-11-01 | End: 2019-01-18

## 2018-11-01 RX ORDER — NITROGLYCERIN 0.4 MG/1
0.4 TABLET SUBLINGUAL
Qty: 1 BOTTLE | Refills: 0 | Status: SHIPPED | OUTPATIENT
Start: 2018-11-01 | End: 2019-04-11 | Stop reason: SDUPTHER

## 2018-11-01 RX ORDER — METOPROLOL SUCCINATE 50 MG/1
50 TABLET, EXTENDED RELEASE ORAL DAILY
Qty: 90 TAB | Refills: 1 | Status: SHIPPED | OUTPATIENT
Start: 2018-11-01 | End: 2019-08-05 | Stop reason: SDUPTHER

## 2018-11-01 RX ORDER — BUPROPION HYDROCHLORIDE 150 MG/1
TABLET, EXTENDED RELEASE ORAL
Qty: 180 TAB | Refills: 2 | Status: SHIPPED | OUTPATIENT
Start: 2018-11-01 | End: 2019-01-25 | Stop reason: SDUPTHER

## 2018-11-01 RX ORDER — ZOLPIDEM TARTRATE 10 MG/1
10 TABLET ORAL
Qty: 90 TAB | Refills: 0 | Status: SHIPPED | OUTPATIENT
Start: 2018-11-01 | End: 2019-05-10 | Stop reason: SDUPTHER

## 2018-11-01 NOTE — PROGRESS NOTES
HPI:  Mark Anthony Salter is a 61 y.o. male who presents today for a routine follow up for Diabetes, HLD and HTN. Pt denies headache, blurred vision and dizziness. Pt states he has been adhering to medication regimen but states he has not been following dietary recommendations. Pt admits to gaining weight and noticing a gradual increase in shortness of breath with exertion. Pt states he would like to better control his diabetes but does not wish to be put on insulin. Hypertension ROS: taking medications as instructed, no medication side effects noted, no TIA's, no chest pain on exertion, no dyspnea on exertion, notes stable dyspnea on exertion, no change, no swelling of ankles. New concerns: SOB with exertion    Diabetic Review of Systems - medication compliance: compliant all of the time, diabetic diet compliance: noncompliant some of the time, home glucose monitoring: is performed sporadically, maybe once a week range from , average 150, further diabetic ROS: no polyuria or polydipsia, no chest pain, dyspnea or TIA's, no numbness, tingling or pain in extremities, no medication side effects noted, weight has increased, has dysesthesias in the feet, last eye exam approximately 1 month ago. Other symptoms and concerns: dyspnea with exertion.       Past Medical History:   Diagnosis Date    Abdominal adhesions 7/30/2014    Back muscle spasm 7/30/2014    Back pain, lumbosacral     Cellulitis     Constipation, chronic 7/30/2014    DDD (degenerative disc disease), lumbar 7/30/2014    DDD (degenerative disc disease), thoracic 7/30/2014    Depression     Diabetes ([facility]     DM (diabetes mellitus) ([facility] 7/30/2014    Frequent falls 7/30/2014    TRUE (generalized anxiety disorder) 7/30/2014    High cholesterol     HTN (hypertension) 7/30/2014    Hyperlipidemia 7/30/2014    Hypertension     Insomnia secondary to chronic pain 7/30/2014    LBP radiating to both legs 7/30/2014    Lumbar facet arthropathy 7/30/2014    Lumbar nerve root impingement 7/30/2014    Lumbosacral radiculopathy at S1 7/30/2014    Major depression 7/30/2014    Nausea & vomiting     Neurological disorder     sciatica    MANISH (obstructive sleep apnea) 7/30/2014    S/P lumbar laminectomy 7/30/2014    S/P lumbar spinal fusion 7/30/2014    Small vessel disease     Thoracic compression fracture ([address] 7/30/2014    Unspecified sleep apnea     USES CPAP EVERY NIGHT        Past Surgical History:   Procedure Laterality Date    HX APPENDECTOMY      HX BACK SURGERY      Lumbar fusion    HX HERNIA REPAIR  1992    HX OTHER SURGICAL      EMG - S1 disorder    HX VASECTOMY  1985        Current Outpatient Medications   Medication Sig Dispense Refill    pramipexole (MIRAPEX) 0.5 mg tablet TAKE 1 TABLET THREE TIMES DAILY 270 Tab 1    busPIRone (BUSPAR) 10 mg tablet Take 1 Tab by mouth two (2) times a day. 60 Tab 6    ACCU-CHEK FASTCLIX LANCET DRUM misc CHECK BLOOD SUGAR EVERY  Each 11    simvastatin (ZOCOR) 10 mg tablet TAKE 1 TABLET EVERY NIGHT 90 Tab 1    gabapentin (NEURONTIN) 800 mg tablet Take  by mouth three (3) times daily.  metoprolol succinate (TOPROL-XL) 50 mg XL tablet Take 1 Tab by mouth daily. APPOINTMENT REQUIRED BEFORE NEXT REFILL. 90 Tab 1    ondansetron hcl (ZOFRAN) 4 mg tablet Take 1 Tab by mouth every eight (8) hours as needed for Nausea. 12 Tab 0    ACCU-CHEK SMARTVIEW TEST STRIP strip CHECK BLOOD SUGAR EVERY  Strip 3    buPROPion SR (WELLBUTRIN SR) 150 mg SR tablet TAKE 1 TABLET TWICE DAILY 180 Tab 1    isosorbide mononitrate ER (IMDUR) 60 mg CR tablet TAKE 1 TABLET EVERY MORNING 90 Tab 1    Blood-Glucose Meter (ACCU-CHEK GABRIELLE) misc Check blood sugar daily 1 Each 0    metFORMIN ER (GLUCOPHAGE XR) 500 mg tablet Take 3 Tabs by mouth daily (with dinner). 270 Tab 3    diazePAM (VALIUM) 5 mg tablet Take 5 mg by mouth every six (6) hours as needed for Anxiety.       zolpidem (AMBIEN) 10 mg tablet Take 1 Tab by mouth nightly. Max Daily Amount: 10 mg. 90 Tab 0    DULoxetine (CYMBALTA) 60 mg capsule Take 1 Cap by mouth daily. 90 Cap 2    phentermine (ADIPEX-P) 37.5 mg tablet Take 1 Tab by mouth every morning. Max Daily Amount: 37.5 mg. 30 Tab 2    OMEPRAZOLE, BULK,       naloxone 4 mg/actuation spry 4 mg by Nasal route as needed. 1 Box 0    FERROUS SULFATE, DRIED (IRON, DRIED, PO) Take  by mouth.  nitroglycerin (NITROSTAT) 0.4 mg SL tablet 1 Tab by SubLINGual route every five (5) minutes as needed. Use for Chest Pain ; Call MD if > 3 tablets required 1 Bottle 0    Back Brace misc 1 Device by Does Not Apply route daily as needed for Pain. One, lumbosacral orthosis/back brace with metal struts      Medically necessary 1 Device 0    multivitamin (ONE A DAY) tablet Take 1 Tab by mouth daily.           Allergies   Allergen Reactions    Opana [Oxymorphone] Other (comments)     Feels like bug crawling under skin and drowziness        Social History     Socioeconomic History    Marital status:      Spouse name: Not on file    Number of children: Not on file    Years of education: Not on file    Highest education level: Not on file   Social Needs    Financial resource strain: Not on file    Food insecurity - worry: Not on file    Food insecurity - inability: Not on file   [employer] needs - medical: Not on file   [employer] needs - non-medical: Not on file   Occupational History    Not on file   Tobacco Use    Smoking status: Never Smoker    Smokeless tobacco: Never Used   Substance and Sexual Activity    Alcohol use: No    Drug use: No    Sexual activity: Yes     Partners: Female     Birth control/protection: Surgical   Other Topics Concern    Not on file   Social History Narrative    Not on file        Family History   Problem Relation Age of Onset    Diabetes Mother     Heart Failure Mother     Heart Attack Father     Diabetes Sister     Stroke Brother Review of Systems   Constitutional: Negative for chills, fever and malaise/fatigue. HENT: Negative for congestion. Eyes: Negative for blurred vision and pain. Respiratory: Positive for shortness of breath (with exertion). Negative for cough and sputum production. Cardiovascular: Negative for chest pain, palpitations and leg swelling. Gastrointestinal: Negative for abdominal pain, diarrhea, nausea and vomiting. Genitourinary: Negative for dysuria, frequency and urgency. Musculoskeletal: Negative for joint pain and myalgias. Neurological: Positive for sensory change (bilateral feet, neuropathy). Negative for dizziness, tingling and headaches. Visit Vitals  /84 (BP 1 Location: Left arm, BP Patient Position: Sitting)   Pulse 76   Temp 99 °F (37.2 °C) (Oral)   Resp 20   Ht 6' 2\" (1.88 m)   Wt 300 lb (136.1 kg)   SpO2 96%   BMI 38.52 kg/m²     I have reviewed/discussed the above normal BMI with the patient. I have recommended the following interventions: dietary management education, guidance, and counseling, encourage exercise and monitor weight . Mickeal Rink Physical Exam   Constitutional: He is oriented to person, place, and time and well-developed, well-nourished, and in no distress. HENT:   Head: Normocephalic. Eyes: Conjunctivae and EOM are normal. Pupils are equal, round, and reactive to light. Neck: Normal range of motion. Neck supple. No thyromegaly present. Cardiovascular: Normal rate, regular rhythm and normal heart sounds. Pulmonary/Chest: Effort normal and breath sounds normal. No respiratory distress. He has no wheezes. He has no rales. Abdominal: Soft. Musculoskeletal: He exhibits no edema or tenderness. Neurological: He is alert and oriented to person, place, and time. Gait normal.   Skin: Skin is warm and dry. Nursing note and vitals reviewed. ASSESSMENT:  Diagnoses and all orders for this visit:    1.  Dyspnea on exertion  - nitroglycerin (NITROSTAT) 0.4 mg SL tablet; 1 Tab by SubLINGual route every five (5) minutes as needed. Use for Chest Pain ; Call MD if > 3 tablets required  -     EKG, 12 LEAD, INITIAL; Future    2. Diabetes mellitus without complication (HCC)  -     metFORMIN ER (GLUCOPHAGE XR) 500 mg tablet; Take 3 Tabs by mouth daily (with dinner). -     HEMOGLOBIN A1C WITH EAG; Future  -     METABOLIC PANEL, COMPREHENSIVE; Future  -     CBC WITH AUTOMATED DIFF; Future  -     MICROALBUMIN, UR, RAND W/ MICROALB/CREAT RATIO; Future  -     EKG, 12 LEAD, INITIAL; Future  -     REFERRAL TO PHARMACIST    3. Essential hypertension  -     isosorbide mononitrate ER (IMDUR) 60 mg CR tablet; TAKE 1 TABLET EVERY MORNING  -     nitroglycerin (NITROSTAT) 0.4 mg SL tablet; 1 Tab by SubLINGual route every five (5) minutes as needed. Use for Chest Pain ; Call MD if > 3 tablets required  -     metoprolol succinate (TOPROL-XL) 50 mg XL tablet; Take 1 Tab by mouth daily. 4. History of depression  -     buPROPion SR (WELLBUTRIN SR) 150 mg SR tablet; TAKE 1 TABLET TWICE DAILY  -     ondansetron hcl (ZOFRAN) 4 mg tablet; Take 1 Tab by mouth every eight (8) hours as needed for Nausea. 5. Primary insomnia  -     zolpidem (AMBIEN) 10 mg tablet; Take 1 Tab by mouth nightly. Max Daily Amount: 10 mg. PLAN:   checked, pt is in compliance Ambien last filled 4/2017  Refilled Nitroglycerin, Wellbutrin, Toprol-XL, Imdur, Ambien  Labs today: CBC, CMP, Hemoglobin A1C, microalbumin, EKG  orders and follow up as documented in patient record  reviewed diet, exercise and weight control  recommended sodium restriction  copy of written low fat low cholesterol diet provided and reviewed  cardiovascular risk and specific lipid/LDL goals reviewed.   diabetic diet discussed in detail, written exchange diet given, low cholesterol diet, weight control and daily exercise discussed, home glucose monitoring emphasized, glycohemoglobin and other lab monitoring discussed and labs immediately prior to next visit. I have discussed the diagnosis with the patient and the intended plan as seen in the above orders. The patient has received an after-visit summary and questions were answered concerning future plans. I have discussed medication side effects and warnings with the patient as well. Patient will call for further questions. Follow-up Disposition:  Return in about 3 months (around 2/1/2019) for DM.       Chu Banegas NP

## 2018-11-01 NOTE — PROGRESS NOTES
Chief Complaint   Patient presents with    Well Male     1. Have you been to the ER, urgent care clinic since your last visit? Hospitalized since your last visit? Yes When: 2 weeks ago [facility] Reason for visit: viral infection    2. Have you seen or consulted any other health care providers outside of the [address] [place] [address] since your last visit? Include any pap smears or colon screening.  No

## 2018-11-01 NOTE — PATIENT INSTRUCTIONS
Type 2 Diabetes: Care Instructions  Your Care Instructions    Type 2 diabetes is a disease that develops when the body's tissues cannot use insulin properly. Over time, the pancreas cannot make enough insulin. Insulin is a hormone that helps the body's cells use sugar (glucose) for energy. It also helps the body store extra sugar in muscle, fat, and liver cells. Without insulin, the sugar cannot get into the cells to do its work. It stays in the blood instead. This can cause high blood sugar levels. A person has diabetes when the blood sugar stays too high too much of the time. Over time, diabetes can lead to diseases of the heart, blood vessels, nerves, kidneys, and eyes. You may be able to control your blood sugar by losing weight, eating a healthy diet, and getting daily exercise. You may also have to take insulin or other diabetes medicine. Follow-up care is a key part of your treatment and safety. Be sure to make and go to all appointments. Call your doctor if you are having problems. It's also a good idea to know your test results and keep a list of the medicines you take. How can you care for yourself at home? · Keep your blood sugar at a target level (which you set with your doctor). ? Eat a good diet that spreads carbohydrate throughout the day. Carbohydrate--the body's main source of fuel--affects blood sugar more than any other nutrient. Carbohydrate is in fruits, vegetables, milk, and yogurt. It also is in breads, cereals, vegetables such as potatoes and corn, and sugary foods such as candy and cakes. ? Aim for 30 minutes of exercise on most, preferably all, days of the week. Walking is a good choice. You also may want to do other activities, such as running, swimming, cycling, or playing tennis or team sports. If your doctor says it's okay, do muscle-strengthening exercises at least 2 times a week. ? Take your medicines exactly as prescribed.  Call your doctor if you think you are having a problem with your medicine. You will get more details on the specific medicines your doctor prescribes. · Check your blood sugar as often as your doctor recommends. It is important to keep track of any symptoms you have, such as low blood sugar. Also tell your doctor if you have any changes in your activities, diet, or insulin use. · Talk to your doctor before you start taking aspirin every day. Aspirin can help certain people lower their risk of a heart attack or stroke. But taking aspirin isn't right for everyone, because it can cause serious bleeding. · Do not smoke. If you need help quitting, talk to your doctor about stop-smoking programs and medicines. These can increase your chances of quitting for good. · Keep your cholesterol and blood pressure at normal levels. You may need to take one or more medicines to reach your goals. Take them exactly as directed. Do not stop or change a medicine without talking to your doctor first.  When should you call for help? Call 911 anytime you think you may need emergency care. For example, call if:    · You passed out (lost consciousness), or you suddenly become very sleepy or confused. (You may have very low blood sugar.)    Call your doctor now or seek immediate medical care if:    · Your blood sugar is 300 mg/dL or is higher than the level your doctor has set for you.     · You have symptoms of low blood sugar, such as:  ? Sweating. ? Feeling nervous, shaky, and weak. ? Extreme hunger and slight nausea. ? Dizziness and headache.  ? Blurred vision. ? Confusion.    Watch closely for changes in your health, and be sure to contact your doctor if:    · You often have problems controlling your blood sugar.     · You have symptoms of long-term diabetes problems, such as:  ? New vision changes. ? New pain, numbness, or tingling in your hands or feet. ? Skin problems. Where can you learn more? Go to http://iza-poornima.info/.   Enter C553 in the search box to learn more about \"Type 2 Diabetes: Care Instructions. \"  Current as of: December 7, 2017  Content Version: 11.8  © 2876-5060 [employer]. Care instructions adapted under license by [employer] (which disclaims liability or warranty for this information). If you have questions about a medical condition or this instruction, always ask your healthcare professional. [address] any warranty or liability for your use of this information.

## 2018-11-02 LAB
ALBUMIN SERPL-MCNC: 4.8 G/DL (ref 3.6–4.8)
ALBUMIN/GLOB SERPL: 1.5 {RATIO} (ref 1.2–2.2)
ALP SERPL-CCNC: 103 IU/L (ref 39–117)
ALT SERPL-CCNC: 42 IU/L (ref 0–44)
AST SERPL-CCNC: 39 IU/L (ref 0–40)
BASOPHILS # BLD AUTO: 0.1 X10E3/UL (ref 0–0.2)
BASOPHILS NFR BLD AUTO: 1 %
BILIRUB SERPL-MCNC: 0.3 MG/DL (ref 0–1.2)
BUN SERPL-MCNC: 18 MG/DL (ref 8–27)
BUN/CREAT SERPL: 15 (ref 10–24)
CALCIUM SERPL-MCNC: 10.1 MG/DL (ref 8.6–10.2)
CHLORIDE SERPL-SCNC: 101 MMOL/L (ref 96–106)
CO2 SERPL-SCNC: 24 MMOL/L (ref 20–29)
CREAT SERPL-MCNC: 1.17 MG/DL (ref 0.76–1.27)
CREAT UR-MCNC: NORMAL MG/DL
EOSINOPHIL # BLD AUTO: 0.2 X10E3/UL (ref 0–0.4)
EOSINOPHIL NFR BLD AUTO: 2 %
ERYTHROCYTE [DISTWIDTH] IN BLOOD BY AUTOMATED COUNT: 13.8 % (ref 12.3–15.4)
EST. AVERAGE GLUCOSE BLD GHB EST-MCNC: 240 MG/DL
GLOBULIN SER CALC-MCNC: 3.1 G/DL (ref 1.5–4.5)
GLUCOSE SERPL-MCNC: 254 MG/DL (ref 65–99)
HBA1C MFR BLD: 10 % (ref 4.8–5.6)
HCT VFR BLD AUTO: 43 % (ref 37.5–51)
HGB BLD-MCNC: 13.7 G/DL (ref 13–17.7)
IMM GRANULOCYTES # BLD: 0 X10E3/UL (ref 0–0.1)
IMM GRANULOCYTES NFR BLD: 0 %
LYMPHOCYTES # BLD AUTO: 2 X10E3/UL (ref 0.7–3.1)
LYMPHOCYTES NFR BLD AUTO: 24 %
MCH RBC QN AUTO: 27.8 PG (ref 26.6–33)
MCHC RBC AUTO-ENTMCNC: 31.9 G/DL (ref 31.5–35.7)
MCV RBC AUTO: 87 FL (ref 79–97)
MICROALBUMIN UR-MCNC: NORMAL
MONOCYTES # BLD AUTO: 0.6 X10E3/UL (ref 0.1–0.9)
MONOCYTES NFR BLD AUTO: 8 %
NEUTROPHILS # BLD AUTO: 5.3 X10E3/UL (ref 1.4–7)
NEUTROPHILS NFR BLD AUTO: 65 %
PLATELET # BLD AUTO: 266 X10E3/UL (ref 150–379)
POTASSIUM SERPL-SCNC: 5.1 MMOL/L (ref 3.5–5.2)
PROT SERPL-MCNC: 7.9 G/DL (ref 6–8.5)
RBC # BLD AUTO: 4.92 X10E6/UL (ref 4.14–5.8)
SODIUM SERPL-SCNC: 139 MMOL/L (ref 134–144)
WBC # BLD AUTO: 8.2 X10E3/UL (ref 3.4–10.8)

## 2018-11-02 NOTE — PROGRESS NOTES
Please advise patient that lab results show normal CBC-blood count, CMP-electrolytes kidney function and liver enzyme was normal, glucose was elevated. Pt hemoglobin has increased from 8.0 to 10.0, pt is encouraged to meet with Sarita SMITH whom we placed a referral for during visit to help management diabetes management.   Pt is also encouraged to return for fasting lipid panel (cholesterol) lab

## 2018-11-03 LAB
CREAT UR-MCNC: 78 MG/DL (ref 30–125)
MICROALBUMIN UR-MCNC: 1.83 MG/DL (ref 0–3)
MICROALBUMIN/CREAT UR-RTO: 23 MG/G (ref 0–30)

## 2018-11-29 ENCOUNTER — OFFICE VISIT (OUTPATIENT)
Dept: FAMILY MEDICINE CLINIC | Age: 63
End: 2018-11-29

## 2018-11-29 DIAGNOSIS — E11.8 TYPE 2 DIABETES MELLITUS WITH COMPLICATION, WITHOUT LONG-TERM CURRENT USE OF INSULIN (HCC): Primary | ICD-10-CM

## 2018-11-29 NOTE — PROGRESS NOTES
Pharmacy Note - Diabetes   11/29/18       Patient name: Pineda Marquez (62 y.o., male)  YOB: 1955    Referred by: MARCIA Onofre for diabetes education / management   Past Medical History:   Diagnosis Date    Abdominal adhesions 7/30/2014    Back muscle spasm 7/30/2014    Back pain, lumbosacral     Cellulitis     Constipation, chronic 7/30/2014    DDD (degenerative disc disease), lumbar 7/30/2014    DDD (degenerative disc disease), thoracic 7/30/2014    Depression     [facility] [address]     DM (diabetes mellitus) ([facility] [address] 7/30/2014    Frequent falls 7/30/2014    TRUE (generalized anxiety disorder) 7/30/2014    High cholesterol     HTN (hypertension) 7/30/2014    Hyperlipidemia 7/30/2014    Hypertension     Insomnia secondary to chronic pain 7/30/2014    LBP radiating to both legs 7/30/2014    Lumbar facet arthropathy 7/30/2014    Lumbar nerve root impingement 7/30/2014    Lumbosacral radiculopathy at S1 7/30/2014    Major depression 7/30/2014    Nausea & vomiting     Neurological disorder     sciatica    MANISH (obstructive sleep apnea) 7/30/2014    S/P lumbar laminectomy 7/30/2014    S/P lumbar spinal fusion 7/30/2014    Small vessel disease     Thoracic compression fracture ([facility] [address] 7/30/2014    Unspecified sleep apnea     USES CPAP EVERY NIGHT     Allergies: Allergies   Allergen Reactions    Opana [Oxymorphone] Other (comments)     Feels like bug crawling under skin and drowziness     Subjective / Objective      Current diabetes symptoms/problems include polyuria and have been stable over the past several months. Medications:   Current medications for diabetes include:      Key Antihyperglycemic Medications             metFORMIN ER (GLUCOPHAGE XR) 500 mg tablet Take 3 Tabs by mouth daily (with dinner).         Previous medications used for diabetes management include:   [] metformin [] SGLT-2 Inhibitors   [] sulfonylureas [] TZDs   [] DPP-IV inhibitors [] insulin [] GLP-1 agonists  [x] none     Patient reports adherence with his medications. Blood Glucose Findings:   He checks his blood glucose readings occasionally 1-2 times daily. Patient did not bring his blood sugar log to today's visit. - fasting range: 99 - 149     - postprandial range: 120's - 165, doesn't check terribly often    Hypoglycemic episodes: None reported    His last A1c value(s) noted to be:      Lab Results   Component Value Date/Time    Hemoglobin A1c 10.0 (H) 11/01/2018 03:27 AM    Hemoglobin A1c 8.0 (H) 08/02/2017 09:17 AM    Hemoglobin A1c 8.3 (H) 04/05/2017 09:30 AM     Dietary Considerations:   A typical days food intake is as follows:   - breakfast: eggs, toast, meat   - lunch: late lunch or early dinner, usually only 2 meals a day   - dinner: meat, potatoes, green vegetable, corn    - beverages: Pepsi (big one - has been trying Pepsi Max recently), water, unsweet tea   - snacks: rare, but will have sweets after dinner sometimes       Physical Activity:     · Current activity level: limited due to spinal injury.      Screenings/Prevention Parameters:  -Diabetic Eye and Foot Exams:      Diabetic Foot and Eye Exam HM Status   Topic Date Due    Diabetic Foot Care  08/02/2018    Eye Exam  08/24/2019     -Microalbumin / Creatinine ratio:       Lab Results   Component Value Date/Time    Microalbumin/Creat ratio (mg/g creat) 23 11/01/2018 03:27 PM    MICROALBUMIN,MG/DAY 14.8 06/13/2016 08:43 AM    Microalbumin,urine random 1.83 11/01/2018 03:27 PM     -ASCVD Risk Score Parameters and Calculation    Race: WHITE OR      BP Readings from Last 3 Encounters:   11/01/18 151/84   09/06/18 144/86   08/16/18 140/80        Lab Results   Component Value Date/Time    Cholesterol, total 116 08/02/2017 09:17 AM    HDL Cholesterol 48 08/02/2017 09:17 AM    LDL, calculated 39.2 08/02/2017 09:17 AM    VLDL, calculated 28.8 08/02/2017 09:17 AM    Triglyceride 144 08/02/2017 09:17 AM    CHOL/HDL Ratio 2.4 08/02/2017 09:17 AM        Social History     Tobacco Use    Smoking status: Never Smoker    Smokeless tobacco: Never Used   Substance Use Topics    Alcohol use: No      Statin?: yes (simvastatin 10 mg daily)      Calculated ASCVD Risk Score: 22.6%    -Statin Safety Parameters:      Lab Results   Component Value Date/Time    ALT (SGPT) 42 11/01/2018 03:27 AM    AST (SGOT) 39 11/01/2018 03:27 AM    Alk. phosphatase 103 11/01/2018 03:27 AM    Bilirubin, direct 0.1 12/20/2012 12:00 AM    Bilirubin, total 0.3 11/01/2018 03:27 AM     -Immunizations:      Immunization History   Administered Date(s) Administered    Influenza Vaccine (Quad) PF 12/07/2016, 11/02/2017, 10/25/2018    Pneumococcal Conjugate (PCV-13) 08/08/2016    Tdap 08/08/2016       Additional Laboratory Parameters of Interest:     Estimation of renal function:  Lab Results   Component Value Date/Time    Creatinine 1.17 11/01/2018 03:27 AM    Creatinine 1.62 (H) 07/03/2018 09:38 AM    Creatinine 1.22 08/02/2017 09:17 AM    GFR est AA 76 11/01/2018 03:27 AM    GFR est AA 52 (L) 07/03/2018 09:38 AM    GFR est AA >60 08/02/2017 09:17 AM    GFR est non-AA 66 11/01/2018 03:27 AM    GFR est non-AA 43 (L) 07/03/2018 09:38 AM    GFR est non-AA >60 08/02/2017 09:17 AM     Wt Readings from Last 3 Encounters:   11/01/18 300 lb (136.1 kg)   09/06/18 300 lb (136.1 kg)   08/16/18 300 lb (136.1 kg)     Ht Readings from Last 1 Encounters:   11/01/18 6' 2\" (1.88 m)     Assessment / Plan        1. Diabetes:  Goal A1C: < 7%. Patient's most recent A1c is not at their current goal. During the visit today reviewed with patient: self-monitoring blood glucose fasting/post-prandial goals, importance of blood glucose control in avoidance of diabetic complications or further progression if already present, and diet and health maintenance items explained below.  Discussed several different management choices for patient including DPP-4 inhibitors, pioglitazone, sulfonylureas, SGLT2 inhibitors, and GLP-1 agonists. Patient is currently opposed to insulin due to the the stated effects he has seen it take on both his wife and mother. Pro's and con's of each class discussed with patient and ultimately he felt that Trulicity would be a good choice for him to trial. He did ask about Ozempic (from the commercials on TV) but wasn't fond of the idea of seeing/handling the needle. Medication sent to patient's local pharmacy. Follow up appointment scheduled in roughly 4 weeks to check on tolerability and impact on blood sugar readings. 2. Diet/lifestyle:  Reviewed with patient utilization of the plate method as a way to encourage healthy eating. Reviewed carbohydrate and non-carbohydrate rich foods, carbohydrate content of various foods, appropriate serving sizes for carbohydrates (15 grams = 1 carb serving), reading the nutrition label focusing on total carbohydrates while being mindful of serving size, recommended serving sizes for carbohydrates for meals and snacks (45-60g with meals and less than or equal to 15g for snacks ), and discussion regarding fruits as a carbohydrate. Patient education materials were provided and reviewed with the patient for their future reference and use. Patient noted that he has recently transitioned more towards diet sodas. He did note that he eats plenty of starches so encouraged him to work to incorporate more non starchy vegetables into his meals if possible. 3. Hyperlipidemia:  ASCVD risk score calculated as: 22% based on parameters listed above. Current lipid treatment guidelines recommend at least moderate-intensity statin doses for all patients with diabetes to decrease overall ASCVD risk. Patient currently qualifies for a high intensity statin therapy based on current recommendations and is currently taking simvastatin 10 mg daily (a low intensity statin).  Could consider in the future increasing intensity but would recommend a repeat cholesterol screening as his LDL at last check was 39. Patient verbalized understanding of the information presented and all of the patients questions were answered. AVS was handed to the patient and information reviewed. Patient advised to call the office with any additional questions or concerns. Notification of recommendations will be sent to MARCIA Barclay for review.     Thank you for the consult,  Patricia Calix, PharmD, BCPS, BCACP, BC-ADM

## 2018-12-06 ENCOUNTER — PATIENT MESSAGE (OUTPATIENT)
Dept: FAMILY MEDICINE CLINIC | Age: 63
End: 2018-12-06

## 2018-12-06 DIAGNOSIS — E11.9 DIABETES MELLITUS WITHOUT COMPLICATION (HCC): ICD-10-CM

## 2018-12-07 RX ORDER — METFORMIN HYDROCHLORIDE 500 MG/1
1000 TABLET, EXTENDED RELEASE ORAL 2 TIMES DAILY WITH MEALS
Qty: 360 TAB | Refills: 3
Start: 2018-12-07 | End: 2019-01-03 | Stop reason: SDUPTHER

## 2018-12-07 NOTE — TELEPHONE ENCOUNTER
Unable to reply to patient using the [employer] feature at this time. Spoke with patient via phone and informed him the Trulicity usually takes a few weeks to start seeing the effects on blood sugars. He was advised to continue monitoring his blood sugars and give the medication some time to build up in his system. Did discuss with patient possibility of increasing metformin to 1000 mg twice daily and he will try this out for the next few weeks and see if he notices a decrease in blood sugar. States that he has enough metformin currently and dose not need refills at this time. Regarding the malfunctioning Trulicity pen, he was advised to contact his pharmacy to see what would need to be done (either a refill exception or if there is a way to get a replacement for the defective pen that he was not able to use).     Louann Banks, PharmD, BCPS, BCACP, BC-ADM

## 2018-12-07 NOTE — TELEPHONE ENCOUNTER
From: Fallon William  To: Jm Linn  Sent: 12/6/2018 1:18 PM EST  Subject: Visit Follow-Up Question    1st. Question is that I started giving my shots on Sunday ,however the first needle malfunction. It did everything just like it was supposed to but after the first click I waited for the second click but after a minute I removed the pen from my belly and the medication shot all over the place. So is there anything I could do about that pen ?    2nd question is that I been checking my sugar levels and they been around 200 or so. Do we need to increase our injections?

## 2018-12-19 DIAGNOSIS — I10 ESSENTIAL HYPERTENSION: ICD-10-CM

## 2019-01-03 ENCOUNTER — OFFICE VISIT (OUTPATIENT)
Dept: FAMILY MEDICINE CLINIC | Age: 64
End: 2019-01-03

## 2019-01-03 DIAGNOSIS — E11.9 DIABETES MELLITUS WITHOUT COMPLICATION (HCC): ICD-10-CM

## 2019-01-03 DIAGNOSIS — E11.8 TYPE 2 DIABETES MELLITUS WITH COMPLICATION, WITHOUT LONG-TERM CURRENT USE OF INSULIN (HCC): Primary | ICD-10-CM

## 2019-01-03 RX ORDER — METFORMIN HYDROCHLORIDE 500 MG/1
1000 TABLET, EXTENDED RELEASE ORAL 2 TIMES DAILY WITH MEALS
Qty: 360 TAB | Refills: 3 | Status: SHIPPED | OUTPATIENT
Start: 2019-01-03 | End: 2020-02-25 | Stop reason: SDUPTHER

## 2019-01-03 NOTE — PROGRESS NOTES
Pharmacy Note - Diabetes   01/03/19       Patient name: Ted Telles (70 y.o., male)  YOB: 1955    Referred by: Elenita Garsia NP for diabetes education / management   Past Medical History:   Diagnosis Date    Abdominal adhesions 7/30/2014    Back muscle spasm 7/30/2014    Back pain, lumbosacral     Cellulitis     Constipation, chronic 7/30/2014    DDD (degenerative disc disease), lumbar 7/30/2014    DDD (degenerative disc disease), thoracic 7/30/2014    Depression     Diabetes ([facility] [address]     DM (diabetes mellitus) ([facility] [address] 7/30/2014    Frequent falls 7/30/2014    TRUE (generalized anxiety disorder) 7/30/2014    High cholesterol     HTN (hypertension) 7/30/2014    Hyperlipidemia 7/30/2014    Hypertension     Insomnia secondary to chronic pain 7/30/2014    LBP radiating to both legs 7/30/2014    Lumbar facet arthropathy 7/30/2014    Lumbar nerve root impingement 7/30/2014    Lumbosacral radiculopathy at S1 7/30/2014    Major depression 7/30/2014    Nausea & vomiting     Neurological disorder     sciatica    MANISH (obstructive sleep apnea) 7/30/2014    S/P lumbar laminectomy 7/30/2014    S/P lumbar spinal fusion 7/30/2014    Small vessel disease     Thoracic compression fracture ([facility] [address] 7/30/2014    Unspecified sleep apnea     USES CPAP EVERY NIGHT     Allergies: Allergies   Allergen Reactions    Opana [Oxymorphone] Other (comments)     Feels like bug crawling under skin and drowziness     Subjective / Objective      Medications:   Current medications for diabetes include:      Key Antihyperglycemic Medications             metFORMIN ER (GLUCOPHAGE XR) 500 mg tablet Take 2 Tabs by mouth two (2) times daily (with meals). dulaglutide (TRULICITY) 4.49 QD/2.2 mL sub-q pen 0.5 mL by SubCUTAneous route every seven (7) days. Patient reports adherence with his medications. Blood Glucose Findings:   He checks his blood glucose readings once daily.  Patient did bring his blood sugar log to today's visit. - fasting range: 100 - 234     Fasting Post-Prandial    Breakfast Lunch Dinner   160      118      125      140      134      123      151      125      181      179      190      158      117      147      166      100      138      152      151      137      156      184      195      143      169      138      156      156      193      234      Average = 153        Hypoglycemic episodes: No    His last A1c value(s) noted to be:      Lab Results   Component Value Date/Time    Hemoglobin A1c 10.0 (H) 2018 03:27 AM    Hemoglobin A1c 8.0 (H) 2017 09:17 AM    Hemoglobin A1c 8.3 (H) 2017 09:30 AM     Assessment / Plan      1. Diabetes: Notes that he is tolerating the Trulicity well without any adverse effects. Also notes that he has lost about 13 pounds. Average blood sugars over the past month have come down to an average of 153 as noted above and has not seen numbers in the 200's since close to time of starting Trulicity. Patient is very motivated to gain the best control of his diabetes so he wishes to increase the dose of Trulicity to 1.5 mg weekly. Given his numbers are still slightly out of range for goal A1c, this is appropriate and a 1 month supply sent to his local pharmacy with continuing refills sent to his mail order (along with increased metformin prescription). Patient has a follow up visit with his PCP at the end of February and will check in on him at that time. Orders Placed This Encounter    DISCONTD: dulaglutide (TRULICITY) 1.5 GI/4.2 mL sub-q pen     Si.5 mL by SubCUTAneous route every seven (7) days. Dispense:  4 Pen     Refill:  0    dulaglutide (TRULICITY) 1.5 SF/1.3 mL sub-q pen     Si.5 mL by SubCUTAneous route every seven (7) days. Dispense:  12 Pen     Refill:  3    metFORMIN ER (GLUCOPHAGE XR) 500 mg tablet     Sig: Take 2 Tabs by mouth two (2) times daily (with meals).      Dispense:  360 Tab     Refill:  3 Patient verbalized understanding of the information presented and all of the patients questions were answered. AVS was handed to the patient and information reviewed. Patient advised to call the office with any additional questions or concerns. Notification of recommendations will be sent to MARCIA Mejía for review.     Future Appointments   Date Time Provider Blair Ursula   2/28/2019  1:00 PM [address]   2/28/2019  1:00 PM Maria Rehman NP [facility]     Thank you for the consult,  Melody Ford, PharmD, BCPS, BCACP, BC-ADM

## 2019-01-14 ENCOUNTER — APPOINTMENT (OUTPATIENT)
Dept: GENERAL RADIOLOGY | Age: 64
DRG: 853 | End: 2019-01-14
Attending: EMERGENCY MEDICINE
Payer: MEDICARE

## 2019-01-14 ENCOUNTER — HOSPITAL ENCOUNTER (INPATIENT)
Age: 64
LOS: 4 days | Discharge: HOME HEALTH | DRG: 853 | End: 2019-01-18
Attending: EMERGENCY MEDICINE | Admitting: HOSPITALIST
Payer: MEDICARE

## 2019-01-14 DIAGNOSIS — A41.9 SEPSIS, DUE TO UNSPECIFIED ORGANISM: Primary | ICD-10-CM

## 2019-01-14 DIAGNOSIS — R53.1 WEAKNESS: ICD-10-CM

## 2019-01-14 DIAGNOSIS — N12 PYELONEPHRITIS: ICD-10-CM

## 2019-01-14 DIAGNOSIS — N30.00 ACUTE CYSTITIS WITHOUT HEMATURIA: ICD-10-CM

## 2019-01-14 DIAGNOSIS — R42 DIZZINESS: ICD-10-CM

## 2019-01-14 DIAGNOSIS — R50.9 FEVER, UNSPECIFIED FEVER CAUSE: ICD-10-CM

## 2019-01-14 DIAGNOSIS — D72.829 LEUKOCYTOSIS, UNSPECIFIED TYPE: ICD-10-CM

## 2019-01-14 PROBLEM — N39.0 UTI (URINARY TRACT INFECTION): Status: ACTIVE | Noted: 2019-01-14

## 2019-01-14 LAB
ALBUMIN SERPL-MCNC: 2.9 G/DL (ref 3.4–5)
ALBUMIN/GLOB SERPL: 0.9 {RATIO} (ref 0.8–1.7)
ALP SERPL-CCNC: 90 U/L (ref 45–117)
ALT SERPL-CCNC: 26 U/L (ref 16–61)
ANION GAP SERPL CALC-SCNC: 7 MMOL/L (ref 3–18)
ANION GAP SERPL CALC-SCNC: 8 MMOL/L (ref 3–18)
APPEARANCE UR: ABNORMAL
AST SERPL-CCNC: 31 U/L (ref 15–37)
BACTERIA URNS QL MICRO: ABNORMAL /HPF
BASOPHILS # BLD: 0 K/UL (ref 0–0.06)
BASOPHILS NFR BLD: 0 % (ref 0–3)
BILIRUB SERPL-MCNC: 1.1 MG/DL (ref 0.2–1)
BILIRUB UR QL: NEGATIVE
BUN SERPL-MCNC: 20 MG/DL (ref 7–18)
BUN SERPL-MCNC: 21 MG/DL (ref 7–18)
BUN/CREAT SERPL: 10 (ref 12–20)
BUN/CREAT SERPL: 9 (ref 12–20)
CALCIUM SERPL-MCNC: 7.5 MG/DL (ref 8.5–10.1)
CALCIUM SERPL-MCNC: 8 MG/DL (ref 8.5–10.1)
CHLORIDE SERPL-SCNC: 102 MMOL/L (ref 100–108)
CHLORIDE SERPL-SCNC: 105 MMOL/L (ref 100–108)
CK MB CFR SERPL CALC: 1 % (ref 0–4)
CK MB SERPL-MCNC: 5.3 NG/ML (ref 5–25)
CK SERPL-CCNC: 519 U/L (ref 39–308)
CO2 SERPL-SCNC: 25 MMOL/L (ref 21–32)
CO2 SERPL-SCNC: 26 MMOL/L (ref 21–32)
COLOR UR: ABNORMAL
CREAT SERPL-MCNC: 2.17 MG/DL (ref 0.6–1.3)
CREAT SERPL-MCNC: 2.17 MG/DL (ref 0.6–1.3)
DIFFERENTIAL METHOD BLD: ABNORMAL
EOSINOPHIL # BLD: 0 K/UL (ref 0–0.4)
EOSINOPHIL NFR BLD: 0 % (ref 0–5)
EPITH CASTS URNS QL MICRO: ABNORMAL /LPF (ref 0–5)
ERYTHROCYTE [DISTWIDTH] IN BLOOD BY AUTOMATED COUNT: 14.1 % (ref 11.6–14.5)
FLUAV AG NPH QL IA: NEGATIVE
FLUBV AG NOSE QL IA: NEGATIVE
GLOBULIN SER CALC-MCNC: 3.3 G/DL (ref 2–4)
GLUCOSE BLD STRIP.AUTO-MCNC: 125 MG/DL (ref 70–110)
GLUCOSE SERPL-MCNC: 126 MG/DL (ref 74–99)
GLUCOSE SERPL-MCNC: 180 MG/DL (ref 74–99)
GLUCOSE UR STRIP.AUTO-MCNC: NEGATIVE MG/DL
HCT VFR BLD AUTO: 35.3 % (ref 36–48)
HGB BLD-MCNC: 11.2 G/DL (ref 13–16)
HGB UR QL STRIP: ABNORMAL
KETONES UR QL STRIP.AUTO: ABNORMAL MG/DL
LACTATE BLD-SCNC: 1.49 MMOL/L (ref 0.4–2)
LACTATE BLD-SCNC: 2.02 MMOL/L (ref 0.4–2)
LEUKOCYTE ESTERASE UR QL STRIP.AUTO: ABNORMAL
LYMPHOCYTES # BLD: 1.1 K/UL (ref 0.8–3.5)
LYMPHOCYTES NFR BLD: 6 % (ref 20–51)
MCH RBC QN AUTO: 26.9 PG (ref 24–34)
MCHC RBC AUTO-ENTMCNC: 31.7 G/DL (ref 31–37)
MCV RBC AUTO: 84.7 FL (ref 74–97)
MONOCYTES # BLD: 1.3 K/UL (ref 0–1)
MONOCYTES NFR BLD: 7 % (ref 2–9)
NEUTS BAND NFR BLD MANUAL: 6 % (ref 0–5)
NEUTS SEG # BLD: 16.6 K/UL (ref 1.8–8)
NEUTS SEG NFR BLD: 81 % (ref 42–75)
NITRITE UR QL STRIP.AUTO: NEGATIVE
PH UR STRIP: 5 [PH] (ref 5–8)
PLATELET # BLD AUTO: 219 K/UL (ref 135–420)
PLATELET COMMENTS,PCOM: ABNORMAL
PMV BLD AUTO: 9.9 FL (ref 9.2–11.8)
POTASSIUM SERPL-SCNC: 3.6 MMOL/L (ref 3.5–5.5)
POTASSIUM SERPL-SCNC: 3.9 MMOL/L (ref 3.5–5.5)
PROT SERPL-MCNC: 6.2 G/DL (ref 6.4–8.2)
PROT UR STRIP-MCNC: 100 MG/DL
RBC # BLD AUTO: 4.17 M/UL (ref 4.7–5.5)
RBC #/AREA URNS HPF: ABNORMAL /HPF (ref 0–5)
RBC MORPH BLD: ABNORMAL
SODIUM SERPL-SCNC: 135 MMOL/L (ref 136–145)
SODIUM SERPL-SCNC: 138 MMOL/L (ref 136–145)
SP GR UR REFRACTOMETRY: 1.02 (ref 1–1.03)
TROPONIN I SERPL-MCNC: 0.07 NG/ML (ref 0–0.04)
TROPONIN I SERPL-MCNC: 0.07 NG/ML (ref 0–0.04)
UROBILINOGEN UR QL STRIP.AUTO: 1 EU/DL (ref 0.2–1)
WBC # BLD AUTO: 19 K/UL (ref 4.6–13.2)
WBC URNS QL MICRO: ABNORMAL /HPF (ref 0–4)

## 2019-01-14 PROCEDURE — 74011250637 HC RX REV CODE- 250/637: Performed by: EMERGENCY MEDICINE

## 2019-01-14 PROCEDURE — 99285 EMERGENCY DEPT VISIT HI MDM: CPT

## 2019-01-14 PROCEDURE — 74011250637 HC RX REV CODE- 250/637: Performed by: HOSPITALIST

## 2019-01-14 PROCEDURE — 74011250636 HC RX REV CODE- 250/636: Performed by: HOSPITALIST

## 2019-01-14 PROCEDURE — 74011000258 HC RX REV CODE- 258: Performed by: EMERGENCY MEDICINE

## 2019-01-14 PROCEDURE — 74011250636 HC RX REV CODE- 250/636: Performed by: EMERGENCY MEDICINE

## 2019-01-14 PROCEDURE — 71046 X-RAY EXAM CHEST 2 VIEWS: CPT

## 2019-01-14 PROCEDURE — 96361 HYDRATE IV INFUSION ADD-ON: CPT

## 2019-01-14 PROCEDURE — 87077 CULTURE AEROBIC IDENTIFY: CPT

## 2019-01-14 PROCEDURE — 82962 GLUCOSE BLOOD TEST: CPT

## 2019-01-14 PROCEDURE — 36415 COLL VENOUS BLD VENIPUNCTURE: CPT

## 2019-01-14 PROCEDURE — 77030038269 HC DRN EXT URIN PURWCK BARD -A

## 2019-01-14 PROCEDURE — 82550 ASSAY OF CK (CPK): CPT

## 2019-01-14 PROCEDURE — 87804 INFLUENZA ASSAY W/OPTIC: CPT

## 2019-01-14 PROCEDURE — 93005 ELECTROCARDIOGRAM TRACING: CPT

## 2019-01-14 PROCEDURE — 80053 COMPREHEN METABOLIC PANEL: CPT

## 2019-01-14 PROCEDURE — 87086 URINE CULTURE/COLONY COUNT: CPT

## 2019-01-14 PROCEDURE — 81001 URINALYSIS AUTO W/SCOPE: CPT

## 2019-01-14 PROCEDURE — 85025 COMPLETE CBC W/AUTO DIFF WBC: CPT

## 2019-01-14 PROCEDURE — 83605 ASSAY OF LACTIC ACID: CPT

## 2019-01-14 PROCEDURE — 74011250636 HC RX REV CODE- 250/636: Performed by: INTERNAL MEDICINE

## 2019-01-14 PROCEDURE — 74011000250 HC RX REV CODE- 250: Performed by: HOSPITALIST

## 2019-01-14 PROCEDURE — 65660000000 HC RM CCU STEPDOWN

## 2019-01-14 PROCEDURE — 96374 THER/PROPH/DIAG INJ IV PUSH: CPT

## 2019-01-14 PROCEDURE — 87040 BLOOD CULTURE FOR BACTERIA: CPT

## 2019-01-14 PROCEDURE — 87186 SC STD MICRODIL/AGAR DIL: CPT

## 2019-01-14 PROCEDURE — 87081 CULTURE SCREEN ONLY: CPT

## 2019-01-14 RX ORDER — IBUPROFEN 400 MG/1
800 TABLET ORAL
Status: COMPLETED | OUTPATIENT
Start: 2019-01-14 | End: 2019-01-14

## 2019-01-14 RX ORDER — HEPARIN SODIUM 5000 [USP'U]/ML
5000 INJECTION, SOLUTION INTRAVENOUS; SUBCUTANEOUS EVERY 8 HOURS
Status: DISCONTINUED | OUTPATIENT
Start: 2019-01-14 | End: 2019-01-18 | Stop reason: HOSPADM

## 2019-01-14 RX ORDER — LEVOFLOXACIN 5 MG/ML
750 INJECTION, SOLUTION INTRAVENOUS EVERY 24 HOURS
Status: DISCONTINUED | OUTPATIENT
Start: 2019-01-14 | End: 2019-01-14

## 2019-01-14 RX ORDER — SODIUM CHLORIDE 9 MG/ML
100 INJECTION, SOLUTION INTRAVENOUS CONTINUOUS
Status: DISCONTINUED | OUTPATIENT
Start: 2019-01-14 | End: 2019-01-16

## 2019-01-14 RX ORDER — ACETAMINOPHEN 500 MG
1000 TABLET ORAL
Status: COMPLETED | OUTPATIENT
Start: 2019-01-14 | End: 2019-01-14

## 2019-01-14 RX ORDER — VANCOMYCIN/0.9 % SOD CHLORIDE 1 G/100 ML
1000 PLASTIC BAG, INJECTION (ML) INTRAVENOUS ONCE
Status: DISCONTINUED | OUTPATIENT
Start: 2019-01-14 | End: 2019-01-14 | Stop reason: DRUGHIGH

## 2019-01-14 RX ORDER — ONDANSETRON 2 MG/ML
4 INJECTION INTRAMUSCULAR; INTRAVENOUS
Status: DISCONTINUED | OUTPATIENT
Start: 2019-01-14 | End: 2019-01-18 | Stop reason: HOSPADM

## 2019-01-14 RX ORDER — DULOXETIN HYDROCHLORIDE 60 MG/1
60 CAPSULE, DELAYED RELEASE ORAL DAILY
Status: DISCONTINUED | OUTPATIENT
Start: 2019-01-15 | End: 2019-01-18 | Stop reason: HOSPADM

## 2019-01-14 RX ORDER — SODIUM CHLORIDE 0.9 % (FLUSH) 0.9 %
5-10 SYRINGE (ML) INJECTION AS NEEDED
Status: DISCONTINUED | OUTPATIENT
Start: 2019-01-14 | End: 2019-01-18 | Stop reason: HOSPADM

## 2019-01-14 RX ORDER — ACETAMINOPHEN 325 MG/1
650 TABLET ORAL
Status: DISCONTINUED | OUTPATIENT
Start: 2019-01-14 | End: 2019-01-18 | Stop reason: HOSPADM

## 2019-01-14 RX ORDER — BUPROPION HYDROCHLORIDE 150 MG/1
150 TABLET, EXTENDED RELEASE ORAL DAILY
Status: DISCONTINUED | OUTPATIENT
Start: 2019-01-15 | End: 2019-01-18 | Stop reason: HOSPADM

## 2019-01-14 RX ORDER — GABAPENTIN 400 MG/1
800 CAPSULE ORAL 3 TIMES DAILY
Status: DISCONTINUED | OUTPATIENT
Start: 2019-01-14 | End: 2019-01-18 | Stop reason: HOSPADM

## 2019-01-14 RX ORDER — SODIUM CHLORIDE 9 MG/ML
1000 INJECTION, SOLUTION INTRAVENOUS ONCE
Status: COMPLETED | OUTPATIENT
Start: 2019-01-14 | End: 2019-01-14

## 2019-01-14 RX ORDER — SIMVASTATIN 20 MG/1
10 TABLET, FILM COATED ORAL
Status: DISCONTINUED | OUTPATIENT
Start: 2019-01-14 | End: 2019-01-18 | Stop reason: HOSPADM

## 2019-01-14 RX ADMIN — SIMVASTATIN 10 MG: 20 TABLET, FILM COATED ORAL at 21:40

## 2019-01-14 RX ADMIN — GABAPENTIN 800 MG: 400 CAPSULE ORAL at 21:40

## 2019-01-14 RX ADMIN — SODIUM CHLORIDE 100 ML/HR: 900 INJECTION, SOLUTION INTRAVENOUS at 21:01

## 2019-01-14 RX ADMIN — ACETAMINOPHEN 1000 MG: 500 TABLET ORAL at 17:23

## 2019-01-14 RX ADMIN — SODIUM CHLORIDE 1000 ML: 900 INJECTION, SOLUTION INTRAVENOUS at 22:24

## 2019-01-14 RX ADMIN — CEFTRIAXONE SODIUM 1 G: 1 INJECTION, POWDER, FOR SOLUTION INTRAMUSCULAR; INTRAVENOUS at 21:40

## 2019-01-14 RX ADMIN — PIPERACILLIN SODIUM,TAZOBACTAM SODIUM 4.5 G: 4; .5 INJECTION, POWDER, FOR SOLUTION INTRAVENOUS at 17:12

## 2019-01-14 RX ADMIN — IBUPROFEN 800 MG: 400 TABLET, FILM COATED ORAL at 14:15

## 2019-01-14 RX ADMIN — IBUPROFEN 800 MG: 400 TABLET, FILM COATED ORAL at 17:23

## 2019-01-14 RX ADMIN — ACETAMINOPHEN 1000 MG: 500 TABLET ORAL at 13:21

## 2019-01-14 RX ADMIN — SODIUM CHLORIDE 975 ML: 900 INJECTION, SOLUTION INTRAVENOUS at 17:10

## 2019-01-14 RX ADMIN — HEPARIN SODIUM 5000 UNITS: 5000 INJECTION INTRAVENOUS; SUBCUTANEOUS at 21:40

## 2019-01-14 RX ADMIN — SODIUM CHLORIDE 1000 ML: 900 INJECTION, SOLUTION INTRAVENOUS at 17:52

## 2019-01-14 RX ADMIN — SODIUM CHLORIDE 1000 ML: 900 INJECTION, SOLUTION INTRAVENOUS at 14:15

## 2019-01-14 RX ADMIN — LEVOFLOXACIN 750 MG: 750 INJECTION, SOLUTION INTRAVENOUS at 17:55

## 2019-01-14 RX ADMIN — ONDANSETRON 4 MG: 2 INJECTION INTRAMUSCULAR; INTRAVENOUS at 23:36

## 2019-01-14 NOTE — ED TRIAGE NOTES
Pt arrived by EMS from home. Pt has been dizzy x3 days. Pt reports that he has fallen several times in the last few days due to weakness, pain in his feet, and left leg pain. Pt reports that he was diagnosed with a UTI 3 weeks ago and finished treatment. Been fevered at home. Took Ibuprofen around 0900 for fever. Pt reports that he is feeling SOB. Pt was 90% on RA. Pt was placed on 2L NC. PT reports that he has a hx of a kidney stone, having urinary retention, but unable to hold it at times.  Pt

## 2019-01-14 NOTE — ED NOTES
Spoke with Dr. Genesis Stafford regarding patient's BP. He is aware of patient's BP. Provider wants fluids to continue infusing until finished. No other instructions given other than patient has floor orders for patient's fluids to be ran at 100ml after the boluses finish.

## 2019-01-14 NOTE — ED NOTES
Pt's blood pressure is dropping to the high 70's low 80's. Repeated blood pressures performed in both extremities to confirm BP. Spoke with Dr. Amy Callaway who asked me to page Dr. Cheyenne Medina since he was admitted. Dr. Cheyenne Medina paged. Waiting for provider to call this RN back. Pt's heart rate is WNL. Pt is alert and oriented. Pt is asymptomatic at this time. Charge nurse, Bel Villa notified of patient's BP. Pt has two boluses of fluids infusing at this time in right forearm.

## 2019-01-14 NOTE — ED NOTES
5:27 PM: Spoke to patient about his UTI symptoms. Patient notes that despite urinating a lot less today, he has been urinating more than usual. Describes he has urinary incontinence secondary to a surgical complication in the past and he does not feel the urgency to go that you typically see with UTI. Scribe Attestation Yolande Rivera acting as a scribe for and in the presence of Hira Kiser MD     
January 14, 2019 at 5:28 PM 
    
Provider Attestation:     
I personally performed the services described in the documentation, reviewed the documentation, as recorded by the scribe in my presence, and it accurately and completely records my words and actions.  January 14, 2019 at 5:28 PM - Hira Kiser MD

## 2019-01-14 NOTE — ED NOTES
Pt attempted to give urine sample. Pt wanted to sit at edge of bed. Pt became very light headed and fell forwards nurse. Pt was laid pack into bed. Provider notified. This RN asked if patient could be straight cath for use. Pt was straight cath for urine.

## 2019-01-14 NOTE — ED PROVIDER NOTES
[facility] DREAD [facility] EMERGENCY DEPT 
 
 
1:11 PM 
 
Date: 1/14/2019 Patient Name: Ayaka Evangelista History of Presenting Illness Chief Complaint Patient presents with  Dizziness x3 days. falling every time gets up.  Fatigue  
  fever at home. 102.6F oral temp in triage  Back Pain  
  diagnosed with kidney stones and having urinary retention and frequency History Provided By: Patient Chief Complaint: Fatigue Duration:  2 days Timing:  Worsening Location: Generalized Quality: Not described Severity: Moderate Modifying Factors: No modifying or aggravating factors were reported. Associated Symptoms: several falls, subjective fever, sore throat, myalgias, 3-4 episodes of nausea. 61 y.o. male with noted past medical history who presents to the ED with c/o worsening fatigue for the past 2 days. Patient describes he woke up with a subjective fever and difficulty getting up and ambulating for the past 2 days. Also reports several falls. Associated symptoms include a sore throat, myalgias, 3-4 episodes of nausea. Denies cough, vomiting, diarrhea, dysuria, urinary frequency, rash, ear pain or sinus problems. No modifying or aggravating factors were reported. No other symptoms or concerns were expressed. No other complaints. Nursing notes regarding the HPI and triage nursing notes were reviewed. Prior medical records were reviewed. Current Facility-Administered Medications Medication Dose Route Frequency Provider Last Rate Last Dose  sodium chloride (NS) flush 5-10 mL  5-10 mL IntraVENous PRN Kira Bradshaw MD      
 piperacillin-tazobactam (ZOSYN) 4.5 g in 0.9% sodium chloride (MBP/ADV) 100 mL MBP  4.5 g IntraVENous NOW Kira Bradshaw  mL/hr at 01/14/19 1712 4.5 g at 01/14/19 1712  levoFLOXacin (LEVAQUIN) 750 mg in D5W IVPB  750 mg IntraVENous Q24H Kira Bradshaw MD      
 sodium chloride 0.9 % bolus infusion 1,000 mL  1,000 mL IntraVENous ONCE Ralph Culver MD      
 Followed by  
Titus Kasper sodium chloride 0.9 % bolus infusion 1,000 mL  1,000 mL IntraVENous Robert Vallejo MD      
 Followed by  
Titus Kasper sodium chloride 0.9 % bolus infusion 1,000 mL  1,000 mL IntraVENous MD Titus Syed [START ON 1/15/2019] piperacillin-tazobactam (ZOSYN) 3.375 g in 0.9% sodium chloride (MBP/ADV) 100 mL MBP  3.375 g IntraVENous Q6H Ralph Culver MD      
 vancomycin (VANCOCIN) 2,500 mg in 0.9% sodium chloride 500 mL IVPB  2,500 mg IntraVENous Robert Vallejo MD      
 VANCOMYCIN INFORMATION NOTE   Other Rx Dosing/Monitoring Ralph Culver MD      
 
Current Outpatient Medications Medication Sig Dispense Refill  dulaglutide (TRULICITY) 1.5 ME/8.4 mL sub-q pen 0.5 mL by SubCUTAneous route every seven (7) days. 12 Pen 3  
 metFORMIN ER (GLUCOPHAGE XR) 500 mg tablet Take 2 Tabs by mouth two (2) times daily (with meals). 360 Tab 3  
 buPROPion SR (WELLBUTRIN SR) 150 mg SR tablet TAKE 1 TABLET TWICE DAILY 180 Tab 2  
 isosorbide mononitrate ER (IMDUR) 60 mg CR tablet TAKE 1 TABLET EVERY MORNING 90 Tab 2  
 ondansetron hcl (ZOFRAN) 4 mg tablet Take 1 Tab by mouth every eight (8) hours as needed for Nausea. 12 Tab 0  
 metoprolol succinate (TOPROL-XL) 50 mg XL tablet Take 1 Tab by mouth daily. 90 Tab 1  
 zolpidem (AMBIEN) 10 mg tablet Take 1 Tab by mouth nightly. Max Daily Amount: 10 mg. 90 Tab 0  
 simvastatin (ZOCOR) 10 mg tablet TAKE 1 TABLET EVERY NIGHT 90 Tab 1  
 gabapentin (NEURONTIN) 800 mg tablet Take  by mouth three (3) times daily.  DULoxetine (CYMBALTA) 60 mg capsule Take 1 Cap by mouth daily. 90 Cap 2  phentermine (ADIPEX-P) 37.5 mg tablet Take 1 Tab by mouth every morning. Max Daily Amount: 37.5 mg. 30 Tab 2  
 OMEPRAZOLE, BULK,     
 multivitamin (ONE A DAY) tablet Take 1 Tab by mouth daily.     
 nitroglycerin (NITROSTAT) 0.4 mg SL tablet 1 Tab by SubLINGual route every five (5) minutes as needed. Use for Chest Pain ; Call MD if > 3 tablets required 1 Bottle 0  
 pramipexole (MIRAPEX) 0.5 mg tablet TAKE 1 TABLET THREE TIMES DAILY 270 Tab 1  ACCU-CHEK FASTCLIX LANCET DRUM [facility] CHECK BLOOD SUGAR EVERY  Each 11  
 ACCU-CHEK SMARTVIEW TEST STRIP strip CHECK BLOOD SUGAR EVERY  Strip 3  Blood-Glucose Meter (ACCU-CHEK GABRIELLE) misc Check blood sugar daily 1 Each 0  
 naloxone 4 mg/actuation spry 4 mg by Nasal route as needed. 1 Box 0  
 Back Brace [facility] 1 Device by Does Not Apply route daily as needed for Pain. One, lumbosacral orthosis/back brace with metal struts      Medically necessary 1 Device 0 Past History Past Medical History: 
Past Medical History:  
Diagnosis Date  Abdominal adhesions 7/30/2014  Back muscle spasm 7/30/2014  Back pain, lumbosacral   
 Cellulitis  Constipation, chronic 7/30/2014  DDD (degenerative disc disease), lumbar 7/30/2014  DDD (degenerative disc disease), thoracic 7/30/2014  Depression  Diabetes ([address]  DM (diabetes mellitus) ([address] 7/30/2014  Frequent falls 7/30/2014  TRUE (generalized anxiety disorder) 7/30/2014  High cholesterol  HTN (hypertension) 7/30/2014  Hyperlipidemia 7/30/2014  Hypertension  Insomnia secondary to chronic pain 7/30/2014  Kidney stones  LBP radiating to both legs 7/30/2014  Lumbar facet arthropathy 7/30/2014  Lumbar nerve root impingement 7/30/2014  Lumbosacral radiculopathy at S1 7/30/2014  Major depression 7/30/2014  Nausea & vomiting  Neurological disorder   
 sciatica  MANISH (obstructive sleep apnea) 7/30/2014  S/P lumbar laminectomy 7/30/2014  S/P lumbar spinal fusion 7/30/2014  Small vessel disease  Thoracic compression fracture ([address] 7/30/2014  Unspecified sleep apnea USES CPAP EVERY NIGHT Past Surgical History: 
Past Surgical History:  
Procedure Laterality Date  HX APPENDECTOMY  HX BACK SURGERY Lumbar fusion [address]  HX OTHER SURGICAL    
 EMG - S1 disorder [address] [facility] Family History: 
Family History Problem Relation Age of Onset  Diabetes Mother  Heart Failure Mother  Heart Attack Father  Diabetes Sister  Stroke Brother Social History: 
Social History Tobacco Use  Smoking status: Never Smoker  Smokeless tobacco: Never Used Substance Use Topics  Alcohol use: No  
 Drug use: No  
 
 
Allergies: Allergies Allergen Reactions  Bactrim [Sulfamethoprim] Rash  Opana [Oxymorphone] Other (comments) Feels like bug crawling under skin and drowziness Patient's primary care provider (as noted in EPIC):  Chevy Dunn MD 
 
Review of Systems Constitutional: Positive for fatigue and fever. HENT: Positive for sore throat. Negative for ear pain. Respiratory: Negative for cough. Gastrointestinal: Positive for nausea. Negative for vomiting. Genitourinary: Negative for dysuria and frequency. Musculoskeletal: Positive for myalgias. Skin: Negative for rash. All other systems reviewed and are negative. Visit Vitals /50 Pulse (!) 102 Temp 99.2 °F (37.3 °C) Resp (!) 31 Ht 6' 2\" (1.88 m) Wt 132.5 kg (292 lb) SpO2 95% BMI 37.49 kg/m² EKG (Interpretation by ED Physician): 
 Time Interpreted: 16:37 Rate: 90 Rhythm: Normal Sinus Rhythm Interpretation: No STEMI. PHYSICAL EXAM: 
 
CONSTITUTIONAL:  Alert, in no apparent distress;  well developed;  well nourished. HEAD:  Normocephalic, atraumatic. EYES:  EOMI. Non-icteric sclera. Normal conjunctiva. ENTM:  Nose:  no rhinorrhea. Throat:  no erythema or exudate, mucous membranes moist. 
NECK:  No JVD. Supple RESPIRATORY:  Chest clear, equal breath sounds, good air movement. CARDIOVASCULAR:  Regular rate and rhythm. No murmurs, rubs, or gallops. GI:  Normal bowel sounds, abdomen soft and non-tender. No rebound or guarding. BACK:  Non-tender. UPPER EXT:  Normal inspection. LOWER EXT:  No edema, no calf tenderness. Distal pulses intact. NEURO:  Moves all four extremities. Normal motor exam and sensation in all four extremities. Normal CN II-XII exam.  Normal bilateral finger-to-nose exam.   
 
SKIN:  No rashes;  Normal for age. PSYCH:  Alert and normal affect. DIFFERENTIAL DIAGNOSES/ MEDICAL DECISION MAKING:  
Dehydration, hyperglycemia-induced weakness, electrolyte and/or endocrine imbalance, CVA, intracranial hemorrhage, sepsis, cardiac arrhythmia, central versus peripheral vertigo, illicit drug intoxication, alcohol intoxication, prescribed drug toxicity, pregnancy in females patients, anxiety disorder, versus other etiologies or a combination of the above. ED COURSE:   
 
Diagnostic Study Results Abnormal lab results from this emergency department encounter: 
Labs Reviewed CBC WITH AUTOMATED DIFF - Abnormal; Notable for the following components:  
    Result Value WBC 19.0 (*)   
 RBC 4.17 (*) HGB 11.2 (*) HCT 35.3 (*) NEUTROPHILS 81 (*) BAND NEUTROPHILS 6 (*) LYMPHOCYTES 6 (*)   
 ABS. NEUTROPHILS 16.6 (*)   
 ABS. MONOCYTES 1.3 (*) All other components within normal limits METABOLIC PANEL, BASIC - Abnormal; Notable for the following components:  
 Sodium 135 (*) Glucose 180 (*) BUN 20 (*) Creatinine 2.17 (*)   
 BUN/Creatinine ratio 9 (*)   
 GFR est AA 37 (*)   
 GFR est non-AA 31 (*) Calcium 8.0 (*) All other components within normal limits CARDIAC PANEL,(CK, CKMB & TROPONIN) - Abnormal; Notable for the following components:  
  (*)   
 CK - MB 5.3 (*) Troponin-I, QT 0.07 (*) All other components within normal limits URINALYSIS W/ RFLX MICROSCOPIC - Abnormal; Notable for the following components:  
 Protein 100 (*) Ketone TRACE (*) Blood MODERATE (*) Leukocyte Esterase LARGE (*) All other components within normal limits METABOLIC PANEL, COMPREHENSIVE - Abnormal; Notable for the following components:  
 Glucose 126 (*) BUN 21 (*) Creatinine 2.17 (*)   
 BUN/Creatinine ratio 10 (*)   
 GFR est AA 37 (*)   
 GFR est non-AA 31 (*) Calcium 7.5 (*) Bilirubin, total 1.1 (*) Protein, total 6.2 (*) Albumin 2.9 (*) All other components within normal limits URINE MICROSCOPIC ONLY - Abnormal; Notable for the following components:  
 Bacteria 4+ (*) All other components within normal limits POC LACTIC ACID - Abnormal; Notable for the following components:  
 Lactic Acid (POC) 2.02 (*) All other components within normal limits CULTURE, BLOOD CULTURE, BLOOD CULTURE, URINE  
INFLUENZA A & B AG (RAPID TEST) STREP THROAT SCREEN  
POC LACTIC ACID Lab values for this patient within approximately the last 12 hours: 
Recent Results (from the past 12 hour(s)) CBC WITH AUTOMATED DIFF Collection Time: 01/14/19  1:14 PM  
Result Value Ref Range WBC 19.0 (H) 4.6 - 13.2 K/uL  
 RBC 4.17 (L) 4.70 - 5.50 M/uL  
 HGB 11.2 (L) 13.0 - 16.0 g/dL HCT 35.3 (L) 36.0 - 48.0 % MCV 84.7 74.0 - 97.0 FL  
 MCH 26.9 24.0 - 34.0 PG  
 MCHC 31.7 31.0 - 37.0 g/dL  
 RDW 14.1 11.6 - 14.5 % PLATELET 027 918 - 832 K/uL MPV 9.9 9.2 - 11.8 FL  
 NEUTROPHILS 81 (H) 42 - 75 % BAND NEUTROPHILS 6 (H) 0 - 5 % LYMPHOCYTES 6 (L) 20 - 51 % MONOCYTES 7 2 - 9 % EOSINOPHILS 0 0 - 5 % BASOPHILS 0 0 - 3 %  
 ABS. NEUTROPHILS 16.6 (H) 1.8 - 8.0 K/UL  
 ABS. LYMPHOCYTES 1.1 0.8 - 3.5 K/UL  
 ABS. MONOCYTES 1.3 (H) 0 - 1.0 K/UL  
 ABS. EOSINOPHILS 0.0 0.0 - 0.4 K/UL  
 ABS. BASOPHILS 0.0 0.0 - 0.06 K/UL  
 DF MANUAL PLATELET COMMENTS ADEQUATE PLATELETS    
 RBC COMMENTS NORMOCYTIC, NORMOCHROMIC METABOLIC PANEL, BASIC Collection Time: 01/14/19  1:14 PM  
Result Value Ref Range  Sodium 135 (L) 136 - 145 mmol/L  
 Potassium 3.9 3.5 - 5.5 mmol/L Chloride 102 100 - 108 mmol/L  
 CO2 26 21 - 32 mmol/L Anion gap 7 3.0 - 18 mmol/L Glucose 180 (H) 74 - 99 mg/dL BUN 20 (H) 7.0 - 18 MG/DL Creatinine 2.17 (H) 0.6 - 1.3 MG/DL  
 BUN/Creatinine ratio 9 (L) 12 - 20 GFR est AA 37 (L) >60 ml/min/1.73m2 GFR est non-AA 31 (L) >60 ml/min/1.73m2 Calcium 8.0 (L) 8.5 - 10.1 MG/DL  
CARDIAC PANEL,(CK, CKMB & TROPONIN) Collection Time: 01/14/19  1:14 PM  
Result Value Ref Range  (H) 39 - 308 U/L  
 CK - MB 5.3 (H) <3.6 ng/ml CK-MB Index 1.0 0.0 - 4.0 % Troponin-I, QT 0.07 (H) 0.0 - 0.045 NG/ML  
INFLUENZA A & B AG (RAPID TEST) Collection Time: 01/14/19  2:17 PM  
Result Value Ref Range Influenza A Antigen NEGATIVE  NEG Influenza B Antigen NEGATIVE  NEG    
STREP THROAT SCREEN Collection Time: 01/14/19  2:17 PM  
Result Value Ref Range Special Requests: NO SPECIAL REQUESTS Strep Screen NEGATIVE Culture result: PENDING   
POC LACTIC ACID Collection Time: 01/14/19  2:27 PM  
Result Value Ref Range Lactic Acid (POC) 2.02 (HH) 0.40 - 2.00 mmol/L  
EKG, 12 LEAD, INITIAL Collection Time: 01/14/19  4:37 PM  
Result Value Ref Range Ventricular Rate 90 BPM  
 Atrial Rate 90 BPM  
 P-R Interval 126 ms  
 QRS Duration 86 ms  
 Q-T Interval 338 ms QTC Calculation (Bezet) 413 ms Calculated P Axis 57 degrees Calculated R Axis 58 degrees Calculated T Axis 49 degrees Diagnosis Normal sinus rhythm Normal ECG When compared with ECG of 16-JAN-2015 12:43, No significant change was found URINALYSIS W/ RFLX MICROSCOPIC Collection Time: 01/14/19  4:50 PM  
Result Value Ref Range Color DARK YELLOW Appearance CLOUDY Specific gravity 1.020 1.005 - 1.030    
 pH (UA) 5.0 5.0 - 8.0 Protein 100 (A) NEG mg/dL Glucose NEGATIVE  NEG mg/dL Ketone TRACE (A) NEG mg/dL Bilirubin NEGATIVE  NEG  Blood MODERATE (A) NEG    
 Urobilinogen 1.0 0.2 - 1.0 EU/dL Nitrites NEGATIVE  NEG Leukocyte Esterase LARGE (A) NEG    
METABOLIC PANEL, COMPREHENSIVE Collection Time: 01/14/19  4:50 PM  
Result Value Ref Range Sodium 138 136 - 145 mmol/L Potassium 3.6 3.5 - 5.5 mmol/L Chloride 105 100 - 108 mmol/L  
 CO2 25 21 - 32 mmol/L Anion gap 8 3.0 - 18 mmol/L Glucose 126 (H) 74 - 99 mg/dL BUN 21 (H) 7.0 - 18 MG/DL Creatinine 2.17 (H) 0.6 - 1.3 MG/DL  
 BUN/Creatinine ratio 10 (L) 12 - 20 GFR est AA 37 (L) >60 ml/min/1.73m2 GFR est non-AA 31 (L) >60 ml/min/1.73m2 Calcium 7.5 (L) 8.5 - 10.1 MG/DL Bilirubin, total 1.1 (H) 0.2 - 1.0 MG/DL  
 ALT (SGPT) 26 16 - 61 U/L  
 AST (SGOT) 31 15 - 37 U/L Alk. phosphatase 90 45 - 117 U/L Protein, total 6.2 (L) 6.4 - 8.2 g/dL Albumin 2.9 (L) 3.4 - 5.0 g/dL Globulin 3.3 2.0 - 4.0 g/dL A-G Ratio 0.9 0.8 - 1.7 URINE MICROSCOPIC ONLY Collection Time: 01/14/19  4:50 PM  
Result Value Ref Range WBC TOO NUMEROUS TO COUNT 0 - 4 /hpf  
 RBC 4 to 10 0 - 5 /hpf Epithelial cells 2+ 0 - 5 /lpf Bacteria 4+ (A) NEG /hpf POC LACTIC ACID Collection Time: 01/14/19  4:58 PM  
Result Value Ref Range Lactic Acid (POC) 1.49 0.40 - 2.00 mmol/L Radiologist and cardiologist interpretations if available at time of this note: Xr Chest Pa Lat Result Date: 1/14/2019 EXAM:  XR CHEST PA LAT INDICATION:   fever COMPARISON: 4/20/2017. FINDINGS: Motion degraded examination. Hypoinflation. Upper normal cardiac silhouette. Mild vascular congestion. Lingular scarring. No pneumothorax or pleural effusion. No consolidation. Thoracic spondylosis. Intraspinal electrode noted in the lower thoracic spine. IMPRESSION: Hypoinflation. Mild enlargement of the cardiac silhouette with vascular congestion. Otherwise no definite acute pulmonary process. Medication(s) ordered for patient during this emergency visit encounter: 
Medications sodium chloride (NS) flush 5-10 mL (not administered)  
piperacillin-tazobactam (ZOSYN) 4.5 g in 0.9% sodium chloride (MBP/ADV) 100 mL MBP (4.5 g IntraVENous New Bag 1/14/19 1712) levoFLOXacin (LEVAQUIN) 750 mg in D5W IVPB (not administered)  
sodium chloride 0.9 % bolus infusion 1,000 mL (not administered) Followed by  
sodium chloride 0.9 % bolus infusion 975 mL (975 mL IntraVENous New Bag 1/14/19 1710) piperacillin-tazobactam (ZOSYN) 3.375 g in 0.9% sodium chloride (MBP/ADV) 100 mL MBP (not administered)  
vancomycin (VANCOCIN) 2,500 mg in 0.9% sodium chloride 500 mL IVPB (not administered) VANCOMYCIN INFORMATION NOTE (not administered)  
acetaminophen (TYLENOL) tablet 1,000 mg (1,000 mg Oral Given 1/14/19 1321)  
sodium chloride 0.9 % bolus infusion 1,000 mL (0 mL IntraVENous IV Completed 1/14/19 1654)  
ibuprofen (MOTRIN) tablet 800 mg (800 mg Oral Given 1/14/19 1415) Medical Decision Making I am the first provider for this patient. I reviewed the vital signs, available nursing notes, past medical history, past surgical history, family history and social history. Vital Signs:  Reviewed the patient's vital signs. Elevated WBC with bandemia in patient with fever and tachycardia who is uroseptic with associated weakness. 5:27 PM: Spoke to patient about his UTI symptoms. Patient notes that despite urinating a lot less today, he has been urinating more than usual. Describes he has urinary incontinence secondary to a surgical complication in the past and he does not feel the urgency to go that you typically see with UTI. Admit to Hospitalist 
 
The patient was presented to the accepting hospitalist, Dr. Kalli Perry.  
 
The patient's primary doctor is Eros Oropeza MD, and admissions for this physician are with the hospitalist.  If the patient has no primary doctor, then admission is to the hospitalist as well. As the emergency physician, I wrote courtesy admission orders for the hospitalist physician. The courtesy orders included explicit instructions for the floor nursing staff to call the admitting attending physician upon patient arrival on the floor. Coding Diagnoses Clinical Impression: 1. Sepsis, due to unspecified organism ([address] 2. Dizziness 3. Weakness 4. Fever, unspecified fever cause 5. Leukocytosis, unspecified type 6. Acute cystitis without hematuria Disposition Disposition:  Admit Aamir Trujillo M.D. RENNY Board Certified Emergency Physician Scribe Attestation Kirkwood Bernheim Ramchandani acting as a scribe for and in the presence of Germán Velez MD     
January 14, 2019 at 2:03 PM   
 
Provider Attestation: If a scribe was utilized in generation of this patient record, I personally performed the services described in the documentation, reviewed the documentation, as recorded by the scribe in my presence, and it accurately records the patient's history of presenting illness, review of systems, patient physical examination, and procedures performed by me as the attending physician. Aamir Trujillo M.D. RENNY Board Certified Emergency Physician 1/14/2019. 
5:16 PM

## 2019-01-14 NOTE — H&P
HISTORY & PHYSICAL Patient: Cam Morales MRN: [id number]  CSN: [id number] YOB: 1955  Age: 61 y.o. Sex: male DOA: 1/14/2019 LOS:  LOS: 0 days DOA: 1/14/2019 Assessment/Plan Active Problems: 
  Sepsis ([facility] [address] (1/14/2019) UTI (urinary tract infection) (1/14/2019) Plan: 1. Sepsis - likely 2y to UTI - has received initial dose of Vanco, Zosyn, Levaquin in the ER , IVF 2. UTI - IV Rocephin 3. Acute on chronic Renal failure - IVF - monitor creatinine 4. HTN - was hypotensive on presentation - IVF 5. T2DM - insulin SS - monitor BS  
6. Obesity 7. Depression & Anxiety - resume home meds, monitor DVT Px - Heparin FC Severity of Signs & Symptoms -- Moderate Risk of adverse events -- Moderate Current Medical Rx Plan - As Above Patient history & comorbidities - Per HPI Discharge Plan -- Home Risk for Readmission -- Moderate HPI:  
 
Cam Morales is a 61 y.o. male who is being admitted tot he hosp 2y to sepsis & UTI -- pt mentions for the past 2 days he has been feeling weak , fever, he also mentions that he has been feeling dehydrated , tired , has been falling a lot -- he says he usually doesn't fall - wife was concerned so sent him to the ER  
ER eval - pt noted to have leukocytosis with hypotension, responded to IVF Will start broad spectrum Abx & admit to hosp Past Medical History:  
Diagnosis Date  Abdominal adhesions 7/30/2014  Back muscle spasm 7/30/2014  Back pain, lumbosacral   
 Cellulitis  Constipation, chronic 7/30/2014  DDD (degenerative disc disease), lumbar 7/30/2014  DDD (degenerative disc disease), thoracic 7/30/2014  Depression  Diabetes ([facility] [address]  DM (diabetes mellitus) ([facility] [address] 7/30/2014  Frequent falls 7/30/2014  TRUE (generalized anxiety disorder) 7/30/2014  High cholesterol  HTN (hypertension) 7/30/2014  Hyperlipidemia 7/30/2014  Hypertension  Insomnia secondary to chronic pain 7/30/2014  Kidney stones  LBP radiating to both legs 7/30/2014  Lumbar facet arthropathy 7/30/2014  Lumbar nerve root impingement 7/30/2014  Lumbosacral radiculopathy at S1 7/30/2014  Major depression 7/30/2014  Nausea & vomiting  Neurological disorder   
 sciatica  MANISH (obstructive sleep apnea) 7/30/2014  S/P lumbar laminectomy 7/30/2014  S/P lumbar spinal fusion 7/30/2014  Small vessel disease  Thoracic compression fracture ([address] 7/30/2014  Unspecified sleep apnea USES CPAP EVERY NIGHT Past Surgical History:  
Procedure Laterality Date  HX APPENDECTOMY  HX BACK SURGERY Lumbar fusion [address]  HX OTHER SURGICAL    
 EMG - S1 disorder [address] Family History Problem Relation Age of Onset  Diabetes Mother  Heart Failure Mother  Heart Attack Father  Diabetes Sister  Stroke Brother Social History Socioeconomic History  Marital status:  Spouse name: Not on file  Number of children: Not on file  Years of education: Not on file  Highest education level: Not on file Tobacco Use  Smoking status: Never Smoker  Smokeless tobacco: Never Used Substance and Sexual Activity  Alcohol use: No  
 Drug use: No  
 Sexual activity: Yes  
  Partners: Female Birth control/protection: Surgical  
 
 
Prior to Admission medications Medication Sig Start Date End Date Taking? Authorizing Provider  
dulaglutide (TRULICITY) 1.5 VT/3.0 mL sub-q pen 0.5 mL by SubCUTAneous route every seven (7) days. 1/3/19  Yes Eugene Singh NP  
metFORMIN ER (GLUCOPHAGE XR) 500 mg tablet Take 2 Tabs by mouth two (2) times daily (with meals).  1/3/19  Yes Eugene Singh NP  
buPROPion SR [facility] SR) 150 mg SR tablet TAKE 1 TABLET TWICE DAILY 11/1/18  Yes Eugene Singh NP  
isosorbide mononitrate ER (IMDUR) 60 mg CR tablet TAKE 1 TABLET EVERY MORNING 11/1/18  Yes Basilio Habermann, NP  
ondansetron hcl (ZOFRAN) 4 mg tablet Take 1 Tab by mouth every eight (8) hours as needed for Nausea. 11/1/18  Yes Basilio Habermann, NP  
metoprolol succinate (TOPROL-XL) 50 mg XL tablet Take 1 Tab by mouth daily. 11/1/18  Yes Basilio Habermann, NP  
zolpidem (AMBIEN) 10 mg tablet Take 1 Tab by mouth nightly. Max Daily Amount: 10 mg. 11/1/18  Yes Basilio Habermann, NP  
simvastatin (ZOCOR) 10 mg tablet TAKE 1 TABLET EVERY NIGHT 8/27/18  Yes Ran Kapadia MD  
gabapentin (NEURONTIN) 800 mg tablet Take  by mouth three (3) times daily. Yes Provider, Historical  
DULoxetine (CYMBALTA) 60 mg capsule Take 1 Cap by mouth daily. 10/26/17  Yes Ifrah Rosa MD  
phentermine (ADIPEX-P) 37.5 mg tablet Take 1 Tab by mouth every morning. Max Daily Amount: 37.5 mg. 8/2/17  Yes Ran Kapadia MD  
OMEPRAZOLE, BULK,  6/27/17  Yes Provider, Historical  
multivitamin (ONE A DAY) tablet Take 1 Tab by mouth daily. Yes Provider, Historical  
nitroglycerin (NITROSTAT) 0.4 mg SL tablet 1 Tab by SubLINGual route every five (5) minutes as needed. Use for Chest Pain ; Call MD if > 3 tablets required 11/1/18   Basilio Habermann, NP  
pramipexole (MIRAPEX) 0.5 mg tablet TAKE 1 TABLET THREE TIMES DAILY 9/6/18   Delayne Bob Arnold MD  
ACCU-CHEK FASTCLIX LANCET DRUM [facility] CHECK BLOOD SUGAR EVERY DAY 8/30/18   Ran Kapadia MD  
ACCU-CHECHRISTIANO SMARTVIEW TEST STRIP strip CHECK BLOOD SUGAR EVERY DAY 6/8/18   Ran Kapadia MD  
Blood-Glucose Meter (ACCU-CHEK GABRIELLE) misc Check blood sugar daily 6/7/18   Ran Kapadia MD  
naloxone 4 mg/actuation spry 4 mg by Nasal route as needed. 5/4/17   Ifrah Rosa MD  
Back Brace [facility] 1 Device by Does Not Apply route daily as needed for Pain. One, lumbosacral orthosis/back brace with metal struts      Medically necessary 3/16/16   Ifrah Rosa MD  
 
 
Allergies Allergen Reactions  Bactrim [Sulfamethoprim] Rash  Opana [Oxymorphone] Other (comments) Feels like bug crawling under skin and drowziness Review of Systems A comprehensive review of systems was negative except for that written in the History of Present Illness. Physical Exam:  
  
Visit Vitals /50 Pulse 87 Temp 100.2 °F (37.9 °C) Resp 15 Ht 6' 2\" (1.88 m) Wt 132.5 kg (292 lb) SpO2 96% BMI 37.49 kg/m² Physical Exam: 
 
Gen: In general, this is a well nourished male in no acute distress HEENT: Sclerae nonicteric. Oral mucous membranes moist. Dentition normal 
Neck: Supple with midline trachea. CV: RRR without murmur or rub appreciated. Resp:Respirations are unlabored without use of accessory muscles. Lung fields B/L without wheezes or rhonchi. Abd: Soft, nontender, nondistended. Extrem: Extremities are warm, without cyanosis or clubbing. No pitting pretibial edema. Skin: Warm, no visible rashes. Neuro: Patient is alert, oriented, and cooperative. No obvious focal defects. Moves all 4 extremities. Labs Reviewed: 
 
Recent Results (from the past 24 hour(s)) CBC WITH AUTOMATED DIFF Collection Time: 01/14/19  1:14 PM  
Result Value Ref Range WBC 19.0 (H) 4.6 - 13.2 K/uL  
 RBC 4.17 (L) 4.70 - 5.50 M/uL  
 HGB 11.2 (L) 13.0 - 16.0 g/dL HCT 35.3 (L) 36.0 - 48.0 % MCV 84.7 74.0 - 97.0 FL  
 MCH 26.9 24.0 - 34.0 PG  
 MCHC 31.7 31.0 - 37.0 g/dL  
 RDW 14.1 11.6 - 14.5 % PLATELET 084 927 - 348 K/uL MPV 9.9 9.2 - 11.8 FL  
 NEUTROPHILS 81 (H) 42 - 75 % BAND NEUTROPHILS 6 (H) 0 - 5 % LYMPHOCYTES 6 (L) 20 - 51 % MONOCYTES 7 2 - 9 % EOSINOPHILS 0 0 - 5 % BASOPHILS 0 0 - 3 %  
 ABS. NEUTROPHILS 16.6 (H) 1.8 - 8.0 K/UL  
 ABS. LYMPHOCYTES 1.1 0.8 - 3.5 K/UL  
 ABS. MONOCYTES 1.3 (H) 0 - 1.0 K/UL  
 ABS. EOSINOPHILS 0.0 0.0 - 0.4 K/UL  
 ABS. BASOPHILS 0.0 0.0 - 0.06 K/UL  
 DF MANUAL PLATELET COMMENTS ADEQUATE PLATELETS    
 RBC COMMENTS NORMOCYTIC, NORMOCHROMIC METABOLIC PANEL, BASIC  
 Collection Time: 01/14/19  1:14 PM  
Result Value Ref Range Sodium 135 (L) 136 - 145 mmol/L Potassium 3.9 3.5 - 5.5 mmol/L Chloride 102 100 - 108 mmol/L  
 CO2 26 21 - 32 mmol/L Anion gap 7 3.0 - 18 mmol/L Glucose 180 (H) 74 - 99 mg/dL BUN 20 (H) 7.0 - 18 MG/DL Creatinine 2.17 (H) 0.6 - 1.3 MG/DL  
 BUN/Creatinine ratio 9 (L) 12 - 20 GFR est AA 37 (L) >60 ml/min/1.73m2 GFR est non-AA 31 (L) >60 ml/min/1.73m2 Calcium 8.0 (L) 8.5 - 10.1 MG/DL  
CARDIAC PANEL,(CK, CKMB & TROPONIN) Collection Time: 01/14/19  1:14 PM  
Result Value Ref Range  (H) 39 - 308 U/L  
 CK - MB 5.3 (H) <3.6 ng/ml CK-MB Index 1.0 0.0 - 4.0 % Troponin-I, QT 0.07 (H) 0.0 - 0.045 NG/ML  
INFLUENZA A & B AG (RAPID TEST) Collection Time: 01/14/19  2:17 PM  
Result Value Ref Range Influenza A Antigen NEGATIVE  NEG Influenza B Antigen NEGATIVE  NEG    
STREP THROAT SCREEN Collection Time: 01/14/19  2:17 PM  
Result Value Ref Range Special Requests: NO SPECIAL REQUESTS Strep Screen NEGATIVE Culture result: PENDING   
POC LACTIC ACID Collection Time: 01/14/19  2:27 PM  
Result Value Ref Range Lactic Acid (POC) 2.02 (HH) 0.40 - 2.00 mmol/L  
EKG, 12 LEAD, INITIAL Collection Time: 01/14/19  4:37 PM  
Result Value Ref Range Ventricular Rate 90 BPM  
 Atrial Rate 90 BPM  
 P-R Interval 126 ms  
 QRS Duration 86 ms  
 Q-T Interval 338 ms QTC Calculation (Bezet) 413 ms Calculated P Axis 57 degrees Calculated R Axis 58 degrees Calculated T Axis 49 degrees Diagnosis Normal sinus rhythm Normal ECG When compared with ECG of 16-JAN-2015 12:43, No significant change was found URINALYSIS W/ RFLX MICROSCOPIC Collection Time: 01/14/19  4:50 PM  
Result Value Ref Range Color DARK YELLOW Appearance CLOUDY Specific gravity 1.020 1.005 - 1.030    
 pH (UA) 5.0 5.0 - 8.0 Protein 100 (A) NEG mg/dL Glucose NEGATIVE  NEG mg/dL Ketone TRACE (A) NEG mg/dL Bilirubin NEGATIVE  NEG Blood MODERATE (A) NEG Urobilinogen 1.0 0.2 - 1.0 EU/dL Nitrites NEGATIVE  NEG Leukocyte Esterase LARGE (A) NEG    
METABOLIC PANEL, COMPREHENSIVE Collection Time: 01/14/19  4:50 PM  
Result Value Ref Range Sodium 138 136 - 145 mmol/L Potassium 3.6 3.5 - 5.5 mmol/L Chloride 105 100 - 108 mmol/L  
 CO2 25 21 - 32 mmol/L Anion gap 8 3.0 - 18 mmol/L Glucose 126 (H) 74 - 99 mg/dL BUN 21 (H) 7.0 - 18 MG/DL Creatinine 2.17 (H) 0.6 - 1.3 MG/DL  
 BUN/Creatinine ratio 10 (L) 12 - 20 GFR est AA 37 (L) >60 ml/min/1.73m2 GFR est non-AA 31 (L) >60 ml/min/1.73m2 Calcium 7.5 (L) 8.5 - 10.1 MG/DL Bilirubin, total 1.1 (H) 0.2 - 1.0 MG/DL  
 ALT (SGPT) 26 16 - 61 U/L  
 AST (SGOT) 31 15 - 37 U/L Alk. phosphatase 90 45 - 117 U/L Protein, total 6.2 (L) 6.4 - 8.2 g/dL Albumin 2.9 (L) 3.4 - 5.0 g/dL Globulin 3.3 2.0 - 4.0 g/dL A-G Ratio 0.9 0.8 - 1.7 URINE MICROSCOPIC ONLY Collection Time: 01/14/19  4:50 PM  
Result Value Ref Range WBC TOO NUMEROUS TO COUNT 0 - 4 /hpf  
 RBC 4 to 10 0 - 5 /hpf Epithelial cells 2+ 0 - 5 /lpf Bacteria 4+ (A) NEG /hpf POC LACTIC ACID Collection Time: 01/14/19  4:58 PM  
Result Value Ref Range Lactic Acid (POC) 1.49 0.40 - 2.00 mmol/L Imaging Reviewed: CXR IMPRESSION: 
  
Hypoinflation. Mild enlargement of the cardiac silhouette with vascular 
congestion. Otherwise no definite acute pulmonary process.  
 
 
 
Laila Rodriguez MD 
1/14/2019, 6:20 PM

## 2019-01-14 NOTE — ED NOTES
Attempted to give report to nurse on 4N for bed 465. This RN was placed on hold for over 10 minutes. Cruz Gresham RN notified.

## 2019-01-15 ENCOUNTER — APPOINTMENT (OUTPATIENT)
Dept: GENERAL RADIOLOGY | Age: 64
DRG: 853 | End: 2019-01-15
Attending: UROLOGY
Payer: MEDICARE

## 2019-01-15 ENCOUNTER — ANESTHESIA (OUTPATIENT)
Dept: SURGERY | Age: 64
DRG: 853 | End: 2019-01-15
Payer: MEDICARE

## 2019-01-15 ENCOUNTER — ANESTHESIA EVENT (OUTPATIENT)
Dept: SURGERY | Age: 64
DRG: 853 | End: 2019-01-15
Payer: MEDICARE

## 2019-01-15 ENCOUNTER — APPOINTMENT (OUTPATIENT)
Dept: CT IMAGING | Age: 64
DRG: 853 | End: 2019-01-15
Attending: NURSE PRACTITIONER
Payer: MEDICARE

## 2019-01-15 LAB
ALBUMIN SERPL-MCNC: 2.6 G/DL (ref 3.4–5)
ALBUMIN/GLOB SERPL: 0.6 {RATIO} (ref 0.8–1.7)
ALP SERPL-CCNC: 103 U/L (ref 45–117)
ALT SERPL-CCNC: 26 U/L (ref 16–61)
ANION GAP SERPL CALC-SCNC: 8 MMOL/L (ref 3–18)
APTT PPP: 37.7 SEC (ref 23–36.4)
AST SERPL-CCNC: 30 U/L (ref 15–37)
ATRIAL RATE: 90 BPM
BILIRUB SERPL-MCNC: 0.9 MG/DL (ref 0.2–1)
BUN SERPL-MCNC: 24 MG/DL (ref 7–18)
BUN/CREAT SERPL: 11 (ref 12–20)
CALCIUM SERPL-MCNC: 7.6 MG/DL (ref 8.5–10.1)
CALCULATED P AXIS, ECG09: 57 DEGREES
CALCULATED R AXIS, ECG10: 58 DEGREES
CALCULATED T AXIS, ECG11: 49 DEGREES
CHLORIDE SERPL-SCNC: 109 MMOL/L (ref 100–108)
CHOLEST SERPL-MCNC: 64 MG/DL
CO2 SERPL-SCNC: 22 MMOL/L (ref 21–32)
CREAT SERPL-MCNC: 2.12 MG/DL (ref 0.6–1.3)
DIAGNOSIS, 93000: NORMAL
ERYTHROCYTE [DISTWIDTH] IN BLOOD BY AUTOMATED COUNT: 14.3 % (ref 11.6–14.5)
GLOBULIN SER CALC-MCNC: 4.3 G/DL (ref 2–4)
GLUCOSE BLD STRIP.AUTO-MCNC: 118 MG/DL (ref 70–110)
GLUCOSE BLD STRIP.AUTO-MCNC: 134 MG/DL (ref 70–110)
GLUCOSE BLD STRIP.AUTO-MCNC: 147 MG/DL (ref 70–110)
GLUCOSE BLD STRIP.AUTO-MCNC: 151 MG/DL (ref 70–110)
GLUCOSE BLD STRIP.AUTO-MCNC: 171 MG/DL (ref 70–110)
GLUCOSE BLD STRIP.AUTO-MCNC: 185 MG/DL (ref 70–110)
GLUCOSE BLD STRIP.AUTO-MCNC: 188 MG/DL (ref 70–110)
GLUCOSE SERPL-MCNC: 134 MG/DL (ref 74–99)
HCT VFR BLD AUTO: 34.1 % (ref 36–48)
HDLC SERPL-MCNC: 25 MG/DL (ref 40–60)
HDLC SERPL: 2.6 {RATIO} (ref 0–5)
HGB BLD-MCNC: 10.8 G/DL (ref 13–16)
LDLC SERPL CALC-MCNC: 18.4 MG/DL (ref 0–100)
LIPID PROFILE,FLP: ABNORMAL
MCH RBC QN AUTO: 27.1 PG (ref 24–34)
MCHC RBC AUTO-ENTMCNC: 31.7 G/DL (ref 31–37)
MCV RBC AUTO: 85.5 FL (ref 74–97)
P-R INTERVAL, ECG05: 126 MS
PLATELET # BLD AUTO: 184 K/UL (ref 135–420)
PMV BLD AUTO: 9.7 FL (ref 9.2–11.8)
POTASSIUM SERPL-SCNC: 4 MMOL/L (ref 3.5–5.5)
PROT SERPL-MCNC: 6.9 G/DL (ref 6.4–8.2)
Q-T INTERVAL, ECG07: 338 MS
QRS DURATION, ECG06: 86 MS
QTC CALCULATION (BEZET), ECG08: 413 MS
RBC # BLD AUTO: 3.99 M/UL (ref 4.7–5.5)
SODIUM SERPL-SCNC: 139 MMOL/L (ref 136–145)
TRIGL SERPL-MCNC: 103 MG/DL (ref ?–150)
TROPONIN I SERPL-MCNC: 0.06 NG/ML (ref 0–0.04)
TROPONIN I SERPL-MCNC: 0.13 NG/ML (ref 0–0.04)
VENTRICULAR RATE, ECG03: 90 BPM
VLDLC SERPL CALC-MCNC: 20.6 MG/DL
WBC # BLD AUTO: 12.9 K/UL (ref 4.6–13.2)

## 2019-01-15 PROCEDURE — 85027 COMPLETE CBC AUTOMATED: CPT

## 2019-01-15 PROCEDURE — 94660 CPAP INITIATION&MGMT: CPT

## 2019-01-15 PROCEDURE — BT1D1ZZ FLUOROSCOPY OF RIGHT KIDNEY, URETER AND BLADDER USING LOW OSMOLAR CONTRAST: ICD-10-PCS | Performed by: UROLOGY

## 2019-01-15 PROCEDURE — 74011250636 HC RX REV CODE- 250/636

## 2019-01-15 PROCEDURE — 74011250636 HC RX REV CODE- 250/636: Performed by: UROLOGY

## 2019-01-15 PROCEDURE — 74011250636 HC RX REV CODE- 250/636: Performed by: INTERNAL MEDICINE

## 2019-01-15 PROCEDURE — 77030018832 HC SOL IRR H20 ICUM -A: Performed by: UROLOGY

## 2019-01-15 PROCEDURE — 77030019927 HC TBNG IRR CYSTO BAXT -A: Performed by: UROLOGY

## 2019-01-15 PROCEDURE — 74011000250 HC RX REV CODE- 250: Performed by: HOSPITALIST

## 2019-01-15 PROCEDURE — 84484 ASSAY OF TROPONIN QUANT: CPT

## 2019-01-15 PROCEDURE — 0T7D8ZZ DILATION OF URETHRA, VIA NATURAL OR ARTIFICIAL OPENING ENDOSCOPIC: ICD-10-PCS | Performed by: UROLOGY

## 2019-01-15 PROCEDURE — 74176 CT ABD & PELVIS W/O CONTRAST: CPT

## 2019-01-15 PROCEDURE — 74011250637 HC RX REV CODE- 250/637: Performed by: NURSE PRACTITIONER

## 2019-01-15 PROCEDURE — 51798 US URINE CAPACITY MEASURE: CPT

## 2019-01-15 PROCEDURE — 74420 UROGRAPHY RTRGR +-KUB: CPT

## 2019-01-15 PROCEDURE — 74011636637 HC RX REV CODE- 636/637: Performed by: NURSE PRACTITIONER

## 2019-01-15 PROCEDURE — 82962 GLUCOSE BLOOD TEST: CPT

## 2019-01-15 PROCEDURE — 0T9B80Z DRAINAGE OF BLADDER WITH DRAINAGE DEVICE, VIA NATURAL OR ARTIFICIAL OPENING ENDOSCOPIC: ICD-10-PCS | Performed by: UROLOGY

## 2019-01-15 PROCEDURE — 87086 URINE CULTURE/COLONY COUNT: CPT

## 2019-01-15 PROCEDURE — 5A09357 ASSISTANCE WITH RESPIRATORY VENTILATION, LESS THAN 24 CONSECUTIVE HOURS, CONTINUOUS POSITIVE AIRWAY PRESSURE: ICD-10-PCS | Performed by: HOSPITALIST

## 2019-01-15 PROCEDURE — C2617 STENT, NON-COR, TEM W/O DEL: HCPCS | Performed by: UROLOGY

## 2019-01-15 PROCEDURE — 74011250637 HC RX REV CODE- 250/637: Performed by: HOSPITALIST

## 2019-01-15 PROCEDURE — 74011250636 HC RX REV CODE- 250/636: Performed by: HOSPITALIST

## 2019-01-15 PROCEDURE — 77030010509 HC AIRWY LMA MSK TELE -A: Performed by: NURSE ANESTHETIST, CERTIFIED REGISTERED

## 2019-01-15 PROCEDURE — C1769 GUIDE WIRE: HCPCS | Performed by: UROLOGY

## 2019-01-15 PROCEDURE — 77030032490 HC SLV COMPR SCD KNE COVD -B: Performed by: UROLOGY

## 2019-01-15 PROCEDURE — 77030020268 HC MISC GENERAL SUPPLY: Performed by: UROLOGY

## 2019-01-15 PROCEDURE — 85730 THROMBOPLASTIN TIME PARTIAL: CPT

## 2019-01-15 PROCEDURE — 77030013553 HC DIL URETH MEATAL COOK -C: Performed by: UROLOGY

## 2019-01-15 PROCEDURE — 74011000250 HC RX REV CODE- 250: Performed by: UROLOGY

## 2019-01-15 PROCEDURE — 0T768DZ DILATION OF RIGHT URETER WITH INTRALUMINAL DEVICE, VIA NATURAL OR ARTIFICIAL OPENING ENDOSCOPIC: ICD-10-PCS | Performed by: UROLOGY

## 2019-01-15 PROCEDURE — 65660000000 HC RM CCU STEPDOWN

## 2019-01-15 PROCEDURE — 80053 COMPREHEN METABOLIC PANEL: CPT

## 2019-01-15 PROCEDURE — 76210000006 HC OR PH I REC 0.5 TO 1 HR: Performed by: UROLOGY

## 2019-01-15 PROCEDURE — 36415 COLL VENOUS BLD VENIPUNCTURE: CPT

## 2019-01-15 PROCEDURE — 77030020782 HC GWN BAIR PAWS FLX 3M -B: Performed by: UROLOGY

## 2019-01-15 PROCEDURE — 76060000032 HC ANESTHESIA 0.5 TO 1 HR: Performed by: UROLOGY

## 2019-01-15 PROCEDURE — 80061 LIPID PANEL: CPT

## 2019-01-15 PROCEDURE — 76010000160 HC OR TIME 0.5 TO 1 HR INTENSV-TIER 1: Performed by: UROLOGY

## 2019-01-15 DEVICE — URETERAL STENT
Type: IMPLANTABLE DEVICE | Site: URETER | Status: FUNCTIONAL
Brand: POLARIS™ ULTRA

## 2019-01-15 RX ORDER — TAMSULOSIN HYDROCHLORIDE 0.4 MG/1
0.4 CAPSULE ORAL DAILY
Status: DISCONTINUED | OUTPATIENT
Start: 2019-01-16 | End: 2019-01-18 | Stop reason: HOSPADM

## 2019-01-15 RX ORDER — TRAMADOL HYDROCHLORIDE 50 MG/1
50 TABLET ORAL
Status: DISCONTINUED | OUTPATIENT
Start: 2019-01-15 | End: 2019-01-17

## 2019-01-15 RX ORDER — LEVOFLOXACIN 5 MG/ML
500 INJECTION, SOLUTION INTRAVENOUS EVERY 24 HOURS
Status: DISCONTINUED | OUTPATIENT
Start: 2019-01-15 | End: 2019-01-18 | Stop reason: HOSPADM

## 2019-01-15 RX ORDER — ONDANSETRON 2 MG/ML
INJECTION INTRAMUSCULAR; INTRAVENOUS AS NEEDED
Status: DISCONTINUED | OUTPATIENT
Start: 2019-01-15 | End: 2019-01-15 | Stop reason: HOSPADM

## 2019-01-15 RX ORDER — HYDROMORPHONE HYDROCHLORIDE 1 MG/ML
INJECTION, SOLUTION INTRAMUSCULAR; INTRAVENOUS; SUBCUTANEOUS
Status: COMPLETED
Start: 2019-01-15 | End: 2019-01-15

## 2019-01-15 RX ORDER — MAGNESIUM SULFATE 100 %
4 CRYSTALS MISCELLANEOUS AS NEEDED
Status: DISCONTINUED | OUTPATIENT
Start: 2019-01-15 | End: 2019-01-15 | Stop reason: HOSPADM

## 2019-01-15 RX ORDER — DEXTROSE 50 % IN WATER (D50W) INTRAVENOUS SYRINGE
25-50 AS NEEDED
Status: DISCONTINUED | OUTPATIENT
Start: 2019-01-15 | End: 2019-01-15 | Stop reason: HOSPADM

## 2019-01-15 RX ORDER — MIDAZOLAM HYDROCHLORIDE 1 MG/ML
INJECTION, SOLUTION INTRAMUSCULAR; INTRAVENOUS AS NEEDED
Status: DISCONTINUED | OUTPATIENT
Start: 2019-01-15 | End: 2019-01-15 | Stop reason: HOSPADM

## 2019-01-15 RX ORDER — INSULIN LISPRO 100 [IU]/ML
INJECTION, SOLUTION INTRAVENOUS; SUBCUTANEOUS
Status: DISCONTINUED | OUTPATIENT
Start: 2019-01-15 | End: 2019-01-18 | Stop reason: HOSPADM

## 2019-01-15 RX ORDER — DEXTROSE 50 % IN WATER (D50W) INTRAVENOUS SYRINGE
25-50 AS NEEDED
Status: DISCONTINUED | OUTPATIENT
Start: 2019-01-15 | End: 2019-01-18 | Stop reason: HOSPADM

## 2019-01-15 RX ORDER — FENTANYL CITRATE 50 UG/ML
50 INJECTION, SOLUTION INTRAMUSCULAR; INTRAVENOUS
Status: DISCONTINUED | OUTPATIENT
Start: 2019-01-15 | End: 2019-01-15 | Stop reason: HOSPADM

## 2019-01-15 RX ORDER — LABETALOL HCL 20 MG/4 ML
5 SYRINGE (ML) INTRAVENOUS AS NEEDED
Status: DISCONTINUED | OUTPATIENT
Start: 2019-01-15 | End: 2019-01-15 | Stop reason: HOSPADM

## 2019-01-15 RX ORDER — HYDROMORPHONE HYDROCHLORIDE 1 MG/ML
0.5 INJECTION, SOLUTION INTRAMUSCULAR; INTRAVENOUS; SUBCUTANEOUS
Status: COMPLETED | OUTPATIENT
Start: 2019-01-15 | End: 2019-01-15

## 2019-01-15 RX ORDER — MAGNESIUM SULFATE 100 %
4 CRYSTALS MISCELLANEOUS AS NEEDED
Status: DISCONTINUED | OUTPATIENT
Start: 2019-01-15 | End: 2019-01-18 | Stop reason: HOSPADM

## 2019-01-15 RX ORDER — ONDANSETRON 2 MG/ML
4 INJECTION INTRAMUSCULAR; INTRAVENOUS ONCE
Status: DISCONTINUED | OUTPATIENT
Start: 2019-01-15 | End: 2019-01-15 | Stop reason: HOSPADM

## 2019-01-15 RX ORDER — LIDOCAINE HYDROCHLORIDE 20 MG/ML
INJECTION, SOLUTION EPIDURAL; INFILTRATION; INTRACAUDAL; PERINEURAL AS NEEDED
Status: DISCONTINUED | OUTPATIENT
Start: 2019-01-15 | End: 2019-01-15 | Stop reason: HOSPADM

## 2019-01-15 RX ORDER — MORPHINE SULFATE 4 MG/ML
4 INJECTION INTRAVENOUS
Status: DISCONTINUED | OUTPATIENT
Start: 2019-01-15 | End: 2019-01-16

## 2019-01-15 RX ORDER — LABETALOL HCL 20 MG/4 ML
SYRINGE (ML) INTRAVENOUS
Status: COMPLETED
Start: 2019-01-15 | End: 2019-01-15

## 2019-01-15 RX ORDER — FENTANYL CITRATE 50 UG/ML
INJECTION, SOLUTION INTRAMUSCULAR; INTRAVENOUS AS NEEDED
Status: DISCONTINUED | OUTPATIENT
Start: 2019-01-15 | End: 2019-01-15 | Stop reason: HOSPADM

## 2019-01-15 RX ORDER — PROPOFOL 10 MG/ML
INJECTION, EMULSION INTRAVENOUS AS NEEDED
Status: DISCONTINUED | OUTPATIENT
Start: 2019-01-15 | End: 2019-01-15 | Stop reason: HOSPADM

## 2019-01-15 RX ORDER — SUCCINYLCHOLINE CHLORIDE 20 MG/ML
INJECTION INTRAMUSCULAR; INTRAVENOUS AS NEEDED
Status: DISCONTINUED | OUTPATIENT
Start: 2019-01-15 | End: 2019-01-15 | Stop reason: HOSPADM

## 2019-01-15 RX ORDER — INSULIN LISPRO 100 [IU]/ML
INJECTION, SOLUTION INTRAVENOUS; SUBCUTANEOUS ONCE
Status: DISCONTINUED | OUTPATIENT
Start: 2019-01-15 | End: 2019-01-15 | Stop reason: HOSPADM

## 2019-01-15 RX ORDER — PHENYLEPHRINE HCL IN 0.9% NACL 1 MG/10 ML
SYRINGE (ML) INTRAVENOUS AS NEEDED
Status: DISCONTINUED | OUTPATIENT
Start: 2019-01-15 | End: 2019-01-15 | Stop reason: HOSPADM

## 2019-01-15 RX ADMIN — PROPOFOL 200 MG: 10 INJECTION, EMULSION INTRAVENOUS at 16:33

## 2019-01-15 RX ADMIN — GABAPENTIN 800 MG: 400 CAPSULE ORAL at 12:49

## 2019-01-15 RX ADMIN — HYDROMORPHONE HYDROCHLORIDE 0.5 MG: 1 INJECTION, SOLUTION INTRAMUSCULAR; INTRAVENOUS; SUBCUTANEOUS at 15:05

## 2019-01-15 RX ADMIN — ACETAMINOPHEN 650 MG: 325 TABLET ORAL at 21:22

## 2019-01-15 RX ADMIN — MIDAZOLAM HYDROCHLORIDE 2 MG: 1 INJECTION, SOLUTION INTRAMUSCULAR; INTRAVENOUS at 16:28

## 2019-01-15 RX ADMIN — WATER 2 G: 1 INJECTION INTRAMUSCULAR; INTRAVENOUS; SUBCUTANEOUS at 16:40

## 2019-01-15 RX ADMIN — ACETAMINOPHEN 650 MG: 325 TABLET ORAL at 09:21

## 2019-01-15 RX ADMIN — LIDOCAINE HYDROCHLORIDE 80 MG: 20 INJECTION, SOLUTION EPIDURAL; INFILTRATION; INTRACAUDAL; PERINEURAL at 16:32

## 2019-01-15 RX ADMIN — SUCCINYLCHOLINE CHLORIDE 140 MG: 20 INJECTION INTRAMUSCULAR; INTRAVENOUS at 16:33

## 2019-01-15 RX ADMIN — INSULIN LISPRO 2 UNITS: 100 INJECTION, SOLUTION INTRAVENOUS; SUBCUTANEOUS at 22:08

## 2019-01-15 RX ADMIN — Medication 50 MCG: at 16:51

## 2019-01-15 RX ADMIN — BUPROPION HYDROCHLORIDE 150 MG: 150 TABLET, EXTENDED RELEASE ORAL at 09:21

## 2019-01-15 RX ADMIN — HEPARIN SODIUM 5000 UNITS: 5000 INJECTION INTRAVENOUS; SUBCUTANEOUS at 04:08

## 2019-01-15 RX ADMIN — GABAPENTIN 800 MG: 400 CAPSULE ORAL at 09:21

## 2019-01-15 RX ADMIN — GABAPENTIN 800 MG: 400 CAPSULE ORAL at 21:27

## 2019-01-15 RX ADMIN — TRAMADOL HYDROCHLORIDE 50 MG: 50 TABLET, COATED ORAL at 10:40

## 2019-01-15 RX ADMIN — Medication 5 MG: at 17:41

## 2019-01-15 RX ADMIN — HEPARIN SODIUM 5000 UNITS: 5000 INJECTION INTRAVENOUS; SUBCUTANEOUS at 20:26

## 2019-01-15 RX ADMIN — HEPARIN SODIUM 5000 UNITS: 5000 INJECTION INTRAVENOUS; SUBCUTANEOUS at 12:48

## 2019-01-15 RX ADMIN — TRAMADOL HYDROCHLORIDE 50 MG: 50 TABLET, COATED ORAL at 20:24

## 2019-01-15 RX ADMIN — FENTANYL CITRATE 50 MCG: 50 INJECTION, SOLUTION INTRAMUSCULAR; INTRAVENOUS at 16:45

## 2019-01-15 RX ADMIN — SIMVASTATIN 10 MG: 20 TABLET, FILM COATED ORAL at 21:27

## 2019-01-15 RX ADMIN — CEFTRIAXONE SODIUM 1 G: 1 INJECTION, POWDER, FOR SOLUTION INTRAMUSCULAR; INTRAVENOUS at 20:35

## 2019-01-15 RX ADMIN — DULOXETINE HYDROCHLORIDE 60 MG: 60 CAPSULE, DELAYED RELEASE ORAL at 09:21

## 2019-01-15 RX ADMIN — FENTANYL CITRATE 50 MCG: 50 INJECTION, SOLUTION INTRAMUSCULAR; INTRAVENOUS at 17:01

## 2019-01-15 RX ADMIN — INSULIN LISPRO 2 UNITS: 100 INJECTION, SOLUTION INTRAVENOUS; SUBCUTANEOUS at 12:48

## 2019-01-15 RX ADMIN — FENTANYL CITRATE 100 MCG: 50 INJECTION, SOLUTION INTRAMUSCULAR; INTRAVENOUS at 16:32

## 2019-01-15 RX ADMIN — ONDANSETRON 4 MG: 2 INJECTION INTRAMUSCULAR; INTRAVENOUS at 16:46

## 2019-01-15 RX ADMIN — MORPHINE SULFATE 4 MG: 4 INJECTION INTRAVENOUS at 21:58

## 2019-01-15 RX ADMIN — LEVOFLOXACIN 500 MG: 5 INJECTION, SOLUTION INTRAVENOUS at 22:03

## 2019-01-15 RX ADMIN — LABETALOL HYDROCHLORIDE 5 MG: 5 INJECTION, SOLUTION INTRAVENOUS at 17:41

## 2019-01-15 RX ADMIN — INSULIN LISPRO 2 UNITS: 100 INJECTION, SOLUTION INTRAVENOUS; SUBCUTANEOUS at 15:53

## 2019-01-15 RX ADMIN — SODIUM CHLORIDE 100 ML/HR: 900 INJECTION, SOLUTION INTRAVENOUS at 06:16

## 2019-01-15 NOTE — PROGRESS NOTES
PT orders received and chart was reviewed. Attempted to see patient for evaluation however he is off the floor. Will continue to follow.   Kirill Monahan, PT

## 2019-01-15 NOTE — PROGRESS NOTES
#1 Infected 9mm proximal right ureteral stone s/p stent 1/15 #2 1cm, bulbomembranous, 15fr, soft urethral stricture s/p dilation 1/15 #3 Urosepsis #4 Neurogenic bladder? (history of pdet 40 at 500mL in 2013) #5 Rectal exam with 15gm prostate, anodular Found to have urethral stricture during cystoscopy, stent placement today. Underwent dilation in order to access bladder for stent. Urine from kidney sent for culture. Continue abx and follow cultures. Continue [place] for 3 days, can remove and void trial Friday morning. Will need follow up for definitive stone treatment as well as further bladder work up. Recs: 1. Abx, follow cultures 2. [place] x 3 days 3. Follow up outpatient for definitive stone treatment (Message sent to [facility])

## 2019-01-15 NOTE — ANESTHESIA PREPROCEDURE EVALUATION
Anesthetic History PONV Review of Systems / Medical History Patient summary reviewed and pertinent labs reviewed Pulmonary Sleep apnea Neuro/Psych Psychiatric history Cardiovascular Hypertension GI/Hepatic/Renal 
  
 
 
Renal disease: stones Endo/Other Diabetes: type 2 Morbid obesity and arthritis Other Findings Physical Exam 
 
Airway Mallampati: III 
TM Distance: 4 - 6 cm Neck ROM: decreased range of motion Mouth opening: Diminished (comment) Cardiovascular Rhythm: regular Rate: normal 
 
 
 
 Dental 
 
Dentition: Poor dentition and Loose teeth Pulmonary Breath sounds clear to auscultation Abdominal 
GI exam deferred Other Findings Anesthetic Plan ASA: 3 Anesthesia type: general 
 
 
 
 
Induction: Intravenous Anesthetic plan and risks discussed with: Patient

## 2019-01-15 NOTE — INTERVAL H&P NOTE
H&P Update: 
Feliberto William was seen and examined. History and physical has been reviewed. The patient has been examined.  There have been no significant clinical changes since the completion of the originally dated History and Physical. 
 
Signed By: Pattie Gregorio MD   
 January 15, 2019 3:36 PM

## 2019-01-15 NOTE — PERIOP NOTES
TRANSFER - OUT REPORT: 
 
Verbal report given to Lilli ANNE(name) on Dilan William  being transferred to [address] [place] [address](unit) for routine post - op Report consisted of patients Situation, Background, Assessment and  
Recommendations(SBAR). Information from the following report(s) SBAR, OR Summary, Intake/Output and MAR was reviewed with the receiving nurse. Lines:  
Peripheral IV 01/14/19 Right;Posterior Forearm (Active) Site Assessment Clean, dry, & intact 1/15/2019  4:00 PM  
Phlebitis Assessment 0 1/15/2019  4:00 PM  
Infiltration Assessment 0 1/15/2019  4:00 PM  
Dressing Status Clean, dry, & intact 1/15/2019  4:00 PM  
Dressing Type Transparent 1/15/2019  8:00 AM  
Hub Color/Line Status Pink; Infusing;Flushed;Patent 1/15/2019  8:00 AM  
Alcohol Cap Used Yes 1/15/2019  8:00 AM  
   
Peripheral IV 01/14/19 Left Antecubital (Active) Site Assessment Clean, dry, & intact 1/15/2019  4:00 PM  
Phlebitis Assessment 0 1/15/2019  4:00 PM  
Infiltration Assessment 0 1/15/2019  4:00 PM  
Dressing Status Clean, dry, & intact 1/15/2019  4:00 PM  
Dressing Type Transparent 1/15/2019  8:00 AM  
Hub Color/Line Status Pink;Flushed;Patent;Capped 1/15/2019  8:00 AM  
Alcohol Cap Used Yes 1/15/2019  8:00 AM  
  
 
Opportunity for questions and clarification was provided. Patient transported with: 
 O2 @ 3 liters Tech

## 2019-01-15 NOTE — PROGRESS NOTES
Problem: Falls - Risk of 
Goal: *Absence of Falls Document Yoanna Salcedo Fall Risk and appropriate interventions in the flowsheet. Outcome: Progressing Towards Goal 
Fall Risk Interventions: 
Mobility Interventions: Assess mobility with egress test, Communicate number of staff needed for ambulation/transfer, OT consult for ADLs, Patient to call before getting OOB, PT Consult for mobility concerns, PT Consult for assist device competence, Strengthening exercises (ROM-active/passive) Medication Interventions: Evaluate medications/consider consulting pharmacy, Teach patient to arise slowly Elimination Interventions: Urinal in reach History of Falls Interventions: Bed/chair exit alarm, Consult care management for discharge planning, Door open when patient unattended, Investigate reason for fall

## 2019-01-15 NOTE — DIABETES MGMT
GLYCEMIC CONTROL: 
 
A1c of 10.0% (11/01/2018). Patient currently in [address] [place] [address]. Plan to see patient on 01/16/2019 for inpatient glycemic assessment, assessment of home diabetes management, and educational needs. Sarahi Arce RN Pager: [phone]

## 2019-01-15 NOTE — PROGRESS NOTES
Patient voided 375mL. Post void residual 0mL 
 
1900 Bedside and Verbal shift change [id number] ([facility]) by Vandana Lino RN (offgoing nurse). Report included the following information SBAR, Kardex, ED Summary, Intake/Output, MAR and Recent Results.

## 2019-01-15 NOTE — ROUTINE PROCESS
Assumed patient care. Report received from [facility]. Patient arrived from ER per stretcher. Patient in bed, awake, alert and oriented x3. Vital signs taken. Orientation to room provided. Denies pain or discomforts at this time. Call light placed within reach. Bed in low position. Noted admitting orders from Dr. Ingrid Fernandez. Primary Nurse Izzy Villarreal, RN and Olivier Grant RN performed a dual skin assessment on this patient No impairment noted Tad score is 22. 
 
10:02 PM - Noted low BP 89/60, pt complaining of being light headed. Called Dr Panda Stafford for further orders. Per MD will review pt chart and will call this RN back. Pt updated. 10:30 PM - Received call from Dr Panda Stafford with orders for 1 L IVF bolus and re-assess post adm. Started bolus now. Will continue to monitor. 2335 -re-assess BP after NS bolus of 1L NS  currently 99/52, pt remained A/O x 4. Denies discomforts at this time. 0231 - pt placed on CPAP machine by RT after obtaining orders from Dr Panda Stafford. Per pt, he uses CPAP at home. 7:36 AM - Bedside shift change report given to Lexa Link (oncoming nurse) by James Lobato RN (offgoing nurse). Report given with SBAR, Kardex, Intake/Output, MAR and Recent Results.

## 2019-01-15 NOTE — PROGRESS NOTES
conducted an initial consultation and Spiritual Assessment for Maddie William, who is a 61 y.o.,male. Patients Primary Language is: [place]. According to the patients EMR Roman Catholic Affiliation is: Cari Sanz. The reason the Patient came to the hospital is:  
Patient Active Problem List  
 Diagnosis Date Noted  Sepsis ([facility] [address] 01/14/2019  UTI (urinary tract infection) 01/14/2019  Chronic pain syndrome 06/15/2017  Lumbar post-laminectomy syndrome 06/15/2017  Lumbar and sacral osteoarthritis 06/15/2017  Anemia 12/07/2016  Advanced care planning/counseling discussion 08/08/2016  Chronic midline low back pain with sciatica 05/16/2016  History of depression 06/02/2015  Thoracic or lumbosacral neuritis or radiculitis, unspecified 01/26/2015  Postlaminectomy syndrome, lumbar region 01/26/2015  DDD (degenerative disc disease), lumbar 07/30/2014  Lumbar facet arthropathy 07/30/2014  S/P lumbar laminectomy 07/30/2014  S/P lumbar spinal fusion 07/30/2014  Lumbar nerve root impingement 07/30/2014  Lumbosacral radiculopathy at S1 07/30/2014  DDD (degenerative disc disease), thoracic 07/30/2014  Thoracic compression fracture ([facility] [address] 07/30/2014  Back muscle spasm 07/30/2014  Major depression 07/30/2014  TRUE (generalized anxiety disorder) 07/30/2014  Abdominal adhesions 07/30/2014  Constipation, chronic 07/30/2014  Insomnia secondary to chronic pain 07/30/2014  MANISH (obstructive sleep apnea) 07/30/2014  DM (diabetes mellitus) ([facility] [address] 07/30/2014  
 HTN (hypertension) 07/30/2014  Hyperlipidemia 07/30/2014  Frequent falls 07/30/2014 The  provided the following Interventions: 
Initiated a relationship of care and support with patient's family. Listened empathically. Provided chaplaincy education. Provided information about Spiritual Care Services. Offered prayer and assurance of continued prayers on patient's behalf. Chart reviewed. The following outcomes where achieved: 
Patient shared limited information about both their medical narrative and spiritual journey/beliefs. Patient processed feeling about current hospitalization. Patient expressed gratitude for 's visit. Assessment: 
Patient does not have any Scientology/cultural needs that will affect patients preferences in health care. There are no spiritual or Scientology issues which require intervention at this time. Plan: 
Chaplains will continue to follow and will provide pastoral care on an as needed/requested basis.  recommends bedside caregivers page  on duty if patient shows signs of acute spiritual or emotional distress. [id number] [facility] Spiritual Care  
[phone]

## 2019-01-15 NOTE — H&P (VIEW-ONLY)
[address] [facility], [address] [facility] Phone: [phone] Urology Consult / Admit Note   
  
 
 
 
1. Sepsis, due to unspecified organism ([address] 2. Dizziness 3. Weakness 4. Fever, unspecified fever cause 5. Leukocytosis, unspecified type 6. Acute cystitis without hematuria Assessment & Plan Assessment - Right flank pain. CVA tenderness on exam. Concerning for ureteral stone vs pyelo - Kidney stone- 7 x 11 mm calculus in lower pole right kidney by CT 7/2018 
- Urosepsis- Urine Cx prelim GNR WBC 12.9<19.0, febrile T max 102.6, Tachy 
- Gross hematuria- possibly 2/2 above. Hgb stable - Neurogenic bladder - QUANG  Creat trending down 2.12 < 2.17 
- Hx of x of spinal surgery on 8/22/2012. Plan NPO - ordered STAT CT w/o contrast ordered UCx final results pending. Abx per primary currently on Rocephin Serial PVR's. If PVR >300 would place hernandez IVF, symptom management per primary Trend labs Start Flomax Follow up arranged: NO Following [facility] [place] Pager # [phone] Available M-F 7:30- 5pm .  After 5pm and weekends please call answering service at [phone] I have seen and examined this patient independently, I reviewed pertinent labs and imaging, and I agree with the resident's findings, assessment and plan as outlined above, with ammendments as follows: 
  
CT shows obstructing stone right proximal. Proceed to emergent stent placement. Discussed with patient need for follow up and secondary treatment of stone. All questions answered. Informed consent signed.  Atif Martin MD 
[facility] [place], [address] Pager [phone] Chief Complaint Patient presents with  Dizziness x3 days. falling every time gets up.  Fatigue fever at home. 102.6F oral temp in triage  Back Pain  
  diagnosed with kidney stones and having urinary retention and frequency   
 :  
 
HISTORY OF PRESENT ILLNESS:   
Elenita Nelson is a 61y.o. year old white male who was admitted to the hospital on 1/14/2019 with a diagnosis of Sepsis ([address] UTI (urinary tract infection). The patient is now referred to [facility] by YUSEF Phillips for urologic evaluation due Urosepsis, right flank pain. He has been seen by a urologist and is not receiving any ongoing urologic care. Previously saw Dr. Itz Ortega last on 2/19/103  Previous urologic history is significant for Kidney stones and Neurogenic Bladder. He has a hx of spinal surgery on 8/22/2012. HPI: Patient reports onset of right flank pain and fever of 104 2 days ago with gross hematuria that started yesterday. Denies any dysuria. Nausea w/o vomiting. States he feels like he is passing a stone. Admitted for urosepsis. Ucx + GNR, on rocephin. QUANG  Creat trending down 2.12 < 2.17. T max 102.6, tachy. Pain not well controlled. Last at at 0800 this am.  
 
Location  tract Duration days Associated signs/symptoms  As above Modifying factors as above Severity severe Past Medical History:  
Diagnosis Date  Abdominal adhesions 7/30/2014  Back muscle spasm 7/30/2014  Back pain, lumbosacral   
 Cellulitis  Constipation, chronic 7/30/2014  DDD (degenerative disc disease), lumbar 7/30/2014  DDD (degenerative disc disease), thoracic 7/30/2014  Depression  Diabetes ([address]  DM (diabetes mellitus) ([address] 7/30/2014  Frequent falls 7/30/2014  TRUE (generalized anxiety disorder) 7/30/2014  High cholesterol  HTN (hypertension) 7/30/2014  Hyperlipidemia 7/30/2014  Hypertension  Insomnia secondary to chronic pain 7/30/2014  Kidney stones  LBP radiating to both legs 7/30/2014  Lumbar facet arthropathy 7/30/2014  Lumbar nerve root impingement 7/30/2014  Lumbosacral radiculopathy at S1 7/30/2014  Major depression 7/30/2014  Nausea & vomiting  Neurological disorder   
 sciatica  MANISH (obstructive sleep apnea) 7/30/2014  S/P lumbar laminectomy 7/30/2014  S/P lumbar spinal fusion 7/30/2014  Small vessel disease  Thoracic compression fracture ([address] 7/30/2014  Unspecified sleep apnea USES CPAP EVERY NIGHT Past Surgical History:  
Procedure Laterality Date  HX APPENDECTOMY  HX BACK SURGERY Lumbar fusion [address]  HX OTHER SURGICAL    
 EMG - S1 disorder [address] Social History Socioeconomic History  Marital status:  Spouse name: Not on file  Number of children: Not on file  Years of education: Not on file  Highest education level: Not on file Tobacco Use  Smoking status: Never Smoker  Smokeless tobacco: Never Used Substance and Sexual Activity  Alcohol use: No  
 Drug use: No  
 Sexual activity: Yes  
  Partners: Female Birth control/protection: Surgical  
 
 
Allergies Allergen Reactions  Bactrim [Sulfamethoprim] Rash  Opana [Oxymorphone] Other (comments) Feels like bug crawling under skin and drowziness Family History Problem Relation Age of Onset  Diabetes Mother  Heart Failure Mother  Heart Attack Father  Diabetes Sister  Stroke Brother ROS is: 
 
 
Negative for: Ophthalmologic issues, ENT issues, Cardiovascular issues, respiratory issues, GI issues, neurologic issues, hematoogic issues, skin lesions, musculoskeletal issues, psychiatric issues Exceptions: YES Positive for:    Fever, Nausea, Right flank pain, gross hematuira PHYSICAL EXAMINATION:  
Visit Vitals /69 (BP 1 Location: Right arm, BP Patient Position: At rest) Pulse 99 Temp 97.4 °F (36.3 °C) Resp 19 Ht 6' 2\" (1.88 m) Wt 292 lb (132.5 kg) SpO2 97% BMI 37.49 kg/m² Constitutional: Well developed, well nourished female. No acute distress. HEENT: Normocephalic, Atraumatic CV:  Tachy Pulm: No respiratory distress or difficulties breathing GI:  No abdominal masses or tenderness. :  Right CVA tenderness SCROTUM:  No scrotal rash or lesions noticed. Normal bilateral testes and epididymis. PENIS: Urethral meatus normal in location and size. No urethral discharge. Circumcised Skin: No evidence of jaundice. Normal color Neuro/Psych:  Alert and oriented. Affect appropriate. MSK: FROM  
 
 
 
REVIEW OF LABS AND IMAGING:   
 
CT abd/ pelvis w/ cont 7/3/2018 IMPRESSION:  
  
1. No acute diagnostic abnormality to explain patient's pain. 
  
2. Moderate hepatic steatosis. 
  
3. No CT evidence of cholelithiasis or biliary dilatation. 
  
4. Small hiatal hernia. Labs: Results:  
Chemistry Recent Labs  
  01/15/19 
0358 01/14/19 
1650 01/14/19 
1314 * 126* 180*  138 135* K 4.0 3.6 3.9 * 105 102 CO2 22 25 26 BUN 24* 21* 20* CREA 2.12* 2.17* 2.17* CA 7.6* 7.5* 8.0* AGAP 8 8 7 BUCR 11* 10* 9*  90  --   
TP 6.9 6.2*  --   
ALB 2.6* 2.9*  --   
GLOB 4.3* 3.3  --   
AGRAT 0.6* 0.9  --   
  
CBC w/Diff Recent Labs  
  01/15/19 
0358 01/14/19 
1314 WBC 12.9 19.0*  
RBC 3.99* 4.17* HGB 10.8* 11.2* HCT 34.1* 35.3*  
 219 GRANS  --  81* LYMPH  --  6*  
EOS  --  0 Cultures Recent Labs  
  01/14/19 
1650 01/14/19 
1504 01/14/19 
1432 01/14/19 [address] CULT >100,000 COLONIES/mL GRAM NEGATIVE RODS*  >100,000 COLONIES/mL POSSIBLE 2ND GRAM NEGATIVE FRANCOIS*  >100,000 COLONIES/mL 
3RD GRAM NEGATIVE FRANCOIS * CULTURE IN PROGRESS,FURTHER UPDATES TO FOLLOW CULTURE IN PROGRESS,FURTHER UPDATES TO FOLLOW PENDING All Micro Results Procedure Component Value Units Date/Time CULTURE, URINE [[id number]]  (Abnormal) Collected:  01/14/19 4514 Order Status:  Completed Specimen:  Clean catch Updated:  01/15/19 1015 Special Requests: NO SPECIAL REQUESTS Culture result:    
  >100,000 COLONIES/mL GRAM NEGATIVE RODS  
     
      
  >100,000 COLONIES/mL POSSIBLE 2ND GRAM NEGATIVE FRANCOIS  
     
      
  >100,000 COLONIES/mL 
3RD GRAM NEGATIVE FRANCOIS CULTURE, BLOOD [[id number]] Collected:  01/14/19 1504 Order Status:  Completed Specimen:  Blood Updated:  01/15/19 7472 Special Requests: NO SPECIAL REQUESTS     
  GRAM STAIN    
  AEROBIC AND ANAEROBIC BOTTLES GRAM NEGATIVE RODS SMEAR CALLED TO AND CORRECTLY REPEATED BY: [facility] DAYANA 4N [id number] 01/15/19 TO I.T. Culture result:    
  CULTURE IN PROGRESS,FURTHER UPDATES TO FOLLOW CULTURE, BLOOD [[id number]] Collected:  01/14/19 1432 Order Status:  Completed Specimen:  Blood Updated:  01/15/19 4794 Special Requests: NO SPECIAL REQUESTS     
  GRAM STAIN    
  AEROBIC BOTTLE GRAM NEGATIVE RODS SMEAR CALLED TO AND CORRECTLY REPEATED BY: [facility] DAYANA 4N [id number] 01/15/19 TO I.T. Culture result:    
  CULTURE IN PROGRESS,FURTHER UPDATES TO FOLLOW  
     
 INFLUENZA A & B AG (RAPID TEST) [[id number]] Collected:  01/14/19 1417 Order Status:  Completed Specimen:  Nasopharyngeal from Nasal washing Updated:  01/14/19 1452 Influenza A Antigen NEGATIVE Comment: A negative result does not exclude influenza virus infection, seasonal or H1N1 due to suboptimal sensitivity. If influenza is circulating in your community, a diagnosis of influenza should be considered based on a patients clinical presentation and empiric antiviral treatment should be considered, if indicated. Influenza B Antigen NEGATIVE STREP THROAT SCREEN [[id number]] Collected:  01/14/19 1417 Order Status:  Completed Specimen:  Throat Swab Updated:  01/14/19 1450 Special Requests: NO SPECIAL REQUESTS   Strep Screen NEGATIVE      
 Culture result: PENDING Urinalysis Color Date Value Ref Range Status 01/14/2019 DARK YELLOW   Final  
 
Appearance Date Value Ref Range Status 01/14/2019 CLOUDY   Final  
 
Specific gravity Date Value Ref Range Status 01/14/2019 1.020 1.005 - 1.030   Final  
 
pH (UA) Date Value Ref Range Status 01/14/2019 5.0 5.0 - 8.0   Final  
 
Protein Date Value Ref Range Status 01/14/2019 100 (A) NEG mg/dL Final  
 
Ketone Date Value Ref Range Status 01/14/2019 TRACE (A) NEG mg/dL Final  
 
Bilirubin Date Value Ref Range Status 01/14/2019 NEGATIVE  NEG   Final  
 
Blood Date Value Ref Range Status 01/14/2019 MODERATE (A) NEG   Final  
 
Urobilinogen Date Value Ref Range Status 01/14/2019 1.0 0.2 - 1.0 EU/dL Final  
 
Nitrites Date Value Ref Range Status 01/14/2019 NEGATIVE  NEG   Final  
 
Leukocyte Esterase Date Value Ref Range Status 01/14/2019 LARGE (A) NEG   Final  
 
Potassium Date Value Ref Range Status 01/15/2019 4.0 3.5 - 5.5 mmol/L Final  
 
Creatinine Date Value Ref Range Status 01/15/2019 2.12 (H) 0.6 - 1.3 MG/DL Final  
 
BUN Date Value Ref Range Status 01/15/2019 24 (H) 7.0 - 18 MG/DL Final  
  
PSA No results for input(s): PSA in the last 72 hours. Coagulation Lab Results Component Value Date/Time  Prothrombin time 12.1 01/16/2015 12:45 PM  
 Prothrombin time 12.9 08/10/2012 11:08 AM  
 INR 0.9 01/16/2015 12:45 PM  
 INR 1.0 08/10/2012 11:08 AM  
 aPTT 37.7 (H) 01/15/2019 03:58 AM

## 2019-01-15 NOTE — CONSULTS
[address], [address]                                                      Phone: [phone]  Urology Consult / Admit Note             1. Sepsis, due to unspecified organism ([address]    2. Dizziness    3. Weakness    4. Fever, unspecified fever cause    5. Leukocytosis, unspecified type    6. Acute cystitis without hematuria          Assessment & Plan   Assessment    - Right flank pain. CVA tenderness on exam. Concerning for ureteral stone vs pyelo  - Kidney stone- 7 x 11 mm calculus in lower pole right kidney by CT 7/2018  - Urosepsis- Urine Cx prelim GNR WBC 12.9<19.0, febrile T max 102.6, Tachy  - Gross hematuria- possibly 2/2 above. Hgb stable  - Neurogenic bladder  - QUANG  Creat trending down 2.12 < 2.17  - Hx of x of spinal surgery on 8/22/2012. Plan   NPO - ordered  STAT CT w/o contrast ordered  UCx final results pending. Abx per primary currently on Rocephin  Serial PVR's. If PVR >300 would place hernandez  IVF, symptom management per primary  Trend labs  Start Flomax  Follow up arranged: NO  Following Closely         Abby Chavez  [facility] [place]  Pager # [phone]    Available M-F 7:30- 5pm .  After 5pm and weekends please call answering service at [phone]  I have seen and examined this patient independently, I reviewed pertinent labs and imaging, and I agree with the resident's findings, assessment and plan as outlined above, with ammendments as follows:     CT shows obstructing stone right proximal. Proceed to emergent stent placement. Discussed with patient need for follow up and secondary treatment of stone. All questions answered. Informed consent signed.  Nika Patel MD  [facility] [place] [place]  Pager [phone]        Chief Complaint   Patient presents with    Dizziness     x3 days. falling every time gets up.      Fatigue     fever at home. 102.6F oral temp in triage    Back Pain     diagnosed with kidney stones and having urinary retention and frequency     :     HISTORY OF PRESENT ILLNESS:    Felipe Cosme is a 61y.o. year old white male who was admitted to the hospital on 1/14/2019 with a diagnosis of Sepsis ([address]  UTI (urinary tract infection). The patient is now referred to [facility] [place] [facility] [place] for urologic evaluation due Urosepsis, right flank pain. He has been seen by a urologist and is not receiving any ongoing urologic care. Previously saw Dr. Suraj Wolff last on 2/19/103  Previous urologic history is significant for Kidney stones and Neurogenic Bladder. He has a hx of spinal surgery on 8/22/2012. HPI: Patient reports onset of right flank pain and fever of 104 2 days ago with gross hematuria that started yesterday. Denies any dysuria. Nausea w/o vomiting. States he feels like he is passing a stone. Admitted for urosepsis. Ucx + GNR, on rocephin. QUANG  Creat trending down 2.12 < 2.17. T max 102.6, tachy. Pain not well controlled.  Last at at 0800 this am.     Location  tract  Duration days   Associated signs/symptoms  As above  Modifying factors as above  Severity severe    Past Medical History:   Diagnosis Date    Abdominal adhesions 7/30/2014    Back muscle spasm 7/30/2014    Back pain, lumbosacral     Cellulitis     Constipation, chronic 7/30/2014    DDD (degenerative disc disease), lumbar 7/30/2014    DDD (degenerative disc disease), thoracic 7/30/2014    Depression     Diabetes ([address]     DM (diabetes mellitus) ([address] 7/30/2014    Frequent falls 7/30/2014    TRUE (generalized anxiety disorder) 7/30/2014    High cholesterol     HTN (hypertension) 7/30/2014    Hyperlipidemia 7/30/2014    Hypertension     Insomnia secondary to chronic pain 7/30/2014    Kidney stones     LBP radiating to both legs 7/30/2014    Lumbar facet arthropathy 7/30/2014    Lumbar nerve root impingement 7/30/2014    Lumbosacral radiculopathy at S1 7/30/2014    Major depression 7/30/2014    Nausea & vomiting     Neurological disorder     sciatica    MANISH (obstructive sleep apnea) 7/30/2014    S/P lumbar laminectomy 7/30/2014    S/P lumbar spinal fusion 7/30/2014    Small vessel disease     Thoracic compression fracture ([address] 7/30/2014    Unspecified sleep apnea     USES CPAP EVERY NIGHT       Past Surgical History:   Procedure Laterality Date    HX APPENDECTOMY      HX BACK SURGERY      Lumbar fusion    HX HERNIA REPAIR  1992    HX OTHER SURGICAL      EMG - S1 disorder    HX VASECTOMY  1985       Social History     Socioeconomic History    Marital status:      Spouse name: Not on file    Number of children: Not on file    Years of education: Not on file    Highest education level: Not on file   Tobacco Use    Smoking status: Never Smoker    Smokeless tobacco: Never Used   Substance and Sexual Activity    Alcohol use: No    Drug use: No    Sexual activity: Yes     Partners: Female     Birth control/protection: Surgical       Allergies   Allergen Reactions    Bactrim [Sulfamethoprim] Rash    Opana [Oxymorphone] Other (comments)     Feels like bug crawling under skin and drowziness       Family History   Problem Relation Age of Onset    Diabetes Mother     Heart Failure Mother     Heart Attack Father     Diabetes Sister     Stroke Brother          ROS is:      Negative for: Ophthalmologic issues, ENT issues, Cardiovascular issues, respiratory issues, GI issues, neurologic issues, hematoogic issues, skin lesions, musculoskeletal issues, psychiatric issues  Exceptions: YES    Positive for:    Fever, Nausea, Right flank pain, gross hematuira        PHYSICAL EXAMINATION:   Visit Vitals  /69 (BP 1 Location: Right arm, BP Patient Position: At rest)   Pulse 99   Temp 97.4 °F (36.3 °C)   Resp 19   Ht 6' 2\" (1.88 m)   Wt 292 lb (132.5 kg)   SpO2 97%   BMI 37.49 kg/m²       Constitutional: Well developed, well nourished female. No acute distress. HEENT: Normocephalic, Atraumatic  CV:  Tachy  Pulm: No respiratory distress or difficulties breathing   GI:  No abdominal masses or tenderness. :  Right CVA tenderness  SCROTUM:  No scrotal rash or lesions noticed. Normal bilateral testes and epididymis. PENIS: Urethral meatus normal in location and size. No urethral discharge. Circumcised   Skin: No evidence of jaundice. Normal color  Neuro/Psych:  Alert and oriented. Affect appropriate. MSK: FROM         REVIEW OF LABS AND IMAGING:      CT abd/ pelvis w/ cont 7/3/2018  IMPRESSION:      1. No acute diagnostic abnormality to explain patient's pain.     2. Moderate hepatic steatosis.     3. No CT evidence of cholelithiasis or biliary dilatation.     4. Small hiatal hernia.       Labs: Results:   Chemistry    Recent Labs     01/15/19  0358 01/14/19  1650 01/14/19  1314   * 126* 180*    138 135*   K 4.0 3.6 3.9   * 105 102   CO2 22 25 26   BUN 24* 21* 20*   CREA 2.12* 2.17* 2.17*   CA 7.6* 7.5* 8.0*   AGAP 8 8 7   BUCR 11* 10* 9*    90  --    TP 6.9 6.2*  --    ALB 2.6* 2.9*  --    GLOB 4.3* 3.3  --    AGRAT 0.6* 0.9  --       CBC w/Diff Recent Labs     01/15/19  0358 01/14/19  1314   WBC 12.9 19.0*   RBC 3.99* 4.17*   HGB 10.8* 11.2*   HCT 34.1* 35.3*    219   GRANS  --  81*   LYMPH  --  6*   EOS  --  0      Cultures Recent Labs     01/14/19  1650 01/14/19  1504 01/14/19  1432 01/14/19  1417   CULT >100,000 COLONIES/mL GRAM NEGATIVE RODS*  >100,000 COLONIES/mL POSSIBLE 2ND GRAM NEGATIVE FRANCOIS*  >100,000  COLONIES/mL  3RD GRAM NEGATIVE FRANCOIS  * CULTURE IN PROGRESS,FURTHER UPDATES TO FOLLOW CULTURE IN PROGRESS,FURTHER UPDATES TO FOLLOW PENDING     All Micro Results     Procedure Component Value Units Date/Time    CULTURE, URINE [[id number]]  (Abnormal) Collected:  01/14/19 1650    Order Status:  Completed Specimen:  Clean catch Updated:  01/15/19 1015     Special Requests: NO SPECIAL REQUESTS        Culture result:       >100,000 COLONIES/mL GRAM NEGATIVE RODS                  >100,000 COLONIES/mL POSSIBLE 2ND GRAM NEGATIVE FRANCOIS                  >100,000  COLONIES/mL  3RD GRAM NEGATIVE FRANCOIS      CULTURE, BLOOD [[id number]] Collected:  01/14/19 1504    Order Status:  Completed Specimen:  Blood Updated:  01/15/19 0823     Special Requests: NO SPECIAL REQUESTS        GRAM STAIN       AEROBIC AND ANAEROBIC BOTTLES GRAM NEGATIVE RODS                  SMEAR CALLED TO AND CORRECTLY REPEATED BY: [facility] DAYANA 4N [id number] 01/15/19 TO I.T. Culture result:       CULTURE IN PROGRESS,FURTHER UPDATES TO FOLLOW          CULTURE, BLOOD [[id number]] Collected:  01/14/19 1432    Order Status:  Completed Specimen:  Blood Updated:  01/15/19 0822     Special Requests: NO SPECIAL REQUESTS        GRAM STAIN       AEROBIC BOTTLE GRAM NEGATIVE RODS                  SMEAR CALLED TO AND CORRECTLY REPEATED BY: [facility] RN [id number] [id number] 01/15/19 TO I.T. Culture result:       CULTURE IN PROGRESS,FURTHER UPDATES TO FOLLOW          INFLUENZA A & B AG (RAPID TEST) [[id number]] Collected:  01/14/19 1417    Order Status:  Completed Specimen:  Nasopharyngeal from Nasal washing Updated:  01/14/19 1452     Influenza A Antigen NEGATIVE         Comment: A negative result does not exclude influenza virus infection, seasonal or H1N1 due to suboptimal sensitivity. If influenza is circulating in your community, a diagnosis of influenza should be considered based on a patients clinical presentation and empiric antiviral treatment should be considered, if indicated.         Influenza B Antigen NEGATIVE        STREP THROAT SCREEN [[id number]] Collected:  01/14/19 1417    Order Status:  Completed Specimen:  Throat Swab Updated:  01/14/19 1450     Special Requests: NO SPECIAL REQUESTS        Strep Screen NEGATIVE         Culture result: PENDING            Urinalysis Color   Date Value Ref Range Status   01/14/2019 DARK YELLOW   Final     Appearance   Date Value Ref Range Status   01/14/2019 CLOUDY   Final     Specific gravity   Date Value Ref Range Status   01/14/2019 1.020 1.005 - 1.030   Final     pH (UA)   Date Value Ref Range Status   01/14/2019 5.0 5.0 - 8.0   Final     Protein   Date Value Ref Range Status   01/14/2019 100 (A) NEG mg/dL Final     Ketone   Date Value Ref Range Status   01/14/2019 TRACE (A) NEG mg/dL Final     Bilirubin   Date Value Ref Range Status   01/14/2019 NEGATIVE  NEG   Final     Blood   Date Value Ref Range Status   01/14/2019 MODERATE (A) NEG   Final     Urobilinogen   Date Value Ref Range Status   01/14/2019 1.0 0.2 - 1.0 EU/dL Final     Nitrites   Date Value Ref Range Status   01/14/2019 NEGATIVE  NEG   Final     Leukocyte Esterase   Date Value Ref Range Status   01/14/2019 LARGE (A) NEG   Final     Potassium   Date Value Ref Range Status   01/15/2019 4.0 3.5 - 5.5 mmol/L Final     Creatinine   Date Value Ref Range Status   01/15/2019 2.12 (H) 0.6 - 1.3 MG/DL Final     BUN   Date Value Ref Range Status   01/15/2019 24 (H) 7.0 - 18 MG/DL Final      PSA No results for input(s): PSA in the last 72 hours.    Coagulation Lab Results   Component Value Date/Time    Prothrombin time 12.1 01/16/2015 12:45 PM    Prothrombin time 12.9 08/10/2012 11:08 AM    INR 0.9 01/16/2015 12:45 PM    INR 1.0 08/10/2012 11:08 AM    aPTT 37.7 (H) 01/15/2019 03:58 AM

## 2019-01-15 NOTE — ANESTHESIA POSTPROCEDURE EVALUATION
Procedure(s): RIGHT CYSTOSCOPY WITH RETROGRADES/ DOUBLE J  STENTS INSERTION/ C-ARM. Anesthesia Post Evaluation Multimodal analgesia: multimodal analgesia used between 6 hours prior to anesthesia start to PACU discharge Patient location during evaluation: bedside Patient participation: complete - patient participated Level of consciousness: awake Pain management: adequate Airway patency: patent Anesthetic complications: no 
Cardiovascular status: stable Respiratory status: acceptable Hydration status: acceptable Post anesthesia nausea and vomiting:  controlled Visit Vitals BP (!) 156/117 Pulse (!) 107 Temp 37.4 °C (99.3 °F) Resp 20 Ht 6' 2\" (1.88 m) Wt 132.5 kg (292 lb) SpO2 96% BMI 37.49 kg/m²

## 2019-01-15 NOTE — ED NOTES
Spoke with Dr. Priscilla Hernandez regarding patient's temperature. Verbalize orders to give tylenol

## 2019-01-15 NOTE — OP NOTES
Operative Note  Patient: Aaron Ibrahim               Sex: male             MRN: [id number]  YOB: 1955      Age:  61 y.o. Preoperative Diagnosis: DX: CYSTOSCOPY; URETERAL STENT  Postoperative Diagnosis:  DX: CYSTOSCOPY; URETERAL STENT  Surgeon: Pilar Ross     Indication: This is a 62 y/o man with history of ?neurogenic bladder with elevated diminished compliance and pDET 40cm of water at 500cc checked in 2013. He does not catheterize and is not any medication for his bladder. He presented to the hospital with fever and right flank pain. CT revealed 9mm proximal ureteral stone and positive urines. He is here for cystoscopy and right ureteral stent placement. Procedure:    1) Cystoscopy  2) Urethral dilation   3) R Retrograde pyelogram with interpretation  4) R Ureteral stent placement     Findings:    1). 1cm soft bulbomembranous urethral stricture dilated to 24 fr  2) Cystoscopy with inflammatory erythema   3). Retrograde pyelogram revealed right hydro due to obstructing 9mm proximal ureteral stone   3). 7x26 Ureteral stent placed without complication     Narrative of Events: The patient was brought to the operative suite. Anesthesia was induced and preoperative antibiotics were administered. They were then placed in the dorsal lithotomy position and their external genitalia was prepped and draped in the usual fashion. A surgical timeout was performed confirming the patient's name, date of birth, laterality, and antibiotics. All were in agreement. A 22 Sinhala cystourethroscope was then inserted into the patients urethra. This was normal up to the bulbomembranous junction where there was a 16 fr, approximately 1 cm, soft urethral stricture. Wire was advanced past this and plastic blue dilators were used to dilate the stricture to 24 fr. The cystoscope was able to traverse past this area. Prostate was small with a high riding bladder neck.   Thorough cystoscopy was performed which revealed no tumors or stones, but did show infectious erythema. The affected side was identified and ureteral orifice was cannulated with a  0.035 sensor tip wire. The wire was negotiated past the stone and confirmed in the renal pelvis. A ureteral catheter was advanced over the wire and after a culture was obtained,  retrograde pyelogram was performed confirming appropriate location. There was hydronephrosis. A ureteral stent was then advanced over the wire and deployed in the standard fashion with an excellent curl in the bladder and renal pelvis. The wire was then advanced into the bladder and an 18 fr [place] catheter was advanced over the wire into the bladder. It irrigated to light pink. 10cc was placed into the balloon. Patient was awoken and transferred to the recovery room in stable condition. Estimated Blood Loss: <5CC     Anesthesia:  General                  Implants:   Implant Name Type Inv. Item Serial No.  Lot No. LRB No. Used Action   Phuong Melton 7FR Brian Pock - [id number]  Demetra Alcantara POLARIS 7FR 26CM -- [facility] 67 [id number] Right 1 Implanted       Specimens:   ID Type Source Tests Collected by Time Destination   1 : Urine culture Urine Straight Cath CULTURE, URINE, CULTURE, AEROBIC,(REFLEXED) Shelby Wright MD 1/15/2019  4:55 PM Microbiology        Drains: Enriqueta Toussaint, 18fr [place] catheter            Complications:  None           Plan:  1. [place]ca x 3 days   2. Continue Abx  3.  Will need to return to office to discuss definitive stone treatment (message sent to Todd Lujan)     Darlyn Aguilar MD  1/15/2019

## 2019-01-15 NOTE — PROGRESS NOTES
[facility] Hospitalist Group Progress Note Patient: Corey Hernández Age: 61 y.o. : 1955 MR#: [id number] SSN: [id number] Date: 1/15/2019 Subjective: C/o right sided flank pain - no other current complaints. Denies dysuria, reports chronic urgency in setting of neurogenic bladder; States ongoing chronic lower back pain at site of prior vertebral fractures. No other concerns including no CP, SOB, n/v/constipation. Assessment/Plan: 1. Sepsis - in setting of UTI; cont rocephin; VSS improved; 2/2 blood cultures + for gram neg rods - repeat culture in AM 
2. UTI - cont IV rocephin, follow culture results for sensitivities 3. Recurrent UTI's and history of neurogenic bladder - concern for ? Infected renal calculi (CT non contrast and NPO per discussion with urology); continue IV antibiotics and urology consulted. 4. Acute on chronic kidney disease - IVF; no significant improvement in renal function, nephrology consulted. CT w/o contrast ordered 5. HTN - initially hypotensive and now improved; cont to hold all oral antihypertensives for now and follow trends 6. T2DM, uncontrolled - A1c 10.0 in 2018, SSI added, consider addition of basal insulin if needed, hold oral diabetes meds 7. Obesity 8. Depression & Anxiety - resume home meds, monitor Additional Notes:   
 
Case discussed with:  [x]Patient  [x]Family  [x]Nursing  []Case Management DVT Prophylaxis:  []Lovenox  [x]Hep SQ  []SCDs  []Coumadin   []On Heparin gtt Objective:  
VS:  
Visit Vitals /59 (BP 1 Location: Right arm, BP Patient Position: At rest) Pulse 95 Temp 98.5 °F (36.9 °C) Resp 20 Ht 6' 2\" (1.88 m) Wt 132.5 kg (292 lb) SpO2 96% BMI 37.49 kg/m² Tmax/24hrs: Temp (24hrs), Av.4 °F (37.4 °C), Min:97.6 °F (36.4 °C), Max:102.6 °F (39.2 °C) Intake/Output Summary (Last 24 hours) at 1/15/2019 3286 Last data filed at 1/15/2019 8424 Gross per 24 hour Intake 1580 ml Output 2025 ml Net -445 ml General:  Alert, uncomfortable appearing Cardiovascular:  RRR Pulmonary:  LSC throughout GI:  +BS in all four quadrants, soft, non-tender : Right flank with + L CVA tenderness Extremities:  No edema; 2+ dorsalis pedis pulses bilaterally Neuro: oriented x 3 Labs:   
Recent Results (from the past 24 hour(s)) CBC WITH AUTOMATED DIFF Collection Time: 01/14/19  1:14 PM  
Result Value Ref Range WBC 19.0 (H) 4.6 - 13.2 K/uL  
 RBC 4.17 (L) 4.70 - 5.50 M/uL  
 HGB 11.2 (L) 13.0 - 16.0 g/dL HCT 35.3 (L) 36.0 - 48.0 % MCV 84.7 74.0 - 97.0 FL  
 MCH 26.9 24.0 - 34.0 PG  
 MCHC 31.7 31.0 - 37.0 g/dL  
 RDW 14.1 11.6 - 14.5 % PLATELET 858 361 - 946 K/uL MPV 9.9 9.2 - 11.8 FL  
 NEUTROPHILS 81 (H) 42 - 75 % BAND NEUTROPHILS 6 (H) 0 - 5 % LYMPHOCYTES 6 (L) 20 - 51 % MONOCYTES 7 2 - 9 % EOSINOPHILS 0 0 - 5 % BASOPHILS 0 0 - 3 %  
 ABS. NEUTROPHILS 16.6 (H) 1.8 - 8.0 K/UL  
 ABS. LYMPHOCYTES 1.1 0.8 - 3.5 K/UL  
 ABS. MONOCYTES 1.3 (H) 0 - 1.0 K/UL  
 ABS. EOSINOPHILS 0.0 0.0 - 0.4 K/UL  
 ABS. BASOPHILS 0.0 0.0 - 0.06 K/UL  
 DF MANUAL PLATELET COMMENTS ADEQUATE PLATELETS    
 RBC COMMENTS NORMOCYTIC, NORMOCHROMIC METABOLIC PANEL, BASIC Collection Time: 01/14/19  1:14 PM  
Result Value Ref Range Sodium 135 (L) 136 - 145 mmol/L Potassium 3.9 3.5 - 5.5 mmol/L Chloride 102 100 - 108 mmol/L  
 CO2 26 21 - 32 mmol/L Anion gap 7 3.0 - 18 mmol/L Glucose 180 (H) 74 - 99 mg/dL BUN 20 (H) 7.0 - 18 MG/DL Creatinine 2.17 (H) 0.6 - 1.3 MG/DL  
 BUN/Creatinine ratio 9 (L) 12 - 20 GFR est AA 37 (L) >60 ml/min/1.73m2 GFR est non-AA 31 (L) >60 ml/min/1.73m2 Calcium 8.0 (L) 8.5 - 10.1 MG/DL  
CARDIAC PANEL,(CK, CKMB & TROPONIN) Collection Time: 01/14/19  1:14 PM  
Result Value Ref Range  (H) 39 - 308 U/L  
 CK - MB 5.3 (H) <3.6 ng/ml  CK-MB Index 1.0 0.0 - 4.0 %  
 Troponin-I, QT 0.07 (H) 0.0 - 0.045 NG/ML  
INFLUENZA A & B AG (RAPID TEST) Collection Time: 01/14/19  2:17 PM  
Result Value Ref Range Influenza A Antigen NEGATIVE  NEG Influenza B Antigen NEGATIVE  NEG    
STREP THROAT SCREEN Collection Time: 01/14/19  2:17 PM  
Result Value Ref Range Special Requests: NO SPECIAL REQUESTS Strep Screen NEGATIVE Culture result: PENDING   
POC LACTIC ACID Collection Time: 01/14/19  2:27 PM  
Result Value Ref Range Lactic Acid (POC) 2.02 (HH) 0.40 - 2.00 mmol/L  
EKG, 12 LEAD, INITIAL Collection Time: 01/14/19  4:37 PM  
Result Value Ref Range Ventricular Rate 90 BPM  
 Atrial Rate 90 BPM  
 P-R Interval 126 ms  
 QRS Duration 86 ms  
 Q-T Interval 338 ms QTC Calculation (Bezet) 413 ms Calculated P Axis 57 degrees Calculated R Axis 58 degrees Calculated T Axis 49 degrees Diagnosis Normal sinus rhythm Normal ECG When compared with ECG of 16-JAN-2015 12:43, No significant change was found URINALYSIS W/ RFLX MICROSCOPIC Collection Time: 01/14/19  4:50 PM  
Result Value Ref Range Color DARK YELLOW Appearance CLOUDY Specific gravity 1.020 1.005 - 1.030    
 pH (UA) 5.0 5.0 - 8.0 Protein 100 (A) NEG mg/dL Glucose NEGATIVE  NEG mg/dL Ketone TRACE (A) NEG mg/dL Bilirubin NEGATIVE  NEG Blood MODERATE (A) NEG Urobilinogen 1.0 0.2 - 1.0 EU/dL Nitrites NEGATIVE  NEG Leukocyte Esterase LARGE (A) NEG    
METABOLIC PANEL, COMPREHENSIVE Collection Time: 01/14/19  4:50 PM  
Result Value Ref Range Sodium 138 136 - 145 mmol/L Potassium 3.6 3.5 - 5.5 mmol/L Chloride 105 100 - 108 mmol/L  
 CO2 25 21 - 32 mmol/L Anion gap 8 3.0 - 18 mmol/L Glucose 126 (H) 74 - 99 mg/dL BUN 21 (H) 7.0 - 18 MG/DL Creatinine 2.17 (H) 0.6 - 1.3 MG/DL  
 BUN/Creatinine ratio 10 (L) 12 - 20 GFR est AA 37 (L) >60 ml/min/1.73m2 GFR est non-AA 31 (L) >60 ml/min/1.73m2 Calcium 7.5 (L) 8.5 - 10.1 MG/DL Bilirubin, total 1.1 (H) 0.2 - 1.0 MG/DL  
 ALT (SGPT) 26 16 - 61 U/L  
 AST (SGOT) 31 15 - 37 U/L Alk. phosphatase 90 45 - 117 U/L Protein, total 6.2 (L) 6.4 - 8.2 g/dL Albumin 2.9 (L) 3.4 - 5.0 g/dL Globulin 3.3 2.0 - 4.0 g/dL A-G Ratio 0.9 0.8 - 1.7 URINE MICROSCOPIC ONLY Collection Time: 01/14/19  4:50 PM  
Result Value Ref Range WBC TOO NUMEROUS TO COUNT 0 - 4 /hpf  
 RBC 4 to 10 0 - 5 /hpf Epithelial cells 2+ 0 - 5 /lpf Bacteria 4+ (A) NEG /hpf POC LACTIC ACID Collection Time: 01/14/19  4:58 PM  
Result Value Ref Range Lactic Acid (POC) 1.49 0.40 - 2.00 mmol/L  
TROPONIN I Collection Time: 01/14/19  8:48 PM  
Result Value Ref Range Troponin-I, QT 0.07 (H) 0.0 - 0.045 NG/ML  
GLUCOSE, POC Collection Time: 01/14/19  9:53 PM  
Result Value Ref Range Glucose (POC) 125 (H) 70 - 110 mg/dL GLUCOSE, POC Collection Time: 01/15/19  1:24 AM  
Result Value Ref Range Glucose (POC) 147 (H) 70 - 110 mg/dL METABOLIC PANEL, COMPREHENSIVE Collection Time: 01/15/19  3:58 AM  
Result Value Ref Range Sodium 139 136 - 145 mmol/L Potassium 4.0 3.5 - 5.5 mmol/L Chloride 109 (H) 100 - 108 mmol/L  
 CO2 22 21 - 32 mmol/L Anion gap 8 3.0 - 18 mmol/L Glucose 134 (H) 74 - 99 mg/dL BUN 24 (H) 7.0 - 18 MG/DL Creatinine 2.12 (H) 0.6 - 1.3 MG/DL  
 BUN/Creatinine ratio 11 (L) 12 - 20 GFR est AA 38 (L) >60 ml/min/1.73m2 GFR est non-AA 32 (L) >60 ml/min/1.73m2 Calcium 7.6 (L) 8.5 - 10.1 MG/DL Bilirubin, total 0.9 0.2 - 1.0 MG/DL  
 ALT (SGPT) 26 16 - 61 U/L  
 AST (SGOT) 30 15 - 37 U/L Alk. phosphatase 103 45 - 117 U/L Protein, total 6.9 6.4 - 8.2 g/dL Albumin 2.6 (L) 3.4 - 5.0 g/dL Globulin 4.3 (H) 2.0 - 4.0 g/dL A-G Ratio 0.6 (L) 0.8 - 1.7 LIPID PANEL Collection Time: 01/15/19  3:58 AM  
Result Value Ref Range LIPID PROFILE  Cholesterol, total PENDING MG/DL  
 Triglyceride PENDING MG/DL  
 HDL Cholesterol PENDING MG/DL  
 LDL, calculated PENDING MG/DL VLDL, calculated PENDING MG/DL  
 CHOL/HDL Ratio PENDING   
CBC W/O DIFF Collection Time: 01/15/19  3:58 AM  
Result Value Ref Range WBC 12.9 4.6 - 13.2 K/uL  
 RBC 3.99 (L) 4.70 - 5.50 M/uL  
 HGB 10.8 (L) 13.0 - 16.0 g/dL HCT 34.1 (L) 36.0 - 48.0 % MCV 85.5 74.0 - 97.0 FL  
 MCH 27.1 24.0 - 34.0 PG  
 MCHC 31.7 31.0 - 37.0 g/dL  
 RDW 14.3 11.6 - 14.5 % PLATELET 518 926 - 757 K/uL MPV 9.7 9.2 - 11.8 FL  
PTT Collection Time: 01/15/19  3:58 AM  
Result Value Ref Range aPTT 37.7 (H) 23.0 - 36.4 SEC  
TROPONIN I Collection Time: 01/15/19  3:58 AM  
Result Value Ref Range Troponin-I, QT 0.06 (H) 0.0 - 0.045 NG/ML Signed By: Tierra Carter NP   
 January 15, 2019

## 2019-01-16 LAB
ANION GAP SERPL CALC-SCNC: 5 MMOL/L (ref 3–18)
B-HEM STREP THROAT QL CULT: NEGATIVE
BACTERIA SPEC CULT: NORMAL
BASOPHILS # BLD: 0 K/UL (ref 0–0.1)
BASOPHILS NFR BLD: 0 % (ref 0–2)
BUN SERPL-MCNC: 17 MG/DL (ref 7–18)
BUN/CREAT SERPL: 10 (ref 12–20)
CALCIUM SERPL-MCNC: 8 MG/DL (ref 8.5–10.1)
CHLORIDE SERPL-SCNC: 109 MMOL/L (ref 100–108)
CO2 SERPL-SCNC: 25 MMOL/L (ref 21–32)
CREAT SERPL-MCNC: 1.78 MG/DL (ref 0.6–1.3)
DIFFERENTIAL METHOD BLD: ABNORMAL
EOSINOPHIL # BLD: 0.1 K/UL (ref 0–0.4)
EOSINOPHIL NFR BLD: 1 % (ref 0–5)
ERYTHROCYTE [DISTWIDTH] IN BLOOD BY AUTOMATED COUNT: 14.3 % (ref 11.6–14.5)
GLUCOSE BLD STRIP.AUTO-MCNC: 170 MG/DL (ref 70–110)
GLUCOSE BLD STRIP.AUTO-MCNC: 188 MG/DL (ref 70–110)
GLUCOSE BLD STRIP.AUTO-MCNC: 216 MG/DL (ref 70–110)
GLUCOSE BLD STRIP.AUTO-MCNC: 240 MG/DL (ref 70–110)
GLUCOSE SERPL-MCNC: 151 MG/DL (ref 74–99)
HCT VFR BLD AUTO: 32.9 % (ref 36–48)
HGB BLD-MCNC: 10.4 G/DL (ref 13–16)
LYMPHOCYTES # BLD: 1.1 K/UL (ref 0.9–3.6)
LYMPHOCYTES NFR BLD: 11 % (ref 21–52)
MCH RBC QN AUTO: 26.9 PG (ref 24–34)
MCHC RBC AUTO-ENTMCNC: 31.6 G/DL (ref 31–37)
MCV RBC AUTO: 85 FL (ref 74–97)
MONOCYTES # BLD: 1.1 K/UL (ref 0.05–1.2)
MONOCYTES NFR BLD: 11 % (ref 3–10)
NEUTS SEG # BLD: 7.8 K/UL (ref 1.8–8)
NEUTS SEG NFR BLD: 77 % (ref 40–73)
PLATELET # BLD AUTO: 174 K/UL (ref 135–420)
PMV BLD AUTO: 9.7 FL (ref 9.2–11.8)
POTASSIUM SERPL-SCNC: 3.9 MMOL/L (ref 3.5–5.5)
RBC # BLD AUTO: 3.87 M/UL (ref 4.7–5.5)
SERVICE CMNT-IMP: NORMAL
SODIUM SERPL-SCNC: 139 MMOL/L (ref 136–145)
WBC # BLD AUTO: 10 K/UL (ref 4.6–13.2)

## 2019-01-16 PROCEDURE — 74011250636 HC RX REV CODE- 250/636: Performed by: INTERNAL MEDICINE

## 2019-01-16 PROCEDURE — 94660 CPAP INITIATION&MGMT: CPT

## 2019-01-16 PROCEDURE — 80048 BASIC METABOLIC PNL TOTAL CA: CPT

## 2019-01-16 PROCEDURE — 65660000000 HC RM CCU STEPDOWN

## 2019-01-16 PROCEDURE — 36415 COLL VENOUS BLD VENIPUNCTURE: CPT

## 2019-01-16 PROCEDURE — 74011250637 HC RX REV CODE- 250/637: Performed by: HOSPITALIST

## 2019-01-16 PROCEDURE — 74011000258 HC RX REV CODE- 258: Performed by: INTERNAL MEDICINE

## 2019-01-16 PROCEDURE — 74011250636 HC RX REV CODE- 250/636: Performed by: HOSPITALIST

## 2019-01-16 PROCEDURE — 85025 COMPLETE CBC W/AUTO DIFF WBC: CPT

## 2019-01-16 PROCEDURE — 97162 PT EVAL MOD COMPLEX 30 MIN: CPT

## 2019-01-16 PROCEDURE — 74011250636 HC RX REV CODE- 250/636: Performed by: NURSE PRACTITIONER

## 2019-01-16 PROCEDURE — 74011636637 HC RX REV CODE- 636/637: Performed by: NURSE PRACTITIONER

## 2019-01-16 PROCEDURE — 74011250637 HC RX REV CODE- 250/637: Performed by: INTERNAL MEDICINE

## 2019-01-16 PROCEDURE — 5A09357 ASSISTANCE WITH RESPIRATORY VENTILATION, LESS THAN 24 CONSECUTIVE HOURS, CONTINUOUS POSITIVE AIRWAY PRESSURE: ICD-10-PCS | Performed by: INTERNAL MEDICINE

## 2019-01-16 PROCEDURE — 74011000250 HC RX REV CODE- 250: Performed by: HOSPITALIST

## 2019-01-16 PROCEDURE — 82962 GLUCOSE BLOOD TEST: CPT

## 2019-01-16 PROCEDURE — 74011250637 HC RX REV CODE- 250/637: Performed by: UROLOGY

## 2019-01-16 PROCEDURE — 97116 GAIT TRAINING THERAPY: CPT

## 2019-01-16 PROCEDURE — 87040 BLOOD CULTURE FOR BACTERIA: CPT

## 2019-01-16 RX ORDER — HYDROMORPHONE HYDROCHLORIDE 2 MG/ML
2 INJECTION, SOLUTION INTRAMUSCULAR; INTRAVENOUS; SUBCUTANEOUS
Status: COMPLETED | OUTPATIENT
Start: 2019-01-16 | End: 2019-01-16

## 2019-01-16 RX ORDER — HYDROMORPHONE HYDROCHLORIDE 1 MG/ML
1 INJECTION, SOLUTION INTRAMUSCULAR; INTRAVENOUS; SUBCUTANEOUS
Status: DISCONTINUED | OUTPATIENT
Start: 2019-01-16 | End: 2019-01-17

## 2019-01-16 RX ORDER — INSULIN GLARGINE 100 [IU]/ML
8 INJECTION, SOLUTION SUBCUTANEOUS DAILY
Status: DISCONTINUED | OUTPATIENT
Start: 2019-01-16 | End: 2019-01-17

## 2019-01-16 RX ORDER — PHENAZOPYRIDINE HYDROCHLORIDE 100 MG/1
100 TABLET, FILM COATED ORAL
Status: DISCONTINUED | OUTPATIENT
Start: 2019-01-16 | End: 2019-01-18 | Stop reason: HOSPADM

## 2019-01-16 RX ORDER — HYDROMORPHONE HYDROCHLORIDE 2 MG/1
2 TABLET ORAL
Status: DISCONTINUED | OUTPATIENT
Start: 2019-01-16 | End: 2019-01-16

## 2019-01-16 RX ORDER — ZOLPIDEM TARTRATE 5 MG/1
5 TABLET ORAL
Status: DISCONTINUED | OUTPATIENT
Start: 2019-01-16 | End: 2019-01-18 | Stop reason: HOSPADM

## 2019-01-16 RX ORDER — SODIUM CHLORIDE 450 MG/100ML
100 INJECTION, SOLUTION INTRAVENOUS CONTINUOUS
Status: DISCONTINUED | OUTPATIENT
Start: 2019-01-16 | End: 2019-01-18 | Stop reason: HOSPADM

## 2019-01-16 RX ADMIN — MORPHINE SULFATE 4 MG: 4 INJECTION INTRAVENOUS at 04:07

## 2019-01-16 RX ADMIN — HEPARIN SODIUM 5000 UNITS: 5000 INJECTION INTRAVENOUS; SUBCUTANEOUS at 12:42

## 2019-01-16 RX ADMIN — ACETAMINOPHEN 650 MG: 325 TABLET ORAL at 20:53

## 2019-01-16 RX ADMIN — BUPROPION HYDROCHLORIDE 150 MG: 150 TABLET, EXTENDED RELEASE ORAL at 08:14

## 2019-01-16 RX ADMIN — LEVOFLOXACIN 500 MG: 5 INJECTION, SOLUTION INTRAVENOUS at 22:46

## 2019-01-16 RX ADMIN — MORPHINE SULFATE 4 MG: 4 INJECTION INTRAVENOUS at 07:08

## 2019-01-16 RX ADMIN — SODIUM CHLORIDE 100 ML/HR: 450 INJECTION, SOLUTION INTRAVENOUS at 22:49

## 2019-01-16 RX ADMIN — MORPHINE SULFATE 4 MG: 4 INJECTION INTRAVENOUS at 12:44

## 2019-01-16 RX ADMIN — HEPARIN SODIUM 5000 UNITS: 5000 INJECTION INTRAVENOUS; SUBCUTANEOUS at 04:09

## 2019-01-16 RX ADMIN — INSULIN GLARGINE 8 UNITS: 100 INJECTION, SOLUTION SUBCUTANEOUS at 22:50

## 2019-01-16 RX ADMIN — HEPARIN SODIUM 5000 UNITS: 5000 INJECTION INTRAVENOUS; SUBCUTANEOUS at 20:42

## 2019-01-16 RX ADMIN — TAMSULOSIN HYDROCHLORIDE 0.4 MG: 0.4 CAPSULE ORAL at 08:43

## 2019-01-16 RX ADMIN — HYDROMORPHONE HYDROCHLORIDE 1 MG: 1 INJECTION, SOLUTION INTRAMUSCULAR; INTRAVENOUS; SUBCUTANEOUS at 17:37

## 2019-01-16 RX ADMIN — SODIUM CHLORIDE 100 ML/HR: 900 INJECTION, SOLUTION INTRAVENOUS at 08:13

## 2019-01-16 RX ADMIN — GABAPENTIN 800 MG: 400 CAPSULE ORAL at 20:52

## 2019-01-16 RX ADMIN — INSULIN LISPRO 4 UNITS: 100 INJECTION, SOLUTION INTRAVENOUS; SUBCUTANEOUS at 17:44

## 2019-01-16 RX ADMIN — HYDROMORPHONE HYDROCHLORIDE 2 MG: 2 INJECTION, SOLUTION INTRAMUSCULAR; INTRAVENOUS; SUBCUTANEOUS at 21:24

## 2019-01-16 RX ADMIN — INSULIN LISPRO 2 UNITS: 100 INJECTION, SOLUTION INTRAVENOUS; SUBCUTANEOUS at 08:36

## 2019-01-16 RX ADMIN — MORPHINE SULFATE 4 MG: 4 INJECTION INTRAVENOUS at 16:11

## 2019-01-16 RX ADMIN — ZOLPIDEM TARTRATE 5 MG: 5 TABLET ORAL at 22:52

## 2019-01-16 RX ADMIN — INSULIN LISPRO 4 UNITS: 100 INJECTION, SOLUTION INTRAVENOUS; SUBCUTANEOUS at 12:44

## 2019-01-16 RX ADMIN — SODIUM CHLORIDE 100 ML/HR: 900 INJECTION, SOLUTION INTRAVENOUS at 19:15

## 2019-01-16 RX ADMIN — SIMVASTATIN 10 MG: 20 TABLET, FILM COATED ORAL at 22:52

## 2019-01-16 RX ADMIN — MORPHINE SULFATE 4 MG: 4 INJECTION INTRAVENOUS at 09:32

## 2019-01-16 RX ADMIN — GABAPENTIN 800 MG: 400 CAPSULE ORAL at 16:10

## 2019-01-16 RX ADMIN — CEFTRIAXONE SODIUM 1 G: 1 INJECTION, POWDER, FOR SOLUTION INTRAMUSCULAR; INTRAVENOUS at 20:39

## 2019-01-16 RX ADMIN — INSULIN LISPRO 2 UNITS: 100 INJECTION, SOLUTION INTRAVENOUS; SUBCUTANEOUS at 22:51

## 2019-01-16 RX ADMIN — MORPHINE SULFATE 4 MG: 4 INJECTION INTRAVENOUS at 00:56

## 2019-01-16 RX ADMIN — DULOXETINE HYDROCHLORIDE 60 MG: 60 CAPSULE, DELAYED RELEASE ORAL at 08:14

## 2019-01-16 RX ADMIN — GABAPENTIN 800 MG: 400 CAPSULE ORAL at 08:14

## 2019-01-16 RX ADMIN — PHENAZOPYRIDINE HYDROCHLORIDE 100 MG: 100 TABLET ORAL at 17:39

## 2019-01-16 NOTE — PROGRESS NOTES
Urine culture and the blood cultures are growing gram -ve rods. Will add Levaquin to the regimen. Gave him morphine PRN for the pain.

## 2019-01-16 NOTE — PROGRESS NOTES
Dr. Rabago Speaker was informed of V/S:  
 
 01/15/19 2050 Vital Signs Temp (!) 101.4 °F (38.6 °C) Temp Source Oral  
Pulse (Heart Rate) (!) 134 Heart Rate Source Monitor Resp Rate 20  
O2 Sat (%) 90 % Level of Consciousness Alert /74 MAP (Calculated) 106 BP 1 Method Automatic  
BP 1 Location Right arm BP Patient Position At rest  
MEWS Score 6 Rechecked V/S. Pain meds was administered. Tylenol and ice compress were administered. Dr. Rabago Speaker was informed of V/S, pt's c/o pain not relieved by Tramadol, and results of blood and urine cultures. He said he will write new orders.

## 2019-01-16 NOTE — ROUTINE PROCESS
Bedside and Verbal shift change report given to Pattie Washington, DAYANA (oncoming nurse) by Sisi Hawthorne RN (offgoing nurse). Report included the following information SBAR, Kardex, MAR and Recent Results. SITUATION: 
Code Status: Full Code Reason for Admission: Sepsis ([address] UTI (urinary tract infection) Hospital day: 2 Problem List:  
   
Hospital Problems  Date Reviewed: 1/15/2019 Codes Class Noted POA Sepsis ([address] ICD-10-CM: A41.9 ICD-9-CM: 038.9, 995.91  1/14/2019 Unknown UTI (urinary tract infection) ICD-10-CM: N39.0 ICD-9-CM: 599.0  1/14/2019 Unknown BACKGROUND: 
 Past Medical History:  
Past Medical History:  
Diagnosis Date  Abdominal adhesions 7/30/2014  Back muscle spasm 7/30/2014  Back pain, lumbosacral   
 Cellulitis  Constipation, chronic 7/30/2014  DDD (degenerative disc disease), lumbar 7/30/2014  DDD (degenerative disc disease), thoracic 7/30/2014  Depression  Diabetes ([address]  DM (diabetes mellitus) ([address] 7/30/2014  Frequent falls 7/30/2014  TRUE (generalized anxiety disorder) 7/30/2014  High cholesterol  HTN (hypertension) 7/30/2014  Hyperlipidemia 7/30/2014  Hypertension  Insomnia secondary to chronic pain 7/30/2014  Kidney stones  LBP radiating to both legs 7/30/2014  Lumbar facet arthropathy 7/30/2014  Lumbar nerve root impingement 7/30/2014  Lumbosacral radiculopathy at S1 7/30/2014  Major depression 7/30/2014  Nausea & vomiting  Neurological disorder   
 sciatica  MANISH (obstructive sleep apnea) 7/30/2014  S/P lumbar laminectomy 7/30/2014  S/P lumbar spinal fusion 7/30/2014  Small vessel disease  Thoracic compression fracture ([address] 7/30/2014  Unspecified sleep apnea USES CPAP EVERY NIGHT Patient taking anticoagulants yes Patient has a defibrillator: no  
 History of shots YES for example, flu, pneumonia, tetanus Isolation History NO for example, MRSA, CDiff ASSESSMENT: 
Changes in Assessment Throughout Shift: NONE Significant Changes in 24 hours (for example, RR/code, fall) Patient has Central Line: no  
Patient has [place] Cath: yes Reasons if yes: S/P Cystoscopy and Right Ureteral Stent Placement Mobility Issues PT 
IV Patency OR Checklist 
Pending Tests Last Vitals: 
Vitals w/ MEWS Score (last day) Date/Time MEWS Score Pulse Resp Temp BP Level of Consciousness SpO2  
 01/16/19 0412    100  20        99 % 01/16/19 0401  2  101  (Abnormal)   20  97 °F (36.1 °C)  131/77  Alert  98 % 01/16/19 0052    89      119/68      
 01/15/19 2300  2  104  (Abnormal)   20  99.7 °F (37.6 °C)  122/69  Alert  94 % 01/15/19 2255              95 % 01/15/19 2122  5  123  (Abnormal)   20  101.3 °F (38.5 °C)  (Abnormal)   150/83  Alert  95 % 01/15/19 2050  6  134  (Abnormal)   20  101.4 °F (38.6 °C)  (Abnormal)   170/74  Alert  90 % 01/15/19 1749    107  (Abnormal)   20    156/117  (Abnormal)     96 % 01/15/19 1746    106  (Abnormal)   18    144/70    96 % 01/15/19 1739    124  (Abnormal)   20    161/77    96 % 01/15/19 1729    122  (Abnormal)   18    149/71    96 % 01/15/19 1722    120  (Abnormal)   22  99.3 °F (37.4 °C)  149/72    95 % 01/15/19 1508  3  120  (Abnormal)   18  99 °F (37.2 °C)  147/79  Alert  96 % 01/15/19 1506            Alert    
 01/15/19 1109  2  106  (Abnormal)   20  98 °F (36.7 °C)  119/62  Alert  95 % 01/15/19 0920            Alert    
 01/15/19 0736  1  99  19  97.4 °F (36.3 °C)  116/69  Alert  97 % 01/15/19 0405  1  95  20  98.5 °F (36.9 °C)  103/59  Alert  96 % 01/15/19 0121  2  90  24  97.6 °F (36.4 °C)  102/66  Alert  94 % PAIN Pain Assessment Pain Intensity 1: 10 (01/16/19 0708) Pain Location 1: Penis Pain Intervention(s) 1: Medication (see MAR)   Patient Stated Pain Goal: 0 
 Intervention effective: yes Time of last intervention: 0708 Reassessment Completed: yes Other actions taken for pain: Distraction Last 3 Weights: 
Last 3 Recorded Weights in this Encounter 01/14/19 1230 Weight: 132.5 kg (292 lb) Weight change: INTAKE/OUPUT Current Shift: No intake/output data recorded. Last three shifts: 01/14 1901 - 01/16 0700 In: 6616.7 [P.O.:2100; I.V.:4516.7] Out: 7925 [QGCNX:[id number]] RECOMMENDATIONS AND DISCHARGE PLANNING Patient needs and requests: Pain Control Pending tests/procedures: labs Discharge plan for patient: Home Discharge planning Needs or Barriers: none Estimated Discharge Date: 1/19/2019 Posted on Whiteboard in [facility] Room: yes \"HEALS\" SAFETY CHECK A safety check occurred in the patient's room between off going nurse and oncoming nurse listed above. The safety check included the below items: 
 
H High Alert Medications Verify all high alert medication drips (heparin, PCA, etc.) E Equipment Suction is set up for ALL patients (with rakesh) Red plugs utilized for all equipment (IV pumps, etc.) WOWs wiped down at end of shift. Room stocked with oxygen, suction, and other unit-specific supplies A Alarms Bed alarm is set for fall risk patients Ensure chair alarm is in place and activated if patient is up in a chair L Lines Check IV for any infiltration [place] Oca bag is empty if patient has a [place]ca Tubing and IV bags are labeled [place] Safety Room is clean, patient is clean, and equipment is clean. Hallways are clear from equipment besides carts. Fall bracelet on for fall risk patients Ensure room is clear and free of clutter Suction is set up for ALL patients (with rakesh) Hallways are clear from equipment besides carts. Isolation precautions followed, supplies available outside room, sign posted Jhonny Siddiqui RN

## 2019-01-16 NOTE — PROGRESS NOTES
Problem: Mobility Impaired (Adult and Pediatric) Goal: *Acute Goals and Plan of Care (Insert Text) Physical Therapy Goals Initiated 1/16/2019 and to be accomplished within 7 day(s) 1. Patient will move from supine to sit and sit to supine  in bed with independence. 2.  Patient will transfer from bed to chair and chair to bed with independence using the least restrictive device. 3.  Patient will perform sit to stand with independence. 4.  Patient will ambulate with independence for 200 feet with the least restrictive device. 5.  Patient will ascend/descend 12 stairs with 1 handrail(s) with independence. Outcome: Progressing Towards Goal 
physical Therapy EVALUATION Patient: Lanae Skiff [de-identified]61 y.o. male) Date: 1/16/2019 Primary Diagnosis: Sepsis ([address] UTI (urinary tract infection) Procedure(s) (LRB): 
RIGHT CYSTOSCOPY WITH RETROGRADES/ DOUBLE J  STENTS INSERTION/ C-ARM (Right) 1 Day Post-Op Precautions:   Fall OBJECTIVE/ASSESSMENT : 
Based on the objective data described below, the patient presents with impaired functional mobility including bed mobility, transfers, ambulation, and general activity tolerance following admission for UTI, Sepsis. Patient presented today sitting up recliner, cleared by nursing to participate and agreeable to physical therapy evaluation. Patient transferred sit-stand with CGA and presented with significant loss of balance upon immediate standing. Pt ambulated in hallway x 150 feet with intermittent use of railing in hallway with SBA/occasional loss of balance which improved the longer pt ambulated. At conclusion of session, patient left sitting up in recliner with call bell in reach, needs met, and nurse notified. Education:  
[x]         Bed mobility [x]         Transfers 
[x]         Ambulation 
[x]         Assistive device management 
[]         Stairs [x]         Body mechanics [x]         Position change [x]         Activity pacing/energy conservation 
[]         Other: 
 
Patient will benefit from skilled intervention to address the above impairments. Patients rehabilitation potential is considered to be Good Factors which may influence rehabilitation potential include:  
[]         None noted 
[]         Mental ability/status []         Medical condition 
[]         Home/family situation and support systems 
[]         Safety awareness 
[]         Pain tolerance/management 
[]         Other: PLAN : 
Recommendations and Planned Interventions: 
[x]           Bed Mobility Training             [x]    Neuromuscular Re-Education 
[x]           Transfer Training                   []    Orthotic/Prosthetic Training 
[x]           Gait Training                          []    Modalities [x]           Therapeutic Exercises          []    Edema Management/Control 
[x]           Therapeutic Activities            [x]    Patient and Family Training/Education 
[]           Other (comment): Frequency/Duration: Patient will be followed by physical therapy 1-2 times per day, 4-7 days per week to address goals. Discharge Recommendations: Home Health Further Equipment Recommendations for Discharge: N/A  
 
SUBJECTIVE:  
Patient stated I need to get home to my wife.  OBJECTIVE DATA SUMMARY:  
 
Past Medical History:  
Diagnosis Date  Abdominal adhesions 7/30/2014  Back muscle spasm 7/30/2014  Back pain, lumbosacral   
 Cellulitis  Constipation, chronic 7/30/2014  DDD (degenerative disc disease), lumbar 7/30/2014  DDD (degenerative disc disease), thoracic 7/30/2014  Depression  Diabetes [address] [address]  DM (diabetes mellitus) ([facility] [address] 7/30/2014  Frequent falls 7/30/2014  TRUE (generalized anxiety disorder) 7/30/2014  High cholesterol  HTN (hypertension) 7/30/2014  Hyperlipidemia 7/30/2014  Hypertension  Insomnia secondary to chronic pain 7/30/2014  Kidney stones  LBP radiating to both legs 7/30/2014  Lumbar facet arthropathy 7/30/2014  Lumbar nerve root impingement 7/30/2014  Lumbosacral radiculopathy at S1 7/30/2014  Major depression 7/30/2014  Nausea & vomiting  Neurological disorder   
 sciatica  MANISH (obstructive sleep apnea) 7/30/2014  S/P lumbar laminectomy 7/30/2014  S/P lumbar spinal fusion 7/30/2014  Small vessel disease  Thoracic compression fracture ([address] 7/30/2014  Unspecified sleep apnea USES CPAP EVERY NIGHT Past Surgical History:  
Procedure Laterality Date  HX APPENDECTOMY  HX BACK SURGERY Lumbar fusion [id number]  HX OTHER SURGICAL    
 EMG - S1 disorder [address] Barriers to Learning/Limitations: None Compensate with: N/A Prior Level of Function/Home Situation: Pt lives with wife in a 2 story home with 5 steps to enter, bilateral railings. Pt is primary CG to his disabled wife who requires physical assistance for all mobility. Pt has a stair lift in his home. Prior to this admission pt was ambulating independently in community and successfully caring for his wife Home Situation Home Environment: Private residence # Steps to Enter: 5 Rails to Enter: Yes One/Two Story Residence: Two story # of Interior Steps: 12 Interior Rails: Right Lift Chair Available: Yes Living Alone: No(Pt is primary CG to disabled wife) Support Systems: Family member(s) Patient Expects to be Discharged to[de-identified] Private residence Current DME Used/Available at Home: NoneCritical Behavior: 
Neurologic State: Alert Psychosocial 
Purposeful Interaction: Yes Pt Identified Daily Priority: Clinical issues (comment) Car[employer] Process: Nurture loving kindness;Establish trust;Teaching/learning; Attend basic human needs;Create healing environment Caring Interventions: Reassure; Therapeutic modalities Reassure: Informing;Caring rounds Therapeutic Modalities: Intentional therapeutic touch;Music/Imagery channel [facility] only)Strength:   
Strength: Within functional limits Tone & Sensation:  
Tone: Normal 
Sensation: Intact Range Of Motion: 
AROM: Within functional limits Functional Mobility: 
Transfers: 
Sit to Stand: Contact guard assistance(LOB upon immediate standing) Stand to Sit: Contact guard assistance Balance:  
Sitting: Intact Standing: Impaired; Without support Standing - Static: Fair Standing - Dynamic : FairAmbulation/Gait Training: 
Distance (ft): 150 Feet (ft) Assistive Device: Other (comment)(Intermittent use of railing on wall) Ambulation - Level of Assistance: Contact guard assistance Base of Support: Widened Speed/Diamond: Pace decreased (<100 feet/min) Pain: 
Pre session: 9/10 (pain with urination) Post session: 9/10 (pain with urination) Activity Tolerance:  
fair Please refer to the flowsheet for vital signs taken during this treatment. After treatment:  
[x] Patient left in no apparent distress sitting up in chair 
[] Patient left sitting on EOB [] Patient left in no apparent distress in bed 
[] Patient declined to be OOB at this time due to  
[x] Call bell left within reach [x] Nursing notified(Crystal) [] Caregiver present 
[] Bed alarm activated 
[] SCDs in place COMMUNICATION/EDUCATION:  
[x]         Fall prevention education was provided and the patient/caregiver indicated understanding. [x]         Patient/family have participated as able in goal setting and plan of care. [x]         Patient/family agree to work toward stated goals and plan of care. []         Patient understands intent and goals of therapy, but is neutral about his/her participation. []         Patient is unable to participate in goal setting and plan of care. Thank you for this referral. 
Mike Beasley, PT Time Calculation: 23 mins Eval Complexity: History: MEDIUM  Complexity : 1-2 comorbidities / personal factors will impact the outcome/ POC Exam:LOW Complexity : 1-2 Standardized tests and measures addressing body structure, function, activity limitation and / or participation in recreation  Presentation: MEDIUM Complexity : Evolving with changing characteristics  Clinical Decision Making:Medium Complexity   Overall Complexity:MEDIUM

## 2019-01-16 NOTE — PROGRESS NOTES
In Patient Progress note Admit Date: 1/14/2019 Impression:  
 
1) nonoliguric QUANG on CKD3 d/t rt sided hydronephrosis d/t ureteral stone ,  
ATN d/t hypotension / urosepsis/ may also be contributing NSAID use may have  
also contributed in this setting Renal parameters improving , postobstructive diuresis , continue IVF , switch to 1/2 NS Appreciate  stent placement per urology , Discussed with patient about avoiding NSAIDs Metformin appropriately held 2) Rt sided hydronephrosis , s/p cytoscopy , pyelogram and ureteral stent placement ,  
also urethral stricture dilation 3) Sepsis d/t gm -ve rods UTI , on AB per IM 4) CKD3 with proteinuria , likely diabetic nephropathy 5) HTN , hypotensive on admission , better, avoid extremes of BP 6) DM2  With complications, poorly controlled 
  
Please call with questions, 
  
Tiffanie Nuno MD FAS Cell [phone] Pager: [phone] Subjective:  
 
Denies SOB/CP Current Facility-Administered Medications:  
  insulin lispro (HUMALOG) injection, , SubCUTAneous, AC&HS, Harinder VO, NP, 2 Units at 01/16/19 0836 
  glucose chewable tablet 16 g, 4 Tab, Oral, PRN, Florentino Ramirez NP 
  glucagon (GLUCAGEN) injection 1 mg, 1 mg, IntraMUSCular, PRN, Florentino Ramirez NP 
  dextrose (D50W) injection syrg 12.5-25 g, 25-50 mL, IntraVENous, PRN, Florentino Ramirez NP 
  traMADol Riddhi Annie) tablet 50 mg, 50 mg, Oral, Q6H PRN, Harinder VO, NP, 50 mg at 01/15/19 2024   tamsulosin (FLOMAX) capsule 0.4 mg, 0.4 mg, Oral, DAILY, Alf Melo MD, 0.4 mg at 01/16/19 8283   morphine injection 4 mg, 4 mg, IntraVENous, Q3H PRN, Comfort Collier MD, 4 mg at 01/16/19 0932   levoFLOXacin (LEVAQUIN) 500 mg in D5W IVPB, 500 mg, IntraVENous, Q24H, Comfort Collier MD, Last Rate: 100 mL/hr at 01/15/19 2203, 500 mg at 01/15/19 2203   sodium chloride (NS) flush 5-10 mL, 5-10 mL, IntraVENous, PRN, Juliann Gale MD 
   DULoxetine (CYMBALTA) capsule 60 mg, 60 mg, Oral, DAILY, Aure Flores MD, 60 mg at 01/16/19 5005 
  gabapentin (NEURONTIN) capsule 800 mg, 800 mg, Oral, TID, Aure Flores MD, 800 mg at 01/16/19 4091   simvastatin (ZOCOR) tablet 10 mg, 10 mg, Oral, QHS, Aure Flores MD, 10 mg at 01/15/19 2127   buPROPion SR (WELLBUTRIN SR) tablet 150 mg, 150 mg, Oral, DAILY, Aure Flores MD, 150 mg at 01/16/19 4724   acetaminophen (TYLENOL) tablet 650 mg, 650 mg, Oral, Q6H PRN, Aure Flores MD, 650 mg at 01/15/19 2122   ondansetron (ZOFRAN) injection 4 mg, 4 mg, IntraVENous, Q6H PRN, Aure Flores MD, 4 mg at 01/14/19 2336 
  0.9% sodium chloride infusion, 100 mL/hr, IntraVENous, CONTINUOUS, Aure Flores MD, Last Rate: 100 mL/hr at 01/16/19 0813, 100 mL/hr at 01/16/19 4338   heparin (porcine) injection 5,000 Units, 5,000 Units, SubCUTAneous, Q8H, Aure Flores MD, 5,000 Units at 01/16/19 6676   cefTRIAXone (ROCEPHIN) 1 g in sterile water (preservative free) 10 mL IV syringe, 1 g, IntraVENous, Q24H, Aure Flores MD, 1 g at 01/15/19 2035 Objective:  
 
Visit Vitals /90 (BP 1 Location: Right arm, BP Patient Position: At rest) Pulse 100 Temp 99.7 °F (37.6 °C) Resp 20 Ht 6' 2\" (1.88 m) Wt 132.5 kg (292 lb) SpO2 90% BMI 37.49 kg/m² Intake/Output Summary (Last 24 hours) at 1/16/2019 1142 Last data filed at 1/16/2019 1382 Gross per 24 hour Intake 3133.33 ml Output 4075 ml Net -941.67 ml Physical Exam:  
 
GEN NAD HENT mmm RS AEBE clear CVS s1 s2 wnl no JVD 
GI soft BS + Ext no edema Data Review: 
 
Recent Labs  
  01/16/19 
0345 WBC 10.0  
RBC 3.87* HCT 32.9* MCV 85.0 MCH 26.9 MCHC 31.6 RDW 14.3 Recent Labs  
  01/16/19 
0345 01/15/19 
0358 01/14/19 
1650 01/14/19 
1314 BUN 17 24* 21* 20* CREA 1.78* 2.12* 2.17* 2.17* CA 8.0* 7.6* 7.5* 8.0* ALB  --  2.6* 2.9*  --   
K 3.9 4.0 3.6 3.9  139 138 135* * 109* 105 102 CO2 25 22 25 26 * 134* 126* 180* Olimpia Stockton MD

## 2019-01-16 NOTE — PROGRESS NOTES
Urology Progress Note Assessment/Plan:  
 
Patient Active Problem List  
Diagnosis Code  DDD (degenerative disc disease), lumbar M51.36  
 Lumbar facet arthropathy M47.816  S/P lumbar laminectomy Z98.890  
 S/P lumbar spinal fusion Z98.1  Lumbar nerve root impingement M54.16  
 Lumbosacral radiculopathy at S1 M54.17  
 DDD (degenerative disc disease), thoracic M51.34  Thoracic compression fracture ([facility]) S22.000A  Back muscle spasm M62.830  
 Major depression F32.9  TRUE (generalized anxiety disorder) F41.1  Abdominal adhesions K66.0  
 Constipation, chronic K59.09  
 Insomnia secondary to chronic pain G89.29, G47.01  
 MANISH (obstructive sleep apnea) G47.33  
 DM (diabetes mellitus) ([facility]) E11.9  
 HTN (hypertension) I10  
 Hyperlipidemia E78.5  Frequent falls R29.6  Thoracic or lumbosacral neuritis or radiculitis, unspecified [id number]  Postlaminectomy syndrome, lumbar region M96.1  History of depression Z86.59  
 Chronic midline low back pain with sciatica M54.40, G89.29  
 Advanced care planning/counseling discussion Z71.89  
 Anemia D64.9  Chronic pain syndrome G89.4  Lumbar post-laminectomy syndrome M96.1  Lumbar and sacral osteoarthritis M47.817  Sepsis ([address] A41.9  
 UTI (urinary tract infection) N39.0 Plan:  Cont hernandez and JJ stent VT this Friday ABX per primary team based on culture results which are ongoing Add pyridium Will see again Fri if still in house, otherwise will need to complete 2 weeks ABX prior to definitive stone mgmt and can have outpatient VT Friday in office. Aura Humphrey MD 
 
[phone] Subjective:  
 
Daily Progress Note: 1/16/2019 2:44 PM 
 
Raymond William is doing well POD #1 Rt JJ and urethral dilation. Back pain resolved. Only complaint is bladder and penis pain. Objective:  
 
Visit Vitals /90 (BP 1 Location: Right arm, BP Patient Position: At rest) Pulse 100 Temp 99.7 °F (37.6 °C) Resp 20 Ht 6' 2\" (1.88 m) Wt 292 lb (132.5 kg) SpO2 90% BMI 37.49 kg/m² Temp (24hrs), Av.6 °F (37.6 °C), Min:97 °F (36.1 °C), Max:101.4 °F (38.6 °C) Intake and Output: 
1901 -  07 In: 6616.7 [P.O.:2100; I.V.:4516.7] Out: 7925 [ZXNMP:[id number]]  07 - 1900 In: 120 [P.O.:120] Out: - PHYSICAL EXAMINATION:  
Visit Vitals /90 (BP 1 Location: Right arm, BP Patient Position: At rest) Pulse 100 Temp 99.7 °F (37.6 °C) Resp 20 Ht 6' 2\" (1.88 m) Wt 292 lb (132.5 kg) SpO2 90% BMI 37.49 kg/m² Constitutional: Well developed, well nourished. No acute distress. HEENT: Normocephalic, Atraumatic, EOM's intact CV:  RRR Respiratory: No respiratory distress or difficulties breathing Abdomen:  Soft, non-tender, non-distended  Male:   
       PENIS: normal  [place]: clear UOP Skin: No evidence of jaundice. Normal color Neuro/Psych:  Alert and oriented. Affect appropriate. Lymphatic:   No enlarged inguinal lymph nodes. Lab/Data Review: 
 
 
Labs:  
 
Labs: Results:  
Chemistry Recent Labs  
  19 
0345 01/15/19 
0358 19 
1650 * 134* 126*  139 138  
K 3.9 4.0 3.6 * 109* 105 CO2 25 22 25 BUN 17 24* 21* CREA 1.78* 2.12* 2.17* CA 8.0* 7.6* 7.5* AGAP 5 8 8 BUCR 10* 11* 10* AP  --  103 90 TP  --  6.9 6.2* ALB  --  2.6* 2.9*  
GLOB  --  4.3* 3.3 AGRAT  --  0.6* 0.9  
  
CBC w/Diff Recent Labs  
  19 
0345 01/15/19 
0358 19 
1314 WBC 10.0 12.9 19.0*  
RBC 3.87* 3.99* 4.17* HGB 10.4* 10.8* 11.2* HCT 32.9* 34.1* 35.3*  
 184 219 GRANS 77*  --  81* LYMPH 11*  --  6*  
EOS 1  --  0 Cultures Recent Labs  
  19 
0345 01/15/19 
1655 19 
1650 19 
1504 19 
1432 CULT NO GROWTH AFTER 3 HOURS NO GROWTH AFTER 17 HOURS >100,000 COLONIES/mL GRAM NEGATIVE RODS*  >100,000 COLONIES/mL POSSIBLE 2ND GRAM NEGATIVE FRANCOIS* >100,000 COLONIES/mL 
3RD GRAM NEGATIVE FRANCOIS * AEROBIC AND ANAEROBIC BOTTLES GRAM NEGATIVE RODS* AEROBIC BOTTLE GRAM NEGATIVE RODS* All Micro Results Procedure Component Value Units Date/Time CULTURE, URINE [[id number]] Collected:  01/15/19 1655 Order Status:  Completed Specimen:  Cystoscopic Urine Updated:  01/16/19 1130 Special Requests: NO SPECIAL REQUESTS Culture result: NO GROWTH AFTER 17 HOURS     
 STREP THROAT SCREEN [[id number]] Collected:  01/14/19 1417 Order Status:  Completed Specimen:  Throat Swab Updated:  01/16/19 1022 Special Requests: NO SPECIAL REQUESTS Strep Screen NEGATIVE Culture result:    
  NO BETA HEMOLYTIC STREPTOCOCCUS GROUP A ISOLATED  
     
 CULTURE, BLOOD [[id number]] Collected:  01/16/19 0345 Order Status:  Completed Specimen:  Whole Blood Updated:  01/16/19 0745 Special Requests: NO SPECIAL REQUESTS Culture result: NO GROWTH AFTER 3 HOURS     
 CULTURE, BLOOD [[id number]]  (Abnormal) Collected:  01/14/19 1432 Order Status:  Completed Specimen:  Blood Updated:  01/16/19 5449 Special Requests: NO SPECIAL REQUESTS     
  GRAM STAIN    
  AEROBIC BOTTLE GRAM NEGATIVE RODS SMEAR CALLED TO AND CORRECTLY REPEATED BY: [facility] RN 4N [id number] 01/15/19 TO I.T. Culture result:    
  AEROBIC BOTTLE GRAM NEGATIVE RODS  
     
 CULTURE, BLOOD [[id number]]  (Abnormal) Collected:  01/14/19 1504 Order Status:  Completed Specimen:  Blood Updated:  01/16/19 0737 Special Requests: NO SPECIAL REQUESTS     
  GRAM STAIN    
  AEROBIC AND ANAEROBIC BOTTLES GRAM NEGATIVE RODS SMEAR CALLED TO AND CORRECTLY REPEATED BY: [facility] DAYANA 4N [id number] 01/15/19 TO I.T. Culture result:    
  AEROBIC AND ANAEROBIC BOTTLES GRAM NEGATIVE RODS  
     
 CULTURE, URINE [[id number]]  (Abnormal) Collected:  01/14/19 1650  Order Status:  Completed Specimen:  Clean catch Updated:  01/15/19 1015 Special Requests: NO SPECIAL REQUESTS Culture result:    
  >100,000 COLONIES/mL GRAM NEGATIVE RODS  
     
      
  >100,000 COLONIES/mL POSSIBLE 2ND GRAM NEGATIVE FRANCOIS  
     
      
  >100,000 COLONIES/mL 
3RD GRAM NEGATIVE FRANCOIS INFLUENZA A & B AG (RAPID TEST) [[id number]] Collected:  01/14/19 1417 Order Status:  Completed Specimen:  Nasopharyngeal from Nasal washing Updated:  01/14/19 1454 Influenza A Antigen NEGATIVE Comment: A negative result does not exclude influenza virus infection, seasonal or H1N1 due to suboptimal sensitivity. If influenza is circulating in your community, a diagnosis of influenza should be considered based on a patients clinical presentation and empiric antiviral treatment should be considered, if indicated. Influenza B Antigen NEGATIVE Urinalysis Color Date Value Ref Range Status 01/14/2019 DARK YELLOW   Final  
 
Appearance Date Value Ref Range Status 01/14/2019 CLOUDY   Final  
 
Specific gravity Date Value Ref Range Status 01/14/2019 1.020 1.005 - 1.030   Final  
 
pH (UA) Date Value Ref Range Status 01/14/2019 5.0 5.0 - 8.0   Final  
 
Protein Date Value Ref Range Status 01/14/2019 100 (A) NEG mg/dL Final  
 
Ketone Date Value Ref Range Status 01/14/2019 TRACE (A) NEG mg/dL Final  
 
Bilirubin Date Value Ref Range Status 01/14/2019 NEGATIVE  NEG   Final  
 
Blood Date Value Ref Range Status 01/14/2019 MODERATE (A) NEG   Final  
 
Urobilinogen Date Value Ref Range Status 01/14/2019 1.0 0.2 - 1.0 EU/dL Final  
 
Nitrites Date Value Ref Range Status 01/14/2019 NEGATIVE  NEG   Final  
 
Leukocyte Esterase Date Value Ref Range Status 01/14/2019 LARGE (A) NEG   Final  
 
Potassium Date Value Ref Range Status 01/16/2019 3.9 3.5 - 5.5 mmol/L Final  
 
Creatinine Date Value Ref Range Status 01/16/2019 1.78 (H) 0.6 - 1.3 MG/DL Final  
 
BUN Date Value Ref Range Status 01/16/2019 17 7.0 - 18 MG/DL Final  
  
PSA No results for input(s): PSA in the last 72 hours. Coagulation Lab Results Component Value Date/Time  Prothrombin time 12.1 01/16/2015 12:45 PM  
 Prothrombin time 12.9 08/10/2012 11:08 AM  
 INR 0.9 01/16/2015 12:45 PM  
 INR 1.0 08/10/2012 11:08 AM  
 aPTT 37.7 (H) 01/15/2019 03:58 AM

## 2019-01-16 NOTE — DIABETES MGMT
Glycemic Control Plan of Care 
 
T2DM with current A1c of 10.0% (11/01/2019). See separate notes, 01/16/2019, for assessment of home diabetes management and education. Patient reported that he is followed by Dr. Shaila Lambert (PCP) and by Nidia Mendoza, with [employer] for his diabetes care. Home diabetes medications reported by patient prior to this hospital admission: 
Metformin 1000 mg twice daily with meals (patient stated that he will not be able to continue this medicine because of kidney function). Trulicity 1.5 mg injection every 7 days. POC BG range on 01/15/2019: 118-188 mg/dL. POC BG report on 01/16/2019 at time of review: 176, 240 mg/dL. Current hospital diabetes medications: 
Correctional lispro insulin ACHS. Normal sensitivity dose. Recommendation(s): 
1.) Consider adding 10 units basal lantus insulin daily. 2.) Modified diabetic diet by adding no concentrate sweets. Assessment: 
Patient is 61year old with past medical history including type 2 diabetes mellitus, obesity, hypertension, hyperlipidemia, chronic low back pain, MANISH, and depression - was admitted on 01/01/14/2019 with report of dizziness, fatigue, fever, and back pain. Noted: 
Status post right ureteral stent placement and urethral stricture dilatation on 01/15/2019. GNR urine GNR bacteremia. T2DM with current A1c of 10.0% (11/01/2018). Most recent blood glucose values: 
 
Results for Akua LEONARDO (MRN [id number]) as of 1/16/2019 14:18 Ref. Range 1/15/2019 01:24 1/15/2019 07:35 1/15/2019 11:12 1/15/2019 15:37 1/15/2019 17:23 1/15/2019 21:26 1/15/2019 21:54 GLUCOSE,FAST - POC Latest Ref Range: 70 - 110 mg/dL 147 (H) 118 (H) 171 (H) 151 (H) 134 (H) 188 (H) 185 (H) Results for [place] (MRN [id number]) as of 1/16/2019 14:18 Ref. Range 1/16/2019 08:28 1/16/2019 11:01 GLUCOSE,FAST - POC Latest Ref Range: 70 - 110 mg/dL 170 (H) 240 (H) Current A1C: 10.0% (11/01/2018) which is equivalent to estimated average blood glucose of 240 mg/dL during the past 2-3 months. Current hospital diabetes medications: 
Correctional lispro insulin ACHS. Normal sensitivity dose. Total daily dose insulin requirement previous day: 01/15/2019 Lispro: 6 units (correctional). Home diabetes medications: Patient reported on 01/16/2019: 
Metformin 1000 mg twice daily with meals. Trulicity 1.5 mg injection every 7 days. Diet: Diabetic consistent carb regular; no concentrated sweets. Goals:  Blood glucose will be within target range of  mg/dL by 01/19/2019 Education:  _X__  Refer to Diabetes Education Record: Yes. 
           ___  Education not indicated at this time Katty Talavera RN [facility] Pager: [phone]

## 2019-01-16 NOTE — DIABETES MGMT
Diabetes Patient/Family Education RecordFactors That  May Influence Patients Ability  to Learn or  Comply with Recommendations []   Language barrier    []   Cultural needs   []   Motivation  
 []   Cognitive limitation    []   Physical   [x]   Education  
 []   Physiological factors   []   Hearing/vision/speaking impairment   []   Orthodox beliefs []   Financial factors   []  Other:   []  No factors identified at this time. Person Instructed: [x]   Patient   [x]   Family: Supportive son visiting at bedside actively participated to help his father with diabetes management and treatment compliance. []  Other Preference for Learning: 
 [x]   Verbal   []   Written   []  Demonstration Level of Comprehension & Competence:   
[x]  Good                                      [] Fair                                     []  Poor                             []  Needs Reinforcement  
[x]  Teachback completed Education Component: Patient reported that he is also followed by [facility] Pharmacist, Winnie Enrique, for management of diabetes. [x]  Medication management, including how to administer insulin (if appropriate) and potential medication interactions: Yes. Patient reported the following home diabetes medications prior to this hospital admission: 
Metformin 2 tabs of 500 mg twice daily. Trulicity 1.5 mg injection every 7 days. Patient verbalized understanding that he will not be able to continue take Metformin due to CKD (stage 3). Trulicity is an injectable diabetes medication. Patient plan to f/u with his medical provider and [facility] pharmacist, Winnie Enrique, what diabetes medication to take since he cannot take Metformin now. [x]  Nutritional management -obtain usual meal pattern: Yes. Patient reported that he has been eating better limiting intake of concentrated sweets (beverages and snacks). Discussed nutritional recommendations for carbs (starches, fruits, dairy). [x]  Exercise: Yes. Educated patient about the benefits of performing regular physical exercise and to follow his medical provider's exercise recommendations. [x]  Signs, symptoms, and treatment of hyperglycemia and hypoglycemia: Yes. Patient verbalized understanding of high blood sugar, symptoms, causes, treatment, prevention, and when to contact his medical provider if he's unable to explain persistently elevated blood sugar readings. [x] Prevention, recognition and treatment of hyperglycemia and hypoglycemia: Yes. Patient verbalized understanding of low blood sugar, symptoms, causes, treatment, prevention, and when to contact his medical provider - or call 911 for emergency medical help when he's unsuccessful in treating low blood sugar above 70. [x]  Importance of blood glucose monitoring and how to obtain a blood glucose meter: Yes. Patient reported that he has BG meter and testing supplies. He is using [employer]. He is monitoring his blood sugar at least 3x daily and additional times when he is experiencing fluctuations in his blood sugar readings. []  Instruction on use of the blood glucose meter [x]  Discuss the importance of HbA1C monitoring: Yes. Patient verbalized understanding that his diabetes is currently not under control. Discussed that his A1c of 10.0% (11/01/2018) which is equivalent to estimated average blood glucose of 240 mg/dL during the past 2-3 months. Patient stated that he is due for A1c f/u Feb. 2019. Patient also verbalized understanding to follow his diabetes treatment plan: meds, diet, and exercise. [x]  Sick day guidelines: Yes. Patient verbalized understanding that blood sugar will be running high when sick/ill therefore it is recommended to continue to take his diabetes medication(s). Get emergency medical help when blood sugar is 300 and higher and symptoms including nausea, vomiting, abd pain, shortness of breath. [x]  Proper use and disposal of lancets, needles, syringes or insulin pens (if appropriate): Yes. [x]  Potential long-term complications (retinopathy, kidney disease, neuropathy, foot care): Yes. [x] Information about whom to contact in case of emergency or for more information: Yes. [x]  Goal:  Patient/family will demonstrate understanding of Diabetes Self Management Skills by: 01/19/2019 Plan for post-discharge education or self-management support: 
  [] Outpatient class schedule provided            [] Patient Declined 
  [] Scheduled for outpatient classes (date) _______ Verify: 
Does patient understand how diabetes medications work? Yes. Does patient know what their most recent A1c is? Yes. Does patient monitor glucose at home? Yes. Does patient have a glucometer, testing supplies or difficulty obtaining diabetes medications? See notes below. Does patient have BG meter and testing supplies? Yes. Does patient have difficulty obtaining diabetes testing supplies and medications? No. 
  
Kathryn Higgins RN 
[facility] [phone]

## 2019-01-16 NOTE — PROGRESS NOTES
Problem: Falls - Risk of 
Goal: *Absence of Falls Document Magda Richardson Fall Risk and appropriate interventions in the flowsheet. Outcome: Progressing Towards Goal 
Fall Risk Interventions: 
Mobility Interventions: Bed/chair exit alarm, Patient to call before getting OOB, Utilize walker, cane, or other assistive device Medication Interventions: Bed/chair exit alarm, Patient to call before getting OOB, Teach patient to arise slowly Elimination Interventions: Bed/chair exit alarm, Call light in reach, Patient to call for help with toileting needs, Toilet paper/wipes in reach, Elevated toilet seat History of Falls Interventions: Bed/chair exit alarm, Door open when patient unattended, Room close to nurse's station

## 2019-01-16 NOTE — PROGRESS NOTES
Pt has elevated MEWs score of 3 r/t Elevated heart rate and temp. Pt recently started on IV ABX and MD is aware of vital signs. Pt given ice pack which he states helps a lot.

## 2019-01-16 NOTE — PROGRESS NOTES
Patient seen and examined independently. Ancillary data reviewed. Case discussed with APC. Agree with APC note with the following additional comments: pt c/o dysuria. Exam reassuring. S/p right ureteral stent placement and urethral stricture dilatation. Currently has hernandez in place. Has several GNR types growing in urine, and GNR bacteremia, specifics not available yet. Will cont abx. Cont to monitor closely.

## 2019-01-16 NOTE — CONSULTS
Consult Note  Consult requested by: Camila Parisi MD    ADMIT DATE: 1/14/2019  CONSULT DATE: January 15, 2019           Admission diagnosis: sepsis d/t UTI  Reason for Nephrology Consultation: QUANG    Assessment:   1) nonoliguric QUANG on CKD3 d/t rt sided hydronephrosis d/t ureteral stone ,   ATN d/t hypotension / urosepsis/ may also be contributing NSAID use may have   also contributed in this setting,   continue to hydrate with NS. Renal parameters stable,   should see improvement with stent placement per urology ,   Discussed with patient about avoiding NSAIDs   Metformin appropriately held  2) Rt sided hydronephrosis , s/p cytoscopy , pyelogram and ureteral stent placement ,   also urethral stricture dilation  3) Sepsis d/t UTI , on AB per IM  4) CKD3 with proteinuria , likely diabetic nephropathy  5) HTN , hypotensive on admission , better  6) DM2  With complications, poorly controlled    Please call with questions,    Tamica Hopper MD [facility]  Cell [phone]  Pager: [phone]      HPI: Ashley Sender is a 61 y.o. male [facility] with PMH of DM2 with complications poorly controlled , HTN , CKD3 d/t diabetic nephropathy, NSAID use , presented with feeling weak , fever, he also mentions that he has been feeling dehydrated , tired ,multiple falls , presented to ED , was found to be hypotensive , diagnosed with sepsis d/t UTI , was started on AB. Patient was noted to have QUANG with creat elevated in 2.4 range . CT done showed rt sided hydronephrosis with ureteric calculus , urology was consulted and cystoscopy was done with stent placement.     Past Medical History:   Diagnosis Date    Abdominal adhesions 7/30/2014    Back muscle spasm 7/30/2014    Back pain, lumbosacral     Cellulitis     Constipation, chronic 7/30/2014    DDD (degenerative disc disease), lumbar 7/30/2014    DDD (degenerative disc disease), thoracic 7/30/2014    Depression     [facility]     DM (diabetes mellitus) ([facility] 7/30/2014    Frequent falls 7/30/2014    TRUE (generalized anxiety disorder) 7/30/2014    High cholesterol     HTN (hypertension) 7/30/2014    Hyperlipidemia 7/30/2014    Hypertension     Insomnia secondary to chronic pain 7/30/2014    Kidney stones     LBP radiating to both legs 7/30/2014    Lumbar facet arthropathy 7/30/2014    Lumbar nerve root impingement 7/30/2014    Lumbosacral radiculopathy at S1 7/30/2014    Major depression 7/30/2014    Nausea & vomiting     Neurological disorder     sciatica    MANISH (obstructive sleep apnea) 7/30/2014    S/P lumbar laminectomy 7/30/2014    S/P lumbar spinal fusion 7/30/2014    Small vessel disease     Thoracic compression fracture ([facility] 7/30/2014    Unspecified sleep apnea     USES CPAP EVERY NIGHT      Past Surgical History:   Procedure Laterality Date    HX APPENDECTOMY      HX BACK SURGERY      Lumbar fusion    HX HERNIA REPAIR  1992    HX OTHER SURGICAL      EMG - S1 disorder    HX VASECTOMY  1985       Social History     Socioeconomic History    Marital status:      Spouse name: Not on file    Number of children: Not on file    Years of education: Not on file    Highest education level: Not on file   Social Needs    Financial resource strain: Not on file    Food insecurity - worry: Not on file    Food insecurity - inability: Not on file   [employer] needs - medical: Not on file   [employer] needs - non-medical: Not on file   Occupational History    Not on file   Tobacco Use    Smoking status: Never Smoker    Smokeless tobacco: Never Used   Substance and Sexual Activity    Alcohol use: No    Drug use: No    Sexual activity: Yes     Partners: Female     Birth control/protection: Surgical   Other Topics Concern    Not on file   Social History Narrative    Not on file       Family History   Problem Relation Age of Onset    Diabetes Mother     Heart Failure Mother     Heart Attack Father     Diabetes Sister     Stroke Brother      Allergies   Allergen Reactions    Bactrim [Sulfamethoprim] Rash    Opana [Oxymorphone] Other (comments)     Feels like bug crawling under skin and drowziness        Home Medications:     Medications Prior to Admission   Medication Sig    dulaglutide (TRULICITY) 1.5 ML/0.1 mL sub-q pen 0.5 mL by SubCUTAneous route every seven (7) days.  metFORMIN ER (GLUCOPHAGE XR) 500 mg tablet Take 2 Tabs by mouth two (2) times daily (with meals).  buPROPion SR (WELLBUTRIN SR) 150 mg SR tablet TAKE 1 TABLET TWICE DAILY    isosorbide mononitrate ER (IMDUR) 60 mg CR tablet TAKE 1 TABLET EVERY MORNING    ondansetron hcl (ZOFRAN) 4 mg tablet Take 1 Tab by mouth every eight (8) hours as needed for Nausea.  metoprolol succinate (TOPROL-XL) 50 mg XL tablet Take 1 Tab by mouth daily.  zolpidem (AMBIEN) 10 mg tablet Take 1 Tab by mouth nightly. Max Daily Amount: 10 mg.    simvastatin (ZOCOR) 10 mg tablet TAKE 1 TABLET EVERY NIGHT    gabapentin (NEURONTIN) 800 mg tablet Take  by mouth three (3) times daily.  DULoxetine (CYMBALTA) 60 mg capsule Take 1 Cap by mouth daily.  phentermine (ADIPEX-P) 37.5 mg tablet Take 1 Tab by mouth every morning. Max Daily Amount: 37.5 mg.    OMEPRAZOLE, BULK,     multivitamin (ONE A DAY) tablet Take 1 Tab by mouth daily.  nitroglycerin (NITROSTAT) 0.4 mg SL tablet 1 Tab by SubLINGual route every five (5) minutes as needed. Use for Chest Pain ; Call MD if > 3 tablets required    pramipexole (MIRAPEX) 0.5 mg tablet TAKE 1 TABLET THREE TIMES DAILY    ACCU-CHEK FASTCLIX LANCET DRUM [facility] CHECK BLOOD SUGAR EVERY DAY    ACCU-CHEK SMARTVIEW TEST STRIP strip CHECK BLOOD SUGAR EVERY DAY    Blood-Glucose Meter (ACCU-CHEK GABRIELLE) misc Check blood sugar daily    naloxone 4 mg/actuation spry 4 mg by Nasal route as needed.  Back Brace [facility] 1 Device by Does Not Apply route daily as needed for Pain.  One, lumbosacral orthosis/back brace with metal struts Medically necessary       Current Inpatient Medications:     Current Facility-Administered Medications   Medication Dose Route Frequency    insulin lispro (HUMALOG) injection   SubCUTAneous AC&HS    glucose chewable tablet 16 g  4 Tab Oral PRN    glucagon (GLUCAGEN) injection 1 mg  1 mg IntraMUSCular PRN    dextrose (D50W) injection syrg 12.5-25 g  25-50 mL IntraVENous PRN    traMADol (ULTRAM) tablet 50 mg  50 mg Oral Q6H PRN    [START ON 1/16/2019] tamsulosin (FLOMAX) capsule 0.4 mg  0.4 mg Oral DAILY    sodium chloride (NS) flush 5-10 mL  5-10 mL IntraVENous PRN    DULoxetine (CYMBALTA) capsule 60 mg  60 mg Oral DAILY    gabapentin (NEURONTIN) capsule 800 mg  800 mg Oral TID    simvastatin (ZOCOR) tablet 10 mg  10 mg Oral QHS    buPROPion SR (WELLBUTRIN SR) tablet 150 mg  150 mg Oral DAILY    acetaminophen (TYLENOL) tablet 650 mg  650 mg Oral Q6H PRN    ondansetron (ZOFRAN) injection 4 mg  4 mg IntraVENous Q6H PRN    0.9% sodium chloride infusion  100 mL/hr IntraVENous CONTINUOUS    heparin (porcine) injection 5,000 Units  5,000 Units SubCUTAneous Q8H    cefTRIAXone (ROCEPHIN) 1 g in sterile water (preservative free) 10 mL IV syringe  1 g IntraVENous Q24H       Review of Systems:    No sore throat. No cough or hemoptysis. No shortness of breath or chest pain. No orthopnea or paroxysmal nocturnal dyspnea. No ankle swelling, no joint paints.        Physical Assessment:     Vitals:    01/15/19 1729 01/15/19 1739 01/15/19 1746 01/15/19 1749   BP: 149/71 161/77 144/70 (!) 156/117   Pulse: (!) 122 (!) 124 (!) 106 (!) 107   Resp: 18 20 18 20   Temp:       SpO2: 96% 96% 96% 96%   Weight:       Height:         Last 3 Recorded Weights in this Encounter    01/14/19 1230   Weight: 132.5 kg (292 lb)     Admission weight: Weight: 132.5 kg (292 lb) (01/14/19 1230)      Intake/Output Summary (Last 24 hours) at 1/15/2019 1908  Last data filed at 1/15/2019 1853  Gross per 24 hour   Intake 4288.33 ml   Output 4975 ml Net -686.67 ml     Patient is in no apparent distress. HEENT: dry mucosa   Lungs: good air entry, clear to auscultation bilaterally. Cardiovascular system: S1, S2, regular rate and rhythm. Abdomen: soft, non tender, non distended. Positive bowel sounds. Extremities: no clubbing, cyanosis or edema. Integumentary: skin is grossly intact. Data Review:    Labs: Results:       Chemistry Recent Labs     01/15/19  0358 01/14/19  1650 01/14/19  1314   * 126* 180*    138 135*   K 4.0 3.6 3.9   * 105 102   CO2 22 25 26   BUN 24* 21* 20*   CREA 2.12* 2.17* 2.17*   CA 7.6* 7.5* 8.0*   AGAP 8 8 7   BUCR 11* 10* 9*    90  --    TP 6.9 6.2*  --    ALB 2.6* 2.9*  --    GLOB 4.3* 3.3  --    AGRAT 0.6* 0.9  --          CBC w/Diff Recent Labs     01/15/19  0358 01/14/19  1314   WBC 12.9 19.0*   RBC 3.99* 4.17*   HGB 10.8* 11.2*   HCT 34.1* 35.3*    219   GRANS  --  81*   LYMPH  --  6*   EOS  --  0         Iron/Ferritin No results for input(s): IRON in the last 72 hours. No lab exists for component: TIBCCALC   PTH/VIT D No results for input(s): PTH in the last 72 hours.     No lab exists for component: VITD           Shaheen Cabrera MD  1/15/2019  7:08 PM      January 15, 2019

## 2019-01-16 NOTE — PROGRESS NOTES
OT order received and chart reviewed. OT screen completed with pt stating that he is at baseline for functional independence and does not require additional equipment for home. Pt declined any OT services at this time. Pt encouraged to request re-order if decrease in functional independence occurs. Thank you, Abigail Manrique, OTRL

## 2019-01-17 LAB
ANION GAP SERPL CALC-SCNC: 9 MMOL/L (ref 3–18)
BACTERIA SPEC CULT: ABNORMAL
BACTERIA SPEC CULT: NORMAL
BASOPHILS # BLD: 0 K/UL (ref 0–0.1)
BASOPHILS NFR BLD: 0 % (ref 0–2)
BUN SERPL-MCNC: 13 MG/DL (ref 7–18)
BUN/CREAT SERPL: 8 (ref 12–20)
CALCIUM SERPL-MCNC: 7.9 MG/DL (ref 8.5–10.1)
CHLORIDE SERPL-SCNC: 105 MMOL/L (ref 100–108)
CO2 SERPL-SCNC: 24 MMOL/L (ref 21–32)
CREAT SERPL-MCNC: 1.54 MG/DL (ref 0.6–1.3)
DIFFERENTIAL METHOD BLD: ABNORMAL
EOSINOPHIL # BLD: 0.2 K/UL (ref 0–0.4)
EOSINOPHIL NFR BLD: 3 % (ref 0–5)
ERYTHROCYTE [DISTWIDTH] IN BLOOD BY AUTOMATED COUNT: 14.6 % (ref 11.6–14.5)
GLUCOSE BLD STRIP.AUTO-MCNC: 157 MG/DL (ref 70–110)
GLUCOSE BLD STRIP.AUTO-MCNC: 168 MG/DL (ref 70–110)
GLUCOSE BLD STRIP.AUTO-MCNC: 191 MG/DL (ref 70–110)
GLUCOSE BLD STRIP.AUTO-MCNC: 193 MG/DL (ref 70–110)
GLUCOSE SERPL-MCNC: 150 MG/DL (ref 74–99)
GRAM STN SPEC: ABNORMAL
HCT VFR BLD AUTO: 32.5 % (ref 36–48)
HGB BLD-MCNC: 10.1 G/DL (ref 13–16)
LYMPHOCYTES # BLD: 1.1 K/UL (ref 0.9–3.6)
LYMPHOCYTES NFR BLD: 14 % (ref 21–52)
MCH RBC QN AUTO: 26.4 PG (ref 24–34)
MCHC RBC AUTO-ENTMCNC: 31.1 G/DL (ref 31–37)
MCV RBC AUTO: 84.9 FL (ref 74–97)
MONOCYTES # BLD: 1.3 K/UL (ref 0.05–1.2)
MONOCYTES NFR BLD: 16 % (ref 3–10)
NEUTS SEG # BLD: 5.2 K/UL (ref 1.8–8)
NEUTS SEG NFR BLD: 67 % (ref 40–73)
PLATELET # BLD AUTO: 190 K/UL (ref 135–420)
PMV BLD AUTO: 9.8 FL (ref 9.2–11.8)
POTASSIUM SERPL-SCNC: 3.6 MMOL/L (ref 3.5–5.5)
RBC # BLD AUTO: 3.83 M/UL (ref 4.7–5.5)
SERVICE CMNT-IMP: ABNORMAL
SERVICE CMNT-IMP: NORMAL
SODIUM SERPL-SCNC: 138 MMOL/L (ref 136–145)
WBC # BLD AUTO: 7.8 K/UL (ref 4.6–13.2)

## 2019-01-17 PROCEDURE — 74011250637 HC RX REV CODE- 250/637: Performed by: NURSE PRACTITIONER

## 2019-01-17 PROCEDURE — 82962 GLUCOSE BLOOD TEST: CPT

## 2019-01-17 PROCEDURE — 65660000000 HC RM CCU STEPDOWN

## 2019-01-17 PROCEDURE — 94660 CPAP INITIATION&MGMT: CPT

## 2019-01-17 PROCEDURE — 5A09357 ASSISTANCE WITH RESPIRATORY VENTILATION, LESS THAN 24 CONSECUTIVE HOURS, CONTINUOUS POSITIVE AIRWAY PRESSURE: ICD-10-PCS | Performed by: INTERNAL MEDICINE

## 2019-01-17 PROCEDURE — 74011250637 HC RX REV CODE- 250/637: Performed by: UROLOGY

## 2019-01-17 PROCEDURE — 74011000258 HC RX REV CODE- 258: Performed by: INTERNAL MEDICINE

## 2019-01-17 PROCEDURE — 74011250636 HC RX REV CODE- 250/636: Performed by: NURSE PRACTITIONER

## 2019-01-17 PROCEDURE — 74011250636 HC RX REV CODE- 250/636: Performed by: HOSPITALIST

## 2019-01-17 PROCEDURE — 74011250636 HC RX REV CODE- 250/636: Performed by: INTERNAL MEDICINE

## 2019-01-17 PROCEDURE — 85025 COMPLETE CBC W/AUTO DIFF WBC: CPT

## 2019-01-17 PROCEDURE — 74011000250 HC RX REV CODE- 250: Performed by: NURSE PRACTITIONER

## 2019-01-17 PROCEDURE — 36415 COLL VENOUS BLD VENIPUNCTURE: CPT

## 2019-01-17 PROCEDURE — 80048 BASIC METABOLIC PNL TOTAL CA: CPT

## 2019-01-17 PROCEDURE — 74011250637 HC RX REV CODE- 250/637: Performed by: HOSPITALIST

## 2019-01-17 PROCEDURE — 74011636637 HC RX REV CODE- 636/637: Performed by: NURSE PRACTITIONER

## 2019-01-17 RX ORDER — METOPROLOL TARTRATE 25 MG/1
25 TABLET, FILM COATED ORAL EVERY 12 HOURS
Status: DISCONTINUED | OUTPATIENT
Start: 2019-01-17 | End: 2019-01-18 | Stop reason: HOSPADM

## 2019-01-17 RX ORDER — HYDROMORPHONE HYDROCHLORIDE 1 MG/ML
1-1.5 INJECTION, SOLUTION INTRAMUSCULAR; INTRAVENOUS; SUBCUTANEOUS
Status: DISCONTINUED | OUTPATIENT
Start: 2019-01-17 | End: 2019-01-17

## 2019-01-17 RX ORDER — INSULIN GLARGINE 100 [IU]/ML
12 INJECTION, SOLUTION SUBCUTANEOUS DAILY
Status: DISCONTINUED | OUTPATIENT
Start: 2019-01-18 | End: 2019-01-18 | Stop reason: HOSPADM

## 2019-01-17 RX ORDER — BISACODYL 5 MG
10 TABLET, DELAYED RELEASE (ENTERIC COATED) ORAL DAILY PRN
Status: DISCONTINUED | OUTPATIENT
Start: 2019-01-17 | End: 2019-01-18 | Stop reason: HOSPADM

## 2019-01-17 RX ORDER — HYDROMORPHONE HYDROCHLORIDE 2 MG/1
4 TABLET ORAL
Status: DISCONTINUED | OUTPATIENT
Start: 2019-01-17 | End: 2019-01-18 | Stop reason: HOSPADM

## 2019-01-17 RX ORDER — DOCUSATE SODIUM 100 MG/1
100 CAPSULE, LIQUID FILLED ORAL 2 TIMES DAILY
Status: DISCONTINUED | OUTPATIENT
Start: 2019-01-17 | End: 2019-01-18 | Stop reason: HOSPADM

## 2019-01-17 RX ORDER — POLYETHYLENE GLYCOL 3350 17 G/17G
17 POWDER, FOR SOLUTION ORAL
Status: DISCONTINUED | OUTPATIENT
Start: 2019-01-17 | End: 2019-01-18 | Stop reason: HOSPADM

## 2019-01-17 RX ORDER — HYDROMORPHONE HYDROCHLORIDE 2 MG/1
4 TABLET ORAL
Status: DISCONTINUED | OUTPATIENT
Start: 2019-01-17 | End: 2019-01-17

## 2019-01-17 RX ORDER — HYDROMORPHONE HYDROCHLORIDE 2 MG/1
6 TABLET ORAL
Status: DISCONTINUED | OUTPATIENT
Start: 2019-01-17 | End: 2019-01-18 | Stop reason: HOSPADM

## 2019-01-17 RX ADMIN — HYDROMORPHONE HYDROCHLORIDE 1.5 MG: 1 INJECTION, SOLUTION INTRAMUSCULAR; INTRAVENOUS; SUBCUTANEOUS at 10:29

## 2019-01-17 RX ADMIN — POLYETHYLENE GLYCOL 3350 17 G: 17 POWDER, FOR SOLUTION ORAL at 16:44

## 2019-01-17 RX ADMIN — INSULIN LISPRO 2 UNITS: 100 INJECTION, SOLUTION INTRAVENOUS; SUBCUTANEOUS at 18:37

## 2019-01-17 RX ADMIN — GABAPENTIN 800 MG: 400 CAPSULE ORAL at 08:10

## 2019-01-17 RX ADMIN — GABAPENTIN 800 MG: 400 CAPSULE ORAL at 13:40

## 2019-01-17 RX ADMIN — HEPARIN SODIUM 5000 UNITS: 5000 INJECTION INTRAVENOUS; SUBCUTANEOUS at 21:47

## 2019-01-17 RX ADMIN — LEVOFLOXACIN 500 MG: 5 INJECTION, SOLUTION INTRAVENOUS at 21:46

## 2019-01-17 RX ADMIN — SODIUM CHLORIDE 100 ML/HR: 450 INJECTION, SOLUTION INTRAVENOUS at 18:42

## 2019-01-17 RX ADMIN — HYDROMORPHONE HYDROCHLORIDE 1 MG: 1 INJECTION, SOLUTION INTRAMUSCULAR; INTRAVENOUS; SUBCUTANEOUS at 03:41

## 2019-01-17 RX ADMIN — DULOXETINE HYDROCHLORIDE 60 MG: 60 CAPSULE, DELAYED RELEASE ORAL at 08:09

## 2019-01-17 RX ADMIN — SIMVASTATIN 10 MG: 20 TABLET, FILM COATED ORAL at 21:48

## 2019-01-17 RX ADMIN — PHENAZOPYRIDINE HYDROCHLORIDE 100 MG: 100 TABLET ORAL at 13:40

## 2019-01-17 RX ADMIN — HYDROMORPHONE HYDROCHLORIDE 6 MG: 2 TABLET ORAL at 16:44

## 2019-01-17 RX ADMIN — HEPARIN SODIUM 5000 UNITS: 5000 INJECTION INTRAVENOUS; SUBCUTANEOUS at 13:40

## 2019-01-17 RX ADMIN — TAMSULOSIN HYDROCHLORIDE 0.4 MG: 0.4 CAPSULE ORAL at 08:09

## 2019-01-17 RX ADMIN — BENZOCAINE AND MENTHOL 1 LOZENGE: 15; 3.6 LOZENGE ORAL at 18:37

## 2019-01-17 RX ADMIN — DOCUSATE SODIUM 100 MG: 100 CAPSULE, LIQUID FILLED ORAL at 18:37

## 2019-01-17 RX ADMIN — BENZOCAINE AND MENTHOL 1 LOZENGE: 15; 3.6 LOZENGE ORAL at 21:57

## 2019-01-17 RX ADMIN — PHENAZOPYRIDINE HYDROCHLORIDE 100 MG: 100 TABLET ORAL at 18:37

## 2019-01-17 RX ADMIN — BUPROPION HYDROCHLORIDE 150 MG: 150 TABLET, EXTENDED RELEASE ORAL at 08:09

## 2019-01-17 RX ADMIN — GABAPENTIN 800 MG: 400 CAPSULE ORAL at 21:46

## 2019-01-17 RX ADMIN — HYDROMORPHONE HYDROCHLORIDE 6 MG: 2 TABLET ORAL at 21:45

## 2019-01-17 RX ADMIN — HYDROMORPHONE HYDROCHLORIDE 1.5 MG: 1 INJECTION, SOLUTION INTRAMUSCULAR; INTRAVENOUS; SUBCUTANEOUS at 08:10

## 2019-01-17 RX ADMIN — ACETAMINOPHEN 650 MG: 325 TABLET ORAL at 18:37

## 2019-01-17 RX ADMIN — HEPARIN SODIUM 5000 UNITS: 5000 INJECTION INTRAVENOUS; SUBCUTANEOUS at 04:27

## 2019-01-17 RX ADMIN — HYDROMORPHONE HYDROCHLORIDE 1 MG: 1 INJECTION, SOLUTION INTRAMUSCULAR; INTRAVENOUS; SUBCUTANEOUS at 00:33

## 2019-01-17 RX ADMIN — PHENAZOPYRIDINE HYDROCHLORIDE 100 MG: 100 TABLET ORAL at 08:17

## 2019-01-17 RX ADMIN — HYDROMORPHONE HYDROCHLORIDE 6 MG: 2 TABLET ORAL at 13:40

## 2019-01-17 RX ADMIN — INSULIN LISPRO 2 UNITS: 100 INJECTION, SOLUTION INTRAVENOUS; SUBCUTANEOUS at 13:41

## 2019-01-17 RX ADMIN — INSULIN LISPRO 2 UNITS: 100 INJECTION, SOLUTION INTRAVENOUS; SUBCUTANEOUS at 22:36

## 2019-01-17 RX ADMIN — INSULIN LISPRO 2 UNITS: 100 INJECTION, SOLUTION INTRAVENOUS; SUBCUTANEOUS at 08:10

## 2019-01-17 NOTE — DIABETES MGMT
NUTRITIONAL ASSESSMENT GLYCEMIC CONTROL/ PLAN OF CARE Aniceto William           61 y.o.           1/14/2019 1. Sepsis, due to unspecified organism ([address] 2. Dizziness 3. Weakness 4. Fever, unspecified fever cause 5. Leukocytosis, unspecified type 6. Acute cystitis without hematuria INTERVENTIONS/PLAN:  
Consider increasing Lantus insulin to 12 units daily Continue inpatient monitoring and intervention ASSESSMENT:  
Pt is a 61 year male with a past medical history significant for diabetes, hypertension, hyperlipidemia, kidney stones, and cellulitis admitted with sepsis and UTI. Blood glucose elevated, noted addition of Lantus insulin yesterday. Pt is tolerating diet. Diabetes Management: Recent blood glucose:    
1/16/2019 15:27 1/16/2019 22:06 1/17/2019 07:52 1/17/2019 12:30  
216 (H) 188 (H) 193 (H) 191 (H) Within target range (non-ICU: <140; ICU<180): [] Yes   [x]  No 
 
Current Insulin regimen:  
Lantus insulin 8 units daily Correctional lispro insulin ACHS. Normal sensitivity dose. Home medication/insulin regimen:   
Metformin 1000 mg twice daily with meals. Trulicity 1.5 mg injection every 7 days. HbA1c: 10% (estimated average glucose of 240 mg/dL) Adequate glycemic control PTA:  [] Yes  [x] No 
  
SUBJECTIVE/OBJECTIVE:  
 
Diet: Diabetic consistent carbohydrate, no concentrated sweets Patient Vitals for the past 100 hrs: 
 % Diet Eaten 01/16/19 0910 100 % 01/15/19 1853 100 % 01/15/19 1306 0 % 01/15/19 1200 0 % 01/15/19 0927 100 % Medications: [x] Reviewed Most Recent POC Glucose:  
Recent Labs  
  01/17/19 
0349 01/16/19 
0345 01/15/19 
0358 01/14/19 
1650 * 151* 134* 126* Labs:  
Lab Results Component Value Date/Time Hemoglobin A1c 10.0 (H) 11/01/2018 03:27 AM  
 
Lab Results Component Value Date/Time  Sodium 138 01/17/2019 03:49 AM  
 Potassium 3.6 01/17/2019 03:49 AM  
 Chloride 105 01/17/2019 03:49 AM  
 CO2 24 01/17/2019 03:49 AM  
 Anion gap 9 01/17/2019 03:49 AM  
 Glucose 150 (H) 01/17/2019 03:49 AM  
 BUN 13 01/17/2019 03:49 AM  
 Creatinine 1.54 (H) 01/17/2019 03:49 AM  
 Calcium 7.9 (L) 01/17/2019 03:49 AM  
 Albumin 2.6 (L) 01/15/2019 03:58 AM  
 
Anthropometrics:   BMI (calculated): 37.5 Wt Readings from Last 1 Encounters:  
01/14/19 132.5 kg (292 lb) Ht Readings from Last 1 Encounters:  
01/14/19 6' 2\" (1.88 m) Estimated Nutrition Needs:  0099-1753 (500 Kcal deficit for weight loss), 106-133 grams protein/day Based on:   [x] Actual BW    []  IBW   []  Adjusted BW   
 
Nutrition Diagnoses: Altered nutrition related lab value related to diabetes as evidenced by Hemoglobin A1c of 10% Nutrition Interventions: diabetic diet, coordination of care Goal: Blood glucose will be within target range of  mg/dL by 1/20/19 Nutrition Monitoring and Evaluation []     Monitor po intake on meal rounds 
[x]     Continue inpatient monitoring and intervention 
[]     Other: 
 
Mikki Lilly RD, CDE pgr [phone]

## 2019-01-17 NOTE — CONSULTS
Infectious Disease Consultation Note    Requested by: Dr. Rosalinda Esparza    Reason: sepsis, klebsiella bacteremia, complicated uti    Current abx Prior abx   levofloxacin since 1/14 Pip/tazo, vancomycin 1/14  Ceftriaxone  1/15     Lines:       Assessment :      61year old man with h/o renal stones, uncontrolled type 2 DM (last hgbA1C 10 on 11/1/18) admitted to [facility] on 1/14/19 with 2 day h/o high grade fevers, weakness. Clinical picture c/w sepsis (POA) due to klebsiella bloodstream infection (positive blood culture 1/14, negative blood culture 1/16), right kidney pyelonephritis, complicated UTI due to klebsiella. Right kidney obstructive uropathy - s/p right retrogram pyelogram, right ureteral stent placement, urethral dilatation on 1/15/19    Patient is clinically improving. Intermittent low grade fevers could be due to gradually resolving renal inflammation. R/o persistent bacteremia. Recommendations:    1. D/c levofloxacin. Start po ciprofloxacin  2. F/u blood cultures 1/16  3. Remove hernandez per urology  4. Ok to d/c in am on po ciprofloxacin till 1/30/19 if clinically stable in am  5. mx of right ureteral calculus per urology    Advance Care planning:full code: discussed  with patient/surrogate decision maker:jimmy khan: [phone]    Thank you for consultation request. Above plan was discussed in details with patient, primary team. Please call me if any further questions or concerns. Will continue to participate in the care of this patient. HPI:    61year old man with h/o renal stones, uncontrolled type 2 DM (last hgbA1C 10 on 11/1/18) admitted to [facility] on 1/14/19 with 2 day h/o high grade fevers, weakness. In ed, he was noted to have wbc: 19k, creatinine: 2. Significant pyuria. Ct scan \"mild/moderate hydronephrosis and perinephric stranding. Proximal ureteral calculus measuring 9 x 5 mm\". He was started on broad spectrum abx.  Evaluated by urologist. Radha Seo cystoscopy with right retrogram pyelogram, right ureteral stent placement, urethral dilatation on 1/15/19. Blood cultures, urine cultures 1/14 revealed klebsiella. He was switched to ceftriaxone on 1/15. He had temp of 100.9 last pm. I have been consulted for further recommendations. Patient states that he was relatively well up until 12/26 when he had fever, dysuria, right flank pain - [facility]. In 3 days, he developed diffuse rash on his legs. Bactrim was d/nurys and he was given amoxicillin. He completed amoxicillin around 1/7/19. Patient feels better. Resolved right flank pain. Still some pelvic discomfort. Patient denies headaches, visual disturbances, sore throat, runny nose, earaches, cp, sob, chills, cough, abdominal pain, diarrhea,, pain or weakness in extremities. denies recent sick contacts. No h/o recent travel.  No known h/o MRSA colonization or infection in the past.        Past Medical History:   Diagnosis Date    Abdominal adhesions 7/30/2014    Back muscle spasm 7/30/2014    Back pain, lumbosacral     Cellulitis     Constipation, chronic 7/30/2014    DDD (degenerative disc disease), lumbar 7/30/2014    DDD (degenerative disc disease), thoracic 7/30/2014    Depression     Diabetes [facility] [address]     DM (diabetes mellitus) ([facility] [address] 7/30/2014    Frequent falls 7/30/2014    TRUE (generalized anxiety disorder) 7/30/2014    High cholesterol     HTN (hypertension) 7/30/2014    Hyperlipidemia 7/30/2014    Hypertension     Insomnia secondary to chronic pain 7/30/2014    Kidney stones     LBP radiating to both legs 7/30/2014    Lumbar facet arthropathy 7/30/2014    Lumbar nerve root impingement 7/30/2014    Lumbosacral radiculopathy at S1 7/30/2014    Major depression 7/30/2014    Nausea & vomiting     Neurological disorder     sciatica    MANISH (obstructive sleep apnea) 7/30/2014    S/P lumbar laminectomy 7/30/2014    S/P lumbar spinal fusion 7/30/2014    Small vessel disease     Thoracic compression fracture ([facility] [address] 7/30/2014    Unspecified sleep apnea     USES CPAP EVERY NIGHT       Past Surgical History:   Procedure Laterality Date    HX APPENDECTOMY      HX BACK SURGERY      Lumbar fusion    HX HERNIA REPAIR  1992    HX OTHER SURGICAL      EMG - S1 disorder    HX VASECTOMY  1985          Medication List      ASK your doctor about these medications    ACCU-CHEK FASTCLIX LANCET DRUM Mis  Generic drug:  lancets  CHECK BLOOD SUGAR EVERY DAY     ACCU-CHEK SMARTVIEW TEST STRIP strip  Generic drug:  glucose blood VI test strips  CHECK BLOOD SUGAR EVERY DAY     Back Brace Misc  1 Device by Does Not Apply route daily as needed for Pain. One, lumbosacral orthosis/back brace with metal struts      Medically necessary     Blood-Glucose Meter Misc  Commonly known as:  ACCU-CHEK GABRIELLE  Check blood sugar daily     buPROPion  mg SR tablet  Commonly known as:  WELLBUTRIN SR  TAKE 1 TABLET TWICE DAILY     dulaglutide 1.5 mg/0.5 mL sub-q pen  Commonly known as:  TRULICITY  0.5 mL by SubCUTAneous route every seven (7) days. DULoxetine 60 mg capsule  Commonly known as:  CYMBALTA  Take 1 Cap by mouth daily. gabapentin 800 mg tablet  Commonly known as:  NEURONTIN     isosorbide mononitrate ER 60 mg CR tablet  Commonly known as:  IMDUR  TAKE 1 TABLET EVERY MORNING     metFORMIN  mg tablet  Commonly known as:  GLUCOPHAGE XR  Take 2 Tabs by mouth two (2) times daily (with meals). metoprolol succinate 50 mg XL tablet  Commonly known as:  TOPROL-XL  Take 1 Tab by mouth daily. multivitamin tablet  Commonly known as:  ONE A DAY     naloxone 4 mg/actuation nasal spray  Commonly known as:  NARCAN  4 mg by Nasal route as needed. nitroglycerin 0.4 mg SL tablet  Commonly known as:  NITROSTAT  1 Tab by SubLINGual route every five (5) minutes as needed.   Use for Chest Pain ; Call MD if > 3 tablets required     OMEPRAZOLE (BULK)     ondansetron hcl 4 mg tablet  Commonly known as: ZOFRAN  Take 1 Tab by mouth every eight (8) hours as needed for Nausea. phentermine 37.5 mg tablet  Commonly known as:  ADIPEX-P  Take 1 Tab by mouth every morning. Max Daily Amount: 37.5 mg.     pramipexole 0.5 mg tablet  Commonly known as:  MIRAPEX  TAKE 1 TABLET THREE TIMES DAILY     simvastatin 10 mg tablet  Commonly known as:  ZOCOR  TAKE 1 TABLET EVERY NIGHT     zolpidem 10 mg tablet  Commonly known as:  AMBIEN  Take 1 Tab by mouth nightly.  Max Daily Amount: 10 mg.            Current Facility-Administered Medications   Medication Dose Route Frequency    HYDROmorphone (DILAUDID) tablet 4 mg  4 mg Oral Q3H PRN    HYDROmorphone (DILAUDID) tablet 6 mg  6 mg Oral Q3H PRN    phenazopyridine (PYRIDIUM) tablet 100 mg  100 mg Oral TIDPC    insulin glargine (LANTUS) injection 8 Units  8 Units SubCUTAneous DAILY    zolpidem (AMBIEN) tablet 5 mg  5 mg Oral QHS PRN    0.45% sodium chloride infusion  100 mL/hr IntraVENous CONTINUOUS    insulin lispro (HUMALOG) injection   SubCUTAneous AC&HS    glucose chewable tablet 16 g  4 Tab Oral PRN    glucagon (GLUCAGEN) injection 1 mg  1 mg IntraMUSCular PRN    dextrose (D50W) injection syrg 12.5-25 g  25-50 mL IntraVENous PRN    tamsulosin (FLOMAX) capsule 0.4 mg  0.4 mg Oral DAILY    levoFLOXacin (LEVAQUIN) 500 mg in D5W IVPB  500 mg IntraVENous Q24H    sodium chloride (NS) flush 5-10 mL  5-10 mL IntraVENous PRN    DULoxetine (CYMBALTA) capsule 60 mg  60 mg Oral DAILY    gabapentin (NEURONTIN) capsule 800 mg  800 mg Oral TID    simvastatin (ZOCOR) tablet 10 mg  10 mg Oral QHS    buPROPion SR (WELLBUTRIN SR) tablet 150 mg  150 mg Oral DAILY    acetaminophen (TYLENOL) tablet 650 mg  650 mg Oral Q6H PRN    ondansetron (ZOFRAN) injection 4 mg  4 mg IntraVENous Q6H PRN    heparin (porcine) injection 5,000 Units  5,000 Units SubCUTAneous Q8H       Allergies: Bactrim [sulfamethoprim] and Opana [oxymorphone]    Family History   Problem Relation Age of Onset    Diabetes Mother     Heart Failure Mother     Heart Attack Father     Diabetes Sister     Stroke Brother      Social History     Socioeconomic History    Marital status:      Spouse name: Not on file    Number of children: Not on file    Years of education: Not on file    Highest education level: Not on file   Social Needs    Financial resource strain: Not on file    Food insecurity - worry: Not on file    Food insecurity - inability: Not on file   [employer] needs - medical: Not on file   [employer] needs - non-medical: Not on file   Occupational History    Not on file   Tobacco Use    Smoking status: Never Smoker    Smokeless tobacco: Never Used   Substance and Sexual Activity    Alcohol use: No    Drug use: No    Sexual activity: Yes     Partners: Female     Birth control/protection: Surgical   Other Topics Concern    Not on file   Social History Narrative    Not on file     Social History     Tobacco Use   Smoking Status Never Smoker   Smokeless Tobacco Never Used        Temp (24hrs), Av.7 °F (37.1 °C), Min:97.4 °F (36.3 °C), Max:100.9 °F (38.3 °C)    Visit Vitals  /86 (BP 1 Location: Right arm, BP Patient Position: At rest)   Pulse 95   Temp 97.7 °F (36.5 °C)   Resp 18   Ht 6' 2\" (1.88 m)   Wt 132.5 kg (292 lb)   SpO2 99%   BMI 37.49 kg/m²       ROS: 12 point ROS obtained in details. Pertinent positives as mentioned in HPI,   otherwise negative    Physical Exam:    General: obese male laying on the bed/sitting on the  bed AAOx3 in no acute distress. General:   awake alert and oriented   HEENT:  Normocephalic, atraumatic, PERRL, EOMI, no scleral icterus or pallor; no conjunctival hemmohage;  nasal and oral mucous are moist and without evidence of lesions. No thrush. Dentition good. Neck supple, no bruits.    Lymph Nodes:   no cervical, axillary or inguinal adenopathy   Lungs:   non-labored, bilaterally clear to auscultation- no crackles wheezes rales or rhonchi Heart:  RRR, s1 and s2; no rubs or gallops, no edema, + pedal pulses   Abdomen:  soft, non-distended, active bowel sounds, no hepatomegaly, no splenomegaly. Non-tender   Genitourinary:  deferred   Extremities:   no clubbing, cyanosis; no joint effusions or swelling; Full ROM of all large joints to the upper and lower extremities; muscle mass appropriate for age, mild blanching erythema right foot (better than before per patient) - no overlying warmth, tenderness   Neurologic:  No gross focal sensory abnormalities; 5/5 muscle strength to upper and lower extremities. Speech appropriate.  Cranial nerves intact                        Skin:  mild blanching erythema right foot (better than before per patient) - no overlying warmth, tenderness; healed scab around 1 cm in size lateral aspect right leg   Back:  no spinal or paraspinal muscle tenderness or rigidity, no CVA tenderness     Psychiatric:  No suicidal or homicidal ideations, appropriate mood and affect         Labs: Results:   Chemistry Recent Labs     01/17/19  0349 01/16/19  0345 01/15/19  0358 01/14/19  1650   * 151* 134* 126*    139 139 138   K 3.6 3.9 4.0 3.6    109* 109* 105   CO2 24 25 22 25   BUN 13 17 24* 21*   CREA 1.54* 1.78* 2.12* 2.17*   CA 7.9* 8.0* 7.6* 7.5*   AGAP 9 5 8 8   BUCR 8* 10* 11* 10*   AP  --   --  103 90   TP  --   --  6.9 6.2*   ALB  --   --  2.6* 2.9*   GLOB  --   --  4.3* 3.3   AGRAT  --   --  0.6* 0.9      CBC w/Diff Recent Labs     01/17/19  0349 01/16/19  0345 01/15/19  0358   WBC 7.8 10.0 12.9   RBC 3.83* 3.87* 3.99*   HGB 10.1* 10.4* 10.8*   HCT 32.5* 32.9* 34.1*    174 184   GRANS 67 77*  --    LYMPH 14* 11*  --    EOS 3 1  --       Microbiology Recent Labs     01/16/19  0345 01/15/19  1655 01/14/19  1650 01/14/19  1504 01/14/19  1432   CULT NO GROWTH 1 DAY NO GROWTH 2 DAYS >100,000 COLONIES/mL KLEBSIELLA PNEUMONIAE*  >100,000 COLONIES/mL KLEBSIELLA PNEUMONIAE 2ND MORPHOTYPE*  >100,000 COLONIES/mL KLEBSIELLA PNEUMONIAE 3RD MORPHOTYPE* AEROBIC AND ANAEROBIC BOTTLES KLEBSIELLA PNEUMONIAE* AEROBIC BOTTLE KLEBSIELLA PNEUMONIAE*  For Susceptibility Refer to Culture  [id number]            RADIOLOGY:    All available imaging studies/reports in [facility] for this admission were reviewed    Dr. Enoc Davis, Infectious Disease Specialist  [phone]  January 17, 2019  1:57 PM

## 2019-01-17 NOTE — PROGRESS NOTES
[facility] Hospitalist Group Progress Note Patient: Makenzie Solomon Age: 61 y.o. : 1955 MR#: [id number] SSN: [id number] Date: 2019 Subjective:  
 
Cont to c/o pain with urination / spasms worsening w movement. States oral dilaudid seems to last longer but still reports sig pain with movement. No other concerns including no CP, SOB, n/v; reports mild constipation at present. Assessment/Plan: 1. Sepsis - in setting of UTI; cont rocephin; VSS improved; 2/2 blood cultures + for gram neg rods - repeat culture neg x 1 day 2. UTI - ID consult appreciated. cipro per ID 3. Obstructing renal stone - s/p cystoscopy w/ JJ stent placement; C/o penile pain - change to oral dilaudid. Cont pyridium per urology 4. Acute on chronic kidney disease stage 3- improving, nephrology following; likely r/t hydronephrosis in setting of ureteral stone +/- r/t NSAID use prior to admission 5. HTN - initially hypotensive and now improved; cont to hold all oral antihypertensives for now and follow trends 6. T2DM, uncontrolled - A1c 10.0 in 2018, cont SSI; inc lantus, hold oral diabetes meds 7. Obesity 8. Depression & Anxiety - resume home meds, monitor 9. Constipation - patient agrees to miralax / stool softener now Additional Notes:   
 
Case discussed with:  [x]Patient  [x]Family  [x]Nursing  []Case Management DVT Prophylaxis:  []Lovenox  [x]Hep SQ  []SCDs  []Coumadin   []On Heparin gtt Objective:  
VS:  
Visit Vitals /73 (BP 1 Location: Right arm, BP Patient Position: At rest;Head of bed elevated (Comment degrees)) Pulse (!) 102 Temp 98.3 °F (36.8 °C) Resp 20 Ht 6' 2\" (1.88 m) Wt 132.5 kg (292 lb) SpO2 94% BMI 37.49 kg/m² Tmax/24hrs: Temp (24hrs), Av.2 °F (37.3 °C), Min:98.3 °F (36.8 °C), Max:100.9 °F (38.3 °C) Intake/Output Summary (Last 24 hours) at 2019 5483 Last data filed at 2019 9003 Gross per 24 hour Intake 3001.66 ml Output 4550 ml Net -1548.34 ml General:  Alert, appears more comfortable Cardiovascular:  RRR Pulmonary:  LSC throughout GI:  +BS in all four quadrants, soft, non-tender : no CVA tenderness Extremities:  No edema; 2+ dorsalis pedis pulses bilaterally Neuro: oriented x 3 Labs:   
Recent Results (from the past 24 hour(s)) GLUCOSE, POC Collection Time: 01/16/19  8:28 AM  
Result Value Ref Range Glucose (POC) 170 (H) 70 - 110 mg/dL GLUCOSE, POC Collection Time: 01/16/19 11:01 AM  
Result Value Ref Range Glucose (POC) 240 (H) 70 - 110 mg/dL GLUCOSE, POC Collection Time: 01/16/19  3:27 PM  
Result Value Ref Range Glucose (POC) 216 (H) 70 - 110 mg/dL GLUCOSE, POC Collection Time: 01/16/19 10:06 PM  
Result Value Ref Range Glucose (POC) 188 (H) 70 - 110 mg/dL METABOLIC PANEL, BASIC Collection Time: 01/17/19  3:49 AM  
Result Value Ref Range Sodium 138 136 - 145 mmol/L Potassium 3.6 3.5 - 5.5 mmol/L Chloride 105 100 - 108 mmol/L  
 CO2 24 21 - 32 mmol/L Anion gap 9 3.0 - 18 mmol/L Glucose 150 (H) 74 - 99 mg/dL BUN 13 7.0 - 18 MG/DL Creatinine 1.54 (H) 0.6 - 1.3 MG/DL  
 BUN/Creatinine ratio 8 (L) 12 - 20 GFR est AA 56 (L) >60 ml/min/1.73m2 GFR est non-AA 46 (L) >60 ml/min/1.73m2 Calcium 7.9 (L) 8.5 - 10.1 MG/DL  
CBC WITH AUTOMATED DIFF Collection Time: 01/17/19  3:49 AM  
Result Value Ref Range WBC 7.8 4.6 - 13.2 K/uL  
 RBC 3.83 (L) 4.70 - 5.50 M/uL  
 HGB 10.1 (L) 13.0 - 16.0 g/dL HCT 32.5 (L) 36.0 - 48.0 % MCV 84.9 74.0 - 97.0 FL  
 MCH 26.4 24.0 - 34.0 PG  
 MCHC 31.1 31.0 - 37.0 g/dL  
 RDW 14.6 (H) 11.6 - 14.5 % PLATELET 812 381 - 747 K/uL MPV 9.8 9.2 - 11.8 FL  
 NEUTROPHILS 67 40 - 73 % LYMPHOCYTES 14 (L) 21 - 52 % MONOCYTES 16 (H) 3 - 10 % EOSINOPHILS 3 0 - 5 % BASOPHILS 0 0 - 2 %  
 ABS. NEUTROPHILS 5.2 1.8 - 8.0 K/UL  
 ABS. LYMPHOCYTES 1.1 0.9 - 3.6 K/UL ABS. MONOCYTES 1.3 (H) 0.05 - 1.2 K/UL  
 ABS. EOSINOPHILS 0.2 0.0 - 0.4 K/UL  
 ABS. BASOPHILS 0.0 0.0 - 0.1 K/UL  
 DF AUTOMATED Signed By: Saritha Ratliff, NP   
 January 17, 2019

## 2019-01-17 NOTE — PROGRESS NOTES
Patient seen and examined independently. Ancillary data reviewed. Case discussed with APC. Agree with APC note with the following additional comments: pt continues to c/o dysuria. Exam reassuring. S/p right ureteral stent placement and urethral stricture dilatation. Currently has hernandez in place. Urine cx, blood cx + for K. Pneumoniae all sensitive to levofloxacin, will dc rocephin. Will consult ID regarding duration of abx. Cont to monitor closely.

## 2019-01-17 NOTE — PROGRESS NOTES
Problem: Mobility Impaired (Adult and Pediatric) Goal: *Acute Goals and Plan of Care (Insert Text) Physical Therapy Goals Initiated 1/16/2019 and to be accomplished within 7 day(s) 1. Patient will move from supine to sit and sit to supine  in bed with independence. 2.  Patient will transfer from bed to chair and chair to bed with independence using the least restrictive device. 3.  Patient will perform sit to stand with independence. 4.  Patient will ambulate with independence for 200 feet with the least restrictive device. 5.  Patient will ascend/descend 12 stairs with 1 handrail(s) with independence. 1358 - Pt wearing bi-pap and attempting to sleep. Pt reports 9/10 pain at this time and would like to rest. Will f/u later in day. 1659 - Nursing entering room to bathe patient . Will f/u at later date.

## 2019-01-17 NOTE — PROGRESS NOTES
In Patient Progress note Admit Date: 1/14/2019 Impression:  
 
1) nonoliguric QUANG on CKD3 d/t rt sided hydronephrosis d/t ureteral stone ,  
ATN d/t hypotension / urosepsis/ may also be contributing NSAID use may have  
also contributed in this setting Renal parameters improving , postobstructive diuresis , continue IVF as still febrile/inadequate intake Appreciate  stent placement per urology , Discussed with patient about avoiding NSAIDs Metformin appropriately held 2) Rt sided hydronephrosis , s/p cytoscopy , pyelogram and ureteral stent placement ,  
also urethral stricture dilation 3) Sepsis d/t polymicrobial  UTI , on AB per IM 4) CKD3 with proteinuria , likely diabetic nephropathy 5) HTN , hypotensive on admission , better, avoid extremes of BP 6) DM2  With complications, poorly controlled 
  
Please call with questions, 
  
Coleman Fleming MD [facility] [place] Cell [phone] Pager: [phone] Subjective:  
 
Febrile overnight Denies SOB Denies nausea, Vx  
 
 
 
Current Facility-Administered Medications:  
  HYDROmorphone (DILAUDID) tablet 4 mg, 4 mg, Oral, Q3H PRN, Alleen Chess, NP 
  HYDROmorphone (DILAUDID) tablet 6 mg, 6 mg, Oral, Q3H PRN, Lamb Sizer B, NP, 6 mg at 01/17/19 1340 
  benzocaine-menthol (CEPACOL) lozenge 1 Lozenge, 1 Lozenge, Mucous Membrane, PRN, Alleen Chess, NP 
  docusate sodium (COLACE) capsule 100 mg, 100 mg, Oral, BID, Lamb Sizer B, NP 
  polyethylene glycol (MIRALAX) packet 17 g, 17 g, Oral, DAILY PRN, Alleen Chess, NP 
  phenazopyridine (PYRIDIUM) tablet 100 mg, 100 mg, Oral, TIDPC, Centre Rosibel VO MD, 100 mg at 01/17/19 1340 
  insulin glargine (LANTUS) injection 8 Units, 8 Units, SubCUTAneous, DAILY, Lamb Sizer B, NP, 8 Units at 01/16/19 2250   zolpidem (AMBIEN) tablet 5 mg, 5 mg, Oral, QHS PRN, Robina Collier MD, 5 mg at 01/16/19 2252   0.45% sodium chloride infusion, 100 mL/hr, IntraVENous, CONTINUOUS, Bichu, Jose VO MD, Last Rate: 100 mL/hr at 01/16/19 2249, 100 mL/hr at 01/16/19 2249   insulin lispro (HUMALOG) injection, , SubCUTAneous, AC&HS, Karine Matosred B, NP, 2 Units at 01/17/19 1341 
  glucose chewable tablet 16 g, 4 Tab, Oral, PRN, Chemo Uribe NP 
  glucagon (GLUCAGEN) injection 1 mg, 1 mg, IntraMUSCular, PRN, Chmeo Uribe NP 
  dextrose (D50W) injection syrg 12.5-25 g, 25-50 mL, IntraVENous, PRN, Chemo Uribe NP 
  tamsulosin (FLOMAX) capsule 0.4 mg, 0.4 mg, Oral, DAILY, Neo Abreu MD, 0.4 mg at 01/17/19 9848   levoFLOXacin (LEVAQUIN) 500 mg in D5W IVPB, 500 mg, IntraVENous, Q24H, Nando, Trever Hussein MD, Last Rate: 100 mL/hr at 01/16/19 2246, 500 mg at 01/16/19 2246   sodium chloride (NS) flush 5-10 mL, 5-10 mL, IntraVENous, PRN, Fiorella Cook MD 
  DULoxetine (CYMBALTA) capsule 60 mg, 60 mg, Oral, DAILY, Pernell Klinefelter, MD, 60 mg at 01/17/19 0809 
  gabapentin (NEURONTIN) capsule 800 mg, 800 mg, Oral, TID, Pernell Klinefelter, MD, 800 mg at 01/17/19 1340   simvastatin (ZOCOR) tablet 10 mg, 10 mg, Oral, QHS, Pernell Klinefelter, MD, 10 mg at 01/16/19 2252   buPROPion SR (WELLBUTRIN SR) tablet 150 mg, 150 mg, Oral, DAILY, Pernell Klinefelter, MD, 150 mg at 01/17/19 7510   acetaminophen (TYLENOL) tablet 650 mg, 650 mg, Oral, Q6H PRN, Pernell Klinefelter, MD, 650 mg at 01/16/19 2053   ondansetron (ZOFRAN) injection 4 mg, 4 mg, IntraVENous, Q6H PRN, Pernell Klinefelter, MD, 4 mg at 01/14/19 2336 
  heparin (porcine) injection 5,000 Units, 5,000 Units, SubCUTAneous, Q8H, Pernell Klinefelter, MD, 5,000 Units at 01/17/19 1340 Objective:  
 
Visit Vitals /89 (BP 1 Location: Right arm, BP Patient Position: At rest) Pulse 88 Temp 98 °F (36.7 °C) Resp 18 Ht 6' 2\" (1.88 m) Wt 132.5 kg (292 lb) SpO2 96% BMI 37.49 kg/m² Intake/Output Summary (Last 24 hours) at 1/17/2019 1604 Last data filed at 1/17/2019 4879 Gross per 24 hour Intake 2500 ml Output 4100 ml Net -1600 ml Physical Exam:  
 
GEN NAD HENT mmm RS AEBE clear CVS s1 s2 wnl no JVD 
GI soft BS + Ext no edema Data Review: 
 
Recent Labs  
  01/17/19 
8765 WBC 7.8  
RBC 3.83* HCT 32.5* MCV 84.9 MCH 26.4 MCHC 31.1 RDW 14.6* Recent Labs  
  01/17/19 
0349 01/16/19 
0345 01/15/19 
0358 01/14/19 
1650 BUN 13 17 24* 21* CREA 1.54* 1.78* 2.12* 2.17* CA 7.9* 8.0* 7.6* 7.5* ALB  --   --  2.6* 2.9*  
K 3.6 3.9 4.0 3.6  139 139 138  109* 109* 105 CO2 24 25 22 25 * 151* 134* 126* Leslye Matos MD

## 2019-01-17 NOTE — PROGRESS NOTES
This writer obtained [facility] for [facility] and referral sent to [facility], message left for staff. Anil Kumar RN, BSN  [phone]

## 2019-01-17 NOTE — ROUTINE PROCESS
Bedside and Verbal shift change report given to [employer], RN (oncoming nurse) by Jorge Sr RN (offgoing nurse). Report included the following information SBAR, Kardex, MAR and Recent Results. SITUATION: 
Code Status: Full Code Reason for Admission: Sepsis ([address] UTI (urinary tract infection) Hospital day: 3 Problem List:  
   
Hospital Problems  Date Reviewed: 1/15/2019 Codes Class Noted POA Sepsis ([address] ICD-10-CM: A41.9 ICD-9-CM: 038.9, 995.91  1/14/2019 Unknown UTI (urinary tract infection) ICD-10-CM: N39.0 ICD-9-CM: 599.0  1/14/2019 Unknown BACKGROUND: 
 Past Medical History:  
Past Medical History:  
Diagnosis Date  Abdominal adhesions 7/30/2014  Back muscle spasm 7/30/2014  Back pain, lumbosacral   
 Cellulitis  Constipation, chronic 7/30/2014  DDD (degenerative disc disease), lumbar 7/30/2014  DDD (degenerative disc disease), thoracic 7/30/2014  Depression  Diabetes ([address]  DM (diabetes mellitus) ([address] 7/30/2014  Frequent falls 7/30/2014  TRUE (generalized anxiety disorder) 7/30/2014  High cholesterol  HTN (hypertension) 7/30/2014  Hyperlipidemia 7/30/2014  Hypertension  Insomnia secondary to chronic pain 7/30/2014  Kidney stones  LBP radiating to both legs 7/30/2014  Lumbar facet arthropathy 7/30/2014  Lumbar nerve root impingement 7/30/2014  Lumbosacral radiculopathy at S1 7/30/2014  Major depression 7/30/2014  Nausea & vomiting  Neurological disorder   
 sciatica  MANISH (obstructive sleep apnea) 7/30/2014  S/P lumbar laminectomy 7/30/2014  S/P lumbar spinal fusion 7/30/2014  Small vessel disease  Thoracic compression fracture ([facility] [address] [facility] 7/30/2014  Unspecified sleep apnea USES CPAP EVERY NIGHT Patient taking anticoagulants yes Patient has a defibrillator: no  
 History of shots YES for example, flu, pneumonia, tetanus Isolation History NO for example, MRSA, CDiff ASSESSMENT: 
Changes in Assessment Throughout Shift: NONE Significant Changes in 24 hours (for example, RR/code, fall) Patient has Central Line: no  
Patient has Montes De Oca Cath: yes Reasons if yes: S/P Cystoscopy and Right Ureteral Stent Placement Mobility Issues PT 
IV Patency OR Checklist 
Pending Tests Last Vitals: 
Vitals w/ MEWS Score (last day) Date/Time MEWS Score Pulse Resp Temp BP Level of Consciousness SpO2  
 01/17/19 0335  2  102  (Abnormal)   20  98.3 °F (36.8 °C)  134/73  Alert  94 % 01/17/19 0100  2  107  (Abnormal)   20  99.1 °F (37.3 °C)  132/79  Alert  95 % 01/17/19 0004              94 % 01/16/19 2351  3  122  (Abnormal)   20  99 °F (37.2 °C)  177/91  (Abnormal)   Alert  92 % 01/16/19 2003  3  114  (Abnormal)   20  100.9 °F (38.3 °C)  (Abnormal)   148/81  Alert  94 % 01/16/19 1743            Alert    
 01/16/19 1613    Vimal.Rolando        Alert    
 01/16/19 1528  2  110  (Abnormal)   20  98.4 °F (36.9 °C)  148/80  Alert  96 % 01/16/19 1101  1  100  20  99.7 °F (37.6 °C)  149/90  Alert  90 % 01/16/19 0936            Alert    
 01/16/19 0830  3  113  (Abnormal)   20  99.3 °F (37.4 °C)  149/84  Alert  91 % 01/16/19 0412    100  20        99 % 01/16/19 0401  2  101  (Abnormal)   20  97 °F (36.1 °C)  131/77  Alert  98 % 01/16/19 0052    89      119/68     PAIN Pain Assessment Pain Intensity 1: 5 (01/17/19 0356) Pain Location 1: Penis Pain Intervention(s) 1: Medication (see MAR) Patient Stated Pain Goal: 0 Intervention effective: yes Time of last intervention: 0341 Reassessment Completed: yes Other actions taken for pain: Distraction Last 3 Weights: 
Last 3 Recorded Weights in this Encounter 01/14/19 1230 Weight: 132.5 kg (292 lb) Weight change: INTAKE/OUPUT Current Shift: No intake/output data recorded. Last three shifts: 01/15 1901 - 01/17 0700 In: 3721.7 [P.O.:840; I.V.:2881.7] Out: Kin Shorten RECOMMENDATIONS AND DISCHARGE PLANNING Patient needs and requests: Pain Control Pending tests/procedures: labs Discharge plan for patient: Home Discharge planning Needs or Barriers: none Estimated Discharge Date: 1/19/2019 Posted on Whiteboard in [facility] Room: yes \"HEALS\" SAFETY CHECK A safety check occurred in the patient's room between off going nurse and oncoming nurse listed above. The safety check included the below items: 
 
H High Alert Medications Verify all high alert medication drips (heparin, PCA, etc.) E Equipment Suction is set up for ALL patients (with rakesh) Red plugs utilized for all equipment (IV pumps, etc.) WOWs wiped down at end of shift. Room stocked with oxygen, suction, and other unit-specific supplies A Alarms Bed alarm is set for fall risk patients Ensure chair alarm is in place and activated if patient is up in a chair L Lines Check IV for any infiltration Montes De Oca bag is empty if patient has a Montes De Oca Tubing and IV bags are labeled Carly Rodgers Safety Room is clean, patient is clean, and equipment is clean. Hallways are clear from equipment besides carts. Fall bracelet on for fall risk patients Ensure room is clear and free of clutter Suction is set up for ALL patients (with rakesh) Hallways are clear from equipment besides carts. Isolation precautions followed, supplies available outside room, sign posted Cristina Jimenez RN

## 2019-01-17 NOTE — PROGRESS NOTES
[facility] Hospitalist Group Progress Note Patient: Gennaro Marin Age: 61 y.o. : 1955 MR#: [id number] SSN: [id number] Date: 2019 Subjective:  
 
C/o severe pain to penis with urination and also w/ c/o inadequate pain control w/ morphine. No other concerns including no CP, SOB, n/v/constipation. Assessment/Plan: 1. Sepsis - in setting of UTI; cont rocephin; VSS improved; 2/2 blood cultures + for gram neg rods - repeat culture in progress 2. UTI - cont IV rocephin / levaquin, follow culture results for sensitivities 3. Obstructing renal stone - s/p cystoscopy w/ JJ stent placement; C/o penile pain - change from morphine to dilaudid ( reviewed no recent narcotics since last year). Pyridium added per urology 4. Acute on chronic kidney disease - cont IVF; nephrology consult appreciated, improving 5. HTN - initially hypotensive and now improved; cont to hold all oral antihypertensives for now and follow trends 6. T2DM, uncontrolled - A1c 10.0 in 2018, cont SSI; add low dose lantus, hold oral diabetes meds 7. Obesity 8. Depression & Anxiety - resume home meds, monitor Additional Notes:   
 
Case discussed with:  [x]Patient  [x]Family  [x]Nursing  []Case Management DVT Prophylaxis:  []Lovenox  [x]Hep SQ  []SCDs  []Coumadin   []On Heparin gtt Objective:  
VS:  
Visit Vitals /80 (BP 1 Location: Right arm, BP Patient Position: At rest) Pulse (!) 110 Temp 98.4 °F (36.9 °C) Resp 20 Ht 6' 2\" (1.88 m) Wt 132.5 kg (292 lb) SpO2 96% BMI 37.49 kg/m² Tmax/24hrs: Temp (24hrs), Av.5 °F (37.5 °C), Min:97 °F (36.1 °C), Max:101.4 °F (38.6 °C) Intake/Output Summary (Last 24 hours) at 2019 194 Last data filed at 2019 1819 Gross per 24 hour Intake 2148.33 ml Output 4700 ml Net -2551.67 ml General:  Alert, uncomfortable appearing Cardiovascular:  RRR Pulmonary:  LSC throughout GI:  +BS in all four quadrants, soft, non-tender : no CVA tenderness Extremities:  No edema; 2+ dorsalis pedis pulses bilaterally Neuro: oriented x 3 Labs:   
Recent Results (from the past 24 hour(s)) GLUCOSE, POC Collection Time: 01/15/19  9:26 PM  
Result Value Ref Range Glucose (POC) 188 (H) 70 - 110 mg/dL GLUCOSE, POC Collection Time: 01/15/19  9:54 PM  
Result Value Ref Range Glucose (POC) 185 (H) 70 - 110 mg/dL CBC WITH AUTOMATED DIFF Collection Time: 01/16/19  3:45 AM  
Result Value Ref Range WBC 10.0 4.6 - 13.2 K/uL  
 RBC 3.87 (L) 4.70 - 5.50 M/uL  
 HGB 10.4 (L) 13.0 - 16.0 g/dL HCT 32.9 (L) 36.0 - 48.0 % MCV 85.0 74.0 - 97.0 FL  
 MCH 26.9 24.0 - 34.0 PG  
 MCHC 31.6 31.0 - 37.0 g/dL  
 RDW 14.3 11.6 - 14.5 % PLATELET 416 295 - 678 K/uL MPV 9.7 9.2 - 11.8 FL  
 NEUTROPHILS 77 (H) 40 - 73 % LYMPHOCYTES 11 (L) 21 - 52 % MONOCYTES 11 (H) 3 - 10 % EOSINOPHILS 1 0 - 5 % BASOPHILS 0 0 - 2 %  
 ABS. NEUTROPHILS 7.8 1.8 - 8.0 K/UL  
 ABS. LYMPHOCYTES 1.1 0.9 - 3.6 K/UL  
 ABS. MONOCYTES 1.1 0.05 - 1.2 K/UL  
 ABS. EOSINOPHILS 0.1 0.0 - 0.4 K/UL  
 ABS. BASOPHILS 0.0 0.0 - 0.1 K/UL  
 DF AUTOMATED METABOLIC PANEL, BASIC Collection Time: 01/16/19  3:45 AM  
Result Value Ref Range Sodium 139 136 - 145 mmol/L Potassium 3.9 3.5 - 5.5 mmol/L Chloride 109 (H) 100 - 108 mmol/L  
 CO2 25 21 - 32 mmol/L Anion gap 5 3.0 - 18 mmol/L Glucose 151 (H) 74 - 99 mg/dL BUN 17 7.0 - 18 MG/DL Creatinine 1.78 (H) 0.6 - 1.3 MG/DL  
 BUN/Creatinine ratio 10 (L) 12 - 20 GFR est AA 47 (L) >60 ml/min/1.73m2 GFR est non-AA 39 (L) >60 ml/min/1.73m2 Calcium 8.0 (L) 8.5 - 10.1 MG/DL  
CULTURE, BLOOD Collection Time: 01/16/19  3:45 AM  
Result Value Ref Range Special Requests: NO SPECIAL REQUESTS Culture result: NO GROWTH AFTER 3 HOURS    
GLUCOSE, POC Collection Time: 01/16/19  8:28 AM  
Result Value Ref Range Glucose (POC) 170 (H) 70 - 110 mg/dL GLUCOSE, POC Collection Time: 01/16/19 11:01 AM  
Result Value Ref Range Glucose (POC) 240 (H) 70 - 110 mg/dL GLUCOSE, POC Collection Time: 01/16/19  3:27 PM  
Result Value Ref Range Glucose (POC) 216 (H) 70 - 110 mg/dL Signed By: Siobhan Cowart NP   
 January 16, 2019

## 2019-01-17 NOTE — PROGRESS NOTES
Dr. Danielle Gupta was informed of patient's c/o that Dilaudid 1 mg does not help and wants it increased, and also wants ambien 5 mg which he was taking at home. Dr. Danielle Gupta ordered a one time dose of Dilaudid 2 mg IV and Ambien 5 mg PO q HS PRN.

## 2019-01-17 NOTE — PROGRESS NOTES
Dr. Brenda Polk, on-call for Dr. Tiffanie Sweet, was informed of patient Montes De Oca catheter leaking moderate amount urine once at 2310. Dr. Brenda Polk said that it was just a spasm and leave the Montes De Oca catheter as it is. No new order was received. No further leakage of urine noted at this time.

## 2019-01-18 ENCOUNTER — HOME HEALTH ADMISSION (OUTPATIENT)
Dept: HOME HEALTH SERVICES | Facility: HOME HEALTH | Age: 64
End: 2019-01-18

## 2019-01-18 VITALS
OXYGEN SATURATION: 96 % | WEIGHT: 292 LBS | SYSTOLIC BLOOD PRESSURE: 134 MMHG | HEIGHT: 74 IN | BODY MASS INDEX: 37.47 KG/M2 | HEART RATE: 81 BPM | DIASTOLIC BLOOD PRESSURE: 83 MMHG | RESPIRATION RATE: 18 BRPM | TEMPERATURE: 97 F

## 2019-01-18 LAB
ANION GAP SERPL CALC-SCNC: 9 MMOL/L (ref 3–18)
BASOPHILS # BLD: 0 K/UL (ref 0–0.06)
BASOPHILS NFR BLD: 0 % (ref 0–3)
BUN SERPL-MCNC: 14 MG/DL (ref 7–18)
BUN/CREAT SERPL: 10 (ref 12–20)
CALCIUM SERPL-MCNC: 8.3 MG/DL (ref 8.5–10.1)
CHLORIDE SERPL-SCNC: 102 MMOL/L (ref 100–108)
CO2 SERPL-SCNC: 27 MMOL/L (ref 21–32)
CREAT SERPL-MCNC: 1.44 MG/DL (ref 0.6–1.3)
DIFFERENTIAL METHOD BLD: ABNORMAL
EOSINOPHIL # BLD: 0.5 K/UL (ref 0–0.4)
EOSINOPHIL NFR BLD: 8 % (ref 0–5)
ERYTHROCYTE [DISTWIDTH] IN BLOOD BY AUTOMATED COUNT: 14.5 % (ref 11.6–14.5)
GLUCOSE BLD STRIP.AUTO-MCNC: 133 MG/DL (ref 70–110)
GLUCOSE BLD STRIP.AUTO-MCNC: 159 MG/DL (ref 70–110)
GLUCOSE SERPL-MCNC: 140 MG/DL (ref 74–99)
HCT VFR BLD AUTO: 33.2 % (ref 36–48)
HGB BLD-MCNC: 10.2 G/DL (ref 13–16)
LYMPHOCYTES # BLD: 0.8 K/UL (ref 0.8–3.5)
LYMPHOCYTES NFR BLD: 13 % (ref 20–51)
MCH RBC QN AUTO: 26.2 PG (ref 24–34)
MCHC RBC AUTO-ENTMCNC: 30.7 G/DL (ref 31–37)
MCV RBC AUTO: 85.1 FL (ref 74–97)
METAMYELOCYTES NFR BLD MANUAL: 2 %
MONOCYTES # BLD: 1 K/UL (ref 0–1)
MONOCYTES NFR BLD: 16 % (ref 2–9)
NEUTS BAND NFR BLD MANUAL: 1 % (ref 0–5)
NEUTS SEG # BLD: 3.8 K/UL (ref 1.8–8)
NEUTS SEG NFR BLD: 60 % (ref 42–75)
PLATELET # BLD AUTO: 174 K/UL (ref 135–420)
PLATELET COMMENTS,PCOM: ABNORMAL
PMV BLD AUTO: 9.7 FL (ref 9.2–11.8)
POTASSIUM SERPL-SCNC: 3.7 MMOL/L (ref 3.5–5.5)
RBC # BLD AUTO: 3.9 M/UL (ref 4.7–5.5)
RBC MORPH BLD: ABNORMAL
SODIUM SERPL-SCNC: 138 MMOL/L (ref 136–145)
WBC # BLD AUTO: 6.2 K/UL (ref 4.6–13.2)

## 2019-01-18 PROCEDURE — 82962 GLUCOSE BLOOD TEST: CPT

## 2019-01-18 PROCEDURE — 74011250637 HC RX REV CODE- 250/637: Performed by: NURSE PRACTITIONER

## 2019-01-18 PROCEDURE — 36415 COLL VENOUS BLD VENIPUNCTURE: CPT

## 2019-01-18 PROCEDURE — 74011250636 HC RX REV CODE- 250/636: Performed by: HOSPITALIST

## 2019-01-18 PROCEDURE — 74011250637 HC RX REV CODE- 250/637: Performed by: UROLOGY

## 2019-01-18 PROCEDURE — 80048 BASIC METABOLIC PNL TOTAL CA: CPT

## 2019-01-18 PROCEDURE — 85025 COMPLETE CBC W/AUTO DIFF WBC: CPT

## 2019-01-18 PROCEDURE — 94660 CPAP INITIATION&MGMT: CPT

## 2019-01-18 PROCEDURE — 74011000258 HC RX REV CODE- 258: Performed by: INTERNAL MEDICINE

## 2019-01-18 PROCEDURE — 74011636637 HC RX REV CODE- 636/637: Performed by: NURSE PRACTITIONER

## 2019-01-18 PROCEDURE — 74011250637 HC RX REV CODE- 250/637: Performed by: HOSPITALIST

## 2019-01-18 PROCEDURE — 5A09357 ASSISTANCE WITH RESPIRATORY VENTILATION, LESS THAN 24 CONSECUTIVE HOURS, CONTINUOUS POSITIVE AIRWAY PRESSURE: ICD-10-PCS | Performed by: INTERNAL MEDICINE

## 2019-01-18 PROCEDURE — 74011000250 HC RX REV CODE- 250: Performed by: NURSE PRACTITIONER

## 2019-01-18 RX ORDER — HYDROMORPHONE HYDROCHLORIDE 4 MG/1
4-6 TABLET ORAL
Qty: 20 TAB | Refills: 0 | Status: SHIPPED | OUTPATIENT
Start: 2019-01-18 | End: 2019-01-25 | Stop reason: DRUGHIGH

## 2019-01-18 RX ORDER — CIPROFLOXACIN 500 MG/1
500 TABLET ORAL 2 TIMES DAILY
Qty: 24 TAB | Refills: 0 | Status: SHIPPED | OUTPATIENT
Start: 2019-01-18 | End: 2019-01-30

## 2019-01-18 RX ORDER — POLYETHYLENE GLYCOL 3350 17 G/17G
17 POWDER, FOR SOLUTION ORAL DAILY
Qty: 10 EACH | Refills: 0 | Status: SHIPPED | OUTPATIENT
Start: 2019-01-18

## 2019-01-18 RX ORDER — TAMSULOSIN HYDROCHLORIDE 0.4 MG/1
0.4 CAPSULE ORAL DAILY
Qty: 30 CAP | Refills: 0 | Status: SHIPPED | OUTPATIENT
Start: 2019-01-19 | End: 2019-02-20

## 2019-01-18 RX ORDER — DOCUSATE SODIUM 100 MG/1
100 CAPSULE, LIQUID FILLED ORAL 2 TIMES DAILY
Qty: 60 CAP | Refills: 0 | Status: SHIPPED | OUTPATIENT
Start: 2019-01-18 | End: 2019-02-17

## 2019-01-18 RX ADMIN — ACETAMINOPHEN 650 MG: 325 TABLET ORAL at 04:28

## 2019-01-18 RX ADMIN — BUPROPION HYDROCHLORIDE 150 MG: 150 TABLET, EXTENDED RELEASE ORAL at 08:29

## 2019-01-18 RX ADMIN — BENZOCAINE AND MENTHOL 1 LOZENGE: 15; 3.6 LOZENGE ORAL at 01:06

## 2019-01-18 RX ADMIN — BENZOCAINE AND MENTHOL 1 LOZENGE: 15; 3.6 LOZENGE ORAL at 06:11

## 2019-01-18 RX ADMIN — HEPARIN SODIUM 5000 UNITS: 5000 INJECTION INTRAVENOUS; SUBCUTANEOUS at 12:12

## 2019-01-18 RX ADMIN — HYDROMORPHONE HYDROCHLORIDE 6 MG: 2 TABLET ORAL at 06:11

## 2019-01-18 RX ADMIN — METOPROLOL TARTRATE 25 MG: 25 TABLET ORAL at 01:06

## 2019-01-18 RX ADMIN — HYDROMORPHONE HYDROCHLORIDE 6 MG: 2 TABLET ORAL at 02:23

## 2019-01-18 RX ADMIN — HYDROMORPHONE HYDROCHLORIDE 4 MG: 2 TABLET ORAL at 09:07

## 2019-01-18 RX ADMIN — DULOXETINE HYDROCHLORIDE 60 MG: 60 CAPSULE, DELAYED RELEASE ORAL at 08:29

## 2019-01-18 RX ADMIN — SODIUM CHLORIDE 100 ML/HR: 450 INJECTION, SOLUTION INTRAVENOUS at 06:11

## 2019-01-18 RX ADMIN — INSULIN LISPRO 2 UNITS: 100 INJECTION, SOLUTION INTRAVENOUS; SUBCUTANEOUS at 08:30

## 2019-01-18 RX ADMIN — DOCUSATE SODIUM 100 MG: 100 CAPSULE, LIQUID FILLED ORAL at 08:30

## 2019-01-18 RX ADMIN — GABAPENTIN 800 MG: 400 CAPSULE ORAL at 13:21

## 2019-01-18 RX ADMIN — INSULIN GLARGINE 12 UNITS: 100 INJECTION, SOLUTION SUBCUTANEOUS at 09:00

## 2019-01-18 RX ADMIN — GABAPENTIN 800 MG: 400 CAPSULE ORAL at 08:29

## 2019-01-18 RX ADMIN — POLYETHYLENE GLYCOL 3350 17 G: 17 POWDER, FOR SOLUTION ORAL at 08:38

## 2019-01-18 RX ADMIN — PHENAZOPYRIDINE HYDROCHLORIDE 100 MG: 100 TABLET ORAL at 12:12

## 2019-01-18 RX ADMIN — METOPROLOL TARTRATE 25 MG: 25 TABLET ORAL at 08:29

## 2019-01-18 RX ADMIN — TAMSULOSIN HYDROCHLORIDE 0.4 MG: 0.4 CAPSULE ORAL at 08:30

## 2019-01-18 RX ADMIN — HEPARIN SODIUM 5000 UNITS: 5000 INJECTION INTRAVENOUS; SUBCUTANEOUS at 04:29

## 2019-01-18 RX ADMIN — PHENAZOPYRIDINE HYDROCHLORIDE 100 MG: 100 TABLET ORAL at 08:29

## 2019-01-18 NOTE — PROGRESS NOTES
D/C order noted for today. Confirmed that [facility] have received the referral. No further needs. CM remains available. Jurgen Ernst, [phone]

## 2019-01-18 NOTE — PROGRESS NOTES
Infectious Disease progress Note Requested by: Dr. Love Castro Reason: sepsis, klebsiella bacteremia, complicated uti Current abx Prior abx Ciprofloxacin since 1/17 Pip/tazo, vancomycin 1/14 Ceftriaxone  1/15 
levofloxacin since 1/14-1/17 Lines:  
 
 
Assessment : 
 
 
61year old man with h/o renal stones, uncontrolled type 2 DM (last hgbA1C 10 on 11/1/18) admitted to [facility] on 1/14/19 with 2 day h/o high grade fevers, weakness. Clinical picture c/w sepsis (POA) due to klebsiella bloodstream infection (positive blood culture 1/14, negative blood culture 1/16), right kidney pyelonephritis, complicated UTI due to klebsiella. Right kidney obstructive uropathy - s/p right retrogram pyelogram, right ureteral stent placement, urethral dilatation on 1/15/19 Patient is clinically improving. Intermittent low grade fevers could be due to gradually resolving renal inflammation. R/o persistent bacteremia. Recommendations: 1. cont po ciprofloxacin till 1/30/19 2. mx of right ureteral calculus per urology 3.ok to d/c patient from ID standpoint Advance Care planning:full code: discussed  with patient/surrogate decision maker:jimmy khan: [phone] Above plan was discussed in details with patient, primary team. Please call me if any further questions or concerns. Will continue to participate in the care of this patient. subjective: 
 
patient feels better. Resolved right flank pain. Still some pelvic discomfort. Patient denies headaches, visual disturbances, sore throat, runny nose, earaches, cp, sob, chills, cough, abdominal pain, diarrhea,, pain or weakness in extremities. Medication List  
  
START taking these medications   
docusate sodium 100 mg capsule Commonly known as:  Alurence Sauce Take 1 Cap by mouth two (2) times a day for 30 days. polyethylene glycol 17 gram packet Commonly known as:  Haroldo Walkerton Take 1 Packet by mouth daily. Hold for loose stools CONTINUE taking these medications [address] Generic drug:  lancets CHECK BLOOD SUGAR EVERY DAY 
  
ACCU-CHEK SMARTVIEW TEST STRIP strip Generic drug:  glucose blood VI test strips CHECK BLOOD SUGAR EVERY DAY Back Brace Misc 1 Device by Does Not Apply route daily as needed for Pain. One, lumbosacral orthosis/back brace with metal struts      Medically necessary Blood-Glucose Meter Misc Commonly known as:  Inglisallan Umanzor Check blood sugar daily buPROPion  mg SR tablet Commonly known as:  WELLBUTRIN SR 
TAKE 1 TABLET TWICE DAILY 
  
dulaglutide 1.5 mg/0.5 mL sub-q pen Commonly known as:  TRULICITY 
0.5 mL by SubCUTAneous route every seven (7) days. DULoxetine 60 mg capsule Commonly known as:  CYMBALTA Take 1 Cap by mouth daily. gabapentin 800 mg tablet Commonly known as:  NEURONTIN 
  
isosorbide mononitrate ER 60 mg CR tablet Commonly known as:  IMDUR 
TAKE 1 TABLET EVERY MORNING 
  
metFORMIN  mg tablet Commonly known as:  GLUCOPHAGE XR Take 2 Tabs by mouth two (2) times daily (with meals). metoprolol succinate 50 mg XL tablet Commonly known as:  TOPROL-XL Take 1 Tab by mouth daily. multivitamin tablet Commonly known as:  ONE A DAY 
  
nitroglycerin 0.4 mg SL tablet Commonly known as:  NITROSTAT 
1 Tab by SubLINGual route every five (5) minutes as needed. Use for Chest Pain ; Call MD if > 3 tablets required 
  
simvastatin 10 mg tablet Commonly known as:  ZOCOR 
TAKE 1 TABLET EVERY NIGHT 
  
zolpidem 10 mg tablet Commonly known as:  AMBIEN Take 1 Tab by mouth nightly. Max Daily Amount: 10 mg. STOP taking these medications   
ondansetron hcl 4 mg tablet Commonly known as:  Vic Doing your doctor about these medications   
naloxone 4 mg/actuation nasal spray Commonly known as:  NARCAN 
4 mg by Nasal route as needed. OMEPRAZOLE (BULK) phentermine 37.5 mg tablet Commonly known as:  ADIPEX-P 
 Take 1 Tab by mouth every morning. Max Daily Amount: 37.5 mg. 
  
pramipexole 0.5 mg tablet Commonly known as:  MIRAPEX TAKE 1 TABLET THREE TIMES DAILY ·  
· Current Facility-Administered Medications Medication Dose Route Frequency  HYDROmorphone (DILAUDID) tablet 4 mg  4 mg Oral Q3H PRN  
 HYDROmorphone (DILAUDID) tablet 6 mg  6 mg Oral Q3H PRN  
 benzocaine-menthol (CEPACOL) lozenge 1 Lozenge  1 Lozenge Mucous Membrane PRN  
 docusate sodium (COLACE) capsule 100 mg  100 mg Oral BID  polyethylene glycol (MIRALAX) packet 17 g  17 g Oral DAILY PRN  
 insulin glargine (LANTUS) injection 12 Units  12 Units SubCUTAneous DAILY  bisacodyl (DULCOLAX) tablet 10 mg  10 mg Oral DAILY PRN  
 metoprolol tartrate (LOPRESSOR) tablet 25 mg  25 mg Oral Q12H  phenazopyridine (PYRIDIUM) tablet 100 mg  100 mg Oral TIDPC  zolpidem (AMBIEN) tablet 5 mg  5 mg Oral QHS PRN  
 0.45% sodium chloride infusion  100 mL/hr IntraVENous CONTINUOUS  
 insulin lispro (HUMALOG) injection   SubCUTAneous AC&HS  
 glucose chewable tablet 16 g  4 Tab Oral PRN  
 glucagon (GLUCAGEN) injection 1 mg  1 mg IntraMUSCular PRN  
 dextrose (D50W) injection syrg 12.5-25 g  25-50 mL IntraVENous PRN  
 tamsulosin (FLOMAX) capsule 0.4 mg  0.4 mg Oral DAILY  levoFLOXacin (LEVAQUIN) 500 mg in D5W IVPB  500 mg IntraVENous Q24H  
 sodium chloride (NS) flush 5-10 mL  5-10 mL IntraVENous PRN  
 DULoxetine (CYMBALTA) capsule 60 mg  60 mg Oral DAILY  gabapentin (NEURONTIN) capsule 800 mg  800 mg Oral TID  simvastatin (ZOCOR) tablet 10 mg  10 mg Oral QHS  buPROPion SR (WELLBUTRIN SR) tablet 150 mg  150 mg Oral DAILY  acetaminophen (TYLENOL) tablet 650 mg  650 mg Oral Q6H PRN  
 ondansetron (ZOFRAN) injection 4 mg  4 mg IntraVENous Q6H PRN  
 heparin (porcine) injection 5,000 Units  5,000 Units SubCUTAneous Q8H Allergies: Bactrim [sulfamethoprim] and Opana [oxymorphone] Temp (24hrs), Av.7 °F (36.5 °C), Min:97 °F (36.1 °C), Max:98.8 °F (37.1 °C) Visit Vitals /83 (BP 1 Location: Right arm, BP Patient Position: At rest) Pulse 81 Temp 97 °F (36.1 °C) Resp 18 Ht 6' 2\" (1.88 m) Wt 132.5 kg (292 lb) SpO2 96% BMI 37.49 kg/m² ROS: 12 point ROS obtained in details. Pertinent positives as mentioned in HPI,  
otherwise negative Physical Exam: 
 
General: obese male laying on the bed/sitting on the  bed AAOx3 in no acute distress. General:   awake alert and oriented HEENT:  Normocephalic, atraumatic, PERRL, EOMI, no scleral icterus or pallor; no conjunctival hemmohage;  nasal and oral mucous are moist and without evidence of lesions. No thrush. Dentition good. Neck supple, no bruits. Lymph Nodes:   no cervical, axillary or inguinal adenopathy Lungs:   non-labored, bilaterally clear to auscultation- no crackles wheezes rales or rhonchi Heart:  RRR, s1 and s2; no rubs or gallops, no edema, + pedal pulses Abdomen:  soft, non-distended, active bowel sounds, no hepatomegaly, no splenomegaly. Non-tender Genitourinary:  deferred Extremities:   no clubbing, cyanosis; no joint effusions or swelling; Full ROM of all large joints to the upper and lower extremities; muscle mass appropriate for age, mild blanching erythema right foot (better than before per patient) - no overlying warmth, tenderness Neurologic:  No gross focal sensory abnormalities; 5/5 muscle strength to upper and lower extremities. Speech appropriate. Cranial nerves intact Skin:  mild blanching erythema right foot (better than before per patient) - no overlying warmth, tenderness; healed scab around 1 cm in size lateral aspect right leg Back:  no spinal or paraspinal muscle tenderness or rigidity, no CVA tenderness Psychiatric:  No suicidal or homicidal ideations, appropriate mood and affect Labs: Results:  
Chemistry Recent Labs  
  19 9600 01/17/19 
0349 01/16/19 
0345 * 150* 151*  138 139  
K 3.7 3.6 3.9  105 109* CO2 27 24 25 BUN 14 13 17 CREA 1.44* 1.54* 1.78* CA 8.3* 7.9* 8.0* AGAP 9 9 5 BUCR 10* 8* 10* CBC w/Diff Recent Labs  
  01/18/19 
0450 01/17/19 
0349 01/16/19 
0345 WBC 6.2 7.8 10.0  
RBC 3.90* 3.83* 3.87* HGB 10.2* 10.1* 10.4* HCT 33.2* 32.5* 32.9*  
 190 174 GRANS 60 67 77* LYMPH 13* 14* 11* EOS 8* 3 1 Microbiology Recent Labs  
  01/16/19 
0345 01/15/19 
1655 CULT NO GROWTH 2 DAYS NO GROWTH 2 DAYS  
  
 
 
RADIOLOGY: 
 
All available imaging studies/reports in [facility] for this admission were reviewed Dr. Kimmie Luna, Infectious Disease Specialist 
[phone] January 18, 2019 
1:57 PM

## 2019-01-18 NOTE — HOME CARE
Discharge noted for today, [facility] will follow for SN,DM management,PT,OT. LENNOX RODRIGUEZ.

## 2019-01-18 NOTE — ROUTINE PROCESS
Bedside and Verbal shift change report given to Arpita Dougherty RN (oncoming nurse) by Reddy Ritchie RN (offgoing nurse). Report included the following information SBAR, Kardex, MAR and Recent Results. SITUATION: 
Code Status: Full Code Reason for Admission: Sepsis ([address] UTI (urinary tract infection) Hospital day: 4 Problem List:  
   
Hospital Problems  Date Reviewed: 1/15/2019 Codes Class Noted POA Sepsis ([address] ICD-10-CM: A41.9 ICD-9-CM: 038.9, 995.91  1/14/2019 Unknown UTI (urinary tract infection) ICD-10-CM: N39.0 ICD-9-CM: 599.0  1/14/2019 Unknown BACKGROUND: 
 Past Medical History:  
Past Medical History:  
Diagnosis Date  Abdominal adhesions 7/30/2014  Back muscle spasm 7/30/2014  Back pain, lumbosacral   
 Cellulitis  Constipation, chronic 7/30/2014  DDD (degenerative disc disease), lumbar 7/30/2014  DDD (degenerative disc disease), thoracic 7/30/2014  Depression  Diabetes ([address]  DM (diabetes mellitus) ([address] 7/30/2014  Frequent falls 7/30/2014  TRUE (generalized anxiety disorder) 7/30/2014  High cholesterol  HTN (hypertension) 7/30/2014  Hyperlipidemia 7/30/2014  Hypertension  Insomnia secondary to chronic pain 7/30/2014  Kidney stones  LBP radiating to both legs 7/30/2014  Lumbar facet arthropathy 7/30/2014  Lumbar nerve root impingement 7/30/2014  Lumbosacral radiculopathy at S1 7/30/2014  Major depression 7/30/2014  Nausea & vomiting  Neurological disorder   
 sciatica  MANISH (obstructive sleep apnea) 7/30/2014  S/P lumbar laminectomy 7/30/2014  S/P lumbar spinal fusion 7/30/2014  Small vessel disease  Thoracic compression fracture ([address] 7/30/2014  Unspecified sleep apnea USES CPAP EVERY NIGHT Patient taking anticoagulants yes Patient has a defibrillator: no  
 History of shots YES for example, flu, pneumonia, tetanus Isolation History NO for example, MRSA, CDiff ASSESSMENT: 
Changes in Assessment Throughout Shift: None Significant Changes in 24 hours (for example, RR/code, fall) Patient has Central Line: no Reasons if yes: N/A Patient has Montes De Oca Cath: no Reasons if yes: N/A Mobility Issues PT 
IV Patency OR Checklist 
Pending Tests Last Vitals: 
Vitals w/ MEWS Score (last day) Date/Time MEWS Score Pulse Resp Temp BP Level of Consciousness SpO2  
 01/18/19 0753  1  80  18  97.2 °F (36.2 °C)  159/65  Alert  97 % 01/18/19 0402  1  81  18  98.8 °F (37.1 °C)  107/64  Alert  94 % 01/17/19 2346  2  105  (Abnormal)   18  97.8 °F (36.6 °C)  141/83  Alert  97 % 01/17/19 2038  2  109  (Abnormal)   18  97.2 °F (36.2 °C)  138/92  (Abnormal)   Alert  96 % 01/17/19 1525  1  88  18  98 °F (36.7 °C)  155/89  Alert  96 % 01/17/19 1343            Alert    
 01/17/19 1155  1  95  18  97.7 °F (36.5 °C)  165/86  Alert  99 % 01/17/19 0753  1  97  20  97.4 °F (36.3 °C)  144/83  Alert  100 % 01/17/19 0335  2  102  (Abnormal)   20  98.3 °F (36.8 °C)  134/73  Alert  94 % 01/17/19 0100  2  107  (Abnormal)   20  99.1 °F (37.3 °C)  132/79  Alert  95 % 01/17/19 0004              94 % PAIN Pain Assessment Pain Intensity 1: 0 (01/18/19 0711) Pain Location 1: Penis Pain Intervention(s) 1: Medication (see MAR) Patient Stated Pain Goal: 0 Intervention effective: yes Time of last intervention: 0611 Reassessment Completed: yes Other actions taken for pain: None Last 3 Weights: 
Last 3 Recorded Weights in this Encounter 01/14/19 1230 Weight: 132.5 kg (292 lb) Weight change: INTAKE/OUPUT Current Shift: No intake/output data recorded. Last three shifts: 01/16 1901 - 01/18 0700 In: 2500 [I.V.:2500] Out: 7979 [[id number]] RECOMMENDATIONS AND DISCHARGE PLANNING Patient needs and requests: Pain control Pending tests/procedures: None Discharge plan for patient: Home with home health Discharge planning Needs or Barriers: None Estimated Discharge Date: 1/18/19 Posted on Whiteboard in Patients Room: no   
  
\"HEALS\" SAFETY CHECK A safety check occurred in the patient's room between off going nurse and oncoming nurse listed above. The safety check included the below items: 
 
H High Alert Medications Verify all high alert medication drips (heparin, PCA, etc.) E Equipment Suction is set up for ALL patients (with rakesh) Red plugs utilized for all equipment (IV pumps, etc.) WOWs wiped down at end of shift. Room stocked with oxygen, suction, and other unit-specific supplies A Alarms Bed alarm is set for fall risk patients Ensure chair alarm is in place and activated if patient is up in a chair L Lines Check IV for any infiltration Montes De Oca bag is empty if patient has a Montes De Oca Tubing and IV bags are labeled Shirlyn Fan Safety Room is clean, patient is clean, and equipment is clean. Hallways are clear from equipment besides carts. Fall bracelet on for fall risk patients Ensure room is clear and free of clutter Suction is set up for ALL patients (with rakesh) Hallways are clear from equipment besides carts. Isolation precautions followed, supplies available outside room, sign posted Terrance Watts RN

## 2019-01-18 NOTE — DISCHARGE SUMMARY
[facility]  [facility]       Patient: Makenzie Solomon Age: 61 y.o. : 1955 MR#: [id number] SSN: [id number]  PCP on record: Eri Bronson MD  Admit date: 2019  Discharge date: 2019    Disposition:    []Home   [x]Home with Home Health   []SNF/NH   []Rehab   []Home with family   []Alternate Facility:____________________    Admission Diagnoses:  Sepsis ([address]  UTI (urinary tract infection)    Discharge Diagnoses:                             1. Sepsis   2. Bacteremia   3. R kidney pyelonephritis w/ obstructing renal stone  4. Acute on chronic kidney disease   5. HTN   6. T2DM, uncontrolled   7. Obesity   8. Depression & Anxiety     Discharge Medications:     Current Discharge Medication List      START taking these medications    Details   docusate sodium (COLACE) 100 mg capsule Take 1 Cap by mouth two (2) times a day for 30 days. Qty: 60 Cap, Refills: 0      polyethylene glycol (MIRALAX) 17 gram packet Take 1 Packet by mouth daily. Hold for loose stools  Qty: 10 Each, Refills: 0      tamsulosin (FLOMAX) 0.4 mg capsule Take 1 Cap by mouth daily. Qty: 30 Cap, Refills: 0      ciprofloxacin HCl (CIPRO) 500 mg tablet Take 1 Tab by mouth two (2) times a day for 12 days. Qty: 24 Tab, Refills: 0      HYDROmorphone (DILAUDID) 4 mg tablet Take 1-1.5 Tabs by mouth every three (3) hours as needed. Max Daily Amount: 48 mg. Qty: 20 Tab, Refills: 0    Associated Diagnoses: Pyelonephritis         CONTINUE these medications which have NOT CHANGED    Details   dulaglutide (TRULICITY) 1.5 FU/3.8 mL sub-q pen 0.5 mL by SubCUTAneous route every seven (7) days. Qty: 12 Pen, Refills: 3      metFORMIN ER (GLUCOPHAGE XR) 500 mg tablet Take 2 Tabs by mouth two (2) times daily (with meals).   Qty: 360 Tab, Refills: 3    Associated Diagnoses: Diabetes mellitus without complication (HCC)      buPROPion SR (WELLBUTRIN SR) 150 mg SR tablet TAKE 1 TABLET TWICE DAILY  Qty: 180 Tab, Refills: 2    Associated Diagnoses: History of depression      isosorbide mononitrate ER (IMDUR) 60 mg CR tablet TAKE 1 TABLET EVERY MORNING  Qty: 90 Tab, Refills: 2    Associated Diagnoses: Essential hypertension      metoprolol succinate (TOPROL-XL) 50 mg XL tablet Take 1 Tab by mouth daily. Qty: 90 Tab, Refills: 1    Associated Diagnoses: Essential hypertension      zolpidem (AMBIEN) 10 mg tablet Take 1 Tab by mouth nightly. Max Daily Amount: 10 mg.  Qty: 90 Tab, Refills: 0    Comments: This request is for a new prescription for a controlled substance as required by Federal/State law. Associated Diagnoses: Primary insomnia      simvastatin (ZOCOR) 10 mg tablet TAKE 1 TABLET EVERY NIGHT  Qty: 90 Tab, Refills: 1      gabapentin (NEURONTIN) 800 mg tablet Take  by mouth three (3) times daily. Associated Diagnoses: PAD (peripheral artery disease) ([address] Leg pain, bilateral      DULoxetine (CYMBALTA) 60 mg capsule Take 1 Cap by mouth daily. Qty: 90 Cap, Refills: 2    Associated Diagnoses: Chronic pain syndrome      OMEPRAZOLE, BULK,       multivitamin (ONE A DAY) tablet Take 1 Tab by mouth daily. nitroglycerin (NITROSTAT) 0.4 mg SL tablet 1 Tab by SubLINGual route every five (5) minutes as needed. Use for Chest Pain ; Call MD if > 3 tablets required  Qty: 1 Bottle, Refills: 0    Associated Diagnoses: Dyspnea on exertion; Essential hypertension      ACCU-CHEK FASTCLIX LANCET DRUM misc CHECK BLOOD SUGAR EVERY DAY  Qty: 102 Each, Refills: 11    Associated Diagnoses: Type 2 diabetes mellitus with hyperglycemia, without long-term current use of insulin (HCC)      ACCU-CHEK SMARTVIEW TEST STRIP strip CHECK BLOOD SUGAR EVERY DAY  Qty: 100 Strip, Refills: 3      Blood-Glucose Meter (ACCU-CHEK GABRIELLE) misc Check blood sugar daily  Qty: 1 Each, Refills: 0      naloxone 4 mg/actuation spry 4 mg by Nasal route as needed.   Qty: 1 Box, Refills: 0      Back Brace misc 1 Device by Does Not Apply route daily as needed for Pain. One, lumbosacral orthosis/back brace with metal struts      Medically necessary  Qty: 1 Device, Refills: 0         STOP taking these medications       ondansetron hcl (ZOFRAN) 4 mg tablet Comments:   Reason for Stopping:         phentermine (ADIPEX-P) 37.5 mg tablet Comments:   Reason for Stopping:         pramipexole (MIRAPEX) 0.5 mg tablet Comments:   Reason for Stopping:             Consults:    - urology   - nephrology   - Infectious disease     Procedures:  Per urology intervention on 01/15/2019  1) Cystoscopy  2) Urethral dilation   3) R Retrograde pyelogram with interpretation  4) R Ureteral stent placement     Significant Diagnostic Studies:   -  XR CHEST PA LAT on 01/14/2019  IMPRESSION:     Hypoinflation. Mild enlargement of the cardiac silhouette with vascular  congestion. Otherwise no definite acute pulmonary process. CT ABD PELV WO CONT on 01/15/2019  IMPRESSION:     Right obstructive uropathy. Please see report for additional findings and  Details. XR RETROGRADE PYELOGRAM on 01/15/2019  IMPRESSION:   Selected images were presented during an or retrograde procedure. There is  limited visualization as described above. Hospital Course by Problem   1. Sepsis - at time of admission with hypotension, febrile in setting of UTI; Vital signs stable with treatment of infection at time of discharge. 2.  Bacteremia - at time of admission with 2/2 blood cultures positive for K. Pneumonia. With treatment of #3 repeat cultures on day of discharge remain without growth x 3 days. 3.  R kidney pyelonephritis w/ obstructing renal stone- treated with rocephin and levaquin while inpatient with procedures to include cystoscopy, right JJ renal stent placement per urology during admission. Per urine culture results (K. Pneumoniae 3 morphologies) transitioned to cipro until 01/30 per ID recommendations. Additionally patient underwent successful voiding trial on day of discharge.   Close follow up with urology on 2019 as planned. Short course of narcotics provided at time of discharge due to some persistent pain with urination (improved). 4.  Acute on chronic kidney disease - Creatine trended during admission 2.17>1.44 and likely r/t hydronephrosis d/t ureteral stone, ATN d/t hypotension / urosepsis/ may also be contributing NSAID use may have also contributed in this setting per nephrology. Have discussed and encouraged no NSAIDS going forward and fine for resumption of metformin. Follow with nephrology as outpatient. 5.  HTN - initially hypotensive and BB resumed during admission without issue. Continue upon discharge. 6.  T2DM, uncontrolled - A1c 10.0 in 2018 for which patient recently started followed diabetes specialist.  Home regimen was held while inpatient and covered with lantus and sliding scale insulin with good control. Patient clear about no insulin upon discharge - continue home regimen with follow up as outpatient. Followed by diabetes educator with continued dietary education. 7.  Obesity - encourage safe weight loss  8.   Depression & Anxiety - continue home meds    Today's examination of the patient revealed:     Subjective:   Reports pain much improved since urinary catheter removal; no flank pain; no other complaints - +small BM this morning, has been voiding since catheter removal (urgency at baseline); no SOB, CP, n/v  Objective:   VS:   Visit Vitals  /83 (BP 1 Location: Right arm, BP Patient Position: At rest)   Pulse 81   Temp 97 °F (36.1 °C)   Resp 18   Ht 6' 2\" (1.88 m)   Wt 132.5 kg (292 lb)   SpO2 96%   BMI 37.49 kg/m²      Tmax/24hrs: Temp (24hrs), Av.7 °F (36.5 °C), Min:97 °F (36.1 °C), Max:98.8 °F (37.1 °C)     Input/Output:     Intake/Output Summary (Last 24 hours) at 2019 1331  Last data filed at 2019 0900  Gross per 24 hour   Intake 120 ml   Output 4500 ml   Net -4380 ml     General:  Alert, NAD  Cardiovascular: RRR  Pulmonary:  LSC throughout; respiratory effort WNL  GI:  +BS in all four quadrants, soft, non-tender  Extremities:  No edema; 2+ dorsalis pedis pulses bilaterally  Neuro: oriented x 3    Labs:    Recent Results (from the past 24 hour(s))   GLUCOSE, POC    Collection Time: 01/17/19  4:06 PM   Result Value Ref Range    Glucose (POC) 157 (H) 70 - 110 mg/dL   GLUCOSE, POC    Collection Time: 01/17/19 10:21 PM   Result Value Ref Range    Glucose (POC) 168 (H) 70 - 089 mg/dL   METABOLIC PANEL, BASIC    Collection Time: 01/18/19  4:50 AM   Result Value Ref Range    Sodium 138 136 - 145 mmol/L    Potassium 3.7 3.5 - 5.5 mmol/L    Chloride 102 100 - 108 mmol/L    CO2 27 21 - 32 mmol/L    Anion gap 9 3.0 - 18 mmol/L    Glucose 140 (H) 74 - 99 mg/dL    BUN 14 7.0 - 18 MG/DL    Creatinine 1.44 (H) 0.6 - 1.3 MG/DL    BUN/Creatinine ratio 10 (L) 12 - 20      GFR est AA >60 >60 ml/min/1.73m2    GFR est non-AA 50 (L) >60 ml/min/1.73m2    Calcium 8.3 (L) 8.5 - 10.1 MG/DL   CBC WITH AUTOMATED DIFF    Collection Time: 01/18/19  4:50 AM   Result Value Ref Range    WBC 6.2 4.6 - 13.2 K/uL    RBC 3.90 (L) 4.70 - 5.50 M/uL    HGB 10.2 (L) 13.0 - 16.0 g/dL    HCT 33.2 (L) 36.0 - 48.0 %    MCV 85.1 74.0 - 97.0 FL    MCH 26.2 24.0 - 34.0 PG    MCHC 30.7 (L) 31.0 - 37.0 g/dL    RDW 14.5 11.6 - 14.5 %    PLATELET 297 256 - 868 K/uL    MPV 9.7 9.2 - 11.8 FL    NEUTROPHILS 60 42 - 75 %    BAND NEUTROPHILS 1 0 - 5 %    LYMPHOCYTES 13 (L) 20 - 51 %    MONOCYTES 16 (H) 2 - 9 %    EOSINOPHILS 8 (H) 0 - 5 %    BASOPHILS 0 0 - 3 %    METAMYELOCYTES 2 (H) 0 %    ABS. NEUTROPHILS 3.8 1.8 - 8.0 K/UL    ABS. LYMPHOCYTES 0.8 0.8 - 3.5 K/UL    ABS. MONOCYTES 1.0 0 - 1.0 K/UL    ABS. EOSINOPHILS 0.5 (H) 0.0 - 0.4 K/UL    ABS.  BASOPHILS 0.0 0.0 - 0.06 K/UL    DF MANUAL      PLATELET COMMENTS ADEQUATE PLATELETS      RBC COMMENTS POLYCHROMASIA  1+       GLUCOSE, POC    Collection Time: 01/18/19  7:50 AM   Result Value Ref Range    Glucose (POC) 159 (H) 70 - 110 mg/dL   GLUCOSE, POC    Collection Time: 01/18/19 11:49 AM   Result Value Ref Range    Glucose (POC) 133 (H) 70 - 110 mg/dL     Additional Data Reviewed:     Condition: Stable  Follow-up Appointments:   Dr. Penelope Hobson in 5-7 days Dr. Kala Fritz (urology) in 7-10 days Dr. Marian Lopez (nephrology) in 3-4 weeks    >30 minutes spent coordinating this discharge (review instructions/follow-up, prescriptions, preparing report for sign off)    Signed:  Ivana Snow NP  1/18/2019  1:31 PM

## 2019-01-18 NOTE — DISCHARGE INSTRUCTIONS
Patient Education        Dizziness: Care Instructions  Your Care Instructions  Dizziness is the feeling of unsteadiness or fuzziness in your head. It is different than having vertigo, which is a feeling that the room is spinning or that you are moving or falling. It is also different from lightheadedness, which is the feeling that you are about to faint. It can be hard to know what causes dizziness. Some people feel dizzy when they have migraine headaches. Sometimes bouts of flu can make you feel dizzy. Some medical conditions, such as heart problems or high blood pressure, can make you feel dizzy. Many medicines can cause dizziness, including medicines for high blood pressure, pain, or anxiety. If a medicine causes your symptoms, your doctor may recommend that you stop or change the medicine. If it is a problem with your heart, you may need medicine to help your heart work better. If there is no clear reason for your symptoms, your doctor may suggest watching and waiting for a while to see if the dizziness goes away on its own. Follow-up care is a key part of your treatment and safety. Be sure to make and go to all appointments, and call your doctor if you are having problems. It's also a good idea to know your test results and keep a list of the medicines you take. How can you care for yourself at home? · If your doctor recommends or prescribes medicine, take it exactly as directed. Call your doctor if you think you are having a problem with your medicine. · Do not drive while you feel dizzy. · Try to prevent falls. Steps you can take include:  ? Using nonskid mats, adding grab bars near the tub, and using night-lights. ? Clearing your home so that walkways are free of anything you might trip on.  ? Letting family and friends know that you have been feeling dizzy. This will help them know how to help you. When should you call for help? Call 911 anytime you think you may need emergency care.  For example, call if:    · You passed out (lost consciousness).     · You have dizziness along with symptoms of a heart attack. These may include:  ? Chest pain or pressure, or a strange feeling in the chest.  ? Sweating. ? Shortness of breath. ? Nausea or vomiting. ? Pain, pressure, or a strange feeling in the back, neck, jaw, or upper belly or in one or both shoulders or arms. ? Lightheadedness or sudden weakness. ? A fast or irregular heartbeat.     · You have symptoms of a stroke. These may include:  ? Sudden numbness, tingling, weakness, or loss of movement in your face, arm, or leg, especially on only one side of your body. ? Sudden vision changes. ? Sudden trouble speaking. ? Sudden confusion or trouble understanding simple statements. ? Sudden problems with walking or balance. ? A sudden, severe headache that is different from past headaches.    Call your doctor now or seek immediate medical care if:    · You feel dizzy and have a fever, headache, or ringing in your ears.     · You have new or increased nausea and vomiting.     · Your dizziness does not go away or comes back.    Watch closely for changes in your health, and be sure to contact your doctor if:    · You do not get better as expected. Where can you learn more? Go to http://iza-poornima.info/. Enter G732 in the search box to learn more about \"Dizziness: Care Instructions. \"  Current as of: September 23, 2018  Content Version: 11.9  © 5759-8326 [employer]. Care instructions adapted under license by [employer] (which disclaims liability or warranty for this information). If you have questions about a medical condition or this instruction, always ask your healthcare professional. [place] [address] any warranty or liability for your use of this information.          Patient Education        Epley Maneuver for Vertigo: Exercises  Your Care Instructions  The Epley Maneuver is a series of movements your doctor may use to treat your vertigo. Here are the steps for the exercises. Your doctor or physical therapist will guide you through the movements. A single 10- to 15-minute session often is all that's needed. Crystal debris (canaliths) cause the vertigo. When your head is moved into different positions, the debris moves freely. This may cause your symptoms to stop. How to do the exercises  Step 1    1. You will sit on the doctor's exam table. Your legs will be out in front of you. The doctor or physical therapist will turn your head so that it is skilled nursing between looking straight ahead and looking to the side that causes the worst vertigo. 2. Without changing your head position, he or she will guide you back quickly. Your shoulders will be on the table. Your head will hang over the edge of the table. At this point, the side of your head that is causing the worst vertigo will face the floor. You'll stay in this position for 30 seconds or until your symptoms stop. Step 2    1. Then, the doctor or physical therapist will turn your head to the other side. You don't need to lift your head. The other side of your head will face the floor. You will stay in this position for 30 seconds or until your symptoms stop. Step 3    1. The doctor or physical therapist will help you roll your body in the same direction that your head is facing. You will lie on your side. (For example, if you are looking to your right, you will roll onto your right side.) The side that causes the worst symptoms should be facing up. You'll stay in this position for another 30 seconds or until your symptoms stop. Step 4    1. The doctor or physical therapist will then help you to sit back up. Your legs will hang off the table on the same side that you were facing. Follow-up care is a key part of your treatment and safety. Be sure to make and go to all appointments, and call your doctor if you are having problems.  It's also a good idea to know your test results and keep a list of the medicines you take. Where can you learn more? Go to http://iza-poornima.info/. Enter B430 in the search box to learn more about \"Epley Maneuver for Vertigo: Exercises. \"  Current as of: March 27, 2018  Content Version: 11.9  © 2735-0654 [employer]. Care instructions adapted under license by [employer] (which disclaims liability or warranty for this information). If you have questions about a medical condition or this instruction, always ask your healthcare professional. [place] [address] any warranty or liability for your use of this information.

## 2019-01-18 NOTE — HOME CARE
Received  referral, [facility] will follow for SN,Diabetic management, PT,OT, pt has a glucometer,\" Patient Request to Please call a day before to set up [facility] visit \", this information added to [facility] referral, [facility] will follow. LENNOX RODRIGUEZ.

## 2019-01-18 NOTE — PROGRESS NOTES
[address] [facility], [address] [facility] Phone: [phone] Urology Daily Progress Note Patient Active Problem List  
Diagnosis Code  DDD (degenerative disc disease), lumbar M51.36  
 Lumbar facet arthropathy M47.816  S/P lumbar laminectomy Z98.890  
 S/P lumbar spinal fusion Z98.1  Lumbar nerve root impingement M54.16  
 Lumbosacral radiculopathy at S1 M54.17  
 DDD (degenerative disc disease), thoracic M51.34  Thoracic compression fracture (HCC) S22.000A  Back muscle spasm M62.830  
 Major depression F32.9  TRUE (generalized anxiety disorder) F41.1  Abdominal adhesions K66.0  
 Constipation, chronic K59.09  
 Insomnia secondary to chronic pain G89.29, G47.01  
 MANISH (obstructive sleep apnea) G47.33  
 DM (diabetes mellitus) ([facility]) E11.9  
 HTN (hypertension) I10  
 Hyperlipidemia E78.5  Frequent falls R29.6  Thoracic or lumbosacral neuritis or radiculitis, unspecified [id number]  Postlaminectomy syndrome, lumbar region M96.1  History of depression Z86.59  
 Chronic midline low back pain with sciatica M54.40, G89.29  
 Advanced care planning/counseling discussion Z71.89  
 Anemia D64.9  Chronic pain syndrome G89.4  Lumbar post-laminectomy syndrome M96.1  Lumbar and sacral osteoarthritis M47.817  Sepsis ([address] A41.9  
 UTI (urinary tract infection) N39.0 Assessment & Plan ASSESSMENT Infected 9mm proximal right ureteral stone s/p stent 1/15. WBC normalized, afebrile. Creat trending down Klebsiella Bacteremia- likely 2/2 above. Repeat BC NG. On levaquin Neurogenic bladder? (history of pdet 40 at 500mL in 2013) 1cm, bulbomembranous, 15fr, soft urethral stricture s/p dilation 1/15 Rectal exam 1/15/19 15gm prostate, anodular Plan: VT today, monitor PVR's ABX per ID on Levaquin- recommend completing 2 week course Continue Flomax Add pyridium. If PVR;s remain >150 consider adding Ditropan PRN bladder spasms Will need PSA once acute episode resolved F/Up arranged: YES has appointment with Dr. Dennis Reddy on 1/31/2019 Ok to discharge home from a urological standpoint Will sign off at this time. Please contact with any questions or concerns. Abby Macias AGACNP-BC Urology [facility] Pager # [phone] Available M-F 7:30- 5pm .  After 5pm and weekends please call answering service at [phone] Pt was seen with assistance from Lyric Herrmann NP. I was present during the visit and oversaw/directed the management as documented above. I have reviewed the above note and I agree with the history, assessment, and plan as outlined. - As above - Again emphasised to the patient about the need for fu and stent can only stay for 3 months. He verbalised understanding. Viral Turpin MD PhD 
Endourology & MIS Fellow Urology [facility] Pager : [phone] Office : [phone] Subjective Feeling much better, reports frequency and urgency, denies flank pain or dysuria Objective PHYSICAL EXAMINATION:  
Visit Vitals /83 (BP 1 Location: Right arm, BP Patient Position: At rest) Pulse 81 Temp 97 °F (36.1 °C) Resp 18 Ht 6' 2\" (1.88 m) Wt 292 lb (132.5 kg) SpO2 96% BMI 37.49 kg/m² Constitutional: Well developed, well nourished male. No acute distress. HEENT: Normocephalic, Atraumatic CV:  RRR Pulm: No respiratory distress or difficulties breathing GI:  No abdominal masses or tenderness. [de-identified]   CVA tenderness: None SCROTUM:  Normal 
       PENIS: Normal   
Skin: No evidence of jaundice. Normal color Neuro/Psych:  Alert and oriented. Affect appropriate. Lymphatic:   No enlarged supraclavicular lymphnodes MSK: FROM  
 
REVIEW OF LABS AND IMAGING:   
 
 
Labs:  
 
Labs: Results: Chemistry Recent Labs  
  01/18/19 
0450 01/17/19 0349 01/16/19 0345 * 150* 151*  138 139  
K 3.7 3.6 3.9  105 109* CO2 27 24 25 BUN 14 13 17 CREA 1.44* 1.54* 1.78* CA 8.3* 7.9* 8.0* AGAP 9 9 5 BUCR 10* 8* 10* CBC w/Diff Recent Labs  
  01/18/19 
0450 01/17/19 
0349 01/16/19 0345 WBC 6.2 7.8 10.0  
RBC 3.90* 3.83* 3.87* HGB 10.2* 10.1* 10.4* HCT 33.2* 32.5* 32.9*  
 190 174 GRANS 60 67 77* LYMPH 13* 14* 11* EOS 8* 3 1 Cultures Recent Labs  
  01/16/19 
0345 01/15/19 
1655 CULT NO GROWTH 2 DAYS NO GROWTH 2 DAYS All Micro Results Procedure Component Value Units Date/Time CULTURE, BLOOD [[id number]] Collected:  01/16/19 0345 Order Status:  Completed Specimen:  Whole Blood Updated:  01/18/19 0291 Special Requests: NO SPECIAL REQUESTS Culture result: NO GROWTH 2 DAYS     
 CULTURE, URINE [[id number]] Collected:  01/15/19 1655 Order Status:  Completed Specimen:  Cystoscopic Urine Updated:  01/17/19 0448 Special Requests: NO SPECIAL REQUESTS Culture result: NO GROWTH 2 DAYS     
 CULTURE, URINE [[id number]]  (Abnormal)  (Susceptibility) Collected:  01/14/19 1650 Order Status:  Completed Specimen:  Clean catch Updated:  01/17/19 6214 Special Requests: NO SPECIAL REQUESTS Culture result:    
  >100,000 COLONIES/mL KLEBSIELLA PNEUMONIAE  
     
      
  >100,000 COLONIES/mL KLEBSIELLA PNEUMONIAE 2ND MORPHOTYPE  
     
      
  >100,000 COLONIES/mL KLEBSIELLA PNEUMONIAE 3RD MORPHOTYPE  
     
 CULTURE, BLOOD [[id number]]  (Abnormal) Collected:  01/14/19 1432 Order Status:  Completed Specimen:  Blood Updated:  01/17/19 0747 Special Requests: NO SPECIAL REQUESTS     
  GRAM STAIN    
  AEROBIC BOTTLE GRAM NEGATIVE RODS SMEAR CALLED TO AND CORRECTLY REPE[id number] 01/15/19 TO I.T.   
     
  Culture result:    
  AEROBIC BOTTLE KLEBSIELLA PNEUMONIAE  
     
 For Susceptibility Refer to Culture [id number] CULTURE, BLOOD [[id number]]  (Abnormal)  (Susceptibility) Collected:  01/14/19 1504 Order Status:  Completed Specimen:  Blood Updated:  01/17/19 0746 Special Requests: NO SPECIAL REQUESTS     
  GRAM STAIN    
  AEROBIC AND ANAEROBIC BOTTLES GRAM NEGATIVE RODS SMEAR CALLED TO AND CORRECTLY REPEATED BY: [facility] RN 4N [id number] 01/15/19 TO I.T. Culture result:    
  AEROBIC AND ANAEROBIC BOTTLES KLEBSIELLA PNEUMONIAE  
     
 STREP THROAT SCREEN [[id number]] Collected:  01/14/19 1417 Order Status:  Completed Specimen:  Throat Swab Updated:  01/16/19 1022 Special Requests: NO SPECIAL REQUESTS Strep Screen NEGATIVE Culture result:    
  NO BETA HEMOLYTIC STREPTOCOCCUS GROUP A ISOLATED INFLUENZA A & B AG (RAPID TEST) [[id number]] Collected:  01/14/19 1417 Order Status:  Completed Specimen:  Nasopharyngeal from Nasal washing Updated:  01/14/19 1452 Influenza A Antigen NEGATIVE Comment: A negative result does not exclude influenza virus infection, seasonal or H1N1 due to suboptimal sensitivity. If influenza is circulating in your community, a diagnosis of influenza should be considered based on a patients clinical presentation and empiric antiviral treatment should be considered, if indicated. Influenza B Antigen NEGATIVE Urinalysis Color Date Value Ref Range Status 01/14/2019 DARK YELLOW   Final  
 
Appearance Date Value Ref Range Status 01/14/2019 CLOUDY   Final  
 
Specific gravity Date Value Ref Range Status 01/14/2019 1.020 1.005 - 1.030   Final  
 
pH (UA) Date Value Ref Range Status 01/14/2019 5.0 5.0 - 8.0   Final  
 
Protein Date Value Ref Range Status 01/14/2019 100 (A) NEG mg/dL Final  
 
Ketone Date Value Ref Range Status 01/14/2019 TRACE (A) NEG mg/dL Final  
 
Bilirubin Date Value Ref Range Status 01/14/2019 NEGATIVE  NEG   Final  
 
Blood Date Value Ref Range Status 01/14/2019 MODERATE (A) NEG   Final  
 
Urobilinogen Date Value Ref Range Status 01/14/2019 1.0 0.2 - 1.0 EU/dL Final  
 
Nitrites Date Value Ref Range Status 01/14/2019 NEGATIVE  NEG   Final  
 
Leukocyte Esterase Date Value Ref Range Status 01/14/2019 LARGE (A) NEG   Final  
 
Potassium Date Value Ref Range Status 01/18/2019 3.7 3.5 - 5.5 mmol/L Final  
 
Creatinine Date Value Ref Range Status 01/18/2019 1.44 (H) 0.6 - 1.3 MG/DL Final  
 
BUN Date Value Ref Range Status 01/18/2019 14 7.0 - 18 MG/DL Final  
  
PSA No results for input(s): PSA in the last 72 hours. Coagulation Lab Results Component Value Date/Time  Prothrombin time 12.1 01/16/2015 12:45 PM  
 Prothrombin time 12.9 08/10/2012 11:08 AM  
 INR 0.9 01/16/2015 12:45 PM  
 INR 1.0 08/10/2012 11:08 AM  
 aPTT 37.7 (H) 01/15/2019 03:58 AM

## 2019-01-18 NOTE — PROGRESS NOTES
Problem: Mobility Impaired (Adult and Pediatric) Goal: *Acute Goals and Plan of Care (Insert Text) Physical Therapy Goals Initiated 1/16/2019 and to be accomplished within 7 day(s) 1. Patient will move from supine to sit and sit to supine  in bed with independence. 2.  Patient will transfer from bed to chair and chair to bed with independence using the least restrictive device. 3.  Patient will perform sit to stand with independence. 4.  Patient will ambulate with independence for 200 feet with the least restrictive device. 5.  Patient will ascend/descend 12 stairs with 1 handrail(s) with independence. 1400 - Pt declines PT tx at this time as he is sitting at EOB dressing, awaiting DC. Pt declines having any questions at this time. Will f/u at later date if pt does not DC from [address] for any reason.

## 2019-01-18 NOTE — PROGRESS NOTES
Discharge medications reviewed with patient and appropriate educational materials and side effects teaching were provided. If you experience chest pain call 911. Do not drive yourself.

## 2019-01-20 ENCOUNTER — HOME CARE VISIT (OUTPATIENT)
Dept: HOME HEALTH SERVICES | Facility: HOME HEALTH | Age: 64
End: 2019-01-20

## 2019-01-21 ENCOUNTER — PATIENT OUTREACH (OUTPATIENT)
Dept: FAMILY MEDICINE CLINIC | Age: 64
End: 2019-01-21

## 2019-01-21 NOTE — PROGRESS NOTES
Nurse Navigator (NN) attempted to contact patient via telephone call. There was no response. A voicemail message was left requesting a non-emergency return telephone call on home and mobile phone numbers. Nurse Navigator contact information provided. Per Chart Review         Hospital Discharge Follow-Up      Date/Time:  1/21/2019 10:54 AM    [facility]  and discharged on 1/18/19 for:     - Discharge Diagnoses:                             1. Sepsis   2. Bacteremia   3. R kidney pyelonephritis w/ obstructing renal stone  4. Acute on chronic kidney disease   5. HTN   6. T2DM, uncontrolled   7. Obesity   8. Depression & Anxiety     Nurse Navigator to continue with outreach efforts to reach patient for Transition of Care follow up.

## 2019-01-22 ENCOUNTER — HOME CARE VISIT (OUTPATIENT)
Dept: HOME HEALTH SERVICES | Facility: HOME HEALTH | Age: 64
End: 2019-01-22

## 2019-01-22 ENCOUNTER — PATIENT OUTREACH (OUTPATIENT)
Dept: FAMILY MEDICINE CLINIC | Age: 64
End: 2019-01-22

## 2019-01-22 ENCOUNTER — TELEPHONE (OUTPATIENT)
Dept: FAMILY MEDICINE CLINIC | Age: 64
End: 2019-01-22

## 2019-01-22 LAB
BACTERIA SPEC CULT: NORMAL
SERVICE CMNT-IMP: NORMAL

## 2019-01-22 NOTE — PROGRESS NOTES
Nurse Navigator (NN) attempted to contact patient via telephone call. There was no response. A voicemail message was left requesting a non-emergency return telephone call. Nurse Navigator contact information provided. 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 Per Chart [facility] visit is scheduled for today and time TBD. Per further review of EMR patient was a non-admit for [address]

## 2019-01-22 NOTE — TELEPHONE ENCOUNTER
Aida Dobbs from [facility] home care is calling to inform the provider that she spoke with the patient and he said that he did not need the home care.

## 2019-01-25 ENCOUNTER — OFFICE VISIT (OUTPATIENT)
Dept: FAMILY MEDICINE CLINIC | Age: 64
End: 2019-01-25

## 2019-01-25 ENCOUNTER — HOSPITAL ENCOUNTER (OUTPATIENT)
Dept: LAB | Age: 64
Discharge: HOME | End: 2019-01-25

## 2019-01-25 VITALS
WEIGHT: 291 LBS | DIASTOLIC BLOOD PRESSURE: 72 MMHG | SYSTOLIC BLOOD PRESSURE: 134 MMHG | RESPIRATION RATE: 20 BRPM | HEIGHT: 74 IN | OXYGEN SATURATION: 96 % | BODY MASS INDEX: 37.35 KG/M2

## 2019-01-25 DIAGNOSIS — Z86.19 HISTORY OF SEPSIS: ICD-10-CM

## 2019-01-25 DIAGNOSIS — Z09 HOSPITAL DISCHARGE FOLLOW-UP: ICD-10-CM

## 2019-01-25 DIAGNOSIS — Z86.59 HISTORY OF DEPRESSION: ICD-10-CM

## 2019-01-25 DIAGNOSIS — R30.0 DYSURIA: Primary | ICD-10-CM

## 2019-01-25 DIAGNOSIS — I10 ESSENTIAL HYPERTENSION: ICD-10-CM

## 2019-01-25 DIAGNOSIS — R79.89 ELEVATED SERUM CREATININE: ICD-10-CM

## 2019-01-25 DIAGNOSIS — N20.1 RIGHT URETERAL STONE: ICD-10-CM

## 2019-01-25 DIAGNOSIS — E11.8 TYPE 2 DIABETES MELLITUS WITH COMPLICATION, UNSPECIFIED WHETHER LONG TERM INSULIN USE: ICD-10-CM

## 2019-01-25 DIAGNOSIS — F41.8 DEPRESSION WITH ANXIETY: ICD-10-CM

## 2019-01-25 PROCEDURE — 99001 SPECIMEN HANDLING PT-LAB: CPT

## 2019-01-25 RX ORDER — BUPROPION HYDROCHLORIDE 150 MG/1
TABLET, EXTENDED RELEASE ORAL
Qty: 180 TAB | Refills: 2 | Status: CANCELLED | OUTPATIENT
Start: 2019-01-25

## 2019-01-25 RX ORDER — BUPROPION HYDROCHLORIDE 150 MG/1
TABLET, EXTENDED RELEASE ORAL
Qty: 180 TAB | Refills: 2 | Status: SHIPPED | OUTPATIENT
Start: 2019-01-25 | End: 2019-02-28 | Stop reason: ALTCHOICE

## 2019-01-25 RX ORDER — HYDROCODONE BITARTRATE AND ACETAMINOPHEN 7.5; 325 MG/1; MG/1
1 TABLET ORAL
Qty: 28 TAB | Refills: 0 | Status: SHIPPED | OUTPATIENT
Start: 2019-01-25 | End: 2019-02-20

## 2019-01-25 NOTE — PROGRESS NOTES
Chief Complaint   Patient presents with   [facility] Follow Up     1. Have you been to the ER, urgent care clinic since your last visit? Hospitalized since your last visit? Yes here today to follow up    2. Have you seen or consulted any other health care providers outside of the [address] [place] [address] since your last visit? Include any pap smears or colon screening.

## 2019-01-25 NOTE — PROGRESS NOTES
Subjective:   Nelia Huffman is a 61 y.o. male patient of Dr. Miriam Li presents to the office today for transitional care appointment. patient reports that he is experiencing moderate to severe pain in lower suprapubic region along with moderate to severe pain with urination. He has been taking Dilaudid at least twice daily for pain relief--moderate relief only. He reports only 2 pills left. He has a follow up with urology next Friday and is worried about pain relief. He reports taking Cipro 500 mg po bid as prescribed by hospitalist.   ( urine cultures in the hospital + K Pneumoniae 3 morphologies) treated with rocephin and levaquin while in the hospital with procedures (cystoscopy, right JJ renal stent placement per urology) transitioned to Cipro with Infectious Disease recommendations. He is taking this now along with flomax . He was admitted to [facility] on 1-07-19 and discharged on 1-11-19. Admitting diagnosis of sepsis and UTI. Patient was contacted by RN navigator after discharge for coordination of transitional care appointments on 1-21-19 and 1-22-19. Discharge diagnosis   1. Sepsis  2. Bacteremia  3.  R. Kidney pyelonephritis w/obstructing renal stone  4. Acute on chronic kidney disease  5. HTN  6.. T2DM, uncontrolled  7. Obesity  8. Depression with anxiety. Medication list upon discharge reviewed with patient:  Discharge Medications:            Current Discharge Medication List             START taking these medications     Details   docusate sodium (COLACE) 100 mg capsule Take 1 Cap by mouth two (2) times a day for 30 days. Qty: 60 Cap, Refills: 0       polyethylene glycol (MIRALAX) 17 gram packet Take 1 Packet by mouth daily. Hold for loose stools  Qty: 10 Each, Refills: 0       tamsulosin (FLOMAX) 0.4 mg capsule Take 1 Cap by mouth daily. Qty: 30 Cap, Refills: 0       ciprofloxacin HCl (CIPRO) 500 mg tablet Take 1 Tab by mouth two (2) times a day for 12 days.   Qty: 24 Tab, Refills: 0     HYDROmorphone (DILAUDID) 4 mg tablet Take 1-1.5 Tabs by mouth every three (3) hours as needed. Max Daily Amount: 48 mg. Qty: 20 Tab, Refills: 0     Associated Diagnoses: Pyelonephritis                 CONTINUE these medications which have NOT CHANGED     Details   dulaglutide (TRULICITY) 1.5 RE/3.5 mL sub-q pen 0.5 mL by SubCUTAneous route every seven (7) days. Qty: 12 Pen, Refills: 3       metFORMIN ER (GLUCOPHAGE XR) 500 mg tablet Take 2 Tabs by mouth two (2) times daily (with meals). Qty: 360 Tab, Refills: 3     Associated Diagnoses: Diabetes mellitus without complication (HCC)       buPROPion SR (WELLBUTRIN SR) 150 mg SR tablet TAKE 1 TABLET TWICE DAILY  Qty: 180 Tab, Refills: 2     Associated Diagnoses: History of depression       isosorbide mononitrate ER (IMDUR) 60 mg CR tablet TAKE 1 TABLET EVERY MORNING  Qty: 90 Tab, Refills: 2     Associated Diagnoses: Essential hypertension       metoprolol succinate (TOPROL-XL) 50 mg XL tablet Take 1 Tab by mouth daily. Qty: 90 Tab, Refills: 1     Associated Diagnoses: Essential hypertension       zolpidem (AMBIEN) 10 mg tablet Take 1 Tab by mouth nightly. Max Daily Amount: 10 mg.  Qty: 90 Tab, Refills: 0     Comments: This request is for a new prescription for a controlled substance as required by Federal/State law. Associated Diagnoses: Primary insomnia       simvastatin (ZOCOR) 10 mg tablet TAKE 1 TABLET EVERY NIGHT  Qty: 90 Tab, Refills: 1       gabapentin (NEURONTIN) 800 mg tablet Take  by mouth three (3) times daily.     Associated Diagnoses: PAD (peripheral artery disease) ([facility] [address] Leg pain, bilateral       DULoxetine (CYMBALTA) 60 mg capsule Take 1 Cap by mouth daily. Qty: 90 Cap, Refills: 2     Associated Diagnoses: Chronic pain syndrome       OMEPRAZOLE, BULK,         multivitamin (ONE A DAY) tablet Take 1 Tab by mouth daily.       nitroglycerin (NITROSTAT) 0.4 mg SL tablet 1 Tab by SubLINGual route every five (5) minutes as needed.   Use for Chest Pain ; Call MD if > 3 tablets required  Qty: 1 Bottle, Refills: 0     Associated Diagnoses: Dyspnea on exertion; Essential hypertension       ACCU-CHEK FASTCLIX LANCET DRUM misc CHECK BLOOD SUGAR EVERY DAY  Qty: 102 Each, Refills: 11     Associated Diagnoses: Type 2 diabetes mellitus with hyperglycemia, without long-term current use of insulin (HCC)       ACCU-CHEK SMARTVIEW TEST STRIP strip CHECK BLOOD SUGAR EVERY DAY  Qty: 100 Strip, Refills: 3       Blood-Glucose Meter (ACCU-CHEK GABRIELLE) misc Check blood sugar daily  Qty: 1 Each, Refills: 0       naloxone 4 mg/actuation spry 4 mg by Nasal route as needed. Qty: 1 Box, Refills: 0       Back Brace misc 1 Device by Does Not Apply route daily as needed for Pain. One, lumbosacral orthosis/back brace with metal struts      Medically necessary  Qty: 1 Device, Refills: 0                STOP taking these medications         ondansetron hcl (ZOFRAN) 4 mg tablet Comments:   Reason for Stopping:            phentermine (ADIPEX-P) 37.5 mg tablet Comments:   Reason for Stopping:            pramipexole (MIRAPEX) 0.5 mg tablet Comments:   Reason for Stopping:              Procedures during hospitalization:   Urology 1-: cystoscopy, urethral dilation, R Retrograde pyelogram with interpretation, R Ureteral stent  Significant Diagnostic Studies:   -  XR CHEST PA LAT on 01/14/2019  IMPRESSION:     Hypoinflation. Mild enlargement of the cardiac silhouette with vascular  congestion. Otherwise no definite acute pulmonary process.     CT ABD PELV WO CONT on 01/15/2019  IMPRESSION:     Right obstructive uropathy. Please see report for additional findings and  Details.     XR RETROGRADE PYELOGRAM on 01/15/2019  IMPRESSION:   Selected images were presented during an or retrograde procedure. There is  limited visualization as described above. Hospitalization:   1.   Sepsis - at time of admission with hypotension, febrile in setting of UTI; Vital signs stable with treatment of infection at time of discharge. 2.  Bacteremia - at time of admission with 2/2 blood cultures positive for K. Pneumonia. With treatment of #3 repeat cultures on day of discharge remain without growth x 3 days. 3.  R kidney pyelonephritis w/ obstructing renal stone- treated with rocephin and levaquin while inpatient with procedures to include cystoscopy, right JJ renal stent placement per urology during admission. Per urine culture results (K. Pneumoniae 3 morphologies) transitioned to cipro until 01/30 per ID recommendations. Additionally patient underwent successful voiding trial on day of discharge. Close follow up with urology on 01/31/2019 as planned. Short course of narcotics provided at time of discharge due to some persistent pain with urination (improved). 4.  Acute on chronic kidney disease - Creatine trended during admission 2.17>1.44 and likely r/t hydronephrosis d/t ureteral stone, ATN d/t hypotension / urosepsis/ may also be contributing NSAID use may have also contributed in this setting per nephrology. Have discussed and encouraged no NSAIDS going forward and fine for resumption of metformin. Follow with nephrology as outpatient. 5.  HTN - initially hypotensive and BB resumed during admission without issue. Continue upon discharge. 6.  T2DM, uncontrolled - A1c 10.0 in Nov 2018 for which patient recently started followed diabetes specialist.  Home regimen was held while inpatient and covered with lantus and sliding scale insulin with good control. Patient clear about no insulin upon discharge - continue home regimen with follow up as outpatient. Followed by diabetes educator with continued dietary education. 7.  Obesity - encourage safe weight loss  8. Depression & Anxiety - continue home meds    Follow up appointments.     1 Follow up with [address] [place] [address]) note patient declined services see telephone encounter 1-22-19   2 Schedule an appointment with Brian Noel Brantley MD (Nephrology) in 3 weeks (2/8/2019); Office will call patient with appt date and time   3 Follow up with Mikey Ramirez MD (Urology) on 1/31/2019; @9:00AM     Current Outpatient Medications   Medication Sig Dispense Refill    docusate sodium (COLACE) 100 mg capsule Take 1 Cap by mouth two (2) times a day for 30 days. 60 Cap 0    polyethylene glycol (MIRALAX) 17 gram packet Take 1 Packet by mouth daily. Hold for loose stools 10 Each 0    tamsulosin (FLOMAX) 0.4 mg capsule Take 1 Cap by mouth daily. 30 Cap 0    ciprofloxacin HCl (CIPRO) 500 mg tablet Take 1 Tab by mouth two (2) times a day for 12 days. 24 Tab 0    HYDROmorphone (DILAUDID) 4 mg tablet Take 1-1.5 Tabs by mouth every three (3) hours as needed. Max Daily Amount: 48 mg. 20 Tab 0    dulaglutide (TRULICITY) 1.5 CP/6.8 mL sub-q pen 0.5 mL by SubCUTAneous route every seven (7) days. 12 Pen 3    metFORMIN ER (GLUCOPHAGE XR) 500 mg tablet Take 2 Tabs by mouth two (2) times daily (with meals). 360 Tab 3    buPROPion SR (WELLBUTRIN SR) 150 mg SR tablet TAKE 1 TABLET TWICE DAILY 180 Tab 2    isosorbide mononitrate ER (IMDUR) 60 mg CR tablet TAKE 1 TABLET EVERY MORNING 90 Tab 2    nitroglycerin (NITROSTAT) 0.4 mg SL tablet 1 Tab by SubLINGual route every five (5) minutes as needed. Use for Chest Pain ; Call MD if > 3 tablets required 1 Bottle 0    metoprolol succinate (TOPROL-XL) 50 mg XL tablet Take 1 Tab by mouth daily. 90 Tab 1    zolpidem (AMBIEN) 10 mg tablet Take 1 Tab by mouth nightly. Max Daily Amount: 10 mg. 90 Tab 0    ACCU-CHEK FASTCLIX LANCET DRUM misc CHECK BLOOD SUGAR EVERY  Each 11    simvastatin (ZOCOR) 10 mg tablet TAKE 1 TABLET EVERY NIGHT 90 Tab 1    gabapentin (NEURONTIN) 800 mg tablet Take  by mouth three (3) times daily.       ACCU-CHEK SMARTVIEW TEST STRIP strip CHECK BLOOD SUGAR EVERY  Strip 3    Blood-Glucose Meter (ACCU-CHEK GABRIELLE) misc Check blood sugar daily 1 Each 0    DULoxetine (CYMBALTA) 60 mg capsule Take 1 Cap by mouth daily. 90 Cap 2    OMEPRAZOLE, BULK,       naloxone 4 mg/actuation spry 4 mg by Nasal route as needed. 1 Box 0    Back Brace misc 1 Device by Does Not Apply route daily as needed for Pain. One, lumbosacral orthosis/back brace with metal struts      Medically necessary 1 Device 0    multivitamin (ONE A DAY) tablet Take 1 Tab by mouth daily. Review of Systems   Constitutional: Negative. Eating well, drinking water for hydration   Respiratory: Negative. Cardiovascular: Negative. Gastrointestinal:        Complaining of abdominal pain today 5/10 had right ureteral stent while in hospital.     Neurological: Negative. Endo/Heme/Allergies: Negative. Lab Results   Component Value Date/Time    Hemoglobin A1c 10.0 (H) 11/01/2018 03:27 AM     Lab Results   Component Value Date/Time    Glucose 140 (H) 01/18/2019 04:50 AM    Glucose (POC) 133 (H) 01/18/2019 11:49 AM     BP Readings from Last 3 Encounters:   01/25/19 134/72   01/18/19 134/83   11/01/18 151/84     Wt Readings from Last 3 Encounters:   01/25/19 291 lb (132 kg)   01/14/19 292 lb (132.5 kg)   11/01/18 300 lb (136.1 kg)     Lab Results   Component Value Date/Time    Sodium 138 01/18/2019 04:50 AM    Potassium 3.7 01/18/2019 04:50 AM    Chloride 102 01/18/2019 04:50 AM    CO2 27 01/18/2019 04:50 AM    Anion gap 9 01/18/2019 04:50 AM    Glucose 140 (H) 01/18/2019 04:50 AM    BUN 14 01/18/2019 04:50 AM    Creatinine 1.44 (H) 01/18/2019 04:50 AM    BUN/Creatinine ratio 10 (L) 01/18/2019 04:50 AM    GFR est AA >60 01/18/2019 04:50 AM    GFR est non-AA 50 (L) 01/18/2019 04:50 AM    Calcium 8.3 (L) 01/18/2019 04:50 AM    Bilirubin, total 0.9 01/15/2019 03:58 AM    AST (SGOT) 30 01/15/2019 03:58 AM    Alk.  phosphatase 103 01/15/2019 03:58 AM    Protein, total 6.9 01/15/2019 03:58 AM    Albumin 2.6 (L) 01/15/2019 03:58 AM    Globulin 4.3 (H) 01/15/2019 03:58 AM    A-G Ratio 0.6 (L) 01/15/2019 03:58 AM    ALT (SGPT) 26 01/15/2019 03:58 AM     OBJECTIVE:  Awake and alert in no acute distress  Lungs clear throughout  S1 S2 RRR without ectopy or murmur auscultated. Abdomen: normoactive bowel sounds all quadrants, no tenderness to abdomen upper and lower quadrants. No hepatosplenomegaly. Mild suprapubic TTP negative for CVAT bilaterally  Integumentary: no rashes  Normal skin turgor  Visit Vitals  /72 (BP 1 Location: Right arm, BP Patient Position: Sitting)   Resp 20   Ht 6' 2\" (1.88 m)   Wt 291 lb (132 kg)   SpO2 96%   BMI 37.36 kg/m²     Diagnoses and all orders for this visit:    Dysuria  -     CULTURE, URINE; Future  -     AMB POC URINALYSIS DIP STICK MANUAL W/O MICRO  -     HYDROcodone-acetaminophen (NORCO) 7.5-325 mg per tablet; Take 1 Tab by mouth every six (6) hours as needed for Pain. Max Daily Amount: 4 Tabs., Print, Disp-28 Tab, R-0    Elevated serum creatinine  -     METABOLIC PANEL, COMPREHENSIVE; Future    Hospital discharge follow-up    History of sepsis    Essential hypertension    Right ureteral stone    Type 2 diabetes mellitus with complication, unspecified whether long term insulin use (HCC)    Depression with anxiety  -     buPROPion SR (WELLBUTRIN SR) 150 mg SR tablet; TAKE 1 TABLET TWICE DAILY, Normal, Disp-180 Tab, R-2    History of depression    Other orders  -     Cancel: buPROPion SR (WELLBUTRIN SR) 150 mg SR tablet; TAKE 1 TABLET TWICE DAILY, Normal, Disp-180 Tab, R-2  -     METABOLIC PANEL, COMPREHENSIVE    unable to void (just voided prior to appointment). Water given but unable to void and reports that he will bring back. May finish the remaining two dilaudid for pain but do not take concurrent with Norco. Patient agrees with plan and verbalizes understanding. Anticipatory guidance regarding emergency care if symptoms worsen and patient verbalizes understanding. Continue flomax, Cipro  Patient agrees with plan and verbalizes understanding.   I have discussed the diagnosis with the patient and the intended plan as seen in the above orders. The patient has received an after-visit summary and questions were answered concerning future plans. I have discussed medication side effects and warnings with the patient as well. Follow-up Disposition:  Return in about 4 weeks (around 2/22/2019) for Dr. Rosie Goodwin .

## 2019-01-26 LAB
ALBUMIN SERPL-MCNC: 4 G/DL (ref 3.6–4.8)
ALBUMIN/GLOB SERPL: 1.2 {RATIO} (ref 1.2–2.2)
ALP SERPL-CCNC: 96 IU/L (ref 39–117)
ALT SERPL-CCNC: 23 IU/L (ref 0–44)
AST SERPL-CCNC: 25 IU/L (ref 0–40)
BILIRUB SERPL-MCNC: 0.3 MG/DL (ref 0–1.2)
BUN SERPL-MCNC: 15 MG/DL (ref 8–27)
BUN/CREAT SERPL: 12 (ref 10–24)
CALCIUM SERPL-MCNC: 9.3 MG/DL (ref 8.6–10.2)
CHLORIDE SERPL-SCNC: 103 MMOL/L (ref 96–106)
CO2 SERPL-SCNC: 26 MMOL/L (ref 20–29)
CREAT SERPL-MCNC: 1.26 MG/DL (ref 0.76–1.27)
GLOBULIN SER CALC-MCNC: 3.3 G/DL (ref 1.5–4.5)
GLUCOSE SERPL-MCNC: 118 MG/DL (ref 65–99)
POTASSIUM SERPL-SCNC: 5.5 MMOL/L (ref 3.5–5.2)
PROT SERPL-MCNC: 7.3 G/DL (ref 6–8.5)
SODIUM SERPL-SCNC: 141 MMOL/L (ref 134–144)

## 2019-01-28 NOTE — PROGRESS NOTES
ADELE, saw this patient on last Friday. Glucose has improved, renal function has improved. Has follow up with urology on Friday this week. Gave him Norco for dysuria (ureteral stone on right) s/p stent. Has follow up with you in one month.    Samantha ARANA

## 2019-01-29 ENCOUNTER — PATIENT OUTREACH (OUTPATIENT)
Dept: FAMILY MEDICINE CLINIC | Age: 64
End: 2019-01-29

## 2019-01-29 NOTE — LETTER
1/29/2019 8:46 AM 
 
Mr. Hedy William [address] [place] [address] Drive [address] [address] Dear Mr. William:  
 
I am providing you some information regarding Sepsis. If you have any questions please do not hesitate to contact me or your provider. Sepsis is a very serious illness. The infection is caused by germs called bacteria. That can spread through your body very quickly. It will cause you to not feel well when you should be getting better. The infection makes it hard for oxygen and nutrients to get to your organs, like the kidneys and lungs, and they may start to have problems working. Sepsis is so serious that it  may even cause death in some people who have a weakened immune systems or other health problems that prevent their body from fighting off infections. It is important that we stay in touch with you after you are discharged from the hospital to watch for any problems that may arise. When we check on you we want to know if you are experiencing any of the following signs and symptoms: 
 
 Confusion  Fast breathing  Chills or shaking  Fever or low body temperature  Fast heart beat  Lightheadedness  Less urine output  Skin rash or skin color changes Are you at risk? Anyone can get sepsis, but some people are at a greater risk, such as: 
 
 People with catheters  Those with severe lopez or trauma Keary Roup Babies younger than 1 year  People over the age of 72  Those with chronic diseases like liver disease, kidney disease or cancer  Those who have weak immune systems because of treatments or illness Prevention When you get home from the hospital it is important that you: If you are on antibiotic you need to take it to completion. Report any of the above signs and symptoms to your provider or your nurse navigator.   
Make sure you wash your hands frequently; it is the best way to prevent germs from spreading. You can wash your hands with soap and water or use alcohol based hand . Be sure anyone helping with your care or visiting you also washes their hands before touching you. Staying current on vaccines, like flu and pneumonia will also help to prevent infection. If have been treated for sepsis in the hospital it is important that you report any worsening symptoms as well. The key to a safe transition from the hospital is being an informed patient and working with your health care team very closely over the next 30 days once you get home. Treatment If signs and symptoms develop, your provider will see you in the office and discuss the best place for you to receive care. Your provider will likely order blood tests or other tests may be done to check for Sepsis. You may need to start on intravenous (IV) antibiotics, oxygen, and other medicines. If body organs start to not work properly, other treatments may be needed and you will likely have to go back to the hospital.  
 
 
 
 
 
 
Sincerely, 
 
 
 
 
 
 
Jennifer Vines, DAYANA  
[phone]

## 2019-01-29 NOTE — PROGRESS NOTES
Nurse Navigator unable to reach patient via telephone call. Outreach letter mailed to patient. Sepsis letter mailed to patient.

## 2019-01-29 NOTE — LETTER
1/29/2019 8:36 AM 
 
Mr. Akosua Boston [address] [address] Dear Prema Cisneros I am a Nurse Navigator working with Vaughn Wagoner MD. Part of my job is to follow up with patients who have recently been discharged from the Hospital to see how they are feeling, answer any questions they may have about their hospital admission or discharge instructions, and also to make sure that they have a follow-up appointment scheduled to see their primary care doctor. Additionally, our office is open Monday-Friday, 8:00 am till 5:00 pm. There is always a doctor on-call after our office closes and until we reopen and this includes weekends. The doctor on-call can advise you on what you need to do about your medical concerns. If you could call me at your convenience, I can be reached at, [phone] Thank you for allowing us to participate in your care!  
 
 
 
 
 
Sincerely, 
 
 
 
 
 
Mendel Bers, RN

## 2019-01-30 LAB — BACTERIA UR CULT: NO GROWTH

## 2019-01-30 NOTE — H&P (VIEW-ONLY)
Radha Vasquez 1955 ASSESSMENT:  
#1 Infected 9mm proximal right ureteral stone s/p stent 1/15 #2 1cm, bulbomembranous, 15fr, soft urethral stricture s/p dilation 1/15 #3 Recent episode of urosepsis #4 Neurogenic bladder? (history of pdet 40 at 500mL in 2013) #5 Rectal exam with 15gm prostate, anodular #6 Type II DM- Last HGB 11/1/18- 10.0% Will address stone first. Discussed URS. Next will follow stricture and get UDS to re-evaluate bladder given the previous findings in 2013. If UDS normal, will follow for recurrece of stricture. If recurs, will need urethroplasty. PLAN:   
1. Plan for right URS with laser lithotripsy and stone extraction 2. RTC post-op 3. Stop Flomax 4. Begin Toviaz trial 4mg. Risks, benefits, and SE discussed. Follow-up Disposition: 
Return for Post-Op. DISCUSSION: 
Patient's BMI is out of the normal parameters. Information about BMI was given and patient was advised to follow-up with their PCP for further management. Chief Complaint Patient presents with  Hydronephrosis w/stent HISTORY OF PRESENT ILLNESS:  Radha Vasquez is a 61 y.o. male who is seen as a hospital follow up after right JJ stent placement 1/15/19 for a right obstructing stone with pyelonephritis and sepsis. Treated with Cipro. Today he reports that he is doing much better. Having some urinary incontinence 2/2 the stent managing with depends. Reports a strong FOS since surgery, weak FOS prior. Also noted increasing fatigue since discharge. Otherwise doing well with the stent. The patient denies gross hematuria, dysuria, urinary frequency, urinary hesitancy, f/c/n/v, abdominal pain, and flank pain. No hx of STDs or penile trauma. REVIEW OF LABS & IMAGING: 
No results found for: PSA, Chandrika Mutter, PSAR3, [id number], [id number], PSALT  
  
 
AUA Symptom Score 1/31/2019 Over the past month how often have you had the sensation that your bladder was not completely empty after you finished urinating? 5 Over the past month, how often have had to urinate again less than 2 hours after you last finished urinating? 5 Over the past month, how often have you found you stopped and started again several times when you urinated? 5 Over the past month, how often have you found it difficult to postpone urination? 5 Over the past month, how often have you had a weak urinary stream? 5 Over the past month, how often have you had to push or strain to begin urinating? 4 Over the past month, how many times did you most typically get up to urinate from the time you went to bed at night until the time you got up in the morning? 4  
AUA Score 33 If you were to spend the rest of your life with your urinary condition the way it is now, how would you feel about that? Terrible Past Medical History:  
Diagnosis Date  Abdominal adhesions 7/30/2014  Back muscle spasm 7/30/2014  Back pain, lumbosacral   
 Cellulitis  Constipation, chronic 7/30/2014  DDD (degenerative disc disease), lumbar 7/30/2014  DDD (degenerative disc disease), thoracic 7/30/2014  Depression  Diabetes ([address]  DM (diabetes mellitus) ([address] 7/30/2014  Frequent falls 7/30/2014  TRUE (generalized anxiety disorder) 7/30/2014  High cholesterol  HTN (hypertension) 7/30/2014  Hyperlipidemia 7/30/2014  Hypertension  Insomnia secondary to chronic pain 7/30/2014  Kidney stones  LBP radiating to both legs 7/30/2014  Lumbar facet arthropathy 7/30/2014  Lumbar nerve root impingement 7/30/2014  Lumbosacral radiculopathy at S1 7/30/2014  Major depression 7/30/2014  Nausea & vomiting  Neurological disorder   
 sciatica  MANISH (obstructive sleep apnea) 7/30/2014  S/P lumbar laminectomy 7/30/2014  S/P lumbar spinal fusion 7/30/2014  Small vessel disease  Thoracic compression fracture ([address] 7/30/2014  Unspecified sleep apnea USES CPAP EVERY NIGHT Past Surgical History:  
Procedure Laterality Date  HX APPENDECTOMY  HX BACK SURGERY Lumbar fusion [address]  HX OTHER SURGICAL    
 EMG - S1 disorder [address] [facility] Social History Tobacco Use  Smoking status: Never Smoker  Smokeless tobacco: Never Used Substance Use Topics  Alcohol use: No  
 Drug use: No  
 
 
Allergies Allergen Reactions  Bactrim [Sulfamethoprim] Rash  Opana [Oxymorphone] Other (comments) Feels like bug crawling under skin and drowziness Family History Problem Relation Age of Onset  Diabetes Mother  Heart Failure Mother  Heart Attack Father  Diabetes Sister  Stroke Brother Current Outpatient Medications Medication Sig Dispense Refill  buPROPion SR (WELLBUTRIN SR) 150 mg SR tablet TAKE 1 TABLET TWICE DAILY 180 Tab 2  
 tamsulosin (FLOMAX) 0.4 mg capsule Take 1 Cap by mouth daily. 30 Cap 0  
 dulaglutide (TRULICITY) 1.5 OL/5.6 mL sub-q pen 0.5 mL by SubCUTAneous route every seven (7) days. 12 Pen 3  
 metFORMIN ER (GLUCOPHAGE XR) 500 mg tablet Take 2 Tabs by mouth two (2) times daily (with meals). 360 Tab 3  
 isosorbide mononitrate ER (IMDUR) 60 mg CR tablet TAKE 1 TABLET EVERY MORNING 90 Tab 2  
 metoprolol succinate (TOPROL-XL) 50 mg XL tablet Take 1 Tab by mouth daily. 90 Tab 1  
 zolpidem (AMBIEN) 10 mg tablet Take 1 Tab by mouth nightly. Max Daily Amount: 10 mg. 90 Tab 0  ACCU-CHEK FASTCLIX LANCET DRUM misc CHECK BLOOD SUGAR EVERY  Each 11  
 simvastatin (ZOCOR) 10 mg tablet TAKE 1 TABLET EVERY NIGHT 90 Tab 1  
 gabapentin (NEURONTIN) 800 mg tablet Take  by mouth three (3) times daily.  ACCU-CHEK SMARTVIEW TEST STRIP strip CHECK BLOOD SUGAR EVERY  Strip 3  Blood-Glucose Meter (ACCU-CHEK GABRIELLE) misc Check blood sugar daily 1 Each 0  
  DULoxetine (CYMBALTA) 60 mg capsule Take 1 Cap by mouth daily. 90 Cap 2  
 OMEPRAZOLE, BULK,     
 Back Brace misc 1 Device by Does Not Apply route daily as needed for Pain. One, lumbosacral orthosis/back brace with metal struts      Medically necessary 1 Device 0  
 HYDROcodone-acetaminophen (NORCO) 7.5-325 mg per tablet Take 1 Tab by mouth every six (6) hours as needed for Pain. Max Daily Amount: 4 Tabs. 28 Tab 0  
 docusate sodium (COLACE) 100 mg capsule Take 1 Cap by mouth two (2) times a day for 30 days. 60 Cap 0  
 polyethylene glycol (MIRALAX) 17 gram packet Take 1 Packet by mouth daily. Hold for loose stools 10 Each 0  
 nitroglycerin (NITROSTAT) 0.4 mg SL tablet 1 Tab by SubLINGual route every five (5) minutes as needed. Use for Chest Pain ; Call MD if > 3 tablets required 1 Bottle 0  
 naloxone 4 mg/actuation spry 4 mg by Nasal route as needed. 1 Box 0  
 multivitamin (ONE A DAY) tablet Take 1 Tab by mouth daily. Review of Systems Constitutional: Fever: No 
Skin: Rash: No 
HEENT: Hearing difficulty: No 
Eyes: Blurred vision: No 
Cardiovascular: Chest pain: No 
Respiratory: Shortness of breath: No 
Gastrointestinal: Nausea/vomiting: No 
Musculoskeletal: Back pain: No 
Neurological: Weakness: No 
Psychological: Memory loss: No 
Comments/additional findings: PHYSICAL EXAMINATION:  
Visit Vitals /80 Ht 6' 2\" (1.88 m) Wt 283 lb (128.4 kg) BMI 36.34 kg/m² Constitutional: WDWN, Pleasant and appropriate affect, No acute distress. CV:  No peripheral swelling noted Respiratory: No respiratory distress or difficulties Abdomen:  No abdominal masses or tenderness. No CVA tenderness. No inguinal hernias noted.  Male 1/15/19:   
Gilberto Cera normal to visual inspection, no erythema or irritation, Sphincter with good tone, Rectum with no hemorrhoids, fissures or masses, Prostate smooth, symmetric and anodular. Prostate is 15 grams SCROTUM:  No scrotal rash or lesions noticed. Normal bilateral testes and epididymis. PENIS: Urethral meatus normal in location and size. No urethral discharge. Skin: No jaundice. Neuro/Psych:  Alert and oriented x 3, affect appropriate. Lymphatic:   No enlarged inguinal lymph nodes. LABS TODAY:   
Recent Results (from the past 12 hour(s)) AMB POC URINALYSIS DIP STICK AUTO W/O MICRO Collection Time: 01/31/19  9:50 AM  
Result Value Ref Range Color (UA POC) Clarity (UA POC) Glucose (UA POC) Negative Negative Bilirubin (UA POC) Negative Negative Ketones (UA POC) Negative Negative Specific gravity (UA POC) 1.020 1.001 - 1.035 Blood (UA POC) 2+ Negative pH (UA POC) 5.5 4.6 - 8.0 Protein (UA POC) 1+ Negative Urobilinogen (UA POC) 0.2 mg/dL 0.2 - 1 Nitrites (UA POC) Negative Negative Leukocyte esterase (UA POC) Trace Negative AMB POC PVR, DEREK,POST-VOID RES,US,NON-IMAGING Collection Time: 01/31/19 10:08 AM  
Result Value Ref Range PVR 11 cc A copy of today's office visit with all pertinent imaging results and labs were sent to the referring physician. Dede Duke MD  
[facility] [phone] Encounter Diagnoses ICD-10-CM ICD-9-CM 1. Hydronephrosis, unspecified hydronephrosis type N13.30 591 2. Neurogenic bladder N31.9 596.54  
3. Right flank pain R10.9 789.09  
4. Nocturia R35.1 788.43 Medical documentation provided with the assistance of Steve Wu. Teresa Alfonso, medical scribe for Dede Duke MD on 1/31/2019.

## 2019-02-04 ENCOUNTER — PATIENT OUTREACH (OUTPATIENT)
Dept: FAMILY MEDICINE CLINIC | Age: 64
End: 2019-02-04

## 2019-02-04 NOTE — PROGRESS NOTES
Per Chart Review:     Patient attended Urology Appointment on 1-31-19. Nurse Navigator to continue to monitor case.

## 2019-02-12 ENCOUNTER — HOSPITAL ENCOUNTER (OUTPATIENT)
Dept: LAB | Age: 64
Discharge: HOME | End: 2019-02-12
Payer: MEDICARE

## 2019-02-12 DIAGNOSIS — N20.0 CALCULUS OF KIDNEY: ICD-10-CM

## 2019-02-12 LAB
ANION GAP SERPL CALC-SCNC: 5 MMOL/L (ref 3–18)
APPEARANCE UR: CLEAR
APTT PPP: 27.9 SEC (ref 23–36.4)
ATRIAL RATE: 78 BPM
BACTERIA URNS QL MICRO: NEGATIVE /HPF
BASOPHILS # BLD: 0 K/UL (ref 0–0.1)
BASOPHILS NFR BLD: 1 % (ref 0–2)
BILIRUB UR QL: NEGATIVE
BUN SERPL-MCNC: 18 MG/DL (ref 7–18)
BUN/CREAT SERPL: 15 (ref 12–20)
CALCIUM SERPL-MCNC: 9.6 MG/DL (ref 8.5–10.1)
CALCULATED P AXIS, ECG09: 35 DEGREES
CALCULATED R AXIS, ECG10: 52 DEGREES
CALCULATED T AXIS, ECG11: 33 DEGREES
CHLORIDE SERPL-SCNC: 104 MMOL/L (ref 100–108)
CO2 SERPL-SCNC: 30 MMOL/L (ref 21–32)
COLOR UR: YELLOW
CREAT SERPL-MCNC: 1.17 MG/DL (ref 0.6–1.3)
DIAGNOSIS, 93000: NORMAL
DIFFERENTIAL METHOD BLD: ABNORMAL
EOSINOPHIL # BLD: 0.3 K/UL (ref 0–0.4)
EOSINOPHIL NFR BLD: 6 % (ref 0–5)
EPITH CASTS URNS QL MICRO: NORMAL /LPF (ref 0–5)
ERYTHROCYTE [DISTWIDTH] IN BLOOD BY AUTOMATED COUNT: 14.7 % (ref 11.6–14.5)
GLUCOSE SERPL-MCNC: 95 MG/DL (ref 74–99)
GLUCOSE UR STRIP.AUTO-MCNC: NEGATIVE MG/DL
HCT VFR BLD AUTO: 38.2 % (ref 36–48)
HGB BLD-MCNC: 11.8 G/DL (ref 13–16)
HGB UR QL STRIP: ABNORMAL
INR PPP: 0.9 (ref 0.8–1.2)
KETONES UR QL STRIP.AUTO: NEGATIVE MG/DL
LEUKOCYTE ESTERASE UR QL STRIP.AUTO: ABNORMAL
LYMPHOCYTES # BLD: 1.6 K/UL (ref 0.9–3.6)
LYMPHOCYTES NFR BLD: 28 % (ref 21–52)
MCH RBC QN AUTO: 26.2 PG (ref 24–34)
MCHC RBC AUTO-ENTMCNC: 30.9 G/DL (ref 31–37)
MCV RBC AUTO: 84.9 FL (ref 74–97)
MONOCYTES # BLD: 0.5 K/UL (ref 0.05–1.2)
MONOCYTES NFR BLD: 9 % (ref 3–10)
NEUTS SEG # BLD: 3.1 K/UL (ref 1.8–8)
NEUTS SEG NFR BLD: 56 % (ref 40–73)
NITRITE UR QL STRIP.AUTO: NEGATIVE
P-R INTERVAL, ECG05: 156 MS
PH UR STRIP: 7 [PH] (ref 5–8)
PLATELET # BLD AUTO: 262 K/UL (ref 135–420)
PMV BLD AUTO: 10.1 FL (ref 9.2–11.8)
POTASSIUM SERPL-SCNC: 4.7 MMOL/L (ref 3.5–5.5)
PROT UR STRIP-MCNC: NEGATIVE MG/DL
PROTHROMBIN TIME: 12.2 SEC (ref 11.5–15.2)
Q-T INTERVAL, ECG07: 356 MS
QRS DURATION, ECG06: 90 MS
QTC CALCULATION (BEZET), ECG08: 405 MS
RBC # BLD AUTO: 4.5 M/UL (ref 4.7–5.5)
RBC #/AREA URNS HPF: NORMAL /HPF (ref 0–5)
SODIUM SERPL-SCNC: 139 MMOL/L (ref 136–145)
SP GR UR REFRACTOMETRY: 1.01 (ref 1–1.03)
UROBILINOGEN UR QL STRIP.AUTO: 0.2 EU/DL (ref 0.2–1)
VENTRICULAR RATE, ECG03: 78 BPM
WBC # BLD AUTO: 5.5 K/UL (ref 4.6–13.2)
WBC URNS QL MICRO: NORMAL /HPF (ref 0–4)

## 2019-02-12 PROCEDURE — 93005 ELECTROCARDIOGRAM TRACING: CPT

## 2019-02-12 PROCEDURE — 85610 PROTHROMBIN TIME: CPT

## 2019-02-12 PROCEDURE — 80048 BASIC METABOLIC PNL TOTAL CA: CPT

## 2019-02-12 PROCEDURE — 81001 URINALYSIS AUTO W/SCOPE: CPT

## 2019-02-12 PROCEDURE — 85025 COMPLETE CBC W/AUTO DIFF WBC: CPT

## 2019-02-12 PROCEDURE — 85730 THROMBOPLASTIN TIME PARTIAL: CPT

## 2019-02-12 PROCEDURE — 87086 URINE CULTURE/COLONY COUNT: CPT

## 2019-02-12 PROCEDURE — 36415 COLL VENOUS BLD VENIPUNCTURE: CPT

## 2019-02-14 LAB
BACTERIA SPEC CULT: NORMAL
SERVICE CMNT-IMP: NORMAL

## 2019-02-18 ENCOUNTER — PATIENT OUTREACH (OUTPATIENT)
Dept: FAMILY MEDICINE CLINIC | Age: 64
End: 2019-02-18

## 2019-02-21 ENCOUNTER — PATIENT OUTREACH (OUTPATIENT)
Dept: FAMILY MEDICINE CLINIC | Age: 64
End: 2019-02-21

## 2019-02-22 ENCOUNTER — ANESTHESIA EVENT (OUTPATIENT)
Dept: SURGERY | Age: 64
End: 2019-02-22
Payer: MEDICARE

## 2019-02-25 ENCOUNTER — ANESTHESIA (OUTPATIENT)
Dept: SURGERY | Age: 64
End: 2019-02-25
Payer: MEDICARE

## 2019-02-25 ENCOUNTER — APPOINTMENT (OUTPATIENT)
Dept: GENERAL RADIOLOGY | Age: 64
End: 2019-02-25
Attending: UROLOGY
Payer: MEDICARE

## 2019-02-25 ENCOUNTER — HOSPITAL ENCOUNTER (OUTPATIENT)
Age: 64
Discharge: HOME | End: 2019-02-25
Attending: UROLOGY | Admitting: UROLOGY
Payer: MEDICARE

## 2019-02-25 VITALS
HEIGHT: 74 IN | HEART RATE: 77 BPM | BODY MASS INDEX: 37.92 KG/M2 | WEIGHT: 295.5 LBS | OXYGEN SATURATION: 98 % | DIASTOLIC BLOOD PRESSURE: 83 MMHG | RESPIRATION RATE: 16 BRPM | SYSTOLIC BLOOD PRESSURE: 168 MMHG | TEMPERATURE: 97.7 F

## 2019-02-25 DIAGNOSIS — N20.0 KIDNEY STONE: Primary | ICD-10-CM

## 2019-02-25 LAB
GLUCOSE BLD STRIP.AUTO-MCNC: 151 MG/DL (ref 70–110)
GLUCOSE BLD STRIP.AUTO-MCNC: 93 MG/DL (ref 70–110)

## 2019-02-25 PROCEDURE — 74011250636 HC RX REV CODE- 250/636

## 2019-02-25 PROCEDURE — C1758 CATHETER, URETERAL: HCPCS | Performed by: UROLOGY

## 2019-02-25 PROCEDURE — 74011636320 HC RX REV CODE- 636/320: Performed by: UROLOGY

## 2019-02-25 PROCEDURE — 76210000001 HC OR PH I REC 2.5 TO 3 HR: Performed by: UROLOGY

## 2019-02-25 PROCEDURE — 74011250636 HC RX REV CODE- 250/636: Performed by: UROLOGY

## 2019-02-25 PROCEDURE — 74011250636 HC RX REV CODE- 250/636: Performed by: NURSE ANESTHETIST, CERTIFIED REGISTERED

## 2019-02-25 PROCEDURE — 76210000026 HC REC RM PH II 1 TO 1.5 HR: Performed by: UROLOGY

## 2019-02-25 PROCEDURE — 74011000250 HC RX REV CODE- 250

## 2019-02-25 PROCEDURE — 77030032490 HC SLV COMPR SCD KNE COVD -B: Performed by: UROLOGY

## 2019-02-25 PROCEDURE — 74420 UROGRAPHY RTRGR +-KUB: CPT

## 2019-02-25 PROCEDURE — 74011000250 HC RX REV CODE- 250: Performed by: UROLOGY

## 2019-02-25 PROCEDURE — 82962 GLUCOSE BLOOD TEST: CPT

## 2019-02-25 PROCEDURE — C1769 GUIDE WIRE: HCPCS | Performed by: UROLOGY

## 2019-02-25 PROCEDURE — 77030020782 HC GWN BAIR PAWS FLX 3M -B: Performed by: UROLOGY

## 2019-02-25 PROCEDURE — 74011636637 HC RX REV CODE- 636/637: Performed by: NURSE ANESTHETIST, CERTIFIED REGISTERED

## 2019-02-25 PROCEDURE — C1894 INTRO/SHEATH, NON-LASER: HCPCS | Performed by: UROLOGY

## 2019-02-25 PROCEDURE — 77030018836 HC SOL IRR NACL ICUM -A: Performed by: UROLOGY

## 2019-02-25 PROCEDURE — C2617 STENT, NON-COR, TEM W/O DEL: HCPCS | Performed by: UROLOGY

## 2019-02-25 PROCEDURE — 76060000033 HC ANESTHESIA 1 TO 1.5 HR: Performed by: UROLOGY

## 2019-02-25 PROCEDURE — 77030012961 HC IRR KT CYSTO/TUR ICUM -A: Performed by: UROLOGY

## 2019-02-25 PROCEDURE — 74011000250 HC RX REV CODE- 250: Performed by: NURSE ANESTHETIST, CERTIFIED REGISTERED

## 2019-02-25 PROCEDURE — 77030006974 HC BSKT URET RTVR BSC -C: Performed by: UROLOGY

## 2019-02-25 PROCEDURE — 76010000161 HC OR TIME 1 TO 1.5 HR INTENSV-TIER 1: Performed by: UROLOGY

## 2019-02-25 PROCEDURE — 82360 CALCULUS ASSAY QUANT: CPT

## 2019-02-25 PROCEDURE — 74011250636 HC RX REV CODE- 250/636: Performed by: ANESTHESIOLOGY

## 2019-02-25 PROCEDURE — 77030020268 HC MISC GENERAL SUPPLY: Performed by: UROLOGY

## 2019-02-25 DEVICE — URETERAL STENT
Type: IMPLANTABLE DEVICE | Site: URETER | Status: FUNCTIONAL
Brand: POLARIS™ ULTRA

## 2019-02-25 RX ORDER — SODIUM CHLORIDE 0.9 % (FLUSH) 0.9 %
5-40 SYRINGE (ML) INJECTION AS NEEDED
Status: DISCONTINUED | OUTPATIENT
Start: 2019-02-25 | End: 2019-02-25 | Stop reason: HOSPADM

## 2019-02-25 RX ORDER — HYDROMORPHONE HYDROCHLORIDE 2 MG/ML
INJECTION, SOLUTION INTRAMUSCULAR; INTRAVENOUS; SUBCUTANEOUS
Status: COMPLETED
Start: 2019-02-25 | End: 2019-02-25

## 2019-02-25 RX ORDER — PROPOFOL 10 MG/ML
INJECTION, EMULSION INTRAVENOUS AS NEEDED
Status: DISCONTINUED | OUTPATIENT
Start: 2019-02-25 | End: 2019-02-25 | Stop reason: HOSPADM

## 2019-02-25 RX ORDER — GLYCOPYRROLATE 0.2 MG/ML
INJECTION INTRAMUSCULAR; INTRAVENOUS AS NEEDED
Status: DISCONTINUED | OUTPATIENT
Start: 2019-02-25 | End: 2019-02-25 | Stop reason: HOSPADM

## 2019-02-25 RX ORDER — LIDOCAINE HYDROCHLORIDE 20 MG/ML
INJECTION, SOLUTION EPIDURAL; INFILTRATION; INTRACAUDAL; PERINEURAL AS NEEDED
Status: DISCONTINUED | OUTPATIENT
Start: 2019-02-25 | End: 2019-02-25 | Stop reason: HOSPADM

## 2019-02-25 RX ORDER — FLUCONAZOLE 2 MG/ML
400 INJECTION, SOLUTION INTRAVENOUS ONCE
Status: COMPLETED | OUTPATIENT
Start: 2019-02-25 | End: 2019-02-25

## 2019-02-25 RX ORDER — SODIUM CHLORIDE 0.9 % (FLUSH) 0.9 %
5-40 SYRINGE (ML) INJECTION EVERY 8 HOURS
Status: DISCONTINUED | OUTPATIENT
Start: 2019-02-25 | End: 2019-02-25 | Stop reason: HOSPADM

## 2019-02-25 RX ORDER — ONDANSETRON 2 MG/ML
4 INJECTION INTRAMUSCULAR; INTRAVENOUS ONCE
Status: DISCONTINUED | OUTPATIENT
Start: 2019-02-25 | End: 2019-02-25 | Stop reason: HOSPADM

## 2019-02-25 RX ORDER — MAGNESIUM SULFATE 100 %
4 CRYSTALS MISCELLANEOUS AS NEEDED
Status: DISCONTINUED | OUTPATIENT
Start: 2019-02-25 | End: 2019-02-25 | Stop reason: HOSPADM

## 2019-02-25 RX ORDER — LIDOCAINE HYDROCHLORIDE 10 MG/ML
0.1 INJECTION, SOLUTION EPIDURAL; INFILTRATION; INTRACAUDAL; PERINEURAL AS NEEDED
Status: DISCONTINUED | OUTPATIENT
Start: 2019-02-25 | End: 2019-02-25 | Stop reason: HOSPADM

## 2019-02-25 RX ORDER — FENTANYL CITRATE 50 UG/ML
50 INJECTION, SOLUTION INTRAMUSCULAR; INTRAVENOUS AS NEEDED
Status: DISCONTINUED | OUTPATIENT
Start: 2019-02-25 | End: 2019-02-25 | Stop reason: HOSPADM

## 2019-02-25 RX ORDER — FENTANYL CITRATE 50 UG/ML
INJECTION, SOLUTION INTRAMUSCULAR; INTRAVENOUS AS NEEDED
Status: DISCONTINUED | OUTPATIENT
Start: 2019-02-25 | End: 2019-02-25 | Stop reason: HOSPADM

## 2019-02-25 RX ORDER — SODIUM CHLORIDE 9 MG/ML
INJECTION, SOLUTION INTRAVENOUS
Status: DISCONTINUED | OUTPATIENT
Start: 2019-02-25 | End: 2019-02-25 | Stop reason: HOSPADM

## 2019-02-25 RX ORDER — OXYCODONE AND ACETAMINOPHEN 5; 325 MG/1; MG/1
1-2 TABLET ORAL
Qty: 10 TAB | Refills: 0 | Status: SHIPPED | OUTPATIENT
Start: 2019-02-25 | End: 2019-02-28 | Stop reason: SDUPTHER

## 2019-02-25 RX ORDER — INSULIN LISPRO 100 [IU]/ML
INJECTION, SOLUTION INTRAVENOUS; SUBCUTANEOUS ONCE
Status: DISCONTINUED | OUTPATIENT
Start: 2019-02-25 | End: 2019-02-25 | Stop reason: HOSPADM

## 2019-02-25 RX ORDER — ONDANSETRON 2 MG/ML
INJECTION INTRAMUSCULAR; INTRAVENOUS AS NEEDED
Status: DISCONTINUED | OUTPATIENT
Start: 2019-02-25 | End: 2019-02-25 | Stop reason: HOSPADM

## 2019-02-25 RX ORDER — SODIUM CHLORIDE 9 MG/ML
1000 INJECTION, SOLUTION INTRAVENOUS ONCE
Status: COMPLETED | OUTPATIENT
Start: 2019-02-25 | End: 2019-02-25

## 2019-02-25 RX ORDER — HYDROMORPHONE HYDROCHLORIDE 2 MG/ML
0.5 INJECTION, SOLUTION INTRAMUSCULAR; INTRAVENOUS; SUBCUTANEOUS
Status: DISCONTINUED | OUTPATIENT
Start: 2019-02-25 | End: 2019-02-25 | Stop reason: HOSPADM

## 2019-02-25 RX ORDER — INSULIN LISPRO 100 [IU]/ML
INJECTION, SOLUTION INTRAVENOUS; SUBCUTANEOUS ONCE
Status: COMPLETED | OUTPATIENT
Start: 2019-02-25 | End: 2019-02-25

## 2019-02-25 RX ORDER — LEVOFLOXACIN 500 MG/1
500 TABLET, FILM COATED ORAL DAILY
Qty: 7 TAB | Refills: 0 | Status: SHIPPED | OUTPATIENT
Start: 2019-02-25 | End: 2019-04-26 | Stop reason: ALTCHOICE

## 2019-02-25 RX ORDER — MIDAZOLAM HYDROCHLORIDE 1 MG/ML
INJECTION, SOLUTION INTRAMUSCULAR; INTRAVENOUS AS NEEDED
Status: DISCONTINUED | OUTPATIENT
Start: 2019-02-25 | End: 2019-02-25 | Stop reason: HOSPADM

## 2019-02-25 RX ORDER — DEXTROSE 50 % IN WATER (D50W) INTRAVENOUS SYRINGE
25-50 AS NEEDED
Status: DISCONTINUED | OUTPATIENT
Start: 2019-02-25 | End: 2019-02-25 | Stop reason: HOSPADM

## 2019-02-25 RX ADMIN — FAMOTIDINE 20 MG: 10 INJECTION, SOLUTION INTRAVENOUS at 08:25

## 2019-02-25 RX ADMIN — GLYCOPYRROLATE 0.2 MG: 0.2 INJECTION INTRAMUSCULAR; INTRAVENOUS at 09:15

## 2019-02-25 RX ADMIN — LIDOCAINE HYDROCHLORIDE 100 MG: 20 INJECTION, SOLUTION EPIDURAL; INFILTRATION; INTRACAUDAL; PERINEURAL at 09:10

## 2019-02-25 RX ADMIN — ONDANSETRON 4 MG: 2 INJECTION INTRAMUSCULAR; INTRAVENOUS at 09:15

## 2019-02-25 RX ADMIN — HYDROMORPHONE HYDROCHLORIDE 0.5 MG: 2 INJECTION, SOLUTION INTRAMUSCULAR; INTRAVENOUS; SUBCUTANEOUS at 11:44

## 2019-02-25 RX ADMIN — FENTANYL CITRATE 50 MCG: 50 INJECTION INTRAMUSCULAR; INTRAVENOUS at 10:52

## 2019-02-25 RX ADMIN — SODIUM CHLORIDE 1000 ML: 900 INJECTION, SOLUTION INTRAVENOUS at 08:20

## 2019-02-25 RX ADMIN — FENTANYL CITRATE 50 MCG: 50 INJECTION, SOLUTION INTRAMUSCULAR; INTRAVENOUS at 09:10

## 2019-02-25 RX ADMIN — MIDAZOLAM HYDROCHLORIDE 2 MG: 1 INJECTION, SOLUTION INTRAMUSCULAR; INTRAVENOUS at 09:06

## 2019-02-25 RX ADMIN — FENTANYL CITRATE 50 MCG: 50 INJECTION, SOLUTION INTRAMUSCULAR; INTRAVENOUS at 09:33

## 2019-02-25 RX ADMIN — WATER 1 G: 1 INJECTION INTRAMUSCULAR; INTRAVENOUS; SUBCUTANEOUS at 09:08

## 2019-02-25 RX ADMIN — HYDROMORPHONE HYDROCHLORIDE 0.5 MG: 2 INJECTION, SOLUTION INTRAMUSCULAR; INTRAVENOUS; SUBCUTANEOUS at 12:12

## 2019-02-25 RX ADMIN — FENTANYL CITRATE 50 MCG: 50 INJECTION INTRAMUSCULAR; INTRAVENOUS at 11:02

## 2019-02-25 RX ADMIN — INSULIN LISPRO 3 UNITS: 100 INJECTION, SOLUTION INTRAVENOUS; SUBCUTANEOUS at 08:28

## 2019-02-25 RX ADMIN — FLUCONAZOLE 400 MG: 2 INJECTION INTRAVENOUS at 09:45

## 2019-02-25 RX ADMIN — SODIUM CHLORIDE: 9 INJECTION, SOLUTION INTRAVENOUS at 08:50

## 2019-02-25 RX ADMIN — HYDROMORPHONE HYDROCHLORIDE 0.5 MG: 2 INJECTION, SOLUTION INTRAMUSCULAR; INTRAVENOUS; SUBCUTANEOUS at 12:01

## 2019-02-25 RX ADMIN — PROPOFOL 200 MG: 10 INJECTION, EMULSION INTRAVENOUS at 09:10

## 2019-02-25 RX ADMIN — FENTANYL CITRATE 50 MCG: 50 INJECTION INTRAMUSCULAR; INTRAVENOUS at 11:25

## 2019-02-25 RX ADMIN — FENTANYL CITRATE 50 MCG: 50 INJECTION INTRAMUSCULAR; INTRAVENOUS at 11:15

## 2019-02-25 NOTE — INTERVAL H&P NOTE
H&P Update: 
Javier Otto was seen and examined. History and physical has been reviewed. Significant clinical changes have occurred as noted:   
 
Stream slowing down again. Preop urine culture was negative. Likely recurrence of stricture. Will need dilation again and likely have hernandez placed at conclusion.   
 
Signed By: Abisai Allan MD   
 February 25, 2019 8:12 AM

## 2019-02-25 NOTE — DISCHARGE INSTRUCTIONS
Discharge Instructions      Patient: Ricardo Santos               Sex: male          DOA: 2/25/2019         YOB: 1955      Age:  59 y.o. ACUTE DIAGNOSES:  Nephrolithiasis     DIET: General     ACTIVITY: No restrictions     ADDITIONAL INFORMATION:   You have indwelling ureteral stents which frequently causes slight discomfort in the flank region and bladder spasms. If these occur beyond 24 hours after surgery, please contact our office to prescribe you flomax as needed for flank discomfort or Ditropan for bladder spasms. The indwelling stent will need to be removed/exchanged as directed below. If the stent is left in place without appropriate follow-up, it may become encrusted with stone and/or infected. If that occurs, you will require multiple additional surgeries to fix this. It is very important you follow up for appropriate removal or exchange of ureteral stents. If you experience any fevers > 100.4, significant nausea/vomiting, or significant worsening of pain, please contact us at the number below. It is common to experience mild, recurrent blood in your urine and this is likely due to stent irritation. If the bleeding continues to worsen with passage of clots, you are unable to urinate, or you experience significant light-headedness, please contact us at the number below. STENT: Your stents are attached to small strings coming out of your urethra. Please remove the stent on Friday 3/1 , by pulling firmly on the string until the stent is completely recovered. Some patients prefer to do this in a warm tub or shower. After the stent is removed it is common to experience some flank pain. This flank pain should resolve in 24-48 hours and should be manageable with pain medications. If pain is not controlled with pain medications, you experience nausea, vomiting, or fevers please notify us at the number below.       FOLLOW UP CARE:  You have a return appointment with Dr. Genoveva Smyth in 6 weeks with a 24 hour urine test, blood work, X ray and kidney ultrasound as well as a flow study. Please come with a full bladder. DISCHARGE SUMMARY from Nurse    PATIENT INSTRUCTIONS:    After general anesthesia or intravenous sedation, for 24 hours or while taking prescription Narcotics:  · Limit your activities  · Do not drive and operate hazardous machinery  · Do not make important personal or business decisions  · Do  not drink alcoholic beverages  · If you have not urinated within 8 hours after discharge, please contact your surgeon on call. Report the following to your surgeon:  · Excessive pain, swelling, redness or odor of or around the surgical area  · Temperature over 100.5  · Nausea and vomiting lasting longer than 4 hours or if unable to take medications  · Any signs of decreased circulation or nerve impairment to extremity: change in color, persistent  numbness, tingling, coldness or increase pain  · Any questions  *  Please give a list of your current medications to your Primary Care Provider. *  Please update this list whenever your medications are discontinued, doses are      changed, or new medications (including over-the-counter products) are added. *  Please carry medication information at all times in case of emergency situations. These are general instructions for a healthy lifestyle:    No smoking/ No tobacco products/ Avoid exposure to second hand smoke  Surgeon General's Warning:  Quitting smoking now greatly reduces serious risk to your health.     Obesity, smoking, and sedentary lifestyle greatly increases your risk for illness    A healthy diet, regular physical exercise & weight monitoring are important for maintaining a healthy lifestyle    You may be retaining fluid if you have a history of heart failure or if you experience any of the following symptoms:  Weight gain of 3 pounds or more overnight or 5 pounds in a week, increased swelling in our hands or feet or shortness of breath while lying flat in bed. Please call your doctor as soon as you notice any of these symptoms; do not wait until your next office visit. Recognize signs and symptoms of STROKE:    F-face looks uneven    A-arms unable to move or move unevenly    S-speech slurred or non-existent    T-time-call 911 as soon as signs and symptoms begin-DO NOT go       Back to bed or wait to see if you get better-TIME IS BRAIN. Warning Signs of HEART ATTACK     Call 911 if you have these symptoms:   Chest discomfort. Most heart attacks involve discomfort in the center of the chest that lasts more than a few minutes, or that goes away and comes back. It can feel like uncomfortable pressure, squeezing, fullness, or pain.  Discomfort in other areas of the upper body. Symptoms can include pain or discomfort in one or both arms, the back, neck, jaw, or stomach.  Shortness of breath with or without chest discomfort.  Other signs may include breaking out in a cold sweat, nausea, or lightheadedness. Don't wait more than five minutes to call 911 - MINUTES MATTER! Fast action can save your life. Calling 911 is almost always the fastest way to get lifesaving treatment. Emergency Medical Services staff can begin treatment when they arrive -- up to an hour sooner than if someone gets to the hospital by car. The discharge information has been reviewed with the patient and spouse. The patient and spouse verbalized understanding. Discharge medications reviewed with the patient and spouse and appropriate educational materials and side effects teaching were provided.   ___________________________________________________________________________________________________________________________________

## 2019-02-25 NOTE — OP NOTES
Operative Note  Patient: Bruce Harding               Sex: male             MRN: [id number]  YOB: 1955      Age:  59 y.o. Preoperative Diagnosis: Kidney stones [N20.0]  Postoperative Diagnosis:  Kidney stones [N20.0]  Surgeon: Herbert Franklin     Indication: This is a 60 y/o man with history of ?neurogenic bladder with elevated diminished compliance and pDET 40cm of water at 500cc checked in 2013 subsequently lost to follow up. He does not catheterize and is not any medication for his bladder. He presented to the hospital with fever and right flank pain in mid January and CT revealed 9mm proximal ureteral stone and positive urines for klebsiella resistent to macrobid. He underwent cystoscopy, urethral dilation for small bulbar stricture and right stent placement on 1/15 and treated with Abx. He has had a negative urine culture preop. He is here today for ureteroscopic stone extraction. Procedure:    1) Cystoscopy  2) Retrograde pyelogram with interpretation  3) < 1 hour physician fluoroscopy imaging interpretation time  4) Right side Ureteroscopy, laser lithotripsy and stone extraction   5) JJ ureteral stent placement     Findings:    1). Normal cystoscopy   2). Retrograde pyelograms without evidence of hydronephrosis or filling defects  3). Total stone burden 1.1 cm (8mm proximal and 3mm distal stone)   4). 7x24 JJ stent placed     Narrative of events: The patient was brought to the operative suite. Anesthesia was induced and preoperative antibiotics were administered. They were then placed in the dorsal lithotomy position and their external genitalia was prepped and draped in the usual fashion. A surgical timeout was performed confirming the patient's name, date of birth, laterality, and antibiotics. All were in agreement. A 22 Wolof cystourethroscope was then inserted into the patients bladder.  The urethra revealed a small area in the distal bulb consistent with previous dilation but this area was easily traversed with the scope. Prostate was non obstructing. Thorough cystoscopy was performed with both the 30 degree and 70 degree lens. The right ureteral orifice was cannulated with a 0.035 sensor tip wire and confirmed in the renal pelvis after the stent was removed. A duel lumen was advanced over this and retrograde pyelogram was performed no evidence of hydronephrosis or filling defects. Second wire was advanced and duel lumen was removed. Ureteral access sheath was advanced to the proximal stone under direct fluoroscopic guidance and flexible ureteroscopy was performed. The stone was identified and laser lithotripsy was performed. Basket extraction was then performed with removal of all visible stone. Thorough pyeloscopy was again performed to ensure no residual fragments. The sheath was removed slowly and the entire ureter was well visualized without evidence of injury. An additional 3mm stone was identified in the distal ureter and was basket extracted. A stent was advanced over the wire and deployed using fluoroscopic technique with the string left in place. The bladder was drained and patient was awoken from anesthesia. Estimated Blood Loss: <5CC     Anesthesia:  General                  Implants:   Implant Name Type Inv. Item Serial No.  Lot No. LRB No. Used Action   Jazmín Diaz - [id number]  Alicia LUNA 7FR 26CM -- [id number] [place] [id number] Right 1 Implanted       Specimens:   ID Type Source Tests Collected by Time Destination   1 : Kidney stone analysis Fresh Kidney  Vielka Purcell MD 2/25/2019  9:39 AM Pathology        Drains: 11x27 JJ stent on string            Complications:  None    Plan:  1. Stent out on Friday   2. Levaquin x 7 days   3.  Return in 6 weeks with Uroflow/PVR, Ultrasound, KUB and Cherylene Lesser, MD  2/25/2019

## 2019-02-25 NOTE — ANESTHESIA PREPROCEDURE EVALUATION
Anesthetic History PONV Review of Systems / Medical History Patient summary reviewed and pertinent labs reviewed Pulmonary Sleep apnea: CPAP Neuro/Psych Cardiovascular Hypertension: well controlled Exercise tolerance: >4 METS 
  
GI/Hepatic/Renal 
  
 
 
 
 
 
 Endo/Other Diabetes: well controlled, type 2 Arthritis Other Findings Physical Exam 
 
Airway Mallampati: III 
TM Distance: 4 - 6 cm Neck ROM: decreased range of motion Mouth opening: Normal 
 
 Cardiovascular Rhythm: regular Rate: normal 
 
 
 
 Dental 
No notable dental hx Pulmonary Breath sounds clear to auscultation Abdominal 
GI exam deferred Other Findings Anesthetic Plan ASA: 3 Anesthesia type: general 
 
 
 
 
Induction: Intravenous Anesthetic plan and risks discussed with: Patient

## 2019-02-25 NOTE — ANESTHESIA POSTPROCEDURE EVALUATION
Procedure(s): 
CYSTOSCOPY/RIGHT URETEROSCOPY/100W HOLMIUM LASER LITHOTRIPSY/BASKET EXTRACTION/DOUBLE J STENT/C-ARM. Anesthesia Post Evaluation Multimodal analgesia: multimodal analgesia used between 6 hours prior to anesthesia start to PACU discharge Patient location during evaluation: bedside Patient participation: complete - patient participated Level of consciousness: awake Pain management: adequate Airway patency: patent Anesthetic complications: no 
Cardiovascular status: stable Respiratory status: acceptable Hydration status: acceptable Post anesthesia nausea and vomiting:  controlled Visit Vitals /83 Pulse 77 Temp 36.5 °C (97.7 °F) Resp 16 Ht 6' 2\" (1.88 m) Wt 134 kg (295 lb 8 oz) SpO2 98% BMI 37.94 kg/m²

## 2019-02-25 NOTE — PERIOP NOTES
Patient confirmed by two identifiers with discharge instructions prior too being provided to patient and spouse/son. Patient armband removed and shredded

## 2019-02-27 ENCOUNTER — PATIENT OUTREACH (OUTPATIENT)
Dept: FAMILY MEDICINE CLINIC | Age: 64
End: 2019-02-27

## 2019-02-27 NOTE — PROGRESS NOTES
Nurse Navigator (NN) attempted to contact patient via telephone call. There was no response. A voicemail message was left requesting a non-emergency return telephone call. Nurse Navigator contact information provided.      Per [address] [place] [address] [phone]  Discharge Follow-Up    Date/Time:     2/27/2019 8:58 AM    [facility] and was discharged on 2/25/19 for:   Procedure:    1) Cystoscopy  2) Retrograde pyelogram with interpretation  3) < 1 hour physician fluoroscopy imaging interpretation time  4) Right side Ureteroscopy, laser lithotripsy and stone extraction   5) JJ ureteral stent placement   * per Operative note of 2/25/19

## 2019-02-28 ENCOUNTER — OFFICE VISIT (OUTPATIENT)
Dept: FAMILY MEDICINE CLINIC | Age: 64
End: 2019-02-28

## 2019-02-28 VITALS
SYSTOLIC BLOOD PRESSURE: 149 MMHG | TEMPERATURE: 98 F | HEART RATE: 77 BPM | DIASTOLIC BLOOD PRESSURE: 71 MMHG | RESPIRATION RATE: 20 BRPM | WEIGHT: 300.4 LBS | OXYGEN SATURATION: 93 % | HEIGHT: 74 IN | BODY MASS INDEX: 38.55 KG/M2

## 2019-02-28 DIAGNOSIS — Z12.11 COLON CANCER SCREENING: ICD-10-CM

## 2019-02-28 DIAGNOSIS — G47.00 INSOMNIA, UNSPECIFIED TYPE: ICD-10-CM

## 2019-02-28 DIAGNOSIS — E11.8 TYPE 2 DIABETES MELLITUS WITH COMPLICATION, WITHOUT LONG-TERM CURRENT USE OF INSULIN (HCC): Primary | ICD-10-CM

## 2019-02-28 DIAGNOSIS — N20.0 KIDNEY STONE: ICD-10-CM

## 2019-02-28 DIAGNOSIS — F33.9 EPISODE OF RECURRENT MAJOR DEPRESSIVE DISORDER, UNSPECIFIED DEPRESSION EPISODE SEVERITY (HCC): ICD-10-CM

## 2019-02-28 DIAGNOSIS — G89.18 POST-OP PAIN: ICD-10-CM

## 2019-02-28 DIAGNOSIS — E11.8 CONTROLLED DIABETES MELLITUS TYPE 2 WITH COMPLICATIONS, UNSPECIFIED WHETHER LONG TERM INSULIN USE (HCC): Primary | ICD-10-CM

## 2019-02-28 LAB
CA PHOS MFR STONE: 5 %
COLOR STONE: NORMAL
COM MFR STONE: 95 %
COMMENT, 519994: NORMAL
COMPOSITION, 120440: NORMAL
DISCLAIMER, STO32L: NORMAL
HBA1C MFR BLD HPLC: 7.2 %
NIDUS STONE QL: NORMAL
SIZE STONE: NORMAL MM
WT STONE: 292 MG

## 2019-02-28 RX ORDER — OXYCODONE AND ACETAMINOPHEN 5; 325 MG/1; MG/1
1-2 TABLET ORAL
Qty: 6 TAB | Refills: 0 | Status: SHIPPED | OUTPATIENT
Start: 2019-02-28 | End: 2019-03-01

## 2019-02-28 RX ORDER — HYDROXYZINE 25 MG/1
25 TABLET, FILM COATED ORAL
Qty: 30 TAB | Refills: 1 | Status: SHIPPED | OUTPATIENT
Start: 2019-02-28 | End: 2019-03-10

## 2019-02-28 RX ORDER — AMITRIPTYLINE HYDROCHLORIDE 25 MG/1
25 TABLET, FILM COATED ORAL
Qty: 90 TAB | Refills: 0 | Status: SHIPPED | OUTPATIENT
Start: 2019-02-28 | End: 2019-04-11 | Stop reason: SDUPTHER

## 2019-02-28 NOTE — PROGRESS NOTES
Pharmacy Note - Diabetes   02/28/19       Patient name: Katerin Daly (72 y.o., male)  YOB: 1955    Referred by: Andrew Wan NP for diabetes education / management   Past Medical History:   Diagnosis Date    Abdominal adhesions 7/30/2014    Back muscle spasm 7/30/2014    Back pain, lumbosacral     Cellulitis     Constipation, chronic 7/30/2014    DDD (degenerative disc disease), lumbar 7/30/2014    DDD (degenerative disc disease), thoracic 7/30/2014    Depression     [facility]     DM (diabetes mellitus) ([facility] 7/30/2014    Frequent falls 7/30/2014    TRUE (generalized anxiety disorder) 7/30/2014    High cholesterol     HTN (hypertension) 7/30/2014    Hyperlipidemia 7/30/2014    Hypertension     Insomnia secondary to chronic pain 7/30/2014    Kidney stones     LBP radiating to both legs 7/30/2014    Lumbar facet arthropathy 7/30/2014    Lumbar nerve root impingement 7/30/2014    Lumbosacral radiculopathy at S1 7/30/2014    Major depression 7/30/2014    Nausea & vomiting     Neurological disorder     sciatica    MANISH (obstructive sleep apnea) 7/30/2014    S/P lumbar laminectomy 7/30/2014    S/P lumbar spinal fusion 7/30/2014    Small vessel disease     Thoracic compression fracture ([facility] 07/30/2014    Unspecified sleep apnea     USES CPAP EVERY NIGHT     Allergies: Allergies   Allergen Reactions    Bactrim [Sulfamethoprim] Rash    Opana [Oxymorphone] Other (comments)     Feels like bug crawling under skin and drowziness     Subjective / Objective      Medications:   Current medications for diabetes include:      Key Antihyperglycemic Medications             dulaglutide (TRULICITY) 1.5 PL/2.9 mL sub-q pen 0.5 mL by SubCUTAneous route every seven (7) days. metFORMIN ER (GLUCOPHAGE XR) 500 mg tablet Take 2 Tabs by mouth two (2) times daily (with meals). Patient reports adherence with his medications.      Blood Glucose Findings:   He checks his blood glucose readings often. Patient did not bring his blood sugar log to today's visit. Recently had surgery for kidney stone and forgot his blood sugar readings. Thinks they are about the same or slightly better than before. Hypoglycemic episodes: No issues noted     His last A1c value(s) noted to be:      Lab Results   Component Value Date/Time    Hemoglobin A1c 10.0 (H) 11/01/2018 03:27 AM    Hemoglobin A1c 8.0 (H) 08/02/2017 09:17 AM    Hemoglobin A1c 8.3 (H) 04/05/2017 09:30 AM    Hemoglobin A1c (POC) 7.2 02/28/2019 01:34 PM     Assessment / Plan      1. Diabetes: POC A1c down to 7.2% today. States no issues with Trulicity or metformin. Noted that he would like to some off metformin at some point due to some concerns over metofrmin being dangerous/toxic. Provided education to patient that metformin only becomes a concern with decreased renal function and that his most recent renal function done on 2/12 shows and eGFR greater than 60 mL/min which fully supports using the medication. Patient understood and also educated him that if he wishes to work his way off of medications he would really need to focus on weight loss and dietary control. No adjustments made to medications at this time and patient would like to follow up with me in about 6 weeks for continued support with keeping his A1c down. Patient verbalized understanding of the information presented and all of the patients questions were answered. AVS was handed to the patient and information reviewed. Patient advised to call the office with any additional questions or concerns. Notification of recommendations will be sent to Antonio Alva NP for review.     Future Appointments   Date Time Provider Blair Vergara   3/28/2019  8:20 AM Antonio Alva NP 11 [facility]   4/9/2019  4:00 PM [facility] Jannie Gallegos   4/9/2019  4:30 PM [facility] JOSEFINA [facility]   4/18/2019  8:30 AM B[facility] JOSEFINA SCHED   4/19/2019 11:00 AM Tomer Padilla MD [address]       Thank you for the consult,  Exie Felty, PharmD, BCPS, BCACP, BC-ADM

## 2019-02-28 NOTE — PROGRESS NOTES
Chief Complaint   Patient presents with    Pre-op Exam     1. Have you been to the ER, urgent care clinic since your last visit? Hospitalized since your last visit? No    2. Have you seen or consulted any other health care providers outside of the [address] [place] [address] since your last visit? Include any pap smears or colon screening.  No

## 2019-02-28 NOTE — PROGRESS NOTES
HPI  Katie Leigh is a 59 y.o. male who presents today for follow up of diabetes. Diabetic Review of Systems - medication compliance: compliant all of the time, diabetic diet compliance: compliant most of the time, home glucose monitoring: is performed regularly every other day. BGL range from , average in 120's. Other symptoms and concerns: anxiety and difficulty sleeping at night with constant scratching at legs, pt would like to discontinue Wellbutrin feels like he has been on on this medication for a while. Denies thoughts of harming himself and others. Pt also complains of pain, pt recently had procedure for kidney stone removal, pain rated a 10/10 pt is advised by urologist to remove stent at home tomorrow. TRUE-7 Score: 8 \"somewhat difficult\"  HM addressed today: [place] guard ordered today for colon cancer screening  Current Outpatient Medications   Medication Sig Dispense Refill    levoFLOXacin (LEVAQUIN) 500 mg tablet Take 1 Tab by mouth daily. 7 Tab 0    oxyCODONE-acetaminophen (PERCOCET) 5-325 mg per tablet Take 1-2 Tabs by mouth every four (4) hours as needed for Pain. Max Daily Amount: 12 Tabs. 10 Tab 0    OMEPRAZOLE PO Take  by mouth daily as needed.  buPROPion SR (WELLBUTRIN SR) 150 mg SR tablet TAKE 1 TABLET TWICE DAILY 180 Tab 2    polyethylene glycol (MIRALAX) 17 gram packet Take 1 Packet by mouth daily. Hold for loose stools 10 Each 0    dulaglutide (TRULICITY) 1.5 WA/9.4 mL sub-q pen 0.5 mL by SubCUTAneous route every seven (7) days. 12 Pen 3    metFORMIN ER (GLUCOPHAGE XR) 500 mg tablet Take 2 Tabs by mouth two (2) times daily (with meals). 360 Tab 3    isosorbide mononitrate ER (IMDUR) 60 mg CR tablet TAKE 1 TABLET EVERY MORNING (Patient taking differently: nightly. TAKE 1 TABLET EVERY MORNING) 90 Tab 2    nitroglycerin (NITROSTAT) 0.4 mg SL tablet 1 Tab by SubLINGual route every five (5) minutes as needed.   Use for Chest Pain ; Call MD if > 3 tablets required 1 Bottle 0  metoprolol succinate (TOPROL-XL) 50 mg XL tablet Take 1 Tab by mouth daily. (Patient taking differently: Take 50 mg by mouth nightly.) 90 Tab 1    zolpidem (AMBIEN) 10 mg tablet Take 1 Tab by mouth nightly. Max Daily Amount: 10 mg. 90 Tab 0    ACCU-CHEK FASTCLIX LANCET DRUM misc CHECK BLOOD SUGAR EVERY  Each 11    simvastatin (ZOCOR) 10 mg tablet TAKE 1 TABLET EVERY NIGHT 90 Tab 1    gabapentin (NEURONTIN) 800 mg tablet Take  by mouth three (3) times daily as needed.  ACCU-CHEK SMARTVIEW TEST STRIP strip CHECK BLOOD SUGAR EVERY  Strip 3    Blood-Glucose Meter (ACCU-CHEK GABRIELLE) misc Check blood sugar daily 1 Each 0    DULoxetine (CYMBALTA) 60 mg capsule Take 1 Cap by mouth daily. 90 Cap 2    naloxone 4 mg/actuation spry 4 mg by Nasal route as needed. 1 Box 0    Back Brace misc 1 Device by Does Not Apply route daily as needed for Pain. One, lumbosacral orthosis/back brace with metal struts      Medically necessary 1 Device 0      Allergies   Allergen Reactions    Bactrim [Sulfamethoprim] Rash    Opana [Oxymorphone] Other (comments)     Feels like bug crawling under skin and drowziness       SUBJECTIVE:  Review of Systems   Constitutional: Negative for chills, fever and malaise/fatigue. Eyes: Negative for blurred vision. Respiratory: Negative for shortness of breath. Cardiovascular: Negative for chest pain, palpitations and leg swelling. Gastrointestinal: Negative for abdominal pain, diarrhea, nausea and vomiting. Genitourinary: Positive for flank pain. Negative for dysuria, frequency and urgency. Pain from urethral stent, post op   Musculoskeletal: Negative for myalgias. Neurological: Positive for dizziness. Negative for tingling, sensory change and headaches. Psychiatric/Behavioral: The patient is nervous/anxious and has insomnia.          OBJECTIVE:  Visit Vitals  /71 (BP 1 Location: Left arm, BP Patient Position: Sitting)   Pulse 77   Temp 98 °F (36.7 °C) (Oral)   Resp 20   Ht 6' 2\" (1.88 m)   Wt 300 lb 6.4 oz (136.3 kg)   SpO2 93%   BMI 38.57 kg/m²      I have reviewed/discussed the above normal BMI with the patient. I have recommended the following interventions: dietary management education, guidance, and counseling, encourage exercise and monitor weight . Physical Exam   Constitutional: He is oriented to person, place, and time and well-developed, well-nourished, and in no distress. HENT:   Head: Normocephalic. Cardiovascular: Normal rate, regular rhythm and normal heart sounds. Pulmonary/Chest: Effort normal and breath sounds normal. He has no wheezes. He has no rales. Abdominal: Soft. Bowel sounds are normal. He exhibits no distension. There is no tenderness. There is no guarding. Musculoskeletal: He exhibits tenderness. He exhibits no edema. Neurological: He is alert and oriented to person, place, and time. Gait normal.   Skin: Skin is warm and dry. No erythema. Psychiatric: His mood appears not anxious. He exhibits a depressed mood. He expresses no homicidal and no suicidal ideation. Nursing note and vitals reviewed. ASSESSMENT:  Diagnoses and all orders for this visit:    1. Controlled diabetes mellitus type 2 with complications, unspecified whether long term insulin use (HCC)  -     AMB POC HEMOGLOBIN A1C    2. Insomnia, unspecified type  -     hydrOXYzine HCl (ATARAX) 25 mg tablet; Take 1 Tab by mouth three (3) times daily as needed for Itching for up to 10 days. 3. Kidney stone  -     oxyCODONE-acetaminophen (PERCOCET) 5-325 mg per tablet; Take 1-2 Tabs by mouth every four (4) hours as needed for Pain for up to 1 day. Max Daily Amount: 12 Tabs. 4. Post-op pain  -     oxyCODONE-acetaminophen (PERCOCET) 5-325 mg per tablet; Take 1-2 Tabs by mouth every four (4) hours as needed for Pain for up to 1 day. Max Daily Amount: 12 Tabs.     5. Episode of recurrent major depressive disorder, unspecified [facility]  -     amitriptyline (ELAVIL) 25 mg tablet; Take 1 Tab by mouth nightly for 90 days. 6. Colon cancer screening  -     COLOGUARD TEST (FECAL DNA COLORECTAL CANCER SCREENING)      PLAN:  Hemoglobin A1C today, TRUE-7 today  [place] guard ordered today for colon cancer screening  Start Hydroxyzine 25 mg for anxiety and insomnia  Discontinue Wellbutrin and start Amitriptyline 25 mg   Start Percocet Q4H PRN for post op pain  Home glucose monitoring emphasized, foot care discussed and Podiatry visits discussed, annual eye examinations at Ophthalmology discussed and glycohemoglobin and other lab monitoring discussed. I have discussed the diagnosis with the patient and the intended plan as seen in the above orders. The patient has received an after-visit summary and questions were answered concerning future plans. I have discussed medication side effects and warnings with the patient as well. Patient will call for further questions. Follow-up Disposition:  Return in about 4 weeks (around 3/28/2019) for depression/anxiety follow up, medicare wellness.     Peter Caballero NP

## 2019-03-08 ENCOUNTER — PATIENT OUTREACH (OUTPATIENT)
Dept: FAMILY MEDICINE CLINIC | Age: 64
End: 2019-03-08

## 2019-03-08 NOTE — PROGRESS NOTES
Patient attended PCP appointment on 2/28/19. Patient also met with DERIK Veras with [place] on 2-28-19. Patient to follow up again with PCP in about 4 weeks. Next PCP follow up scheduled for 3-28-19. Nurse Navigator to follow case.

## 2019-03-12 ENCOUNTER — PATIENT OUTREACH (OUTPATIENT)
Dept: FAMILY MEDICINE CLINIC | Age: 64
End: 2019-03-12

## 2019-03-12 NOTE — PROGRESS NOTES
Hospital Discharge Follow-Up      Date/Time:  3/12/2019 12:35 PM    Patient was admitted to [facility] on 19 and discharged on 19  For:   Procedure:    1) Cystoscopy  2) Retrograde pyelogram with interpretation  3) < 1 hour physician fluoroscopy imaging interpretation time  4) Right side Ureteroscopy, laser lithotripsy and stone extraction   5) JJ ureteral stent placement   * Per Operative note of 19. The physician discharge summary was not available at the time of outreach. Top Challenges reviewed with the provider   None noted at this time. Method of communication with provider:  None     Inpatient RRAT score: n/a   Was this a readmission? no   Patient stated reason for the readmission: n/a     Nurse Navigator (NN) contacted the patient by telephone to perform post hospital discharge assessment. Verified name and  with patient as identifiers. Provided introduction to self, and explanation of the Nurse Navigator role. Patient given an opportunity to ask questions and does not have any further questions or concerns at this time. The patient agrees to contact the PCP office for questions related to their healthcare. NN provided contact information for future reference. Disease Specific:   N/A    Summary of patient's top problems:  1. Patient s/p surgical procedure  2.   3.     Home Health orders at discharge:    No home health orders noted. Home Health company: n/a   Date of initial visit: n/a     Durable Medical Equipment ordered/company: no orders noted   Durable Medical Equipment received: none noted     Barriers to care? None noted     Advance Care Planning:   Does patient have an Advance Directive:   Not on file     Medication(s):   New Medications at Discharge:   Changed Medications at Discharge:   Discontinued Medications at Discharge:   - Discharge summary not available for review.        Medication reconciliation was performed with patient by Damian Loco NP on 2/28/19. Patient also met with DERIK Graves On 2-29-19. Current Outpatient Medications   Medication Sig    amitriptyline (ELAVIL) 25 mg tablet Take 1 Tab by mouth nightly for 90 days.  levoFLOXacin (LEVAQUIN) 500 mg tablet Take 1 Tab by mouth daily.  OMEPRAZOLE PO Take  by mouth daily as needed.  polyethylene glycol (MIRALAX) 17 gram packet Take 1 Packet by mouth daily. Hold for loose stools    dulaglutide (TRULICITY) 1.5 NK/2.5 mL sub-q pen 0.5 mL by SubCUTAneous route every seven (7) days.  metFORMIN ER (GLUCOPHAGE XR) 500 mg tablet Take 2 Tabs by mouth two (2) times daily (with meals).  isosorbide mononitrate ER (IMDUR) 60 mg CR tablet TAKE 1 TABLET EVERY MORNING (Patient taking differently: nightly. TAKE 1 TABLET EVERY MORNING)    nitroglycerin (NITROSTAT) 0.4 mg SL tablet 1 Tab by SubLINGual route every five (5) minutes as needed. Use for Chest Pain ; Call MD if > 3 tablets required    metoprolol succinate (TOPROL-XL) 50 mg XL tablet Take 1 Tab by mouth daily. (Patient taking differently: Take 50 mg by mouth nightly.)    zolpidem (AMBIEN) 10 mg tablet Take 1 Tab by mouth nightly. Max Daily Amount: 10 mg.    ACCU-CHEK FASTCLIX LANCET DRUM [facility] CHECK BLOOD SUGAR EVERY DAY    simvastatin (ZOCOR) 10 mg tablet TAKE 1 TABLET EVERY NIGHT    gabapentin (NEURONTIN) 800 mg tablet Take  by mouth three (3) times daily as needed.  ACCU-CHEK SMARTVIEW TEST STRIP strip CHECK BLOOD SUGAR EVERY DAY    Blood-Glucose Meter (ACCU-CHEK GABRIELLE) misc Check blood sugar daily    DULoxetine (CYMBALTA) 60 mg capsule Take 1 Cap by mouth daily.  naloxone 4 mg/actuation spry 4 mg by Nasal route as needed.  Back Brace misc 1 Device by Does Not Apply route daily as needed for Pain. One, lumbosacral orthosis/back brace with metal struts      Medically necessary     No current facility-administered medications for this visit.         There are no discontinued medications. [facility] follow up appointment(s):   Future Appointments   Date Time Provider Blair Vergara   3/28/2019  8:20 AM Carolyn Whalen NP 11 [facility]   4/9/2019  4:30 PM [facility]   4/18/2019  8:30 AM [facility] JO[facility]FINA [facility]   4/19/2019 11:00 AM Onofre Morris MD [place]      Non-[facility]/Other [facility] follow up appointment(s):   Dr. Haile Lema - Urology       [facility]:  out of service area       Goals      Attends follow-up appointments as directed. [employer] Date:     3/12/2019   Patient attended PCP follow up appointment with Ms. Finn on 2-28-19   Patient atteneded meeting with DERIK Burch with [facility] Outpatient pharmacy on 2- 28-19.     3/12/2019   Next follow up appointments are scheduled for:   - 3/28/19 with Ms. Xander Ware NP   - 4/9/19 with Dr. Haile Lema, Urology       Prevent complications post hospitalization. [employer] Date:  3/25/19    3/12/2019   2/27/19 - Dr. Ki Ventura staff member called patient and spoke with him for follow up, reviewed signs and symptoms of what to expect and when to call the office. Staff member reviewed follow up appointment scheduled for April. 3/12/2019  Patient and/or family members were alerted to the availability of physician or other advanced practioners after hours, weekends, and holidays. Please call the PCP office phone number and speak with the answering service for assistance. Patient reports that \"everything is fine\"     Patient Denies:   - Fever  - Chills  - Pain     Nurse Navigator reviewed upcoming appointments with patient.

## 2019-04-03 ENCOUNTER — PATIENT OUTREACH (OUTPATIENT)
Dept: FAMILY MEDICINE CLINIC | Age: 64
End: 2019-04-03

## 2019-04-04 RX ORDER — SIMVASTATIN 10 MG/1
TABLET, FILM COATED ORAL
Qty: 90 TAB | Refills: 1 | Status: SHIPPED | OUTPATIENT
Start: 2019-04-04 | End: 2019-10-30 | Stop reason: SDUPTHER

## 2019-04-04 NOTE — PROGRESS NOTES
Transitions of Care Follow Up    Per EMR Review:    Goals Addressed                 This Visit's Progress     Attends follow-up appointments as directed. On track     [employer] Date:     3/12/2019   Patient attended PCP follow up appointment with MsRadha Aakash on 2-28-19   Patient atteneded meeting with DERIK Regan with [facility] Outpatient pharmacy on 2- 28-19.     3/12/2019   Next follow up appointments are scheduled for:   - 3/28/19 with Ms. Marco A Maradiaga NP   - 4/9/19 with Dr. Christen Wilhelm, Urology    4/3/2019   Follow up appointment with Ms. Marco A Maradiaga NP has been rescheduled to 4/11/19. Urology follow up appointment rescheduled by office staff. Letter mailed to patient.  Prevent complications post hospitalization. On track     [employer] Date:  3/25/19    3/12/2019   2/27/19 - Dr. Alvin Giles staff member called patient and spoke with him for follow up, reviewed signs and symptoms of what to expect and when to call the office. Staff member reviewed follow up appointment scheduled for April. 3/12/2019  Patient and/or family members were alerted to the availability of physician or other advanced practioners after hours, weekends, and holidays. Please call the PCP office phone number and speak with the answering service for assistance. 3/12/19   Patient attended follow up consultation with DERIK Regan With [facility]

## 2019-04-04 NOTE — TELEPHONE ENCOUNTER
Requested Prescriptions     Pending Prescriptions Disp Refills    simvastatin (ZOCOR) 10 mg tablet 90 Tab 1     Pramipexole di-hci 0.5 mg.

## 2019-04-11 ENCOUNTER — OFFICE VISIT (OUTPATIENT)
Dept: FAMILY MEDICINE CLINIC | Age: 64
End: 2019-04-11

## 2019-04-11 VITALS
OXYGEN SATURATION: 95 % | HEIGHT: 74 IN | HEART RATE: 77 BPM | SYSTOLIC BLOOD PRESSURE: 141 MMHG | DIASTOLIC BLOOD PRESSURE: 84 MMHG | RESPIRATION RATE: 20 BRPM | BODY MASS INDEX: 38.63 KG/M2 | WEIGHT: 301 LBS | TEMPERATURE: 97.9 F

## 2019-04-11 DIAGNOSIS — Z00.00 MEDICARE ANNUAL WELLNESS VISIT, SUBSEQUENT: Primary | ICD-10-CM

## 2019-04-11 DIAGNOSIS — G89.29 CHRONIC PAIN OF RIGHT KNEE: ICD-10-CM

## 2019-04-11 DIAGNOSIS — E11.8 TYPE 2 DIABETES MELLITUS WITH COMPLICATION, WITHOUT LONG-TERM CURRENT USE OF INSULIN (HCC): ICD-10-CM

## 2019-04-11 DIAGNOSIS — I10 ESSENTIAL HYPERTENSION: ICD-10-CM

## 2019-04-11 DIAGNOSIS — G47.00 INSOMNIA, UNSPECIFIED TYPE: ICD-10-CM

## 2019-04-11 DIAGNOSIS — M25.561 CHRONIC PAIN OF RIGHT KNEE: ICD-10-CM

## 2019-04-11 DIAGNOSIS — F41.8 DEPRESSION WITH ANXIETY: ICD-10-CM

## 2019-04-11 DIAGNOSIS — F33.9 EPISODE OF RECURRENT MAJOR DEPRESSIVE DISORDER, UNSPECIFIED DEPRESSION EPISODE SEVERITY (HCC): ICD-10-CM

## 2019-04-11 RX ORDER — AMITRIPTYLINE HYDROCHLORIDE 25 MG/1
25 TABLET, FILM COATED ORAL
Qty: 90 TAB | Refills: 0 | Status: SHIPPED | OUTPATIENT
Start: 2019-04-11 | End: 2019-05-10 | Stop reason: ALTCHOICE

## 2019-04-11 RX ORDER — HYDROXYZINE 25 MG/1
25 TABLET, FILM COATED ORAL
Qty: 180 TAB | Refills: 0 | Status: SHIPPED | OUTPATIENT
Start: 2019-04-11 | End: 2019-07-10

## 2019-04-11 RX ORDER — NITROGLYCERIN 0.4 MG/1
0.4 TABLET SUBLINGUAL
Qty: 1 BOTTLE | Refills: 0 | Status: SHIPPED | OUTPATIENT
Start: 2019-04-11 | End: 2020-05-18

## 2019-04-11 RX ORDER — PRAMIPEXOLE DIHYDROCHLORIDE 0.75 MG/1
TABLET ORAL
Qty: 90 TAB | Refills: 1 | Status: SHIPPED | OUTPATIENT
Start: 2019-04-11 | End: 2019-05-10

## 2019-04-11 NOTE — PROGRESS NOTES
HPI  Alexandru Santana is a 59 y.o. male who presents today for chronic care follow up and chronic right knee pain. Hypertension ROS: taking medications as instructed, no medication side effects noted, no TIA's, no chest pain on exertion, no dyspnea on exertion, no swelling of ankles. Pt has been having pain to right knee for a couple of months, this pain is daily and has caused him to walk on the side of his right foot due to compensation. Pt has also had frequent falls due to knee pain. Pt has not been evaluated by orthopedic specialist due to concerns for caring for wife whom is disabled. Pt states his anxiety, depression and insomnia has improved since he has started the amitriptyline and hydroxzyine   Pt is requesting a refill on Mirapex due to worsening of restless leg syndrome since he has been out of the medication and nitroglycerin due to the current bottle expiring. Pt also reports he has had to stop his trulicity due to insurance coverage issues, causing him to be in a \"donut hole\". Pt is also due for his diabetic foot exam.    PHQ 9 Score: 8 (4/11/2019  1:49 PM)  TRUE-7 Score: 9    Current Outpatient Medications   Medication Sig Dispense Refill    nitroglycerin (NITROSTAT) 0.4 mg SL tablet 1 Tab by SubLINGual route every five (5) minutes as needed for Chest Pain. Use for Chest Pain ; Call MD if > 3 tablets required 1 Bottle 0    pramipexole (MIRAPEX) 0.75 mg tablet TAKE 1 TABLET at night 90 Tab 1    amitriptyline (ELAVIL) 25 mg tablet Take 1 Tab by mouth nightly for 90 days. 90 Tab 0    hydrOXYzine HCl (ATARAX) 25 mg tablet Take 1 Tab by mouth two (2) times daily as needed for Anxiety for up to 90 days. 180 Tab 0    simvastatin (ZOCOR) 10 mg tablet TAKE 1 TABLET EVERY NIGHT 90 Tab 1    OMEPRAZOLE PO Take  by mouth daily as needed.  polyethylene glycol (MIRALAX) 17 gram packet Take 1 Packet by mouth daily.  Hold for loose stools 10 Each 0    dulaglutide (TRULICITY) 1.5 UY/5.8 mL sub-q pen 0.5 mL by SubCUTAneous route every seven (7) days. 12 Pen 3    metFORMIN ER (GLUCOPHAGE XR) 500 mg tablet Take 2 Tabs by mouth two (2) times daily (with meals). 360 Tab 3    isosorbide mononitrate ER (IMDUR) 60 mg CR tablet TAKE 1 TABLET EVERY MORNING (Patient taking differently: nightly. TAKE 1 TABLET EVERY MORNING) 90 Tab 2    metoprolol succinate (TOPROL-XL) 50 mg XL tablet Take 1 Tab by mouth daily. (Patient taking differently: Take 50 mg by mouth nightly.) 90 Tab 1    zolpidem (AMBIEN) 10 mg tablet Take 1 Tab by mouth nightly. Max Daily Amount: 10 mg. 90 Tab 0    ACCU-CHEK FASTCLIX LANCET DRUM misc CHECK BLOOD SUGAR EVERY  Each 11    gabapentin (NEURONTIN) 800 mg tablet Take  by mouth three (3) times daily as needed.  ACCU-CHEK SMARTVIEW TEST STRIP strip CHECK BLOOD SUGAR EVERY  Strip 3    Blood-Glucose Meter (ACCU-CHEK GABRIELLE) misc Check blood sugar daily 1 Each 0    DULoxetine (CYMBALTA) 60 mg capsule Take 1 Cap by mouth daily. 90 Cap 2    naloxone 4 mg/actuation spry 4 mg by Nasal route as needed. 1 Box 0    levoFLOXacin (LEVAQUIN) 500 mg tablet Take 1 Tab by mouth daily. 7 Tab 0    Back Brace misc 1 Device by Does Not Apply route daily as needed for Pain. One, lumbosacral orthosis/back brace with metal struts      Medically necessary 1 Device 0      Allergies   Allergen Reactions    Bactrim [Sulfamethoprim] Rash    Opana [Oxymorphone] Other (comments)     Feels like bug crawling under skin and drowziness       SUBJECTIVE:  Review of Systems   Constitutional: Negative for chills, fever and malaise/fatigue. Eyes: Negative for blurred vision. Respiratory: Negative for shortness of breath. Cardiovascular: Positive for leg swelling (right knee). Negative for chest pain and palpitations. Gastrointestinal: Negative for abdominal pain, diarrhea, nausea and vomiting. Genitourinary: Negative for dysuria, frequency and urgency.    Musculoskeletal: Positive for falls and joint pain (right knee). Neurological: Negative for dizziness, tingling, sensory change and headaches. Endo/Heme/Allergies: Negative for polydipsia. Psychiatric/Behavioral: Negative for depression. The patient is not nervous/anxious and does not have insomnia. OBJECTIVE:  Visit Vitals  /84 (BP 1 Location: Left arm, BP Patient Position: Sitting)   Pulse 77   Temp 97.9 °F (36.6 °C) (Oral)   Resp 20   Ht 6' 2\" (1.88 m)   Wt 301 lb (136.5 kg)   SpO2 95%   BMI 38.65 kg/m²      I have reviewed/discussed the above normal BMI with the patient. I have recommended the following interventions: dietary management education, guidance, and counseling, encourage exercise and monitor weight . Candance Huger Physical Exam   Constitutional: He is oriented to person, place, and time and well-developed, well-nourished, and in no distress. HENT:   Head: Normocephalic. Eyes: Pupils are equal, round, and reactive to light. Conjunctivae and EOM are normal.   Cardiovascular: Normal rate, regular rhythm and normal heart sounds. Pulmonary/Chest: Effort normal and breath sounds normal. He has no wheezes. He has no rales. He exhibits no tenderness. Musculoskeletal: He exhibits edema (right knee) and tenderness (right knee). Neurological: He is alert and oriented to person, place, and time. Gait normal.   Skin: Skin is warm and dry. No erythema. Psychiatric: Mood and affect normal.   Nursing note and vitals reviewed. Diabetic foot exam:     Left Foot:   Visual Exam: callous - heel   Pulse DP: 2+ (normal)   Filament test: reduced sensation to heel       Right Foot:   Visual Exam: callous - heel   Pulse DP: 2+ (normal)   Filament test: reduced sensation to heel       ASSESSMENT:  Diagnoses and all orders for this visit:    1. Medicare annual wellness visit, subsequent    2. Essential hypertension  -     nitroglycerin (NITROSTAT) 0.4 mg SL tablet; 1 Tab by SubLINGual route every five (5) minutes as needed for Chest Pain.   Use for Chest Pain ; Call MD if > 3 tablets required    3. Chronic pain of right knee  -     XR KNEE RT MIN 4 V; Future  -     REFERRAL TO ORTHOPEDICS    4. Insomnia, unspecified type    5. Depression with anxiety  -     amitriptyline (ELAVIL) 25 mg tablet; Take 1 Tab by mouth nightly for 90 days. -     hydrOXYzine HCl (ATARAX) 25 mg tablet; Take 1 Tab by mouth two (2) times daily as needed for Anxiety for up to 90 days. 6. Episode of recurrent major depressive disorder, unspecified depression episode severity (HCC)  -     amitriptyline (ELAVIL) 25 mg tablet; Take 1 Tab by mouth nightly for 90 days. -     hydrOXYzine HCl (ATARAX) 25 mg tablet; Take 1 Tab by mouth two (2) times daily as needed for Anxiety for up to 90 days. 7. Type 2 diabetes mellitus with complication, without long-term current use of insulin (HCC)  -     REFERRAL TO PODIATRY    Other orders  -     pramipexole (MIRAPEX) 0.75 mg tablet; TAKE 1 TABLET at night    PLAN:  foot care discussed and Podiatry visits discussed  reviewed diet, exercise and weight control  recommended sodium restriction    I have discussed the diagnosis with the patient and the intended plan as seen in the above orders. The patient has received an after-visit summary and questions were answered concerning future plans. I have discussed medication side effects and warnings with the patient as well. Patient will call for further questions. Follow-up and Dispositions    · Return in about 1 month (around 5/9/2019) for DM.        Elpidio Roth NP

## 2019-04-11 NOTE — PATIENT INSTRUCTIONS
Medicare Wellness Visit, Male    The best way to live healthy is to have a lifestyle where you eat a well-balanced diet, exercise regularly, limit alcohol use, and quit all forms of tobacco/nicotine, if applicable. Regular preventive services are another way to keep healthy. Preventive services (vaccines, screening tests, monitoring & exams) can help personalize your care plan, which helps you manage your own care. Screening tests can find health problems at the earliest stages, when they are easiest to treat. [facility] follows the current, evidence-based guidelines published by the [facility]zel ([facility]) when recommending preventive services for our patients. Because we follow these guidelines, sometimes recommendations change over time as research supports it. (For example, a prostate screening blood test is no longer routinely recommended for men with no symptoms.)  Of course, you and your doctor may decide to screen more often for some diseases, based on your risk and co-morbidities (chronic disease you are already diagnosed with). Preventive services for you include:  - Medicare offers their members a free annual wellness visit, which is time for you and your primary care provider to discuss and plan for your preventive service needs. Take advantage of this benefit every year!  -All adults over age 72 should receive the recommended pneumonia vaccines. Current USPSTF guidelines recommend a series of two vaccines for the best pneumonia protection.   -All adults should have a flu vaccine yearly and an ECG.  All adults age 61 and older should receive a shingles vaccine once in their lifetime.    -All adults age 38-68 who are overweight should have a diabetes screening test once every three years.   -Other screening tests & preventive services for persons with diabetes include: an eye exam to screen for diabetic retinopathy, a kidney function test, a foot exam, and stricter control over your cholesterol.   -Cardiovascular screening for adults with routine risk involves an electrocardiogram (ECG) at intervals determined by the provider.   -Colorectal cancer screening should be done for adults age 54-65 with no increased risk factors for colorectal cancer. There are a number of acceptable methods of screening for this type of cancer. Each test has its own benefits and drawbacks. Discuss with your provider what is most appropriate for you during your annual wellness visit. The different tests include: colonoscopy (considered the best screening method), a fecal occult blood test, a fecal DNA test, and sigmoidoscopy.  -All adults born between [facility] should be screened once for Hepatitis C.  -An Abdominal Aortic Aneurysm (AAA) Screening is recommended for men age 73-68 who has ever smoked in their lifetime. Here is a list of your current Health Maintenance items (your personalized list of preventive services) with a due date:  Health Maintenance Due   Topic Date Due    Colonoscopy  02/19/1973    Shingles Vaccine (1 of 2) 02/19/2005    Pneumococcal Vaccine (1 of 1 - PPSV23) 10/03/2016    Diabetic Foot Care  08/02/2018    Annual Well Visit  08/16/2018        Anxiety Disorder: Care Instructions  Your Care Instructions    Anxiety is a normal reaction to stress. Difficult situations can cause you to have symptoms such as sweaty palms and a nervous feeling. In an anxiety disorder, the symptoms are far more severe. Constant worry, muscle tension, trouble sleeping, nausea and diarrhea, and other symptoms can make normal daily activities difficult or impossible. These symptoms may occur for no reason, and they can affect your work, school, or social life. Medicines, counseling, and self-care can all help. Follow-up care is a key part of your treatment and safety. Be sure to make and go to all appointments, and call your doctor if you are having problems.  It's also a good idea to know your test results and keep a list of the medicines you take. How can you care for yourself at home? · Take medicines exactly as directed. Call your doctor if you think you are having a problem with your medicine. · Go to your counseling sessions and follow-up appointments. · Recognize and accept your anxiety. Then, when you are in a situation that makes you anxious, say to yourself, \"This is not an emergency. I feel uncomfortable, but I am not in danger. I can keep going even if I feel anxious. \"  · Be kind to your body:  ? Relieve tension with exercise or a massage. ? Get enough rest.  ? Avoid alcohol, caffeine, nicotine, and illegal drugs. They can increase your anxiety level and cause sleep problems. ? Learn and do relaxation techniques. See below for more about these techniques. · Engage your mind. Get out and do something you enjoy. Go to a funny movie, or take a walk or hike. Plan your day. Having too much or too little to do can make you anxious. · Keep a record of your symptoms. Discuss your fears with a good friend or family member, or join a support group for people with similar problems. Talking to others sometimes relieves stress. · Get involved in social groups, or volunteer to help others. Being alone sometimes makes things seem worse than they are. · Get at least 30 minutes of exercise on most days of the week to relieve stress. Walking is a good choice. You also may want to do other activities, such as running, swimming, cycling, or playing tennis or team sports. Relaxation techniques  Do relaxation exercises 10 to 20 minutes a day. You can play soothing, relaxing music while you do them, if you wish. · Tell others in your house that you are going to do your relaxation exercises. Ask them not to disturb you. · Find a comfortable place, away from all distractions and noise. · Lie down on your back, or sit with your back straight. · Focus on your breathing.  Make it slow and steady. · Breathe in through your nose. Breathe out through either your nose or mouth. · Breathe deeply, filling up the area between your navel and your rib cage. Breathe so that your belly goes up and down. · Do not hold your breath. · Breathe like this for 5 to 10 minutes. Notice the feeling of calmness throughout your whole body. As you continue to breathe slowly and deeply, relax by doing the following for another 5 to 10 minutes:  · Tighten and relax each muscle group in your body. You can begin at your toes and work your way up to your head. · Imagine your muscle groups relaxing and becoming heavy. · Empty your mind of all thoughts. · Let yourself relax more and more deeply. · Become aware of the state of calmness that surrounds you. · When your relaxation time is over, you can bring yourself back to alertness by moving your fingers and toes and then your hands and feet and then stretching and moving your entire body. Sometimes people fall asleep during relaxation, but they usually wake up shortly afterward. · Always give yourself time to return to full alertness before you drive a car or do anything that might cause an accident if you are not fully alert. Never play a relaxation tape while you drive a car. When should you call for help? Call 911 anytime you think you may need emergency care. For example, call if:    · You feel you cannot stop from hurting yourself or someone else.   Sherif Hassan the numbers for these national suicide hotlines: 0-378-288-TALK (3-178.427.6665) and 2-741-BPUHPRW (9-220.568.3108). If you or someone you know talks about suicide or feeling hopeless, get help right away.   Watch closely for changes in your health, and be sure to contact your doctor if:    · You have anxiety or fear that affects your life.     · You have symptoms of anxiety that are new or different from those you had before. Where can you learn more?   Go to http://iza-poornima.info/. Enter P754 in the search box to learn more about \"Anxiety Disorder: Care Instructions. \"  Current as of: September 11, 2018  Content Version: 11.9  © 5281-7669 [employer]. Care instructions adapted under license by [employer] (which disclaims liability or warranty for this information). If you have questions about a medical condition or this instruction, always ask your healthcare professional. [place] [address] any warranty or liability for your use of this information. Restless Legs Syndrome: Care Instructions  Your Care Instructions  Restless legs syndrome is a common nervous system problem. People with this syndrome feel a creeping, achy, or unpleasant feeling in the legs and an overpowering urge to move them. It often occurs in the evening and at night and can lead to sleep problems and tiredness. Your doctor may suggest doing a study of your sleep patterns to figure out what is happening when you try to sleep. Many people get relief from symptoms when they get regular exercise, eat well, and avoid caffeine, alcohol, and tobacco.  Follow-up care is a key part of your treatment and safety. Be sure to make and go to all appointments, and call your doctor if you are having problems. It's also a good idea to know your test results and keep a list of the medicines you take. How can you care for yourself at home? · Take your medicines exactly as prescribed. Call your doctor if you think you are having a problem with your medicine. · Try bathing in hot or cold water. Applying a heating pad or ice bag to your legs may also help symptoms. · Stretch and massage your legs before bed or when discomfort begins. · Get some exercise for at least 30 minutes a day on most days of the week. Stop exercising at least 3 hours before bedtime. · Try to plan for situations where you will need to remain seated for long stretches.  For example, if you are traveling by car, plan some stops so you can get out and walk around. · Tell your doctor about any medicines you are taking. This includes all over-the-counter, prescription, and herbal medicines. Some medicines, such as antidepressants, antihistamines, and cold and sinus medicines, can make your symptoms worse. · Avoid caffeine products, such as coffee, tea, cola, and chocolate. Caffeine can interrupt your sleep and stimulate you. · Do not smoke. Nicotine can make restless legs worse. If you need help quitting, talk to your doctor about stop-smoking programs and medicines. These can increase your chances of quitting for good. · Do not drink alcohol late in the evening. Take steps to help you sleep better  · Get plenty of sunlight in the outdoors, particularly later in the afternoon. · Use the evening hours for settling down. Avoid activities that challenge you in the hours before bedtime. · Eat meals at regular times, and do not snack before bedtime. · Keep your bedroom quiet, dark, and cool. Try using a sleep mask and earplugs to help you sleep. · Limit how much you drink at night to reduce your need to get up to urinate. But do not go to bed thirsty. · Run a fan or other steady \"white noise\" during the night if noises wake you up. · Hardwick the bed for sleeping and sex. Do your reading or TV watching in another room. · Once you are in bed, relax from head to toe, and guide your mind to pleasant thoughts. · Do not stay in bed longer than 8 hours, and try to avoid naps. When should you call for help? Watch closely for changes in your health, and be sure to contact your doctor if:    · You are still not getting enough sleep.     · Your symptoms become more severe or happen more often. Where can you learn more? Go to http://iza-poornima.info/. Enter C503 in the search box to learn more about \"Restless Legs Syndrome: Care Instructions. \"  Current as of: Katharine 3, 2018  Content Version: 11.9  © 6905-4316 [employer]. Care instructions adapted under license by [employer] (which disclaims liability or warranty for this information). If you have questions about a medical condition or this instruction, always ask your healthcare professional. [address] any warranty or liability for your use of this information.

## 2019-04-11 NOTE — PROGRESS NOTES
Chief Complaint   Patient presents with    Diabetes    Hypertension     1. Have you been to the ER, urgent care clinic since your last visit? Hospitalized since your last visit? No    2. Have you seen or consulted any other health care providers outside of the [address] [place] [address] since your last visit? Include any pap smears or colon screening. No    This is the Subsequent Medicare Annual Wellness Exam, performed 12 months or more after the Initial AWV or the last Subsequent AWV    I have reviewed the patient's medical history in detail and updated the computerized patient record.      History     Past Medical History:   Diagnosis Date    Abdominal adhesions 7/30/2014    Back muscle spasm 7/30/2014    Back pain, lumbosacral     Cellulitis     Constipation, chronic 7/30/2014    DDD (degenerative disc disease), lumbar 7/30/2014    DDD (degenerative disc disease), thoracic 7/30/2014    Depression     Diabetes ([address]     DM (diabetes mellitus) ([address] 7/30/2014    Frequent falls 7/30/2014    RTUE (generalized anxiety disorder) 7/30/2014    High cholesterol     HTN (hypertension) 7/30/2014    Hyperlipidemia 7/30/2014    Hypertension     Insomnia secondary to chronic pain 7/30/2014    Kidney stones     LBP radiating to both legs 7/30/2014    Lumbar facet arthropathy 7/30/2014    Lumbar nerve root impingement 7/30/2014    Lumbosacral radiculopathy at S1 7/30/2014    Major depression 7/30/2014    Nausea & vomiting     Neurological disorder     sciatica    MANISH (obstructive sleep apnea) 7/30/2014    S/P lumbar laminectomy 7/30/2014    S/P lumbar spinal fusion 7/30/2014    Small vessel disease ([address]     Thoracic compression fracture ([address] 07/30/2014    Unspecified sleep apnea     USES CPAP EVERY NIGHT      Past Surgical History:   Procedure Laterality Date    HX APPENDECTOMY      HX BACK SURGERY      Lumbar fusion    HX HERNIA REPAIR  1992    HX OTHER SURGICAL      EMG - S1 disorder    HX OTHER SURGICAL  10/2017    spinal cord stimulator    HX VASECTOMY  1985     Current Outpatient Medications   Medication Sig Dispense Refill    simvastatin (ZOCOR) 10 mg tablet TAKE 1 TABLET EVERY NIGHT 90 Tab 1    amitriptyline (ELAVIL) 25 mg tablet Take 1 Tab by mouth nightly for 90 days. 90 Tab 0    OMEPRAZOLE PO Take  by mouth daily as needed.  polyethylene glycol (MIRALAX) 17 gram packet Take 1 Packet by mouth daily. Hold for loose stools 10 Each 0    dulaglutide (TRULICITY) 1.5 MR/8.7 mL sub-q pen 0.5 mL by SubCUTAneous route every seven (7) days. 12 Pen 3    metFORMIN ER (GLUCOPHAGE XR) 500 mg tablet Take 2 Tabs by mouth two (2) times daily (with meals). 360 Tab 3    isosorbide mononitrate ER (IMDUR) 60 mg CR tablet TAKE 1 TABLET EVERY MORNING (Patient taking differently: nightly. TAKE 1 TABLET EVERY MORNING) 90 Tab 2    nitroglycerin (NITROSTAT) 0.4 mg SL tablet 1 Tab by SubLINGual route every five (5) minutes as needed. Use for Chest Pain ; Call MD if > 3 tablets required 1 Bottle 0    metoprolol succinate (TOPROL-XL) 50 mg XL tablet Take 1 Tab by mouth daily. (Patient taking differently: Take 50 mg by mouth nightly.) 90 Tab 1    zolpidem (AMBIEN) 10 mg tablet Take 1 Tab by mouth nightly. Max Daily Amount: 10 mg. 90 Tab 0    ACCU-CHEK FASTCLIX LANCET DRUM misc CHECK BLOOD SUGAR EVERY  Each 11    gabapentin (NEURONTIN) 800 mg tablet Take  by mouth three (3) times daily as needed.  ACCU-CHEK SMARTVIEW TEST STRIP strip CHECK BLOOD SUGAR EVERY  Strip 3    Blood-Glucose Meter (ACCU-CHEK GABRIELLE) misc Check blood sugar daily 1 Each 0    DULoxetine (CYMBALTA) 60 mg capsule Take 1 Cap by mouth daily. 90 Cap 2    naloxone 4 mg/actuation spry 4 mg by Nasal route as needed. 1 Box 0    levoFLOXacin (LEVAQUIN) 500 mg tablet Take 1 Tab by mouth daily. 7 Tab 0    Back Brace misc 1 Device by Does Not Apply route daily as needed for Pain.  One, lumbosacral orthosis/back brace with metal struts      Medically necessary 1 Device 0     Allergies   Allergen Reactions    Bactrim [Sulfamethoprim] Rash    Opana [Oxymorphone] Other (comments)     Feels like bug crawling under skin and drowziness     Family History   Problem Relation Age of Onset    Diabetes Mother     Heart Failure Mother     Heart Attack Father     Diabetes Sister     Stroke Brother      Social History     Tobacco Use    Smoking status: Never Smoker    Smokeless tobacco: Never Used   Substance Use Topics    Alcohol use: No     Patient Active Problem List   Diagnosis Code    DDD (degenerative disc disease), lumbar M51.36    Lumbar facet arthropathy M47.816    S/P lumbar laminectomy Z98.890    S/P lumbar spinal fusion Z98.1    Lumbar nerve root impingement M54.16    Lumbosacral radiculopathy at S1 M54.17    DDD (degenerative disc disease), thoracic M51.34    Thoracic compression fracture (HCC) S22.000A    Back muscle spasm M62.830    Major depression F32.9    TRUE (generalized anxiety disorder) F41.1    Abdominal adhesions K66.0    Constipation, chronic K59.09    Insomnia secondary to chronic pain G89.29, G47.01    MANISH (obstructive sleep apnea) G47.33    DM (diabetes mellitus) ([facility]) E11.9    HTN (hypertension) I10    Hyperlipidemia E78.5    Frequent falls R29.6    Thoracic or lumbosacral neuritis or radiculitis, unspecified [id number]    Postlaminectomy syndrome, lumbar region M96.1    History of depression Z86.59    Chronic midline low back pain with sciatica M54.40, G89.29    Advanced care planning/counseling discussion Z71.89    Anemia D64.9    Chronic pain syndrome G89.4    Lumbar post-laminectomy syndrome M96.1    Lumbar and sacral osteoarthritis M47.817    Sepsis ([facility]) A41.9    UTI (urinary tract infection) N39.0    Kidney stone N20.0     Depression Risk Factor Screening:     3 most recent PHQ Screens 5/15/2018   PHQ Not Done -   Little interest or pleasure in doing things Several days Feeling down, depressed, irritable, or hopeless Several days   Total Score PHQ 2 2     Alcohol Risk Factor Screening:     Patient does not drink Alcohol  Functional Ability and Level of Safety:   Hearing Loss  Hearing is good. Activities of Daily Living  The home contains: handrails  Patient doesn't need assistant  With Daily activities    Fall Risk  Fall Risk Assessment, last 12 mths 8/30/2017   Able to walk? Yes   Fall in past 12 months? Yes   Fall with injury? No   Number of falls in past 12 months 8 or more   Fall Risk Score 8       Abuse Screen  Patient is not abused    Cognitive Screening   Evaluation of Cognitive Function:  Has your family/caregiver stated any concerns about your memory: no    Normal    Patient Care Team   Patient Care Team:  Florian Giraldo NP as PCP - General (Nurse Practitioner)      Assessment/Plan   Education and counseling provided:  Are appropriate based on today's review and evaluation  Recommendation for colon cancer screening    Diagnoses and all orders for this visit:    1.  Medicare annual wellness visit, subsequent      Health Maintenance Due   Topic Date Due    COLONOSCOPY  02/19/1973    Shingrix Vaccine Age 50> (1 of 2) 02/19/2005    Pneumococcal 0-64 years (1 of 1 - PPSV23) 10/03/2016    FOOT EXAM Q1  08/02/2018    MEDICARE YEARLY EXAM  08/16/2018

## 2019-04-19 ENCOUNTER — PATIENT OUTREACH (OUTPATIENT)
Dept: FAMILY MEDICINE CLINIC | Age: 64
End: 2019-04-19

## 2019-04-19 NOTE — PROGRESS NOTES
Patient has graduated from the Transitions of Care Coordination  program on 4/19/19. Patient's symptoms are stable at this time. Patient/family has the ability to self-manage. Care management goals have been completed at this time. No further nurse navigator follow up scheduled. Goals Addressed                 This Visit's Progress     COMPLETED: Attends follow-up appointments as directed. Target Date:     3/12/2019   Patient attended PCP follow up appointment with Ms. Aakash on 2-28-19   Patient atteneded meeting with DERIK Hubbard with [facility] on 2- 28-19.     3/12/2019   Next follow up appointments are scheduled for:   - 3/28/19 with Ms. Evelyn Lopez NP   - 4/9/19 with Dr. Joshua Lora, Urology    4/3/2019   Follow up appointment with Ms. Evelyn Lopez NP has been rescheduled to 4/11/19. Urology follow up appointment rescheduled by office staff. Letter mailed to patient. 4/19/2019   Patient attended follow up appointment with Ms. Emil Dobbs, PCP on 4-11-19   Patient attended follow up appointment with urology on 4-9-11.  COMPLETED: Prevent complications post hospitalization. Target Date:  3/25/19    3/12/2019   2/27/19 - Dr. Nelly Trimble staff member called patient and spoke with him for follow up, reviewed signs and symptoms of what to expect and when to call the office. Staff member reviewed follow up appointment scheduled for April. 3/12/2019  Patient and/or family members were alerted to the availability of physician or other advanced practioners after hours, weekends, and holidays. Please call the PCP office phone number and speak with the answering service for assistance. 3/12/19   Patient attended follow up consultation with DERIK Hubbard With [address]. 4/19/2019   Patient has not been admitted to the acute care hospital setting within 30 days of hospital discharge.             Staff message sent to Ms. Chris Chapa NP. Nurse Navigator advised that Transition of care Episode related to admission for Kidney Loletta Felty is being closed out. Please contact nurse navigator for any further assistance.      Patients upcoming visits:    Future Appointments   Date Time Provider Blair Vergara   5/1/2019 11:45 AM MD Rosy Gonsales C[facility]   5/10/2019  7:20 AM Tg Barron NP [address] [place] [address]

## 2019-04-25 ENCOUNTER — HOSPITAL ENCOUNTER (OUTPATIENT)
Dept: GENERAL RADIOLOGY | Age: 64
Discharge: HOME | End: 2019-04-25
Payer: MEDICARE

## 2019-04-25 DIAGNOSIS — M25.561 CHRONIC PAIN OF RIGHT KNEE: ICD-10-CM

## 2019-04-25 DIAGNOSIS — G89.29 CHRONIC PAIN OF RIGHT KNEE: ICD-10-CM

## 2019-04-25 PROCEDURE — 73564 X-RAY EXAM KNEE 4 OR MORE: CPT

## 2019-04-26 ENCOUNTER — OFFICE VISIT (OUTPATIENT)
Dept: ORTHOPEDIC SURGERY | Age: 64
End: 2019-04-26

## 2019-04-26 VITALS
RESPIRATION RATE: 22 BRPM | SYSTOLIC BLOOD PRESSURE: 135 MMHG | BODY MASS INDEX: 39.14 KG/M2 | DIASTOLIC BLOOD PRESSURE: 79 MMHG | HEIGHT: 74 IN | OXYGEN SATURATION: 95 % | HEART RATE: 88 BPM | WEIGHT: 305 LBS | TEMPERATURE: 97.9 F

## 2019-04-26 DIAGNOSIS — M76.51 PATELLAR TENDINITIS OF RIGHT KNEE: Primary | ICD-10-CM

## 2019-04-26 PROBLEM — E66.01 SEVERE OBESITY (HCC): Status: ACTIVE | Noted: 2019-04-26

## 2019-04-26 RX ORDER — DICLOFENAC SODIUM 50 MG/1
50 TABLET, DELAYED RELEASE ORAL 2 TIMES DAILY WITH MEALS
Qty: 120 TAB | Refills: 2 | Status: SHIPPED | OUTPATIENT
Start: 2019-04-26 | End: 2019-10-21 | Stop reason: SDUPTHER

## 2019-04-26 NOTE — PROGRESS NOTES
Ping Sotelo is a 59 y.o. male (: 1955) presenting to address:    No chief complaint on file. Medication list and allergies have been reviewed with Erika William and updated as of today's date. I have gone over all Medical, Surgical and Social History with Erika William and updated/added the information accordingly. 1. Have you been to the ER, Urgent Care or Hospitalized since your last visit? NO      2. Have you followed up with your PCP or any other Physicians since your procedure/ last office visit?    NO    VORB: No orders of the defined types were placed in this encounter.  Aftab Birch MD/Perry Mckeon

## 2019-04-26 NOTE — PROGRESS NOTES
Patient: Leonel Vides                MRN: [id number]       SSN: [id number]  YOB: 1955        AGE: 59 y.o. SEX: male  Body mass index is 39.16 kg/m². PCP: Catherine Smalls NP  04/26/19    Chief Complaint: Right knee pain, ankle weakness    HISTORY OF PRESENT ILLNESS:  Leta Kirk is a 59year-old male who comes in the office today with three to four months of right knee pain. He also reports that his right ankle has been inverting because of the knee pain. He has issues walking. He says he has pressure in the knee when he walks. He was seen yesterday and sent for x-rays. He is not taking any prescriptions. He has a history of multiple medical problems including diabetes with diabetic neuropathy. He also has some issues with falling. He is not in any physical therapy. He has not had any injections.               Past Medical History:   Diagnosis Date    Abdominal adhesions 7/30/2014    Back muscle spasm 7/30/2014    Back pain, lumbosacral     Cellulitis     Constipation, chronic 7/30/2014    DDD (degenerative disc disease), lumbar 7/30/2014    DDD (degenerative disc disease), thoracic 7/30/2014    Depression     Diabetes ([address]     DM (diabetes mellitus) ([address] 7/30/2014    Frequent falls 7/30/2014    TRUE (generalized anxiety disorder) 7/30/2014    High cholesterol     HTN (hypertension) 7/30/2014    Hyperlipidemia 7/30/2014    Hypertension     Insomnia secondary to chronic pain 7/30/2014    Kidney stones     LBP radiating to both legs 7/30/2014    Lumbar facet arthropathy 7/30/2014    Lumbar nerve root impingement 7/30/2014    Lumbosacral radiculopathy at S1 7/30/2014    Major depression 7/30/2014    Nausea & vomiting     Neurological disorder     sciatica    MANISH (obstructive sleep apnea) 7/30/2014    S/P lumbar laminectomy 7/30/2014    S/P lumbar spinal fusion 7/30/2014    Small vessel disease ([address]     Thoracic compression fracture ([address] 07/30/2014    Unspecified sleep apnea     USES CPAP EVERY NIGHT       Family History   Problem Relation Age of Onset    Diabetes Mother     Heart Failure Mother     Heart Attack Father     Diabetes Sister     Stroke Brother        Current Outpatient Medications   Medication Sig Dispense Refill    diclofenac EC (VOLTAREN) 50 mg EC tablet Take 1 Tab by mouth two (2) times daily (with meals). 120 Tab 2    pramipexole (MIRAPEX) 0.75 mg tablet TAKE 1 TABLET at night 90 Tab 1    amitriptyline (ELAVIL) 25 mg tablet Take 1 Tab by mouth nightly for 90 days. 90 Tab 0    hydrOXYzine HCl (ATARAX) 25 mg tablet Take 1 Tab by mouth two (2) times daily as needed for Anxiety for up to 90 days. 180 Tab 0    simvastatin (ZOCOR) 10 mg tablet TAKE 1 TABLET EVERY NIGHT 90 Tab 1    OMEPRAZOLE PO Take  by mouth daily as needed.  polyethylene glycol (MIRALAX) 17 gram packet Take 1 Packet by mouth daily. Hold for loose stools 10 Each 0    dulaglutide (TRULICITY) 1.5 IM/2.6 mL sub-q pen 0.5 mL by SubCUTAneous route every seven (7) days. 12 Pen 3    metFORMIN ER (GLUCOPHAGE XR) 500 mg tablet Take 2 Tabs by mouth two (2) times daily (with meals). 360 Tab 3    isosorbide mononitrate ER (IMDUR) 60 mg CR tablet TAKE 1 TABLET EVERY MORNING (Patient taking differently: nightly. TAKE 1 TABLET EVERY MORNING) 90 Tab 2    metoprolol succinate (TOPROL-XL) 50 mg XL tablet Take 1 Tab by mouth daily. (Patient taking differently: Take 50 mg by mouth nightly.) 90 Tab 1    zolpidem (AMBIEN) 10 mg tablet Take 1 Tab by mouth nightly. Max Daily Amount: 10 mg. 90 Tab 0    ACCU-CHEK FASTCLIX LANCET DRUM mis CHECK BLOOD SUGAR EVERY  Each 11    gabapentin (NEURONTIN) 800 mg tablet Take  by mouth three (3) times daily as needed.       ACCU-CHEK SMARTVIEW TEST STRIP strip CHECK BLOOD SUGAR EVERY  Strip 3    Blood-Glucose Meter (ACCU-CHEK GABRIELLE) misc Check blood sugar daily 1 Each 0    DULoxetine (CYMBALTA) 60 mg capsule Take 1 Cap by mouth daily. 90 Cap 2    Back Brace misc 1 Device by Does Not Apply route daily as needed for Pain. One, lumbosacral orthosis/back brace with metal struts      Medically necessary 1 Device 0    nitroglycerin (NITROSTAT) 0.4 mg SL tablet 1 Tab by SubLINGual route every five (5) minutes as needed for Chest Pain. Use for Chest Pain ; Call MD if > 3 tablets required 1 Bottle 0    naloxone 4 mg/actuation spry 4 mg by Nasal route as needed.  1 Box 0       Allergies   Allergen Reactions    Bactrim [Sulfamethoprim] Rash    Opana [Oxymorphone] Other (comments)     Feels like bug crawling under skin and drowziness       Past Surgical History:   Procedure Laterality Date    HX APPENDECTOMY      HX BACK SURGERY      Lumbar fusion    HX HERNIA REPAIR  1992    HX OTHER SURGICAL      EMG - S1 disorder    HX OTHER SURGICAL  10/2017    spinal cord stimulator    HX VASECTOMY  1985       Social History     Socioeconomic History    Marital status:      Spouse name: Not on file    Number of children: Not on file    Years of education: Not on file    Highest education level: Not on file   Occupational History    Not on file   Social Needs    Financial resource strain: Not on file    Food insecurity:     Worry: Not on file     Inability: Not on file    Transportation needs:     Medical: Not on file     Non-medical: Not on file   Tobacco Use    Smoking status: Never Smoker    Smokeless tobacco: Never Used   Substance and Sexual Activity    Alcohol use: No    Drug use: No    Sexual activity: Yes     Partners: Female     Birth control/protection: Surgical   Lifestyle    Physical activity:     Days per week: Not on file     Minutes per session: Not on file    Stress: Not on file   Relationships    Social connections:     Talks on phone: Not on file     Gets together: Not on file     Attends Christianity service: Not on file     Active member of club or organization: Not on file     Attends meetings of clubs or organizations: Not on file     Relationship status: Not on file    Intimate partner violence:     Fear of current or ex partner: Not on file     Emotionally abused: Not on file     Physically abused: Not on file     Forced sexual activity: Not on file   Other Topics Concern    Not on file   Social History Narrative    Not on file       REVIEW OF SYSTEMS:      CON: negative for recent weight loss/gain, fever, or chills  EYE: negative for double or blurry vision  ENT: negative for hoarseness  RS:   negative for cough, URI, SOB  CV:  negative for chest pain, palpitations  GI:    negative for blood in stool, nausea/vomiting  :  negative for blood in urine  MS: As per HPI  Other systems reviewed and noted below. PHYSICAL EXAMINATION:  Visit Vitals  /79   Pulse 88   Temp 97.9 °F (36.6 °C) (Oral)   Resp 22   Ht 6' 2\" (1.88 m)   Wt 305 lb (138.3 kg)   SpO2 95%   BMI 39.16 kg/m²     Body mass index is 39.16 kg/m². GENERAL: Alert and oriented x3, in no acute distress, well-developed, well-nourished. HEENT: Normocephalic, atraumatic. RESP: Non labored breathing with equal chest rise on inspiration. CV: Well perfused extremities. No cyanosis or clubbing noted. ABDOMEN: Soft, non-tender, non-distended. PHYSICAL EXAM:  Examination of the right knee with full knee range of motion. He has swelling over the patellar tendon. He is tender to palpation over the patellar tendon. He has pain with deep knee flexion over the patellar tendon. Nontender over the medial and lateral joint line. No pain with Gogo's. Examination of the right foot has obligate inversion. He has some weakness with resisted eversion and eversion strength testing. Full ankle range of motion otherwise. IMAGING:  X-rays of the right knee were reviewed. These are nonweightbearing x-rays taken yesterday. They do not show any significant arthrosis or fractures.       ASSESSMENT AND PLAN:   Ruth Dugan is a 59year-old male with right knee patellar tendonitis, as well as obligate inversion of his ankle. I would like for him to work on physical therapy to work on gait training and strengthening of his knee, as well as his ankle. I also prescribed an antiinflammatory. I will see him back in six weeks.              Electronically signed by: Tasha Duque MD

## 2019-04-28 NOTE — PROGRESS NOTES
Please advise patient that imaging right knee results were negative for fracture and dislocation.   X-ray does indicate arthritic changes

## 2019-05-02 ENCOUNTER — TELEPHONE (OUTPATIENT)
Dept: FAMILY MEDICINE CLINIC | Age: 64
End: 2019-05-02

## 2019-05-02 NOTE — TELEPHONE ENCOUNTER
Left name and call back number      ----- Message from Karina Fallon NP sent at 4/28/2019 10:43 AM EDT -----  Please advise patient that imaging right knee results were negative for fracture and dislocation.   X-ray does indicate arthritic changes

## 2019-05-08 ENCOUNTER — TELEPHONE (OUTPATIENT)
Dept: FAMILY MEDICINE CLINIC | Age: 64
End: 2019-05-08

## 2019-05-08 NOTE — PROGRESS NOTES
Patient is aware  imaging right knee results were negative for fracture and dislocation. X-ray does indicate arthritic changes.

## 2019-05-10 ENCOUNTER — OFFICE VISIT (OUTPATIENT)
Dept: FAMILY MEDICINE CLINIC | Age: 64
End: 2019-05-10

## 2019-05-10 VITALS
DIASTOLIC BLOOD PRESSURE: 82 MMHG | SYSTOLIC BLOOD PRESSURE: 142 MMHG | HEART RATE: 88 BPM | BODY MASS INDEX: 39.14 KG/M2 | RESPIRATION RATE: 18 BRPM | OXYGEN SATURATION: 94 % | HEIGHT: 74 IN | TEMPERATURE: 98.1 F | WEIGHT: 305 LBS

## 2019-05-10 DIAGNOSIS — F51.01 PRIMARY INSOMNIA: ICD-10-CM

## 2019-05-10 DIAGNOSIS — E11.8 TYPE 2 DIABETES MELLITUS WITH COMPLICATION, WITHOUT LONG-TERM CURRENT USE OF INSULIN (HCC): Primary | ICD-10-CM

## 2019-05-10 DIAGNOSIS — E66.01 SEVERE OBESITY (HCC): ICD-10-CM

## 2019-05-10 PROBLEM — E11.40 TYPE 2 DIABETES MELLITUS WITH DIABETIC NEUROPATHY (HCC): Status: ACTIVE | Noted: 2019-05-10

## 2019-05-10 RX ORDER — PHENTERMINE HYDROCHLORIDE 37.5 MG/1
37.5 TABLET ORAL
Qty: 30 TAB | Refills: 0 | Status: SHIPPED | OUTPATIENT
Start: 2019-05-10 | End: 2019-06-07 | Stop reason: SDUPTHER

## 2019-05-10 RX ORDER — ZOLPIDEM TARTRATE 10 MG/1
10 TABLET ORAL
Qty: 30 TAB | Refills: 0 | Status: SHIPPED | OUTPATIENT
Start: 2019-05-10 | End: 2019-09-13 | Stop reason: SDUPTHER

## 2019-05-10 RX ORDER — PRAMIPEXOLE DIHYDROCHLORIDE 0.75 MG/1
TABLET ORAL
Qty: 270 TAB | Refills: 1 | Status: SHIPPED | OUTPATIENT
Start: 2019-05-10 | End: 2019-12-06 | Stop reason: SDUPTHER

## 2019-05-10 NOTE — PROGRESS NOTES
Zonia Fan is a  59 y.o. male presents today for office visit for routine follow up. 1. Have you been to the ER, urgent care clinic or hospitalized since your last visit? NO     2. Have you seen or consulted any other health care providers outside of the [address] [place] [address] since your last visit (Include any pap smears or colon screening)? NO

## 2019-05-10 NOTE — PATIENT INSTRUCTIONS
Type 2 Diabetes: Care Instructions  Your Care Instructions    Type 2 diabetes is a disease that develops when the body's tissues cannot use insulin properly. Over time, the pancreas cannot make enough insulin. Insulin is a hormone that helps the body's cells use sugar (glucose) for energy. It also helps the body store extra sugar in muscle, fat, and liver cells. Without insulin, the sugar cannot get into the cells to do its work. It stays in the blood instead. This can cause high blood sugar levels. A person has diabetes when the blood sugar stays too high too much of the time. Over time, diabetes can lead to diseases of the heart, blood vessels, nerves, kidneys, and eyes. You may be able to control your blood sugar by losing weight, eating a healthy diet, and getting daily exercise. You may also have to take insulin or other diabetes medicine. Follow-up care is a key part of your treatment and safety. Be sure to make and go to all appointments. Call your doctor if you are having problems. It's also a good idea to know your test results and keep a list of the medicines you take. How can you care for yourself at home? · Keep your blood sugar at a target level (which you set with your doctor). ? Eat a good diet that spreads carbohydrate throughout the day. Carbohydrate--the body's main source of fuel--affects blood sugar more than any other nutrient. Carbohydrate is in fruits, vegetables, milk, and yogurt. It also is in breads, cereals, vegetables such as potatoes and corn, and sugary foods such as candy and cakes. ? Aim for 30 minutes of exercise on most, preferably all, days of the week. Walking is a good choice. You also may want to do other activities, such as running, swimming, cycling, or playing tennis or team sports. If your doctor says it's okay, do muscle-strengthening exercises at least 2 times a week. ? Take your medicines exactly as prescribed.  Call your doctor if you think you are having a problem with your medicine. You will get more details on the specific medicines your doctor prescribes. · Check your blood sugar as often as your doctor recommends. It is important to keep track of any symptoms you have, such as low blood sugar. Also tell your doctor if you have any changes in your activities, diet, or insulin use. · Talk to your doctor before you start taking aspirin every day. Aspirin can help certain people lower their risk of a heart attack or stroke. But taking aspirin isn't right for everyone, because it can cause serious bleeding. · Do not smoke. If you need help quitting, talk to your doctor about stop-smoking programs and medicines. These can increase your chances of quitting for good. · Keep your cholesterol and blood pressure at normal levels. You may need to take one or more medicines to reach your goals. Take them exactly as directed. Do not stop or change a medicine without talking to your doctor first.  When should you call for help? Call 911 anytime you think you may need emergency care. For example, call if:    · You passed out (lost consciousness), or you suddenly become very sleepy or confused. (You may have very low blood sugar.)    Call your doctor now or seek immediate medical care if:    · Your blood sugar is 300 mg/dL or is higher than the level your doctor has set for you.     · You have symptoms of low blood sugar, such as:  ? Sweating. ? Feeling nervous, shaky, and weak. ? Extreme hunger and slight nausea. ? Dizziness and headache.  ? Blurred vision. ? Confusion.    Watch closely for changes in your health, and be sure to contact your doctor if:    · You often have problems controlling your blood sugar.     · You have symptoms of long-term diabetes problems, such as:  ? New vision changes. ? New pain, numbness, or tingling in your hands or feet. ? Skin problems. Where can you learn more? Go to http://iza-poornima.info/.   Enter C553 in the search box to learn more about \"Type 2 Diabetes: Care Instructions. \"  Current as of: July 25, 2018  Content Version: 11.9  © 7799-4289 [employer]. Care instructions adapted under license by [employer] (which disclaims liability or warranty for this information). If you have questions about a medical condition or this instruction, always ask your healthcare professional. [address] any warranty or liability for your use of this information. Starting a Weight Loss Plan: Care Instructions  Your Care Instructions    If you are thinking about losing weight, it can be hard to know where to start. Your doctor can help you set up a weight loss plan that best meets your needs. You may want to take a class on nutrition or exercise, or join a weight loss support group. If you have questions about how to make changes to your eating or exercise habits, ask your doctor about seeing a registered dietitian or an exercise specialist.  It can be a big challenge to lose weight. But you do not have to make huge changes at once. Make small changes, and stick with them. When those changes become habit, add a few more changes. If you do not think you are ready to make changes right now, try to pick a date in the future. Make an appointment to see your doctor to discuss whether the time is right for you to start a plan. Follow-up care is a key part of your treatment and safety. Be sure to make and go to all appointments, and call your doctor if you are having problems. It's also a good idea to know your test results and keep a list of the medicines you take. How can you care for yourself at home? · Set realistic goals. Many people expect to lose much more weight than is likely. A weight loss of 5% to 10% of your body weight may be enough to improve your health. · Get family and friends involved to provide support.  Talk to them about why you are trying to lose weight, and ask them to help. They can help by participating in exercise and having meals with you, even if they may be eating something different. · Find what works best for you. If you do not have time or do not like to cook, a program that offers meal replacement bars or shakes may be better for you. Or if you like to prepare meals, finding a plan that includes daily menus and recipes may be best.  · Ask your doctor about other health professionals who can help you achieve your weight loss goals. ? A dietitian can help you make healthy changes in your diet. ? An exercise specialist or  can help you develop a safe and effective exercise program.  ? A counselor or psychiatrist can help you cope with issues such as depression, anxiety, or family problems that can make it hard to focus on weight loss. · Consider joining a support group for people who are trying to lose weight. Your doctor can suggest groups in your area. Where can you learn more? Go to http://iza-poornima.info/. Enter S096 in the search box to learn more about \"Starting a Weight Loss Plan: Care Instructions. \"  Current as of: June 25, 2018  Content Version: 11.9  © 1018-1066 [employer]. Care instructions adapted under license by [employer] (which disclaims liability or warranty for this information). If you have questions about a medical condition or this instruction, always ask your healthcare professional. [address] any warranty or liability for your use of this information.

## 2019-05-10 NOTE — PROGRESS NOTES
TIMOTEO Jc is a 59 y.o. male who presents today for DM follow up. Diabetic Review of Systems - medication compliance: compliant most of the time, diabetic diet compliance: noncompliant some of the time, home glucose monitoring: is performed regularly. BGL range from  and average 130's. Pt was seen by the ortho specialist and was told he has patellar tendinitis and was encouraged to complete physical therapy three times a week for 6 weeks. Pt also was encouraged by his podiatrist to have PCP/MD on care team submit authorization, prescription for diabetic shoes. Pt would like to start a medication that can help him lose weight he has been on Wellbutrin in the past and that was not effective for weight loss. Pt is requesting a refill on Mirapex and increase in frequency due to increasing RLS. Pt is also requesting a refill on Ambien for insomnia. Current Outpatient Medications   Medication Sig Dispense Refill    zolpidem (AMBIEN) 10 mg tablet Take 1 Tab by mouth nightly. Max Daily Amount: 10 mg. 30 Tab 0    diclofenac EC (VOLTAREN) 50 mg EC tablet Take 1 Tab by mouth two (2) times daily (with meals). 120 Tab 2    nitroglycerin (NITROSTAT) 0.4 mg SL tablet 1 Tab by SubLINGual route every five (5) minutes as needed for Chest Pain. Use for Chest Pain ; Call MD if > 3 tablets required 1 Bottle 0    pramipexole (MIRAPEX) 0.75 mg tablet TAKE 1 TABLET at night 90 Tab 1    amitriptyline (ELAVIL) 25 mg tablet Take 1 Tab by mouth nightly for 90 days. 90 Tab 0    hydrOXYzine HCl (ATARAX) 25 mg tablet Take 1 Tab by mouth two (2) times daily as needed for Anxiety for up to 90 days. 180 Tab 0    simvastatin (ZOCOR) 10 mg tablet TAKE 1 TABLET EVERY NIGHT 90 Tab 1    OMEPRAZOLE PO Take  by mouth daily as needed.  polyethylene glycol (MIRALAX) 17 gram packet Take 1 Packet by mouth daily.  Hold for loose stools 10 Each 0    dulaglutide (TRULICITY) 1.5 RJ/0.8 mL sub-q pen 0.5 mL by SubCUTAneous route every seven (7) days. 12 Pen 3    metFORMIN ER (GLUCOPHAGE XR) 500 mg tablet Take 2 Tabs by mouth two (2) times daily (with meals). 360 Tab 3    isosorbide mononitrate ER (IMDUR) 60 mg CR tablet TAKE 1 TABLET EVERY MORNING (Patient taking differently: nightly. TAKE 1 TABLET EVERY MORNING) 90 Tab 2    metoprolol succinate (TOPROL-XL) 50 mg XL tablet Take 1 Tab by mouth daily. (Patient taking differently: Take 50 mg by mouth nightly.) 90 Tab 1    ACCU-CHEK FASTCLIX LANCET DRUM [facility] CHECK BLOOD SUGAR EVERY  Each 11    gabapentin (NEURONTIN) 800 mg tablet Take  by mouth three (3) times daily as needed.  ACCU-CHEK SMARTVIEW TEST STRIP strip CHECK BLOOD SUGAR EVERY  Strip 3    Blood-Glucose Meter (ACCU-CHEK GABRILELE) misc Check blood sugar daily 1 Each 0    DULoxetine (CYMBALTA) 60 mg capsule Take 1 Cap by mouth daily. 90 Cap 2    naloxone 4 mg/actuation spry 4 mg by Nasal route as needed. 1 Box 0    Back Brace [facility] 1 Device by Does Not Apply route daily as needed for Pain. One, lumbosacral orthosis/back brace with metal struts      Medically necessary 1 Device 0      Allergies   Allergen Reactions    Bactrim [Sulfamethoprim] Rash    Opana [Oxymorphone] Other (comments)     Feels like bug crawling under skin and drowziness       SUBJECTIVE:  Review of Systems   Constitutional: Negative for chills, fever and malaise/fatigue. Eyes: Negative for blurred vision. Respiratory: Negative for shortness of breath. Cardiovascular: Negative for chest pain, palpitations and leg swelling. Gastrointestinal: Negative for abdominal pain, diarrhea, nausea and vomiting. Genitourinary: Negative for dysuria, frequency and urgency. Musculoskeletal: Positive for joint pain (knee pain). Negative for myalgias. Neurological: Positive for tingling and sensory change. Negative for dizziness and headaches. Endo/Heme/Allergies: Negative for polydipsia. Psychiatric/Behavioral: The patient has insomnia. OBJECTIVE:  Visit Vitals  /82   Pulse 88   Temp 98.1 °F (36.7 °C) (Oral)   Resp 18   Ht 6' 2\" (1.88 m)   Wt 305 lb (138.3 kg)   SpO2 94%   BMI 39.16 kg/m²      I have reviewed/discussed the above normal BMI with the patient. I have recommended the following interventions: dietary management education, guidance, and counseling, encourage exercise and monitor weight . Physical Exam   Constitutional: He is oriented to person, place, and time and well-developed, well-nourished, and in no distress. HENT:   Head: Normocephalic. Eyes: Pupils are equal, round, and reactive to light. Conjunctivae and EOM are normal.   Neck: Normal range of motion. Neck supple. No thyromegaly present. Cardiovascular: Normal rate, regular rhythm and normal heart sounds. Pulmonary/Chest: Effort normal and breath sounds normal. He has no wheezes. He has no rales. He exhibits no tenderness. Musculoskeletal: He exhibits no edema. Neurological: He is alert and oriented to person, place, and time. Gait normal.   Skin: Skin is warm and dry. No erythema. No pallor. Psychiatric: Mood and affect normal.   Nursing note and vitals reviewed. ASSESSMENT:  Diagnoses and all orders for this visit:    1. Type 2 diabetes mellitus with complication, without long-term current use of insulin ([address]    2. Primary insomnia  -     zolpidem (AMBIEN) 10 mg tablet; Take 1 Tab by mouth nightly. Max Daily Amount: 10 mg.    3. Severe obesity (HCC)  -     phentermine (ADIPEX-P) 37.5 mg tablet; Take 1 Tab by mouth every morning. Max Daily Amount: 37.5 mg.     Other orders  -     pramipexole (MIRAPEX) 0.75 mg tablet; TAKE 1 TABLET Three times a day    PLAN:  Medication refilled today  EKG reviewed, start Phentermine for weight 3 month trial  All medications, side effects and compliance discussed carefully   Foot care discussed and Podiatry visits discussed   Glycohemoglobin and other lab monitoring discussed    I have discussed the diagnosis with the patient and the intended plan as seen in the above orders. The patient has received an after-visit summary and questions were answered concerning future plans. I have discussed medication side effects and warnings with the patient as well. Patient will call for further questions. Follow-up and Dispositions    · Return in about 1 month (around 6/7/2019) for weight, DM follow up in 3 months.        Maciel Bueno, YUSEF

## 2019-05-16 ENCOUNTER — TELEPHONE (OUTPATIENT)
Dept: FAMILY MEDICINE CLINIC | Age: 64
End: 2019-05-16

## 2019-05-16 NOTE — TELEPHONE ENCOUNTER
Spoke with Ms. Annie Mcduffie at [facility] to nquire if patient needs to return to the office to be seen by a medical provider instead of a nurse practitioner due to diabetic shoes will not be covered. Informed that podiatry office, [address], stated that the patient needs to return for a repeat visit with a medical doctor, due to a nurse practitioner notes are unacceptable even with a medical provider signing the notes as well because the shoes will not be covered unless the patient is seen by a medical doctor. Ms. Annie Mcduffie verbalized understanding, stated this information is not true according to Medicare guidelines, and that a nurse practitioner's notes are acceptable.

## 2019-05-16 NOTE — TELEPHONE ENCOUNTER
Spoke with Phylicia Hernandez at [place] [address] [facility] that stated patient will need to return to the office for another diabetic visit with medical doctor due to the diabetic shoes will not be covered by insurance if office notes are from the nurse practitioner with co-signature from medical doctor. Inquired if this was policy of this facility due to this request has not been made before to my knowledge. Phylicia Hernandez stated that insurance will not pay. Will contact insurance company. Phylicia Hernandez verbalized understanding.

## 2019-05-16 NOTE — TELEPHONE ENCOUNTER
Spoke with Jesenia Fontaine at [address], to inform Yobany Hoskins that according to insurance company there is no discrepancy for office notes to come from nurse practitioner with co-signature from medical doctor. Informed that office notes have been faxed over with Dr. Stacy Bell.

## 2019-05-16 NOTE — TELEPHONE ENCOUNTER
[facility] with [facility] at [facility] to inquire if patient needs to return to the office to be seen by a medical provider instead of a nurse practitioner due to diabetic shoes will not be covered. Informed that podiatry office, [address], stated that the patient needs to return for a repeat visit with a medical doctor, due to a nurse practitioner notes are unacceptable even with a medical provider signing the notes as well because the shoes will not be covered unless the patient is seen by a medical doctor. Bruce Mcgee verbalized understanding, stated this information is not true, and that a nurse practitioner's notes are acceptable.

## 2019-05-31 ENCOUNTER — TELEPHONE (OUTPATIENT)
Dept: ORTHOPEDIC SURGERY | Age: 64
End: 2019-05-31

## 2019-05-31 NOTE — TELEPHONE ENCOUNTER
Patient is requesting the order the physical therapy to be sent to [facility] there phone number is [phone] to fax.   Please call today so the therapy can be schedule  Please call patient back at [phone]

## 2019-06-07 ENCOUNTER — OFFICE VISIT (OUTPATIENT)
Dept: FAMILY MEDICINE CLINIC | Age: 64
End: 2019-06-07

## 2019-06-07 VITALS
OXYGEN SATURATION: 94 % | DIASTOLIC BLOOD PRESSURE: 80 MMHG | HEIGHT: 74 IN | BODY MASS INDEX: 38.12 KG/M2 | RESPIRATION RATE: 18 BRPM | TEMPERATURE: 98 F | SYSTOLIC BLOOD PRESSURE: 135 MMHG | WEIGHT: 297 LBS | HEART RATE: 73 BPM

## 2019-06-07 DIAGNOSIS — E11.8 TYPE 2 DIABETES MELLITUS WITH COMPLICATION, WITHOUT LONG-TERM CURRENT USE OF INSULIN (HCC): Primary | ICD-10-CM

## 2019-06-07 DIAGNOSIS — E66.01 SEVERE OBESITY (HCC): ICD-10-CM

## 2019-06-07 RX ORDER — PHENTERMINE HYDROCHLORIDE 37.5 MG/1
37.5 TABLET ORAL
Qty: 30 TAB | Refills: 0 | Status: SHIPPED | OUTPATIENT
Start: 2019-06-07 | End: 2019-07-12 | Stop reason: SDUPTHER

## 2019-06-07 NOTE — PROGRESS NOTES
Chief Complaint   Patient presents with    Weight Management     1. Have you been to the ER, urgent care clinic since your last visit? Hospitalized since your last visit? No    2. Have you seen or consulted any other health care providers outside of the [address] [place] [address] since your last visit? Include any pap smears or colon screening.  No

## 2019-06-07 NOTE — PROGRESS NOTES
HPI  Conner Guan is a 59 y.o. male who presents today for weight management and DM follow up. Pt has been taking Phentermine daily for weight and has noticed a decreased appetite, increase in energy and weight loss. Pt denies chest pain, SOB, heighten anxiety, issues sleeping and constipation. Diabetic Review of Systems - medication compliance: compliant all of the time, diabetic diet compliance: noncompliant some of the time, home glucose monitoring: is performed regularly. BGL ranges from 's since last visit. Current Outpatient Medications   Medication Sig Dispense Refill    phentermine (ADIPEX-P) 37.5 mg tablet Take 1 Tab by mouth every morning. Max Daily Amount: 37.5 mg. 30 Tab 0    zolpidem (AMBIEN) 10 mg tablet Take 1 Tab by mouth nightly. Max Daily Amount: 10 mg. 30 Tab 0    pramipexole (MIRAPEX) 0.75 mg tablet TAKE 1 TABLET Three times a day 270 Tab 1    diclofenac EC (VOLTAREN) 50 mg EC tablet Take 1 Tab by mouth two (2) times daily (with meals). 120 Tab 2    nitroglycerin (NITROSTAT) 0.4 mg SL tablet 1 Tab by SubLINGual route every five (5) minutes as needed for Chest Pain. Use for Chest Pain ; Call MD if > 3 tablets required 1 Bottle 0    hydrOXYzine HCl (ATARAX) 25 mg tablet Take 1 Tab by mouth two (2) times daily as needed for Anxiety for up to 90 days. 180 Tab 0    simvastatin (ZOCOR) 10 mg tablet TAKE 1 TABLET EVERY NIGHT 90 Tab 1    OMEPRAZOLE PO Take  by mouth daily as needed.  polyethylene glycol (MIRALAX) 17 gram packet Take 1 Packet by mouth daily. Hold for loose stools 10 Each 0    dulaglutide (TRULICITY) 1.5 XM/8.2 mL sub-q pen 0.5 mL by SubCUTAneous route every seven (7) days. 12 Pen 3    metFORMIN ER (GLUCOPHAGE XR) 500 mg tablet Take 2 Tabs by mouth two (2) times daily (with meals). 360 Tab 3    isosorbide mononitrate ER (IMDUR) 60 mg CR tablet TAKE 1 TABLET EVERY MORNING (Patient taking differently: nightly.  TAKE 1 TABLET EVERY MORNING) 90 Tab 2    metoprolol succinate (TOPROL-XL) 50 mg XL tablet Take 1 Tab by mouth daily. (Patient taking differently: Take 50 mg by mouth nightly.) 90 Tab 1    ACCU-CHEK FASTCLIX LANCET DRUM [facility] CHECK BLOOD SUGAR EVERY  Each 11    gabapentin (NEURONTIN) 800 mg tablet Take  by mouth three (3) times daily as needed.  ACCU-CHEK SMARTVIEW TEST STRIP strip CHECK BLOOD SUGAR EVERY  Strip 3    Blood-Glucose Meter (ACCU-CHEK GABRIELLE) misc Check blood sugar daily 1 Each 0    DULoxetine (CYMBALTA) 60 mg capsule Take 1 Cap by mouth daily. 90 Cap 2    naloxone 4 mg/actuation spry 4 mg by Nasal route as needed. 1 Box 0    Back Brace misc 1 Device by Does Not Apply route daily as needed for Pain. One, lumbosacral orthosis/back brace with metal struts      Medically necessary 1 Device 0      Allergies   Allergen Reactions    Bactrim [Sulfamethoprim] Rash    Opana [Oxymorphone] Other (comments)     Feels like bug crawling under skin and drowziness       SUBJECTIVE:  Review of Systems   Constitutional: Positive for weight loss. Negative for chills, fever and malaise/fatigue. Eyes: Negative for blurred vision. Respiratory: Negative for shortness of breath. Cardiovascular: Negative for chest pain, palpitations and leg swelling. Gastrointestinal: Negative for abdominal pain, diarrhea, nausea and vomiting. Genitourinary: Negative for dysuria, frequency, hematuria and urgency. Musculoskeletal: Negative for falls and myalgias. Neurological: Negative for dizziness, sensory change and headaches. Endo/Heme/Allergies: Negative for polydipsia. Psychiatric/Behavioral: The patient is not nervous/anxious and does not have insomnia.          OBJECTIVE:  Visit Vitals  /80 (BP 1 Location: Left arm, BP Patient Position: Sitting)   Pulse 73   Temp 98 °F (36.7 °C) (Oral)   Resp 18   Ht 6' 2\" (1.88 m)   Wt 297 lb (134.7 kg)   SpO2 94%   BMI 38.13 kg/m²      I have reviewed/discussed the above normal BMI with the patient. I have recommended the following interventions: dietary management education, guidance, and counseling, encourage exercise and monitor weight . Physical Exam   Constitutional: He is oriented to person, place, and time and well-developed, well-nourished, and in no distress. HENT:   Head: Normocephalic. Eyes: Pupils are equal, round, and reactive to light. Conjunctivae and EOM are normal.   Neck: Normal range of motion. Neck supple. Cardiovascular: Normal rate, regular rhythm and normal heart sounds. Pulmonary/Chest: Effort normal and breath sounds normal. He has no wheezes. He has no rales. He exhibits no tenderness. Musculoskeletal: He exhibits no edema. Neurological: He is alert and oriented to person, place, and time. Gait normal.   Skin: Skin is warm and dry. No erythema. Psychiatric: Mood and affect normal.   Nursing note and vitals reviewed. ASSESSMENT:  Diagnoses and all orders for this visit:    1. Type 2 diabetes mellitus with complication, without long-term current use of insulin (HCC)  -     HEMOGLOBIN A1C WITH EAG; Future    2. Severe obesity (HCC)  -     phentermine (ADIPEX-P) 37.5 mg tablet; Take 1 Tab by mouth every morning. Max Daily Amount: 37.5 mg. PLAN:  Hemoglobin A1C ordered  Down 8 lbs, continue Phentermine  Low cholesterol/ diabetic diet, weight control and daily exercise discussed    Glycohemoglobin and other lab monitoring discussed    I have discussed the diagnosis with the patient and the intended plan as seen in the above orders. The patient has received an after-visit summary and questions were answered concerning future plans. I have discussed medication side effects and warnings with the patient as well. Patient will call for further questions. Follow-up and Dispositions    · Return in about 3 months (around 9/7/2019).        Elaine Thomson NP

## 2019-06-07 NOTE — PATIENT INSTRUCTIONS
Type 2 Diabetes: Care Instructions  Your Care Instructions    Type 2 diabetes is a disease that develops when the body's tissues cannot use insulin properly. Over time, the pancreas cannot make enough insulin. Insulin is a hormone that helps the body's cells use sugar (glucose) for energy. It also helps the body store extra sugar in muscle, fat, and liver cells. Without insulin, the sugar cannot get into the cells to do its work. It stays in the blood instead. This can cause high blood sugar levels. A person has diabetes when the blood sugar stays too high too much of the time. Over time, diabetes can lead to diseases of the heart, blood vessels, nerves, kidneys, and eyes. You may be able to control your blood sugar by losing weight, eating a healthy diet, and getting daily exercise. You may also have to take insulin or other diabetes medicine. Follow-up care is a key part of your treatment and safety. Be sure to make and go to all appointments. Call your doctor if you are having problems. It's also a good idea to know your test results and keep a list of the medicines you take. How can you care for yourself at home? · Keep your blood sugar at a target level (which you set with your doctor). ? Eat a good diet that spreads carbohydrate throughout the day. Carbohydrate--the body's main source of fuel--affects blood sugar more than any other nutrient. Carbohydrate is in fruits, vegetables, milk, and yogurt. It also is in breads, cereals, vegetables such as potatoes and corn, and sugary foods such as candy and cakes. ? Aim for 30 minutes of exercise on most, preferably all, days of the week. Walking is a good choice. You also may want to do other activities, such as running, swimming, cycling, or playing tennis or team sports. If your doctor says it's okay, do muscle-strengthening exercises at least 2 times a week. ? Take your medicines exactly as prescribed.  Call your doctor if you think you are having a problem with your medicine. You will get more details on the specific medicines your doctor prescribes. · Check your blood sugar as often as your doctor recommends. It is important to keep track of any symptoms you have, such as low blood sugar. Also tell your doctor if you have any changes in your activities, diet, or insulin use. · Talk to your doctor before you start taking aspirin every day. Aspirin can help certain people lower their risk of a heart attack or stroke. But taking aspirin isn't right for everyone, because it can cause serious bleeding. · Do not smoke. If you need help quitting, talk to your doctor about stop-smoking programs and medicines. These can increase your chances of quitting for good. · Keep your cholesterol and blood pressure at normal levels. You may need to take one or more medicines to reach your goals. Take them exactly as directed. Do not stop or change a medicine without talking to your doctor first.  When should you call for help? Call 911 anytime you think you may need emergency care. For example, call if:    · You passed out (lost consciousness), or you suddenly become very sleepy or confused. (You may have very low blood sugar.)    Call your doctor now or seek immediate medical care if:    · Your blood sugar is 300 mg/dL or is higher than the level your doctor has set for you.     · You have symptoms of low blood sugar, such as:  ? Sweating. ? Feeling nervous, shaky, and weak. ? Extreme hunger and slight nausea. ? Dizziness and headache.  ? Blurred vision. ? Confusion.    Watch closely for changes in your health, and be sure to contact your doctor if:    · You often have problems controlling your blood sugar.     · You have symptoms of long-term diabetes problems, such as:  ? New vision changes. ? New pain, numbness, or tingling in your hands or feet. ? Skin problems. Where can you learn more? Go to http://iza-poornima.info/.   Enter C553 in the search box to learn more about \"Type 2 Diabetes: Care Instructions. \"  Current as of: July 25, 2018  Content Version: 11.9  © 5138-8480 [employer]. Care instructions adapted under license by [employer] (which disclaims liability or warranty for this information). If you have questions about a medical condition or this instruction, always ask your healthcare professional. [place] [address] any warranty or liability for your use of this information.

## 2019-06-08 LAB
EST. AVERAGE GLUCOSE BLD GHB EST-MCNC: 160 MG/DL
HBA1C MFR BLD: 7.2 % (ref 4.8–5.6)

## 2019-06-12 ENCOUNTER — OFFICE VISIT (OUTPATIENT)
Dept: FAMILY MEDICINE CLINIC | Age: 64
End: 2019-06-12

## 2019-06-12 VITALS
WEIGHT: 300.2 LBS | TEMPERATURE: 98 F | HEART RATE: 83 BPM | OXYGEN SATURATION: 96 % | SYSTOLIC BLOOD PRESSURE: 129 MMHG | RESPIRATION RATE: 18 BRPM | HEIGHT: 74 IN | DIASTOLIC BLOOD PRESSURE: 72 MMHG | BODY MASS INDEX: 38.53 KG/M2

## 2019-06-12 DIAGNOSIS — L02.91 ABSCESS: Primary | ICD-10-CM

## 2019-06-12 RX ORDER — DOXYCYCLINE 100 MG/1
100 CAPSULE ORAL 2 TIMES DAILY
Qty: 20 CAP | Refills: 0 | Status: SHIPPED | OUTPATIENT
Start: 2019-06-12 | End: 2019-06-22

## 2019-06-12 RX ORDER — CEPHALEXIN 500 MG/1
500 CAPSULE ORAL 4 TIMES DAILY
Qty: 40 CAP | Refills: 0 | Status: SHIPPED | OUTPATIENT
Start: 2019-06-12 | End: 2019-06-22

## 2019-06-12 NOTE — PROGRESS NOTES
Chief Complaint   Patient presents with    Cyst     1. Have you been to the ER, urgent care clinic since your last visit? Hospitalized since your last visit? No    2. Have you seen or consulted any other health care providers outside of the [address] [place] [address] since your last visit? Include any pap smears or colon screening.  No

## 2019-06-12 NOTE — PATIENT INSTRUCTIONS
Skin Abscess: Care Instructions  Your Care Instructions    A skin abscess is a bacterial infection that forms a pocket of pus. A boil is a kind of skin abscess. The doctor may have cut an opening in the abscess so that the pus can drain out. You may have gauze in the cut so that the abscess will stay open and keep draining. You may need antibiotics. You will need to follow up with your doctor to make sure the infection has gone away. The doctor has checked you carefully, but problems can develop later. If you notice any problems or new symptoms, get medical treatment right away. Follow-up care is a key part of your treatment and safety. Be sure to make and go to all appointments, and call your doctor if you are having problems. It's also a good idea to know your test results and keep a list of the medicines you take. How can you care for yourself at home? · Apply warm and dry compresses, a heating pad set on low, or a hot water bottle 3 or 4 times a day for pain. Keep a cloth between the heat source and your skin. · If your doctor prescribed antibiotics, take them as directed. Do not stop taking them just because you feel better. You need to take the full course of antibiotics. · Take pain medicines exactly as directed. ? If the doctor gave you a prescription medicine for pain, take it as prescribed. ? If you are not taking a prescription pain medicine, ask your doctor if you can take an over-the-counter medicine. · Keep your bandage clean and dry. Change the bandage whenever it gets wet or dirty, or at least one time a day. · If the abscess was packed with gauze:  ? Keep follow-up appointments to have the gauze changed or removed. If the doctor instructed you to remove the gauze, follow the instructions you were given for how to remove it. ? After the gauze is removed, soak the area in warm water for 15 to 20 minutes 2 times a day, until the wound closes. When should you call for help?   Call your doctor now or seek immediate medical care if:    · You have signs of worsening infection, such as:  ? Increased pain, swelling, warmth, or redness. ? Red streaks leading from the infected skin. ? Pus draining from the wound. ? A fever.    Watch closely for changes in your health, and be sure to contact your doctor if:    · You do not get better as expected. Where can you learn more? Go to http://iza-poornima.info/. Enter D645 in the search box to learn more about \"Skin Abscess: Care Instructions. \"  Current as of: April 17, 2018  Content Version: 11.9  © 2033-4050 [employer]. Care instructions adapted under license by [employer] (which disclaims liability or warranty for this information). If you have questions about a medical condition or this instruction, always ask your healthcare professional. [address] any warranty or liability for your use of this information.

## 2019-06-12 NOTE — PROGRESS NOTES
TIMOTEO Hernandez is a 59 y.o. male who presents today for abscess. Pt notes it started 5 days ago as a bump and it was painful, that has tripled in size since then and is red in appearance. Pt also notes clear drainage and is concerned that it may be infected. Pt denies fever, chills, nausea, vomiting, diarrhea and fatigue. Pt denies any recent trauma to site and identified bug bite to the area. Pt has been keeping the area clean and covered with a band aid. Current Outpatient Medications   Medication Sig Dispense Refill    phentermine (ADIPEX-P) 37.5 mg tablet Take 1 Tab by mouth every morning. Max Daily Amount: 37.5 mg. 30 Tab 0    zolpidem (AMBIEN) 10 mg tablet Take 1 Tab by mouth nightly. Max Daily Amount: 10 mg. 30 Tab 0    pramipexole (MIRAPEX) 0.75 mg tablet TAKE 1 TABLET Three times a day 270 Tab 1    diclofenac EC (VOLTAREN) 50 mg EC tablet Take 1 Tab by mouth two (2) times daily (with meals). 120 Tab 2    nitroglycerin (NITROSTAT) 0.4 mg SL tablet 1 Tab by SubLINGual route every five (5) minutes as needed for Chest Pain. Use for Chest Pain ; Call MD if > 3 tablets required 1 Bottle 0    hydrOXYzine HCl (ATARAX) 25 mg tablet Take 1 Tab by mouth two (2) times daily as needed for Anxiety for up to 90 days. 180 Tab 0    simvastatin (ZOCOR) 10 mg tablet TAKE 1 TABLET EVERY NIGHT 90 Tab 1    OMEPRAZOLE PO Take  by mouth daily as needed.  polyethylene glycol (MIRALAX) 17 gram packet Take 1 Packet by mouth daily. Hold for loose stools 10 Each 0    dulaglutide (TRULICITY) 1.5 VB/2.8 mL sub-q pen 0.5 mL by SubCUTAneous route every seven (7) days. 12 Pen 3    metFORMIN ER (GLUCOPHAGE XR) 500 mg tablet Take 2 Tabs by mouth two (2) times daily (with meals). 360 Tab 3    isosorbide mononitrate ER (IMDUR) 60 mg CR tablet TAKE 1 TABLET EVERY MORNING (Patient taking differently: nightly.  TAKE 1 TABLET EVERY MORNING) 90 Tab 2    metoprolol succinate (TOPROL-XL) 50 mg XL tablet Take 1 Tab by mouth daily. (Patient taking differently: Take 50 mg by mouth nightly.) 90 Tab 1    ACCU-CHEK FASTCLIX LANCET DRUM misc CHECK BLOOD SUGAR EVERY  Each 11    gabapentin (NEURONTIN) 800 mg tablet Take  by mouth three (3) times daily as needed.  ACCU-CHEK SMARTVIEW TEST STRIP strip CHECK BLOOD SUGAR EVERY  Strip 3    Blood-Glucose Meter (ACCU-CHEK GABRIELLE) misc Check blood sugar daily 1 Each 0    DULoxetine (CYMBALTA) 60 mg capsule Take 1 Cap by mouth daily. 90 Cap 2    naloxone 4 mg/actuation spry 4 mg by Nasal route as needed. 1 Box 0    Back Brace misc 1 Device by Does Not Apply route daily as needed for Pain. One, lumbosacral orthosis/back brace with metal struts      Medically necessary 1 Device 0      Allergies   Allergen Reactions    Bactrim [Sulfamethoprim] Rash    Opana [Oxymorphone] Other (comments)     Feels like bug crawling under skin and drowziness       SUBJECTIVE:  Review of Systems   Constitutional: Negative for chills, fever and malaise/fatigue. Eyes: Negative for blurred vision. Respiratory: Negative for shortness of breath. Cardiovascular: Negative for chest pain, palpitations and leg swelling. Gastrointestinal: Negative for abdominal pain, diarrhea, nausea and vomiting. Musculoskeletal: Negative for myalgias. Skin: Positive for rash (painful cyst that ruptured to right forearm). Neurological: Negative for dizziness, tingling, sensory change and headaches. OBJECTIVE:  Visit Vitals  /72 (BP 1 Location: Left arm, BP Patient Position: Sitting)   Pulse 83   Temp 98 °F (36.7 °C) (Oral)   Resp 18   Ht 6' 2\" (1.88 m)   Wt 300 lb 3.2 oz (136.2 kg)   SpO2 96%   BMI 38.54 kg/m²      I have reviewed/discussed the above normal BMI with the patient. I have recommended the following interventions: dietary management education, guidance, and counseling, encourage exercise and monitor weight .      Physical Exam   Constitutional: He is oriented to person, place, and time and well-developed, well-nourished, and in no distress. HENT:   Head: Normocephalic. Eyes: Pupils are equal, round, and reactive to light. Conjunctivae and EOM are normal.   Cardiovascular: Normal rate, regular rhythm and normal heart sounds. Pulmonary/Chest: Effort normal and breath sounds normal. He has no wheezes. He has no rales. He exhibits no tenderness. Musculoskeletal: He exhibits tenderness (right forearm). He exhibits no edema. Neurological: He is alert and oriented to person, place, and time. Gait normal.   Skin: Skin is warm and dry. There is erythema. Nursing note and vitals reviewed. ASSESSMENT:  Diagnoses and all orders for this visit:    1. Abscess  -     doxycycline (MONODOX) 100 mg capsule; Take 1 Cap by mouth two (2) times a day for 10 days. -     cephALEXin (KEFLEX) 500 mg capsule; Take 1 Cap by mouth four (4) times daily for 10 days. PLAN:  Start Doxycycline and Keflex for 10 days    I have discussed the diagnosis with the patient and the intended plan as seen in the above orders. The patient has received an after-visit summary and questions were answered concerning future plans. I have discussed medication side effects and warnings with the patient as well. Patient will call for further questions. Follow-up and Dispositions    · Return in about 3 months (around 9/12/2019) for DM follow up.        Turner Gonzales NP

## 2019-07-12 DIAGNOSIS — E66.01 SEVERE OBESITY (HCC): ICD-10-CM

## 2019-07-12 RX ORDER — PHENTERMINE HYDROCHLORIDE 37.5 MG/1
37.5 TABLET ORAL
Qty: 30 TAB | Refills: 0 | Status: SHIPPED | OUTPATIENT
Start: 2019-07-12 | End: 2020-04-14

## 2019-07-15 ENCOUNTER — OFFICE VISIT (OUTPATIENT)
Dept: FAMILY MEDICINE CLINIC | Age: 64
End: 2019-07-15

## 2019-07-15 DIAGNOSIS — E11.8 TYPE 2 DIABETES MELLITUS WITH COMPLICATION, WITHOUT LONG-TERM CURRENT USE OF INSULIN (HCC): Primary | ICD-10-CM

## 2019-07-15 NOTE — PROGRESS NOTES
Patient application for [facility] received. Application was sent and receipt of fax was documented. Financial hardship letter furnished in addition to application for patient given that he has not spent the $1100 in out of pocket expenses required by the program. He was admitted to the hospital earlier this year and was able to document roughly $2000 in medical expenses along with caring for his wife who has spend over $01633 this year alone in pharmacy expenses. Patient is aware this application may be denied given he does not meet the eligibility requires stated with the application. Eve Sullivan will follow with application moving forward.      Kalin Mitchell, PharmD, BCPS, BCACP, BC-ADM

## 2019-07-24 ENCOUNTER — PATIENT MESSAGE (OUTPATIENT)
Dept: FAMILY MEDICINE CLINIC | Age: 64
End: 2019-07-24

## 2019-07-30 ENCOUNTER — TELEPHONE (OUTPATIENT)
Dept: FAMILY MEDICINE CLINIC | Age: 64
End: 2019-07-30

## 2019-07-30 NOTE — TELEPHONE ENCOUNTER
Called patient and informed him medications are at the office ready to be picked up. Patient voiced understanding and will be in today.

## 2019-08-02 ENCOUNTER — OFFICE VISIT (OUTPATIENT)
Dept: ORTHOPEDIC SURGERY | Age: 64
End: 2019-08-02

## 2019-08-02 VITALS
TEMPERATURE: 95.6 F | OXYGEN SATURATION: 98 % | RESPIRATION RATE: 11 BRPM | BODY MASS INDEX: 40.43 KG/M2 | DIASTOLIC BLOOD PRESSURE: 82 MMHG | SYSTOLIC BLOOD PRESSURE: 149 MMHG | WEIGHT: 315 LBS | HEART RATE: 83 BPM | HEIGHT: 74 IN

## 2019-08-02 DIAGNOSIS — M25.371 RIGHT ANKLE INSTABILITY: Primary | ICD-10-CM

## 2019-08-02 NOTE — PROGRESS NOTES
Patient: Yris Carolina                MRN: [id number]       SSN: [id number]  YOB: 1955        AGE: 59 y.o. SEX: male  Body mass index is 40.49 kg/m². PCP: Qian Laureano NP  08/02/19    Chief Complaint: Right knee/right ankle follow up    HISTORY OF PRESENT ILLNESS:  Ld Galdamez returns to the office today for his right ankle and right knee. His primary problem is his right ankle. He continues to have instability events with inversion. He has been in physical therapy without complete resolution of his symptoms. He did have a recent inversion episode of his ankle where he fell. He unfortunately continues to have these issues despite bracing and conservative management.             Past Medical History:   Diagnosis Date    Abdominal adhesions 7/30/2014    Back muscle spasm 7/30/2014    Back pain, lumbosacral     Cellulitis     Constipation, chronic 7/30/2014    DDD (degenerative disc disease), lumbar 7/30/2014    DDD (degenerative disc disease), thoracic 7/30/2014    Depression     Diabetes ([address]     DM (diabetes mellitus) ([address] 7/30/2014    Frequent falls 7/30/2014    TRUE (generalized anxiety disorder) 7/30/2014    High cholesterol     HTN (hypertension) 7/30/2014    Hydronephrosis     Hyperlipidemia 7/30/2014    Hypertension     Insomnia secondary to chronic pain 7/30/2014    Kidney stones     LBP radiating to both legs 7/30/2014    Lumbar facet arthropathy 7/30/2014    Lumbar nerve root impingement 7/30/2014    Lumbosacral radiculopathy at S1 7/30/2014    Major depression 7/30/2014    Nausea & vomiting     Neurological disorder     sciatica    MANISH (obstructive sleep apnea) 7/30/2014    S/P lumbar laminectomy 7/30/2014    S/P lumbar spinal fusion 7/30/2014    Small vessel disease (HCC)     Thoracic compression fracture ([address] 07/30/2014    Unspecified sleep apnea     USES CPAP EVERY NIGHT       Family History   Problem Relation Age of Onset    Diabetes Mother     Heart Failure Mother     Heart Attack Father     Diabetes Sister     Stroke Brother        Current Outpatient Medications   Medication Sig Dispense Refill    dulaglutide (TRULICITY) 1.5 ZQ/1.8 mL sub-q pen 0.5 mL by SubCUTAneous route every seven (7) days. 12 Pen 3    phentermine (ADIPEX-P) 37.5 mg tablet Take 1 Tab by mouth every morning. Max Daily Amount: 37.5 mg. 30 Tab 0    zolpidem (AMBIEN) 10 mg tablet Take 1 Tab by mouth nightly. Max Daily Amount: 10 mg. 30 Tab 0    pramipexole (MIRAPEX) 0.75 mg tablet TAKE 1 TABLET Three times a day 270 Tab 1    diclofenac EC (VOLTAREN) 50 mg EC tablet Take 1 Tab by mouth two (2) times daily (with meals). 120 Tab 2    nitroglycerin (NITROSTAT) 0.4 mg SL tablet 1 Tab by SubLINGual route every five (5) minutes as needed for Chest Pain. Use for Chest Pain ; Call MD if > 3 tablets required 1 Bottle 0    simvastatin (ZOCOR) 10 mg tablet TAKE 1 TABLET EVERY NIGHT 90 Tab 1    OMEPRAZOLE PO Take  by mouth daily as needed.  polyethylene glycol (MIRALAX) 17 gram packet Take 1 Packet by mouth daily. Hold for loose stools 10 Each 0    metFORMIN ER (GLUCOPHAGE XR) 500 mg tablet Take 2 Tabs by mouth two (2) times daily (with meals). 360 Tab 3    isosorbide mononitrate ER (IMDUR) 60 mg CR tablet TAKE 1 TABLET EVERY MORNING (Patient taking differently: nightly. TAKE 1 TABLET EVERY MORNING) 90 Tab 2    metoprolol succinate (TOPROL-XL) 50 mg XL tablet Take 1 Tab by mouth daily. (Patient taking differently: Take 50 mg by mouth nightly.) 90 Tab 1    ACCU-CHEK FASTCLIX LANCET DRUM misc CHECK BLOOD SUGAR EVERY  Each 11    gabapentin (NEURONTIN) 800 mg tablet Take  by mouth three (3) times daily as needed.  ACCU-CHEK SMARTVIEW TEST STRIP strip CHECK BLOOD SUGAR EVERY  Strip 3    Blood-Glucose Meter (ACCU-CHEK GABRIELLE) misc Check blood sugar daily 1 Each 0    DULoxetine (CYMBALTA) 60 mg capsule Take 1 Cap by mouth daily.  90 Cap 2    naloxone 4 mg/actuation spry 4 mg by Nasal route as needed. 1 Box 0    Back Brace misc 1 Device by Does Not Apply route daily as needed for Pain.  One, lumbosacral orthosis/back brace with metal struts      Medically necessary 1 Device 0       Allergies   Allergen Reactions    Bactrim [Sulfamethoprim] Rash    Opana [Oxymorphone] Other (comments)     Feels like bug crawling under skin and drowziness       Past Surgical History:   Procedure Laterality Date    HX APPENDECTOMY      HX BACK SURGERY      Lumbar fusion    HX HERNIA REPAIR  1992    HX OTHER SURGICAL      EMG - S1 disorder    HX OTHER SURGICAL  10/2017    spinal cord stimulator    HX VASECTOMY  1985       Social History     Socioeconomic History    Marital status:      Spouse name: Not on file    Number of children: Not on file    Years of education: Not on file    Highest education level: Not on file   Occupational History    Not on file   Social Needs    Financial resource strain: Not on file    Food insecurity:     Worry: Not on file     Inability: Not on file    Transportation needs:     Medical: Not on file     Non-medical: Not on file   Tobacco Use    Smoking status: Never Smoker    Smokeless tobacco: Never Used   Substance and Sexual Activity    Alcohol use: No    Drug use: No    Sexual activity: Yes     Partners: Female     Birth control/protection: Surgical   Lifestyle    Physical activity:     Days per week: Not on file     Minutes per session: Not on file    Stress: Not on file   Relationships    Social connections:     Talks on phone: Not on file     Gets together: Not on file     Attends Confucianism service: Not on file     Active member of club or organization: Not on file     Attends meetings of clubs or organizations: Not on file     Relationship status: Not on file    Intimate partner violence:     Fear of current or ex partner: Not on file     Emotionally abused: Not on file     Physically abused: Not on file     Forced sexual activity: Not on file   Other Topics Concern    Not on file   Social History Narrative    Not on file       REVIEW OF SYSTEMS:      No changes from previous review of systems unless noted. PHYSICAL EXAMINATION:  Visit Vitals  /82   Pulse 83   Temp 95.6 °F (35.3 °C) (Oral)   Resp 11   Ht 6' 2\" (1.88 m)   Wt 315 lb 6.4 oz (143.1 kg)   SpO2 98%   BMI 40.49 kg/m²     Body mass index is 40.49 kg/m². GENERAL: Alert and oriented x3, in no acute distress. HEENT: Normocephalic, atraumatic. RESP: Non labored breathing. SKIN: No rashes or lesions noted. PHYSICAL EXAM:  Exam of the right ankle with obligate inversion. He has laxity with lateral ankle decreased range of motion when it comes to eversion of his ankle. He has good strength with plantarflexion, dorsiflexion, inversion and eversion, but laxity of the lateral ligaments of the ankle. ASSESSMENT AND PLAN:   Zuleika Dawson continues to have right ankle laxity. We tried conservative management. He is going to try a brace and the physical therapist is recommending an AFO, but I think that is more than what he needs. I do think he may benefit from a surgical consultation with Dr. Maykel Ortiz and I have recommended a referral to Dr. Maykel Ortiz for further evaluation and possible surgical management of his ankle instability. He would like to do that and he will follow up with Dr. Maykel Ortiz.              Electronically signed by: Srini Ashraf MD

## 2019-08-02 NOTE — PROGRESS NOTES
1. Have you been to the ER, urgent care clinic since your last visit? Hospitalized since your last visit? No    2. Have you seen or consulted any other health care providers outside of the [address] [place] [address] since your last visit? Include any pap smears or colon screening.  No

## 2019-08-05 DIAGNOSIS — I10 ESSENTIAL HYPERTENSION: ICD-10-CM

## 2019-08-05 NOTE — TELEPHONE ENCOUNTER
Dr. Unger notified of patient being unable to swallow oral potassium and could not tolerate potassium powder. One time order for IV potassium 10mEq to be given at this time.    LOV 7-  NOV 9-

## 2019-08-06 RX ORDER — METOPROLOL SUCCINATE 50 MG/1
50 TABLET, EXTENDED RELEASE ORAL DAILY
Qty: 90 TAB | Refills: 1 | Status: SHIPPED | OUTPATIENT
Start: 2019-08-06 | End: 2020-01-31 | Stop reason: SDUPTHER

## 2019-08-07 ENCOUNTER — OFFICE VISIT (OUTPATIENT)
Dept: ORTHOPEDIC SURGERY | Age: 64
End: 2019-08-07

## 2019-08-07 VITALS
HEIGHT: 74 IN | TEMPERATURE: 97.2 F | HEART RATE: 66 BPM | WEIGHT: 308.6 LBS | BODY MASS INDEX: 39.61 KG/M2 | OXYGEN SATURATION: 96 % | SYSTOLIC BLOOD PRESSURE: 134 MMHG | DIASTOLIC BLOOD PRESSURE: 74 MMHG | RESPIRATION RATE: 18 BRPM

## 2019-08-07 DIAGNOSIS — M79.671 RIGHT FOOT PAIN: ICD-10-CM

## 2019-08-07 DIAGNOSIS — R29.898 ANKLE WEAKNESS: ICD-10-CM

## 2019-08-07 DIAGNOSIS — M25.371 RIGHT ANKLE INSTABILITY: Primary | ICD-10-CM

## 2019-08-07 DIAGNOSIS — M25.571 ACUTE RIGHT ANKLE PAIN: ICD-10-CM

## 2019-08-07 NOTE — PATIENT INSTRUCTIONS
An MRI or CT has been ordered for you. A [employer] will be contacting you to schedule the appointment as soon as it has been approved with your insurance company. Please schedule an appointment to follow up with the doctor or the physicians assistant after the MRI or CT has been conducted. Foot Pain: Care Instructions  Your Care Instructions  Foot injuries that cause pain and swelling are fairly common. Almost all sports or home repair projects can cause a misstep that ends up as foot pain. Normal wear and tear, especially as you get older, also can cause foot pain. Most minor foot injuries will heal on their own, and home treatment is usually all you need to do. If you have a severe injury, you may need tests and treatment. Follow-up care is a key part of your treatment and safety. Be sure to make and go to all appointments, and call your doctor if you are having problems. It's also a good idea to know your test results and keep a list of the medicines you take. How can you care for yourself at home? · Take pain medicines exactly as directed. ? If the doctor gave you a prescription medicine for pain, take it as prescribed. ? If you are not taking a prescription pain medicine, ask your doctor if you can take an over-the-counter medicine. · Rest and protect your foot. Take a break from any activity that may cause pain. · Put ice or a cold pack on your foot for 10 to 20 minutes at a time. Put a thin cloth between the ice and your skin. · Prop up the sore foot on a pillow when you ice it or anytime you sit or lie down during the next 3 days. Try to keep it above the level of your heart. This will help reduce swelling. · Your doctor may recommend that you wrap your foot with an elastic bandage. Keep your foot wrapped for as long as your doctor advises. · If your doctor recommends crutches, use them as directed. · Wear roomy footwear.   · As soon as pain and swelling end, begin gentle exercises of your foot. Your doctor can tell you which exercises will help. When should you call for help? Call 911 anytime you think you may need emergency care. For example, call if:    · Your foot turns pale, white, blue, or cold.    Call your doctor now or seek immediate medical care if:    · You cannot move or stand on your foot.     · Your foot looks twisted or out of its normal position.     · Your foot is not stable when you step down.     · You have signs of infection, such as:  ? Increased pain, swelling, warmth, or redness. ? Red streaks leading from the sore area. ? Pus draining from a place on your foot. ? A fever.     · Your foot is numb or tingly.    Watch closely for changes in your health, and be sure to contact your doctor if:    · You do not get better as expected.     · You have bruises from an injury that last longer than 2 weeks. Where can you learn more? Go to http://iza-poornima.info/. Enter Z602 in the search box to learn more about \"Foot Pain: Care Instructions. \"  Current as of: September 20, 2018  Content Version: 12.1  © 2629-0051 [employer]. Care instructions adapted under license by [employer] (which disclaims liability or warranty for this information). If you have questions about a medical condition or this instruction, always ask your healthcare professional. [address] any warranty or liability for your use of this information.

## 2019-08-07 NOTE — PROGRESS NOTES
AMBULATORY PROGRESS NOTE      Patient: Jatin William             MRN: [id number]     SSN: [id number] Body mass index is 39.62 kg/m². YOB: 1955     AGE: 59 y.o. SEX: male    PCP: Gerardo Rivera NP     IMPRESSION/DIAGNOSIS AND TREATMENT PLAN     DIAGNOSES  1. Right ankle instability    2. Ankle weakness    3. Acute right ankle pain    4. Right foot pain        Orders Placed This Encounter    AMB SUPPLY ORDER    AMB SUPPLY ORDER    [02937] Ankle 2V    [50429] Foot Min 3V    MRI ANKLE RT WO CONT      Dilan William understands his diagnoses and the proposed plan. As such, in assessing him, he has profound weakness to the peroneal tendons, and he has no eversion strength. So, I am not sure. It may be a nerve issue. He does have diabetes and does have some decreased monofilament testing to his forefoot and to parts of his foot. My plan is to get an MRI to assess the peroneal tendons to make sure there are no structural deficits that would cause him to have weakness. He does have some mild swelling to the peroneal brevis and longus tendons. We will get the MRI first.  I anticipate sending him to Neurology for EMG nerve conduction studies should the MRI show no structural rupture or disruption of the peroneal tendons. He is going to require an [address] type brace to help control his hindfoot or an extended SMO brace to control his hindfoot, for which I will have him respectfully go to [address] [place] [address] for this device. The [address] device will give him tri-planar control, medial and lateral stability, optimal arch support, and it will help protect his motion, so he would not invert and isabella his foot. So, it helps for coronal plane instability. It will be help overall improve his anatomic alignment and, again, alleviate his pain and protect him for improved function.   Indeed, this DME extended SMO brace is an item of medical necessity in this individual.      Plan:    1) DME Order: Custom right extended SMO brace (TPC). 2) DME Order: Short right CAM walker boot. 3) MRI without IV Contrast of the right ankle. 4) Continue activity modification as directed. 5) Anticipate referral to neurology and EMG/NCS if condition does not improve. RTO - after MRI     HPI AND EXAMINATION     Lazaro Brunner Chick IS A 59 y.o. male who presents to my outpatient office complaining of right ankle pain. Mr. Houston Glass reports that he is concerned that his foot is \"changing\". He notes that when he walks, his right ankle rolls and inverts, causing his knee to give out and causing him to fall. He also notes that he stepped in a hole and twisted his right ankle a few weeks ago. He states that he has h/o DM, which he has had for the past 3-4 years, and he reports that he wears diabetic style shoes around the house. He presents to the office today with a cane. The patient denies any kidney or lung diseases. He does have h/o small vessel disease in the heart. The patient does have h/o 4 vertebrae fractures, however, he notes that he never had issues with his motor function as a result of these fractures. The patient was referred to me by Dr. Shelli Kehr, who believes he has an issue with the tendons in his knee. The patient denies h/o alcohol abuse and denies use of dilantin. The patient takes Cymbalta, as prescribed by his PCP to help him sleep at night. The patient has h/o DM, neuropathy, and small vessel disease of the heart. Visit Vitals  /74   Pulse 66   Temp 97.2 °F (36.2 °C) (Oral)   Resp 18   Ht 6' 2\" (1.88 m)   Wt 308 lb 9.6 oz (140 kg)   SpO2 96%   BMI 39.62 kg/m²     Appearance: Alert, well appearing and pleasant patient who is in no distress, oriented to person, place/time, and who follows commands. This patient is accompanied in the examination room by his self. Dementia: no dementia  Psychiatric: Affect and mood are appropriate.  Patient arrives to office via: with assistive device: cane  HEENT: Head normocephalic & atraumatic. Both pupils are round, non icteric sclera   Eye: EOM are intact and sclera are clear    Neck: ROM WNL and JVD neck is not present     Hearings Intact, does not require hearing aid device  Respiratory: Breathing is unlabored without accessory chest muscle use  Cardiovascular/Peripheral Vascular: Normal Pulses to each foot    ANKLE/FOOT left    Gait: uses cane  Tenderness: Mild tenderness with ROM. Cutaneous: WNL. Joint Motion: Painful ROM. Joint / Tendon Stability: No Ankle or Subtalar instability or joint laxity. No peroneal sublux ability or dislocation  Alignment: Pes cavus alignment bilaterally          Varus alignment of the right tibia, bowing of the RLE relative to the LLE. Neuro: Decreased monofilament testing to the plantar hindfoot   Cannot isabella   DF 5/5   PF 5/5   Inversion 5/5  Vascular: NL foot/ankle pulses. 1+ Pitting edema, bilaterally  Lymphatics: No extremity lymphedema, No calf swelling, no tenderness to calf muscles.     CHART REVIEW     Past Medical History:   Diagnosis Date    Abdominal adhesions 7/30/2014    Back muscle spasm 7/30/2014    Back pain, lumbosacral     Cellulitis     Constipation, chronic 7/30/2014    DDD (degenerative disc disease), lumbar 7/30/2014    DDD (degenerative disc disease), thoracic 7/30/2014    Depression     Diabetes ([address]     DM (diabetes mellitus) ([facility] 7/30/2014    Frequent falls 7/30/2014    TRUE (generalized anxiety disorder) 7/30/2014    High cholesterol     HTN (hypertension) 7/30/2014    Hydronephrosis     Hyperlipidemia 7/30/2014    Hypertension     Insomnia secondary to chronic pain 7/30/2014    Kidney stones     LBP radiating to both legs 7/30/2014    Lumbar facet arthropathy 7/30/2014    Lumbar nerve root impingement 7/30/2014    Lumbosacral radiculopathy at S1 7/30/2014    Major depression 7/30/2014    Nausea & vomiting     Neurological disorder sciatica    MANISH (obstructive sleep apnea) 7/30/2014    S/P lumbar laminectomy 7/30/2014    S/P lumbar spinal fusion 7/30/2014    Small vessel disease (HCC)     Thoracic compression fracture (HCC) 07/30/2014    Unspecified sleep apnea     USES CPAP EVERY NIGHT     Current Outpatient Medications   Medication Sig    metoprolol succinate (TOPROL-XL) 50 mg XL tablet Take 1 Tab by mouth daily.  dulaglutide (TRULICITY) 1.5 KH/9.3 mL sub-q pen 0.5 mL by SubCUTAneous route every seven (7) days.  phentermine (ADIPEX-P) 37.5 mg tablet Take 1 Tab by mouth every morning. Max Daily Amount: 37.5 mg.    zolpidem (AMBIEN) 10 mg tablet Take 1 Tab by mouth nightly. Max Daily Amount: 10 mg.  pramipexole (MIRAPEX) 0.75 mg tablet TAKE 1 TABLET Three times a day    diclofenac EC (VOLTAREN) 50 mg EC tablet Take 1 Tab by mouth two (2) times daily (with meals).  nitroglycerin (NITROSTAT) 0.4 mg SL tablet 1 Tab by SubLINGual route every five (5) minutes as needed for Chest Pain. Use for Chest Pain ; Call MD if > 3 tablets required    simvastatin (ZOCOR) 10 mg tablet TAKE 1 TABLET EVERY NIGHT    OMEPRAZOLE PO Take  by mouth daily as needed.  polyethylene glycol (MIRALAX) 17 gram packet Take 1 Packet by mouth daily. Hold for loose stools    metFORMIN ER (GLUCOPHAGE XR) 500 mg tablet Take 2 Tabs by mouth two (2) times daily (with meals).  isosorbide mononitrate ER (IMDUR) 60 mg CR tablet TAKE 1 TABLET EVERY MORNING (Patient taking differently: nightly. TAKE 1 TABLET EVERY MORNING)    ACCU-CHEK FASTCLIX LANCET DRUM mis CHECK BLOOD SUGAR EVERY DAY    gabapentin (NEURONTIN) 800 mg tablet Take  by mouth three (3) times daily as needed.  ACCU-CHEK SMARTVIEW TEST STRIP strip CHECK BLOOD SUGAR EVERY DAY    Blood-Glucose Meter (ACCU-CHEK GABRIELLE) misc Check blood sugar daily    DULoxetine (CYMBALTA) 60 mg capsule Take 1 Cap by mouth daily.  naloxone 4 mg/actuation spry 4 mg by Nasal route as needed.     Back Brace misc 1 Device by Does Not Apply route daily as needed for Pain. One, lumbosacral orthosis/back brace with metal struts      Medically necessary     No current facility-administered medications for this visit. Allergies   Allergen Reactions    Bactrim [Sulfamethoprim] Rash    Opana [Oxymorphone] Other (comments)     Feels like bug crawling under skin and drowziness     Past Surgical History:   Procedure Laterality Date    HX APPENDECTOMY      HX BACK SURGERY      Lumbar fusion    HX HERNIA REPAIR  1992    HX OTHER SURGICAL      EMG - S1 disorder    HX OTHER SURGICAL  10/2017    spinal cord stimulator    HX VASECTOMY  1985     Social History     Occupational History    Not on file   Tobacco Use    Smoking status: Never Smoker    Smokeless tobacco: Never Used   Substance and Sexual Activity    Alcohol use: No    Drug use: No    Sexual activity: Yes     Partners: Female     Birth control/protection: Surgical     Family History   Problem Relation Age of Onset    Diabetes Mother     Heart Failure Mother     Heart Attack Father     Diabetes Sister     Stroke Brother       REVIEW OF SYSTEMS : 8/7/2019  ALL BELOW ARE Negative except : SEE HPI     General: Negative for fever and chills. No unexpected change in weight. Denies fatigue. No change in appetite. Dermatologic: Negative for rash or itching, dry skin, hair changes, rash or skin lesion changes  HEENT: Negative for congestion, sore throat, neck pain and neck stiffness. No change in vision or hearing. Hasn't noted any enlarged lymph nodes in the neck. Cardiovascular:  Negative for chest pain and palpitations. Has not noted pedal edema. Peripheral Vascular: No calf pain, vascular vein tenderness to calf pain           No calf throbbing, posterior knee throbbing pain   Respiratory: Negative for cough, colds, sinus, hemoptysis, shortness of breath and wheezing.   Gastrointestinal: Negative for nausea and vomiting, rectal bleeding, coffee ground emesis, abdominal pain, diarrhea and constipation. Genitourinary: Negative for dysuria, frequency urgency, or burning on micturition. No flank pain, no foul smelling urine, no difficulty with initiating urination. Hematological: Negative for bleeding or easy bruising. Musculoskeletal: Negative  for arthralgias, back pain or neck pain. Neurological: Negative for dizziness, seizures or syncopal episodes. Denies headaches. Endocrine: Denies excessive thirst.  No heat/cold intolerance. Psychiatric: Negative for depression or insomnia. No calf throbbing, posterior knee throbbing pain     DIAGNOSTIC IMAGING     No notes on file    Please see above section of this report. I have personally reviewed the results of the above study. The interpretation of this study is my professional opinion. Written by Lori Thomas, as dictated by Dr. Brenda Longoria. I, Dr. Brenda Longoria, confirm that all documentation is accurate.

## 2019-08-07 NOTE — PROGRESS NOTES
1. Have you been to the ER, urgent care clinic since your last visit? Hospitalized since your last visit? No    2. Have you seen or consulted any other health care providers outside of the [address] [place] [address] since your last visit? Include any pap smears or colon screening.  No

## 2019-08-07 NOTE — PROGRESS NOTES
AMBULATORY PROGRESS NOTE      Patient: Tomas William             MRN: [id number]     SSN: [id number] Body mass index is 39.62 kg/m². YOB: 1955     AGE: 59 y.o. SEX: male    PCP: Blanca Diaz NP     IMPRESSION/DIAGNOSIS AND TREATMENT PLAN     DIAGNOSES  1. Right ankle instability    2. Ankle weakness    3. Acute right ankle pain    4. Right foot pain        Orders Placed This Encounter    AMB SUPPLY ORDER    AMB SUPPLY ORDER    [60292] Ankle 2V    [27452] Foot Min 3V    MRI ANKLE RT WO CONT      Dilan William understands his diagnoses and the proposed plan. 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 There is some lucency to the right great toe proximal phalanx. It also shows on the right side OA to the midfoot. The one, two, three, and four TMT joint articulations are narrowed. There is generalized osteopenia. Weightbearing right foot films to include the ankle show that the ankle and subtalar joints are well-maintained. There is some spurring to the dorsal part of the right talonavicular joint. There is no plantar gapping of the first tarsometatarsal and/or navicular cuneiform or talonavicular joints. There is a moderate calcific bone spur seen to the insertion point of this right Achilles tendon in the lateral radiographic x-ray. There is no hammering of the lesser digits in looking at the forefoot in this weightbearing films on the right side. Right shoulder ankle films show that the right ankle joint appears to be well-maintained. There is no fracture, subluxation, or dislocation. No osteolytic or osteoblastic lesions are seen on these three views of the right ankle. AP view of the left foot shows metatarsus adductus, a shortened great toe proximal phalanx, hallux valgus alignment, and a long second metatarsal, but no fractures are seen to this AP of the left foot.      AP and oblique or mortise of the left ankle, show the ankle joint is fairly well-maintained. There is slight narrowing of the medial gutter. There is mild soft tissue swelling seen laterally and medially to this left ankle film and on the right ankle film, AP view. The right shows some soft tissue swelling medially and laterally, mild to moderate. I have personally reviewed the results of the above study. The interpretation of this study is my professional opinion. Written by Johnny Manjeet, as dictated by Dr. Pastor Lord. I, Dr. Pastor Lord, confirm that all documentation is accurate.

## 2019-08-29 ENCOUNTER — HOSPITAL ENCOUNTER (OUTPATIENT)
Age: 64
Discharge: HOME | End: 2019-08-29
Attending: ORTHOPAEDIC SURGERY

## 2019-08-29 DIAGNOSIS — M25.571 ACUTE RIGHT ANKLE PAIN: ICD-10-CM

## 2019-08-30 ENCOUNTER — TELEPHONE (OUTPATIENT)
Dept: ORTHOPEDIC SURGERY | Age: 64
End: 2019-08-30

## 2019-08-30 NOTE — TELEPHONE ENCOUNTER
Received call from patient; he advised he was supposed to have an MRI completed yesterday but the facility canceled it b/c he has a spinal cord stimulator. Patient wants to know what he should do now. Patient advised his right foot and ankle is very painful when trying to walk.      Patient can be reached at: [phone]

## 2019-08-31 NOTE — TELEPHONE ENCOUNTER
Dr. Jessica Marion would like to order an Ultrasound of the right ankle to evaluate the peroneal tendons.     Jensen Howe PA-C

## 2019-09-03 DIAGNOSIS — M25.571 ACUTE RIGHT ANKLE PAIN: Primary | ICD-10-CM

## 2019-09-09 ENCOUNTER — TELEPHONE (OUTPATIENT)
Dept: ORTHOPEDIC SURGERY | Age: 64
End: 2019-09-09

## 2019-09-09 NOTE — TELEPHONE ENCOUNTER
Patient called stating he would like to talk to [facility] nurse regarding his MRI that was cancelled due to his spinal cord stimulator.  Please advise patient at [phone]

## 2019-09-09 NOTE — TELEPHONE ENCOUNTER
[facility] schedules these, they will be calling patient.    Coordination of care # [phone]

## 2019-09-10 ENCOUNTER — TELEPHONE (OUTPATIENT)
Dept: ORTHOPEDIC SURGERY | Age: 64
End: 2019-09-10

## 2019-09-10 NOTE — TELEPHONE ENCOUNTER
Spoke with pt informed pt order has been faxed to [facility]. Provided pt with contact address and location to schedule appt to  brace. Pt acknowledges understanding.  Jojo Butts LPN

## 2019-09-10 NOTE — TELEPHONE ENCOUNTER
Patient called for . Patent said that when he saw Juan Alberto Anderson on 08/07/2019 that he was going to order him a Brace for his Right Ankle. Patient said he still has not heard from anyone as to whether the Brace has been ordered and when it will come in , so that he may pick it up from the [facility] location. Patient would like a call back at  tel. [phone].

## 2019-09-11 ENCOUNTER — DOCUMENTATION ONLY (OUTPATIENT)
Dept: ORTHOPEDIC SURGERY | Age: 64
End: 2019-09-11

## 2019-09-11 ENCOUNTER — TELEPHONE (OUTPATIENT)
Dept: FAMILY MEDICINE CLINIC | Age: 64
End: 2019-09-11

## 2019-09-11 NOTE — TELEPHONE ENCOUNTER
Called and spoke to [facility] pharmacist in reference to fax received for clarification of Accucheck an meter. Pharmacist stated that the an meter was no longer going to be manufactured and they are now using the alternative patricia. An Patricia starter kit was given verbally with readback with corresponding test strips and a refill on lancets. Patient was left a voice mail to return to office to make him aware of changes. If patient has any other questions please defer to nursing.

## 2019-09-13 ENCOUNTER — OFFICE VISIT (OUTPATIENT)
Dept: FAMILY MEDICINE CLINIC | Age: 64
End: 2019-09-13

## 2019-09-13 VITALS
OXYGEN SATURATION: 96 % | HEIGHT: 74 IN | TEMPERATURE: 97.8 F | WEIGHT: 308 LBS | SYSTOLIC BLOOD PRESSURE: 150 MMHG | BODY MASS INDEX: 39.53 KG/M2 | DIASTOLIC BLOOD PRESSURE: 80 MMHG | HEART RATE: 84 BPM | RESPIRATION RATE: 22 BRPM

## 2019-09-13 DIAGNOSIS — E11.8 TYPE 2 DIABETES MELLITUS WITH COMPLICATION, WITHOUT LONG-TERM CURRENT USE OF INSULIN (HCC): ICD-10-CM

## 2019-09-13 DIAGNOSIS — F41.9 ANXIETY: Primary | ICD-10-CM

## 2019-09-13 DIAGNOSIS — F51.01 PRIMARY INSOMNIA: ICD-10-CM

## 2019-09-13 LAB — HBA1C MFR BLD HPLC: 8.1 %

## 2019-09-13 RX ORDER — ALPRAZOLAM 0.5 MG/1
0.5 TABLET ORAL
Qty: 30 TAB | Refills: 0 | Status: SHIPPED | OUTPATIENT
Start: 2019-09-13 | End: 2021-09-22

## 2019-09-13 RX ORDER — ZOLPIDEM TARTRATE 10 MG/1
10 TABLET ORAL
Qty: 30 TAB | Refills: 1 | Status: SHIPPED | OUTPATIENT
Start: 2019-09-13 | End: 2020-11-18

## 2019-09-13 NOTE — PROGRESS NOTES
TIMOTEO Etienne is a 59 y.o. male who presents today for DM follow up, anxiety and difficulty sleeping. Pt is accompanied by wife, both state patient has been very anxious and easily irritable regarding current health conditions. Diabetic Review of Systems - medication compliance: compliant all of the time, diabetic diet compliance: noncompliant some of the time, home glucose monitoring: is performed regularly. Pt also notes increase anxiety. BGL range , average 's. Pt has imaging and possible procedure to assess kidneys for further stones and potential scar tissue on September 25th with urology. Pt requesting refill on Ambien. Pt is seeing both  for chronic knee pain and  for ankle pain and has a upcoming ultrasound on September 26th. Pt has been having difficulty walking due to ankle pain and is using straight cane. PHQ 9 Score: 2 (9/13/2019  2:00 PM)  TRUE-7 Score: 3 \"somewhat difficult\"    Current Outpatient Medications   Medication Sig Dispense Refill    zolpidem (AMBIEN) 10 mg tablet Take 1 Tab by mouth nightly. Max Daily Amount: 10 mg. 30 Tab 1    tamsulosin (FLOMAX) 0.4 mg capsule Take 1 Cap by mouth daily (after dinner). 90 Cap 3    metoprolol succinate (TOPROL-XL) 50 mg XL tablet Take 1 Tab by mouth daily. 90 Tab 1    dulaglutide (TRULICITY) 1.5 MK/6.2 mL sub-q pen 0.5 mL by SubCUTAneous route every seven (7) days. 12 Pen 3    pramipexole (MIRAPEX) 0.75 mg tablet TAKE 1 TABLET Three times a day 270 Tab 1    nitroglycerin (NITROSTAT) 0.4 mg SL tablet 1 Tab by SubLINGual route every five (5) minutes as needed for Chest Pain. Use for Chest Pain ; Call MD if > 3 tablets required 1 Bottle 0    simvastatin (ZOCOR) 10 mg tablet TAKE 1 TABLET EVERY NIGHT 90 Tab 1    OMEPRAZOLE PO Take  by mouth daily as needed.  polyethylene glycol (MIRALAX) 17 gram packet Take 1 Packet by mouth daily.  Hold for loose stools 10 Each 0    metFORMIN ER (GLUCOPHAGE XR) 500 mg tablet Take 2 Tabs by mouth two (2) times daily (with meals). 360 Tab 3    isosorbide mononitrate ER (IMDUR) 60 mg CR tablet TAKE 1 TABLET EVERY MORNING (Patient taking differently: nightly. TAKE 1 TABLET EVERY MORNING) 90 Tab 2    gabapentin (NEURONTIN) 800 mg tablet Take  by mouth three (3) times daily as needed.  DULoxetine (CYMBALTA) 60 mg capsule Take 1 Cap by mouth daily. 90 Cap 2    naloxone 4 mg/actuation spry 4 mg by Nasal route as needed. 1 Box 0    phentermine (ADIPEX-P) 37.5 mg tablet Take 1 Tab by mouth every morning. Max Daily Amount: 37.5 mg. 30 Tab 0    diclofenac EC (VOLTAREN) 50 mg EC tablet Take 1 Tab by mouth two (2) times daily (with meals). 120 Tab 2    ACCU-CHEK FASTCLIX LANCET DRUM [facility] CHECK BLOOD SUGAR EVERY  Each 11    ACCU-CHEK SMARTVIEW TEST STRIP strip CHECK BLOOD SUGAR EVERY  Strip 3    Blood-Glucose Meter (ACCU-CHEK GABRIELLE) misc Check blood sugar daily 1 Each 0    Back Brace misc 1 Device by Does Not Apply route daily as needed for Pain. One, lumbosacral orthosis/back brace with metal struts      Medically necessary 1 Device 0      Allergies   Allergen Reactions    Bactrim [Sulfamethoprim] Rash    Opana [Oxymorphone] Other (comments)     Feels like bug crawling under skin and drowziness       SUBJECTIVE:  Review of Systems   Constitutional: Negative for chills, fever and malaise/fatigue. Eyes: Negative for blurred vision. Respiratory: Negative for shortness of breath. Cardiovascular: Negative for chest pain, palpitations and leg swelling. Gastrointestinal: Negative for abdominal pain, diarrhea, nausea and vomiting. Genitourinary: Negative for dysuria, frequency and urgency. Musculoskeletal: Positive for falls and joint pain. Neurological: Negative for dizziness, tingling, sensory change and headaches. Psychiatric/Behavioral: Negative for depression and suicidal ideas. The patient is nervous/anxious and has insomnia. OBJECTIVE:  Visit Vitals  /80 (BP 1 Location: Left arm, BP Patient Position: Sitting)   Pulse 84   Temp 97.8 °F (36.6 °C) (Oral)   Resp 22   Ht 6' 2\" (1.88 m)   Wt 308 lb (139.7 kg)   SpO2 96%   BMI 39.54 kg/m²      I have reviewed/discussed the above normal BMI with the patient. I have recommended the following interventions: dietary management education, guidance, and counseling, encourage exercise and monitor weight . Physical Exam   Constitutional: He is oriented to person, place, and time and well-developed, well-nourished, and in no distress. HENT:   Head: Normocephalic. Eyes: Pupils are equal, round, and reactive to light. Conjunctivae and EOM are normal.   Neck: No thyromegaly present. Cardiovascular: Normal rate, regular rhythm and normal heart sounds. Pulmonary/Chest: Effort normal and breath sounds normal. He has no wheezes. He has no rales. He exhibits no tenderness. Musculoskeletal: He exhibits no edema. Neurological: He is alert and oriented to person, place, and time. Pt ambulating with straight cane   Skin: Skin is warm and dry. No erythema. Psychiatric: His mood appears anxious. He exhibits a depressed mood. He expresses no homicidal and no suicidal ideation. Nursing note and vitals reviewed. ASSESSMENT:  Diagnoses and all orders for this visit:    1. Anxiety  -     ALPRAZolam (XANAX) 0.5 mg tablet; Take 1 Tab by mouth two (2) times daily as needed for Anxiety. Max Daily Amount: 1 mg.    2. Primary insomnia  -     zolpidem (AMBIEN) 10 mg tablet; Take 1 Tab by mouth nightly.  Max Daily Amount: 10 mg.    3. Type 2 diabetes mellitus with complication, without long-term current use of insulin (HCC)  -     AMB POC HEMOGLOBIN A1C    PLAN:  TRUE-7 & PHQ-9 today  Hemoglobin A1C today  Medication refill today, one time Rx for Xanax  Diabetic diet discussed in detail, written exchange diet given, all medications, side effects and compliance discussed carefully and glycohemoglobin and other lab monitoring discussed    I have discussed the diagnosis with the patient and the intended plan as seen in the above orders. The patient has received an after-visit summary and questions were answered concerning future plans. I have discussed medication side effects and warnings with the patient as well. Patient will call for further questions. Follow-up and Dispositions    · Return in about 5 months (around 2/13/2020) for DM follow up.         Anna Hussein, YUSEF

## 2019-09-13 NOTE — PATIENT INSTRUCTIONS
Anxiety Disorder: Care Instructions  Your Care Instructions    Anxiety is a normal reaction to stress. Difficult situations can cause you to have symptoms such as sweaty palms and a nervous feeling. In an anxiety disorder, the symptoms are far more severe. Constant worry, muscle tension, trouble sleeping, nausea and diarrhea, and other symptoms can make normal daily activities difficult or impossible. These symptoms may occur for no reason, and they can affect your work, school, or social life. Medicines, counseling, and self-care can all help. Follow-up care is a key part of your treatment and safety. Be sure to make and go to all appointments, and call your doctor if you are having problems. It's also a good idea to know your test results and keep a list of the medicines you take. How can you care for yourself at home? · Take medicines exactly as directed. Call your doctor if you think you are having a problem with your medicine. · Go to your counseling sessions and follow-up appointments. · Recognize and accept your anxiety. Then, when you are in a situation that makes you anxious, say to yourself, \"This is not an emergency. I feel uncomfortable, but I am not in danger. I can keep going even if I feel anxious. \"  · Be kind to your body:  ? Relieve tension with exercise or a massage. ? Get enough rest.  ? Avoid alcohol, caffeine, nicotine, and illegal drugs. They can increase your anxiety level and cause sleep problems. ? Learn and do relaxation techniques. See below for more about these techniques. · Engage your mind. Get out and do something you enjoy. Go to a funny movie, or take a walk or hike. Plan your day. Having too much or too little to do can make you anxious. · Keep a record of your symptoms. Discuss your fears with a good friend or family member, or join a support group for people with similar problems. Talking to others sometimes relieves stress.   · Get involved in social groups, or volunteer to help others. Being alone sometimes makes things seem worse than they are. · Get at least 30 minutes of exercise on most days of the week to relieve stress. Walking is a good choice. You also may want to do other activities, such as running, swimming, cycling, or playing tennis or team sports. Relaxation techniques  Do relaxation exercises 10 to 20 minutes a day. You can play soothing, relaxing music while you do them, if you wish. · Tell others in your house that you are going to do your relaxation exercises. Ask them not to disturb you. · Find a comfortable place, away from all distractions and noise. · Lie down on your back, or sit with your back straight. · Focus on your breathing. Make it slow and steady. · Breathe in through your nose. Breathe out through either your nose or mouth. · Breathe deeply, filling up the area between your navel and your rib cage. Breathe so that your belly goes up and down. · Do not hold your breath. · Breathe like this for 5 to 10 minutes. Notice the feeling of calmness throughout your whole body. As you continue to breathe slowly and deeply, relax by doing the following for another 5 to 10 minutes:  · Tighten and relax each muscle group in your body. You can begin at your toes and work your way up to your head. · Imagine your muscle groups relaxing and becoming heavy. · Empty your mind of all thoughts. · Let yourself relax more and more deeply. · Become aware of the state of calmness that surrounds you. · When your relaxation time is over, you can bring yourself back to alertness by moving your fingers and toes and then your hands and feet and then stretching and moving your entire body. Sometimes people fall asleep during relaxation, but they usually wake up shortly afterward. · Always give yourself time to return to full alertness before you drive a car or do anything that might cause an accident if you are not fully alert.  Never play a relaxation tape while you drive a car. When should you call for help? Call 911 anytime you think you may need emergency care. For example, call if:    · You feel you cannot stop from hurting yourself or someone else.   Rita Franks the numbers for these national suicide hotlines: 7-176-477-TALK (3-639.559.9278) and 3-400-IBWYUAZ (3-428.491.6764). If you or someone you know talks about suicide or feeling hopeless, get help right away.   Watch closely for changes in your health, and be sure to contact your doctor if:    · You have anxiety or fear that affects your life.     · You have symptoms of anxiety that are new or different from those you had before. Where can you learn more? Go to http://izaTransperapoornima.info/. Enter P754 in the search box to learn more about \"Anxiety Disorder: Care Instructions. \"  Current as of: September 11, 2018  Content Version: 12.1  © 8266-3300 [employer]. Care instructions adapted under license by [employer] (which disclaims liability or warranty for this information). If you have questions about a medical condition or this instruction, always ask your healthcare professional. [place] [address] any warranty or liability for your use of this information. Insomnia: Care Instructions  Your Care Instructions    Insomnia is the inability to sleep well. It is a common problem for most people at some time. Insomnia may make it hard for you to get to sleep, stay asleep, or sleep as long as you need to. This can make you tired and grouchy during the day. It can also make you forgetful, less effective at work, and unhappy. Insomnia can be caused by conditions such as depression or anxiety. Pain can also affect your ability to sleep. When these problems are solved, the insomnia usually clears up. But sometimes bad sleep habits can cause insomnia.   If insomnia is affecting your work or your enjoyment of life, you can take steps to improve your sleep. Follow-up care is a key part of your treatment and safety. Be sure to make and go to all appointments, and call your doctor if you are having problems. It's also a good idea to know your test results and keep a list of the medicines you take. How can you care for yourself at home? What to avoid  · Do not have drinks with caffeine, such as coffee or black tea, for 8 hours before bed. · Do not smoke or use other types of tobacco near bedtime. Nicotine is a stimulant and can keep you awake. · Avoid drinking alcohol late in the evening, because it can cause you to wake in the middle of the night. · Do not eat a big meal close to bedtime. If you are hungry, eat a light snack. · Do not drink a lot of water close to bedtime, because the need to urinate may wake you up during the night. · Do not read or watch TV in bed. Use the bed only for sleeping and sexual activity. What to try  · Go to bed at the same time every night, and wake up at the same time every morning. Do not take naps during the day. · Keep your bedroom quiet, dark, and cool. · Sleep on a comfortable pillow and mattress. · If watching the clock makes you anxious, turn it facing away from you so you cannot see the time. · If you worry when you lie down, start a worry book. Well before bedtime, write down your worries, and then set the book and your concerns aside. · Try meditation or other relaxation techniques before you go to bed. · If you cannot fall asleep, get up and go to another room until you feel sleepy. Do something relaxing. Repeat your bedtime routine before you go to bed again. · Make your house quiet and calm about an hour before bedtime. Turn down the lights, turn off the TV, log off the computer, and turn down the volume on music. This can help you relax after a busy day. When should you call for help?   Watch closely for changes in your health, and be sure to contact your doctor if:    · Your efforts to improve your sleep do not work.     · Your insomnia gets worse.     · You have been feeling down, depressed, or hopeless or have lost interest in things that you usually enjoy. Where can you learn more? Go to http://iza-poornima.info/. Enter P513 in the search box to learn more about \"Insomnia: Care Instructions. \"  Current as of: June 28, 2018  Content Version: 12.1  © 5164-4745 [employer]. Care instructions adapted under license by [employer] (which disclaims liability or warranty for this information). If you have questions about a medical condition or this instruction, always ask your healthcare professional. [place] [address] any warranty or liability for your use of this information. Type 2 Diabetes: Care Instructions  Your Care Instructions    Type 2 diabetes is a disease that develops when the body's tissues cannot use insulin properly. Over time, the pancreas cannot make enough insulin. Insulin is a hormone that helps the body's cells use sugar (glucose) for energy. It also helps the body store extra sugar in muscle, fat, and liver cells. Without insulin, the sugar cannot get into the cells to do its work. It stays in the blood instead. This can cause high blood sugar levels. A person has diabetes when the blood sugar stays too high too much of the time. Over time, diabetes can lead to diseases of the heart, blood vessels, nerves, kidneys, and eyes. You may be able to control your blood sugar by losing weight, eating a healthy diet, and getting daily exercise. You may also have to take insulin or other diabetes medicine. Follow-up care is a key part of your treatment and safety. Be sure to make and go to all appointments. Call your doctor if you are having problems. It's also a good idea to know your test results and keep a list of the medicines you take. How can you care for yourself at home?   · Keep your blood sugar at a target level (which you set with your doctor). ? Eat a good diet that spreads carbohydrate throughout the day. Carbohydrate--the body's main source of fuel--affects blood sugar more than any other nutrient. Carbohydrate is in fruits, vegetables, milk, and yogurt. It also is in breads, cereals, vegetables such as potatoes and corn, and sugary foods such as candy and cakes. ? Aim for 30 minutes of exercise on most, preferably all, days of the week. Walking is a good choice. You also may want to do other activities, such as running, swimming, cycling, or playing tennis or team sports. If your doctor says it's okay, do muscle-strengthening exercises at least 2 times a week. ? Take your medicines exactly as prescribed. Call your doctor if you think you are having a problem with your medicine. You will get more details on the specific medicines your doctor prescribes. · Check your blood sugar as often as your doctor recommends. It is important to keep track of any symptoms you have, such as low blood sugar. Also tell your doctor if you have any changes in your activities, diet, or insulin use. · Talk to your doctor before you start taking aspirin every day. Aspirin can help certain people lower their risk of a heart attack or stroke. But taking aspirin isn't right for everyone, because it can cause serious bleeding. · Do not smoke. If you need help quitting, talk to your doctor about stop-smoking programs and medicines. These can increase your chances of quitting for good. · Keep your cholesterol and blood pressure at normal levels. You may need to take one or more medicines to reach your goals. Take them exactly as directed. Do not stop or change a medicine without talking to your doctor first.  When should you call for help? Call 911 anytime you think you may need emergency care. For example, call if:    · You passed out (lost consciousness), or you suddenly become very sleepy or confused.  (You may have very low blood sugar.)  Call your doctor now or seek immediate medical care if:    · Your blood sugar is 300 mg/dL or is higher than the level your doctor has set for you.     · You have symptoms of low blood sugar, such as:  ? Sweating. ? Feeling nervous, shaky, and weak. ? Extreme hunger and slight nausea. ? Dizziness and headache.  ? Blurred vision. ? Confusion.    Watch closely for changes in your health, and be sure to contact your doctor if:    · You often have problems controlling your blood sugar.     · You have symptoms of long-term diabetes problems, such as:  ? New vision changes. ? New pain, numbness, or tingling in your hands or feet. ? Skin problems. Where can you learn more? Go to http://iza-poornima.info/. Enter C553 in the search box to learn more about \"Type 2 Diabetes: Care Instructions. \"  Current as of: July 25, 2018  Content Version: 12.1  © 4340-7964 [employer]. Care instructions adapted under license by [employer] (which disclaims liability or warranty for this information). If you have questions about a medical condition or this instruction, always ask your healthcare professional. [place] [address] any warranty or liability for your use of this information.

## 2019-09-26 ENCOUNTER — HOSPITAL ENCOUNTER (OUTPATIENT)
Dept: ULTRASOUND IMAGING | Age: 64
Discharge: HOME | End: 2019-09-26
Attending: ORTHOPAEDIC SURGERY
Payer: MEDICARE

## 2019-09-26 DIAGNOSIS — M25.571 ACUTE RIGHT ANKLE PAIN: ICD-10-CM

## 2019-09-26 PROCEDURE — 76882 US LMTD JT/FCL EVL NVASC XTR: CPT

## 2019-10-16 ENCOUNTER — OFFICE VISIT (OUTPATIENT)
Dept: ORTHOPEDIC SURGERY | Age: 64
End: 2019-10-16

## 2019-10-16 VITALS
DIASTOLIC BLOOD PRESSURE: 73 MMHG | WEIGHT: 307 LBS | TEMPERATURE: 97.8 F | HEIGHT: 74 IN | RESPIRATION RATE: 12 BRPM | BODY MASS INDEX: 39.4 KG/M2 | HEART RATE: 87 BPM | OXYGEN SATURATION: 95 % | SYSTOLIC BLOOD PRESSURE: 146 MMHG

## 2019-10-16 DIAGNOSIS — S86.312A TEAR OF PERONEAL TENDON, LEFT, INITIAL ENCOUNTER: ICD-10-CM

## 2019-10-16 DIAGNOSIS — M25.371 RIGHT ANKLE INSTABILITY: ICD-10-CM

## 2019-10-16 DIAGNOSIS — R29.898 ANKLE WEAKNESS: Primary | ICD-10-CM

## 2019-10-16 NOTE — PROGRESS NOTES
AMBULATORY PROGRESS NOTE      Patient: Marylou William             MRN: [id number]     SSN: [id number] Body mass index is 39.42 kg/m². YOB: 1955     AGE: 59 y.o. SEX: male    PCP: Maritza Lopez NP     IMPRESSION/DIAGNOSIS AND TREATMENT PLAN     DIAGNOSES  1. Ankle weakness    2. Right ankle instability    3. Tear of peroneal tendon, left, initial encounter        Orders Placed This Encounter    REFERRAL TO NEUROLOGY    EMG ONE EXTREMITY LOWER RT    NCV/LAT MOTOR PER NERVE LOW/RT      Marylou William understands his diagnoses and the proposed plan. I reviewed the ultrasound he had done in [facility]. It does reveal a small interstitial tear to an 8-millimeter portion of the peroneal brevis tendon as it inserts to the fifth metatarsal base. The peroneal longus is intact. The ultrasound was done on September 26, 2019. He cannot have an MRI, as he has a spinal cord stimulator. So, at this point, my recommendation is still listed as below for a custom SMO brace. This will provide him some support. He does have weakness to eversion. I also recommend a Neurology consult to assess the etiology of the weakness. It may be from his diabetes, but we need to be more certain. Yuly York MD  has ordered a custom brace or DME product for you. This will be customized and made for you by an outside facility. I am requesting that you contact the [employer] company provided below in order to have the prescription made. Marylou William is in need of CUSTOM   Right     1. Unilateral,  AFO HINGED Right    2. GOAL OF TREATMENT TO: to provide M/L stability, optimal arch support, and limit ML/AP motion. Marylou William needs tri planar control (Medial / Lateral stability, optimal arch support, and limited Medial/Lateral // Anterior/Posterior Motion) of the foot and ankle in order to improve anatomical alignment, protect/control motion, and to relieve pain. Plan: 1) EMG/NCS of the RLE. 2) Referral to Neurology. 3) Obtain custom SMO brace as planned. 4) Use the CAM boot until custom brace is made. 5) Continue activity modification as directed. RTO - after EMG/NCS // November 4th     HPI AND EXAMINATION     Robyn William IS A 59 y.o. male who presents to my outpatient office for follow up of right ankle weakness and right ankle instability. At the last visit, I provided orders for a custom extended SMO brace and a short CAM boot, ordered an MRI of the right ankle, instructed the patient to continue activity modification as directed, and to anticipate a referral to neurology and EMG/NCS if condition does not improve. Since we saw him last, Mr. River De La Rosa states that he is still experiencing pain and weakness in his right lateral ankle. He does not recall any trauma or injuries to his right ankle. US results have been reviewed with the patient. He reports that he has an appointment on the 22nd with  to get fitted for his custom extended SMO brace. He adds that he is beginning to experience right hip pain, as well. The patient reports that he has had surgery on his back by Dr. Isa Brambila. He adds that if his ankle weakness is attributed to his back issues, he will not receive coverage from his insurance, as his back issues are from a work injury and coverage was provided by [facility], which is now closed. He reports that he never received his order for his [facility] initially, due to miscommunications with our staff. He reports that he has had multiple communication issues with our staff. The patient has h/o DM, neuropathy, and small vessel disease of the heart. He has h/o back surgery by Dr. Isa Brambila.      Visit Vitals  /73   Pulse 87   Temp 97.8 °F (36.6 °C) (Oral)   Resp 12   Ht 6' 2\" (1.88 m)   Wt 307 lb (139.3 kg)   SpO2 95%   BMI 39.42 kg/m²     Appearance: Alert, well appearing and pleasant patient who is in no distress, oriented to person, place/time, and who follows commands. This patient is accompanied in the examination room by his self. Dementia: no dementia  Psychiatric: Affect and mood are appropriate. Patient arrives to office via: with assistive device: cane  HEENT: Head normocephalic & atraumatic. Both pupils are round, non icteric sclera   Eye: EOM are intact and sclera are clear    Neck: ROM WNL and JVD neck is not present     Hearings Intact, does not require hearing aid device  Respiratory: Breathing is unlabored without accessory chest muscle use  Cardiovascular/Peripheral Vascular: Normal Pulses to each foot    ANKLE/FOOT left    Gait: uses cane  Tenderness: Mild tenderness with ROM. Cutaneous: WNL. Joint Motion: Painful ROM. Joint / Tendon Stability: No Ankle or Subtalar instability or joint laxity. No peroneal sublux ability or dislocation  Alignment: Pes cavus alignment bilaterally          Varus alignment of the right tibia, bowing of the RLE relative to the LLE. Neuro: Decreased monofilament testing to the plantar hindfoot   Cannot isabella   DF 4/5   PF 5/5   Inversion 5/5   Anterior tibial tendon 5/5   EDC 5/5   EHL 4/5  Vascular: NL foot/ankle pulses. 1+ Pitting edema, bilaterally  Lymphatics: No extremity lymphedema, No calf swelling, no tenderness to calf muscles.     CHART REVIEW     Past Medical History:   Diagnosis Date    Abdominal adhesions 7/30/2014    Back muscle spasm 7/30/2014    Back pain, lumbosacral     BPH (benign prostatic hyperplasia)     Cellulitis     Constipation, chronic 7/30/2014    DDD (degenerative disc disease), lumbar 7/30/2014    DDD (degenerative disc disease), thoracic 7/30/2014    Depression     Diabetes ([facility] [address]     DM (diabetes mellitus) ([facility] [address] 7/30/2014    Frequent falls 7/30/2014    TRUE (generalized anxiety disorder) 7/30/2014    High cholesterol     HTN (hypertension) 7/30/2014    Hydronephrosis     Hyperlipidemia 7/30/2014    Hypertension  Incomplete bladder emptying     Insomnia secondary to chronic pain 7/30/2014    Kidney stones     LBP radiating to both legs 7/30/2014    Lumbar facet arthropathy 7/30/2014    Lumbar nerve root impingement 7/30/2014    Lumbosacral radiculopathy at S1 7/30/2014    Major depression 7/30/2014    Nausea & vomiting     Neurological disorder     sciatica    MANISH (obstructive sleep apnea) 7/30/2014    S/P lumbar laminectomy 7/30/2014    S/P lumbar spinal fusion 7/30/2014    Small vessel disease (HCC)     Thoracic compression fracture (HCC) 07/30/2014    Unspecified sleep apnea     USES CPAP EVERY NIGHT     Current Outpatient Medications   Medication Sig    tamsulosin (FLOMAX) 0.4 mg capsule Take 1 Cap by mouth daily (after dinner).  zolpidem (AMBIEN) 10 mg tablet Take 1 Tab by mouth nightly. Max Daily Amount: 10 mg.    ALPRAZolam (XANAX) 0.5 mg tablet Take 1 Tab by mouth two (2) times daily as needed for Anxiety. Max Daily Amount: 1 mg.  metoprolol succinate (TOPROL-XL) 50 mg XL tablet Take 1 Tab by mouth daily.  dulaglutide (TRULICITY) 1.5 FU/4.8 mL sub-q pen 0.5 mL by SubCUTAneous route every seven (7) days.  phentermine (ADIPEX-P) 37.5 mg tablet Take 1 Tab by mouth every morning. Max Daily Amount: 37.5 mg.    pramipexole (MIRAPEX) 0.75 mg tablet TAKE 1 TABLET Three times a day    diclofenac EC (VOLTAREN) 50 mg EC tablet Take 1 Tab by mouth two (2) times daily (with meals).  nitroglycerin (NITROSTAT) 0.4 mg SL tablet 1 Tab by SubLINGual route every five (5) minutes as needed for Chest Pain. Use for Chest Pain ; Call MD if > 3 tablets required    simvastatin (ZOCOR) 10 mg tablet TAKE 1 TABLET EVERY NIGHT    OMEPRAZOLE PO Take  by mouth daily as needed.  polyethylene glycol (MIRALAX) 17 gram packet Take 1 Packet by mouth daily. Hold for loose stools    metFORMIN ER (GLUCOPHAGE XR) 500 mg tablet Take 2 Tabs by mouth two (2) times daily (with meals).     isosorbide mononitrate ER (IMDUR) 60 mg CR tablet TAKE 1 TABLET EVERY MORNING (Patient taking differently: nightly. TAKE 1 TABLET EVERY MORNING)    ACCU-CHEK FASTCLIX LANCET DRUM misc CHECK BLOOD SUGAR EVERY DAY    gabapentin (NEURONTIN) 800 mg tablet Take  by mouth three (3) times daily as needed.  ACCU-CHEK SMARTVIEW TEST STRIP strip CHECK BLOOD SUGAR EVERY DAY    Blood-Glucose Meter (ACCU-CHEK GABRIELLE) misc Check blood sugar daily    DULoxetine (CYMBALTA) 60 mg capsule Take 1 Cap by mouth daily.  naloxone 4 mg/actuation spry 4 mg by Nasal route as needed.  Back Brace misc 1 Device by Does Not Apply route daily as needed for Pain. One, lumbosacral orthosis/back brace with metal struts      Medically necessary     No current facility-administered medications for this visit. Allergies   Allergen Reactions    Bactrim [Sulfamethoprim] Rash    Opana [Oxymorphone] Other (comments)     Feels like bug crawling under skin and drowziness     Past Surgical History:   Procedure Laterality Date    HX APPENDECTOMY      HX BACK SURGERY      Lumbar fusion    HX HERNIA REPAIR  1992    HX OTHER SURGICAL      EMG - S1 disorder    HX OTHER SURGICAL  10/2017    spinal cord stimulator    HX VASECTOMY  1985     Social History     Occupational History    Not on file   Tobacco Use    Smoking status: Never Smoker    Smokeless tobacco: Never Used   Substance and Sexual Activity    Alcohol use: No    Drug use: No    Sexual activity: Yes     Partners: Female     Birth control/protection: Surgical     Family History   Problem Relation Age of Onset    Diabetes Mother     Heart Failure Mother     Heart Attack Father     Diabetes Sister     Stroke Brother       REVIEW OF SYSTEMS : 10/16/2019  ALL BELOW ARE Negative except : SEE HPI     General: Negative for fever and chills. No unexpected change in weight. Denies fatigue. No change in appetite.    Dermatologic: Negative for rash or itching, dry skin, hair changes, rash or skin lesion changes  HEENT: Negative for congestion, sore throat, neck pain and neck stiffness. No change in vision or hearing. Hasn't noted any enlarged lymph nodes in the neck. Cardiovascular:  Negative for chest pain and palpitations. Has not noted pedal edema. Peripheral Vascular: No calf pain, vascular vein tenderness to calf pain           No calf throbbing, posterior knee throbbing pain   Respiratory: Negative for cough, colds, sinus, hemoptysis, shortness of breath and wheezing. Gastrointestinal: Negative for nausea and vomiting, rectal bleeding, coffee ground emesis, abdominal pain, diarrhea and constipation. Genitourinary: Negative for dysuria, frequency urgency, or burning on micturition. No flank pain, no foul smelling urine, no difficulty with initiating urination. Hematological: Negative for bleeding or easy bruising. Musculoskeletal: Negative  for arthralgias, back pain or neck pain. Neurological: Negative for dizziness, seizures or syncopal episodes. Denies headaches. Endocrine: Denies excessive thirst.  No heat/cold intolerance. Psychiatric: Negative for depression or insomnia. No calf throbbing, posterior knee throbbing pain     DIAGNOSTIC IMAGING     No notes on file     US Results (most recent):  Results from Hospital Encounter encounter on 09/26/19   US EXT NONVAS RT LTD    Narrative EXAM: ULTRASOUND OF THE RIGHT LOWER EXTREMITY, COMPLETE    CLINICAL INDICATION/HISTORY: Persistent lateral right ankle pain and swelling    COMPARISON: None. TECHNIQUE: Real-time sonographic evaluation in the region of concern at the  level of the right ankle was obtained. Multiple static grayscale and Doppler  face sonographic images are provided. _______________    FINDINGS:    TENDONS:    >  Small partial-thickness tear is seen within the distal peroneus brevis  tendon insertion on the fifth metatarsal base, approximately 25-50% tendon  thickness and 0.8 cm in length.  There is minimal adjacent distal insertional  peroneal tenosynovitis. >  Peroneus longus remains normal in caliber and echogenicity throughout,  without findings of peroneus longus tendinosis or tearing. LIGAMENTS:    >  The anterior talofibular ligament is attenuated suggesting prior high-grade  injury/partial tear.      >  Intermediate echogenicity and asymmetric thickening are seen throughout the  posterior talofibular and calcaneofibular ligaments, suggestive of recurrent  stretching deformation. SOFT TISSUES:    >  Mild subcutaneous edema is seen along anterolateral and posterolateral  aspects of the lateral malleolus. No focal soft tissue fluid collection. OSSEOUS: Limited evaluation of osseous structures by ultrasound technique with  no large marginal osteophytes are evident joint space narrowing. No joint  effusion. _______________      Impression IMPRESSION:    1. Small focal partial-thickness tear is seen in the distal insertional peroneus  brevis tendon, as described. No high-grade partial or full-thickness peroneal  tendon tear. 2. Prior high-grade injury/partial tear of anterior talofibular ligament and  chronic stretching deformation of posterior talofibular and calcaneofibular  ligaments. 3. Mild, probably reactive overlying lateral malleolar soft tissue edema. Please see above section of this report. I have personally reviewed the results of the above study. The interpretation of this study is my professional opinion. Written by Loly Howe, as dictated by Dr. Boogie Claros. I, Dr. Boogie Claros, confirm that all documentation is accurate.

## 2019-10-16 NOTE — PROGRESS NOTES
1. Have you been to the ER, urgent care clinic since your last visit? Hospitalized since your last visit? No    2. Have you seen or consulted any other health care providers outside of the [address] [place] [address] since your last visit? Include any pap smears or colon screening.  No

## 2019-10-21 RX ORDER — DICLOFENAC SODIUM 50 MG/1
TABLET, DELAYED RELEASE ORAL
Qty: 120 TAB | Refills: 0 | Status: SHIPPED | OUTPATIENT
Start: 2019-10-21 | End: 2019-11-18

## 2019-10-30 RX ORDER — SIMVASTATIN 10 MG/1
TABLET, FILM COATED ORAL
Qty: 90 TAB | Refills: 0 | Status: SHIPPED | OUTPATIENT
Start: 2019-10-30 | End: 2019-12-06 | Stop reason: SDUPTHER

## 2019-10-30 NOTE — TELEPHONE ENCOUNTER
Last OV  9/13/2019  No future appointments   Last refill 4/4/2019      Please call patient and schedule an appointment with NP Aakash.

## 2019-11-06 ENCOUNTER — TELEPHONE (OUTPATIENT)
Dept: FAMILY MEDICINE CLINIC | Age: 64
End: 2019-11-06

## 2019-11-06 NOTE — TELEPHONE ENCOUNTER
Patient is aware Shabnamy is ready for  from the front office. [facility].

## 2019-11-15 ENCOUNTER — HOSPITAL ENCOUNTER (OUTPATIENT)
Dept: LAB | Age: 64
Discharge: HOME | End: 2019-11-15
Payer: MEDICARE

## 2019-11-15 LAB
FOLATE SERPL-MCNC: >20 NG/ML (ref 3.1–17.5)
HBA1C MFR BLD: 8.4 % (ref 4.2–5.6)
TSH SERPL DL<=0.05 MIU/L-ACNC: 1.03 UIU/ML (ref 0.36–3.74)
VIT B12 SERPL-MCNC: 486 PG/ML (ref 211–911)

## 2019-11-15 PROCEDURE — 84443 ASSAY THYROID STIM HORMONE: CPT

## 2019-11-15 PROCEDURE — 83036 HEMOGLOBIN GLYCOSYLATED A1C: CPT

## 2019-11-15 PROCEDURE — 82607 VITAMIN B-12: CPT

## 2019-11-15 PROCEDURE — 36415 COLL VENOUS BLD VENIPUNCTURE: CPT

## 2019-11-15 PROCEDURE — 82784 ASSAY IGA/IGD/IGG/IGM EACH: CPT

## 2019-11-18 ENCOUNTER — OFFICE VISIT (OUTPATIENT)
Dept: ORTHOPEDIC SURGERY | Age: 64
End: 2019-11-18

## 2019-11-18 VITALS
TEMPERATURE: 96.5 F | SYSTOLIC BLOOD PRESSURE: 132 MMHG | DIASTOLIC BLOOD PRESSURE: 72 MMHG | BODY MASS INDEX: 39.4 KG/M2 | HEIGHT: 74 IN | OXYGEN SATURATION: 94 % | WEIGHT: 307 LBS | HEART RATE: 82 BPM

## 2019-11-18 DIAGNOSIS — R29.898 ANKLE WEAKNESS: Primary | ICD-10-CM

## 2019-11-18 DIAGNOSIS — M79.671 RIGHT FOOT PAIN: ICD-10-CM

## 2019-11-18 DIAGNOSIS — S86.311D TEAR OF PERONEAL TENDON, RIGHT, SUBSEQUENT ENCOUNTER: ICD-10-CM

## 2019-11-18 DIAGNOSIS — M25.371 RIGHT ANKLE INSTABILITY: ICD-10-CM

## 2019-11-18 RX ORDER — CELECOXIB 200 MG/1
200 CAPSULE ORAL DAILY
Qty: 30 CAP | Refills: 0 | Status: SHIPPED | OUTPATIENT
Start: 2019-11-18 | End: 2020-04-14

## 2019-11-18 NOTE — PROGRESS NOTES
AMBULATORY PROGRESS NOTE      Patient: Nancy William             MRN: [id number]     SSN: [id number] Body mass index is 39.42 kg/m². YOB: 1955     AGE: 59 y.o. SEX: male    PCP: Hever Scanlon NP     IMPRESSION/DIAGNOSIS AND TREATMENT PLAN     DIAGNOSES  1. Ankle weakness    2. Right ankle instability    3. Tear of peroneal tendon, right, subsequent encounter    4. Right foot pain        Orders Placed This Encounter    celecoxib (CELEBREX) 200 mg capsule      Nancy William understands his diagnoses and the proposed plan. So, I need to get the EMG nerve conduction studies by Dr. Kenia Armendariz. The patient's home phone number is [phone]. So, we will call him once we see these results of the EMG nerve conduction studies. Otherwise, conservative care is recommended. The patient tells me that Dr. Kenia Armendariz told him that he has nerve damage more than likely from his diabetes, but we just need to see the results. Plan:    1) Celebrex 200 m PO every day; 30 tablets, 0 refills. 2) Call patient with EMG/NCS results, [phone]. 3) Continue activity modification as directed. RTO - 3 weeks      HPI AND EXAMINATION     Nancy William IS A 59 y.o. male who presents to my outpatient office for follow up of a tear of the right peroneal tendon, right ankle weakness, and right ankle instability. At the last visit, I ordered EMG/NCS of the RLE, provided a referral to neurology, instructed the patient to continue activity modification as directed, to obtain the custom SMO brace, and to use the CAM boot until the brace is made. Since we saw him last, Mr. Barak Qiu states that he had his EMG/NCS performed and notes that Dr. Kenia Armendariz believes that he definitely has nerve damage and that his motor function is better in the left foot than the right. He reports that he obtained his [facility] brace on Thursday and he finds that it fits fine.  He notes that the brace has been helping with his stability. He finds that his foot wants to isabella when he does not wear the brace inside the home. He notes that he has been taking gabapentin for the pain, however, he states that this medication makes him sleepy. He inquires about whether he can receive another medication for pain. He believes that he has been experiencing more pain in his ankle and his hip, which he attributes to his body adjusting to the new brace. He denies any kidney or GI issues. He notes that Dr. Adelso Faust prescribed him Voltaren 75 mg which he states does not help with his pain. The patient has h/o DM, neuropathy, and small vessel disease of the heart. He has h/o back surgery by Dr. Ravindra Abreu. Visit Vitals  /72   Pulse 82   Temp 96.5 °F (35.8 °C) (Oral)   Ht 6' 2\" (1.88 m)   Wt 307 lb (139.3 kg)   SpO2 94%   BMI 39.42 kg/m²     Appearance: Alert, well appearing and pleasant patient who is in no distress, oriented to person, place/time, and who follows commands. This patient is accompanied in the examination room by his self. Dementia: no dementia  Psychiatric: Affect and mood are appropriate. Patient arrives to office via: with assistive device: cane  HEENT: Head normocephalic & atraumatic. Both pupils are round, non icteric sclera   Eye: EOM are intact and sclera are clear    Neck: ROM WNL and JVD neck is not present     Hearings Intact, does not require hearing aid device  Respiratory: Breathing is unlabored without accessory chest muscle use  Cardiovascular/Peripheral Vascular: Normal Pulses to each foot    ANKLE/FOOT right    Gait: uses cane  Tenderness: Mild tenderness with ROM. Mild tenderness to the GT  Cutaneous: WNL. Joint Motion: Painful ROM. Joint / Tendon Stability: No Ankle or Subtalar instability or joint laxity.                        No peroneal sublux ability or dislocation  Alignment: Pes cavus alignment bilaterally          Varus alignment of the right tibia, bowing of the RLE relative to the LLE.   Neuro: Decreased monofilament testing to the plantar hindfoot   Cannot isabella   DF 4/5   PF 5/5   Inversion 5/5   Anterior tibial tendon 5/5   EDC 5/5   EHL 4/5  Vascular: NL foot/ankle pulses. 1+ Pitting edema, bilaterally  Lymphatics: No extremity lymphedema, No calf swelling, no tenderness to calf muscles. CHART REVIEW     Past Medical History:   Diagnosis Date    Abdominal adhesions 7/30/2014    Back muscle spasm 7/30/2014    Back pain, lumbosacral     BPH (benign prostatic hyperplasia)     Cellulitis     Constipation, chronic 7/30/2014    DDD (degenerative disc disease), lumbar 7/30/2014    DDD (degenerative disc disease), thoracic 7/30/2014    Depression     Diabetes ([facility]     DM (diabetes mellitus) ([facility] 7/30/2014    Frequent falls 7/30/2014    TRUE (generalized anxiety disorder) 7/30/2014    High cholesterol     HTN (hypertension) 7/30/2014    Hydronephrosis     Hyperlipidemia 7/30/2014    Hypertension     Incomplete bladder emptying     Insomnia secondary to chronic pain 7/30/2014    Kidney stones     LBP radiating to both legs 7/30/2014    Lumbar facet arthropathy 7/30/2014    Lumbar nerve root impingement 7/30/2014    Lumbosacral radiculopathy at S1 7/30/2014    Major depression 7/30/2014    Nausea & vomiting     Neurological disorder     sciatica    MANISH (obstructive sleep apnea) 7/30/2014    S/P lumbar laminectomy 7/30/2014    S/P lumbar spinal fusion 7/30/2014    Small vessel disease (HCC)     Thoracic compression fracture (HCC) 07/30/2014    Unspecified sleep apnea     USES CPAP EVERY NIGHT     Current Outpatient Medications   Medication Sig    celecoxib (CELEBREX) 200 mg capsule Take 1 Cap by mouth daily.  simvastatin (ZOCOR) 10 mg tablet TAKE 1 TABLET EVERY NIGHT    tamsulosin (FLOMAX) 0.4 mg capsule Take 1 Cap by mouth daily (after dinner).  zolpidem (AMBIEN) 10 mg tablet Take 1 Tab by mouth nightly.  Max Daily Amount: 10 mg.    ALPRAZolam [place]) 0.5 mg tablet Take 1 Tab by mouth two (2) times daily as needed for Anxiety. Max Daily Amount: 1 mg.  metoprolol succinate (TOPROL-XL) 50 mg XL tablet Take 1 Tab by mouth daily.  dulaglutide (TRULICITY) 1.5 QG/5.3 mL sub-q pen 0.5 mL by SubCUTAneous route every seven (7) days.  pramipexole (MIRAPEX) 0.75 mg tablet TAKE 1 TABLET Three times a day    nitroglycerin (NITROSTAT) 0.4 mg SL tablet 1 Tab by SubLINGual route every five (5) minutes as needed for Chest Pain. Use for Chest Pain ; Call MD if > 3 tablets required    OMEPRAZOLE PO Take  by mouth daily as needed.  polyethylene glycol (MIRALAX) 17 gram packet Take 1 Packet by mouth daily. Hold for loose stools    metFORMIN ER (GLUCOPHAGE XR) 500 mg tablet Take 2 Tabs by mouth two (2) times daily (with meals).  isosorbide mononitrate ER (IMDUR) 60 mg CR tablet TAKE 1 TABLET EVERY MORNING (Patient taking differently: nightly. TAKE 1 TABLET EVERY MORNING)    ACCU-CHEK FASTCLIX LANCET DRUM [facility] [place]LOOD SUGAR EVERY DAY    gabapentin (NEURONTIN) 800 mg tablet Take  by mouth three (3) times daily as needed.  ACCU-CHEK SMARTVIEW TEST STRIP strip CHECK BLOOD SUGAR EVERY DAY    Blood-Glucose Meter (ACCU-CHEK GABRIELLE) misc Check blood sugar daily    DULoxetine (CYMBALTA) 60 mg capsule Take 1 Cap by mouth daily.  naloxone 4 mg/actuation spry 4 mg by Nasal route as needed.  Back Brace [facility] [place] Device by Does Not Apply route daily as needed for Pain. One, lumbosacral orthosis/back brace with metal struts      Medically necessary    phentermine (ADIPEX-P) 37.5 mg tablet Take 1 Tab by mouth every morning. Max Daily Amount: 37.5 mg. No current facility-administered medications for this visit.       Allergies   Allergen Reactions    Bactrim [Sulfamethoprim] Rash    Opana [Oxymorphone] Other (comments)     Feels like bug crawling under skin and drowziness     Past Surgical History:   Procedure Laterality Date    HX APPENDECTOMY      HX BACK SURGERY      Lumbar fusion    HX HERNIA REPAIR  1992    HX OTHER SURGICAL      EMG - S1 disorder    HX OTHER SURGICAL  10/2017    spinal cord stimulator    HX VASECTOMY  1985     Social History     Occupational History    Not on file   Tobacco Use    Smoking status: Never Smoker    Smokeless tobacco: Never Used   Substance and Sexual Activity    Alcohol use: No    Drug use: No    Sexual activity: Yes     Partners: Female     Birth control/protection: Surgical     Family History   Problem Relation Age of Onset    Diabetes Mother     Heart Failure Mother     Heart Attack Father     Diabetes Sister     Stroke Brother       REVIEW OF SYSTEMS : 11/18/2019  ALL BELOW ARE Negative except : SEE HPI     General: Negative for fever and chills. No unexpected change in weight. Denies fatigue. No change in appetite. Dermatologic: Negative for rash or itching, dry skin, hair changes, rash or skin lesion changes  HEENT: Negative for congestion, sore throat, neck pain and neck stiffness. No change in vision or hearing. Hasn't noted any enlarged lymph nodes in the neck. Cardiovascular:  Negative for chest pain and palpitations. Has not noted pedal edema. Peripheral Vascular: No calf pain, vascular vein tenderness to calf pain           No calf throbbing, posterior knee throbbing pain   Respiratory: Negative for cough, colds, sinus, hemoptysis, shortness of breath and wheezing. Gastrointestinal: Negative for nausea and vomiting, rectal bleeding, coffee ground emesis, abdominal pain, diarrhea and constipation. Genitourinary: Negative for dysuria, frequency urgency, or burning on micturition. No flank pain, no foul smelling urine, no difficulty with initiating urination. Hematological: Negative for bleeding or easy bruising. Musculoskeletal: Negative  for arthralgias, back pain or neck pain. Neurological: Negative for dizziness, seizures or syncopal episodes. Denies headaches.    Endocrine: Denies excessive thirst.  No heat/cold intolerance. Psychiatric: Negative for depression or insomnia. No calf throbbing, posterior knee throbbing pain     DIAGNOSTIC IMAGING     No notes on file      Please see above section of this report. I have personally reviewed the results of the above study. The interpretation of this study is my professional opinion. Written by Lena Sports, as dictated by Dr. Lit Shay. I, Dr. Lit Shay, confirm that all documentation is accurate.

## 2019-11-21 LAB
IGA SERPL-MCNC: 205 MG/DL (ref 61–437)
IGG SERPL-MCNC: 1295 MG/DL (ref 700–1600)
IGM SERPL-MCNC: 87 MG/DL (ref 20–172)
PROT PATTERN SERPL IFE-IMP: NORMAL

## 2019-12-04 ENCOUNTER — OFFICE VISIT (OUTPATIENT)
Dept: FAMILY MEDICINE CLINIC | Age: 64
End: 2019-12-04

## 2019-12-04 VITALS
WEIGHT: 315 LBS | OXYGEN SATURATION: 96 % | TEMPERATURE: 98.6 F | BODY MASS INDEX: 40.43 KG/M2 | SYSTOLIC BLOOD PRESSURE: 138 MMHG | RESPIRATION RATE: 16 BRPM | HEART RATE: 84 BPM | HEIGHT: 74 IN | DIASTOLIC BLOOD PRESSURE: 80 MMHG

## 2019-12-04 DIAGNOSIS — I10 ESSENTIAL HYPERTENSION: ICD-10-CM

## 2019-12-04 DIAGNOSIS — H00.015 HORDEOLUM EXTERNUM OF LEFT LOWER EYELID: Primary | ICD-10-CM

## 2019-12-04 RX ORDER — CEPHALEXIN 500 MG/1
CAPSULE ORAL
Qty: 15 CAP | Refills: 0 | Status: SHIPPED | OUTPATIENT
Start: 2019-12-04 | End: 2020-04-14

## 2019-12-04 NOTE — PROGRESS NOTES
Chief Complaint   Patient presents with    Eye Pain       Pt preferred language for health care discussion is english. Is someone accompanying this pt? no    Is the patient using any DME equipment during [address]? no    Depression Screening:  3 most recent PHQ Screens 9/13/2019 4/11/2019 4/11/2019 5/15/2018 3/25/2014   PHQ Not Done - Active Diagnosis of Depression or Bipolar Disorder - - Active Diagnosis of Depression or Bipolar Disorder   Little interest or pleasure in doing things Not at all Several days Several days Several days -   Feeling down, depressed, irritable, or hopeless Not at all Several days - Several days -   Total Score PHQ 2 0 2 - 2 -   Trouble falling or staying asleep, or sleeping too much Several days More than half the days - - -   Feeling tired or having little energy Several days Nearly every day - - -   Poor appetite, weight loss, or overeating Not at all Several days - - -   Feeling bad about yourself - or that you are a failure or have let yourself or your family down Not at all Not at all - - -   Trouble concentrating on things such as school, work, reading, or watching TV Not at all Not at all - - -   Moving or speaking so slowly that other people could have noticed; or the opposite being so fidgety that others notice Not at all Not at all - - -   Thoughts of being better off dead, or hurting yourself in some way Not at all Not at all - - -   PHQ 9 Score 2 8 - - -   How difficult have these problems made it for you to do your work, take care of your home and get along with others - Somewhat difficult - - -       Learning Assessment:  Learning Assessment 8/16/2018 8/30/2017 2/24/2014   PRIMARY LEARNER Patient Patient Patient   PRIMARY LANGUAGE ENGLISH ENGLISH ENGLISH   LEARNER PREFERENCE PRIMARY DEMONSTRATION DEMONSTRATION DEMONSTRATION   ANSWERED BY patient patient patient   RELATIONSHIP SELF SELF SELF         Health Maintenance reviewed and discussed per provider.  Yes        Advance Directive:  1. Do you have an advance directive in place? Patient Reply:no    2. If not, would you like material regarding how to put one in place? Patient Reply: no    Coordination of Care:  1. Have you been to the ER, urgent care clinic since your last visit? Hospitalized since your last visit? no    2. Have you seen or consulted any other health care providers outside of the [address] [place] [address] since your last visit? Include any pap smears or colon screening.  no    Provider prefers to do his own med reconciliation

## 2019-12-04 NOTE — PATIENT INSTRUCTIONS
DASH Diet: Care Instructions  Your Care Instructions    The DASH diet is an eating plan that can help lower your blood pressure. DASH stands for Dietary Approaches to Stop Hypertension. Hypertension is high blood pressure. The DASH diet focuses on eating foods that are high in calcium, potassium, and magnesium. These nutrients can lower blood pressure. The foods that are highest in these nutrients are fruits, vegetables, low-fat dairy products, nuts, seeds, and legumes. But taking calcium, potassium, and magnesium supplements instead of eating foods that are high in those nutrients does not have the same effect. The DASH diet also includes whole grains, fish, and poultry. The DASH diet is one of several lifestyle changes your doctor may recommend to lower your high blood pressure. Your doctor may also want you to decrease the amount of sodium in your diet. Lowering sodium while following the DASH diet can lower blood pressure even further than just the DASH diet alone. Follow-up care is a key part of your treatment and safety. Be sure to make and go to all appointments, and call your doctor if you are having problems. It's also a good idea to know your test results and keep a list of the medicines you take. How can you care for yourself at home? Following the DASH diet  · Eat 4 to 5 servings of fruit each day. A serving is 1 medium-sized piece of fruit, ½ cup chopped or canned fruit, 1/4 cup dried fruit, or 4 ounces (½ cup) of fruit juice. Choose fruit more often than fruit juice. · Eat 4 to 5 servings of vegetables each day. A serving is 1 cup of lettuce or raw leafy vegetables, ½ cup of chopped or cooked vegetables, or 4 ounces (½ cup) of vegetable juice. Choose vegetables more often than vegetable juice. · Get 2 to 3 servings of low-fat and fat-free dairy each day. A serving is 8 ounces of milk, 1 cup of yogurt, or 1 ½ ounces of cheese. · Eat 6 to 8 servings of grains each day.  A serving is 1 slice of bread, 1 ounce of dry cereal, or ½ cup of cooked rice, pasta, or cooked cereal. Try to choose whole-grain products as much as possible. · Limit lean meat, poultry, and fish to 2 servings each day. A serving is 3 ounces, about the size of a deck of cards. · Eat 4 to 5 servings of nuts, seeds, and legumes (cooked dried beans, lentils, and split peas) each week. A serving is 1/3 cup of nuts, 2 tablespoons of seeds, or ½ cup of cooked beans or peas. · Limit fats and oils to 2 to 3 servings each day. A serving is 1 teaspoon of vegetable oil or 2 tablespoons of salad dressing. · Limit sweets and added sugars to 5 servings or less a week. A serving is 1 tablespoon jelly or jam, ½ cup sorbet, or 1 cup of lemonade. · Eat less than 2,300 milligrams (mg) of sodium a day. If you limit your sodium to 1,500 mg a day, you can lower your blood pressure even more. Tips for success  · Start small. Do not try to make dramatic changes to your diet all at once. You might feel that you are missing out on your favorite foods and then be more likely to not follow the plan. Make small changes, and stick with them. Once those changes become habit, add a few more changes. · Try some of the following:  ? Make it a goal to eat a fruit or vegetable at every meal and at snacks. This will make it easy to get the recommended amount of fruits and vegetables each day. ? Try yogurt topped with fruit and nuts for a snack or healthy dessert. ? Add lettuce, tomato, cucumber, and onion to sandwiches. ? Combine a ready-made pizza crust with low-fat mozzarella cheese and lots of vegetable toppings. Try using tomatoes, squash, spinach, broccoli, carrots, cauliflower, and onions. ? Have a variety of cut-up vegetables with a low-fat dip as an appetizer instead of chips and dip. ? Sprinkle sunflower seeds or chopped almonds over salads. Or try adding chopped walnuts or almonds to cooked vegetables.   ? Try some vegetarian meals using beans and peas. Add garbanzo or kidney beans to salads. Make burritos and tacos with mashed kline beans or black beans. Where can you learn more? Go to http://iza-poornima.info/. Enter W705 in the search box to learn more about \"DASH Diet: Care Instructions. \"  Current as of: April 9, 2019  Content Version: 12.2  © 1876-0539 [employer]. Care instructions adapted under license by [employer] (which disclaims liability or warranty for this information). If you have questions about a medical condition or this instruction, always ask your healthcare professional. [address] any warranty or liability for your use of this information.

## 2019-12-06 RX ORDER — PRAMIPEXOLE DIHYDROCHLORIDE 0.75 MG/1
TABLET ORAL
Qty: 270 TAB | Refills: 1 | Status: SHIPPED | OUTPATIENT
Start: 2019-12-06 | End: 2020-02-26

## 2019-12-06 RX ORDER — SIMVASTATIN 10 MG/1
TABLET, FILM COATED ORAL
Qty: 90 TAB | Refills: 0 | Status: SHIPPED | OUTPATIENT
Start: 2019-12-06 | End: 2020-08-27 | Stop reason: SDUPTHER

## 2019-12-09 ENCOUNTER — OFFICE VISIT (OUTPATIENT)
Dept: ORTHOPEDIC SURGERY | Age: 64
End: 2019-12-09

## 2019-12-09 VITALS
DIASTOLIC BLOOD PRESSURE: 75 MMHG | BODY MASS INDEX: 40.43 KG/M2 | HEIGHT: 74 IN | HEART RATE: 85 BPM | TEMPERATURE: 96.4 F | SYSTOLIC BLOOD PRESSURE: 141 MMHG | WEIGHT: 315 LBS | RESPIRATION RATE: 16 BRPM | OXYGEN SATURATION: 95 %

## 2019-12-09 DIAGNOSIS — S86.311D TEAR OF PERONEAL TENDON, RIGHT, SUBSEQUENT ENCOUNTER: Primary | ICD-10-CM

## 2019-12-09 DIAGNOSIS — R29.898 ANKLE WEAKNESS: ICD-10-CM

## 2019-12-09 DIAGNOSIS — M25.371 RIGHT ANKLE INSTABILITY: ICD-10-CM

## 2019-12-09 NOTE — PROGRESS NOTES
AMBULATORY PROGRESS NOTE      Patient: Yuliana William             MRN: [id number]     SSN: [id number] Body mass index is 41.21 kg/m². YOB: 1955     AGE: 59 y.o. SEX: male    PCP: Brianna Figueroa NP     IMPRESSION/DIAGNOSIS AND TREATMENT PLAN     DIAGNOSES  1. Tear of peroneal tendon, right, subsequent encounter    2. Ankle weakness    3. Right ankle instability        No orders of the defined types were placed in this encounter. Yuliana William understands his diagnoses and the proposed plan. Plan:    1) Plan for referral to [address] [place] [address] Todd Reynolds for assistance with ADLs after surgery. 2) Plan for PCP clearance prior to surgery. 3) Continue using the [address] as directed. 4) Continue activity modification as directed. 5) Contact Anna for surgical scheduling. RTO - after contacting [address] [place] [address] Nataliia IS A 59 y.o. male who presents to my outpatient office for follow up of a tear of the right peroneal tendon, right ankle weakness, and right ankle instability. At the last visit, I prescribed Celebrex 200 mg, instructed the patient to continue activity modification as directed, and planned to call the patient with EMS/NCS results. Since we saw him last, Mr. Ryan Opitz states that he is still experiencing issues with his balance. He notes that the [address] does seem to help with his balance, but he reports that his ankle does still seem to turn a bit when he walks. Possible surgical interventions have been discussed with the patient. 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 As such, he notes that he does not have much support in the home. He reports that he would like to have surgery sometime in January, after the [address] [place] [address]. The patient has h/o DM, neuropathy, and small vessel disease of the heart. He has h/o back surgery by Dr. Yvrose Capone. He states that he does not have a cardiologist at this time.      Visit Vitals  /75 (BP 1 Location: Left arm, BP Patient Position: Sitting)   Pulse 85   Temp 96.4 °F (35.8 °C) (Oral)   Resp 16   Ht 6' 2\" (1.88 m)   Wt 321 lb (145.6 kg)   SpO2 95%   BMI 41.21 kg/m²     Appearance: Alert, well appearing and pleasant patient who is in no distress, oriented to person, place/time, and who follows commands. This patient is accompanied in the examination room by his self. Dementia: no dementia  Psychiatric: Affect and mood are appropriate. Patient arrives to office via: with assistive device: cane and SMO  HEENT: Head normocephalic & atraumatic. Both pupils are round, non icteric sclera   Eye: EOM are intact and sclera are clear    Neck: ROM WNL and JVD neck is not present     Hearings Intact, does not require hearing aid device  Respiratory: Breathing is unlabored without accessory chest muscle use  Cardiovascular/Peripheral Vascular: Normal Pulses to each foot    ANKLE/FOOT right    Gait: uses cane and SMO  Tenderness: Mild tenderness with ROM. Mild tenderness to the GT  Cutaneous: WNL. Joint Motion: Painful ROM. Joint / Tendon Stability: No Ankle or Subtalar instability or joint laxity. No peroneal sublux ability or dislocation  Alignment: Pes cavus alignment bilaterally          Varus alignment of the right tibia, bowing of the RLE relative to the LLE. Neuro: Decreased monofilament testing to the plantar hindfoot   Cannot isabella   DF 4/5   PF 5/5   Inversion 5/5   Anterior tibial tendon 5/5   Posterior tibial tendon 5/5   EDC 5/5   EHL 4/5  Vascular: NL foot/ankle pulses. 1+ Pitting edema, bilaterally  Lymphatics: No extremity lymphedema, No calf swelling, no tenderness to calf muscles.     CHART REVIEW     Past Medical History:   Diagnosis Date    Abdominal adhesions 7/30/2014    Back muscle spasm 7/30/2014    Back pain, lumbosacral     BPH (benign prostatic hyperplasia)     Cellulitis     Constipation, chronic 7/30/2014    DDD (degenerative disc disease), lumbar 7/30/2014  DDD (degenerative disc disease), thoracic 7/30/2014    Depression     Diabetes ([facility]     DM (diabetes mellitus) ([facility] 7/30/2014    Frequent falls 7/30/2014    TRUE (generalized anxiety disorder) 7/30/2014    High cholesterol     HTN (hypertension) 7/30/2014    Hydronephrosis     Hyperlipidemia 7/30/2014    Hypertension     Incomplete bladder emptying     Insomnia secondary to chronic pain 7/30/2014    Kidney stones     LBP radiating to both legs 7/30/2014    Lumbar facet arthropathy 7/30/2014    Lumbar nerve root impingement 7/30/2014    Lumbosacral radiculopathy at S1 7/30/2014    Major depression 7/30/2014    Nausea & vomiting     Neurological disorder     sciatica    MANISH (obstructive sleep apnea) 7/30/2014    S/P lumbar laminectomy 7/30/2014    S/P lumbar spinal fusion 7/30/2014    Small vessel disease (HCC)     Thoracic compression fracture (HCC) 07/30/2014    Unspecified sleep apnea     USES CPAP EVERY NIGHT     Current Outpatient Medications   Medication Sig    simvastatin (ZOCOR) 10 mg tablet TAKE 1 TABLET EVERY NIGHT    pramipexole (MIRAPEX) 0.75 mg tablet TAKE 1 TABLET Three times a day    celecoxib (CELEBREX) 200 mg capsule Take 1 Cap by mouth daily.  tamsulosin (FLOMAX) 0.4 mg capsule Take 1 Cap by mouth daily (after dinner).  zolpidem (AMBIEN) 10 mg tablet Take 1 Tab by mouth nightly. Max Daily Amount: 10 mg.    ALPRAZolam (XANAX) 0.5 mg tablet Take 1 Tab by mouth two (2) times daily as needed for Anxiety. Max Daily Amount: 1 mg.  dulaglutide (TRULICITY) 1.5 VQ/4.7 mL sub-q pen 0.5 mL by SubCUTAneous route every seven (7) days.  phentermine (ADIPEX-P) 37.5 mg tablet Take 1 Tab by mouth every morning. Max Daily Amount: 37.5 mg.    nitroglycerin (NITROSTAT) 0.4 mg SL tablet 1 Tab by SubLINGual route every five (5) minutes as needed for Chest Pain. Use for Chest Pain ; Call MD if > 3 tablets required    OMEPRAZOLE PO Take  by mouth daily as needed.     polyethylene glycol (MIRALAX) 17 gram packet Take 1 Packet by mouth daily. Hold for loose stools    metFORMIN ER (GLUCOPHAGE XR) 500 mg tablet Take 2 Tabs by mouth two (2) times daily (with meals).  isosorbide mononitrate ER (IMDUR) 60 mg CR tablet TAKE 1 TABLET EVERY MORNING (Patient taking differently: nightly. TAKE 1 TABLET EVERY MORNING)    ACCU-CHEK FASTCLIX LANCET DRUM mis CHECK BLOOD SUGAR EVERY DAY    gabapentin (NEURONTIN) 800 mg tablet Take  by mouth three (3) times daily as needed.  ACCU-CHEK SMARTVIEW TEST STRIP strip CHECK BLOOD SUGAR EVERY DAY    Blood-Glucose Meter (ACCU-CHEK GABRIELLE) misc Check blood sugar daily    DULoxetine (CYMBALTA) 60 mg capsule Take 1 Cap by mouth daily.  naloxone 4 mg/actuation spry 4 mg by Nasal route as needed.  Back Brace [facility] 1 Device by Does Not Apply route daily as needed for Pain. One, lumbosacral orthosis/back brace with metal struts      Medically necessary    cephALEXin (KEFLEX) 500 mg capsule 1 tid for 5 days    metoprolol succinate (TOPROL-XL) 50 mg XL tablet Take 1 Tab by mouth daily. No current facility-administered medications for this visit.       Allergies   Allergen Reactions    Bactrim [Sulfamethoprim] Rash    Opana [Oxymorphone] Other (comments)     Feels like bug crawling under skin and drowziness     Past Surgical History:   Procedure Laterality Date    HX APPENDECTOMY      HX BACK SURGERY      Lumbar fusion    HX HERNIA REPAIR  1992    HX OTHER SURGICAL      EMG - S1 disorder    HX OTHER SURGICAL  10/2017    spinal cord stimulator    HX VASECTOMY  1985     Social History     Occupational History    Not on file   Tobacco Use    Smoking status: Never Smoker    Smokeless tobacco: Never Used   Substance and Sexual Activity    Alcohol use: No    Drug use: No    Sexual activity: Yes     Partners: Female     Birth control/protection: Surgical     Family History   Problem Relation Age of Onset    Diabetes Mother     Heart Failure Mother     Heart Attack Father     Diabetes Sister     Stroke Brother       REVIEW OF SYSTEMS : 12/11/2019  ALL BELOW ARE Negative except : SEE HPI     General: Negative for fever and chills. No unexpected change in weight. Denies fatigue. No change in appetite. Dermatologic: Negative for rash or itching, dry skin, hair changes, rash or skin lesion changes  HEENT: Negative for congestion, sore throat, neck pain and neck stiffness. No change in vision or hearing. Hasn't noted any enlarged lymph nodes in the neck. Cardiovascular:  Negative for chest pain and palpitations. Has not noted pedal edema. Peripheral Vascular: No calf pain, vascular vein tenderness to calf pain           No calf throbbing, posterior knee throbbing pain   Respiratory: Negative for cough, colds, sinus, hemoptysis, shortness of breath and wheezing. Gastrointestinal: Negative for nausea and vomiting, rectal bleeding, coffee ground emesis, abdominal pain, diarrhea and constipation. Genitourinary: Negative for dysuria, frequency urgency, or burning on micturition. No flank pain, no foul smelling urine, no difficulty with initiating urination. Hematological: Negative for bleeding or easy bruising. Musculoskeletal: Negative  for arthralgias, back pain or neck pain. Neurological: Negative for dizziness, seizures or syncopal episodes. Denies headaches. Endocrine: Denies excessive thirst.  No heat/cold intolerance. Psychiatric: Negative for depression or insomnia. No calf throbbing, posterior knee throbbing pain     DIAGNOSTIC IMAGING     No notes on file      Please see above section of this report. I have personally reviewed the results of the above study. The interpretation of this study is my professional opinion. Written by Siomara Ferrara, as dictated by Dr. Williams [place]. I, Dr. Williams [place], confirm that all documentation is accurate.

## 2019-12-09 NOTE — PROGRESS NOTES
1. Have you been to the ER, urgent care clinic since your last visit? Hospitalized since your last visit? No    2. Have you seen or consulted any other health care providers outside of the [address] [place] [address] since your last visit? Include any pap smears or colon screening.  No

## 2019-12-10 ENCOUNTER — CLINICAL SUPPORT (OUTPATIENT)
Dept: FAMILY MEDICINE CLINIC | Age: 64
End: 2019-12-10

## 2019-12-10 VITALS
DIASTOLIC BLOOD PRESSURE: 79 MMHG | HEART RATE: 83 BPM | SYSTOLIC BLOOD PRESSURE: 168 MMHG | TEMPERATURE: 98.1 F | RESPIRATION RATE: 18 BRPM | OXYGEN SATURATION: 98 % | HEIGHT: 74 IN | WEIGHT: 315 LBS | BODY MASS INDEX: 40.43 KG/M2

## 2019-12-10 DIAGNOSIS — Z23 ENCOUNTER FOR IMMUNIZATION: ICD-10-CM

## 2019-12-10 NOTE — PROGRESS NOTES
Verbal order read back per Dr. Brandon Kang flu vaccine. Patient received flu vaccine in left deltoid. Patient was observed and no signs or symptoms of allergic reaction noted at this time. Patient tolerated well and left without complaints. Patient received flu VIS.

## 2019-12-12 RX ORDER — BLOOD SUGAR DIAGNOSTIC
STRIP MISCELLANEOUS
Qty: 100 STRIP | Refills: 0 | Status: SHIPPED | OUTPATIENT
Start: 2019-12-12 | End: 2020-06-17 | Stop reason: CLARIF

## 2019-12-12 NOTE — TELEPHONE ENCOUNTER
Left non detailed message requesting a return call to schedule an appointment per note below    Per ENRIQUE Finn 5 mo f/u due around 02/13/2020

## 2020-01-31 DIAGNOSIS — I10 ESSENTIAL HYPERTENSION: ICD-10-CM

## 2020-01-31 RX ORDER — METOPROLOL SUCCINATE 50 MG/1
50 TABLET, EXTENDED RELEASE ORAL DAILY
Qty: 90 TAB | Refills: 1 | Status: SHIPPED | OUTPATIENT
Start: 2020-01-31 | End: 2020-02-25 | Stop reason: SDUPTHER

## 2020-01-31 NOTE — TELEPHONE ENCOUNTER
Last Visit: 9/13/19 with YUSEF Finn  Next Appointment: return in 5 months  Previous Refill Encounter(s): 8/6/19 #90 with 1 refill    Requested Prescriptions     Pending Prescriptions Disp Refills    metoprolol succinate (TOPROL-XL) 50 mg XL tablet 90 Tab 1     Sig: Take 1 Tab by mouth daily.

## 2020-02-25 DIAGNOSIS — I10 ESSENTIAL HYPERTENSION: ICD-10-CM

## 2020-02-25 DIAGNOSIS — E11.9 DIABETES MELLITUS WITHOUT COMPLICATION (HCC): ICD-10-CM

## 2020-02-25 NOTE — TELEPHONE ENCOUNTER
Previous script for Toprol sent to [facility]     Last Visit: 09/13/2019 Aakash  Next Appointment: Return in 5 Months   Previous Refill Encounter(s):Glucophage ER 01/03/2019 #360 with 3 refills, Imdur 11/01/2018 #90 with 2 refills, Toprol 01/31/2020 #90 with 1 refill    Requested Prescriptions     Pending Prescriptions Disp Refills    metFORMIN ER (GLUCOPHAGE XR) 500 mg tablet 360 Tab 3     Sig: Take 2 Tabs by mouth two (2) times daily (with meals).  isosorbide mononitrate ER (IMDUR) 60 mg CR tablet 90 Tab 2     Sig: Take 1 Tab by mouth every morning.  metoprolol succinate (TOPROL-XL) 50 mg XL tablet 90 Tab 1     Sig: Take 1 Tab by mouth daily.

## 2020-02-26 RX ORDER — METFORMIN HYDROCHLORIDE 500 MG/1
1000 TABLET, EXTENDED RELEASE ORAL 2 TIMES DAILY WITH MEALS
Qty: 360 TAB | Refills: 1 | Status: SHIPPED | OUTPATIENT
Start: 2020-02-26 | End: 2020-07-27 | Stop reason: SDUPTHER

## 2020-02-26 RX ORDER — ISOSORBIDE MONONITRATE 60 MG/1
60 TABLET, EXTENDED RELEASE ORAL
Qty: 90 TAB | Refills: 1 | Status: SHIPPED | OUTPATIENT
Start: 2020-02-26 | End: 2020-08-26 | Stop reason: SDUPTHER

## 2020-02-26 RX ORDER — METOPROLOL SUCCINATE 50 MG/1
50 TABLET, EXTENDED RELEASE ORAL DAILY
Qty: 90 TAB | Refills: 1 | Status: SHIPPED | OUTPATIENT
Start: 2020-02-26 | End: 2020-08-26 | Stop reason: SDUPTHER

## 2020-03-02 ENCOUNTER — TELEPHONE (OUTPATIENT)
Dept: FAMILY MEDICINE CLINIC | Age: 65
End: 2020-03-02

## 2020-03-02 NOTE — TELEPHONE ENCOUNTER
Faxed order for wheel chair to [address] [facility] Fax# [phone]

## 2020-03-06 ENCOUNTER — TELEPHONE (OUTPATIENT)
Dept: ORTHOPEDIC SURGERY | Age: 65
End: 2020-03-06

## 2020-04-08 ENCOUNTER — TELEPHONE (OUTPATIENT)
Dept: ORTHOPEDIC SURGERY | Age: 65
End: 2020-04-08

## 2020-04-08 NOTE — TELEPHONE ENCOUNTER
Pt called states his sx is scheduled for next month and his H & P is scheduled for 4/28/2020. Pt wants to know if all has been cancelled due to the covid crisis.     Please contact the patient at N#[phone]

## 2020-04-08 NOTE — TELEPHONE ENCOUNTER
As of today, the OR remains reserved for emergency cases only. As such, his surgery will be rescheduled for a date after 5/27. If anything changes or if restrictions are lifted, we will contact him to offer him an earlier surgery date. For now, we will reschedule his 4/28 H&P/Office Visit until after 5/27. If he requires clearance by his PCP, he will need to check with the provider.     [place] Rosario PA-C  4/8/2020  2:55 PM

## 2020-05-06 ENCOUNTER — TELEPHONE (OUTPATIENT)
Dept: FAMILY MEDICINE CLINIC | Age: 65
End: 2020-05-06

## 2020-05-07 NOTE — TELEPHONE ENCOUNTER
Spoke with patient's wife. Discussed that PharmD spoke with [facility] patient assistance program and was told that patient's PAP application for Trulicity was approved through 12/31/2020. Program states they will ship Trulicity to patient's house. Patient's wife expressed understanding and had no further questions.     Shiloh Burgos, Aide  Clinical Pharmacist Specialist

## 2020-05-11 ENCOUNTER — TELEPHONE (OUTPATIENT)
Dept: FAMILY MEDICINE CLINIC | Age: 65
End: 2020-05-11

## 2020-05-11 NOTE — TELEPHONE ENCOUNTER
Patient would be new patient to Dr. Radha Carolina. [facility] is trying to see if we can clear him on the first visit. Please review and see if you want him scheduled for in person at this time. If we can get him in then they can do surgery 5/22 or 5/29. Procedure to be performed - Right ankle posterior tibial tendon transfer.

## 2020-05-12 NOTE — TELEPHONE ENCOUNTER
Spoke with patient. He has agreed to come in to see Dr. Michael Mora on 5/18/2020 @ 3:00 pm. Advised to arrive at 2:30 for NP paperwork.

## 2020-05-12 NOTE — TELEPHONE ENCOUNTER
Surgery: right posterior tibial tendon transfer   Surgery date: pending clearance  Surgeon: Dr. Vences Olimpia: [id number]  Inpatient/Outpatient: Outpatient  Anesthesia: General with regional block  Clearance: patient can be cleared in office note if possible at visit

## 2020-05-18 ENCOUNTER — OFFICE VISIT (OUTPATIENT)
Dept: FAMILY MEDICINE CLINIC | Age: 65
End: 2020-05-18

## 2020-05-18 VITALS
SYSTOLIC BLOOD PRESSURE: 138 MMHG | BODY MASS INDEX: 39.78 KG/M2 | RESPIRATION RATE: 16 BRPM | TEMPERATURE: 98.8 F | HEART RATE: 72 BPM | OXYGEN SATURATION: 95 % | HEIGHT: 74 IN | DIASTOLIC BLOOD PRESSURE: 80 MMHG | WEIGHT: 310 LBS

## 2020-05-18 DIAGNOSIS — Z87.442 HISTORY OF RENAL STONE: ICD-10-CM

## 2020-05-18 DIAGNOSIS — G25.81 RESTLESS LEG SYNDROME: ICD-10-CM

## 2020-05-18 DIAGNOSIS — G62.9 NEUROPATHY: ICD-10-CM

## 2020-05-18 DIAGNOSIS — G47.30 SLEEP APNEA, UNSPECIFIED TYPE: ICD-10-CM

## 2020-05-18 DIAGNOSIS — E11.40 TYPE 2 DIABETES MELLITUS WITH DIABETIC NEUROPATHY, WITHOUT LONG-TERM CURRENT USE OF INSULIN (HCC): Primary | ICD-10-CM

## 2020-05-18 DIAGNOSIS — Z87.828 H/O BACK INJURY: ICD-10-CM

## 2020-05-18 DIAGNOSIS — R97.20 ELEVATED PSA: ICD-10-CM

## 2020-05-18 DIAGNOSIS — Z01.818 PRE-OPERATIVE CLEARANCE: ICD-10-CM

## 2020-05-18 DIAGNOSIS — M25.571 RIGHT ANKLE PAIN, UNSPECIFIED CHRONICITY: ICD-10-CM

## 2020-05-18 DIAGNOSIS — Z12.11 SCREENING FOR COLON CANCER: ICD-10-CM

## 2020-05-18 DIAGNOSIS — G47.00 INSOMNIA, UNSPECIFIED TYPE: ICD-10-CM

## 2020-05-18 DIAGNOSIS — E66.01 SEVERE OBESITY (HCC): ICD-10-CM

## 2020-05-18 DIAGNOSIS — F41.8 DEPRESSION WITH ANXIETY: ICD-10-CM

## 2020-05-18 DIAGNOSIS — I10 ESSENTIAL HYPERTENSION: ICD-10-CM

## 2020-05-18 PROBLEM — Z96.89 STATUS POST INSERTION OF SPINAL CORD STIMULATOR: Status: ACTIVE | Noted: 2017-09-14

## 2020-05-18 RX ORDER — LISINOPRIL 20 MG/1
20 TABLET ORAL DAILY
Qty: 30 TAB | Refills: 1 | Status: SHIPPED | OUTPATIENT
Start: 2020-05-18

## 2020-05-18 RX ORDER — TESTOSTERONE CYPIONATE 200 MG/ML
100 INJECTION INTRAMUSCULAR
COMMUNITY
Start: 2020-04-15 | End: 2020-11-18

## 2020-05-18 RX ORDER — BUPROPION HYDROCHLORIDE 150 MG/1
TABLET, EXTENDED RELEASE ORAL 2 TIMES DAILY
COMMUNITY
End: 2020-07-07 | Stop reason: SDUPTHER

## 2020-05-18 NOTE — PROGRESS NOTES
1. Have you been to the ER, urgent care clinic since your last visit? Hospitalized since your last visit? No    2. Have you seen or consulted any other health care providers outside of the [address] [place] [address] since your last visit? Include any pap smears or colon screening.  No

## 2020-05-18 NOTE — PATIENT INSTRUCTIONS
Low Sodium Diet (2,000 Milligram): Care Instructions  Your Care Instructions    Too much sodium causes your body to hold on to extra water. This can raise your blood pressure and force your heart and kidneys to work harder. In very serious cases, this could cause you to be put in the hospital. It might even be life-threatening. By limiting sodium, you will feel better and lower your risk of serious problems. The most common source of sodium is salt. People get most of the salt in their diet from canned, prepared, and packaged foods. Fast food and restaurant meals also are very high in sodium. Your doctor will probably limit your sodium to less than 2,000 milligrams (mg) a day. This limit counts all the sodium in prepared and packaged foods and any salt you add to your food. Follow-up care is a key part of your treatment and safety. Be sure to make and go to all appointments, and call your doctor if you are having problems. It's also a good idea to know your test results and keep a list of the medicines you take. How can you care for yourself at home? Read food labels  · Read labels on cans and food packages. The labels tell you how much sodium is in each serving. Make sure that you look at the serving size. If you eat more than the serving size, you have eaten more sodium. · Food labels also tell you the Percent Daily Value for sodium. Choose products with low Percent Daily Values for sodium. · Be aware that sodium can come in forms other than salt, including monosodium glutamate (MSG), sodium citrate, and sodium bicarbonate (baking soda). MSG is often added to Asian food. When you eat out, you can sometimes ask for food without MSG or added salt. Buy low-sodium foods  · Buy foods that are labeled \"unsalted\" (no salt added), \"sodium-free\" (less than 5 mg of sodium per serving), or \"low-sodium\" (less than 140 mg of sodium per serving).  Foods labeled \"reduced-sodium\" and \"light sodium\" may still have too much sodium. Be sure to read the label to see how much sodium you are getting. · Buy fresh vegetables, or frozen vegetables without added sauces. Buy low-sodium versions of canned vegetables, soups, and other canned goods. Prepare low-sodium meals  · Cut back on the amount of salt you use in cooking. This will help you adjust to the taste. Do not add salt after cooking. One teaspoon of salt has about 2,300 mg of sodium. · Take the salt shaker off the table. · Flavor your food with garlic, lemon juice, onion, vinegar, herbs, and spices. Do not use soy sauce, lite soy sauce, steak sauce, onion salt, garlic salt, celery salt, mustard, or ketchup on your food. · Use low-sodium salad dressings, sauces, and ketchup. Or make your own salad dressings and sauces without adding salt. · Use less salt (or none) when recipes call for it. You can often use half the salt a recipe calls for without losing flavor. Other foods such as rice, pasta, and grains do not need added salt. · Rinse canned vegetables, and cook them in fresh water. This removes some--but not all--of the salt. · Avoid water that is naturally high in sodium or that has been treated with water softeners, which add sodium. Call your local water company to find out the sodium content of your water supply. If you buy bottled water, read the label and choose a sodium-free brand. Avoid high-sodium foods  · Avoid eating:  ? Smoked, cured, salted, and canned meat, fish, and poultry. ? Ham, dumont, hot dogs, and luncheon meats. ? Regular, hard, and processed cheese and regular peanut butter. ? Crackers with salted tops, and other salted snack foods such as pretzels, chips, and salted popcorn. ? Frozen prepared meals, unless labeled low-sodium. ? Canned and dried soups, broths, and bouillon, unless labeled sodium-free or low-sodium. ? Canned vegetables, unless labeled sodium-free or low-sodium. ? [place] fries, pizza, tacos, and other fast foods.   ? Shukri Servant, olives, ketchup, and other condiments, especially soy sauce, unless labeled sodium-free or low-sodium. Where can you learn more? Go to http://iza-poornima.info/  Enter V843 in the search box to learn more about \"Low Sodium Diet (2,000 Milligram): Care Instructions. \"  Current as of: August 21, 2019Content Version: 12.4  © 3192-3880 [employer]. Care instructions adapted under license by [employer] (which disclaims liability or warranty for this information). If you have questions about a medical condition or this instruction, always ask your healthcare professional. [address] any warranty or liability for your use of this information. Learning About Diabetes Food Guidelines  Your Care Instructions    Meal planning is important to manage diabetes. It helps keep your blood sugar at a target level (which you set with your doctor). You don't have to eat special foods. You can eat what your family eats, including sweets once in a while. But you do have to pay attention to how often you eat and how much you eat of certain foods. You may want to work with a dietitian or a certified diabetes educator (CDE) to help you plan meals and snacks. A dietitian or CDE can also help you lose weight if that is one of your goals. What should you know about eating carbs? Managing the amount of carbohydrate (carbs) you eat is an important part of healthy meals when you have diabetes. Carbohydrate is found in many foods. · Learn which foods have carbs. And learn the amounts of carbs in different foods. ? Bread, cereal, pasta, and rice have about 15 grams of carbs in a serving. A serving is 1 slice of bread (1 ounce), ½ cup of cooked cereal, or 1/3 cup of cooked pasta or rice. ? Fruits have 15 grams of carbs in a serving.  A serving is 1 small fresh fruit, such as an apple or orange; ½ of a banana; ½ cup of cooked or canned fruit; ½ cup of fruit juice; 1 cup of melon or raspberries; or 2 tablespoons of dried fruit. ? Milk and no-sugar-added yogurt have 15 grams of carbs in a serving. A serving is 1 cup of milk or 2/3 cup of no-sugar-added yogurt. ? Starchy vegetables have 15 grams of carbs in a serving. A serving is ½ cup of mashed potatoes or sweet potato; 1 cup winter squash; ½ of a small baked potato; ½ cup of cooked beans; or ½ cup cooked corn or green peas. · Learn how much carbs to eat each day and at each meal. A dietitian or CDE can teach you how to keep track of the amount of carbs you eat. This is called carbohydrate counting. · If you are not sure how to count carbohydrate grams, use the Plate Method to plan meals. It is a good, quick way to make sure that you have a balanced meal. It also helps you spread carbs throughout the day. ? Divide your plate by types of foods. Put non-starchy vegetables on half the plate, meat or other protein food on one-quarter of the plate, and a grain or starchy vegetable in the final quarter of the plate. To this you can add a small piece of fruit and 1 cup of milk or yogurt, depending on how many carbs you are supposed to eat at a meal.  · Try to eat about the same amount of carbs at each meal. Do not \"save up\" your daily allowance of carbs to eat at one meal.  · Proteins have very little or no carbs per serving. Examples of proteins are beef, chicken, turkey, fish, eggs, tofu, cheese, cottage cheese, and peanut butter. A serving size of meat is 3 ounces, which is about the size of a deck of cards. Examples of meat substitute serving sizes (equal to 1 ounce of meat) are 1/4 cup of cottage cheese, 1 egg, 1 tablespoon of peanut butter, and ½ cup of tofu. How can you eat out and still eat healthy? · Learn to estimate the serving sizes of foods that have carbohydrate. If you measure food at home, it will be easier to estimate the amount in a serving of restaurant food.   · If the meal you order has too much carbohydrate (such as potatoes, corn, or baked beans), ask to have a low-carbohydrate food instead. Ask for a salad or green vegetables. · If you use insulin, check your blood sugar before and after eating out to help you plan how much to eat in the future. · If you eat more carbohydrate at a meal than you had planned, take a walk or do other exercise. This will help lower your blood sugar. What else should you know? · Limit saturated fat, such as the fat from meat and dairy products. This is a healthy choice because people who have diabetes are at higher risk of heart disease. So choose lean cuts of meat and nonfat or low-fat dairy products. Use olive or canola oil instead of butter or shortening when cooking. · Don't skip meals. Your blood sugar may drop too low if you skip meals and take insulin or certain medicines for diabetes. · Check with your doctor before you drink alcohol. Alcohol can cause your blood sugar to drop too low. Alcohol can also cause a bad reaction if you take certain diabetes medicines. Follow-up care is a key part of your treatment and safety. Be sure to make and go to all appointments, and call your doctor if you are having problems. It's also a good idea to know your test results and keep a list of the medicines you take. Where can you learn more? Go to http://iza-poornima.info/  Enter I147 in the search box to learn more about \"Learning About Diabetes Food Guidelines. \"  Current as of: December 19, 2019Content Version: 12.4  © 7496-1402 [employer]. Care instructions adapted under license by [employer] (which disclaims liability or warranty for this information). If you have questions about a medical condition or this instruction, always ask your healthcare professional. [address] any warranty or liability for your use of this information.

## 2020-05-18 NOTE — TELEPHONE ENCOUNTER
Have you been diagnosed with, tested for, or told that you are suspected of having COVID-19 (coronovirus)? Tested negative 2 weeks ago because [employer] recommended he have it done. No exposure to covid-19   Have you had a fever or taken medication to treat a fever in the past 72 hours? no  Have you had a cough, SOB, or flu-like symptoms within the past 3 days? No   Have you had direct contact with someone who tested positive for COVID-19 within the past 14 days? No   Do you have a household member with flu-like symptoms, including fever, cough, or SOB? No   Do you currently have flu-like symptoms including fever, cough, or SOB?  No

## 2020-05-18 NOTE — PROGRESS NOTES
HISTORY OF PRESENT ILLNESS  Curry Harry is a 72 y.o. male. HPI: New patient. Here to establish care switch from [facility]. Multiple medical problems. Poorly controlled diabetes. Due to insurance coverage he has not taken the Trulicity since 1 month. He cannot get his medication now. Taking other oral medication with compliance. Denies any signs of hyper or hypoglycemia. He has been checking blood sugar at home. It is around 122 fasting today morning. Also checking blood pressure and says it has been in 237U systolic. Taking his medication with compliance. Review prior labs from the chart and A1c was around 8.4. I do not see him on any ACE inhibitor or ARB. He does not recall taking any in the past.  Also do not see him taking any statin. Sitting comfortably without any acute distress. Denies any headache or dizziness, chest pain or trouble breathing, palpitation or diaphoresis, nausea or vomiting, abdominal pain, urinary or bowel complaints, mood changes. No cold, cough, fever, wheezing. No direct contact with any sick person. Has a history of hypertension. Taking medication with compliance. Said years ago he had anginal pain. He was put on Imdur and has not needed any nitroglycerin since years. Reviewing the chart that had a stress test done in 2012 which was negative. Currently denies any anginal symptoms. History of depression and anxiety. Taking Xanax only as needed and taking Wellbutrin. Not seeing any specialist.  I have explained to him that I do not manage any controlled substance and understood that a Xanax refill for long-term would be the challenge. Gave him the option of BuSpar and per him it was taken in the past and did not help much. I have offered the psychiatrist referral but he wants to think about it. History of insomnia. Taking Ambien. Also using CPAP machine with compliance. History of sleep apnea. Has seen Dr. Cha Sullivan.   Denies any trouble with the equipment. Discussed the effectiveness and side effects of Ambien for long-term. For now he does not want any referral for further management. Will go observe at this time. History of restless leg syndrome. On Mirapex. Again not following any specialist at this time. He has history of neuropathy. Off gabapentin at this time. Also history of chronic back pain. Prior history of back surgery with Dr. Juancarlos Hernandez. Currently has stimulator. Not helping much. Used to see pain management in the past.  Currently wants to hold off on a referral.  Not taking any pain medication. Per him the pain can be moderate to severe with certain movement. 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 No loss of urine or bowel control. I have offered him the referral to the pain management or back to spine center but he wants to hold off on it at this time. Today also need a clearance for his upcoming right ankle surgery/ right posterior tibial tendon transfer with anesthesia: General with regional block (Dr. Fran Carranza). Surgery date has not been decided yet. Also pending a preop labs, EKG, chest x-ray by surgeon. No recent antibiotic or steroid. No complications of prior anesthesia.     Lab Results   Component Value Date/Time    WBC 5.5 02/12/2019 03:40 PM    HGB 11.8 (L) 02/12/2019 03:40 PM    HCT 38.2 02/12/2019 03:40 PM    PLATELET 437 50/10/5299 03:40 PM    MCV 84.9 02/12/2019 03:40 PM     Lab Results   Component Value Date/Time    Sodium 139 02/12/2019 03:40 PM    Potassium 4.7 02/12/2019 03:40 PM    Chloride 104 02/12/2019 03:40 PM    CO2 30 02/12/2019 03:40 PM    Anion gap 5 02/12/2019 03:40 PM    Glucose 95 02/12/2019 03:40 PM    BUN 18 02/12/2019 03:40 PM    Creatinine 1.17 02/12/2019 03:40 PM    BUN/Creatinine ratio 15 02/12/2019 03:40 PM    GFR est AA >60 02/12/2019 03:40 PM    GFR est non-AA >60 02/12/2019 03:40 PM    Calcium 9.6 02/12/2019 03:40 PM    Bilirubin, total 0.3 11/20/2019 09:10 AM    AST (SGOT) 40 11/20/2019 09:10 AM    Alk.  phosphatase 81 11/20/2019 09:10 AM    Protein, total 7.6 11/20/2019 09:10 AM    Albumin 4.6 11/20/2019 09:10 AM    Globulin 4.3 (H) 01/15/2019 03:58 AM    A-G Ratio 1.2 01/25/2019 01:34 AM    ALT (SGPT) 38 11/20/2019 09:10 AM     Lab Results   Component Value Date/Time    Cholesterol, total 64 01/15/2019 03:58 AM    HDL Cholesterol 25 (L) 01/15/2019 03:58 AM    LDL, calculated 18.4 01/15/2019 03:58 AM    VLDL, calculated 20.6 01/15/2019 03:58 AM    Triglyceride 103 01/15/2019 03:58 AM    CHOL/HDL Ratio 2.6 01/15/2019 03:58 AM     Lab Results   Component Value Date/Time    TSH 1.03 11/15/2019 01:29 PM     Lab Results   Component Value Date/Time    Hemoglobin A1c 8.4 (H) 11/15/2019 01:32 PM    Hemoglobin A1c (POC) 8.1 09/13/2019 01:58 PM     Lab Results   Component Value Date/Time    Vitamin D 25-Hydroxy 38.6 12/07/2016 09:27 AM       Lab Results   Component Value Date/Time    Microalbumin/Creat ratio (mg/g creat) 23 11/01/2018 03:27 PM    MICROALBUMIN,MG/DAY 14.8 06/13/2016 08:43 AM    Microalbumin,urine random 1.83 11/01/2018 03:27 PM       Patient Active Problem List    Diagnosis Date Noted    Type 2 diabetes mellitus with diabetic neuropathy ([address] 05/10/2019    Severe obesity ([address] 04/26/2019    Kidney stone 02/25/2019    Sepsis ([address] 01/14/2019    UTI (urinary tract infection) 01/14/2019    Status post insertion of spinal cord stimulator 09/14/2017    Chronic pain syndrome 06/15/2017    Lumbar post-laminectomy syndrome 06/15/2017    Lumbar and sacral osteoarthritis 06/15/2017    Anemia 12/07/2016    Advanced care planning/counseling discussion 08/08/2016    Chronic midline low back pain with sciatica 05/16/2016    History of depression 06/02/2015    Thoracic or lumbosacral neuritis or radiculitis, unspecified 01/26/2015    Postlaminectomy syndrome, lumbar region 01/26/2015    DDD (degenerative disc disease), lumbar 07/30/2014    Lumbar facet arthropathy 07/30/2014    S/P lumbar laminectomy 07/30/2014    S/P lumbar spinal fusion 07/30/2014    Lumbar nerve root impingement 07/30/2014    Lumbosacral radiculopathy at S1 07/30/2014    DDD (degenerative disc disease), thoracic 07/30/2014    Thoracic compression fracture (HCC) 07/30/2014    Back muscle spasm 07/30/2014    Major depression 07/30/2014    TRUE (generalized anxiety disorder) 07/30/2014    Abdominal adhesions 07/30/2014    Constipation, chronic 07/30/2014    Insomnia secondary to chronic pain 07/30/2014    MANISH (obstructive sleep apnea) 07/30/2014    DM (diabetes mellitus) [address] 07/30/2014    HTN (hypertension) 07/30/2014    Hyperlipidemia 07/30/2014    Frequent falls 07/30/2014     Current Outpatient Medications   Medication Sig Dispense Refill    folic acid/multivit-min/lutein (CENTRUM SILVER PO) Take  by mouth.  buPROPion SR (WELLBUTRIN SR) 150 mg SR tablet Take  by mouth two (2) times a day.  testosterone cypionate (DEPOTESTOTERONE CYPIONATE) 200 mg/mL injection       lisinopriL (PRINIVIL, ZESTRIL) 20 mg tablet Take 1 Tab by mouth daily. 30 Tab 1    pramipexole (MIRAPEX) 0.75 mg tablet TAKE 1 TABLET THREE TIMES DAILY 270 Tab 1    metFORMIN ER (GLUCOPHAGE XR) 500 mg tablet Take 2 Tabs by mouth two (2) times daily (with meals). 360 Tab 1    isosorbide mononitrate ER (IMDUR) 60 mg CR tablet Take 1 Tab by mouth every morning. 90 Tab 1    metoprolol succinate (TOPROL-XL) 50 mg XL tablet Take 1 Tab by mouth daily. 90 Tab 1    simvastatin (ZOCOR) 10 mg tablet TAKE 1 TABLET EVERY NIGHT 90 Tab 0    zolpidem (AMBIEN) 10 mg tablet Take 1 Tab by mouth nightly. Max Daily Amount: 10 mg. (Patient taking differently: Take 10 mg by mouth nightly as needed.) 30 Tab 1    ALPRAZolam (XANAX) 0.5 mg tablet Take 1 Tab by mouth two (2) times daily as needed for Anxiety.  Max Daily Amount: 1 mg. 30 Tab 0    dulaglutide (TRULICITY) 1.5 DV/6.2 mL sub-q pen 0.5 mL by SubCUTAneous route every seven (7) days. 12 Pen 3    Syringe, Disposable, 3 mL syrg Take as directed 30 Syringe 1    Needle, Disp, 23 G 23 gauge x 1 1/4\" ndle Use to inject 0.5 ml every 7 days 30 Pen Needle 1    Needle, Disp, 18 G 18 gauge x 1\" ndle Use to aspirate 0.5 ml every 7 days 30 Pen Needle 1    Wheel Chair maura Assistance with ambulation, gait instability 1 Each 0    ACCU-CHEK ROLANDO PLUS TEST STRP strip TEST BLOOD GLUCOSE ONE TIME DAILY 100 Strip 0    polyethylene glycol (MIRALAX) 17 gram packet Take 1 Packet by mouth daily. Hold for loose stools 10 Each 0    ACCU-CHEK FASTCLIX LANCET DRUM misc CHECK BLOOD SUGAR EVERY  Each 11    Blood-Glucose Meter (ACCU-CHEK GABRIELLE) misc Check blood sugar daily 1 Each 0    naloxone 4 mg/actuation spry 4 mg by Nasal route as needed. 1 Box 0    [facility] Device by Does Not Apply route daily as needed for Pain.  One, lumbosacral orthosis/back brace with metal struts      Medically necessary 1 Device 0     Allergies   Allergen Reactions    Bactrim [Sulfamethoprim] Rash    Opana [Oxymorphone] Other (comments)     Feels like bug crawling under skin and drowziness     Past Medical History:   Diagnosis Date    Abdominal adhesions 7/30/2014    Back muscle spasm 7/30/2014    Back pain, lumbosacral     BPH (benign prostatic hyperplasia)     Cellulitis     Constipation, chronic 7/30/2014    DDD (degenerative disc disease), lumbar 7/30/2014    DDD (degenerative disc disease), thoracic 7/30/2014    Depression     Diabetes ([facility] [address]     DM (diabetes mellitus) ([address] [address] 7/30/2014    Frequent falls 7/30/2014    TRUE (generalized anxiety disorder) 7/30/2014    High cholesterol     HTN (hypertension) 7/30/2014    Hydronephrosis     Hyperlipidemia 7/30/2014    Hypertension     Hypogonadism in male     Incomplete bladder emptying     Insomnia secondary to chronic pain 7/30/2014    Kidney stones     LBP radiating to both legs 7/30/2014    Lumbar facet arthropathy 7/30/2014    Lumbar nerve root impingement 7/30/2014    Lumbosacral radiculopathy at S1 7/30/2014    Major depression 7/30/2014    Nausea & vomiting     Neurological disorder     sciatica    MANISH (obstructive sleep apnea) 7/30/2014    S/P lumbar laminectomy 7/30/2014    S/P lumbar spinal fusion 7/30/2014    Small vessel disease (HCC)     Thoracic compression fracture ([address] 07/30/2014    Unspecified sleep apnea     USES CPAP EVERY NIGHT     Past Surgical History:   Procedure Laterality Date    HX APPENDECTOMY      HX BACK SURGERY      Lumbar fusion    HX HERNIA REPAIR  1992    HX OTHER SURGICAL      EMG - S1 disorder    HX OTHER SURGICAL  10/2017    spinal cord stimulator    HX VASECTOMY  1985     Family History   Problem Relation Age of Onset    Diabetes Mother     Heart Failure Mother     Heart Attack Father     Diabetes Sister     Stroke Brother      Social History     Tobacco Use    Smoking status: Never Smoker    Smokeless tobacco: Never Used   Substance Use Topics    Alcohol use: Yes        ROS: see HPI     Physical Exam  Constitutional:       General: He is not in acute distress. Appearance: He is well-developed. Neck:      Musculoskeletal: Neck supple. Cardiovascular:      Rate and Rhythm: Normal rate and regular rhythm. Heart sounds: Normal heart sounds. Abdominal:      General: Bowel sounds are normal. There is no distension. Palpations: Abdomen is soft. There is no mass. Tenderness: There is no abdominal tenderness. There is no guarding or rebound. Musculoskeletal: Normal range of motion. Right lower leg: No edema. Left lower leg: No edema. Lymphadenopathy:      Cervical: No cervical adenopathy. Skin:     General: Skin is warm. Findings: No rash. Neurological:      Mental Status: He is oriented to person, place, and time.       Comments: AAOx3, neurologically grossly intact, DTR normal bilaterally equal, muscle tone and power bilaterally equal. Sensations intact. Psychiatric:         Behavior: Behavior normal.         Thought Content: Thought content normal.         Judgment: Judgment normal.         ASSESSMENT and PLAN    ICD-10-CM ICD-9-CM    1. Type 2 diabetes mellitus with diabetic neuropathy, without long-term current use of insulin ([facility] [address]): A1c around 8.4. For now we will repeat the labs. He has been not taking Trulicity since almost a month. Citlalli Cabrera he has been getting now shortly. Taking other oral medication with compliance. Will start lisinopril. D  For now we will follow-up after labs. Also sending him to pharmacist for medication management. Discussed with him that if A1c is high we will consider endocrine referral.  Continue the diabetic diet. If his A1c comes around the range of 8 he can be cleared for scheduled procedure. Pre, during procedure and post surgical management for blood sugar per surgeon with the sliding scale insulin. E11.40 250.60 lisinopriL (PRINIVIL, ZESTRIL) 20 mg tablet     357.2 CBC W/O DIFF      METABOLIC PANEL, COMPREHENSIVE      TSH 3RD GENERATION      HEMOGLOBIN A1C WITH EAG      LIPID PANEL      MICROALBUMIN, UR, RAND W/ MICROALB/CREAT RATIO      REFERRAL TO PHARMACIST   2. Essential hypertension: For now mild elevated systolic blood pressure. He is asymptomatic. Will add lisinopril as he has history of diabetes. Discussed the medication side effects. Low-salt diet. Advised him to keep the blood pressure log. For now with the current blood pressure he can be cleared for the surgery. Before complete clearance need to have a preop labs, EKG and chest x-ray needs to review. I10 401.9 lisinopriL (PRINIVIL, ZESTRIL) 20 mg tablet   3. Insomnia, unspecified type: On CPAP. On Ambien. Discussed that I would not recommend long-term use of Ambien. He understood. He refused to have any specialist referral or trial of different medication. We will follow-up next visit G47.00 780.52    4. Depression with anxiety: Fairly stable on current Wellbutrin. Taking Xanax as needed. He understood that I do not manage any controlled substance for long-term use. Discussed the BuSpar but said he has tried and did not help much. I have offered psychiatrist evaluation and the recommendation but patient wants to hold off on it at this time. F41.8 300.4    5. Elevated PSA: Following urology. Denies any urinary complaints at this time R97.20 790.93     following urology   6. Neuropathy: On pramipexole for restless leg syndrome. Neuropathy can be from diabetes as noted the EMG in the chart regarding right ankle weakness. Was following Dr. Nora Capps but currently not seeing them regularly  Since has a CPAP. G62.9 355.9    7. Restless leg syndrome: Fairly stable with the symptomatic treatment G25.81 333.94    8. Sleep apnea, unspecified type: Using CPAP. G47.30 780.57    9. Right ankle pain, unspecified chronicity: Going for surgery as mentioned above. M25.571 719.47    10. Screening for colon cancer Z12.11 V76.51 OCCULT BLOOD IMMUNOASSAY,DIAGNOSTIC   11. History of renal stone Z87.442 V13.01    12. H/O back injury: For now he has refusedpain management or spine center referral.  He will think about it and let me know. Meantime symptomatic treatment with a heating pad, Tylenol or ibuprofen Z87.828 V15.59     injured in [employer]. Dr. Donis Parry did surgery. still has back pain. has stimulator. not following any specialist.    13. Severe obesity ([address]): Weight loss importance discussed. E66.01 278.01    14> pre op clearance: We will review preop labs, EKG, chest x-ray and to make a further plan. Once she is done with the surgery will consider him on aspirin daily dose. Patient understood and agree with above plan.   Total time spent almost 40 minutes and more than 50% of time spent on reviewing records, explaining above assessment and plan, diabetic management. Follow-up and Dispositions    · Return in about 1 month (around 6/18/2020).

## 2020-06-02 ENCOUNTER — VIRTUAL VISIT (OUTPATIENT)
Dept: ORTHOPEDIC SURGERY | Age: 65
End: 2020-06-02

## 2020-06-02 DIAGNOSIS — S86.311D TEAR OF PERONEAL TENDON, RIGHT, SUBSEQUENT ENCOUNTER: Primary | ICD-10-CM

## 2020-06-02 DIAGNOSIS — M25.371 RIGHT ANKLE INSTABILITY: ICD-10-CM

## 2020-06-02 RX ORDER — LEG BRACE
EACH MISCELLANEOUS
Qty: 1 EACH | Refills: 1 | Status: SHIPPED | OUTPATIENT
Start: 2020-06-02 | End: 2020-06-02

## 2020-06-02 NOTE — PROGRESS NOTES
Patient: Kenyetta William             MRN: [id number]     SSN: [id number] There is no height or weight on file to calculate BMI. YOB: 1955     AGE: 72 y.o. EX: male    PCP: Stephen Zamudio NP     VIRTUAL VISIT      Cari Robertson is a 72 y.o. male who was seen by virtual synchronous (real-time) audio-video technology on 6/2/2020. via  [employer]. This virtual Telehealth visit was performed while I was located at : [facility], and Cari Robertson was located at his home at 1:47 PM, 6/2/2020. Services were provided as a substitute for in-person clinic visit. Virtual time  OV/Chart Review was[de-identified]  1:44 PM  to 2:00 PM  of 6/2/2020     Consent:@ and/or the patient's healthcare decision maker is aware that this patient-initiated Telehealth encounter is a billable service, with coverage as determined by their insurance carrier. The patient is aware that they may receive a bill and has provided verbal consent to proceed. Disclaimer: Sections of this note are dictated using utilizing voice recognition software, which may have resulted in some phonetic based errors in grammar and contents. Even though attempts were made to correct all the mistakes, some may have been missed, and remained in the body of the document. If questions arise, please contact our department. ASSESSMENT        ICD-10-CM ICD-9-CM    1. Tear of peroneal tendon, right, subsequent encounter S86.311D V58.89      844.8    2. Right ankle instability M25.371 718.87         Orders Placed This Encounter    Leg Brace (Ankle Brace) misc     Sig: by Does Not Apply route. Power chair      Electric chair     Dispense:  1 Each     Refill:  1       So suggest, she is having more trouble walking. 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 He does have neuropathy. He is caring for his wife, who has lung disease, due to alpha trypsin 1 deficiency, and has pulmonary disease.   I plan to have him come in the office, to reassess him. Should he decide what to do for him surgically. I suspect, this individual, is quite heavy, who has a peroneal tendon tear, as well, side of his foot, he may require a procedure that will help control the transverse tarsal joint exam prior requiring double fusion procedures is not walking the side of his foot. Further discuss things with him once I see him. Again he has profound neuropathy has a small peroneal tendon brevis tendon tear. I did write for electric wheelchair for him in the distal at a medical setting syndrome, who is quite physically disabled and cannot walk and stand for any significant period of time. He will require home health care if he undergoes any type surgery       PLAN       1. Mayda Yuen Chick to come in to see me for live assessment. To determine surgery plan/recovery plan/ etc  2. Surgical options: will need Home health care. He may require Chopart Fusion to control his HF and all less lateral HF wear. 3. Will need new WB foot and ankle xrays  (bilateral)           CPT Codes 67638-69387 for Established Patients may apply to this Telehealth Visit  Time-based coding, delete if not needed: I spent at least 15 minutes with this established patient, and >50% of the time was spent counseling and/or coordinating care regarding      Due to this being a TeleHealth evaluation, many elements of the physical examination are unable to be assessed. We discussed the expected course, resolution and complications of the diagnosis(es) in detail. Medication risks, benefits, costs, interactions, and alternatives were discussed as indicated. I advised him to contact the office if his condition worsens, changes or fails to improve as anticipated. He expressed understanding with the diagnosis(es) and plan.      Pursuant to the emergency declaration under the [address] waiver authority and the Coronavirus Preparedness and Response Supplemental Appropriations Act, this Virtual  Visit was conducted, with patient's consent, to reduce the patient's risk of exposure to COVID-19 and provide continuity of care for an established patient. SUBJECTIVE      Tristen William was seen for No chief complaint on file. Patient is here single live video, for surveillance, for worsening pain discomfort to his right foot  Since my last OV with Tristen William, he states that he is doing less well overall, is having more pain/discomfort, standing/walking less time duration or distances. James Reynolds has been seen for : above Dx is listed above    Tristen William is not taking medications for pain relief[de-identified]   Aleve,  . Tristen William Denies: CP, SOB, ABD PAIN, Calf pain. WES TRIAGE QUESTIONNAIRE    Since your last office visit, have you had any:    1. New medical diagnoses No  2. Changes in your medications  No  3. Surgical procedures No  4. Changes in your Allergies to medication No  5. What is your pain level today see above/10  6. Changes in: PMH, SH, ROS: No          CHART REVIEW     Current Outpatient Medications   Medication Sig    Leg Brace (Ankle Brace) misc by Does Not Apply route. Power chair      Electric chair    folic acid/multivit-min/lutein (CENTRUM SILVER PO) Take  by mouth.  buPROPion SR (WELLBUTRIN SR) 150 mg SR tablet Take  by mouth two (2) times a day.  testosterone cypionate (DEPOTESTOTERONE CYPIONATE) 200 mg/mL injection     lisinopriL (PRINIVIL, ZESTRIL) 20 mg tablet Take 1 Tab by mouth daily.     Syringe, Disposable, 3 mL syrg Take as directed    Needle, Disp, 23 G 23 gauge x 1 1/4\" ndle Use to inject 0.5 ml every 7 days    Needle, Disp, 18 G 18 gauge x 1\" ndle Use to aspirate 0.5 ml every 7 days   Shanice-Jessica Chair maura Assistance with ambulation, gait instability    pramipexole (MIRAPEX) 0.75 mg tablet TAKE 1 TABLET THREE TIMES DAILY    metFORMIN ER (GLUCOPHAGE XR) 500 mg tablet Take 2 Tabs by mouth two (2) times daily (with meals).  isosorbide mononitrate ER (IMDUR) 60 mg CR tablet Take 1 Tab by mouth every morning.  metoprolol succinate (TOPROL-XL) 50 mg XL tablet Take 1 Tab by mouth daily.  ACCU-CHEK ROLANDO PLUS TEST STRP strip TEST BLOOD GLUCOSE ONE TIME DAILY    simvastatin (ZOCOR) 10 mg tablet TAKE 1 TABLET EVERY NIGHT    zolpidem (AMBIEN) 10 mg tablet Take 1 Tab by mouth nightly. Max Daily Amount: 10 mg. (Patient taking differently: Take 10 mg by mouth nightly as needed.)    ALPRAZolam (XANAX) 0.5 mg tablet Take 1 Tab by mouth two (2) times daily as needed for Anxiety. Max Daily Amount: 1 mg.  dulaglutide (TRULICITY) 1.5 HU/4.4 mL sub-q pen 0.5 mL by SubCUTAneous route every seven (7) days.  polyethylene glycol (MIRALAX) 17 gram packet Take 1 Packet by mouth daily. Hold for loose stools    ACCU-CHEK FASTCLIX LANCET DRUM misc CHECK BLOOD SUGAR EVERY DAY    Blood-Glucose Meter (ACCU-CHEK GABRIELLE) misc Check blood sugar daily    naloxone 4 mg/actuation spry 4 mg by Nasal route as needed.  Back Brace [facility] 1 Device by Does Not Apply route daily as needed for Pain. One, lumbosacral orthosis/back brace with metal struts      Medically necessary     No current facility-administered medications for this visit. THE  FOR Priya William  WAS REVIEWED BY Prisca Gamez MD 6/2/2020 . Social History     Occupational History    Not on file   Tobacco Use    Smoking status: Never Smoker    Smokeless tobacco: Never Used   Substance and Sexual Activity    Alcohol use: Yes    Drug use: No    Sexual activity: Not Currently     Partners: Female     Birth control/protection: Surgical        Prior to Admission medications    Medication Sig Start Date End Date Taking? Authorizing Provider   Leg Brace (Ankle Brace) misc by Does Not Apply route.  Power chair      Electric chair 6/2/20  Yes Saturnino Kahn MD   folic acid/multivit-min/lutein (CENTRUM SILVER PO) Take  by mouth. Provider, Historical   buPROPion SR (WELLBUTRIN SR) 150 mg SR tablet Take  by mouth two (2) times a day. Provider, Historical   testosterone cypionate (DEPOTESTOTERONE CYPIONATE) 200 mg/mL injection  4/15/20   Provider, Historical   lisinopriL (PRINIVIL, ZESTRIL) 20 mg tablet Take 1 Tab by mouth daily. 5/18/20   Monica Lora MD   Syringe, Disposable, 3 mL syrg Take as directed 4/15/20   Raphael Orellana MD   Needle, Disp, 23 G 23 gauge x 1 1/4\" ndle Use to inject 0.5 ml every 7 days 4/15/20   Raphael Orellana MD   Needle, Disp, 18 G 18 gauge x 1\" ndle Use to aspirate 0.5 ml every 7 days 4/15/20   Raphael Orellana MD   Wheel Chair maura Assistance with ambulation, gait instability 2/28/20   Brenda Tena NP   pramipexole (MIRAPEX) 0.75 mg tablet TAKE 1 TABLET THREE TIMES DAILY 2/26/20   Brenda Tena NP   metFORMIN ER (GLUCOPHAGE XR) 500 mg tablet Take 2 Tabs by mouth two (2) times daily (with meals). 2/26/20   Brenda Tena NP   isosorbide mononitrate ER (IMDUR) 60 mg CR tablet Take 1 Tab by mouth every morning. 2/26/20   Brenda Tena NP   metoprolol succinate (TOPROL-XL) 50 mg XL tablet Take 1 Tab by mouth daily. 2/26/20   Brenda Tena NP   ACCU-CHEK ROLANDO PLUS TEST STRP strip TEST BLOOD GLUCOSE ONE TIME DAILY 12/12/19   Gio Haas MD   simvastatin (ZOCOR) 10 mg tablet TAKE 1 TABLET EVERY NIGHT 12/6/19   Nataliia Oquendo MD   zolpidem (AMBIEN) 10 mg tablet Take 1 Tab by mouth nightly. Max Daily Amount: 10 mg. Patient taking differently: Take 10 mg by mouth nightly as needed. 9/13/19   Brenda Tena NP   ALPRAZolam Zain Ket) 0.5 mg tablet Take 1 Tab by mouth two (2) times daily as needed for Anxiety. Max Daily Amount: 1 mg. 9/13/19   Brenda Tena NP   dulaglutide (TRULICITY) 1.5 DY/0.5 mL sub-q pen 0.5 mL by SubCUTAneous route every seven (7) days. 7/15/19   Brenda Tena NP   polyethylene glycol (MIRALAX) 17 gram packet Take 1 Packet by mouth daily.  Hold for loose stools 1/18/19   Kathy Wolf, NP   ACCU-CHEK FASTCLIX LANCET DRUM mis CHECK BLOOD SUGAR EVERY DAY 8/30/18   Roslyn Aragon MD   Blood-Glucose Meter [facility]) misc Check blood sugar daily 6/7/18   Roslyn Aragon MD   naloxone 4 mg/actuation spry 4 mg by Nasal route as needed. 5/4/17   Mauri Calloway MD   Back Brace [facility] 1 Device by Does Not Apply route daily as needed for Pain.  One, lumbosacral orthosis/back brace with metal struts      Medically necessary 3/16/16   Mauri Calloway MD     Allergies   Allergen Reactions    Bactrim [Sulfamethoprim] Rash    Opana [Oxymorphone] Other (comments)     Feels like bug crawling under skin and drowziness        ROS    Past Medical History:   Diagnosis Date    Abdominal adhesions 7/30/2014    Back muscle spasm 7/30/2014    Back pain, lumbosacral     BPH (benign prostatic hyperplasia)     Cellulitis     Constipation, chronic 7/30/2014    DDD (degenerative disc disease), lumbar 7/30/2014    DDD (degenerative disc disease), thoracic 7/30/2014    Depression     Diabetes ([address]     DM (diabetes mellitus) ([address] 7/30/2014    Frequent falls 7/30/2014    TRUE (generalized anxiety disorder) 7/30/2014    High cholesterol     HTN (hypertension) 7/30/2014    Hydronephrosis     Hyperlipidemia 7/30/2014    Hypertension     Hypogonadism in male     Incomplete bladder emptying     Insomnia secondary to chronic pain 7/30/2014    Kidney stones     LBP radiating to both legs 7/30/2014    Lumbar facet arthropathy 7/30/2014    Lumbar nerve root impingement 7/30/2014    Lumbosacral radiculopathy at S1 7/30/2014    Major depression 7/30/2014    Nausea & vomiting     Neurological disorder     sciatica    MANISH (obstructive sleep apnea) 7/30/2014    S/P lumbar laminectomy 7/30/2014    S/P lumbar spinal fusion 7/30/2014    Small vessel disease ([address]     Thoracic compression fracture ([address] 07/30/2014    Unspecified sleep apnea     USES CPAP EVERY NIGHT     Past Surgical History:   Procedure Laterality Date    HX APPENDECTOMY      HX BACK SURGERY      Lumbar fusion    HX HERNIA REPAIR  1992    HX OTHER SURGICAL      EMG - S1 disorder    HX OTHER SURGICAL  10/2017    spinal cord stimulator    HX VASECTOMY  1985     Social History     Socioeconomic History    Marital status:      Spouse name: Not on file    Number of children: Not on file    Years of education: Not on file    Highest education level: Not on file   Occupational History    Not on file   Social Needs    Financial resource strain: Not on file    Food insecurity     Worry: Not on file     Inability: Not on file    Transportation needs     Medical: Not on file     Non-medical: Not on file   Tobacco Use    Smoking status: Never Smoker    Smokeless tobacco: Never Used   Substance and Sexual Activity    Alcohol use:  Yes    Drug use: No    Sexual activity: Not Currently     Partners: Female     Birth control/protection: Surgical   Lifestyle    Physical activity     Days per week: Not on file     Minutes per session: Not on file    Stress: Not on file   Relationships    Social connections     Talks on phone: Not on file     Gets together: Not on file     Attends [employer] service: Not on file     Active member of club or organization: Not on file     Attends meetings of clubs or organizations: Not on file     Relationship status: Not on file    Intimate partner violence     Fear of current or ex partner: Not on file     Emotionally abused: Not on file     Physically abused: Not on file     Forced sexual activity: Not on file   Other Topics Concern    Not on file   Social History Narrative    Not on file     Family History   Problem Relation Age of Onset    Diabetes Mother     Heart Failure Mother     Heart Attack Father     Diabetes Sister     Stroke Brother      worker's comp     OBJECTIVE     General: alert, cooperative, no distress   Mental  status: mental status: alert, oriented to person, place, and time, normal mood, behavior, speech, dress, motor activity, and thought processes   Resp:  no respiratory distress   Neuro: Neuro: see above   Skin: skin: please see assessment section of this report   Live video viewing was used and showed:  as above              Renato Gamino MD  6/2/2020  1:47 PM

## 2020-06-09 ENCOUNTER — OFFICE VISIT (OUTPATIENT)
Dept: ORTHOPEDIC SURGERY | Age: 65
End: 2020-06-09

## 2020-06-09 VITALS — TEMPERATURE: 97.2 F | WEIGHT: 315 LBS | HEIGHT: 74 IN | BODY MASS INDEX: 40.43 KG/M2

## 2020-06-09 DIAGNOSIS — S86.311D TEAR OF PERONEAL TENDON, RIGHT, SUBSEQUENT ENCOUNTER: Primary | ICD-10-CM

## 2020-06-09 DIAGNOSIS — R29.898 ANKLE WEAKNESS: ICD-10-CM

## 2020-06-09 DIAGNOSIS — M25.371 RIGHT ANKLE INSTABILITY: ICD-10-CM

## 2020-06-09 DIAGNOSIS — M79.671 RIGHT FOOT PAIN: ICD-10-CM

## 2020-06-09 NOTE — PROGRESS NOTES
AMBULATORY PROGRESS NOTE      Patient: Darren William             MRN: [id number]     SSN: [id number] Body mass index is 40.75 kg/m². YOB: 1955     AGE: 72 y.o. EX: male    PCP: Brittany Ramos NP       IMPRESSION //  DIAGNOSIS AND TREATMENT PLAN      DIAGNOSES  1. Tear of peroneal tendon, right, subsequent encounter    2. Right ankle instability    3. Ankle weakness    4. 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 He is here today, with his wife. He has a severe deformity, to his right foot, he has diabetic peripheral neuropathy, and has nonfunctional peroneal tendons, to his overpull of the posterior tibial tendon causing the foot to go into varus spinning at the talonavicular region. Does have a high arch type foot, as well Pez cavovarus type foot. He is extended SMO brace but is insufficient, and is not holding improperly, he states he does better, when he uses his cam walker boot because it helps hold his foot into a better position. He describes it as peripheral neuropathy, is quite significant, and listening to his wife, this patient does walk in the home and has to hold onto objects in order to walk in his home. Does have a history of lumbar pathology as well.     Nonoperative treatment consist of a custom hinged AFO brace that allow him to wear his tennis shoe give him support and improve his gait mechanics surgical options, would include a gastroc Melissa release, medial talonavicular release, possibly posterior tib tendon transfer, subtalar fusion and a talonavicular fusion to help hold his hindfoot into neutral.  He does have some pitting edema to his leg, so prior to any surgery, he would require I recommend noninvasive arterial, and noninvasive venous studies, prior to surgery as well as obtaining a hemoglobin A1c. This is a difficult situation, is that he will care for him and his wife. He cares for his wife his wife has output 1 trypsin enzymatic deficiency as an as a consequence has severe pulmonary disease and has a pulmonary function of about 20% normal is what she tells me. So she is an electric wheelchair. He cares for her. They do have some adult children, but Mr. and Mrs. Pennington, care for each other primarily.]    Prior to any surgery, is hoping to see what we can get him into the [facility] rehabilitation [place] to see whether he can recuperate in this location, for least 2 to 3 weeks after surgery, but the question to be who would care for his wife in his absence. We will need to see what we can obtain a home health certified home health agency so that they can assist him and his wife with performing personal care services such as bathing, meal preparation, generalized companionship, and some home physical therapy as well. If we cannot get this set up for the family, then we may have to forego the surgery, as there really is not anyone that would be able to care for both of them after he even gets home from surgery. Anticipate he will be nonweightbearing, for least 2 months and then gradual weightbearing thereafter protected. I would take his hindfoot fusion, least 6 months to fuse.   The goal of surgery is to get his foot in the proper position so that is not inverted and walk inside his foot, but to get his hindfoot into his neutral position as I can this I think he be best served with a subtalar fusion, and a talonavicular fusion as well to navicular capsular release Achilles tendon or gastroc Melissa type procedure lengthening . This certainly risks the risk include but not limited to bleeding infection DVT PE death RSD. To the numbness M, positive scar plus a delayed healing nonhealing plus a delayed union,  nonunion primarily. HPI //  OBJECTIVE EXAMINATION       Tayo William IS A 72 y.o. male who presents to my outpatient office for evaluation of:  ABOVE ORTHO DIAGNOSIS    He is here with his wife to discuss treatment options for his right foot and ankle deficit, and deformity. He has diabetic neuropathy peroneal tendon insufficiency overpull of the hindfoot at the medial inverters (muscles. On examination 6/9/2020 , the patient is alert, oriented (name, place, time) and follows commands. he is in no acute distress and his affect and mood are appropriate. Visit Vitals  Temp 97.2 °F (36.2 °C) (Oral)   Ht 6' 2\" (1.88 m)   Wt 317 lb 6.4 oz (144 kg)   BMI 40.75 kg/m²         Appearance: Alert, well appearing and pleasant patient who is in no distress, oriented to person, place/time, and who follows commands. This patient is accompanied in the examination room by his  his wife is present with him today. Dementia: no dementia  Psychiatric: Affect and mood are appropriate. Patient arrives to office via: with assistive device: cane and SMO  HEENT: Head normocephalic & atraumatic.  Both pupils are round, non icteric sclera              Eye: EOM are intact and sclera are clear               Neck: ROM WNL and JVD neck is not present              Hearings Intact, does not require hearing aid device  Respiratory: Breathing is unlabored without accessory chest muscle use  Cardiovascular/Peripheral Vascular: Normal Pulses to each foot     ANKLE/FOOT right     Gait: uses cane and SMO  Tenderness: No specific tenderness, today while examined him in the studies in a seated position  Cutaneous: He does have some swelling, but this 1-2+ pitting edema to his right and left lower extremity at the supra Maller region ankle primarily in towards the mid tib-fib regions. His calf on each side is distinctly soft nontender without any nodules. Joint Motion: His right foot, is held inverted, can isabella into neutral recommend to neutral hypertension neutral, he does have Pez cavovarus type alignment however. Joint / Tendon Stability: No Ankle or Subtalar instability or joint laxity. No peroneal sublux ability or dislocation  Alignment: Pes cavus alignment bilaterally                     Varus alignment of the right tibia, bowing of the RLE relative to the LLE. Neuro: Decreased monofilament testing to the plantar hindfoot              Cannot isabella              DF 4/5              PF 5/5              Inversion 5/5              Anterior tibial tendon 5/5              Posterior tibial tendon 5/5              EDC 5/5              EHL 4/5  Vascular: NL foot/ankle pulses. 1+ Pitting edema, bilaterally  Lymphatics: No extremity lymphedema, No calf swelling, no tenderness to calf muscles.       X-rays 3 view right foot, 2 view right ankle, 5 images in totality: These are nonweightbearing, as he is too heavy for our system does not meet the weight restriction criteria: He has Pez cavovarus type alignment, osteopenia, metatarsus adductus, severe abduction at the talonavicular region, some narrowing on the right 2 3 TMT region. The 4 5 TMT joints appear to be still well preserved the transverse tarsal joints still fairly well preserved throughout the naviculocuneiform joints. The lateral film demonstrates that he is a calcific bone spur the ankle subtalar and transverse tarsal joints well-maintained there is still some spurring the dorsal part of the right TN region.   In the AP ankle view, there is significant supination of the forefoot on these nonweightbearing x-rays           CHART REVIEW     Patient Active Problem List   Diagnosis Code    DDD (degenerative disc disease), lumbar M51.36    Lumbar facet arthropathy M47.816    S/P lumbar laminectomy Z98.890    S/P lumbar spinal fusion Z98.1    Lumbar nerve root impingement M54.16    Lumbosacral radiculopathy at S1 M54.17    DDD (degenerative disc disease), thoracic M51.34    Thoracic compression fracture ([facility]) S22.000A    Back muscle spasm M62.830    Major depression F32.9    TRUE (generalized anxiety disorder) F41.1    Abdominal adhesions K66.0    Constipation, chronic K59.09    Insomnia secondary to chronic pain G89.29, G47.01    MANISH (obstructive sleep apnea) G47.33    DM (diabetes mellitus) ([facility]) E11.9    HTN (hypertension) I10    Hyperlipidemia E78.5    Frequent falls R29.6    Thoracic or lumbosacral neuritis or radiculitis, unspecified [id number]    Postlaminectomy syndrome, lumbar region M96.1    History of depression Z86.59    Chronic midline low back pain with sciatica M54.40, G89.29    Advanced care planning/counseling discussion Z71.89    Anemia D64.9    Chronic pain syndrome G89.4    Lumbar post-laminectomy syndrome M96.1    Lumbar and sacral osteoarthritis M47.817    Sepsis ([facility]) A41.9    UTI (urinary tract infection) N39.0    Kidney stone N20.0    Severe obesity ([facility]) E66.01    Type 2 diabetes mellitus with diabetic neuropathy ([facility]) E11.40    Status post insertion of spinal cord stimulator Z96.89        Lilia William has been experiencing pain and discomfort confirmed as outlined in the pain assessment outlined below. Pain Assessment  6/9/2020   Location of Pain Ankle   Location Modifiers Right   Severity of Pain 8   Quality of Pain Throbbing;Dull;Aching; Sharp   Duration of Pain Persistent   Frequency of Pain Constant   Aggravating Factors Walking;Standing   Limiting Behavior -   Relieving Factors Nothing   Result of Injury No        Lilia William  has a past medical history of Abdominal adhesions (7/30/2014), Back muscle spasm (7/30/2014), Back pain, lumbosacral, BPH (benign prostatic hyperplasia), Cellulitis, Constipation, chronic (7/30/2014), DDD (degenerative disc disease), lumbar (7/30/2014), DDD (degenerative disc disease), thoracic (7/30/2014), Depression, Diabetes ([address] DM (diabetes mellitus) ([address] (7/30/2014), Frequent falls (7/30/2014), TRUE (generalized anxiety disorder) (7/30/2014), High cholesterol, HTN (hypertension) (7/30/2014), Hydronephrosis, Hyperlipidemia (7/30/2014), Hypertension, Hypogonadism in male, Incomplete bladder emptying, Insomnia secondary to chronic pain (7/30/2014), Kidney stones, LBP radiating to both legs (7/30/2014), Lumbar facet arthropathy (7/30/2014), Lumbar nerve root impingement (7/30/2014), Lumbosacral radiculopathy at S1 (7/30/2014), Major depression (7/30/2014), Nausea & vomiting, Neurological disorder, MANISH (obstructive sleep apnea) (7/30/2014), S/P lumbar laminectomy (7/30/2014), S/P lumbar spinal fusion (7/30/2014), Small vessel disease ([address] Thoracic compression fracture ([address] (07/30/2014), and Unspecified sleep apnea.      Patients is employed at: [employer]        Past Medical History:   Diagnosis Date    Abdominal adhesions 7/30/2014    Back muscle spasm 7/30/2014    Back pain, lumbosacral     BPH (benign prostatic hyperplasia)     Cellulitis     Constipation, chronic 7/30/2014    DDD (degenerative disc disease), lumbar 7/30/2014    DDD (degenerative disc disease), thoracic 7/30/2014    Depression     Diabetes ([address]     DM (diabetes mellitus) ([address] 7/30/2014    Frequent falls 7/30/2014    TRUE (generalized anxiety disorder) 7/30/2014    High cholesterol     HTN (hypertension) 7/30/2014    Hydronephrosis     Hyperlipidemia 7/30/2014    Hypertension     Hypogonadism in male     Incomplete bladder emptying     Insomnia secondary to chronic pain 7/30/2014    Kidney stones     LBP radiating to both legs 7/30/2014    Lumbar facet arthropathy 7/30/2014    Lumbar nerve root impingement 7/30/2014    Lumbosacral radiculopathy at S1 7/30/2014    Major depression 7/30/2014    Nausea & vomiting     Neurological disorder     sciatica    MANISH (obstructive sleep apnea) 7/30/2014    S/P lumbar laminectomy 7/30/2014    S/P lumbar spinal fusion 7/30/2014    Small vessel disease (HCC)     Thoracic compression fracture (HCC) 07/30/2014    Unspecified sleep apnea     USES CPAP EVERY NIGHT     Current Outpatient Medications   Medication Sig    folic acid/multivit-min/lutein (CENTRUM SILVER PO) Take  by mouth.  buPROPion SR (WELLBUTRIN SR) 150 mg SR tablet Take  by mouth two (2) times a day.  testosterone cypionate (DEPOTESTOTERONE CYPIONATE) 200 mg/mL injection     lisinopriL (PRINIVIL, ZESTRIL) 20 mg tablet Take 1 Tab by mouth daily.  Syringe, Disposable, 3 mL syrg Take as directed    Needle, Disp, 23 G 23 gauge x 1 1/4\" ndle Use to inject 0.5 ml every 7 days    Needle, Disp, 18 G 18 gauge x 1\" ndle Use to aspirate 0.5 ml every 7 days   [facility] Chair maura Assistance with ambulation, gait instability    pramipexole (MIRAPEX) 0.75 mg tablet TAKE 1 TABLET THREE TIMES DAILY    metFORMIN ER (GLUCOPHAGE XR) 500 mg tablet Take 2 Tabs by mouth two (2) times daily (with meals).  isosorbide mononitrate ER (IMDUR) 60 mg CR tablet Take 1 Tab by mouth every morning.  metoprolol succinate (TOPROL-XL) 50 mg XL tablet Take 1 Tab by mouth daily.  ACCU-CHEK ROLANDO PLUS TEST STRP strip TEST BLOOD GLUCOSE ONE TIME DAILY    simvastatin (ZOCOR) 10 mg tablet TAKE 1 TABLET EVERY NIGHT    zolpidem (AMBIEN) 10 mg tablet Take 1 Tab by mouth nightly. Max Daily Amount: 10 mg. (Patient taking differently: Take 10 mg by mouth nightly as needed.)    ALPRAZolam (XANAX) 0.5 mg tablet Take 1 Tab by mouth two (2) times daily as needed for Anxiety. Max Daily Amount: 1 mg.     dulaglutide (TRULICITY) 1.5 SH/7.7 mL sub-q pen 0.5 mL by SubCUTAneous route every seven (7) days.  polyethylene glycol (MIRALAX) 17 gram packet Take 1 Packet by mouth daily. Hold for loose stools    ACCU-CHEK FASTCLIX LANCET DRUM mis CHECK BLOOD SUGAR EVERY DAY    Blood-Glucose Meter (ACCU-CHEK GABRIELLE) misc Check blood sugar daily    naloxone 4 mg/actuation spry 4 mg by Nasal route as needed.  Back Brace misc 1 Device by Does Not Apply route daily as needed for Pain. One, lumbosacral orthosis/back brace with metal struts      Medically necessary     No current facility-administered medications for this visit. THE  FOR Lilia William  WAS REVIEWED BY Troy Rodriguez MD 6/9/2020 . Allergies   Allergen Reactions    Bactrim [Sulfamethoprim] Rash    Opana [Oxymorphone] Other (comments)     Feels like bug crawling under skin and drowziness     Past Surgical History:   Procedure Laterality Date    HX APPENDECTOMY      HX BACK SURGERY      Lumbar fusion    HX HERNIA REPAIR  1992    HX OTHER SURGICAL      EMG - S1 disorder    HX OTHER SURGICAL  10/2017    spinal cord stimulator    HX VASECTOMY  1985     Social History     Occupational History    Not on file   Tobacco Use    Smoking status: Never Smoker    Smokeless tobacco: Never Used   Substance and Sexual Activity    Alcohol use:  Yes    Drug use: No    Sexual activity: Not Currently     Partners: Female     Birth control/protection: Surgical     Family History   Problem Relation Age of Onset    Diabetes Mother     Heart Failure Mother     Heart Attack Father     Diabetes Sister     Stroke Brother         DIAGNOSTIC IMAGING  LAB DATA      Lab Results   Component Value Date/Time    Hemoglobin A1c 8.4 (H) 11/15/2019 01:32 PM    Hemoglobin A1c 7.2 (H) 06/07/2019 08:10 AM    Hemoglobin A1c 10.0 (H) 11/01/2018 03:27 AM    //   Lab Results   Component Value Date/Time    Glucose 95 02/12/2019 03:40 PM    Glucose (POC) 93 02/25/2019 10:49 AM        Lab Results   Component Value Date/Time Hemoglobin A1c (POC) 8.1 09/13/2019 01:58 PM         Lab Results   Component Value Date/Time    Vitamin D 25-Hydroxy 38.6 12/07/2016 09:27 AM         REVIEW OF SYSTEMS : 6/9/2020  ALL BELOW ARE Negative except : SEE HPI     Constitutional: Negative for fever, chills and weight loss. Neg Weight Loss  Cardiovascular: Negative for chest pain, claudication and leg swelling. SOB, CURRIE   Gastrointestinal/Urological: Negative for pain, N/V/D/C, Blood in stool or urine,dysuria, Hematuria, Incontinence  Musculoskeletal: see HPI. Neurological: Negative for dizziness and weakness, headaches,Visual Changes or             Confusion, or Seizures,   Psychiatric/Behavioral: Negative for depression, memory loss and substance abuse. Extremities:  Negative for hair changes, rash or skin lesion changes. Hematologic: Negative for Bleeding problems, bruising, pallor or swollen lymph nodes. Peripheral Vascular: No calf pain, vascular vein tenderness to calf pain// No calf throbbing, posterior knee throbbing pain     DIAGNOSTIC IMAGING        X RAYS/IMAGES DONE AT [facility] VIEW OFFICE 6/9/2020      X-rays 3 view right foot, 2 view right ankle, 5 images in totality: These are nonweightbearing, as he is too heavy for our system does not meet the weight restriction criteria: He has Pez cavovarus type alignment, osteopenia, metatarsus adductus, severe abduction at the talonavicular region, some narrowing on the right 2 3 TMT region. The 4 5 TMT joints appear to be still well preserved the transverse tarsal joints still fairly well preserved throughout the naviculocuneiform joints. The lateral film demonstrates that he is a calcific bone spur the ankle subtalar and transverse tarsal joints well-maintained there is still some spurring the dorsal part of the right TN region. In the AP ankle view, there is significant supination of the forefoot on these nonweightbearing x-rays  Please see above section of this report.     I have personally reviewed the results of the above study. The interpretation of this study is my professional opinion.       Adán Fox MD  6/9/2020  2:45 PM

## 2020-06-09 NOTE — PROGRESS NOTES
1. Have you been to the ER, urgent care clinic since your last visit? Hospitalized since your last visit? No    2. Have you seen or consulted any other health care providers outside of the [address] [place] [address] since your last visit? Include any pap smears or colon screening.  No

## 2020-06-17 ENCOUNTER — VIRTUAL VISIT (OUTPATIENT)
Dept: INTERNAL MEDICINE CLINIC | Age: 65
End: 2020-06-17

## 2020-06-17 ENCOUNTER — TELEPHONE (OUTPATIENT)
Dept: INTERNAL MEDICINE CLINIC | Age: 65
End: 2020-06-17

## 2020-06-17 DIAGNOSIS — E11.40 TYPE 2 DIABETES MELLITUS WITH DIABETIC NEUROPATHY, WITHOUT LONG-TERM CURRENT USE OF INSULIN (HCC): Primary | ICD-10-CM

## 2020-06-17 RX ORDER — LANCETS
EACH MISCELLANEOUS
Qty: 100 EACH | Refills: 11 | Status: SHIPPED | OUTPATIENT
Start: 2020-06-17 | End: 2022-10-04

## 2020-06-17 RX ORDER — INSULIN PUMP SYRINGE, 3 ML
EACH MISCELLANEOUS
Qty: 1 KIT | Refills: 0 | Status: SHIPPED | OUTPATIENT
Start: 2020-06-17

## 2020-06-17 NOTE — PROGRESS NOTES
CC:  Diabetes management    S/O: Samara Suresh is a 72 y.o. male referred by Dr. Maria Eugenia Pillai for diabetes management. Discussed with patient the Pharmacist Collaborative Practice Agreement. Patient provided verbal and/or electronic (ex. [email or web address]) consent to participate in the collaborative practice agreement between the pharmacist and referred patient. This is in lieu of paper consent due to COVID-19 precautions and the use of remote/virtual visits. Past Medical History:   Diagnosis Date    Abdominal adhesions 7/30/2014    Back muscle spasm 7/30/2014    Back pain, lumbosacral     BPH (benign prostatic hyperplasia)     Cellulitis     Constipation, chronic 7/30/2014    DDD (degenerative disc disease), lumbar 7/30/2014    DDD (degenerative disc disease), thoracic 7/30/2014    Depression     Diabetes ([address]     DM (diabetes mellitus) ([address] 7/30/2014    Frequent falls 7/30/2014    TRUE (generalized anxiety disorder) 7/30/2014    High cholesterol     HTN (hypertension) 7/30/2014    Hydronephrosis     Hyperlipidemia 7/30/2014    Hypertension     Hypogonadism in male     Incomplete bladder emptying     Insomnia secondary to chronic pain 7/30/2014    Kidney stones     LBP radiating to both legs 7/30/2014    Lumbar facet arthropathy 7/30/2014    Lumbar nerve root impingement 7/30/2014    Lumbosacral radiculopathy at S1 7/30/2014    Major depression 7/30/2014    Nausea & vomiting     Neurological disorder     sciatica    MANISH (obstructive sleep apnea) 7/30/2014    S/P lumbar laminectomy 7/30/2014    S/P lumbar spinal fusion 7/30/2014    Small vessel disease (HCC)     Thoracic compression fracture ([address] 07/30/2014    Unspecified sleep apnea     USES CPAP EVERY NIGHT     States he has upcoming procedure to \"fuse bones in my foot\" to help with walking. States they do not have date for this yet.     Current diabetes regimen consists of the following: Trulicity 1.5 mg once weekly, metformin ER 1000 mg BID. -States he has been back on Trulicity for 3 weeks now  -States before he was on Trulicity, BG was in 645R    Patient reports the following signs/symptoms of diabetes: neuropathy in both feet (constant). Patient denies recent hypoglycemic symptoms.  -Denies any BG <70    Patient checks blood sugars 1-3 times per day and readings run 110s-200s mg/dL per his report. Most recent A1C was 8.4 %. Lab Results   Component Value Date/Time    Hemoglobin A1c 8.4 (H) 11/15/2019 01:32 PM    Hemoglobin A1c (POC) 8.1 09/13/2019 01:58 PM     Date Fasting After Breakfast Before Dinner Bedtime   6/17 153      6/16 151      6/15 169 182  181   6/14  186, 170 162    6/13 171 169     6/12 150   175   6/11 209 (stayed up and had cookie the night before)   117   6/10 171 146       A typical days food intake is as follows:  Breakfast: 3 eggs and some toast  Lunch: Doesn't eat lunch often  Dinner: Changes; has steak/chicken/pork; has corn and green vegetable or potatoes and vegetable  Beverages: Water, unsweetened tea, pepsi zero  Snacks: At night before bed has small bag of mini cookies around once a day    Exercise: States he is constantly walking around house    Patient reports adherence with His medications. Patient denies adverse effects from their medications.     Past Surgical History:   Procedure Laterality Date    HX APPENDECTOMY      HX BACK SURGERY      Lumbar fusion    HX HERNIA REPAIR  1992    HX OTHER SURGICAL      EMG - S1 disorder    HX OTHER SURGICAL  10/2017    spinal cord stimulator    HX VASECTOMY  1985     Family History   Problem Relation Age of Onset    Diabetes Mother     Heart Failure Mother     Heart Attack Father     Diabetes Sister     Stroke Brother      Social History     Socioeconomic History    Marital status:      Spouse name: Not on file    Number of children: Not on file    Years of education: Not on file    Highest education level: Not on file   Tobacco Use    Smoking status: Never Smoker    Smokeless tobacco: Never Used   Substance and Sexual Activity    Alcohol use: Yes    Drug use: No    Sexual activity: Not Currently     Partners: Female     Birth control/protection: Surgical     Allergies   Allergen Reactions    Bactrim [Sulfamethoprim] Rash    Opana [Oxymorphone] Other (comments)     Feels like bug crawling under skin and drowziness     Current Outpatient Medications   Medication Sig    folic acid/multivit-min/lutein (CENTRUM SILVER PO) Take  by mouth.  buPROPion SR (WELLBUTRIN SR) 150 mg SR tablet Take  by mouth two (2) times a day.  testosterone cypionate (DEPOTESTOTERONE CYPIONATE) 200 mg/mL injection     lisinopriL (PRINIVIL, ZESTRIL) 20 mg tablet Take 1 Tab by mouth daily.  Syringe, Disposable, 3 mL syrg Take as directed    Needle, Disp, 23 G 23 gauge x 1 1/4\" ndle Use to inject 0.5 ml every 7 days    Needle, Disp, 18 G 18 gauge x 1\" ndle Use to aspirate 0.5 ml every 7 days   [facility] Chair maura Assistance with ambulation, gait instability    pramipexole (MIRAPEX) 0.75 mg tablet TAKE 1 TABLET THREE TIMES DAILY    metFORMIN ER (GLUCOPHAGE XR) 500 mg tablet Take 2 Tabs by mouth two (2) times daily (with meals).  isosorbide mononitrate ER (IMDUR) 60 mg CR tablet Take 1 Tab by mouth every morning.  metoprolol succinate (TOPROL-XL) 50 mg XL tablet Take 1 Tab by mouth daily.  ACCU-CHEK ROLANDO PLUS TEST STRP strip TEST BLOOD GLUCOSE ONE TIME DAILY    simvastatin (ZOCOR) 10 mg tablet TAKE 1 TABLET EVERY NIGHT    zolpidem (AMBIEN) 10 mg tablet Take 1 Tab by mouth nightly. Max Daily Amount: 10 mg. (Patient taking differently: Take 10 mg by mouth nightly as needed.)    ALPRAZolam (XANAX) 0.5 mg tablet Take 1 Tab by mouth two (2) times daily as needed for Anxiety. Max Daily Amount: 1 mg.  dulaglutide (TRULICITY) 1.5 YH/8.7 mL sub-q pen 0.5 mL by SubCUTAneous route every seven (7) days.     polyethylene glycol (MIRALAX) 17 gram packet Take 1 Packet by mouth daily. Hold for loose stools    ACCU-CHEK FASTCLIX LANCET DRUM mis CHECK BLOOD SUGAR EVERY DAY    Blood-Glucose Meter (ACCU-CHEK GABRIELLE) misc Check blood sugar daily    naloxone 4 mg/actuation spry 4 mg by Nasal route as needed.  Back Brace misc 1 Device by Does Not Apply route daily as needed for Pain. One, lumbosacral orthosis/back brace with metal struts      Medically necessary     No current facility-administered medications for this visit. There were no vitals taken for this visit. Lab Results   Component Value Date/Time    Sodium 139 02/12/2019 03:40 PM    Potassium 4.7 02/12/2019 03:40 PM    Chloride 104 02/12/2019 03:40 PM    CO2 30 02/12/2019 03:40 PM    Anion gap 5 02/12/2019 03:40 PM    Glucose 95 02/12/2019 03:40 PM    BUN 18 02/12/2019 03:40 PM    Creatinine 1.17 02/12/2019 03:40 PM    BUN/Creatinine ratio 15 02/12/2019 03:40 PM    GFR est AA >60 02/12/2019 03:40 PM    GFR est non-AA >60 02/12/2019 03:40 PM    Calcium 9.6 02/12/2019 03:40 PM      Lab Results   Component Value Date/Time    Creatinine, POC 1.3 06/23/2015 09:12 AM    Creatinine 1.17 02/12/2019 03:40 PM     Lab Results   Component Value Date/Time    Cholesterol, total 64 01/15/2019 03:58 AM    HDL Cholesterol 25 (L) 01/15/2019 03:58 AM    LDL, calculated 18.4 01/15/2019 03:58 AM    VLDL, calculated 20.6 01/15/2019 03:58 AM    Triglyceride 103 01/15/2019 03:58 AM    CHOL/HDL Ratio 2.6 01/15/2019 03:58 AM     Lab Results   Component Value Date/Time    ALT (SGPT) 38 11/20/2019 09:10 AM    Alk.  phosphatase 81 11/20/2019 09:10 AM    Bilirubin, direct 0.11 11/20/2019 09:10 AM    Bilirubin, total 0.3 11/20/2019 09:10 AM     Lab Results   Component Value Date/Time    Microalbumin/Creat ratio (mg/g creat) 23 11/01/2018 03:27 PM    MICROALBUMIN,MG/DAY 14.8 06/13/2016 08:43 AM    Microalbumin,urine random 1.83 11/01/2018 03:27 PM     BP Readings from Last 3 Encounters:   05/18/20 138/80   12/10/19 168/79   12/09/19 141/75     Wt Readings from Last 3 Encounters:   06/09/20 317 lb 6.4 oz (144 kg)   05/18/20 310 lb (140.6 kg)   12/10/19 321 lb (145.6 kg)     Estimated body mass index is 40.75 kg/m² as calculated from the following:    Height as of 6/9/20: 6' 2\" (1.88 m). Weight as of 6/9/20: 317 lb 6.4 oz (144 kg). Screenings:  Diabetic Foot and Eye Exam HM Status   Topic Date Due    Diabetic Foot Care  08/02/2018    Eye Exam  08/24/2020     Assessment/Plan:     1. Diabetes: uncontrolled with goal A1C at least <7%. Blood glucose readings: high with 200s readings the highest consistently.  -Discussed pathophysiology of diabetes, long-term consequences of uncontrolled diabetes and blood sugars, importance of staying up to date with eye/foot/dental exams, signs/symptoms of hypo/hyperglycemia, and A1C and BG goals with patient today    A. Medications: Stressed importance of medication adherence on overall diabetes control as well as with preventing complications of diabetes. Reviewed symptoms of hypoglycemia and how to treat. Instructed patient to continue to monitor blood sugars at least 2 times daily (fasting, postprandial, bedtime)  -Instructed patient to continue current regimen of Trulicity and metformin  -Discussed additional medication options to help with BG control, including Jardiance   -Patient needs renal function check prior to starting this; will discuss with PCP  -Sent Rxs for BG testing supplies to patient's pharmacy per his request - states he wants a new monitor    B. Diet/lifestyle: Reinforced importance of regular activity/exercise to help improve insulin resistance and reviewed food labels/carbohydrates/general carb guidelines for meals (45-60 g) and snacks (goal of 15g).   -Reviewed importance of decreasing intake of sweets during the week    C. Screenings/Prevention:  Vaccinations: Patient is eligible for the following vaccinations: hepatitis B  Dilated eye exam (recommended at least every 2 years or annually if retinopathy present): next due 2020    Albumin-to creatinine ratio (recommended annually): due    Foot Exam (recommended annually): due    2. Medication reconciliation was completed during the visit. There are no discontinued medications. Patient verbalized understanding of the information presented and all of the patients questions were answered. Patient advised to call the office with any additional questions or concerns. Follow-up: 2 week telephone call. Notification of recommendations will be sent to Dr. Siri Lynn for review.     Thank you for the consult,  Duke Gloria, PharmD  Clinical Pharmacist Specialist    Time spent with the patient: 40  Time spent documentin [place]: MED RECONCILIATION/REVIEW [address]: P[facility] Patient?: Yes  Total # of Interventions Recommended: Count: 0  Total Interventions Accepted: 0  Time Spent (min): 26[address] [facility], PharmD

## 2020-06-17 NOTE — TELEPHONE ENCOUNTER
Pt calling, says whenever [facility] calls him his phone is going right to voicemail. He isn't sure why.  Wants venecia to call him at home number [phone]

## 2020-06-24 ENCOUNTER — TELEPHONE (OUTPATIENT)
Dept: ORTHOPEDIC SURGERY | Age: 65
End: 2020-06-24

## 2020-06-24 DIAGNOSIS — M79.671 RIGHT FOOT PAIN: Primary | ICD-10-CM

## 2020-06-24 DIAGNOSIS — M25.371 RIGHT ANKLE INSTABILITY: ICD-10-CM

## 2020-06-24 DIAGNOSIS — S86.311D TEAR OF PERONEAL TENDON, RIGHT, SUBSEQUENT ENCOUNTER: ICD-10-CM

## 2020-06-24 NOTE — TELEPHONE ENCOUNTER
Patient will require a mobility evaluation through physical therapy. Order entered.  will contact patient to schedule an appointment.

## 2020-06-24 NOTE — TELEPHONE ENCOUNTER
Patient called me asking if our office had heard from [address] regarding his power wheelchair. He states they need a mobility evaluation. Is that something we order? Please advise.

## 2020-06-25 NOTE — TELEPHONE ENCOUNTER
Patient called and advised that mobility eval will have to be done at physical therapy and that the order has been placed. Patient requested to use [facility]. Info given to referral coordinator and patient advised to expect a call from PT. Patient expressed understanding.

## 2020-06-25 NOTE — TELEPHONE ENCOUNTER
I sent orders and demographics to [address] [place] [address], however they contacted me back stating they do not eval for wheelchairs.  I called patient to advise him and see where else he wanted to go but I got busy signal.

## 2020-06-30 NOTE — TELEPHONE ENCOUNTER
Patient needs referral for physical therapy sent to [facility] as [facility] does not do mobility evaluations any longer. He is trying to obtain a power scooter. Wife is totally disabled and he is caregiver. He says he was told we would look into who could provide assistance during his postoperative period.       Patient [phone]

## 2020-06-30 NOTE — TELEPHONE ENCOUNTER
Spoke with patient and advised him that the referral is in our system. Patient may call [facility] and make an appointment.

## 2020-07-01 ENCOUNTER — TELEPHONE (OUTPATIENT)
Dept: INTERNAL MEDICINE CLINIC | Age: 65
End: 2020-07-01

## 2020-07-01 NOTE — TELEPHONE ENCOUNTER
Called patient for PharmD telephone appointment for diabetes management. Patient requested to reschedule appointment. Rescheduled appointment to 7/6. Discussed with patient that labwork was ordered by his PCP Dr. Penelope Leggett. Instructed patient to have labwork drawn at [facility] lab prior to appointment on 7/6. Patient expressed understanding and had no further questions.     Maricruz Benton, PharmD  Clinical Pharmacist Specialist

## 2020-07-03 ENCOUNTER — HOSPITAL ENCOUNTER (OUTPATIENT)
Dept: LAB | Age: 65
Discharge: HOME | End: 2020-07-03
Payer: MEDICARE

## 2020-07-03 DIAGNOSIS — E11.40 TYPE 2 DIABETES MELLITUS WITH DIABETIC NEUROPATHY, WITHOUT LONG-TERM CURRENT USE OF INSULIN (HCC): ICD-10-CM

## 2020-07-03 LAB
ALBUMIN SERPL-MCNC: 3.9 G/DL (ref 3.4–5)
ALBUMIN/GLOB SERPL: 1 {RATIO} (ref 0.8–1.7)
ALP SERPL-CCNC: 97 U/L (ref 45–117)
ALT SERPL-CCNC: 40 U/L (ref 16–61)
ANION GAP SERPL CALC-SCNC: 8 MMOL/L (ref 3–18)
AST SERPL-CCNC: 23 U/L (ref 10–38)
BILIRUB SERPL-MCNC: 0.3 MG/DL (ref 0.2–1)
BUN SERPL-MCNC: 19 MG/DL (ref 7–18)
BUN/CREAT SERPL: 14 (ref 12–20)
CALCIUM SERPL-MCNC: 9.2 MG/DL (ref 8.5–10.1)
CHLORIDE SERPL-SCNC: 106 MMOL/L (ref 100–111)
CHOLEST SERPL-MCNC: 137 MG/DL
CO2 SERPL-SCNC: 24 MMOL/L (ref 21–32)
CREAT SERPL-MCNC: 1.35 MG/DL (ref 0.6–1.3)
CREAT UR-MCNC: 156 MG/DL (ref 30–125)
ERYTHROCYTE [DISTWIDTH] IN BLOOD BY AUTOMATED COUNT: 15.7 % (ref 11.6–14.5)
EST. AVERAGE GLUCOSE BLD GHB EST-MCNC: 229 MG/DL
GLOBULIN SER CALC-MCNC: 3.8 G/DL (ref 2–4)
GLUCOSE SERPL-MCNC: 188 MG/DL (ref 74–99)
HBA1C MFR BLD: 9.6 % (ref 4.2–5.6)
HCT VFR BLD AUTO: 43.6 % (ref 36–48)
HDLC SERPL-MCNC: 39 MG/DL (ref 40–60)
HDLC SERPL: 3.5 {RATIO} (ref 0–5)
HGB BLD-MCNC: 13.6 G/DL (ref 13–16)
LDLC SERPL CALC-MCNC: 59 MG/DL (ref 0–100)
LIPID PROFILE,FLP: ABNORMAL
MCH RBC QN AUTO: 25.9 PG (ref 24–34)
MCHC RBC AUTO-ENTMCNC: 31.2 G/DL (ref 31–37)
MCV RBC AUTO: 83 FL (ref 74–97)
MICROALBUMIN UR-MCNC: 4.06 MG/DL (ref 0–3)
MICROALBUMIN/CREAT UR-RTO: 26 MG/G (ref 0–30)
PLATELET # BLD AUTO: 267 K/UL (ref 135–420)
PMV BLD AUTO: 10.2 FL (ref 9.2–11.8)
POTASSIUM SERPL-SCNC: 4.3 MMOL/L (ref 3.5–5.5)
PROT SERPL-MCNC: 7.7 G/DL (ref 6.4–8.2)
RBC # BLD AUTO: 5.25 M/UL (ref 4.7–5.5)
SODIUM SERPL-SCNC: 138 MMOL/L (ref 136–145)
TRIGL SERPL-MCNC: 195 MG/DL (ref ?–150)
TSH SERPL DL<=0.05 MIU/L-ACNC: 1.25 UIU/ML (ref 0.36–3.74)
VLDLC SERPL CALC-MCNC: 39 MG/DL
WBC # BLD AUTO: 6.8 K/UL (ref 4.6–13.2)

## 2020-07-03 PROCEDURE — 80053 COMPREHEN METABOLIC PANEL: CPT

## 2020-07-03 PROCEDURE — 85027 COMPLETE CBC AUTOMATED: CPT

## 2020-07-03 PROCEDURE — 84443 ASSAY THYROID STIM HORMONE: CPT

## 2020-07-03 PROCEDURE — 82043 UR ALBUMIN QUANTITATIVE: CPT

## 2020-07-03 PROCEDURE — 36415 COLL VENOUS BLD VENIPUNCTURE: CPT

## 2020-07-03 PROCEDURE — 80061 LIPID PANEL: CPT

## 2020-07-03 PROCEDURE — 83036 HEMOGLOBIN GLYCOSYLATED A1C: CPT

## 2020-07-06 ENCOUNTER — HOSPITAL ENCOUNTER (OUTPATIENT)
Dept: PHYSICAL THERAPY | Age: 65
Discharge: HOME | End: 2020-07-06
Payer: MEDICARE

## 2020-07-06 ENCOUNTER — VIRTUAL VISIT (OUTPATIENT)
Dept: FAMILY MEDICINE CLINIC | Age: 65
End: 2020-07-06

## 2020-07-06 DIAGNOSIS — E11.40 TYPE 2 DIABETES MELLITUS WITH DIABETIC NEUROPATHY, WITHOUT LONG-TERM CURRENT USE OF INSULIN (HCC): Primary | ICD-10-CM

## 2020-07-06 PROCEDURE — 97161 PT EVAL LOW COMPLEX 20 MIN: CPT

## 2020-07-06 NOTE — PROGRESS NOTES
[facility]  [place] [facility]  [address] [place] [address]  [phone] fax    Plan of Care/ Statement of Necessity for Physical Therapy Services    Patient name: Sunil William Start of Care: 2020   Referral source: Jackeline Molina MD : 1955    Medical Diagnosis: Other instability, right ankle [M25.371]  Pain in right foot [M79.671]  Strain of muscle(s) and tendon(s) of peroneal muscle group at lower leg level, right leg, subsequent encounter [L59.411A]  Payor: Elkin Maria / Plan: [address] [place] [address] HMO / Product Type: [employer] Medicare /  Onset Date:6 months    Treatment Diagnosis: right ankle pain, abnormal gait,   Prior Hospitalization: see medical history Provider#: [id number]   Medications: Verified on Patient summary List    Comorbidities: heart disease, depression, diabetes, HTN   Prior Level of Function: I all areas of ADLS, caregiver 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 Assessment/ key information: 72Year old male diagnosed as above and with S/S consistent with above diagnosis presents to skilled outpatient PT for mobility consult in attempts to secure approval for a  power chair. He reports 10/10 pain in the right ankle and foot with reports of instability and giving way when he walks - at times resulting in falls. He has reports a history of  frequent and recurrent falls noting 6X in the past month at times requiring outside assistance to rise from the floor. He presents using  a hiking stick to aid both with static standing balance and functional mobility (Fair balance standing static and dynamic). He is UA to perform SLS safely especially on the right due to pain and reports of instability in the right ankle/foot.   His mobility presents as  physically labored, noting shortness of breath with short distances in the clinic <50 feet . He was ambulating close to the wall and took periodic breaks. He has reports of right ankle and foot pain and weakness, right LE weakness, decreased  balance and decrease safety with functional mobility, additionally  decrease endurance and activity tolerance in caring for his wife . With  his own ADLS he indicates all require excessive  time to perform. He stands with mulitple postural deviations including obesity,  B knee Flexion, right ankle is supinated and inverted , trunk is is in slight FF, with FH and RS, he has  A  guarded stance and requires the use of the hiking stick to maintain balance and safety  and somewhat upright posture. Trunk ROM assessment was limited due to safety concerns Flexion 35 degrees and E 5 degrees. He has LOM actively in the right ankle DF 20 degrees and PF 5  Degrees. He reports B peripheral neuropathy with noted decrease sensation B LE . He has MMT right LE 4- across the hip, knee and ankle. Trunk flexion weakness 4/5. He has no noted spasticity or flaccidity- he presents with age related generalized decreased strength. LEFS shows impairment with function scoring 7.5% He is 6'2\" and is 310 pounds per his reports. He notes over time pain and instability has worsened in the right ankle/foot. He notes past attempts to ambulate with a walker have not been successful again due to falls. He would be appropriate for a power chair due to his history of increased recurrent falls, decrease standing balance, reports of progressive worsening of right ankle pain and instability/giving way with short distance ambulation, this resulting in the increased risk for further injuries that would affect his ability to independently care for himself and his wife and thus requiring assistance from others to aide with both their care.    Evaluation Complexity History HIGH Complexity :3+ comorbidities / personal factors will impact the outcome/ POC ; Examination MEDIUM Complexity : 3 Standardized tests and measures addressing body structure, function, activity limitation and / or participation in recreation  ;Presentation MEDIUM Complexity : Evolving with changing characteristics  ; Clinical Decision Making Other outcome measures LEFS 7.5%  HIGH   Overall Complexity Rating: MEDIUM  Problem List: pain affecting function, decrease ROM, decrease strength, impaired gait/ balance, decrease ADL/ functional abilitiies, decrease activity tolerance, decrease flexibility/ joint mobility and decrease transfer abilities   Treatment Plan may include any combination of the following: Other: wheelchair assessment  Patient / Family readiness to learn indicated by: asking questions, trying to perform skills and interest  Persons(s) to be included in education: patient (P)  Barriers to Learning/Limitations: None  Patient Goal (s): to be able to get around safely and have less falls so that I can assist my wife   Patient Self Reported Health Status: fair  Rehabilitation Potential: good    Short Term Goals: To be accomplished in 1 treatments:   Evaluate patient for need for power chair with recommendations. Frequency / Duration: Patient to be seen 1 times per week for 1 treatments. Patient/ Caregiver education and instruction: Diagnosis, prognosis, other process for power chair eval    [x]  Plan of care has been reviewed with PTA    Certification Period: 7/6/20- 8/4/20  Chen Garcia, PT 7/6/2020 1:38 PM    ________________________________________________________________________    I certify that the above Therapy Services are being furnished while the patient is under my care. I agree with the treatment plan and certify that this therapy is necessary.     Physician's Signature:____________Date:_________TIME:________    Ira Herrmann, Date and Time must be completed for valid certification **    Please sign and return to [facility]  [phone] fax

## 2020-07-06 NOTE — PROGRESS NOTES
PT DAILY TREATMENT NOTE 10-18    Patient Name: Aaron Quinonez Chick  Date:2020  : 1955  [x]  Patient  Verified  Payor: Chacha Caputo / Plan: [address] [place] [address] HMO / Product Type: Managed Care Medicare /    In time:1122  Out time:1215  Total Treatment Time (min): 53  Visit #: 1 of 1    Medicare/BCBS Only   Total Timed Codes (min):  53 1:1 Treatment Time:  53       Treatment Area: Other instability, right ankle [M25.371]  Pain in right foot [M79.671]  Strain of muscle(s) and tendon(s) of peroneal muscle group at lower leg level, right leg, subsequent encounter [Q82.404D]    SUBJECTIVE  Pain Level (0-10 scale): 10  Any medication changes, allergies to medications, adverse drug reactions, diagnosis change, or new procedure performed?: [x] No    [] Yes (see summary sheet for update)  Subjective functional status/changes:   [] No changes reported  \"I have had 6 falls in about a month. 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 \"    OBJECTIVE     please see forms for objective findings and assessment    53 min [x]Eval                  []Re-Eval        min Therapeutic Exercise:  [] See flow sheet :   Rationale:  to improve the patients ability to      min Therapeutic Activity:  []  See flow sheet :   Rationale:   to improve the patients ability to       min Neuromuscular Re-education:  []  See flow sheet :   Rationale:   to improve the patients ability to      min Manual Therapy:     Rationale:  to      min Gait Training:  ___ feet with ___ device on level surfaces with ___ level of assist   Rationale: With   [] TE   [] TA   [] neuro   [x] other: Patient Education: [x] Review HEP    [] Progressed/Changed HEP based on:   [] positioning   [] body mechanics   [] transfers   [] heat/ice application    [x] other: explained the process for eval, send to MD, received permission to fax POC to [facility]. Pain Level (0-10 scale) post treatment: 10  ASSESSMENT/Changes in Function: tolerated well. Patient will continue to benefit from skilled PT services to send POC to MD for review for power chair to attain remaining goals.      [x]  See Plan of Care  []  See progress note/recertification  []  See Discharge Summary         Progress towards goals / Updated goals:       PLAN  []  Upgrade activities as tolerated     []  Continue plan of care  []  Update interventions per flow sheet       [x]  Discharge due to:_1X visit  []  Other:_     Claudette Hanna, PT 7/6/2020  1:34 PM    Future Appointments   Date Time Provider Blair Smithi   7/31/2020 11:15 AM Eufemia Pablo MD [facility]   8/18/2020  2:30 PM Anthony Dooley MD \[facility]\"" [address]

## 2020-07-06 NOTE — PROGRESS NOTES
Pharmacy Note:  Diabetes Follow-up    S/O: Placed an outgoing call to patient to follow-up on SMBG readings and diabetes. Patient referred by Ingrid Patrick MD for diabetes management. Discussed with patient the Pharmacist Collaborative Practice Agreement. Patient provided verbal and/or electronic (ex. mychart) consent to participate in the collaborative practice agreement between the pharmacist and referred patient. This is in lieu of paper consent due to COVID-19 precautions and the use of remote/virtual visits. Patient is currently taking the following for diabetes: Trulicity 1.5 mg once weekly, metformin ER 1000 mg BID. He denies any signs/sx of hypoglycemia. Denies any BG <70. Last A1C was 9.6 on 7/3/2020. Reports polyuria (unsure if from sugars or bladder) and neuropathy in feet. Patient is checking His blood sugar 1-2 times daily and reports the following values:  Date Fasting   7/6 181   7/5 173   7/4 175   7/3 150   7/2 140   7/1 169   6/30 171   6/29 177, 166     Diet/Exercise:  -States he is trying to lose weight, so he is decreasing portion sizes  -Breakfast - A few eggs or some toast or Indonesian toast, sometimes dumont egg and cheese Mcgriddle  -Lunch - Usually doesn't have  -Dinner - Usually steak, chicken, or pork with mashed potatoes or corn and green vegetables  -Snacks - Sometimes at night; states not having cookies at night as often  -States this weekend had some doughnuts    Medication Adherence:  -Patient reports adherence with medications  -Patient denies adverse effects from medications    Screenings:  Diabetic Foot and Eye Exam HM Status   Topic Date Due    Diabetic Foot Care  08/02/2018    Eye Exam  08/24/2020       Current Outpatient Medications   Medication Sig    Blood-Glucose Meter monitoring kit Use to check blood sugar 3 times daily. Dx E11.9. Please dispense brand preferred by insurance.     glucose blood VI test strips (ASCENSIA AUTODISC VI, ONE TOUCH ULTRA TEST VI) strip Use to check blood sugar 3 times daily. Dx E11.9. Please dispense brand preferred by insurance.  lancets misc Use to check blood sugar 3 times daily. Dx E11.9. Please dispense brand preferred by insurance.  folic acid/multivit-min/lutein (CENTRUM SILVER PO) Take  by mouth.  buPROPion SR (WELLBUTRIN SR) 150 mg SR tablet Take  by mouth two (2) times a day.  testosterone cypionate (DEPOTESTOTERONE CYPIONATE) 200 mg/mL injection     lisinopriL (PRINIVIL, ZESTRIL) 20 mg tablet Take 1 Tab by mouth daily.  Syringe, Disposable, 3 mL syrg Take as directed    Needle, Disp, 23 G 23 gauge x 1 1/4\" ndle Use to inject 0.5 ml every 7 days    Needle, Disp, 18 G 18 gauge x 1\" ndle Use to aspirate 0.5 ml every 7 days   [facility] Chair maura Assistance with ambulation, gait instability    pramipexole (MIRAPEX) 0.75 mg tablet TAKE 1 TABLET THREE TIMES DAILY    metFORMIN ER (GLUCOPHAGE XR) 500 mg tablet Take 2 Tabs by mouth two (2) times daily (with meals).  isosorbide mononitrate ER (IMDUR) 60 mg CR tablet Take 1 Tab by mouth every morning.  metoprolol succinate (TOPROL-XL) 50 mg XL tablet Take 1 Tab by mouth daily.  simvastatin (ZOCOR) 10 mg tablet TAKE 1 TABLET EVERY NIGHT    zolpidem (AMBIEN) 10 mg tablet Take 1 Tab by mouth nightly. Max Daily Amount: 10 mg. (Patient taking differently: Take 10 mg by mouth nightly as needed.)    ALPRAZolam (XANAX) 0.5 mg tablet Take 1 Tab by mouth two (2) times daily as needed for Anxiety. Max Daily Amount: 1 mg.  dulaglutide (TRULICITY) 1.5 ZY/9.5 mL sub-q pen 0.5 mL by SubCUTAneous route every seven (7) days.  polyethylene glycol (MIRALAX) 17 gram packet Take 1 Packet by mouth daily. Hold for loose stools (Patient taking differently: Take 17 g by mouth daily as needed. Hold for loose stools)    naloxone 4 mg/actuation spry 4 mg by Nasal route as needed.  Back Brace misc 1 Device by Does Not Apply route daily as needed for Pain.  One, lumbosacral orthosis/back brace with metal struts      Medically necessary     No current facility-administered medications for this visit. Lab Results   Component Value Date/Time    Hemoglobin A1c 9.6 (H) 07/03/2020 09:17 AM    Hemoglobin A1c (POC) 8.1 09/13/2019 01:58 PM       Lab Results   Component Value Date/Time    Sodium 138 07/03/2020 09:16 AM    Potassium 4.3 07/03/2020 09:16 AM    Chloride 106 07/03/2020 09:16 AM    CO2 24 07/03/2020 09:16 AM    Anion gap 8 07/03/2020 09:16 AM    Glucose 188 (H) 07/03/2020 09:16 AM    BUN 19 (H) 07/03/2020 09:16 AM    Creatinine 1.35 (H) 07/03/2020 09:16 AM    BUN/Creatinine ratio 14 07/03/2020 09:16 AM    GFR est AA >60 07/03/2020 09:16 AM    GFR est non-AA 53 (L) 07/03/2020 09:16 AM    Calcium 9.2 07/03/2020 09:16 AM       Lab Results   Component Value Date/Time    Cholesterol, total 137 07/03/2020 09:16 AM    HDL Cholesterol 39 (L) 07/03/2020 09:16 AM    LDL, calculated 59 07/03/2020 09:16 AM    Triglyceride 195 (H) 07/03/2020 09:16 AM    CHOL/HDL Ratio 3.5 07/03/2020 09:16 AM     A/P:    Diabetes:  -Discussed BG medication options today, including Jardiance and insulin   -Patient states he \"absolutely don't want to start insulin right now\"  -Instructed patient to start Jardiance 10 mg daily   -Routed Rx to PCP for signing   -Annamarie Brannones him to drink plenty of water and contact PCP if he has signs of UTI/yeast infection or dizziness   -Discussed potential increased risk of lower limb amputation, which is class effect of medication   -Instructed him to check BP regularly at home  -Instructed him to continue Trulicity 1.5 mg once weekly and metformin ER 1000 mg BID  -Reiterated importance of checking BG 1-2 times daily  -Discussed that we will need to recheck BMP in 3-4 weeks after he starts Jardiance to ensure renal function is stable   -Will discuss this with PCP    Reviewed with patient signs/sx/treatment of hypoglycemia. Patient verbalized understanding.     Will follow up with patient in 2 weeks for telephone visit. Will route note to PCP.     Tavares Gavin, PharmD  Clinical Pharmacist Specialist    CLINICAL PHARMACY CONSULT: MED RECONCILIATION/REVIEW ADDENDUM    For Pharmacy Admin Tracking Only    PHSO: PHSO Patient?: Yes  Total # of Interventions Recommended: Count: 1  - New Order #: 1 New Medication Order Reason(s): Needs Additional Medication Therapy  Total Interventions Accepted: 1  Time Spent (min): 2623 [facility], PharmD

## 2020-07-07 ENCOUNTER — VIRTUAL VISIT (OUTPATIENT)
Dept: FAMILY MEDICINE CLINIC | Age: 65
End: 2020-07-07

## 2020-07-07 DIAGNOSIS — E78.5 HYPERLIPIDEMIA, UNSPECIFIED HYPERLIPIDEMIA TYPE: ICD-10-CM

## 2020-07-07 DIAGNOSIS — R80.9 MICROALBUMINURIA: ICD-10-CM

## 2020-07-07 DIAGNOSIS — E11.65 POORLY CONTROLLED DIABETES MELLITUS (HCC): Primary | ICD-10-CM

## 2020-07-07 DIAGNOSIS — G47.30 SLEEP APNEA, UNSPECIFIED TYPE: ICD-10-CM

## 2020-07-07 DIAGNOSIS — M79.671 RIGHT FOOT PAIN: ICD-10-CM

## 2020-07-07 DIAGNOSIS — F32.A ANXIETY AND DEPRESSION: ICD-10-CM

## 2020-07-07 DIAGNOSIS — F41.9 ANXIETY AND DEPRESSION: ICD-10-CM

## 2020-07-07 RX ORDER — BUPROPION HYDROCHLORIDE 150 MG/1
150 TABLET, EXTENDED RELEASE ORAL DAILY
Qty: 90 TAB | Refills: 1 | Status: SHIPPED | OUTPATIENT
Start: 2020-07-07 | End: 2020-07-07

## 2020-07-07 RX ORDER — BUPROPION HYDROCHLORIDE 150 MG/1
150 TABLET, EXTENDED RELEASE ORAL DAILY
Qty: 90 TAB | Refills: 1 | Status: SHIPPED | OUTPATIENT
Start: 2020-07-07 | End: 2021-02-08

## 2020-07-07 NOTE — PROGRESS NOTES
Alexsander Kern is a 72 y.o. male who was seen by synchronous (real-time) audio-video technology on 7/7/2020 for Labs        Assessment & Plan:   Diagnoses and all orders for this visit:    Poorly controlled diabetes mellitus ([facility] [address]): Worsening of A1c around 9.6. Was noncompliant with taking medication for some time. Now started medication with compliance. Recently adjusted medication. Has not started Jardiance  yet. Going to  prescription today. No hypo-or hyperglycemic symptoms. Diet modification has been discussed. On statin and lisinopril. Advised him to keep the blood sugar log and follow-up next visit. Right foot pain: Plan for surgery. He is going to speak with Dr. Elba Hickey office regarding further plan. He requires a surgery for his foot and discussed with the patient that inform with the scheduled surgery date and we will consider preop clearance. Sleep apnea, unspecified type: Using CPAP. Using it with compliance. Not following any specialist.  Does not want any referral at this time. We will follow-up next visit. Anxiety and depression: On medication. He is aware that I do not feel any controlled substance prescription. He has a Xanax prescription from prior physician which she is taking as needed only. He refused to see any psychiatrist or counselor at this time as said he has a lot going on. Given refill on Wellbutrin. Has some sleep disturbance. Also discussed sleep hygiene. He is taking zolpidem only as needed. Again discussed that I do not prescribe any controlled substance. He was offered neurology appointment to discuss the sleep apnea and sleep trouble further. He has declined it at this time. Hyperlipidemia, unspecified hyperlipidemia type: On statin. Diet modification addressed. Will continue current plan. Microalbuminuria: On lisinopril. Probably due to poorly controlled diabetes. Will observe for now.   Comments:  on lisinopril    Other orders  - Discontinue: buPROPion SR (WELLBUTRIN SR) 150 mg SR tablet; Take 1 Tab by mouth daily. , Normal, Disp-90 Tab, R-1  -     buPROPion SR (WELLBUTRIN SR) 150 mg SR tablet; Take 1 Tab by mouth daily. , Normal, Disp-90 Tab, R-1    Patient understood and agree with the plan. Follow-up in 3 weeks. 712  Subjective:   Done visit with [facility]. Poorly controlled diabetes. He was noncompliant with the taking oral medication for some time. Now started back on it with compliance. Recently adjusted medication. He is still has not started taking jar which he is going to  today. No hypo-or hyperglycemic symptoms. His fasting sugar today was 151 and blood pressure around 135/84. Heart rate 90. Denies any headache, dizziness, no chest pain or trouble breathing, no arm or leg weakness. No nausea or vomiting, no weight or appetite changes, no mood changes . No urine or bowel complains, no palpitation, no diaphoresis. No abdominal pain. No cold or cough. No leg swelling. No fever. History of anxiety and depression. Not following any specialist.  Taking Wellbutrin. Also taking Xanax as needed and for sleep trouble Ambien as needed. I have discussed and patient is aware that I do not manage any controlled substance. He was offered medication adjustment and also specialist referral but he has refused with at this time. Using CPAP with compliance. Discussed sleep hygiene again. History of hypertension. Checking blood pressure at home and today was 135/84. He is asymptomatic during visit. Will continue current plan.   Lab Results   Component Value Date/Time    WBC 6.8 07/03/2020 09:16 AM    HGB 13.6 07/03/2020 09:16 AM    HCT 43.6 07/03/2020 09:16 AM    PLATELET 462 14/35/0461 09:16 AM    MCV 83.0 07/03/2020 09:16 AM     Lab Results   Component Value Date/Time    Sodium 138 07/03/2020 09:16 AM    Potassium 4.3 07/03/2020 09:16 AM    Chloride 106 07/03/2020 09:16 AM    CO2 24 07/03/2020 09:16 AM Anion gap 8 07/03/2020 09:16 AM    Glucose 188 (H) 07/03/2020 09:16 AM    BUN 19 (H) 07/03/2020 09:16 AM    Creatinine 1.35 (H) 07/03/2020 09:16 AM    BUN/Creatinine ratio 14 07/03/2020 09:16 AM    GFR est AA >60 07/03/2020 09:16 AM    GFR est non-AA 53 (L) 07/03/2020 09:16 AM    Calcium 9.2 07/03/2020 09:16 AM    Bilirubin, total 0.3 07/03/2020 09:16 AM    Alk. phosphatase 97 07/03/2020 09:16 AM    Protein, total 7.7 07/03/2020 09:16 AM    Albumin 3.9 07/03/2020 09:16 AM    Globulin 3.8 07/03/2020 09:16 AM    A-G Ratio 1.0 07/03/2020 09:16 AM    ALT (SGPT) 40 07/03/2020 09:16 AM    AST (SGOT) 23 07/03/2020 09:16 AM     Lab Results   Component Value Date/Time    Cholesterol, total 137 07/03/2020 09:16 AM    HDL Cholesterol 39 (L) 07/03/2020 09:16 AM    LDL, calculated 59 07/03/2020 09:16 AM    VLDL, calculated 39 07/03/2020 09:16 AM    Triglyceride 195 (H) 07/03/2020 09:16 AM    CHOL/HDL Ratio 3.5 07/03/2020 09:16 AM     Lab Results   Component Value Date/Time    TSH 1.25 07/03/2020 09:16 AM     Lab Results   Component Value Date/Time    Hemoglobin A1c 9.6 (H) 07/03/2020 09:17 AM    Hemoglobin A1c (POC) 8.1 09/13/2019 01:58 PM     Lab Results   Component Value Date/Time    Vitamin D 25-Hydroxy 38.6 12/07/2016 09:27 AM       Lab Results   Component Value Date/Time    Microalbumin/Creat ratio (mg/g creat) 26 07/03/2020 09:17 AM    MICROALBUMIN,MG/DAY 14.8 06/13/2016 08:43 AM    Microalbumin,urine random 4.06 (H) 07/03/2020 09:17 AM     Lab Results   Component Value Date/Time    Vitamin B12 486 11/15/2019 01:29 PM    Folate >20.0 (H) 11/15/2019 01:29 PM         Prior to Admission medications    Medication Sig Start Date End Date Taking? Authorizing Provider   cpap machine kit by Does Not Apply route. Yes Provider, Historical   buPROPion SR (WELLBUTRIN SR) 150 mg SR tablet Take 1 Tab by mouth daily. 7/7/20  Yes Adele Velazquez MD   empagliflozin (Jardiance) 10 mg tablet Take 1 Tab by mouth daily.  7/6/20  Yes Lamin Deutsch, Farhad Suarez MD   Blood-Glucose Meter monitoring kit Use to check blood sugar 3 times daily. Dx E11.9. Please dispense brand preferred by insurance. 6/17/20  Yes Teresa Montgomery MD   glucose blood VI test strips (ASCENSIA AUTODISC VI, ONE TOUCH ULTRA TEST VI) strip Use to check blood sugar 3 times daily. Dx E11.9. Please dispense brand preferred by insurance. 6/17/20  Yes Teresa Montgomery MD   lancets misc Use to check blood sugar 3 times daily. Dx E11.9. Please dispense brand preferred by insurance. 6/17/20  Yes Teresa Montgomery MD   folic acid/multivit-min/lutein (CENTRUM SILVER PO) Take  by mouth. Yes Provider, Historical   lisinopriL (PRINIVIL, ZESTRIL) 20 mg tablet Take 1 Tab by mouth daily. 5/18/20  Yes Teresa Montgomery MD   Syringe, Disposable, 3 mL syrg Take as directed 4/15/20  Yes Anaya Aguilar MD   Needle, Disp, 23 G 23 gauge x 1 1/4\" ndle Use to inject 0.5 ml every 7 days 4/15/20  Yes Anaya Aguilar MD   Needle, Disp, 18 G 18 gauge x 1\" ndle Use to aspirate 0.5 ml every 7 days 4/15/20  Yes Anaya Aguilar MD   Wheel Chair maura Assistance with ambulation, gait instability 2/28/20  Yes Ashwini Walters NP   pramipexole (MIRAPEX) 0.75 mg tablet TAKE 1 TABLET THREE TIMES DAILY 2/26/20  Yes Ashwini Walters NP   metFORMIN ER (GLUCOPHAGE XR) 500 mg tablet Take 2 Tabs by mouth two (2) times daily (with meals). 2/26/20  Yes Ashwini Walters NP   isosorbide mononitrate ER (IMDUR) 60 mg CR tablet Take 1 Tab by mouth every morning. 2/26/20  Yes Ashwini Walters NP   metoprolol succinate (TOPROL-XL) 50 mg XL tablet Take 1 Tab by mouth daily. 2/26/20  Yes Ashwini Walters NP   simvastatin (ZOCOR) 10 mg tablet TAKE 1 TABLET EVERY NIGHT 12/6/19  Yes Migdalia Oquendo MD   dulaglutide (TRULICITY) 1.5 YJ/3.4 mL sub-q pen 0.5 mL by SubCUTAneous route every seven (7) days. 7/15/19  Yes Ashwini Walters, YUSEF   Back Brace misc 1 Device by Does Not Apply route daily as needed for Pain.  One, lumbosacral orthosis/back brace with metal struts      Medically necessary 3/16/16  Yes Mary Snell MD   buPROPion SR [facility] SR) 150 mg SR tablet Take 1 Tab by mouth daily. 7/7/20 7/7/20  Heavenly Rowe MD   testosterone cypionate (DEPOTESTOTERONE CYPIONATE) 200 mg/mL injection  4/15/20   Provider, Historical   buPROPion SR [facility] SR) 150 mg SR tablet Take  by mouth two (2) times a day. 7/7/20  Provider, Historical   zolpidem (AMBIEN) 10 mg tablet Take 1 Tab by mouth nightly. Max Daily Amount: 10 mg. Patient taking differently: Take 10 mg by mouth nightly as needed. 9/13/19   Paradise Gary NP   ALPRAZolam Libia Isa) 0.5 mg tablet Take 1 Tab by mouth two (2) times daily as needed for Anxiety. Max Daily Amount: 1 mg. 9/13/19   Paradise Gary NP   polyethylene glycol (MIRALAX) 17 gram packet Take 1 Packet by mouth daily. Hold for loose stools  Patient taking differently: Take 17 g by mouth daily as needed. Hold for loose stools 1/18/19   Kelley VO NP   naloxone 4 mg/actuation spry 4 mg by Nasal route as needed.  5/4/17   Mary Snell MD     Patient Active Problem List    Diagnosis Date Noted    Type 2 diabetes mellitus with diabetic neuropathy ([facility] [address] 05/10/2019    Severe obesity ([facility] [address] 04/26/2019    Kidney stone 02/25/2019    Sepsis ([facility] [address] 01/14/2019    UTI (urinary tract infection) 01/14/2019    Status post insertion of spinal cord stimulator 09/14/2017    Chronic pain syndrome 06/15/2017    Lumbar post-laminectomy syndrome 06/15/2017    Lumbar and sacral osteoarthritis 06/15/2017    Anemia 12/07/2016    Advanced care planning/counseling discussion 08/08/2016    Chronic midline low back pain with sciatica 05/16/2016    History of depression 06/02/2015    Thoracic or lumbosacral neuritis or radiculitis, unspecified 01/26/2015    Postlaminectomy syndrome, lumbar region 01/26/2015    DDD (degenerative disc disease), lumbar 07/30/2014    Lumbar facet arthropathy 07/30/2014    S/P lumbar laminectomy 07/30/2014    S/P lumbar spinal fusion 07/30/2014    Lumbar nerve root impingement 07/30/2014    Lumbosacral radiculopathy at S1 07/30/2014    DDD (degenerative disc disease), thoracic 07/30/2014    Thoracic compression fracture (HCC) 07/30/2014    Back muscle spasm 07/30/2014    Major depression 07/30/2014    TRUE (generalized anxiety disorder) 07/30/2014    Abdominal adhesions 07/30/2014    Constipation, chronic 07/30/2014    Insomnia secondary to chronic pain 07/30/2014    MANISH (obstructive sleep apnea) 07/30/2014    DM (diabetes mellitus) ([facility] [address] 07/30/2014    HTN (hypertension) 07/30/2014    Hyperlipidemia 07/30/2014    Frequent falls 07/30/2014     Current Outpatient Medications   Medication Sig Dispense Refill    cpap machine kit by Does Not Apply route.  buPROPion SR (WELLBUTRIN SR) 150 mg SR tablet Take 1 Tab by mouth daily. 90 Tab 1    empagliflozin (Jardiance) 10 mg tablet Take 1 Tab by mouth daily. 30 Tab 1    Blood-Glucose Meter monitoring kit Use to check blood sugar 3 times daily. Dx E11.9. Please dispense brand preferred by insurance. 1 Kit 0    glucose blood VI test strips (ASCENSIA AUTODISC VI, ONE TOUCH ULTRA TEST VI) strip Use to check blood sugar 3 times daily. Dx E11.9. Please dispense brand preferred by insurance. 100 Strip 11    lancets misc Use to check blood sugar 3 times daily. Dx E11.9. Please dispense brand preferred by insurance. 524 Each 11    folic acid/multivit-min/lutein (CENTRUM SILVER PO) Take  by mouth.  lisinopriL (PRINIVIL, ZESTRIL) 20 mg tablet Take 1 Tab by mouth daily.  30 Tab 1    Syringe, Disposable, 3 mL syrg Take as directed 30 Syringe 1    Needle, Disp, 23 G 23 gauge x 1 1/4\" ndle Use to inject 0.5 ml every 7 days 30 Pen Needle 1    Needle, Disp, 18 G 18 gauge x 1\" ndle Use to aspirate 0.5 ml every 7 days 30 Pen Needle 1    Wheel Chair maura Assistance with ambulation, gait instability 1 Each 0    pramipexole (MIRAPEX) 0.75 mg tablet TAKE 1 TABLET THREE TIMES DAILY 270 Tab 1    metFORMIN ER (GLUCOPHAGE XR) 500 mg tablet Take 2 Tabs by mouth two (2) times daily (with meals). 360 Tab 1    isosorbide mononitrate ER (IMDUR) 60 mg CR tablet Take 1 Tab by mouth every morning. 90 Tab 1    metoprolol succinate (TOPROL-XL) 50 mg XL tablet Take 1 Tab by mouth daily. 90 Tab 1    simvastatin (ZOCOR) 10 mg tablet TAKE 1 TABLET EVERY NIGHT 90 Tab 0    dulaglutide (TRULICITY) 1.5 QW/8.5 mL sub-q pen 0.5 mL by SubCUTAneous route every seven (7) days. 12 Pen 3    Back Brace misc 1 Device by Does Not Apply route daily as needed for Pain. One, lumbosacral orthosis/back brace with metal struts      Medically necessary 1 Device 0    testosterone cypionate (DEPOTESTOTERONE CYPIONATE) 200 mg/mL injection       zolpidem (AMBIEN) 10 mg tablet Take 1 Tab by mouth nightly. Max Daily Amount: 10 mg. (Patient taking differently: Take 10 mg by mouth nightly as needed.) 30 Tab 1    ALPRAZolam (XANAX) 0.5 mg tablet Take 1 Tab by mouth two (2) times daily as needed for Anxiety. Max Daily Amount: 1 mg. 30 Tab 0    polyethylene glycol (MIRALAX) 17 gram packet Take 1 Packet by mouth daily. Hold for loose stools (Patient taking differently: Take 17 g by mouth daily as needed. Hold for loose stools) 10 Each 0    naloxone 4 mg/actuation spry 4 mg by Nasal route as needed.  1 Box 0     Allergies   Allergen Reactions    Bactrim [Sulfamethoprim] Rash    Opana [Oxymorphone] Other (comments)     Feels like bug crawling under skin and drowziness     Past Medical History:   Diagnosis Date    Abdominal adhesions 7/30/2014    Back muscle spasm 7/30/2014    Back pain, lumbosacral     BPH (benign prostatic hyperplasia)     Cellulitis     Constipation, chronic 7/30/2014    DDD (degenerative disc disease), lumbar 7/30/2014    DDD (degenerative disc disease), thoracic 7/30/2014    Depression     Diabetes ([address]     DM (diabetes mellitus) ([facility] [address] 7/30/2014    Frequent falls 7/30/2014    TRUE (generalized anxiety disorder) 7/30/2014    High cholesterol     HTN (hypertension) 7/30/2014    Hydronephrosis     Hyperlipidemia 7/30/2014    Hypertension     Hypogonadism in male     Incomplete bladder emptying     Insomnia secondary to chronic pain 7/30/2014    Kidney stones     LBP radiating to both legs 7/30/2014    Lumbar facet arthropathy 7/30/2014    Lumbar nerve root impingement 7/30/2014    Lumbosacral radiculopathy at S1 7/30/2014    Major depression 7/30/2014    Nausea & vomiting     Neurological disorder     sciatica    MANISH (obstructive sleep apnea) 7/30/2014    S/P lumbar laminectomy 7/30/2014    S/P lumbar spinal fusion 7/30/2014    Small vessel disease (HCC)     Thoracic compression fracture ([facility] [address] 07/30/2014    Unspecified sleep apnea     USES CPAP EVERY NIGHT     Social History     Tobacco Use    Smoking status: Never Smoker    Smokeless tobacco: Never Used   Substance Use Topics    Alcohol use: Yes       ROS: See HPI    Objective:     Patient-Reported Vitals 7/7/2020   Patient-Reported Weight 310   Patient-Reported Height 62   Patient-Reported Pulse 80   Patient-Reported Temperature 97.5   Patient-Reported SpO2 92   Patient-Reported Systolic  337   Patient-Reported Diastolic 84      General: alert, cooperative, no distress   Mental  status: normal mood, behavior, speech, dress, motor activity, and thought processes, able to follow commands   HENT: NCAT   Neck: no visualized mass   Resp: no respiratory distress   Neuro: no gross deficits   Skin: no discoloration or lesions of concern on visible areas   Psychiatric: normal affect, consistent with stated mood, no evidence of hallucinations     Additional exam findings: We discussed the expected course, resolution and complications of the diagnosis(es) in detail. Medication risks, benefits, costs, interactions, and alternatives were discussed as indicated.   I advised him to contact the office if his condition worsens, changes or fails to improve as anticipated. He expressed understanding with the diagnosis(es) and plan. Praveen William, who was evaluated through a patient-initiated, synchronous (real-time) audio-video encounter, and/or his healthcare decision maker, is aware that it is a billable service, with coverage as determined by his insurance carrier. He provided verbal consent to proceed: Yes, and patient identification was verified. It was conducted pursuant to the emergency declaration under the [address] [place] [address], [address] [place] [address] authority and the [employer]. A caregiver was present when appropriate. Ability to conduct physical exam was limited. I was in the office. The patient was at home.       Etienne Becker MD

## 2020-07-16 ENCOUNTER — TELEPHONE (OUTPATIENT)
Dept: ORTHOPEDIC SURGERY | Age: 65
End: 2020-07-16

## 2020-07-16 NOTE — TELEPHONE ENCOUNTER
Eliazar Rowan from [facility] called stating the order is signed but not filled out by the doctor? Can we review and resend? She is asking for a call back please.   Phone [phone] Fax [phone]

## 2020-07-16 NOTE — TELEPHONE ENCOUNTER
Tried calling the number provided numerous times and it rings one time and gives a busy signal. Will keep trying

## 2020-07-20 ENCOUNTER — VIRTUAL VISIT (OUTPATIENT)
Dept: FAMILY MEDICINE CLINIC | Age: 65
End: 2020-07-20

## 2020-07-20 DIAGNOSIS — E11.65 POORLY CONTROLLED DIABETES MELLITUS (HCC): Primary | ICD-10-CM

## 2020-07-20 NOTE — TELEPHONE ENCOUNTER
The phone number provided is not a working number. Should [employer]ulk[employer] call back please confirm phone number.

## 2020-07-20 NOTE — PROGRESS NOTES
Pharmacy Note:  Diabetes Follow-up    S/O: Placed an outgoing call to patient to follow-up on SMBG readings and diabetes. Patient referred by Anthony Dooley MD for diabetes management. Discussed with patient the Pharmacist Collaborative Practice Agreement. Patient provided verbal and/or electronic (ex. mychart) consent to participate in the collaborative practice agreement between the pharmacist and referred patient. This is in lieu of paper consent due to COVID-19 precautions and the use of remote/virtual visits. Patient is currently taking the following for diabetes: Trulicity 1.5 mg once weekly, metformin ER 1000 mg BID, Jardiance 10 mg daily. He denies any signs/sx of hypoglycemia. Denies any BG <70. Last A1C was 9.6 on 7/3/2020. Denies any signs of hyperglycemia.     Patient is checking His blood sugar 1-2 times daily and reports the following values:  Date Fasting After Breakfast Bedtime   7/20 106     7/19 128  181 (had late dinner)   7/18 121 161 113   7/17 120     7/16 194 (had heavier dinner night before) 122    7/15 107  141   7/13 132  175     Diet/Exercise:  -Denies any big changes in diet  -States sometimes doesn't feel hungry; states he is losing weight  -States he has cut down on eating cookies at night; states he has been having cashews instead    Medication Adherence:  -Patient reports adherence with medications  -Patient denies adverse effects from medications  -Denies any itching/burning when urinating, dizziness  -States he is taking pramipexole 0.75 mg 3 tabs at night; states he was taking extended release tab in the past  -States he restarted testosterone injection last Wednesday; states he was off this for around a month; has upcoming appointment with Dr. Katelyn Carson at end of month    Screenings:  Diabetic Foot and Eye Exam HM Status   Topic Date Due    Diabetic Foot Care  08/02/2018    Eye Exam  08/24/2020       Current Outpatient Medications   Medication Sig    cpap machine kit by Does Not Apply route.  buPROPion SR (WELLBUTRIN SR) 150 mg SR tablet Take 1 Tab by mouth daily.  empagliflozin (Jardiance) 10 mg tablet Take 1 Tab by mouth daily.  Blood-Glucose Meter monitoring kit Use to check blood sugar 3 times daily. Dx E11.9. Please dispense brand preferred by insurance.  glucose blood VI test strips (ASCENSIA AUTODISC VI, ONE TOUCH ULTRA TEST VI) strip Use to check blood sugar 3 times daily. Dx E11.9. Please dispense brand preferred by insurance.  lancets misc Use to check blood sugar 3 times daily. Dx E11.9. Please dispense brand preferred by insurance.  folic acid/multivit-min/lutein (CENTRUM SILVER PO) Take  by mouth.  testosterone cypionate (DEPOTESTOTERONE CYPIONATE) 200 mg/mL injection     lisinopriL (PRINIVIL, ZESTRIL) 20 mg tablet Take 1 Tab by mouth daily.  Syringe, Disposable, 3 mL syrg Take as directed    Needle, Disp, 23 G 23 gauge x 1 1/4\" ndle Use to inject 0.5 ml every 7 days    Needle, Disp, 18 G 18 gauge x 1\" ndle Use to aspirate 0.5 ml every 7 days   [facility] maura Assistance with ambulation, gait instability    pramipexole (MIRAPEX) 0.75 mg tablet TAKE 1 TABLET THREE TIMES DAILY    metFORMIN ER (GLUCOPHAGE XR) 500 mg tablet Take 2 Tabs by mouth two (2) times daily (with meals).  isosorbide mononitrate ER (IMDUR) 60 mg CR tablet Take 1 Tab by mouth every morning.  metoprolol succinate (TOPROL-XL) 50 mg XL tablet Take 1 Tab by mouth daily.  simvastatin (ZOCOR) 10 mg tablet TAKE 1 TABLET EVERY NIGHT    zolpidem (AMBIEN) 10 mg tablet Take 1 Tab by mouth nightly. Max Daily Amount: 10 mg. (Patient taking differently: Take 10 mg by mouth nightly as needed.)    ALPRAZolam (XANAX) 0.5 mg tablet Take 1 Tab by mouth two (2) times daily as needed for Anxiety. Max Daily Amount: 1 mg.  dulaglutide (TRULICITY) 1.5 QK/5.9 mL sub-q pen 0.5 mL by SubCUTAneous route every seven (7) days.     polyethylene glycol (MIRALAX) 17 gram packet Take 1 Packet by mouth daily. Hold for loose stools (Patient taking differently: Take 17 g by mouth daily as needed. Hold for loose stools)    naloxone 4 mg/actuation spry 4 mg by Nasal route as needed.  Back Brace misc 1 Device by Does Not Apply route daily as needed for Pain. One, lumbosacral orthosis/back brace with metal struts      Medically necessary     No current facility-administered medications for this visit. Lab Results   Component Value Date/Time    Hemoglobin A1c 9.6 (H) 07/03/2020 09:17 AM    Hemoglobin A1c (POC) 8.1 09/13/2019 01:58 PM       Lab Results   Component Value Date/Time    Sodium 138 07/03/2020 09:16 AM    Potassium 4.3 07/03/2020 09:16 AM    Chloride 106 07/03/2020 09:16 AM    CO2 24 07/03/2020 09:16 AM    Anion gap 8 07/03/2020 09:16 AM    Glucose 188 (H) 07/03/2020 09:16 AM    BUN 19 (H) 07/03/2020 09:16 AM    Creatinine 1.35 (H) 07/03/2020 09:16 AM    BUN/Creatinine ratio 14 07/03/2020 09:16 AM    GFR est AA >60 07/03/2020 09:16 AM    GFR est non-AA 53 (L) 07/03/2020 09:16 AM    Calcium 9.2 07/03/2020 09:16 AM       Lab Results   Component Value Date/Time    Cholesterol, total 137 07/03/2020 09:16 AM    HDL Cholesterol 39 (L) 07/03/2020 09:16 AM    LDL, calculated 59 07/03/2020 09:16 AM    Triglyceride 195 (H) 07/03/2020 09:16 AM    CHOL/HDL Ratio 3.5 07/03/2020 09:16 AM     A/P:    Diabetes:  -Instructed patient to continue Trulicity 1.5 mg once weekly, metformin ER 1000 mg BID, Jardiance 10 mg daily  -Reiterated importance of checking BG 1-2 times daily and not skipping meals to decrease risk of hypoglycemia  -Will send application for Jardiance PAP per patient's request    Reviewed with patient signs/sx/treatment of hypoglycemia. Patient verbalized understanding. Will follow up with patient in 3 weeks for telephone visit. Will route note to PCP.     Sky Escalera, PharmD  Clinical Pharmacist Specialist    CLINICAL PHARMACY CONSULT: MED RECONCILIATION/REVIEW ADDENDUM    For Pharmacy Admin Tracking Only    PHSO: PHSO Patient?: No  Total # of Interventions Recommended: Count: 0  Total Interventions Accepted: 0  Time Spent (min): [address] [facility], [facility], PharmD

## 2020-07-22 NOTE — TELEPHONE ENCOUNTER
Jose Kenyon from the [facility] called again and is requesting a call back at this new correct tel # [phone].

## 2020-07-24 NOTE — TELEPHONE ENCOUNTER
Renée Das from [address] [place] [address] called again and said she needs another correction on the form . That she is faxing it over to the [facility] location either today or early Monday 7/27/20 for [address] . Eric White. [phone].

## 2020-07-27 DIAGNOSIS — E11.9 DIABETES MELLITUS WITHOUT COMPLICATION (HCC): ICD-10-CM

## 2020-07-27 RX ORDER — METFORMIN HYDROCHLORIDE 500 MG/1
1000 TABLET, EXTENDED RELEASE ORAL 2 TIMES DAILY WITH MEALS
Qty: 360 TAB | Refills: 1 | Status: SHIPPED | OUTPATIENT
Start: 2020-07-27 | End: 2020-12-23

## 2020-07-27 NOTE — PROGRESS NOTES
In Radha Amarilys K[address], [address] [place] [address], [address] [place], [place] [address]y [address]  [phone] fax      Discharge Summary    Patient name: Yuliana William     Start of Care: 20  Referral source: Shirley Silvestre MD    : 1955  Medical/Treatment Diagnosis: Other instability, right ankle [M25.371]  Pain in right foot [M79.671]  Strain of muscle(s) and tendon(s) of peroneal muscle group at lower leg level, right leg, subsequent encounter [V11.400P]  Payor: [employer] MEDICARE / Plan: [address] [place] [address] HMO / Product Type: [employer] /        Onset Date:6 months  Prior Hospitalization: see medical history   Provider#: [id number]  Comorbidities: heart disease, depression, diabetes, HTN  Prior Level of Function:I all areas of ADLS, 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 Thank you. Goal:Evaluate patient for need for power chair with recommendations.   Status at last note/certification:eval  Status at discharge: met     ASSESSMENT/RECOMMENDATIONS:  []Discontinue therapy progressing towards or have reached established goals  []Discontinue therapy due to lack of appreciable progress towards goals  []Discontinue therapy due to lack of attendance or compliance  [x]Other:one time evaluation for wheelchair assessment    Thank you for this referral.     Michael Blanca, PT 2020 2:43 PM

## 2020-07-27 NOTE — TELEPHONE ENCOUNTER
Last Visit: 7/7/20 with MD Janice Regalado  Next Appointment: 8/18/20 with MD Janice Regalado  Previous Refill Encounter(s): 2/26/20 #360 with 1 refill    Requested Prescriptions     Pending Prescriptions Disp Refills    metFORMIN ER (GLUCOPHAGE XR) 500 mg tablet 360 Tab 1     Sig: Take 2 Tabs by mouth two (2) times daily (with meals).

## 2020-08-12 ENCOUNTER — TELEPHONE (OUTPATIENT)
Dept: INTERNAL MEDICINE CLINIC | Age: 65
End: 2020-08-12

## 2020-08-12 NOTE — TELEPHONE ENCOUNTER
Called patient for PharmD telephone appointment for diabetes follow up. Attempted to reach patient 3 times. No answer, left voicemail requesting patient return call to PharmD.     Carissa Raphael, PharmD  Clinical Pharmacist Specialist

## 2020-08-18 ENCOUNTER — VIRTUAL VISIT (OUTPATIENT)
Dept: FAMILY MEDICINE CLINIC | Age: 65
End: 2020-08-18

## 2020-08-18 DIAGNOSIS — R26.81 UNSTEADY GAIT: ICD-10-CM

## 2020-08-18 DIAGNOSIS — F32.A ANXIETY AND DEPRESSION: ICD-10-CM

## 2020-08-18 DIAGNOSIS — F33.9 EPISODE OF RECURRENT MAJOR DEPRESSIVE DISORDER, UNSPECIFIED DEPRESSION EPISODE SEVERITY (HCC): ICD-10-CM

## 2020-08-18 DIAGNOSIS — E11.40 TYPE 2 DIABETES MELLITUS WITH DIABETIC NEUROPATHY, WITHOUT LONG-TERM CURRENT USE OF INSULIN (HCC): ICD-10-CM

## 2020-08-18 DIAGNOSIS — F41.9 ANXIETY AND DEPRESSION: ICD-10-CM

## 2020-08-18 DIAGNOSIS — Z00.00 MEDICARE ANNUAL WELLNESS VISIT, SUBSEQUENT: Primary | ICD-10-CM

## 2020-08-18 DIAGNOSIS — N28.9 RENAL INSUFFICIENCY: ICD-10-CM

## 2020-08-18 DIAGNOSIS — E11.65 POORLY CONTROLLED DIABETES MELLITUS (HCC): ICD-10-CM

## 2020-08-18 NOTE — ACP (ADVANCE CARE PLANNING)
Advance Care Planning       Advance Care Planning (ACP) Physician/NP/PA (Provider) Conversation        Date of ACP Conversation: 8/18/2020    Conversation Conducted with:   Patient with Decision Making [address] Maker:    Current Designated Health Care Decision Maker:   Primary Decision Maker: Miryam William - Spouse - [phone]    Secondary Decision Maker: Darnell Thomas - Son - [phone]  (If there is a [address] named in the [address] [place] [address] Street" box in the ACP activity, but it is not visible above, be sure to open that field and then select the health care decision maker relationship (ie \"primary\") in the blank space to the right of the name.)    Note: Assess and validate information in current ACP documents, as indicated. Care Preferences:    Hospitalization: \"If your health worsens and it becomes clear that your chance of recovery is unlikely, what would your preference be regarding hospitalization? \"  If the patient would want hospitalization, answer \"yes\". If the patient would prefer comfort-focused treatment without hospitalization, answer \"no\". pt wants to follow advance directive and does not want to give copy of it to scan in the chart but mentioned that wife and son is aware about his advance directive and wants to follow it       Ventilation: \"If you were in your present state of health and suddenly became very ill and were unable to breathe on your own, what would your preference be about the use of a ventilator (breathing machine) if it was available to you? \"    If the patient would want hospitalization, answer \"yes\". If the patient would prefer comfort-focused treatment without hospitalization, answer \"no\".  pt wants to follow advance directive and does not want to give copy of it to scan in the chart but mentioned that wife and son is aware about his advance directive and wants to follow it     \"If your health worsens and it becomes clear that your chance of recovery is unlikely, what would your preference be about the use of a ventilator (breathing machine) if it was available to you? \"   If the patient would want hospitalization, answer \"yes\". If the patient would prefer comfort-focused treatment without hospitalization, answer \"no\". pt wants to follow advance directive and does not want to give copy of it to scan in the chart but mentioned that wife and son is aware about his advance directive and wants to follow it       Resuscitation:  \"CPR works best to restart the heart when there is a sudden event, like a heart attack, in someone who is otherwise healthy. Unfortunately, CPR does not typically restart the heart for people who have serious health conditions or who are very sick. \"    \"In the event your heart stopped as a result of an underlying serious health condition, would you want attempts to be made to restart your heart (answer \"yes\" for attempt to resuscitate) or would you prefer a natural death (answer \"no\" for do not attempt to resuscitate)? \"   If the patient would want hospitalization, answer \"yes\". If the patient would prefer comfort-focused treatment without hospitalization, answer \"no\". pt wants to follow advance directive and does not want to give copy of it to scan in the chart but mentioned that wife and son is aware about his advance directive and wants to follow it     NOTE: If the patient has a valid advance directive AND provides care preference(s) that are inconsistent with that prior directive, advise the patient to consider either: creating a new advance directive that complies with state-specific requirements; or, if that is not possible, orally revoking that prior directive in accordance with state-specific requirements, which must be documented in the EHR. Conversation Outcomes / Follow-Up Plan:   If the patient would want hospitalization, answer \"yes\".  If the patient would prefer comfort-focused treatment without hospitalization, answer \"no\".  pt wants to follow advance directive and does not want to give copy of it to scan in the chart but mentioned that wife and son is aware about his advance directive and wants to follow it       Length of Voluntary ACP Conversation in minutes:  <16 minutes (Non-Billable)      Coco Diaz MD

## 2020-08-18 NOTE — PROGRESS NOTES
This is the Subsequent Medicare Annual Wellness Exam, performed 12 months or more after the Initial AWV or the last Subsequent AWV    I have reviewed the patient's medical history in detail and updated the computerized patient record.      History     Patient Active Problem List   Diagnosis Code    DDD (degenerative disc disease), lumbar M51.36    Lumbar facet arthropathy M47.816    S/P lumbar laminectomy Z98.890    S/P lumbar spinal fusion Z98.1    Lumbar nerve root impingement M54.16    Lumbosacral radiculopathy at S1 M54.17    DDD (degenerative disc disease), thoracic M51.34    Thoracic compression fracture (HCC) S22.000A    Back muscle spasm M62.830    Major depression F32.9    TRUE (generalized anxiety disorder) F41.1    Abdominal adhesions K66.0    Constipation, chronic K59.09    Insomnia secondary to chronic pain G89.29, G47.01    MANISH (obstructive sleep apnea) G47.33    DM (diabetes mellitus) ([facility]) E11.9    HTN (hypertension) I10    Hyperlipidemia E78.5    Frequent falls R29.6    Thoracic or lumbosacral neuritis or radiculitis, unspecified [id number]    Postlaminectomy syndrome, lumbar region M96.1    History of depression Z86.59    Chronic midline low back pain with sciatica M54.40, G89.29    Advanced care planning/counseling discussion Z71.89    Anemia D64.9    Chronic pain syndrome G89.4    Lumbar post-laminectomy syndrome M96.1    Lumbar and sacral osteoarthritis M47.817    Sepsis ([facility]) A41.9    UTI (urinary tract infection) N39.0    Kidney stone N20.0    Severe obesity ([facility]) E66.01    Type 2 diabetes mellitus with diabetic neuropathy ([facility]) E11.40    Status post insertion of spinal cord stimulator Z96.89     Past Medical History:   Diagnosis Date    Abdominal adhesions 7/30/2014    Back muscle spasm 7/30/2014    Back pain, lumbosacral     BPH (benign prostatic hyperplasia)     Cellulitis     Constipation, chronic 7/30/2014    DDD (degenerative disc disease), lumbar 7/30/2014    DDD (degenerative disc disease), thoracic 7/30/2014    Depression     Diabetes [facility]     DM (diabetes mellitus) ([facility] 7/30/2014    Frequent falls 7/30/2014    TRUE (generalized anxiety disorder) 7/30/2014    High cholesterol     HTN (hypertension) 7/30/2014    Hydronephrosis     Hyperlipidemia 7/30/2014    Hypertension     Hypogonadism in male     Incomplete bladder emptying     Insomnia secondary to chronic pain 7/30/2014    Kidney stones     LBP radiating to both legs 7/30/2014    Lumbar facet arthropathy 7/30/2014    Lumbar nerve root impingement 7/30/2014    Lumbosacral radiculopathy at S1 7/30/2014    Major depression 7/30/2014    Nausea & vomiting     Neurological disorder     sciatica    MANISH (obstructive sleep apnea) 7/30/2014    S/P lumbar laminectomy 7/30/2014    S/P lumbar spinal fusion 7/30/2014    Small vessel disease (HCC)     Thoracic compression fracture ([facility] 07/30/2014    Unspecified sleep apnea     USES CPAP EVERY NIGHT      Past Surgical History:   Procedure Laterality Date    HX APPENDECTOMY      HX BACK SURGERY      Lumbar fusion    HX HERNIA REPAIR  1992    HX OTHER SURGICAL      EMG - S1 disorder    HX OTHER SURGICAL  10/2017    spinal cord stimulator    HX VASECTOMY  1985     Current Outpatient Medications   Medication Sig Dispense Refill    empagliflozin (Jardiance) 10 mg tablet Take 1 Tab by mouth daily. 90 Tab 1    metFORMIN ER (GLUCOPHAGE XR) 500 mg tablet Take 2 Tabs by mouth two (2) times daily (with meals). 360 Tab 1    cpap machine kit by Does Not Apply route.  buPROPion SR (WELLBUTRIN SR) 150 mg SR tablet Take 1 Tab by mouth daily. 90 Tab 1    Blood-Glucose Meter monitoring kit Use to check blood sugar 3 times daily. Dx E11.9. Please dispense brand preferred by insurance. 1 Kit 0    glucose blood VI test strips (ASCENSIA AUTODISC VI, ONE TOUCH ULTRA TEST VI) strip Use to check blood sugar 3 times daily. Dx E11.9.  Please dispense brand preferred by insurance. 100 Strip 11    lancets misc Use to check blood sugar 3 times daily. Dx E11.9. Please dispense brand preferred by insurance. 969 Each 11    folic acid/multivit-min/lutein (CENTRUM SILVER PO) Take  by mouth.  testosterone cypionate (DEPOTESTOTERONE CYPIONATE) 200 mg/mL injection       lisinopriL (PRINIVIL, ZESTRIL) 20 mg tablet Take 1 Tab by mouth daily. 30 Tab 1    Syringe, Disposable, 3 mL syrg Take as directed 30 Syringe 1    Needle, Disp, 23 G 23 gauge x 1 1/4\" ndle Use to inject 0.5 ml every 7 days 30 Pen Needle 1    Needle, Disp, 18 G 18 gauge x 1\" ndle Use to aspirate 0.5 ml every 7 days 30 Pen Needle 1    Wheel Chair maura Assistance with ambulation, gait instability 1 Each 0    pramipexole (MIRAPEX) 0.75 mg tablet TAKE 1 TABLET THREE TIMES DAILY (Patient taking differently: Takes 3 pills once a day) 270 Tab 1    isosorbide mononitrate ER (IMDUR) 60 mg CR tablet Take 1 Tab by mouth every morning. 90 Tab 1    metoprolol succinate (TOPROL-XL) 50 mg XL tablet Take 1 Tab by mouth daily. 90 Tab 1    simvastatin (ZOCOR) 10 mg tablet TAKE 1 TABLET EVERY NIGHT 90 Tab 0    dulaglutide (TRULICITY) 1.5 IQ/5.4 mL sub-q pen 0.5 mL by SubCUTAneous route every seven (7) days. 12 Pen 3    polyethylene glycol (MIRALAX) 17 gram packet Take 1 Packet by mouth daily. Hold for loose stools (Patient taking differently: Take 17 g by mouth daily as needed. Hold for loose stools) 10 Each 0    Back Brace [facility] 1 Device by Does Not Apply route daily as needed for Pain. One, lumbosacral orthosis/back brace with metal struts      Medically necessary 1 Device 0    zolpidem (AMBIEN) 10 mg tablet Take 1 Tab by mouth nightly. Max Daily Amount: 10 mg. (Patient taking differently: Take 10 mg by mouth nightly as needed.) 30 Tab 1    ALPRAZolam (XANAX) 0.5 mg tablet Take 1 Tab by mouth two (2) times daily as needed for Anxiety.  Max Daily Amount: 1 mg. 30 Tab 0    naloxone 4 mg/actuation spry 4 mg by Nasal route as needed. 1 Box 0     Allergies   Allergen Reactions    Bactrim [Sulfamethoprim] Rash    Opana [Oxymorphone] Other (comments)     Feels like bug crawling under skin and drowziness       Family History   Problem Relation Age of Onset    Diabetes Mother     Heart Failure Mother     Heart Attack Father     Diabetes Sister     Stroke Brother      Social History     Tobacco Use    Smoking status: Never Smoker    Smokeless tobacco: Never Used   Substance Use Topics    Alcohol use: Yes       Depression Risk Factor Screening:     3 most recent PHQ Screens 8/18/2020   PHQ Not Done Active Diagnosis of Depression or Bipolar Disorder   Little interest or pleasure in doing things -   Feeling down, depressed, irritable, or hopeless -   Total Score PHQ 2 -   Trouble falling or staying asleep, or sleeping too much -   Feeling tired or having little energy -   Poor appetite, weight loss, or overeating -   Feeling bad about yourself - or that you are a failure or have let yourself or your family down -   Trouble concentrating on things such as school, work, reading, or watching TV -   Moving or speaking so slowly that other people could have noticed; or the opposite being so fidgety that others notice -   Thoughts of being better off dead, or hurting yourself in some way -   PHQ 9 Score -   How difficult have these problems made it for you to do your work, take care of your home and get along with others -       Alcohol Risk Factor Screening (MALE > 65): Do you average more 1 drink per night or more than 7 drinks a week: No    In the past three months have you have had more than 4 drinks containing alcohol on one occasion: No      Functional Ability and Level of Safety:   Hearing: Hearing is good. Activities of Daily Living:   The home contains: handrails and grab bars  Patient does total self care     Ambulation: with no difficulty and use cane, walking stick, shoping card, wheel chair at home Fall Risk:  Fall Risk Assessment, last 12 mths 8/18/2020   Able to walk? Yes   Fall in past 12 months? Yes   Fall with injury? Yes   Number of falls in past 12 months 2   Fall Risk Score 3     Abuse Screen:  Patient is not abused       Cognitive Screening   Has your family/caregiver stated any concerns about your memory: no      Patient Care Team   Patient Care Team:  Turner Joyce MD as PCP - General (Family Medicine)  Turner Joyce MD as PCP - [facility] Empaneled Provider  Chinyere Vega MD (Physical Medicine and Rehabilitation)  Gilles Fletcher MD (Orthopedic Surgery)  Moose Fragoso MD (Urology)  Gilles Fletcher MD (Orthopedic Surgery)    Assessment/Plan   Education and counseling provided:  Are appropriate based on today's review and evaluation  Discussed 5-year health plan  Discussed advance care directive. Patient has advanced directive but does not wish to give a copy to scan. See ACP note from today. Diagnoses and all orders for this visit:    4. Medicare annual wellness visit, subsequent        Health Maintenance Due   Topic Date Due    Colonoscopy  02/19/1973    Shingrix Vaccine Age 50> (1 of 2) 02/19/2005    Foot Exam Q1  08/02/2018    Pneumococcal 65+ years (2 of 2 - PPSV23) 02/19/2020    Medicare Yearly Exam  04/11/2020    Influenza Age 5 to Adult  08/01/2020    GLAUCOMA SCREENING Q2Y  08/24/2020    Eye Exam Retinal or Dilated  08/24/2020     He is going to make an appointment for an eye exam  Following podiatry [id number]. We will try to obtain the records  He was instructed to get the pneumococcal 23 and flu shot at the pharmacy  Also advised to get the shingles shot from the pharmacy. He was ordered the fit test but has not completed yet.   We will send the fit test again  Leobardo Clay, who was evaluated through a synchronous (real-time) audio-video encounter, and/or his healthcare decision maker, is aware that it is a billable service, with coverage as determined by his insurance carrier. He provided verbal consent to proceed: Yes, and patient identification was verified. It was conducted pursuant to the emergency declaration under the [address] [place] [address] and the [employer] General Act. A caregiver was present when appropriate. Ability to conduct physical exam was limited. I was in the office. The patient was at home.     Ronda Galo MD

## 2020-08-18 NOTE — PROGRESS NOTES
Per verbal ok from Dr. Elmira walsh to print Rx for Patient Assistance Program through [employer].

## 2020-08-18 NOTE — PROGRESS NOTES
Luiz Lafleur is a 72 y.o. male who was seen by synchronous (real-time) audio-video technology on 8/18/2020 for Follow-up        Assessment & Plan:   Diagnoses and all orders for this visit:    Medicare annual wellness visit, subsequent    Poorly controlled diabetes mellitus ([facility] [address]): Last A1c greater than 9. Adjusted medication. He was started on Jardiance. Tolerating medication well. Also seeing pharmacist for medication adjustment. Working on diet modification. Apparently blood sugar has been improving gradually. At home now blood sugars are in 130s. ACE inhibitor. We will recheck the labs  -     HEMOGLOBIN A1C WITH EAG; Future  -     METABOLIC PANEL, COMPREHENSIVE; Future    Episode of recurrent major depressive disorder, unspecified depression episode severity ([facility] [address]): On medication. Stable at this time. Refused the psychiatric referral.:    Renal insufficiency mild elevated creatinine. We will repeat the labs. If it still elevated will consider nephrology referral    Anxiety and depression: Fairly stable at this time. Not following any specialist and refused the referral at this time    Unsteady gait: From chronic foot pain. Following Ortho. Using cane as needed and also use power scooter. Comments:  mainly discomfort from the rt foot. neuropathy. following Dr. Francis Gannon. had prior surgeries. using support to ambulate     Pt understood and agree with plan. Review HM  See medicare wellness note for HM   712  Subjective:     Done visit with doxy  Poorly controlled diabetes. Last A1C >9. Adjusted medication. Tolerating medication well. No hypo or hyperglycemic symptoms. Taking medication with compliance. During the virtual visit sitting comfortable and did not appear in any acute distress  Review last labs. Mild elevated urine microalbumin, elevated triglyceride, renal insufficiency and elevated creatinine. Discussed the importance of the well-controlled diabetes.   He is trying to be compliant with lifestyle modification. Limitation in exercise due to chronic right foot pain. He is following Ortho at this time. Using cane or support or electric scooter as needed. History of anxiety and depression. Has been stable at this time. On medication and showing compliance. No mood changes. Denies any side effect of medication. Discussed the need for counseling or psychiatrist but patient has refused the referral at this time. Will observe. History of sleep apnea. Using CPAP with compliance. Currently not following any specialist.  Offered to neurology referral but he has refused it. Denies any unusual fatigue. Denies any headache or dizziness. No chest pain or trouble breathing. No palpitation or diaphoresis. No nausea vomiting or abdominal pain. No urinary or bowel complaint. No appetite or weight changes. No mood changes. Lab Results   Component Value Date/Time    WBC 6.8 07/03/2020 09:16 AM    HGB 13.6 07/03/2020 09:16 AM    HCT 43.6 07/03/2020 09:16 AM    PLATELET 918 66/91/7521 09:16 AM    MCV 83.0 07/03/2020 09:16 AM     Lab Results   Component Value Date/Time    Sodium 138 07/03/2020 09:16 AM    Potassium 4.3 07/03/2020 09:16 AM    Chloride 106 07/03/2020 09:16 AM    CO2 24 07/03/2020 09:16 AM    Anion gap 8 07/03/2020 09:16 AM    Glucose 188 (H) 07/03/2020 09:16 AM    BUN 19 (H) 07/03/2020 09:16 AM    Creatinine 1.35 (H) 07/03/2020 09:16 AM    BUN/Creatinine ratio 14 07/03/2020 09:16 AM    GFR est AA >60 07/03/2020 09:16 AM    GFR est non-AA 53 (L) 07/03/2020 09:16 AM    Calcium 9.2 07/03/2020 09:16 AM    Bilirubin, total 0.3 07/03/2020 09:16 AM    Alk.  phosphatase 97 07/03/2020 09:16 AM    Protein, total 7.7 07/03/2020 09:16 AM    Albumin 3.9 07/03/2020 09:16 AM    Globulin 3.8 07/03/2020 09:16 AM    A-G Ratio 1.0 07/03/2020 09:16 AM    ALT (SGPT) 40 07/03/2020 09:16 AM    AST (SGOT) 23 07/03/2020 09:16 AM     Lab Results   Component Value Date/Time    Cholesterol, total 137 07/03/2020 09:16 AM    HDL Cholesterol 39 (L) 07/03/2020 09:16 AM    LDL, calculated 59 07/03/2020 09:16 AM    VLDL, calculated 39 07/03/2020 09:16 AM    Triglyceride 195 (H) 07/03/2020 09:16 AM    CHOL/HDL Ratio 3.5 07/03/2020 09:16 AM     Lab Results   Component Value Date/Time    TSH 1.25 07/03/2020 09:16 AM     Lab Results   Component Value Date/Time    Hemoglobin A1c 9.6 (H) 07/03/2020 09:17 AM    Hemoglobin A1c (POC) 8.1 09/13/2019 01:58 PM     Lab Results   Component Value Date/Time    Microalbumin/Creat ratio (mg/g creat) 26 07/03/2020 09:17 AM    MICROALBUMIN,MG/DAY 14.8 06/13/2016 08:43 AM    Microalbumin,urine random 4.06 (H) 07/03/2020 09:17 AM     Also elevated triglyceride. On statin. Tolerating it well. Trying to be compliant with diet modification  Prior to Admission medications    Medication Sig Start Date End Date Taking? Authorizing Provider   empagliflozin (Jardiance) 10 mg tablet Take 1 Tab by mouth daily. 8/18/20  Yes Tao Perez MD   metFORMIN ER (GLUCOPHAGE XR) 500 mg tablet Take 2 Tabs by mouth two (2) times daily (with meals). 7/27/20  Yes Tao Perez MD   cpap machine kit by Does Not Apply route. Yes Provider, Historical   buPROPion SR (WELLBUTRIN SR) 150 mg SR tablet Take 1 Tab by mouth daily. 7/7/20  Yes Tao Perez MD   Blood-Glucose Meter monitoring kit Use to check blood sugar 3 times daily. Dx E11.9. Please dispense brand preferred by insurance. 6/17/20  Yes Tao Perez MD   glucose blood VI test strips (ASCENSIA AUTODISC VI, ONE TOUCH ULTRA TEST VI) strip Use to check blood sugar 3 times daily. Dx E11.9. Please dispense brand preferred by insurance. 6/17/20  Yes Tao Perez MD   lancets misc Use to check blood sugar 3 times daily. Dx E11.9. Please dispense brand preferred by insurance. 6/17/20  Yes Tao Perez MD   folic acid/multivit-min/lutein (CENTRUM SILVER PO) Take  by mouth.    Yes Provider, Historical   testosterone cypionate (Kendal Ping CYPIONATE) 200 mg/mL injection  4/15/20  Yes Provider, Historical   lisinopriL (PRINIVIL, ZESTRIL) 20 mg tablet Take 1 Tab by mouth daily. 5/18/20  Yes Zoya Gilmore MD   Syringe, Disposable, 3 mL syrg Take as directed 4/15/20  Yes Houston Lemus MD   Needle, Disp, 23 G 23 gauge x 1 1/4\" ndle Use to inject 0.5 ml every 7 days 4/15/20  Yes Houston Lemus MD   Needle, Disp, 18 G 18 gauge x 1\" ndle Use to aspirate 0.5 ml every 7 days 4/15/20  Yes Houston Lemus MD   Wheel Chair maura Assistance with ambulation, gait instability 2/28/20  Yes Anna Brewster NP   pramipexole (MIRAPEX) 0.75 mg tablet TAKE 1 TABLET THREE TIMES DAILY  Patient taking differently: Takes 3 pills once a day 2/26/20  Yes Anna Brewster NP   isosorbide mononitrate ER (IMDUR) 60 mg CR tablet Take 1 Tab by mouth every morning. 2/26/20  Yes Anna Brewster NP   metoprolol succinate (TOPROL-XL) 50 mg XL tablet Take 1 Tab by mouth daily. 2/26/20  Yes Anna Brewster NP   simvastatin (ZOCOR) 10 mg tablet TAKE 1 TABLET EVERY NIGHT 12/6/19  Yes Jennie Oquendo MD   dulaglutide (TRULICITY) 1.5 TQ/1.8 mL sub-q pen 0.5 mL by SubCUTAneous route every seven (7) days. 7/15/19  Yes Anna Brewster NP   polyethylene glycol (MIRALAX) 17 gram packet Take 1 Packet by mouth daily. Hold for loose stools  Patient taking differently: Take 17 g by mouth daily as needed. Hold for loose stools 1/18/19  Yes Abisai Pascal NP   Back Brace misc 1 Device by Does Not Apply route daily as needed for Pain. One, lumbosacral orthosis/back brace with metal struts      Medically necessary 3/16/16  Yes David Zeng MD   empagliflozin (Jardiance) 10 mg tablet Take 1 Tab by mouth daily. 7/6/20 8/18/20  Zoya Gilmore MD   zolpidem (AMBIEN) 10 mg tablet Take 1 Tab by mouth nightly. Max Daily Amount: 10 mg. Patient taking differently: Take 10 mg by mouth nightly as needed.  9/13/19   YUSEF March) 0.5 mg tablet Take 1 Tab by mouth two (2) times daily as needed for Anxiety. Max Daily Amount: 1 mg. 9/13/19   Maria Hernandez NP   naloxone 4 mg/actuation spry 4 mg by Nasal route as needed. 5/4/17   Maude Araujo MD     Patient Active Problem List    Diagnosis Date Noted    Type 2 diabetes mellitus with diabetic neuropathy ([facility] [address] 05/10/2019    Severe obesity ([facility] [address] 04/26/2019    Kidney stone 02/25/2019    Sepsis ([facility] [address] 01/14/2019    UTI (urinary tract infection) 01/14/2019    Status post insertion of spinal cord stimulator 09/14/2017    Chronic pain syndrome 06/15/2017    Lumbar post-laminectomy syndrome 06/15/2017    Lumbar and sacral osteoarthritis 06/15/2017    Anemia 12/07/2016    Advanced care planning/counseling discussion 08/08/2016    Chronic midline low back pain with sciatica 05/16/2016    History of depression 06/02/2015    Thoracic or lumbosacral neuritis or radiculitis, unspecified 01/26/2015    Postlaminectomy syndrome, lumbar region 01/26/2015    DDD (degenerative disc disease), lumbar 07/30/2014    Lumbar facet arthropathy 07/30/2014    S/P lumbar laminectomy 07/30/2014    S/P lumbar spinal fusion 07/30/2014    Lumbar nerve root impingement 07/30/2014    Lumbosacral radiculopathy at S1 07/30/2014    DDD (degenerative disc disease), thoracic 07/30/2014    Thoracic compression fracture (HCC) 07/30/2014    Back muscle spasm 07/30/2014    Major depression 07/30/2014    TRUE (generalized anxiety disorder) 07/30/2014    Abdominal adhesions 07/30/2014    Constipation, chronic 07/30/2014    Insomnia secondary to chronic pain 07/30/2014    MANISH (obstructive sleep apnea) 07/30/2014    DM (diabetes mellitus) ([facility] [address] 07/30/2014    HTN (hypertension) 07/30/2014    Hyperlipidemia 07/30/2014    Frequent falls 07/30/2014     Current Outpatient Medications   Medication Sig Dispense Refill    empagliflozin (Jardiance) 10 mg tablet Take 1 Tab by mouth daily.  90 Tab 1    metFORMIN ER (GLUCOPHAGE XR) 500 mg tablet Take 2 Tabs by mouth two (2) times daily (with meals). 360 Tab 1    cpap machine kit by Does Not Apply route.  buPROPion SR (WELLBUTRIN SR) 150 mg SR tablet Take 1 Tab by mouth daily. 90 Tab 1    Blood-Glucose Meter monitoring kit Use to check blood sugar 3 times daily. Dx E11.9. Please dispense brand preferred by insurance. 1 Kit 0    glucose blood VI test strips (ASCENSIA AUTODISC VI, ONE TOUCH ULTRA TEST VI) strip Use to check blood sugar 3 times daily. Dx E11.9. Please dispense brand preferred by insurance. 100 Strip 11    lancets misc Use to check blood sugar 3 times daily. Dx E11.9. Please dispense brand preferred by insurance. 547 Each 11    folic acid/multivit-min/lutein (CENTRUM SILVER PO) Take  by mouth.  testosterone cypionate (DEPOTESTOTERONE CYPIONATE) 200 mg/mL injection       lisinopriL (PRINIVIL, ZESTRIL) 20 mg tablet Take 1 Tab by mouth daily. 30 Tab 1    Syringe, Disposable, 3 mL syrg Take as directed 30 Syringe 1    Needle, Disp, 23 G 23 gauge x 1 1/4\" ndle Use to inject 0.5 ml every 7 days 30 Pen Needle 1    Needle, Disp, 18 G 18 gauge x 1\" ndle Use to aspirate 0.5 ml every 7 days 30 Pen Needle 1    Wheel Chair maura Assistance with ambulation, gait instability 1 Each 0    pramipexole (MIRAPEX) 0.75 mg tablet TAKE 1 TABLET THREE TIMES DAILY (Patient taking differently: Takes 3 pills once a day) 270 Tab 1    isosorbide mononitrate ER (IMDUR) 60 mg CR tablet Take 1 Tab by mouth every morning. 90 Tab 1    metoprolol succinate (TOPROL-XL) 50 mg XL tablet Take 1 Tab by mouth daily. 90 Tab 1    simvastatin (ZOCOR) 10 mg tablet TAKE 1 TABLET EVERY NIGHT 90 Tab 0    dulaglutide (TRULICITY) 1.5 FU/8.9 mL sub-q pen 0.5 mL by SubCUTAneous route every seven (7) days. 12 Pen 3    polyethylene glycol (MIRALAX) 17 gram packet Take 1 Packet by mouth daily. Hold for loose stools (Patient taking differently: Take 17 g by mouth daily as needed.  Hold for loose stools) 10 Each 0    Back Brace misc 1 Device by Does Not Apply route daily as needed for Pain. One, lumbosacral orthosis/back brace with metal struts      Medically necessary 1 Device 0    zolpidem (AMBIEN) 10 mg tablet Take 1 Tab by mouth nightly. Max Daily Amount: 10 mg. (Patient taking differently: Take 10 mg by mouth nightly as needed.) 30 Tab 1    ALPRAZolam (XANAX) 0.5 mg tablet Take 1 Tab by mouth two (2) times daily as needed for Anxiety. Max Daily Amount: 1 mg. 30 Tab 0    naloxone 4 mg/actuation spry 4 mg by Nasal route as needed.  1 Box 0     Allergies   Allergen Reactions    Bactrim [Sulfamethoprim] Rash    Opana [Oxymorphone] Other (comments)     Feels like bug crawling under skin and drowziness     Past Medical History:   Diagnosis Date    Abdominal adhesions 7/30/2014    Back muscle spasm 7/30/2014    Back pain, lumbosacral     BPH (benign prostatic hyperplasia)     Cellulitis     Constipation, chronic 7/30/2014    DDD (degenerative disc disease), lumbar 7/30/2014    DDD (degenerative disc disease), thoracic 7/30/2014    Depression     Diabetes ([address]     DM (diabetes mellitus) ([address] 7/30/2014    Frequent falls 7/30/2014    TRUE (generalized anxiety disorder) 7/30/2014    High cholesterol     HTN (hypertension) 7/30/2014    Hydronephrosis     Hyperlipidemia 7/30/2014    Hypertension     Hypogonadism in male     Incomplete bladder emptying     Insomnia secondary to chronic pain 7/30/2014    Kidney stones     LBP radiating to both legs 7/30/2014    Lumbar facet arthropathy 7/30/2014    Lumbar nerve root impingement 7/30/2014    Lumbosacral radiculopathy at S1 7/30/2014    Major depression 7/30/2014    Nausea & vomiting     Neurological disorder     sciatica    MANISH (obstructive sleep apnea) 7/30/2014    S/P lumbar laminectomy 7/30/2014    S/P lumbar spinal fusion 7/30/2014    Small vessel disease ([address]     Thoracic compression fracture ([address] 07/30/2014    Unspecified sleep apnea     USES CPAP EVERY NIGHT     Social History     Tobacco Use    Smoking status: Never Smoker    Smokeless tobacco: Never Used   Substance Use Topics    Alcohol use: Yes       ROS: See HPI    Objective:     Patient-Reported Vitals 8/18/2020   Patient-Reported Weight 302   Patient-Reported Height 62   Patient-Reported Pulse 70   Patient-Reported Temperature 97.5   Patient-Reported SpO2 95   Patient-Reported Systolic  429   Patient-Reported Diastolic 80      General: alert, cooperative, no distress   Mental  status: normal mood, behavior, speech, dress, motor activity, and thought processes, able to follow commands   HENT: NCAT   Neck: no visualized mass   Resp: no respiratory distress   Neuro: no gross deficits   Skin: no discoloration or lesions of concern on visible areas   Psychiatric: normal affect, consistent with stated mood, no evidence of hallucinations     Additional exam findings: We discussed the expected course, resolution and complications of the diagnosis(es) in detail. Medication risks, benefits, costs, interactions, and alternatives were discussed as indicated. I advised him to contact the office if his condition worsens, changes or fails to improve as anticipated. He expressed understanding with the diagnosis(es) and plan. Jennifer William, who was evaluated through a patient-initiated, synchronous (real-time) audio-video encounter, and/or his healthcare decision maker, is aware that it is a billable service, with coverage as determined by his insurance carrier. He provided verbal consent to proceed: Yes, and patient identification was verified. It was conducted pursuant to the emergency declaration under the [facility], [address] [place] [address] authority and the [employer] Act. A caregiver was present when appropriate. Ability to conduct physical exam was limited. I was in the office. The patient was at home.       Aure Guzman MD

## 2020-08-19 NOTE — PROGRESS NOTES
Pharmacy Note:  Diabetes Follow-up    S/O: Placed an outgoing call to patient to follow-up on SMBG readings and diabetes. Patient referred by Franklyn Cordero MD for diabetes management. Discussed with patient the Pharmacist Collaborative Practice Agreement. Patient provided verbal and/or electronic (ex. mychart) consent to participate in the collaborative practice agreement between the pharmacist and referred patient. This is in lieu of paper consent due to COVID-19 precautions and the use of remote/virtual visits. States he has a stye on his eye that started 3-4 days ago. States this has remained the same. States it is tender to touch, but no other symptoms. States he has been doing warm compresses for it per PCP instructions, but this has not been helping. States he also washes eyelids with Veto's baby soap.  -States he has taken antibiotic ointments and tablets for styes in the past    Patient is currently taking the following for diabetes: Trulicity 1.5 mg once weekly, metformin ER 1000 mg BID, Jardiance 10 mg daily.  -States he got refill of Trulicity from PAP yesterday    He denies any signs/sx of hypoglycemia. Denies any BG <70. Last A1C was 9.6 on 7/3/2020. Denies polyuria/dipsia/phagia, blurry vision, tingling in fingers or toes.     Patient is checking His blood sugar 1-2 times daily and reports the following values:  Date Fasting After Breakfast Before Lunch   8/20 141 137 113   8/19 136     8/18 122 140    8/17 167 148    8/16  133    8/15  141    8/14 153     -States he only waits 30 mins after breakfast to check BG    Diet/Exercise:  -States he is still drinking plenty of water  -States he has lost 13-14 pounds in the last month  -States he usually eats breakfast and dinner each day  -States he has cut back on having dessert after dinner  -States his portions for meals are a lot smaller since he started Trulicity     Medication Adherence:  -Patient reports adherence with medications  -Patient denies adverse effects from medications  -States he is taking magnesium 1200 mg as needed for constipation  -States he is taking testosterone 100 mg SQ in stomach once every 2 weeks  -Denies any itching/burning with urination or dizziness from Jardiance  -Denies any GI symptoms from Trulicity    Screenings:  Diabetic Foot and Eye Exam HM Status   Topic Date Due    Diabetic Foot Care  08/02/2018    Eye Exam  08/24/2020       Current Outpatient Medications   Medication Sig    empagliflozin (Jardiance) 10 mg tablet Take 1 Tab by mouth daily.  metFORMIN ER (GLUCOPHAGE XR) 500 mg tablet Take 2 Tabs by mouth two (2) times daily (with meals).  cpap machine kit by Does Not Apply route.  buPROPion SR (WELLBUTRIN SR) 150 mg SR tablet Take 1 Tab by mouth daily.  Blood-Glucose Meter monitoring kit Use to check blood sugar 3 times daily. Dx E11.9. Please dispense brand preferred by insurance.  glucose blood VI test strips (ASCENSIA AUTODISC VI, ONE TOUCH ULTRA TEST VI) strip Use to check blood sugar 3 times daily. Dx E11.9. Please dispense brand preferred by insurance.  lancets misc Use to check blood sugar 3 times daily. Dx E11.9. Please dispense brand preferred by insurance.  folic acid/multivit-min/lutein (CENTRUM SILVER PO) Take  by mouth.  testosterone cypionate (DEPOTESTOTERONE CYPIONATE) 200 mg/mL injection     lisinopriL (PRINIVIL, ZESTRIL) 20 mg tablet Take 1 Tab by mouth daily.  Syringe, Disposable, 3 mL syrg Take as directed    Needle, Disp, 23 G 23 gauge x 1 1/4\" ndle Use to inject 0.5 ml every 7 days    Needle, Disp, 18 G 18 gauge x 1\" ndle Use to aspirate 0.5 ml every 7 days   [facility] Chair maura Assistance with ambulation, gait instability    pramipexole (MIRAPEX) 0.75 mg tablet TAKE 1 TABLET THREE TIMES DAILY (Patient taking differently: Takes 3 pills once a day)    isosorbide mononitrate ER (IMDUR) 60 mg CR tablet Take 1 Tab by mouth every morning.     metoprolol succinate (TOPROL-XL) 50 mg XL tablet Take 1 Tab by mouth daily.  simvastatin (ZOCOR) 10 mg tablet TAKE 1 TABLET EVERY NIGHT    zolpidem (AMBIEN) 10 mg tablet Take 1 Tab by mouth nightly. Max Daily Amount: 10 mg. (Patient taking differently: Take 10 mg by mouth nightly as needed.)    ALPRAZolam (XANAX) 0.5 mg tablet Take 1 Tab by mouth two (2) times daily as needed for Anxiety. Max Daily Amount: 1 mg.  dulaglutide (TRULICITY) 1.5 UQ/9.2 mL sub-q pen 0.5 mL by SubCUTAneous route every seven (7) days.  polyethylene glycol (MIRALAX) 17 gram packet Take 1 Packet by mouth daily. Hold for loose stools (Patient taking differently: Take 17 g by mouth daily as needed. Hold for loose stools)    naloxone 4 mg/actuation spry 4 mg by Nasal route as needed.  Back Brace misc 1 Device by Does Not Apply route daily as needed for Pain. One, lumbosacral orthosis/back brace with metal struts      Medically necessary     No current facility-administered medications for this visit.         Lab Results   Component Value Date/Time    Hemoglobin A1c 9.6 (H) 07/03/2020 09:17 AM    Hemoglobin A1c (POC) 8.1 09/13/2019 01:58 PM       Lab Results   Component Value Date/Time    Sodium 138 07/03/2020 09:16 AM    Potassium 4.3 07/03/2020 09:16 AM    Chloride 106 07/03/2020 09:16 AM    CO2 24 07/03/2020 09:16 AM    Anion gap 8 07/03/2020 09:16 AM    Glucose 188 (H) 07/03/2020 09:16 AM    BUN 19 (H) 07/03/2020 09:16 AM    Creatinine 1.35 (H) 07/03/2020 09:16 AM    BUN/Creatinine ratio 14 07/03/2020 09:16 AM    GFR est AA >60 07/03/2020 09:16 AM    GFR est non-AA 53 (L) 07/03/2020 09:16 AM    Calcium 9.2 07/03/2020 09:16 AM       Lab Results   Component Value Date/Time    Cholesterol, total 137 07/03/2020 09:16 AM    HDL Cholesterol 39 (L) 07/03/2020 09:16 AM    LDL, calculated 59 07/03/2020 09:16 AM    Triglyceride 195 (H) 07/03/2020 09:16 AM    CHOL/HDL Ratio 3.5 07/03/2020 09:16 AM     A/P:    Diabetes:  -Instructed patient to continue Trulicity 1.5 mg once weekly, metformin ER 1000 mg BID, Jardiance 10 mg daily  -Reiterated importance of checking BG 1-2 times daily and whenever he is not feeling well  -Instructed him to stop taking magnesium tablet for constipation as this may lead to issues with increased magnesium; discussed that he can try Miralax OTC and contact PCP if he is having bothersome constipation    Will discuss patient's stye symptoms with his PCP for her recommendations. Reviewed with patient signs/sx/treatment of hypoglycemia. Patient verbalized understanding. Will sign off on patient's BG management. Will have patient follow with PCP until he establishes care with new PharmD in October. Will route note to PCP.     Laurie Gonzales, PharmD  Clinical Pharmacist Specialist    CLINICAL PHARMACY CONSULT: MED RECONCILIATION/REVIEW ADDENDUM    For Pharmacy Admin Tracking Only    PHSO: PHSO Patient?: No  Total # of Interventions Recommended: Count: 0  Total Interventions Accepted: 0  Time Spent (min): 26[address] [facility], PharmD

## 2020-08-20 ENCOUNTER — VIRTUAL VISIT (OUTPATIENT)
Dept: FAMILY MEDICINE CLINIC | Age: 65
End: 2020-08-20

## 2020-08-20 DIAGNOSIS — E11.65 POORLY CONTROLLED DIABETES MELLITUS (HCC): Primary | ICD-10-CM

## 2020-08-20 RX ORDER — LANOLIN ALCOHOL/MO/W.PET/CERES
1200 CREAM (GRAM) TOPICAL
COMMUNITY
End: 2020-10-07

## 2020-08-20 NOTE — Clinical Note
Hi Dr. Dipak Storm,    Mr. Nhung Haas has been doing well on his current diabetes regimen of Trulicity 1.5 mg once weekly, metformin ER 1000 mg BID, and Jardiance 10 mg daily. I will have him follow with you for diabetes until he establishes care with new PharmD [facility] in October.     Katelyn Ruiz, PharmD  Clinical Pharmacist Specialist

## 2020-08-21 ENCOUNTER — TELEPHONE (OUTPATIENT)
Dept: FAMILY MEDICINE CLINIC | Age: 65
End: 2020-08-21

## 2020-08-21 NOTE — TELEPHONE ENCOUNTER
Contacted patient and verified identity using name and date of birth (2- identifiers)  Spoke with patient and advised to continue with  Warm compresses and ABX will not help. If area gets too big to be seen by ER or Ophthalmologist for lancing. Patient verbalized understanding.

## 2020-08-21 NOTE — TELEPHONE ENCOUNTER
----- Message from Aggie Ahumada MD sent at 8/21/2020 11:15 AM EDT -----  I told him during the visit to continue warm compression. Antibiotic will not help in this condition. If it goes too big , he needs to go to ER to jose it or ophthalmology. Please reach out to pt. Thanks  Kelly Malone   ----- Message -----  From: Feli Inman, [facility]  Sent: 8/21/2020   8:54 AM EDT  To: Aggie Ahumada MD    Hi Dr. Kelly Malone,    I spoke with Mr. Catalina Vasquez yesterday for follow up. He states the stye on his eye that started 3-4 days ago is still bothering him and is tender to touch. He states the warm compresses have not been helping. He states he has had styes in the past before and that antibiotic ointments and tablets have helped with these. He is asking if there is anything you would recommend that would help with the stye.     Thank Paz Powell, PharmD  Clinical Pharmacist Specialist

## 2020-08-26 DIAGNOSIS — I10 ESSENTIAL HYPERTENSION: ICD-10-CM

## 2020-08-26 RX ORDER — ISOSORBIDE MONONITRATE 60 MG/1
60 TABLET, EXTENDED RELEASE ORAL
Qty: 90 TAB | Refills: 1 | Status: SHIPPED | OUTPATIENT
Start: 2020-08-26 | End: 2021-02-08

## 2020-08-26 RX ORDER — METOPROLOL SUCCINATE 50 MG/1
50 TABLET, EXTENDED RELEASE ORAL DAILY
Qty: 90 TAB | Refills: 1 | Status: SHIPPED | OUTPATIENT
Start: 2020-08-26 | End: 2021-01-22

## 2020-08-26 NOTE — TELEPHONE ENCOUNTER
This pharmacy faxed over request for the following prescriptions to be filled:    Medication requested :   Requested Prescriptions     Pending Prescriptions Disp Refills    metoprolol succinate (TOPROL-XL) 50 mg XL tablet 90 Tab 1     Sig: Take 1 Tab by mouth daily.  isosorbide mononitrate ER (IMDUR) 60 mg CR tablet 90 Tab 1     Sig: Take 1 Tab by mouth every morning.      [facility]: [facility] or Print: [employer]   Mail order or Local pharmacy Mail Order     Scheduled appointment if not seen by current providers in office:  [address] [place] [address] 8/18/2020 f/u 11/18/2020

## 2020-08-27 NOTE — TELEPHONE ENCOUNTER
This pharmacy faxed over request for the following prescriptions to be filled:    Medication requested :   Requested Prescriptions     Pending Prescriptions Disp Refills    pramipexole (MIRAPEX) 0.75 mg tablet 270 Tab 1     Sig: TAKE 1 TABLET THREE TIMES DAILY    simvastatin (ZOCOR) 10 mg tablet 90 Tab 0     PCP: [address] or Print: [employer]   Mail order or Local pharmacy mail Order     Scheduled appointment if not seen by current providers in office:LOV LOV 8/18/2020 f/u 11/18/2020

## 2020-08-28 RX ORDER — PRAMIPEXOLE DIHYDROCHLORIDE 0.75 MG/1
TABLET ORAL
Qty: 270 TAB | Refills: 1 | Status: SHIPPED | OUTPATIENT
Start: 2020-08-28 | End: 2021-01-24

## 2020-08-28 RX ORDER — SIMVASTATIN 10 MG/1
10 TABLET, FILM COATED ORAL
Qty: 90 TAB | Refills: 1 | Status: SHIPPED | OUTPATIENT
Start: 2020-08-28 | End: 2021-02-08

## 2020-09-16 RX ORDER — BLOOD-GLUCOSE METER
EACH MISCELLANEOUS
Qty: 1 EACH | Refills: 0 | Status: SHIPPED | OUTPATIENT
Start: 2020-09-16 | End: 2022-02-01

## 2020-09-18 ENCOUNTER — DOCUMENTATION ONLY (OUTPATIENT)
Dept: FAMILY MEDICINE CLINIC | Age: 65
End: 2020-09-18

## 2020-10-05 ENCOUNTER — TELEPHONE (OUTPATIENT)
Dept: FAMILY MEDICINE CLINIC | Age: 65
End: 2020-10-05

## 2020-10-05 NOTE — TELEPHONE ENCOUNTER
Pharmacy Progress Note - Telephone Encounter    S/O: Mr. Marbin Hong Chick 72 y.o. male, referred by Dr. Boston Orr MD, was contacted via an outbound telephone call to discuss upcoming virtual visit. Verified patients identifiers (name & ) per HIPAA policy.     - Briefly spoke with patient last week about scheduling a follow-up visit for his diabetes management. Confirmed with patient that we already have him scheduled for a virtual visit with me on 10/7/20 at 3:30 PM. Patient okay with appointment and will continue with visit on 10/7/20. Thank you,  Radha Baires.  NAIMA De La Garza  Clinical Pharmacist Specialist       CLINICAL PHARMACY CONSULT: MED RECONCILIATION/REVIEW ADDENDUM    For Pharmacy Admin Tracking Only    PHSO: PHSO Patient?: Yes  Time Spent (min): 5

## 2020-10-06 ENCOUNTER — TELEPHONE (OUTPATIENT)
Dept: FAMILY MEDICINE CLINIC | Age: 65
End: 2020-10-06

## 2020-10-07 ENCOUNTER — VIRTUAL VISIT (OUTPATIENT)
Dept: INTERNAL MEDICINE CLINIC | Age: 65
End: 2020-10-07

## 2020-10-07 DIAGNOSIS — E11.40 TYPE 2 DIABETES MELLITUS WITH DIABETIC NEUROPATHY, WITHOUT LONG-TERM CURRENT USE OF INSULIN (HCC): Primary | ICD-10-CM

## 2020-10-07 NOTE — PROGRESS NOTES
Pharmacy Progress Note - Diabetes Management    S/O: Mr. Annabelle Stout is a 72 y.o. male, referred by Dr. Hari Mantilla MD, was contacted today via an outbound telephone call for diabetes management follow-up. Patient's last A1c was 9.6% in July 2020. This is an increase from 8.4% in November 2019. Interim History: Patient reports he is doing \"so so\" since last visit with previous PharmD in August. He states that there has been no significant changes in his diabetes management. He states that he has continued to lose weight since last visit; his weight this morning was 294 lbs, down form 317 lbs in June 2020. He also states that his PAP application for [employer] has been approved and he has received the medication. Current anti-hyperglycemic regimen include(s):    - Jardiance 10 mg daily  - Metformin ER 8582 mg BID  - Trulicity 1.5 mg once weekly    Patient reports adherence with his medications. - Denies any major side effects/adverse events to medications   - Endorses some decrease in appetite from Trulicity, but he is okay with this as it has allowed him to better manage portions and lose weight     ROS:  Today, Pt endorses:  - Symptoms of Hyperglycemia: none  - Symptoms of Hypoglycemia: none    Self Monitoring Blood Glucose (SMBG) or CGM:  - Brought in home glucometer/blood glucose log/CGM reader today:  yes  - Conducts/scans: 1-2x per day. Patient states that if his \"numbers look good\" in the morning then he doesn't check his blood sugars at night     Date Fasting   23-Sep 124   24-Sep 133   25-Sep    26-Sep 139   27-Sep 134   28-Sep 147   29-Sep 122   30-Sep 135   1-Oct 131   2-Oct    3-Oct 141   4-Oct 136   5-Oct 136   6-Oct 148   7-Oct 128       Nutrition/Lifestyle Modifications:  - Eats 2 meals/day ; no major changes in diet except eating smaller portions.  He reports that he seldom eats lunch, and only eats breakfast and dinner most days  - Trying to cook more, but admits he probably eats out 40% of the time due to schedule/time constraints with caring for his grandchildren and helping his wife with her care  -  States he was raised to eat dessert after dinner, so he is still trying to cut back on this. Last night he ate 4 cookies after dinner, but states he used to eat more than that    - Physical Activity:   - Not able to be very active. Patient states that he has a \"bad foot\" that he sees Dr. Luciana Moreno for; potential surgery pending. Vitals:   Wt Readings from Last 3 Encounters:   06/09/20 317 lb 6.4 oz (144 kg)   05/18/20 310 lb (140.6 kg)   12/10/19 321 lb (145.6 kg)     BP Readings from Last 3 Encounters:   05/18/20 138/80   12/10/19 168/79   12/09/19 141/75     Pulse Readings from Last 3 Encounters:   05/18/20 72   12/10/19 83   12/09/19 85       Past Medical History:   Diagnosis Date    Abdominal adhesions 7/30/2014    Back muscle spasm 7/30/2014    Back pain, lumbosacral     BPH (benign prostatic hyperplasia)     Cellulitis     Constipation, chronic 7/30/2014    DDD (degenerative disc disease), lumbar 7/30/2014    DDD (degenerative disc disease), thoracic 7/30/2014    Depression     Diabetes ([place])     DM (diabetes mellitus) ([place]) 7/30/2014    Frequent falls 7/30/2014    TRUE (generalized anxiety disorder) 7/30/2014    High cholesterol     HTN (hypertension) 7/30/2014    Hydronephrosis     Hyperlipidemia 7/30/2014    Hypertension     Hypogonadism in male     Incomplete bladder emptying     Insomnia secondary to chronic pain 7/30/2014    Kidney stones     LBP radiating to both legs 7/30/2014    Lumbar facet arthropathy 7/30/2014    Lumbar nerve root impingement 7/30/2014    Lumbosacral radiculopathy at S1 7/30/2014    Major depression 7/30/2014    Nausea & vomiting     Neurological disorder     sciatica    MANISH (obstructive sleep apnea) 7/30/2014    S/P lumbar laminectomy 7/30/2014    S/P lumbar spinal fusion 7/30/2014    Small vessel disease ([place])     Thoracic compression fracture (HCC) 07/30/2014    Unspecified sleep apnea     USES CPAP EVERY NIGHT     Allergies   Allergen Reactions    Bactrim [Sulfamethoprim] Rash    Opana [Oxymorphone] Other (comments)     Feels like bug crawling under skin and drowziness       Current Outpatient Medications   Medication Sig    Accu-Chek Patricia Plus Meter misc USE AS DIRECTED TO CHECK BLOOD SUGAR (FOR DIABETES)    Jardiance 10 mg tablet TAKE 1 TABLET BY MOUTH DAILY    pramipexole (MIRAPEX) 0.75 mg tablet Takes 3 pills once a day    simvastatin (ZOCOR) 10 mg tablet Take 1 Tab by mouth nightly.  metoprolol succinate (TOPROL-XL) 50 mg XL tablet Take 1 Tab by mouth daily.  isosorbide mononitrate ER (IMDUR) 60 mg CR tablet Take 1 Tab by mouth every morning.  magnesium oxide (MAG-OX) 400 mg tablet Take 1,200 mg by mouth daily as needed.  metFORMIN ER (GLUCOPHAGE XR) 500 mg tablet Take 2 Tabs by mouth two (2) times daily (with meals).  cpap machine kit by Does Not Apply route.  buPROPion SR (WELLBUTRIN SR) 150 mg SR tablet Take 1 Tab by mouth daily.  Blood-Glucose Meter monitoring kit Use to check blood sugar 3 times daily. Dx E11.9. Please dispense brand preferred by insurance.  glucose blood VI test strips (ASCENSIA AUTODISC VI, ONE TOUCH ULTRA TEST VI) strip Use to check blood sugar 3 times daily. Dx E11.9. Please dispense brand preferred by insurance.  lancets misc Use to check blood sugar 3 times daily. Dx E11.9. Please dispense brand preferred by insurance.  folic acid/multivit-min/lutein (CENTRUM SILVER PO) Take  by mouth.  testosterone cypionate (DEPOTESTOTERONE CYPIONATE) 200 mg/mL injection 100 mg. Injects 100 mg subcutaneously into stomach once every 2 weeks    lisinopriL (PRINIVIL, ZESTRIL) 20 mg tablet Take 1 Tab by mouth daily.     Syringe, Disposable, 3 mL syrg Take as directed    Needle, Disp, 23 G 23 gauge x 1 1/4\" ndle Use to inject 0.5 ml every 7 days    Needle, Disp, 18 G 18 gauge x 1\" ndle Use to aspirate 0.5 ml every 7 days   [facility] Chair maura Assistance with ambulation, gait instability    zolpidem (AMBIEN) 10 mg tablet Take 1 Tab by mouth nightly. Max Daily Amount: 10 mg. (Patient taking differently: Take 10 mg by mouth nightly as needed.)    ALPRAZolam (XANAX) 0.5 mg tablet Take 1 Tab by mouth two (2) times daily as needed for Anxiety. Max Daily Amount: 1 mg.  dulaglutide (TRULICITY) 1.5 IW/7.1 mL sub-q pen 0.5 mL by SubCUTAneous route every seven (7) days.  polyethylene glycol (MIRALAX) 17 gram packet Take 1 Packet by mouth daily. Hold for loose stools (Patient taking differently: Take 17 g by mouth daily as needed. Hold for loose stools)    naloxone 4 mg/actuation spry 4 mg by Nasal route as needed.  Back Brace misc 1 Device by Does Not Apply route daily as needed for Pain. One, lumbosacral orthosis/back brace with metal struts      Medically necessary     No current facility-administered medications for this visit. Lab Results   Component Value Date/Time    Sodium 138 07/03/2020 09:16 AM    Potassium 4.3 07/03/2020 09:16 AM    Chloride 106 07/03/2020 09:16 AM    CO2 24 07/03/2020 09:16 AM    Anion gap 8 07/03/2020 09:16 AM    Glucose 188 (H) 07/03/2020 09:16 AM    BUN 19 (H) 07/03/2020 09:16 AM    Creatinine 1.35 (H) 07/03/2020 09:16 AM    BUN/Creatinine ratio 14 07/03/2020 09:16 AM    GFR est AA >60 07/03/2020 09:16 AM    GFR est non-AA 53 (L) 07/03/2020 09:16 AM    Calcium 9.2 07/03/2020 09:16 AM    Bilirubin, total 0.3 07/03/2020 09:16 AM    Alk.  phosphatase 97 07/03/2020 09:16 AM    Protein, total 7.7 07/03/2020 09:16 AM    Albumin 3.9 07/03/2020 09:16 AM    Globulin 3.8 07/03/2020 09:16 AM    A-G Ratio 1.0 07/03/2020 09:16 AM    ALT (SGPT) 40 07/03/2020 09:16 AM       Lab Results   Component Value Date/Time    Cholesterol, total 137 07/03/2020 09:16 AM    HDL Cholesterol 39 (L) 07/03/2020 09:16 AM    LDL, calculated 59 07/03/2020 09:16 AM    VLDL, calculated 39 07/03/2020 09:16 AM    Triglyceride 195 (H) 07/03/2020 09:16 AM    CHOL/HDL Ratio 3.5 07/03/2020 09:16 AM       Lab Results   Component Value Date/Time    WBC 6.8 07/03/2020 09:16 AM    HGB 13.6 07/03/2020 09:16 AM    HCT 43.6 07/03/2020 09:16 AM    PLATELET 343 89/48/1344 09:16 AM    MCV 83.0 07/03/2020 09:16 AM       Lab Results   Component Value Date/Time    Microalbumin/Creat ratio (mg/g creat) 26 07/03/2020 09:17 AM    MICROALBUMIN,MG/DAY 14.8 06/13/2016 08:43 AM    Microalbumin,urine random 4.06 (H) 07/03/2020 09:17 AM       HbA1c:  Lab Results   Component Value Date/Time    Hemoglobin A1c 9.6 (H) 07/03/2020 09:17 AM    Hemoglobin A1c (POC) 8.1 09/13/2019 01:58 PM     No components found for: 2     Last Point of Care HGB A1C  Hemoglobin A1c (POC)   Date Value Ref Range Status   09/13/2019 8.1 % Final        CrCl cannot be calculated (Unknown ideal weight. ). A/P:    Diabetes Management:  - Per ADA guidelines, Pt's A1c is not at goal of < 7%. - Current SMBG(s)/CGM trend is near goal. Reviewed fasting SMBG goals with patient (80 - 130 mg/dL) and explained that there are still some fasting blood sugars that are slightly above goal. Overall, however, patient's blood sugars have been improving.   - Rieviewed dietary options with patient if he needs to eat out vs. cooking. Discussed trying salads, removing the bun on a burger, not eating the fires, etc. which can be meaningful ways to lessen impact on blood sugars.   - Recommend that patient try checking blood sugar at bedtime a few days per week so that we have an idea of how his blood sugar is responding throughout the day. - Recommend to continue current medications at this time. Depending on A1c that patient will have checked later this month, can consider increasing dose of Jardiance if continues to be tolerated. Medication reconciliation was completed during the visit. There are no discontinued medications.     Patient verbalized understanding of the information presented and all of the patients questions were answered. AVS was handed to the patient. Patient advised to call the office with any additional questions or concerns. Notifications of recommendations will be sent to Dr. Oscar Robert MD for review. Patient will return to clinic in 3 week(s) for follow up virtual visit. Thank you,  Ruel Sotomayor.  Heraclio Sow, PHARMD, [facility]      CLINICAL PHARMACY CONSULT: MED RECONCILIATION/REVIEW ADDENDUM    For Pharmacy Admin Tracking Only    PHSO: PHSO Patient?: Yes  Total # of Interventions Recommended: Count: 1  - Discontinued Medication #: 1 Discontinue Reason(s): No Longer Used - magnesium  Total Interventions Accepted: 1  Time Spent (min): 30

## 2020-10-07 NOTE — TELEPHONE ENCOUNTER
Pharmacy Progress Note - Telephone Encounter    S/O: Mr. Young Maynard Chick 72 y.o. male contacted office/me via an inbound telephone call to discuss upcoming virtual visit today. Verified patients identifiers (name & ) per HIPAA policy.     - Patient would like to reschedule appointment for 10/7/20 at 10 AM due to a scheduling conflict. Updated appointment. Thank you,  Adan Calderon.  Rosie Schneider, BCPS

## 2020-10-19 ENCOUNTER — TELEPHONE (OUTPATIENT)
Dept: PHARMACY | Age: 65
End: 2020-10-19

## 2020-10-19 NOTE — TELEPHONE ENCOUNTER
CLINICAL PHARMACY: STATIN THERAPY REVIEW    Identified care gap per [facility] statin use in persons with diabetes and adherence     Per [facility]    Medication: Simvastatin 10mg  Recent fill dates: 2020  Day supply:90  Refills remainin  Directions: 1 tab po qd  Insurance billed: [employer]  Prescribing Provider: Roma Gallagher MD      No patient outreach planned at this time. Patient has medication on hand and refill available.        Megan Caruso, [address] [place] [address]   [facility]  Department, toll free: [phone], option 7    CLINICAL PHARMACY CONSULT: MED RECONCILIATION/REVIEW ADDENDUM    For Pharmacy Admin Tracking Only    PHSO: PHSO Patient?: Yes  Total # of Interventions Recommended: Count: 1  - Maintenance Safety Lab Monitoring #: 1  Total Interventions Accepted: 1  Time Spent (min): 100 [address] [place] [address]

## 2020-10-29 ENCOUNTER — VIRTUAL VISIT (OUTPATIENT)
Dept: FAMILY MEDICINE CLINIC | Age: 65
End: 2020-10-29

## 2020-10-29 DIAGNOSIS — E11.40 TYPE 2 DIABETES MELLITUS WITH DIABETIC NEUROPATHY, WITHOUT LONG-TERM CURRENT USE OF INSULIN (HCC): Primary | ICD-10-CM

## 2020-10-29 NOTE — PROGRESS NOTES
Pharmacy Progress Note - Diabetes Management    S/O: Mr. Liliane Bradford is a 72 y.o. male, referred by Dr. Panchito Buchanan MD, was seen today for diabetes management follow-up visit. Patient's last A1c was 9.6% in July 2020. This is a(n) increase from 8.4% in November 2019. Current anti-hyperglycemic regimen include(s):    - Jardiance 10 mg daily  - Metformin ER 6176 mg BID  - Trulicity 1.5 mg once weekly    Patient reports adherence with his medications. - Denies any side effects to medications  - Reports no missed doses    ROS:  Today, Pt endorses:  - Symptoms of Hyperglycemia: none  - Symptoms of Hypoglycemia: none - no blood sugars < 70    Self Monitoring Blood Glucose (SMBG) or CGM:  - Brought in home glucometer/blood glucose log/CGM reader today:  yes  - Conducts/scans: 1-3x daily     Date Fasting After Lunch After Dinner   19-Oct 133     20-Oct 153     21-Oct 124     22-Oct 131     23-Oct 172     24-Oct 133     25-Oct 117     26-Oct 118 141 138   27-Oct 129     28-Oct 110 116 185       Diet/Nutrition:  - Eats 2 meals/day ; breakfast and dinner (sometimes lunch)  - No significant changes to diet, but he states that he has tried to cook at home more    Vitals:   Wt Readings from Last 3 Encounters:   06/09/20 317 lb 6.4 oz (144 kg)   05/18/20 310 lb (140.6 kg)   12/10/19 321 lb (145.6 kg)     BP Readings from Last 3 Encounters:   05/18/20 138/80   12/10/19 168/79   12/09/19 141/75     Pulse Readings from Last 3 Encounters:   05/18/20 72   12/10/19 83   12/09/19 85       Past Medical History:   Diagnosis Date    Abdominal adhesions 7/30/2014    Back muscle spasm 7/30/2014    Back pain, lumbosacral     BPH (benign prostatic hyperplasia)     Cellulitis     Constipation, chronic 7/30/2014    DDD (degenerative disc disease), lumbar 7/30/2014    DDD (degenerative disc disease), thoracic 7/30/2014    Depression     Diabetes ([address]     DM (diabetes mellitus) ([address] 7/30/2014    Frequent falls 7/30/2014    TRUE (generalized anxiety disorder) 7/30/2014    High cholesterol     HTN (hypertension) 7/30/2014    Hydronephrosis     Hyperlipidemia 7/30/2014    Hypertension     Hypogonadism in male     Incomplete bladder emptying     Insomnia secondary to chronic pain 7/30/2014    Kidney stones     LBP radiating to both legs 7/30/2014    Lumbar facet arthropathy 7/30/2014    Lumbar nerve root impingement 7/30/2014    Lumbosacral radiculopathy at S1 7/30/2014    Major depression 7/30/2014    Nausea & vomiting     Neurological disorder     sciatica    MANISH (obstructive sleep apnea) 7/30/2014    S/P lumbar laminectomy 7/30/2014    S/P lumbar spinal fusion 7/30/2014    Small vessel disease (HCC)     Thoracic compression fracture (HCC) 07/30/2014    Unspecified sleep apnea     USES CPAP EVERY NIGHT     Allergies   Allergen Reactions    Bactrim [Sulfamethoprim] Rash    Opana [Oxymorphone] Other (comments)     Feels like bug crawling under skin and drowziness       Current Outpatient Medications   Medication Sig    Accu-Chek Patricia Plus Meter misc USE AS DIRECTED TO CHECK BLOOD SUGAR (FOR DIABETES)    Jardiance 10 mg tablet TAKE 1 TABLET BY MOUTH DAILY    pramipexole (MIRAPEX) 0.75 mg tablet Takes 3 pills once a day    simvastatin (ZOCOR) 10 mg tablet Take 1 Tab by mouth nightly.  metoprolol succinate (TOPROL-XL) 50 mg XL tablet Take 1 Tab by mouth daily.  isosorbide mononitrate ER (IMDUR) 60 mg CR tablet Take 1 Tab by mouth every morning.  metFORMIN ER (GLUCOPHAGE XR) 500 mg tablet Take 2 Tabs by mouth two (2) times daily (with meals).  cpap machine kit by Does Not Apply route.  buPROPion SR (WELLBUTRIN SR) 150 mg SR tablet Take 1 Tab by mouth daily.  Blood-Glucose Meter monitoring kit Use to check blood sugar 3 times daily. Dx E11.9. Please dispense brand preferred by insurance.     glucose blood VI test strips (ASCENSIA AUTODISC VI, ONE TOUCH ULTRA TEST VI) strip Use to check blood sugar 3 times daily. Dx E11.9. Please dispense brand preferred by insurance.  lancets misc Use to check blood sugar 3 times daily. Dx E11.9. Please dispense brand preferred by insurance.  folic acid/multivit-min/lutein (CENTRUM SILVER PO) Take  by mouth.  testosterone cypionate (DEPOTESTOTERONE CYPIONATE) 200 mg/mL injection 100 mg. Injects 100 mg subcutaneously into stomach once every 2 weeks    lisinopriL (PRINIVIL, ZESTRIL) 20 mg tablet Take 1 Tab by mouth daily.  Syringe, Disposable, 3 mL syrg Take as directed    Needle, Disp, 23 G 23 gauge x 1 1/4\" ndle Use to inject 0.5 ml every 7 days    Needle, Disp, 18 G 18 gauge x 1\" ndle Use to aspirate 0.5 ml every 7 days   [facility] Chair maura Assistance with ambulation, gait instability    zolpidem (AMBIEN) 10 mg tablet Take 1 Tab by mouth nightly. Max Daily Amount: 10 mg. (Patient taking differently: Take 10 mg by mouth nightly as needed.)    ALPRAZolam (XANAX) 0.5 mg tablet Take 1 Tab by mouth two (2) times daily as needed for Anxiety. Max Daily Amount: 1 mg.  dulaglutide (TRULICITY) 1.5 XG/2.4 mL sub-q pen 0.5 mL by SubCUTAneous route every seven (7) days.  polyethylene glycol (MIRALAX) 17 gram packet Take 1 Packet by mouth daily. Hold for loose stools (Patient taking differently: Take 17 g by mouth daily as needed. Hold for loose stools)    naloxone 4 mg/actuation spry 4 mg by Nasal route as needed.  Back Brace mis 1 Device by Does Not Apply route daily as needed for Pain. One, lumbosacral orthosis/back brace with metal struts      Medically necessary     No current facility-administered medications for this visit.         Lab Results   Component Value Date/Time    Sodium 138 07/03/2020 09:16 AM    Potassium 4.3 07/03/2020 09:16 AM    Chloride 106 07/03/2020 09:16 AM    CO2 24 07/03/2020 09:16 AM    Anion gap 8 07/03/2020 09:16 AM    Glucose 188 (H) 07/03/2020 09:16 AM    BUN 19 (H) 07/03/2020 09:16 AM    Creatinine 1.35 (H) 07/03/2020 09:16 AM    BUN/Creatinine ratio 14 07/03/2020 09:16 AM    GFR est AA >60 07/03/2020 09:16 AM    GFR est non-AA 53 (L) 07/03/2020 09:16 AM    Calcium 9.2 07/03/2020 09:16 AM    Bilirubin, total 0.3 07/03/2020 09:16 AM    Alk. phosphatase 97 07/03/2020 09:16 AM    Protein, total 7.7 07/03/2020 09:16 AM    Albumin 3.9 07/03/2020 09:16 AM    Globulin 3.8 07/03/2020 09:16 AM    A-G Ratio 1.0 07/03/2020 09:16 AM    ALT (SGPT) 40 07/03/2020 09:16 AM       Lab Results   Component Value Date/Time    Cholesterol, total 137 07/03/2020 09:16 AM    HDL Cholesterol 39 (L) 07/03/2020 09:16 AM    LDL, calculated 59 07/03/2020 09:16 AM    VLDL, calculated 39 07/03/2020 09:16 AM    Triglyceride 195 (H) 07/03/2020 09:16 AM    CHOL/HDL Ratio 3.5 07/03/2020 09:16 AM       Lab Results   Component Value Date/Time    WBC 6.8 07/03/2020 09:16 AM    HGB 13.6 07/03/2020 09:16 AM    HCT 43.6 07/03/2020 09:16 AM    PLATELET 392 79/28/6025 09:16 AM    MCV 83.0 07/03/2020 09:16 AM       Lab Results   Component Value Date/Time    Microalbumin/Creat ratio (mg/g creat) 26 07/03/2020 09:17 AM    MICROALBUMIN,MG/DAY 14.8 06/13/2016 08:43 AM    Microalbumin,urine random 4.06 (H) 07/03/2020 09:17 AM       HbA1c:  Lab Results   Component Value Date/Time    Hemoglobin A1c 9.6 (H) 07/03/2020 09:17 AM    Hemoglobin A1c (POC) 8.1 09/13/2019 01:58 PM     No components found for: 2     Last Point of Care HGB A1C  Hemoglobin A1c (POC)   Date Value Ref Range Status   09/13/2019 8.1 % Final        CrCl cannot be calculated (Unknown ideal weight. ). A/P:    Diabetes Management:  - Per ADA guidelines, Pt's A1c is not at goal of < 7%.    - Current SMBG(s)/CGM trend is near goal. Most blood sugars are within fasting and post-prandial goals, with some spiking into the upper 100s  - Patient was unable to get labs prior to this visit, but states that he will be getting labs tomorrow   - Expect A1c to be improved, but can increase Jardiance to 25 mg daily for additional blood sugar control if needed. Has follow-up with PCP on 11/18/20.   - Encouraged patient to continue being mindful of the amount of carbs he is consuming with meals       Patient verbalized understanding of the information presented and all of the patients questions were answered. AVS was handed to the patient. Patient advised to call the office with any additional questions or concerns. Notifications of recommendations will be sent to Dr. Panchito Buchanan MD for review. Patient will return to clinic in 3 week(s) for follow up. Thank you,  Jose Huertas.  Enedina Adams, SHAWND, [facility]      CLINICAL PHARMACY CONSULT: MED RECONCILIATION/REVIEW ADDENDUM    For Pharmacy Admin Tracking Only    PHSO: PHSO Patient?: Yes  Total # of Interventions Recommended: Count: 0  Total Interventions Accepted: 0  Time Spent (min): 30

## 2020-10-29 NOTE — Clinical Note
Gatito May,    Followed up with . [facility] today. Blood sugars look decent, with some readings still a little above goal. He says he is getting labs tomorrow and following up with you on 11/18. Expect A1c to be improved, but we can also increase Jardiance to 25 mg daily if extra control needed. Will wait for labs to result to decide. Also, he wanted to ask if his appt on 11/18 can be changed to virtual. 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      Thanks,  Pino Vaughn

## 2020-10-30 ENCOUNTER — HOSPITAL ENCOUNTER (OUTPATIENT)
Dept: LAB | Age: 65
Discharge: HOME | End: 2020-10-30
Payer: MEDICARE

## 2020-10-30 DIAGNOSIS — E11.65 POORLY CONTROLLED DIABETES MELLITUS (HCC): ICD-10-CM

## 2020-10-30 LAB
ALBUMIN SERPL-MCNC: 4.2 G/DL (ref 3.4–5)
ALBUMIN/GLOB SERPL: 1.1 {RATIO} (ref 0.8–1.7)
ALP SERPL-CCNC: 98 U/L (ref 45–117)
ALT SERPL-CCNC: 38 U/L (ref 16–61)
ANION GAP SERPL CALC-SCNC: 6 MMOL/L (ref 3–18)
AST SERPL-CCNC: 26 U/L (ref 10–38)
BILIRUB SERPL-MCNC: 0.6 MG/DL (ref 0.2–1)
BUN SERPL-MCNC: 18 MG/DL (ref 7–18)
BUN/CREAT SERPL: 14 (ref 12–20)
CALCIUM SERPL-MCNC: 9.8 MG/DL (ref 8.5–10.1)
CHLORIDE SERPL-SCNC: 109 MMOL/L (ref 100–111)
CO2 SERPL-SCNC: 27 MMOL/L (ref 21–32)
CREAT SERPL-MCNC: 1.25 MG/DL (ref 0.6–1.3)
EST. AVERAGE GLUCOSE BLD GHB EST-MCNC: 177 MG/DL
GLOBULIN SER CALC-MCNC: 3.9 G/DL (ref 2–4)
GLUCOSE SERPL-MCNC: 111 MG/DL (ref 74–99)
HBA1C MFR BLD: 7.8 % (ref 4.2–5.6)
POTASSIUM SERPL-SCNC: 4.3 MMOL/L (ref 3.5–5.5)
PROT SERPL-MCNC: 8.1 G/DL (ref 6.4–8.2)
SODIUM SERPL-SCNC: 142 MMOL/L (ref 136–145)

## 2020-10-30 PROCEDURE — 80053 COMPREHEN METABOLIC PANEL: CPT

## 2020-10-30 PROCEDURE — 36415 COLL VENOUS BLD VENIPUNCTURE: CPT

## 2020-10-30 PROCEDURE — 83036 HEMOGLOBIN GLYCOSYLATED A1C: CPT

## 2020-11-02 DIAGNOSIS — E11.40 TYPE 2 DIABETES MELLITUS WITH DIABETIC NEUROPATHY, WITHOUT LONG-TERM CURRENT USE OF INSULIN (HCC): Primary | ICD-10-CM

## 2020-11-02 RX ORDER — DULAGLUTIDE 1.5 MG/.5ML
1.5 INJECTION, SOLUTION SUBCUTANEOUS
Qty: 12 EACH | Refills: 3 | Status: SHIPPED | OUTPATIENT
Start: 2020-11-02 | End: 2021-12-15 | Stop reason: SDUPTHER

## 2020-11-02 NOTE — TELEPHONE ENCOUNTER
Pharmacy team received fax from [facility] regarding Trulicity refill information for [employer]. Sent in refill information per patient's request.     Thank you,  Maricruz Salguero.  Mir Rushing, PHARMD, [facility]    CLINICAL PHARMACY CONSULT: MED RECONCILIATION/REVIEW ADDENDUM    For Pharmacy Admin Tracking Only    PHSO: PHSO Patient?: Yes  Total # of Interventions Recommended: Count: 1  - Refills Provided #: 1  Total Interventions Accepted: 1  Time Spent (min): 15

## 2020-11-05 NOTE — PROGRESS NOTES
Contacted patient and verified identity using name and date of birth (2- identifiers)  SPoke with patient and advised improvement in A1c and to continue current plan of care.

## 2020-11-18 ENCOUNTER — VIRTUAL VISIT (OUTPATIENT)
Dept: FAMILY MEDICINE CLINIC | Age: 65
End: 2020-11-18
Payer: MEDICARE

## 2020-11-18 DIAGNOSIS — R09.89 RUNNY NOSE: ICD-10-CM

## 2020-11-18 DIAGNOSIS — L29.9 EAR ITCHING: ICD-10-CM

## 2020-11-18 DIAGNOSIS — Z12.5 SCREENING FOR PROSTATE CANCER: Primary | ICD-10-CM

## 2020-11-18 DIAGNOSIS — E11.65 POORLY CONTROLLED DIABETES MELLITUS (HCC): ICD-10-CM

## 2020-11-18 PROCEDURE — 99214 OFFICE O/P EST MOD 30 MIN: CPT | Performed by: FAMILY MEDICINE

## 2020-11-18 PROCEDURE — 2022F DILAT RTA XM EVC RTNOPTHY: CPT | Performed by: FAMILY MEDICINE

## 2020-11-18 PROCEDURE — G8756 NO BP MEASURE DOC: HCPCS | Performed by: FAMILY MEDICINE

## 2020-11-18 PROCEDURE — 3017F COLORECTAL CA SCREEN DOC REV: CPT | Performed by: FAMILY MEDICINE

## 2020-11-18 PROCEDURE — 3288F FALL RISK ASSESSMENT DOCD: CPT | Performed by: FAMILY MEDICINE

## 2020-11-18 PROCEDURE — 1100F PTFALLS ASSESS-DOCD GE2>/YR: CPT | Performed by: FAMILY MEDICINE

## 2020-11-18 PROCEDURE — G8427 DOCREV CUR MEDS BY ELIG CLIN: HCPCS | Performed by: FAMILY MEDICINE

## 2020-11-18 PROCEDURE — G9717 DOC PT DX DEP/BP F/U NT REQ: HCPCS | Performed by: FAMILY MEDICINE

## 2020-11-18 PROCEDURE — 3051F HG A1C>EQUAL 7.0%<8.0%: CPT | Performed by: FAMILY MEDICINE

## 2020-11-18 RX ORDER — MONTELUKAST SODIUM 10 MG/1
10 TABLET ORAL DAILY
Qty: 30 TAB | Refills: 0 | Status: SHIPPED | OUTPATIENT
Start: 2020-11-18 | End: 2020-11-18

## 2020-11-18 NOTE — PROGRESS NOTES
Emeka Woody is a 72 y.o. male who was seen by synchronous (real-time) audio-video technology on 11/18/2020 for No chief complaint on file. Assessment & Plan:   Diagnoses and all orders for this visit:    Screening for prostate cancer  -     PSA SCREENING (SCREENING); Future    Ear itching: Somatic treatment with Singulair. He has tried over-the-counter Sudafed and Claritin along with the nasal spray has been not helping.    -     Discontinue: montelukast (SINGULAIR) 10 mg tablet; Take 1 Tab by mouth daily. , Normal, Disp-30 Tab,R-0    Poorly controlled diabetes mellitus ([address]): Improving A1c from 9.6-7.8. Encourage him to continue lifestyle modification along with diet modification. Is being compliant with taking medications. No hypo or hyperglycemic symptoms. We will continue current plan. Foot exam when he comes next in person visit. Recheck labs before next visit.  -     HEMOGLOBIN A1C WITH EAG; Future  -     METABOLIC PANEL, COMPREHENSIVE; Future    Runny nose: Symptomatic treatment. Going on since 1 month. Taken over-the-counter Sudafed and Claritin along with nasal spray but not helping. Adding Singulair.-     Discontinue: montelukast (SINGULAIR) 10 mg tablet; Take 1 Tab by mouth daily. , Normal, Disp-30 Tab,R-0    Other orders  -     montelukast (SINGULAIR) 10 mg tablet; Take 1 Tab by mouth daily. , Normal, Disp-30 Tab,R-0    Patient understood and agreed with the plan  Review health maintenance  He has a coming up appointment Dr. Juan M Saldana tomorrow  He was advised to get the pneumonia shot from the pharmacy  He had taken flu shot but does not recall which clinic he had 1  Foot exam when he comes in person  Advised to get the shingles shot from the pharmacy 712  Subjective:   Done visit through doxy  Here for follow-up  No new concern  History of diabetes. Recent labs reviewed. A1c has improved from 9.6-7.6. No hypo or hyperglycemic symptoms.   Taking medication with compliance and no side effects. Working with compliance on diet modification. Discussed high BMI and discussed the importance of lifestyle modification along with diet and exercise. History of hypertension. Checking his blood pressure at home on and off. Said it stays between 1 65-6 40 systolic blood pressure and diastolic in 79X. He was asymptomatic during the visit and did not appear in any acute distress. Denies any headache, dizziness, no chest pain or trouble breathing, no arm or leg weakness. No nausea or vomiting, no weight or appetite changes, no mood changes . No urine or bowel complains, no palpitation, no diaphoresis. No abdominal pain. No cold or cough. No leg swelling. No fever. No sleep trouble. Lab Results   Component Value Date/Time    WBC 6.8 07/03/2020 09:16 AM    HGB 13.6 07/03/2020 09:16 AM    HCT 43.6 07/03/2020 09:16 AM    PLATELET 278 41/44/7081 09:16 AM    MCV 83.0 07/03/2020 09:16 AM     Lab Results   Component Value Date/Time    Sodium 142 10/30/2020 03:37 PM    Potassium 4.3 10/30/2020 03:37 PM    Chloride 109 10/30/2020 03:37 PM    CO2 27 10/30/2020 03:37 PM    Anion gap 6 10/30/2020 03:37 PM    Glucose 111 (H) 10/30/2020 03:37 PM    BUN 18 10/30/2020 03:37 PM    Creatinine 1.25 10/30/2020 03:37 PM    BUN/Creatinine ratio 14 10/30/2020 03:37 PM    GFR est AA >60 10/30/2020 03:37 PM    GFR est non-AA 58 (L) 10/30/2020 03:37 PM    Calcium 9.8 10/30/2020 03:37 PM    Bilirubin, total 0.6 10/30/2020 03:37 PM    Alk.  phosphatase 98 10/30/2020 03:37 PM    Protein, total 8.1 10/30/2020 03:37 PM    Albumin 4.2 10/30/2020 03:37 PM    Globulin 3.9 10/30/2020 03:37 PM    A-G Ratio 1.1 10/30/2020 03:37 PM    ALT (SGPT) 38 10/30/2020 03:37 PM    AST (SGOT) 26 10/30/2020 03:37 PM     Lab Results   Component Value Date/Time    Cholesterol, total 137 07/03/2020 09:16 AM    HDL Cholesterol 39 (L) 07/03/2020 09:16 AM    LDL, calculated 59 07/03/2020 09:16 AM    VLDL, calculated 39 07/03/2020 09:16 AM    Triglyceride 195 (H) 07/03/2020 09:16 AM    CHOL/HDL Ratio 3.5 07/03/2020 09:16 AM     Lab Results   Component Value Date/Time    TSH 1.25 07/03/2020 09:16 AM     Lab Results   Component Value Date/Time    Hemoglobin A1c 7.8 (H) 10/30/2020 03:37 PM    Hemoglobin A1c (POC) 8.1 09/13/2019 01:58 PM     Lab Results   Component Value Date/Time    Vitamin D 25-Hydroxy 38.6 12/07/2016 09:27 AM       Lab Results   Component Value Date/Time    Microalbumin/Creat ratio (mg/g creat) 26 07/03/2020 09:17 AM    MICROALBUMIN,MG/DAY 14.8 06/13/2016 08:43 AM    Microalbumin,urine random 4.06 (H) 07/03/2020 09:17 AM     Currently also complaining of the itchy ears and a runny nose. Going on since more than a month. No contact with the sick person or any Covid positive person. No fever. No sore throat. No shortness of breath or chest pain. No cold cough. Has tried over-the-counter Sudafed and Claritin but not helping. Also tried Flonase. Prior to Admission medications    Medication Sig Start Date End Date Taking? Authorizing Provider   montelukast (SINGULAIR) 10 mg tablet Take 1 Tab by mouth daily. 11/18/20  Yes Rita Muñoz MD   dulaglutide (Trulicity) 1.5 OJ/8.8 mL sub-q pen 0.5 mL by SubCUTAneous route every seven (7) days. 11/2/20  Yes Rita Muñoz MD   Accu-Chek Patricia Plus Meter misc USE AS DIRECTED TO CHECK BLOOD SUGAR (FOR DIABETES) 9/16/20  Yes Rita Muñoz MD   Jardiance 10 mg tablet TAKE 1 TABLET BY MOUTH DAILY 8/31/20  Yes Rita Muñoz MD   pramipexole (MIRAPEX) 0.75 mg tablet Takes 3 pills once a day 8/28/20  Yes Rita Muñoz MD   simvastatin (ZOCOR) 10 mg tablet Take 1 Tab by mouth nightly. 8/28/20  Yes Rita Muñoz MD   metoprolol succinate (TOPROL-XL) 50 mg XL tablet Take 1 Tab by mouth daily.  8/26/20  Yes Rita Muñoz MD   isosorbide mononitrate ER (IMDUR) 60 mg CR tablet Take 1 Tab by mouth every morning. 8/26/20  Yes Rita Muñoz MD   metFORMIN ER (GLUCOPHAGE XR) 500 mg tablet Take 2 Tabs by mouth two (2) times daily (with meals). 7/27/20  Yes Edouard Florentino MD   cpap machine kit by Does Not Apply route. Yes Provider, Historical   buPROPion SR (WELLBUTRIN SR) 150 mg SR tablet Take 1 Tab by mouth daily. 7/7/20  Yes Edouard Florentino MD   Blood-Glucose Meter monitoring kit Use to check blood sugar 3 times daily. Dx E11.9. Please dispense brand preferred by insurance. 6/17/20  Yes Edouard Florentino MD   glucose blood VI test strips (ASCENSIA AUTODISC VI, ONE TOUCH ULTRA TEST VI) strip Use to check blood sugar 3 times daily. Dx E11.9. Please dispense brand preferred by insurance. 6/17/20  Yes Edouard Florentino MD   lancets misc Use to check blood sugar 3 times daily. Dx E11.9. Please dispense brand preferred by insurance. 6/17/20  Yes Edouard Florentino MD   folic acid/multivit-min/lutein (CENTRUM SILVER PO) Take  by mouth. Yes Provider, Historical   lisinopriL (PRINIVIL, ZESTRIL) 20 mg tablet Take 1 Tab by mouth daily. 5/18/20  Yes Edouard Florentino MD   Syringe, Disposable, 3 mL syrg Take as directed 4/15/20  Yes Antonia Mota MD   Needle, Disp, 23 G 23 gauge x 1 1/4\" ndle Use to inject 0.5 ml every 7 days 4/15/20  Yes Antonia Mota MD   Needle, Disp, 18 G 18 gauge x 1\" ndle Use to aspirate 0.5 ml every 7 days 4/15/20  Yes Antonia Mota MD   Wheel Chair maura Assistance with ambulation, gait instability 2/28/20  Yes Trista Julian NP   polyethylene glycol (MIRALAX) 17 gram packet Take 1 Packet by mouth daily. Hold for loose stools  Patient taking differently: Take 17 g by mouth daily as needed. Hold for loose stools 1/18/19  Yes Kris VO NP   montelukast (SINGULAIR) 10 mg tablet Take 1 Tab by mouth daily. 11/18/20 11/18/20  Edouard Florentino MD   testosterone cypionate (DEPOTESTOTERONE CYPIONATE) 200 mg/mL injection 100 mg.  Injects 100 mg subcutaneously into stomach once every 2 weeks 4/15/20 11/18/20  Provider, Historical   ALPRAZolam Shonna Stevens) 0.5 mg tablet Take 1 Tab by mouth two (2) times daily as needed for Anxiety. Max Daily Amount: 1 mg. 9/13/19   Miles Dixon NP   zolpidem (AMBIEN) 10 mg tablet Take 1 Tab by mouth nightly. Max Daily Amount: 10 mg. Patient taking differently: Take 10 mg by mouth nightly as needed. 9/13/19 11/18/20  Miles Dixon NP   naloxone 4 mg/actuation spry 4 mg by Nasal route as needed. 5/4/17   Pooja Hickman MD   Back Brace misc 1 Device by Does Not Apply route daily as needed for Pain.  One, lumbosacral orthosis/back brace with metal struts      Medically necessary 3/16/16   Pooja Hickman MD     Patient Active Problem List    Diagnosis Date Noted    Type 2 diabetes mellitus with diabetic neuropathy ([facility] 05/10/2019    Severe obesity ([facility] 04/26/2019    Kidney stone 02/25/2019    Sepsis ([facility] 01/14/2019    UTI (urinary tract infection) 01/14/2019    Status post insertion of spinal cord stimulator 09/14/2017    Chronic pain syndrome 06/15/2017    Lumbar post-laminectomy syndrome 06/15/2017    Lumbar and sacral osteoarthritis 06/15/2017    Anemia 12/07/2016    Advanced care planning/counseling discussion 08/08/2016    Chronic midline low back pain with sciatica 05/16/2016    History of depression 06/02/2015    Thoracic or lumbosacral neuritis or radiculitis, unspecified 01/26/2015    Postlaminectomy syndrome, lumbar region 01/26/2015    DDD (degenerative disc disease), lumbar 07/30/2014    Lumbar facet arthropathy 07/30/2014    S/P lumbar laminectomy 07/30/2014    S/P lumbar spinal fusion 07/30/2014    Lumbar nerve root impingement 07/30/2014    Lumbosacral radiculopathy at S1 07/30/2014    DDD (degenerative disc disease), thoracic 07/30/2014    Thoracic compression fracture (HCC) 07/30/2014    Back muscle spasm 07/30/2014    Major depression 07/30/2014    TRUE (generalized anxiety disorder) 07/30/2014    Abdominal adhesions 07/30/2014    Constipation, chronic 07/30/2014    Insomnia secondary to chronic pain 07/30/2014    MANISH (obstructive sleep apnea) 07/30/2014    DM (diabetes mellitus) ([facility] 07/30/2014    HTN (hypertension) 07/30/2014    Hyperlipidemia 07/30/2014    Frequent falls 07/30/2014     Current Outpatient Medications   Medication Sig Dispense Refill    montelukast (SINGULAIR) 10 mg tablet Take 1 Tab by mouth daily. 30 Tab 0    dulaglutide (Trulicity) 1.5 OO/7.6 mL sub-q pen 0.5 mL by SubCUTAneous route every seven (7) days. 12 Each 3    Accu-Chek Patricia Plus Meter misc USE AS DIRECTED TO CHECK BLOOD SUGAR (FOR DIABETES) 1 Each 0    Jardiance 10 mg tablet TAKE 1 TABLET BY MOUTH DAILY 90 Tab 1    pramipexole (MIRAPEX) 0.75 mg tablet Takes 3 pills once a day 270 Tab 1    simvastatin (ZOCOR) 10 mg tablet Take 1 Tab by mouth nightly. 90 Tab 1    metoprolol succinate (TOPROL-XL) 50 mg XL tablet Take 1 Tab by mouth daily. 90 Tab 1    isosorbide mononitrate ER (IMDUR) 60 mg CR tablet Take 1 Tab by mouth every morning. 90 Tab 1    metFORMIN ER (GLUCOPHAGE XR) 500 mg tablet Take 2 Tabs by mouth two (2) times daily (with meals). 360 Tab 1    cpap machine kit by Does Not Apply route.  buPROPion SR (WELLBUTRIN SR) 150 mg SR tablet Take 1 Tab by mouth daily. 90 Tab 1    Blood-Glucose Meter monitoring kit Use to check blood sugar 3 times daily. Dx E11.9. Please dispense brand preferred by insurance. 1 Kit 0    glucose blood VI test strips (ASCENSIA AUTODISC VI, ONE TOUCH ULTRA TEST VI) strip Use to check blood sugar 3 times daily. Dx E11.9. Please dispense brand preferred by insurance. 100 Strip 11    lancets misc Use to check blood sugar 3 times daily. Dx E11.9. Please dispense brand preferred by insurance. 064 Each 11    folic acid/multivit-min/lutein (CENTRUM SILVER PO) Take  by mouth.  lisinopriL (PRINIVIL, ZESTRIL) 20 mg tablet Take 1 Tab by mouth daily.  30 Tab 1    Syringe, Disposable, 3 mL syrg Take as directed 30 Syringe 1    Needle, Disp, 23 G 23 gauge x 1 1/4\" ndle Use to inject 0.5 ml every 7 days 30 Pen Needle 1    Needle, Disp, 18 G 18 gauge x 1\" ndle Use to aspirate 0.5 ml every 7 days 30 Pen Needle 1    Wheel Chair maura Assistance with ambulation, gait instability 1 Each 0    polyethylene glycol (MIRALAX) 17 gram packet Take 1 Packet by mouth daily. Hold for loose stools (Patient taking differently: Take 17 g by mouth daily as needed. Hold for loose stools) 10 Each 0    ALPRAZolam (XANAX) 0.5 mg tablet Take 1 Tab by mouth two (2) times daily as needed for Anxiety. Max Daily Amount: 1 mg. 30 Tab 0    naloxone 4 mg/actuation spry 4 mg by Nasal route as needed. 1 Box 0    Back Brace misc 1 Device by Does Not Apply route daily as needed for Pain.  One, lumbosacral orthosis/back brace with metal struts      Medically necessary 1 Device 0     Allergies   Allergen Reactions    Bactrim [Sulfamethoprim] Rash    Opana [Oxymorphone] Other (comments)     Feels like bug crawling under skin and drowziness     Past Medical History:   Diagnosis Date    Abdominal adhesions 7/30/2014    Back muscle spasm 7/30/2014    Back pain, lumbosacral     BPH (benign prostatic hyperplasia)     Cellulitis     Constipation, chronic 7/30/2014    DDD (degenerative disc disease), lumbar 7/30/2014    DDD (degenerative disc disease), thoracic 7/30/2014    Depression     Diabetes [facility]     DM (diabetes mellitus) ([facility] 7/30/2014    Frequent falls 7/30/2014    TRUE (generalized anxiety disorder) 7/30/2014    High cholesterol     HTN (hypertension) 7/30/2014    Hydronephrosis     Hyperlipidemia 7/30/2014    Hypertension     Hypogonadism in male     Incomplete bladder emptying     Insomnia secondary to chronic pain 7/30/2014    Kidney stones     LBP radiating to both legs 7/30/2014    Lumbar facet arthropathy 7/30/2014    Lumbar nerve root impingement 7/30/2014    Lumbosacral radiculopathy at S1 7/30/2014    Major depression 7/30/2014    Nausea & vomiting     Neurological disorder     sciatica    MANISH (obstructive sleep apnea) 7/30/2014    S/P lumbar laminectomy 7/30/2014    S/P lumbar spinal fusion 7/30/2014    Small vessel disease (HCC)     Thoracic compression fracture ([facility] 07/30/2014    Unspecified sleep apnea     USES CPAP EVERY NIGHT     Social History     Tobacco Use    Smoking status: Never Smoker    Smokeless tobacco: Never Used   Substance Use Topics    Alcohol use: Yes       ROS: See HPI    Objective:     Patient-Reported Vitals 8/20/2020   Patient-Reported Weight -   Patient-Reported Height 62   Patient-Reported Pulse 78   Patient-Reported Temperature 97.6   Patient-Reported SpO2 -   Patient-Reported Systolic  380   Patient-Reported Diastolic 84      General: alert, cooperative, no distress   Mental  status: normal mood, behavior, speech, dress, motor activity, and thought processes, able to follow commands   HENT: NCAT   Neck: no visualized mass   Resp: no respiratory distress   Neuro: no gross deficits   Skin: no discoloration or lesions of concern on visible areas   Psychiatric: normal affect, consistent with stated mood, no evidence of hallucinations     Additional exam findings: We discussed the expected course, resolution and complications of the diagnosis(es) in detail. Medication risks, benefits, costs, interactions, and alternatives were discussed as indicated. I advised him to contact the office if his condition worsens, changes or fails to improve as anticipated. He expressed understanding with the diagnosis(es) and plan. Suman William, who was evaluated through a patient-initiated, synchronous (real-time) audio-video encounter, and/or his healthcare decision maker, is aware that it is a billable service, with coverage as determined by his insurance carrier. He provided verbal consent to proceed: Yes, and patient identification was verified.  It was conducted pursuant to the emergency declaration under the [facility] [address] waiver authority and the [employer] and [employer] Act. A caregiver was present when appropriate. Ability to conduct physical exam was limited. I was in the office. The patient was at home.       Eve Duckworth MD

## 2020-11-20 RX ORDER — MONTELUKAST SODIUM 10 MG/1
TABLET ORAL
Qty: 90 TAB | Refills: 0 | Status: SHIPPED | OUTPATIENT
Start: 2020-11-20 | End: 2020-12-28

## 2020-12-23 DIAGNOSIS — E11.9 DIABETES MELLITUS WITHOUT COMPLICATION (HCC): ICD-10-CM

## 2020-12-23 RX ORDER — METFORMIN HYDROCHLORIDE 500 MG/1
TABLET, EXTENDED RELEASE ORAL
Qty: 360 TAB | Refills: 1 | Status: SHIPPED | OUTPATIENT
Start: 2020-12-23 | End: 2021-05-21

## 2020-12-29 ENCOUNTER — DOCUMENTATION ONLY (OUTPATIENT)
Dept: INTERNAL MEDICINE CLINIC | Age: 65
End: 2020-12-29

## 2020-12-29 NOTE — PROGRESS NOTES
Patient assistance forms for Jardiance and Trulicity dropped off to PCP's office for signing and faxing. Will follow-up with patient to ensure he completes and submits patient portion as well. Thank you,  Kev Smith.  Eunice Ge, SHAWND, [facility]      CLINICAL PHARMACY CONSULT: MED RECONCILIATION/REVIEW ADDENDUM    For Pharmacy Admin Tracking Only    PHSO: PHSO Patient?: Yes  Total # of Interventions Recommended: Count: 2  - Medication Access Assistance x 2  Total Interventions Accepted: 2  Time Spent (min): 20

## 2021-01-06 ENCOUNTER — VIRTUAL VISIT (OUTPATIENT)
Dept: INTERNAL MEDICINE CLINIC | Age: 66
End: 2021-01-06

## 2021-01-06 DIAGNOSIS — E11.40 TYPE 2 DIABETES MELLITUS WITH DIABETIC NEUROPATHY, WITHOUT LONG-TERM CURRENT USE OF INSULIN (HCC): Primary | ICD-10-CM

## 2021-01-07 NOTE — PROGRESS NOTES
Pharmacy Progress Note - Diabetes Management    S/O: Mr. Rachel Burnett is a 72 y.o. male, referred by Dr. Charly Crum MD, was contacted today for diabetes management follow up. Patient's last A1c was 7.8% in October 2020. This is a decrease from 9.6% in July 2020. Current anti-hyperglycemic regimen include(s):    - Jardiance 10 mg daily  - Trulicity 1.5 mg once weekly  - Metformin ER 1000 mg BID    Patient will be dropping off patient portion of patient assistance forms to Pharm[employer] tomorrow for faxing. ROS:  Today, Pt endorses:  - Symptoms of Hyperglycemia: none  - Symptoms of Hypoglycemia: none    Self Monitoring Blood Glucose (SMBG) or CGM:  - Conducts/scans: 1-2x daily    Date Fasting  Post-Prandial   27-Dec 139    28-Dec 141    29-Dec 129    30-Dec 147    31-Dec 122    1-Jan 124    2-Jan 140 143   3-Jan 112 157   4-Jan 142 167   5-Jan 130    6-Jan 132        Nutrition/Lifestyle Modifications:  - Reports he has been off of his usual schedule   - States that he may have had more sweets/carbs over the holidays but generally tries to limit carbs as much as possible  - Also states that wife has been in and out of the hospital over the past few months, which caused him to eat more fast food since it was more convenient   - States he is getting back on track now       Vitals:   Wt Readings from Last 3 Encounters:   06/09/20 317 lb 6.4 oz (144 kg)   05/18/20 310 lb (140.6 kg)   12/10/19 321 lb (145.6 kg)     BP Readings from Last 3 Encounters:   05/18/20 138/80   12/10/19 168/79   12/09/19 141/75     Pulse Readings from Last 3 Encounters:   05/18/20 72   12/10/19 83   12/09/19 85       Past Medical History:   Diagnosis Date    Abdominal adhesions 7/30/2014    Back muscle spasm 7/30/2014    Back pain, lumbosacral     BPH (benign prostatic hyperplasia)     Cellulitis     Constipation, chronic 7/30/2014    DDD (degenerative disc disease), lumbar 7/30/2014    DDD (degenerative disc disease), thoracic 7/30/2014    Depression     Diabetes ([facility] [address]     DM (diabetes mellitus) ([facility] [address] 7/30/2014    Frequent falls 7/30/2014    TRUE (generalized anxiety disorder) 7/30/2014    High cholesterol     HTN (hypertension) 7/30/2014    Hydronephrosis     Hyperlipidemia 7/30/2014    Hypertension     Hypogonadism in male     Incomplete bladder emptying     Insomnia secondary to chronic pain 7/30/2014    Kidney stones     LBP radiating to both legs 7/30/2014    Lumbar facet arthropathy 7/30/2014    Lumbar nerve root impingement 7/30/2014    Lumbosacral radiculopathy at S1 7/30/2014    Major depression 7/30/2014    Nausea & vomiting     Neurological disorder     sciatica    MANISH (obstructive sleep apnea) 7/30/2014    S/P lumbar laminectomy 7/30/2014    S/P lumbar spinal fusion 7/30/2014    Small vessel disease (HCC)     Thoracic compression fracture (HCC) 07/30/2014    Unspecified sleep apnea     USES CPAP EVERY NIGHT     Allergies   Allergen Reactions    Bactrim [Sulfamethoprim] Rash    Opana [Oxymorphone] Other (comments)     Feels like bug crawling under skin and drowziness       Current Outpatient Medications   Medication Sig    montelukast (SINGULAIR) 10 mg tablet TAKE 1 TABLET BY MOUTH DAILY    metFORMIN ER (GLUCOPHAGE XR) 500 mg tablet TAKE 2 TABLETS TWICE DAILY WITH MEALS    dulaglutide (Trulicity) 1.5 NB/4.5 mL sub-q pen 0.5 mL by SubCUTAneous route every seven (7) days.  Accu-Chek Patricia Plus Meter misc USE AS DIRECTED TO CHECK BLOOD SUGAR (FOR DIABETES)    Jardiance 10 mg tablet TAKE 1 TABLET BY MOUTH DAILY    pramipexole (MIRAPEX) 0.75 mg tablet Takes 3 pills once a day    simvastatin (ZOCOR) 10 mg tablet Take 1 Tab by mouth nightly.  metoprolol succinate (TOPROL-XL) 50 mg XL tablet Take 1 Tab by mouth daily.  isosorbide mononitrate ER (IMDUR) 60 mg CR tablet Take 1 Tab by mouth every morning.  cpap machine kit by Does Not Apply route.     buPROPion SR (WELLBUTRIN SR) 150 mg SR tablet Take 1 Tab by mouth daily.  Blood-Glucose Meter monitoring kit Use to check blood sugar 3 times daily. Dx E11.9. Please dispense brand preferred by insurance.  glucose blood VI test strips (ASCENSIA AUTODISC VI, ONE TOUCH ULTRA TEST VI) strip Use to check blood sugar 3 times daily. Dx E11.9. Please dispense brand preferred by insurance.  lancets misc Use to check blood sugar 3 times daily. Dx E11.9. Please dispense brand preferred by insurance.  folic acid/multivit-min/lutein (CENTRUM SILVER PO) Take  by mouth.  lisinopriL (PRINIVIL, ZESTRIL) 20 mg tablet Take 1 Tab by mouth daily.  Syringe, Disposable, 3 mL syrg Take as directed    Needle, Disp, 23 G 23 gauge x 1 1/4\" ndle Use to inject 0.5 ml every 7 days    Needle, Disp, 18 G 18 gauge x 1\" ndle Use to aspirate 0.5 ml every 7 days   HelenaiSkoot Chair maura Assistance with ambulation, gait instability    ALPRAZolam (XANAX) 0.5 mg tablet Take 1 Tab by mouth two (2) times daily as needed for Anxiety. Max Daily Amount: 1 mg.  polyethylene glycol (MIRALAX) 17 gram packet Take 1 Packet by mouth daily. Hold for loose stools (Patient taking differently: Take 17 g by mouth daily as needed. Hold for loose stools)    naloxone 4 mg/actuation spry 4 mg by Nasal route as needed.  Back Brace misc 1 Device by Does Not Apply route daily as needed for Pain. One, lumbosacral orthosis/back brace with metal struts      Medically necessary     No current facility-administered medications for this visit.         Lab Results   Component Value Date/Time    Sodium 142 10/30/2020 03:37 PM    Potassium 4.3 10/30/2020 03:37 PM    Chloride 109 10/30/2020 03:37 PM    CO2 27 10/30/2020 03:37 PM    Anion gap 6 10/30/2020 03:37 PM    Glucose 111 (H) 10/30/2020 03:37 PM    BUN 18 10/30/2020 03:37 PM    Creatinine 1.25 10/30/2020 03:37 PM    BUN/Creatinine ratio 14 10/30/2020 03:37 PM    GFR est AA >60 10/30/2020 03:37 PM    GFR est non-AA 58 (L) 10/30/2020 03:37 PM Calcium 9.8 10/30/2020 03:37 PM    Bilirubin, total 0.6 10/30/2020 03:37 PM    Alk. phosphatase 98 10/30/2020 03:37 PM    Protein, total 8.1 10/30/2020 03:37 PM    Albumin 4.2 10/30/2020 03:37 PM    Globulin 3.9 10/30/2020 03:37 PM    A-G Ratio 1.1 10/30/2020 03:37 PM    ALT (SGPT) 38 10/30/2020 03:37 PM       Lab Results   Component Value Date/Time    Cholesterol, total 137 07/03/2020 09:16 AM    HDL Cholesterol 39 (L) 07/03/2020 09:16 AM    LDL, calculated 59 07/03/2020 09:16 AM    VLDL, calculated 39 07/03/2020 09:16 AM    Triglyceride 195 (H) 07/03/2020 09:16 AM    CHOL/HDL Ratio 3.5 07/03/2020 09:16 AM       Lab Results   Component Value Date/Time    WBC 6.8 07/03/2020 09:16 AM    HGB 13.6 07/03/2020 09:16 AM    HCT 43.6 07/03/2020 09:16 AM    PLATELET 050 34/98/5512 09:16 AM    MCV 83.0 07/03/2020 09:16 AM       Lab Results   Component Value Date/Time    Microalbumin/Creat ratio (mg/g creat) 26 07/03/2020 09:17 AM    MICROALBUMIN,MG/DAY 14.8 06/13/2016 08:43 AM    Microalbumin,urine random 4.06 (H) 07/03/2020 09:17 AM       HbA1c:  Lab Results   Component Value Date/Time    Hemoglobin A1c 7.8 (H) 10/30/2020 03:37 PM    Hemoglobin A1c (POC) 8.1 09/13/2019 01:58 PM     No components found for: 2     Last Point of Care HGB A1C  Hemoglobin A1c (POC)   Date Value Ref Range Status   09/13/2019 8.1 % Final        CrCl cannot be calculated (Unknown ideal weight. ). A/P:    Diabetes Management:  - Per ADA guidelines, Pt's A1c is not at goal of < 7%, but it is much improved  - Current SMBG(s)/CGM trend is at/near goal. Some fasting readings are little higher than goals likely due to carb-heavy meals the night before. Patient will continue to watch diet   - No changes to medications today.  Patient states that he is planning to get A1c re-checked next month   - If extra blood sugar support needed, can increase Jardiance to 25 mg once daily at that time  - Will follow up with patient once patient assistance applications are filed and approved     Medication reconciliation was completed during the visit. There are no discontinued medications. Notifications of recommendations will be sent to Dr. Bashir Herrmann MD for review. Thank you,  Felisha Byrnes.  Luis Lerma, SHAWND, [facility]       CLINICAL PHARMACY CONSULT: MED RECONCILIATION/REVIEW ADDENDUM    For Pharmacy Admin Tracking Only    PHSO: PHSO Patient?: Yes  Total # of Interventions Recommended: Count: 0  Total Interventions Accepted: 0  Time Spent (min): 30

## 2021-01-22 DIAGNOSIS — I10 ESSENTIAL HYPERTENSION: ICD-10-CM

## 2021-01-22 RX ORDER — METOPROLOL SUCCINATE 50 MG/1
TABLET, EXTENDED RELEASE ORAL
Qty: 90 TAB | Refills: 1 | Status: SHIPPED | OUTPATIENT
Start: 2021-01-22 | End: 2021-06-21

## 2021-01-24 RX ORDER — PRAMIPEXOLE DIHYDROCHLORIDE 0.75 MG/1
TABLET ORAL
Qty: 270 TAB | Refills: 1 | Status: SHIPPED | OUTPATIENT
Start: 2021-01-24 | End: 2021-06-21

## 2021-02-07 DIAGNOSIS — I10 ESSENTIAL HYPERTENSION: ICD-10-CM

## 2021-02-08 RX ORDER — BUPROPION HYDROCHLORIDE 150 MG/1
TABLET, EXTENDED RELEASE ORAL
Qty: 90 TAB | Refills: 1 | Status: SHIPPED | OUTPATIENT
Start: 2021-02-08 | End: 2021-07-07

## 2021-02-08 RX ORDER — ISOSORBIDE MONONITRATE 60 MG/1
TABLET, EXTENDED RELEASE ORAL
Qty: 90 TAB | Refills: 1 | Status: SHIPPED | OUTPATIENT
Start: 2021-02-08 | End: 2021-07-07

## 2021-02-08 RX ORDER — SIMVASTATIN 10 MG/1
TABLET, FILM COATED ORAL
Qty: 90 TAB | Refills: 1 | Status: SHIPPED | OUTPATIENT
Start: 2021-02-08 | End: 2021-07-07

## 2021-02-12 ENCOUNTER — HOSPITAL ENCOUNTER (OUTPATIENT)
Dept: LAB | Age: 66
Discharge: HOME | End: 2021-02-12
Payer: MEDICARE

## 2021-02-12 DIAGNOSIS — Z12.5 SCREENING FOR PROSTATE CANCER: ICD-10-CM

## 2021-02-12 DIAGNOSIS — E11.65 POORLY CONTROLLED DIABETES MELLITUS (HCC): ICD-10-CM

## 2021-02-12 LAB
ALBUMIN SERPL-MCNC: 4.1 G/DL (ref 3.4–5)
ALBUMIN/GLOB SERPL: 1.2 {RATIO} (ref 0.8–1.7)
ALP SERPL-CCNC: 95 U/L (ref 45–117)
ALT SERPL-CCNC: 36 U/L (ref 16–61)
ANION GAP SERPL CALC-SCNC: 5 MMOL/L (ref 3–18)
AST SERPL-CCNC: 21 U/L (ref 10–38)
BILIRUB SERPL-MCNC: 0.4 MG/DL (ref 0.2–1)
BUN SERPL-MCNC: 19 MG/DL (ref 7–18)
BUN/CREAT SERPL: 16 (ref 12–20)
CALCIUM SERPL-MCNC: 8.8 MG/DL (ref 8.5–10.1)
CHLORIDE SERPL-SCNC: 109 MMOL/L (ref 100–111)
CO2 SERPL-SCNC: 29 MMOL/L (ref 21–32)
CREAT SERPL-MCNC: 1.16 MG/DL (ref 0.6–1.3)
EST. AVERAGE GLUCOSE BLD GHB EST-MCNC: 186 MG/DL
GLOBULIN SER CALC-MCNC: 3.5 G/DL (ref 2–4)
GLUCOSE SERPL-MCNC: 134 MG/DL (ref 74–99)
HBA1C MFR BLD: 8.1 % (ref 4.2–5.6)
POTASSIUM SERPL-SCNC: 4.6 MMOL/L (ref 3.5–5.5)
PROT SERPL-MCNC: 7.6 G/DL (ref 6.4–8.2)
PSA SERPL-MCNC: 4.3 NG/ML (ref 0–4)
SODIUM SERPL-SCNC: 143 MMOL/L (ref 136–145)

## 2021-02-12 PROCEDURE — 80053 COMPREHEN METABOLIC PANEL: CPT

## 2021-02-12 PROCEDURE — 83036 HEMOGLOBIN GLYCOSYLATED A1C: CPT

## 2021-02-12 PROCEDURE — 84153 ASSAY OF PSA TOTAL: CPT

## 2021-02-12 PROCEDURE — 36415 COLL VENOUS BLD VENIPUNCTURE: CPT

## 2021-02-12 NOTE — PROGRESS NOTES
Elevated PSA. Please send this result to urology and advise pt to make a follow up appt with them. Also worsening of diabetes. Will send him to endo as well. Further discussion on follow up visit.

## 2021-02-17 ENCOUNTER — VIRTUAL VISIT (OUTPATIENT)
Dept: FAMILY MEDICINE CLINIC | Age: 66
End: 2021-02-17
Payer: MEDICARE

## 2021-02-17 DIAGNOSIS — E66.01 SEVERE OBESITY (HCC): ICD-10-CM

## 2021-02-17 DIAGNOSIS — E11.65 POORLY CONTROLLED DIABETES MELLITUS (HCC): ICD-10-CM

## 2021-02-17 DIAGNOSIS — E11.40 TYPE 2 DIABETES MELLITUS WITH DIABETIC NEUROPATHY, WITHOUT LONG-TERM CURRENT USE OF INSULIN (HCC): ICD-10-CM

## 2021-02-17 DIAGNOSIS — L29.9 EAR ITCHING: ICD-10-CM

## 2021-02-17 DIAGNOSIS — F33.9 EPISODE OF RECURRENT MAJOR DEPRESSIVE DISORDER, UNSPECIFIED DEPRESSION EPISODE SEVERITY (HCC): ICD-10-CM

## 2021-02-17 DIAGNOSIS — R97.20 ELEVATED PSA: Primary | ICD-10-CM

## 2021-02-17 PROCEDURE — 3288F FALL RISK ASSESSMENT DOCD: CPT | Performed by: FAMILY MEDICINE

## 2021-02-17 PROCEDURE — G9717 DOC PT DX DEP/BP F/U NT REQ: HCPCS | Performed by: FAMILY MEDICINE

## 2021-02-17 PROCEDURE — 3017F COLORECTAL CA SCREEN DOC REV: CPT | Performed by: FAMILY MEDICINE

## 2021-02-17 PROCEDURE — G8427 DOCREV CUR MEDS BY ELIG CLIN: HCPCS | Performed by: FAMILY MEDICINE

## 2021-02-17 PROCEDURE — 99214 OFFICE O/P EST MOD 30 MIN: CPT | Performed by: FAMILY MEDICINE

## 2021-02-17 PROCEDURE — 2022F DILAT RTA XM EVC RTNOPTHY: CPT | Performed by: FAMILY MEDICINE

## 2021-02-17 PROCEDURE — 3052F HG A1C>EQUAL 8.0%<EQUAL 9.0%: CPT | Performed by: FAMILY MEDICINE

## 2021-02-17 PROCEDURE — 1100F PTFALLS ASSESS-DOCD GE2>/YR: CPT | Performed by: FAMILY MEDICINE

## 2021-02-17 PROCEDURE — G8756 NO BP MEASURE DOC: HCPCS | Performed by: FAMILY MEDICINE

## 2021-02-17 NOTE — PROGRESS NOTES
Shantell Cohen is a 72 y.o. male who was seen by synchronous (real-time) audio-video technology on 2/17/2021 for Diabetes, Hypertension, Depression, and Labs (Elevated PSA)        Assessment & Plan:   Diagnoses and all orders for this visit:    Elevated PSA: Denies any urine discomfort. For now elevated PSA. We will send him to urology for further evaluation  -     REFERRAL TO UROLOGY    Episode of recurrent major depressive disorder, unspecified depression episode severity ([facility]): Fairly stable on current medications. We will continue current plan. No panic attacks or any major sleep concern. Currently her wife has been sick in and out of the hospital.  Will observe and follow-up next visit    Poorly controlled diabetes mellitus ([facility] [address]): Elevated A1c compared to before. He has been following pharmacist with diabetic education and following the recommendation. Citlalli Cabrera has been not much compliant with the diet due to the social situation. He will work on it again. We will go up on her Jardiance dose and hold off on endocrine referral at this time as social situation will not permit it happen. If no improvement in A1c with increasing Jardiance dose and home blood sugar log then will consider Endo referral  -     Cancel: REFERRAL TO ENDOCRINOLOGY    Severe obesity ([facility] [address]): Discussed the diet modification, exercise and importance of weight loss    Type 2 diabetes mellitus with diabetic neuropathy, without long-term current use of insulin ([facility] [address]): See above  -     empagliflozin (Jardiance) 25 mg tablet; Take 1 Tab by mouth daily. , Normal, Disp-90 Tab, R-1    Ear itching: Has been going on and off since 1 month. No trouble hearing. No ear ache. Limitation in examining ears due to virtual visit. For now advised OTC antiitch drops. At this time he has been taking Singulair but not been helping much. No cold or cough.   Will observe and if no improvement will consider ENT referral    Patient understood and agreed with the plan  Next follow-up in person visit    Please note that this dictation was completed with [employer], the computer voice recognition software. Quite often unanticipated grammatical, syntax, homophones, and other interpretive errors are inadvertently transcribed by the computer software. Please disregard these errors. Please excuse any errors that have escaped final proofreading. [phone]  Subjective:   Here for follow-up. Done visit through doxy  Here for lab result discussion  Elevated A1c compared to before. Has been compliant with her medication but noncompliant with the diet. Not checking blood sugar at this time. No hyper or hypoglycemic symptoms. During the visit sitting comfortable and did not appear in any acute distress. Said wife has been sick and in and out of the hospital that has bring some emotional stress. Also noncompliant with the diet. Denies any headache, dizziness, no chest pain or trouble breathing, no arm or leg weakness. No nausea or vomiting, no weight or appetite changes, no mood changes . No urine or bowel complains, no palpitation, no diaphoresis. No abdominal pain. No cold or cough. No leg swelling. No fever. No sleep trouble. H/o depression. Currently on medication. Some mood up-and-down due to her wife being sick. Denies any thoughts of hurting himself or others. Sleep is fair. Reviewed her labs. Elevated PSA. Denies any urine discomfort. No burning urgency or frequency. No abdominal pain. Agreed to go to urology. Ear itching. No trouble hearing. Going on since 1 month. Try to take the Singulair but not helping either. For now advised OTC antiallergy drops. Will follow up if no improvement. Reviewed recent labs.   Lab Results   Component Value Date/Time    WBC 6.8 07/03/2020 09:16 AM    HGB 13.6 07/03/2020 09:16 AM    HCT 43.6 07/03/2020 09:16 AM    PLATELET 028 43/55/0646 09:16 AM    MCV 83.0 07/03/2020 09:16 AM     Lab Results   Component Value Date/Time    Sodium 143 02/12/2021 09:37 AM    Potassium 4.6 02/12/2021 09:37 AM    Chloride 109 02/12/2021 09:37 AM    CO2 29 02/12/2021 09:37 AM    Anion gap 5 02/12/2021 09:37 AM    Glucose 134 (H) 02/12/2021 09:37 AM    BUN 19 (H) 02/12/2021 09:37 AM    Creatinine 1.16 02/12/2021 09:37 AM    BUN/Creatinine ratio 16 02/12/2021 09:37 AM    GFR est AA >60 02/12/2021 09:37 AM    GFR est non-AA >60 02/12/2021 09:37 AM    Calcium 8.8 02/12/2021 09:37 AM    Bilirubin, total 0.4 02/12/2021 09:37 AM    Alk. phosphatase 95 02/12/2021 09:37 AM    Protein, total 7.6 02/12/2021 09:37 AM    Albumin 4.1 02/12/2021 09:37 AM    Globulin 3.5 02/12/2021 09:37 AM    A-G Ratio 1.2 02/12/2021 09:37 AM    ALT (SGPT) 36 02/12/2021 09:37 AM    AST (SGOT) 21 02/12/2021 09:37 AM     Lab Results   Component Value Date/Time    Cholesterol, total 137 07/03/2020 09:16 AM    HDL Cholesterol 39 (L) 07/03/2020 09:16 AM    LDL, calculated 59 07/03/2020 09:16 AM    VLDL, calculated 39 07/03/2020 09:16 AM    Triglyceride 195 (H) 07/03/2020 09:16 AM    CHOL/HDL Ratio 3.5 07/03/2020 09:16 AM     Lab Results   Component Value Date/Time    TSH 1.25 07/03/2020 09:16 AM     Lab Results   Component Value Date/Time    Hemoglobin A1c 8.1 (H) 02/12/2021 09:37 AM    Hemoglobin A1c (POC) 8.1 09/13/2019 01:58 PM     Lab Results   Component Value Date/Time    Vitamin D 25-Hydroxy 38.6 12/07/2016 09:27 AM       Lab Results   Component Value Date/Time    Microalbumin/Creat ratio (mg/g creat) 26 07/03/2020 09:17 AM    MICROALBUMIN,MG/DAY 14.8 06/13/2016 08:43 AM    Microalbumin,urine random 4.06 (H) 07/03/2020 09:17 AM         Prior to Admission medications    Medication Sig Start Date End Date Taking? Authorizing Provider   empagliflozin (Jardiance) 25 mg tablet Take 1 Tab by mouth daily.  2/17/21  Yes Chloé Gonzalez MD   buPROPion SR [facility]) 150 mg SR tablet TAKE 1 TABLET EVERY DAY 2/8/21  Yes Chloé Gonzalez MD   isosorbide mononitrate ER (IMDUR) 60 mg CR tablet TAKE 1 TABLET EVERY MORNING 2/8/21  Yes Mike Rodriguez MD   simvastatin (ZOCOR) 10 mg tablet TAKE 1 TABLET EVERY NIGHT 2/8/21  Yes Mike Rodriguez MD   pramipexole (MIRAPEX) 0.75 mg tablet TAKE 3 TABLETS ONE TIME DAILY 1/24/21  Yes Mike Rodriguez MD   metoprolol succinate (TOPROL-XL) 50 mg XL tablet TAKE 1 TABLET EVERY DAY 1/22/21  Yes Mike Rodriguez MD   montelukast (SINGULAIR) 10 mg tablet TAKE 1 TABLET BY MOUTH DAILY 12/28/20  Yes Mike Rodriguez MD   metFORMIN ER (GLUCOPHAGE XR) 500 mg tablet TAKE 2 TABLETS TWICE DAILY WITH MEALS 12/23/20  Yes Mike Rodriguez MD   dulaglutide (Trulicity) 1.5 [place]/6.2 mL sub-q pen 0.5 mL by SubCUTAneous route every seven (7) days. 11/2/20  Yes Mike Rodriguez MD   Accu-Chek Patricia Plus Meter misc USE AS DIRECTED TO CHECK BLOOD SUGAR (FOR DIABETES) 9/16/20  Yes Mike Rodriguez MD   cpap machine kit by Does Not Apply route. Yes Provider, Historical   glucose blood VI test strips (ASCENSIA AUTODISC VI, ONE TOUCH ULTRA TEST VI) strip Use to check blood sugar 3 times daily. Dx E11.9. Please dispense brand preferred by insurance. 6/17/20  Yes Mike Rodriguez MD   lancets misc Use to check blood sugar 3 times daily. Dx E11.9. Please dispense brand preferred by insurance. 6/17/20  Yes Mike Rodriguez MD   folic acid/multivit-min/lutein (CENTRUM SILVER PO) Take  by mouth. Yes Provider, Historical   lisinopriL (PRINIVIL, ZESTRIL) 20 mg tablet Take 1 Tab by mouth daily.  5/18/20  Yes Mike Rodriguez MD   Syringe, Disposable, 3 mL syrg Take as directed 4/15/20  Yes Adin Torres MD   Needle, Disp, 23 G 23 gauge x 1 1/4\" ndle Use to inject 0.5 ml every 7 days 4/15/20  Yes Adin Torres MD   Needle, Disp, 18 G 18 gauge x 1\" ndle Use to aspirate 0.5 ml every 7 days 4/15/20  Yes Adin Torres MD   Jardiance 10 mg tablet TAKE 1 TABLET BY MOUTH DAILY 8/31/20 2/17/21  Mike Rodriguez MD   Blood-Glucose Meter monitoring kit Use to check blood sugar 3 times daily. Dx E11.9. Please dispense brand preferred by insurance. 6/17/20   Ha Fulton MD   Wheel Chair maura Assistance with ambulation, gait instability 2/28/20   Kendall Putnam NP   ALPRAZolam Temitope Briscoe) 0.5 mg tablet Take 1 Tab by mouth two (2) times daily as needed for Anxiety. Max Daily Amount: 1 mg. 9/13/19   Kendall Putnam NP   polyethylene glycol (MIRALAX) 17 gram packet Take 1 Packet by mouth daily. Hold for loose stools  Patient taking differently: Take 17 g by mouth daily as needed. Hold for loose stools 1/18/19   Ingris VO NP   naloxone 4 mg/actuation spry 4 mg by Nasal route as needed. 5/4/17   José Miguel Cooley MD   Back Brace misc 1 Device by Does Not Apply route daily as needed for Pain.  One, lumbosacral orthosis/back brace with metal struts      Medically necessary 3/16/16   José Miguel Cooley MD     Patient Active Problem List    Diagnosis Date Noted    Type 2 diabetes mellitus with diabetic neuropathy ([address] 05/10/2019    Severe obesity ([address] 04/26/2019    Kidney stone 02/25/2019    Sepsis ([address] 01/14/2019    UTI (urinary tract infection) 01/14/2019    Status post insertion of spinal cord stimulator 09/14/2017    Chronic pain syndrome 06/15/2017    Lumbar post-laminectomy syndrome 06/15/2017    Lumbar and sacral osteoarthritis 06/15/2017    Anemia 12/07/2016    Advanced care planning/counseling discussion 08/08/2016    Chronic midline low back pain with sciatica 05/16/2016    History of depression 06/02/2015    Thoracic or lumbosacral neuritis or radiculitis, unspecified 01/26/2015    Postlaminectomy syndrome, lumbar region 01/26/2015    DDD (degenerative disc disease), lumbar 07/30/2014    Lumbar facet arthropathy 07/30/2014    S/P lumbar laminectomy 07/30/2014    S/P lumbar spinal fusion 07/30/2014    Lumbar nerve root impingement 07/30/2014    Lumbosacral radiculopathy at S1 07/30/2014    DDD (degenerative disc disease), thoracic 07/30/2014    Thoracic compression fracture ([facility] 07/30/2014    Back muscle spasm 07/30/2014    Major depression 07/30/2014    TRUE (generalized anxiety disorder) 07/30/2014    Abdominal adhesions 07/30/2014    Constipation, chronic 07/30/2014    Insomnia secondary to chronic pain 07/30/2014    MANISH (obstructive sleep apnea) 07/30/2014    DM (diabetes mellitus) ([facility] 07/30/2014    HTN (hypertension) 07/30/2014    Hyperlipidemia 07/30/2014    Frequent falls 07/30/2014     Current Outpatient Medications   Medication Sig Dispense Refill    empagliflozin (Jardiance) 25 mg tablet Take 1 Tab by mouth daily. 90 Tab 1    buPROPion SR (WELLBUTRIN SR) 150 mg SR tablet TAKE 1 TABLET EVERY DAY 90 Tab 1    isosorbide mononitrate ER (IMDUR) 60 mg CR tablet TAKE 1 TABLET EVERY MORNING 90 Tab 1    simvastatin (ZOCOR) 10 mg tablet TAKE 1 TABLET EVERY NIGHT 90 Tab 1    pramipexole (MIRAPEX) 0.75 mg tablet TAKE 3 TABLETS ONE TIME DAILY 270 Tab 1    metoprolol succinate (TOPROL-XL) 50 mg XL tablet TAKE 1 TABLET EVERY DAY 90 Tab 1    montelukast (SINGULAIR) 10 mg tablet TAKE 1 TABLET BY MOUTH DAILY 90 Tab 0    metFORMIN ER (GLUCOPHAGE XR) 500 mg tablet TAKE 2 TABLETS TWICE DAILY WITH MEALS 360 Tab 1    dulaglutide (Trulicity) 1.5 BY/6.5 mL sub-q pen 0.5 mL by SubCUTAneous route every seven (7) days. 12 Each 3    Accu-Chek Patricia Plus Meter misc USE AS DIRECTED TO CHECK BLOOD SUGAR (FOR DIABETES) 1 Each 0    cpap machine kit by Does Not Apply route.  glucose blood VI test strips (ASCENSIA AUTODISC VI, ONE TOUCH ULTRA TEST VI) strip Use to check blood sugar 3 times daily. Dx E11.9. Please dispense brand preferred by insurance. 100 Strip 11    lancets misc Use to check blood sugar 3 times daily. Dx E11.9. Please dispense brand preferred by insurance. 713 Each 11    folic acid/multivit-min/lutein (CENTRUM SILVER PO) Take  by mouth.  lisinopriL (PRINIVIL, ZESTRIL) 20 mg tablet Take 1 Tab by mouth daily.  30 Tab 1    Syringe, Disposable, 3 mL syrg Take as directed 30 Syringe 1    Needle, Disp, 23 G 23 gauge x 1 1/4\" ndle Use to inject 0.5 ml every 7 days 30 Pen Needle 1    Needle, Disp, 18 G 18 gauge x 1\" ndle Use to aspirate 0.5 ml every 7 days 30 Pen Needle 1    Blood-Glucose Meter monitoring kit Use to check blood sugar 3 times daily. Dx E11.9. Please dispense brand preferred by insurance. 1 Kit 0    Wheel Chair maura Assistance with ambulation, gait instability 1 Each 0    ALPRAZolam (XANAX) 0.5 mg tablet Take 1 Tab by mouth two (2) times daily as needed for Anxiety. Max Daily Amount: 1 mg. 30 Tab 0    polyethylene glycol (MIRALAX) 17 gram packet Take 1 Packet by mouth daily. Hold for loose stools (Patient taking differently: Take 17 g by mouth daily as needed. Hold for loose stools) 10 Each 0    naloxone 4 mg/actuation spry 4 mg by Nasal route as needed. 1 Box 0    Back Brace misc 1 Device by Does Not Apply route daily as needed for Pain.  One, lumbosacral orthosis/back brace with metal struts      Medically necessary 1 Device 0     Allergies   Allergen Reactions    Bactrim [Sulfamethoprim] Rash    Opana [Oxymorphone] Other (comments)     Feels like bug crawling under skin and drowziness     Past Medical History:   Diagnosis Date    Abdominal adhesions 7/30/2014    Back muscle spasm 7/30/2014    Back pain, lumbosacral     BPH (benign prostatic hyperplasia)     Cellulitis     Constipation, chronic 7/30/2014    DDD (degenerative disc disease), lumbar 7/30/2014    DDD (degenerative disc disease), thoracic 7/30/2014    Depression     Diabetes ([facility]     DM (diabetes mellitus) ([facility] 7/30/2014    Frequent falls 7/30/2014    TRUE (generalized anxiety disorder) 7/30/2014    High cholesterol     HTN (hypertension) 7/30/2014    Hydronephrosis     Hyperlipidemia 7/30/2014    Hypertension     Hypogonadism in male     Incomplete bladder emptying     Insomnia secondary to chronic pain 7/30/2014    Kidney stones     LBP radiating to both legs 7/30/2014    Lumbar facet arthropathy 7/30/2014    Lumbar nerve root impingement 7/30/2014    Lumbosacral radiculopathy at S1 7/30/2014    Major depression 7/30/2014    Nausea & vomiting     Neurological disorder     sciatica    MANIHS (obstructive sleep apnea) 7/30/2014    S/P lumbar laminectomy 7/30/2014    S/P lumbar spinal fusion 7/30/2014    Small vessel disease (HCC)     Thoracic compression fracture ([address] 07/30/2014    Unspecified sleep apnea     USES CPAP EVERY NIGHT     Social History     Tobacco Use    Smoking status: Never Smoker    Smokeless tobacco: Never Used   Substance Use Topics    Alcohol use: Yes       ROS    Objective:     Patient-Reported Vitals 8/20/2020   Patient-Reported Weight -   Patient-Reported Height 62   Patient-Reported Pulse 78   Patient-Reported Temperature 97.6   Patient-Reported SpO2 -   Patient-Reported Systolic  443   Patient-Reported Diastolic 84      General: alert, cooperative, no distress   Mental  status: normal mood, behavior, speech, dress, motor activity, and thought processes, able to follow commands   HENT: NCAT   Neck: no visualized mass   Resp: no respiratory distress   Neuro: no gross deficits   Skin: no discoloration or lesions of concern on visible areas   Psychiatric: normal affect, consistent with stated mood, no evidence of hallucinations     Additional exam findings: We discussed the expected course, resolution and complications of the diagnosis(es) in detail. Medication risks, benefits, costs, interactions, and alternatives were discussed as indicated. I advised him to contact the office if his condition worsens, changes or fails to improve as anticipated. He expressed understanding with the diagnosis(es) and plan.        Angel William, who was evaluated through a patient-initiated, synchronous (real-time) audio-video encounter, and/or his healthcare decision maker, is aware that it is a billable service, with coverage as determined by his insurance carrier. He provided verbal consent to proceed: Yes, and patient identification was verified. It was conducted pursuant to the emergency declaration under the [address] [place] [address] and the [employer] Act. A caregiver was present when appropriate. Ability to conduct physical exam was limited. I was in the office. The patient was at home.       Timoteo Chris MD

## 2021-02-19 ENCOUNTER — TELEPHONE (OUTPATIENT)
Dept: INTERNAL MEDICINE CLINIC | Age: 66
End: 2021-02-19

## 2021-02-19 NOTE — TELEPHONE ENCOUNTER
Pharmacy Progress Note - Telephone Encounter    S/O: Mr. Orellana Loges Chick 77 y.o. male, referred by Dr. Shelby Dc MD, needs Jardiance therapy increased to 25 mg and authorized through [facility] PAP. A/P:    - [address] contacted to adjust prescription for to Jardiance 25mg daily.  - Expect arrival to patient in 7-10 business days  - Patient contacted. Thank you,  Rosette Mancilla, PharmD  Clinical Pharmacist Specialist      CLINICAL PHARMACY CONSULT: MED RECONCILIATION/REVIEW ADDENDUM    For Pharmacy Admin Tracking Only    PHSO: PHSO Patient?: Yes  Total # of Interventions Recommended: Count: 1  - Increased Dose #: 1  Total Interventions Accepted: 1  Time Spent (min): 15

## 2021-05-03 ENCOUNTER — TELEPHONE (OUTPATIENT)
Dept: FAMILY MEDICINE CLINIC | Age: 66
End: 2021-05-03

## 2021-05-03 DIAGNOSIS — E11.65 POORLY CONTROLLED DIABETES MELLITUS (HCC): Primary | ICD-10-CM

## 2021-05-03 NOTE — TELEPHONE ENCOUNTER
Patient would like to have lab work for A1C prior to virtual appointment on 5/17. Patient requested for follow up to be virtual. Please advise patient if this needs to be in person.

## 2021-05-03 NOTE — TELEPHONE ENCOUNTER
I have ordered A1C. Please ask pt that It can be in person or virtual. If he wants to do in person visit then we can do A1C in the office, does not need to go to lab/. Also if he is doing virtual, keep blood pressure reading ready before visit.      Declan Thorne

## 2021-05-10 ENCOUNTER — VIRTUAL VISIT (OUTPATIENT)
Dept: INTERNAL MEDICINE CLINIC | Age: 66
End: 2021-05-10

## 2021-05-10 DIAGNOSIS — E11.40 TYPE 2 DIABETES MELLITUS WITH DIABETIC NEUROPATHY, WITHOUT LONG-TERM CURRENT USE OF INSULIN (HCC): Primary | ICD-10-CM

## 2021-05-10 NOTE — TELEPHONE ENCOUNTER
Called patient and left message to have labs done prior to appointment, keep BP log. Consent: The patient's consent was obtained including but not limited to risks of crusting, scabbing, blistering, scarring, darker or lighter pigmentary change, recurrence, incomplete removal and infection. Detail Level: Simple Render Post-Care Instructions In Note?: yes Number Of Freeze-Thaw Cycles: 2 freeze-thaw cycles Post-Care Instructions: I reviewed with the patient in detail post-care instructions. Patient is to wear sunprotection, and avoid picking at any of the treated lesions. Pt may apply Vaseline to crusted or scabbing areas. Duration Of Freeze Thaw-Cycle (Seconds): 0

## 2021-05-11 NOTE — PROGRESS NOTES
Pharmacy Progress Note - Diabetes Management    S/O: Mr. Diane Biggs is a 77 y.o. male, referred by Dr. Immanuel Robertson MD, was contacted today for diabetes management follow up. Patient's last A1c was 8.1% in February 2021. Interim update: Patient states that he has been taking care of his wife over the past 6 months which has consumed a lot of his time and efforts. He states that he has had to eat more fast food due to his variable schedule which likely led to the slight increase in A1c. He has been able to consistently take his medications and reports that blood sugars are better since Jardiance was increased to the 25 mg dose. He does state that he would like to stop taking metformin because he does not feel like it is helping much. He is also concerned about the many recalls that have come out regarding metformin. Current anti-hyperglycemic regimen include(s):    - Trulicity 1.5 mg once weekly  - Jardiance 25 mg once daily   - Metformin 1000 mg BID with meals     Patient reports adherence to his medication regimen. ROS:  Today, Pt endorses:  - Symptoms of Hyperglycemia: none  - Symptoms of Hypoglycemia: none    Self Monitoring Blood Glucose (SMBG) or CGM:  - Conducts/scans: Once daily fasting    - Fasting blood sugars range from the low 100s - 150 per patient report     Nutrition/Lifestyle Modifications:  - Patient admits to eating a lot of fast food lately because they don't have much time to cook  - He also may not eat dinner until 9 PM at night, which causes sugars to be higher in the mornings    - States he usually eats [employer] food, [employer] or [employer]  - Drinking mostly water, but also likes diet pepsi    Vitals:   Wt Readings from Last 3 Encounters:   06/09/20 317 lb 6.4 oz (144 kg)   05/18/20 310 lb (140.6 kg)   12/10/19 321 lb (145.6 kg)     BP Readings from Last 3 Encounters:   05/18/20 138/80   12/10/19 168/79   12/09/19 141/75     Pulse Readings from Last 3 Encounters:   05/18/20 72 12/10/19 83   12/09/19 85       Past Medical History:   Diagnosis Date    Abdominal adhesions 7/30/2014    Back muscle spasm 7/30/2014    Back pain, lumbosacral     BPH (benign prostatic hyperplasia)     Cellulitis     Constipation, chronic 7/30/2014    DDD (degenerative disc disease), lumbar 7/30/2014    DDD (degenerative disc disease), thoracic 7/30/2014    Depression     Diabetes ([facility] [address]     DM (diabetes mellitus) ([facility] [address] 7/30/2014    Frequent falls 7/30/2014    TRUE (generalized anxiety disorder) 7/30/2014    High cholesterol     HTN (hypertension) 7/30/2014    Hydronephrosis     Hyperlipidemia 7/30/2014    Hypertension     Hypogonadism in male     Incomplete bladder emptying     Insomnia secondary to chronic pain 7/30/2014    Kidney stones     LBP radiating to both legs 7/30/2014    Lumbar facet arthropathy 7/30/2014    Lumbar nerve root impingement 7/30/2014    Lumbosacral radiculopathy at S1 7/30/2014    Major depression 7/30/2014    Nausea & vomiting     Neurological disorder     sciatica    MANISH (obstructive sleep apnea) 7/30/2014    S/P lumbar laminectomy 7/30/2014    S/P lumbar spinal fusion 7/30/2014    Small vessel disease (HCC)     Thoracic compression fracture (HCC) 07/30/2014    Unspecified sleep apnea     USES CPAP EVERY NIGHT     Allergies   Allergen Reactions    Bactrim [Sulfamethoprim] Rash    Opana [Oxymorphone] Other (comments)     Feels like bug crawling under skin and drowziness       Current Outpatient Medications   Medication Sig    montelukast (SINGULAIR) 10 mg tablet TAKE 1 TABLET BY MOUTH DAILY    empagliflozin (Jardiance) 25 mg tablet Take 1 Tab by mouth daily.     buPROPion SR (WELLBUTRIN SR) 150 mg SR tablet TAKE 1 TABLET EVERY DAY    isosorbide mononitrate ER (IMDUR) 60 mg CR tablet TAKE 1 TABLET EVERY MORNING    simvastatin (ZOCOR) 10 mg tablet TAKE 1 TABLET EVERY NIGHT    pramipexole (MIRAPEX) 0.75 mg tablet TAKE 3 TABLETS ONE TIME DAILY    metoprolol succinate (TOPROL-XL) 50 mg XL tablet TAKE 1 TABLET EVERY DAY    metFORMIN ER (GLUCOPHAGE XR) 500 mg tablet TAKE 2 TABLETS TWICE DAILY WITH MEALS    dulaglutide (Trulicity) 1.5 FJ/7.3 mL sub-q pen 0.5 mL by SubCUTAneous route every seven (7) days.  Accu-Chek Patricia Plus Meter misc USE AS DIRECTED TO CHECK BLOOD SUGAR (FOR DIABETES)    cpap machine kit by Does Not Apply route.  Blood-Glucose Meter monitoring kit Use to check blood sugar 3 times daily. Dx E11.9. Please dispense brand preferred by insurance.  glucose blood VI test strips (ASCENSIA AUTODISC VI, ONE TOUCH ULTRA TEST VI) strip Use to check blood sugar 3 times daily. Dx E11.9. Please dispense brand preferred by insurance.  lancets misc Use to check blood sugar 3 times daily. Dx E11.9. Please dispense brand preferred by insurance.  folic acid/multivit-min/lutein (CENTRUM SILVER PO) Take  by mouth.  lisinopriL (PRINIVIL, ZESTRIL) 20 mg tablet Take 1 Tab by mouth daily.  Syringe, Disposable, 3 mL syrg Take as directed    Needle, Disp, 23 G 23 gauge x 1 1/4\" ndle Use to inject 0.5 ml every 7 days    Needle, Disp, 18 G 18 gauge x 1\" ndle Use to aspirate 0.5 ml every 7 days   [facility] Chair maura Assistance with ambulation, gait instability    ALPRAZolam (XANAX) 0.5 mg tablet Take 1 Tab by mouth two (2) times daily as needed for Anxiety. Max Daily Amount: 1 mg.  polyethylene glycol (MIRALAX) 17 gram packet Take 1 Packet by mouth daily. Hold for loose stools (Patient taking differently: Take 17 g by mouth daily as needed. Hold for loose stools)    naloxone 4 mg/actuation spry 4 mg by Nasal route as needed.  Back Brace misc 1 Device by Does Not Apply route daily as needed for Pain. One, lumbosacral orthosis/back brace with metal struts      Medically necessary     No current facility-administered medications for this visit.         Lab Results   Component Value Date/Time    Sodium 143 02/12/2021 09:37 AM    Potassium 4.6 02/12/2021 09:37 AM    Chloride 109 02/12/2021 09:37 AM    CO2 29 02/12/2021 09:37 AM    Anion gap 5 02/12/2021 09:37 AM    Glucose 134 (H) 02/12/2021 09:37 AM    BUN 19 (H) 02/12/2021 09:37 AM    Creatinine 1.16 02/12/2021 09:37 AM    BUN/Creatinine ratio 16 02/12/2021 09:37 AM    GFR est AA >60 02/12/2021 09:37 AM    GFR est non-AA >60 02/12/2021 09:37 AM    Calcium 8.8 02/12/2021 09:37 AM    Bilirubin, total 0.4 02/12/2021 09:37 AM    Alk. phosphatase 95 02/12/2021 09:37 AM    Protein, total 7.6 02/12/2021 09:37 AM    Albumin 4.1 02/12/2021 09:37 AM    Globulin 3.5 02/12/2021 09:37 AM    A-G Ratio 1.2 02/12/2021 09:37 AM    ALT (SGPT) 36 02/12/2021 09:37 AM       Lab Results   Component Value Date/Time    Cholesterol, total 137 07/03/2020 09:16 AM    HDL Cholesterol 39 (L) 07/03/2020 09:16 AM    LDL, calculated 59 07/03/2020 09:16 AM    VLDL, calculated 39 07/03/2020 09:16 AM    Triglyceride 195 (H) 07/03/2020 09:16 AM    CHOL/HDL Ratio 3.5 07/03/2020 09:16 AM       Lab Results   Component Value Date/Time    WBC 6.8 07/03/2020 09:16 AM    HGB 13.6 07/03/2020 09:16 AM    HCT 43.6 07/03/2020 09:16 AM    PLATELET 007 16/23/5685 09:16 AM    MCV 83.0 07/03/2020 09:16 AM       Lab Results   Component Value Date/Time    Microalbumin/Creat ratio (mg/g creat) 26 07/03/2020 09:17 AM    MICROALBUMIN,MG/DAY 14.8 06/13/2016 08:43 AM    Microalbumin,urine random 4.06 (H) 07/03/2020 09:17 AM       HbA1c:  Lab Results   Component Value Date/Time    Hemoglobin A1c 8.1 (H) 02/12/2021 09:37 AM    Hemoglobin A1c (POC) 8.1 09/13/2019 01:58 PM     No components found for: 2     Last Point of Care HGB A1C  Hemoglobin A1c (POC)   Date Value Ref Range Status   09/13/2019 8.1 % Final        CrCl cannot be calculated (Unknown ideal weight. ). A/P:    Diabetes Management:  - Per ADA guidelines, Pt's A1c is not at goal of < 7%.    - Current SMBG(s)/CGM trend is at/near fasting goals per patient report   - Diet is likely main contributor to increasing blood sugars, so discussed different options patient can try and principles to keep in mind while eating out (more salads, less potatoes/rice/breads, etc.)  - Also encouraged him to review nutrition information online prior to choosing fast food options, with the goal of staying around 45 g of carbs per meal   - Patient states that he is planning on getting A1c rechecked next week, so will follow up then to develop plan moving forward  - If improved, will consider weaning down metformin per patient preference. Can try increasing Trulicity dose to maintain control if needed   - Follow up 1-2 weeks     Notifications of recommendations will be sent to Dr. Phillip Eller MD for review. Thank you,  Gelacio Sanches. NAIMA Jeffers                          For Pharmacy Admin Tracking Only     CPA in place:  Yes   Recommendation Provided To: Patient/Caregiver: 1 via Telephone   Time Spent (min): 20

## 2021-05-15 ENCOUNTER — HOSPITAL ENCOUNTER (OUTPATIENT)
Dept: LAB | Age: 66
Discharge: HOME | End: 2021-05-15
Attending: FAMILY MEDICINE
Payer: MEDICARE

## 2021-05-15 LAB
EST. AVERAGE GLUCOSE BLD GHB EST-MCNC: 163 MG/DL
HBA1C MFR BLD: 7.3 % (ref 4.2–5.6)

## 2021-05-15 PROCEDURE — 36415 COLL VENOUS BLD VENIPUNCTURE: CPT

## 2021-05-15 PROCEDURE — 83036 HEMOGLOBIN GLYCOSYLATED A1C: CPT

## 2021-05-17 ENCOUNTER — VIRTUAL VISIT (OUTPATIENT)
Dept: FAMILY MEDICINE CLINIC | Age: 66
End: 2021-05-17
Payer: MEDICARE

## 2021-05-17 DIAGNOSIS — M25.571 CHRONIC PAIN OF RIGHT ANKLE: ICD-10-CM

## 2021-05-17 DIAGNOSIS — R97.20 ELEVATED PSA: ICD-10-CM

## 2021-05-17 DIAGNOSIS — Z12.11 SCREENING FOR COLON CANCER: Primary | ICD-10-CM

## 2021-05-17 DIAGNOSIS — Z98.890 S/P LUMBAR LAMINECTOMY: ICD-10-CM

## 2021-05-17 DIAGNOSIS — G89.29 CHRONIC PAIN OF RIGHT ANKLE: ICD-10-CM

## 2021-05-17 DIAGNOSIS — M25.371 INSTABILITY OF RIGHT ANKLE JOINT: ICD-10-CM

## 2021-05-17 DIAGNOSIS — E11.40 TYPE 2 DIABETES MELLITUS WITH DIABETIC NEUROPATHY, WITHOUT LONG-TERM CURRENT USE OF INSULIN (HCC): ICD-10-CM

## 2021-05-17 DIAGNOSIS — F32.A DEPRESSION, UNSPECIFIED DEPRESSION TYPE: ICD-10-CM

## 2021-05-17 PROCEDURE — G8756 NO BP MEASURE DOC: HCPCS | Performed by: FAMILY MEDICINE

## 2021-05-17 PROCEDURE — G9717 DOC PT DX DEP/BP F/U NT REQ: HCPCS | Performed by: FAMILY MEDICINE

## 2021-05-17 PROCEDURE — 99214 OFFICE O/P EST MOD 30 MIN: CPT | Performed by: FAMILY MEDICINE

## 2021-05-17 PROCEDURE — 3017F COLORECTAL CA SCREEN DOC REV: CPT | Performed by: FAMILY MEDICINE

## 2021-05-17 PROCEDURE — 2022F DILAT RTA XM EVC RTNOPTHY: CPT | Performed by: FAMILY MEDICINE

## 2021-05-17 PROCEDURE — 1100F PTFALLS ASSESS-DOCD GE2>/YR: CPT | Performed by: FAMILY MEDICINE

## 2021-05-17 PROCEDURE — G8427 DOCREV CUR MEDS BY ELIG CLIN: HCPCS | Performed by: FAMILY MEDICINE

## 2021-05-17 PROCEDURE — 3288F FALL RISK ASSESSMENT DOCD: CPT | Performed by: FAMILY MEDICINE

## 2021-05-17 PROCEDURE — 3051F HG A1C>EQUAL 7.0%<8.0%: CPT | Performed by: FAMILY MEDICINE

## 2021-05-17 NOTE — PROGRESS NOTES
Improvement in A1C. Advise pt to go to GI as previous positive FIT test and let him know that this can be unseen bleeding from GI and important to see GI for possible colonoscopy as well. For now further discussion on follow up visit.

## 2021-05-17 NOTE — PROGRESS NOTES
Shaunna Bolden is a 77 y.o. male who was seen by synchronous (real-time) audio-video technology on 5/17/2021 for Diabetes and Depression        Assessment & Plan:   Diagnoses and all orders for this visit:    Screening for colon cancer  -     OCCULT BLOOD IMMUNOASSAY,DIAGNOSTIC; Future    Elevated PSA: Now has an appointment today with urology. Will follow the recommendation    Type 2 diabetes mellitus with diabetic neuropathy, without long-term current use of insulin ([address]): A1c came down from 8.1-7.3. Not seeing any Endo yet. Currently following pharmacist Ms. Eliel Barker. Working on a diet modification. We will continue current plan and discuss it further. Depression, unspecified depression type: Fairly stable. Will observe    S/P lumbar laminectomy: Chronic pain. On and off. 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 Using wheelchair as needed. Used to see Dr. Wu Penn Wanted second opinion. Done the new referral to Ortho  Comments:  paroneal nerve damage. used to see Dr. Hernan White   Orders:  -     Jo Nieto    Instability of right ankle joint  -     REFERRAL TO ORTHOPEDICS    Patient understood agree with the plan  Ordered the fit test again  Discussed in person appointment next visit in 3 months. Not ready to take the Covid vaccine yet    Please note that this dictation was completed with [employer], the computer voice recognition software. Quite often unanticipated grammatical, syntax, homophones, and other interpretive errors are inadvertently transcribed by the computer software. Please disregard these errors. Please excuse any errors that have escaped final proofreading. [id number]  Subjective:   Done visit through doxy  Here for follow-up  History of diabetes. Recent A1c around 7.3 which is improved compared to before. Taking medication with compliance no side effects. No hypoglycemia.   Today fasting sugar was 113.  During visit was sitting comfortable without any acute distress  History of depression which is fairly stable at this time. Following counselor. No thoughts of hurting himself or someone else. Denies any headache, dizziness, no chest pain or trouble breathing, no arm or leg weakness. No nausea or vomiting, no weight or appetite changes, no mood changes . No urine or bowel complains, no palpitation, no diaphoresis. No abdominal pain. No cold or cough. No leg swelling. No fever. No sleep trouble. Checking blood pressure at home on and off. Fairly stable. Continue current medications. No side effects of medications. Has a chronic right ankle pain and instability. Was diagnosed with peroneal nerve damage. Seen Dr. Kayla Jacobs. Now wanted second opinion. Done a new referral.  No anginal symptoms. On a statin. No side effects. Discussed the importance of diet modification. Seasonal allergies fairly stable at this time. Review prior labs. Lab Results   Component Value Date/Time    WBC 6.8 07/03/2020 09:16 AM    HGB 13.6 07/03/2020 09:16 AM    HCT 43.6 07/03/2020 09:16 AM    PLATELET 907 83/97/7701 09:16 AM    MCV 83.0 07/03/2020 09:16 AM     Lab Results   Component Value Date/Time    Sodium 143 02/12/2021 09:37 AM    Potassium 4.6 02/12/2021 09:37 AM    Chloride 109 02/12/2021 09:37 AM    CO2 29 02/12/2021 09:37 AM    Anion gap 5 02/12/2021 09:37 AM    Glucose 134 (H) 02/12/2021 09:37 AM    BUN 19 (H) 02/12/2021 09:37 AM    Creatinine 1.16 02/12/2021 09:37 AM    BUN/Creatinine ratio 16 02/12/2021 09:37 AM    GFR est AA >60 02/12/2021 09:37 AM    GFR est non-AA >60 02/12/2021 09:37 AM    Calcium 8.8 02/12/2021 09:37 AM    Bilirubin, total 0.4 02/12/2021 09:37 AM    Alk.  phosphatase 95 02/12/2021 09:37 AM    Protein, total 7.6 02/12/2021 09:37 AM    Albumin 4.1 02/12/2021 09:37 AM    Globulin 3.5 02/12/2021 09:37 AM    A-G Ratio 1.2 02/12/2021 09:37 AM    ALT (SGPT) 36 02/12/2021 09:37 AM    AST (SGOT) 21 02/12/2021 09:37 AM     Lab Results   Component Value Date/Time    Cholesterol, total 137 07/03/2020 09:16 AM    HDL Cholesterol 39 (L) 07/03/2020 09:16 AM    LDL, calculated 59 07/03/2020 09:16 AM    VLDL, calculated 39 07/03/2020 09:16 AM    Triglyceride 195 (H) 07/03/2020 09:16 AM    CHOL/HDL Ratio 3.5 07/03/2020 09:16 AM     Lab Results   Component Value Date/Time    TSH 1.25 07/03/2020 09:16 AM     Lab Results   Component Value Date/Time    Hemoglobin A1c 7.3 (H) 05/15/2021 07:28 AM    Hemoglobin A1c (POC) 8.1 09/13/2019 01:58 PM     Lab Results   Component Value Date/Time    Vitamin D 25-Hydroxy 38.6 12/07/2016 09:27 AM       Lab Results   Component Value Date/Time    Microalbumin/Creat ratio (mg/g creat) 26 07/03/2020 09:17 AM    MICROALBUMIN,MG/DAY 14.8 06/13/2016 08:43 AM    Microalbumin,urine random 4.06 (H) 07/03/2020 09:17 AM     Lab Results   Component Value Date/Time    Prostate Specific Ag 4.3 (H) 02/12/2021 09:37 AM    Prostate Specific Ag 3.7 11/20/2019 09:10 AM    Prostate Specific Ag 4.8 (H) 10/04/2019 10:01 AM    Prostate Specific Ag 3.3 08/07/2019 10:43 AM    Prostate Specific Ag 1.2 03/17/2015       Prior to Admission medications    Medication Sig Start Date End Date Taking? Authorizing Provider   montelukast (SINGULAIR) 10 mg tablet TAKE 1 TABLET BY MOUTH DAILY 3/30/21  Yes Reshma Sapp MD   empagliflozin (Jardiance) 25 mg tablet Take 1 Tab by mouth daily.  2/17/21  Yes Reshma Sapp MD   buPROPion [facility]) 150 mg SR tablet TAKE 1 TABLET EVERY DAY 2/8/21  Yes Reshma Sapp MD   isosorbide mononitrate ER (IMDUR) 60 mg CR tablet TAKE 1 TABLET EVERY MORNING 2/8/21  Yes Reshma Sapp MD   simvastatin (ZOCOR) 10 mg tablet TAKE 1 TABLET EVERY NIGHT 2/8/21  Yes Reshma Sapp MD   pramipexole (MIRAPEX) 0.75 mg tablet TAKE 3 TABLETS ONE TIME DAILY 1/24/21  Yes Reshma Sapp MD   metoprolol succinate (TOPROL-XL) 50 mg XL tablet TAKE 1 TABLET EVERY DAY 1/22/21  Yes Hadley Bennett, Luis Manuel Blanco MD   metFORMIN ER (GLUCOPHAGE XR) 500 mg tablet TAKE 2 TABLETS TWICE DAILY WITH MEALS 12/23/20  Yes Paulo Calloway MD   dulaglutide (Trulicity) 1.5 UQ/4.7 mL sub-q pen 0.5 mL by SubCUTAneous route every seven (7) days. 11/2/20  Yes Paulo Calloway MD   Accu-Chek Patricia Plus Meter misc USE AS DIRECTED TO CHECK BLOOD SUGAR (FOR DIABETES) 9/16/20  Yes Paulo Calloway MD   cpap machine kit by Does Not Apply route. Yes Provider, Historical   Blood-Glucose Meter monitoring kit Use to check blood sugar 3 times daily. Dx E11.9. Please dispense brand preferred by insurance. 6/17/20  Yes Paulo Calloway MD   glucose blood VI test strips (ASCENSIA AUTODISC VI, ONE TOUCH ULTRA TEST VI) strip Use to check blood sugar 3 times daily. Dx E11.9. Please dispense brand preferred by insurance. 6/17/20  Yes Paulo Calloway MD   lancets misc Use to check blood sugar 3 times daily. Dx E11.9. Please dispense brand preferred by insurance. 6/17/20  Yes Paulo Calloway MD   folic acid/multivit-min/lutein (CENTRUM SILVER PO) Take  by mouth. Yes Provider, Historical   lisinopriL (PRINIVIL, ZESTRIL) 20 mg tablet Take 1 Tab by mouth daily. 5/18/20  Yes Paulo Calloway MD   Syringe, Disposable, 3 mL syrg Take as directed 4/15/20  Yes Tammy Ramirez MD   Needle, Disp, 23 G 23 gauge x 1 1/4\" ndle Use to inject 0.5 ml every 7 days 4/15/20  Yes Tammy Ramirez MD   Needle, Disp, 18 G 18 gauge x 1\" ndle Use to aspirate 0.5 ml every 7 days 4/15/20  Yes Tammy Ramirez MD   Wheel Chair maura Assistance with ambulation, gait instability 2/28/20  Yes Melvin De La Rosa NP   Back Brace misc 1 Device by Does Not Apply route daily as needed for Pain. One, lumbosacral orthosis/back brace with metal struts      Medically necessary 3/16/16  Yes Milka Cook MD   ALPRAZolam [facility]) 0.5 mg tablet Take 1 Tab by mouth two (2) times daily as needed for Anxiety.  Max Daily Amount: 1 mg. 9/13/19   Melvin De La Rosa, NP   polyethylene glycol (MIRALAX) 17 gram packet Take 1 Packet by mouth daily. Hold for loose stools  Patient taking differently: Take 17 g by mouth daily as needed. Hold for loose stools 1/18/19   Estefania VO NP   naloxone 4 mg/actuation spry 4 mg by Nasal route as needed.  5/4/17   Radha Oconnor MD     Patient Active Problem List    Diagnosis Date Noted    Type 2 diabetes mellitus with diabetic neuropathy ([facility] 05/10/2019    Severe obesity ([facility] 04/26/2019    Kidney stone 02/25/2019    Sepsis ([facility] 01/14/2019    UTI (urinary tract infection) 01/14/2019    Status post insertion of spinal cord stimulator 09/14/2017    Chronic pain syndrome 06/15/2017    Lumbar post-laminectomy syndrome 06/15/2017    Lumbar and sacral osteoarthritis 06/15/2017    Anemia 12/07/2016    Advanced care planning/counseling discussion 08/08/2016    Chronic midline low back pain with sciatica 05/16/2016    History of depression 06/02/2015    Thoracic or lumbosacral neuritis or radiculitis, unspecified 01/26/2015    Postlaminectomy syndrome, lumbar region 01/26/2015    DDD (degenerative disc disease), lumbar 07/30/2014    Lumbar facet arthropathy 07/30/2014    S/P lumbar laminectomy 07/30/2014    S/P lumbar spinal fusion 07/30/2014    Lumbar nerve root impingement 07/30/2014    Lumbosacral radiculopathy at S1 07/30/2014    DDD (degenerative disc disease), thoracic 07/30/2014    Thoracic compression fracture (HCC) 07/30/2014    Back muscle spasm 07/30/2014    Major depression 07/30/2014    TRUE (generalized anxiety disorder) 07/30/2014    Abdominal adhesions 07/30/2014    Constipation, chronic 07/30/2014    Insomnia secondary to chronic pain 07/30/2014    MANISH (obstructive sleep apnea) 07/30/2014    DM (diabetes mellitus) ([facility] 07/30/2014    HTN (hypertension) 07/30/2014    Hyperlipidemia 07/30/2014    Frequent falls 07/30/2014     Current Outpatient Medications   Medication Sig Dispense Refill    montelukast (SINGULAIR) 10 mg tablet TAKE 1 TABLET BY MOUTH DAILY 90 Tab 1    empagliflozin (Jardiance) 25 mg tablet Take 1 Tab by mouth daily. 90 Tab 1    buPROPion SR (WELLBUTRIN SR) 150 mg SR tablet TAKE 1 TABLET EVERY DAY 90 Tab 1    isosorbide mononitrate ER (IMDUR) 60 mg CR tablet TAKE 1 TABLET EVERY MORNING 90 Tab 1    simvastatin (ZOCOR) 10 mg tablet TAKE 1 TABLET EVERY NIGHT 90 Tab 1    pramipexole (MIRAPEX) 0.75 mg tablet TAKE 3 TABLETS ONE TIME DAILY 270 Tab 1    metoprolol succinate (TOPROL-XL) 50 mg XL tablet TAKE 1 TABLET EVERY DAY 90 Tab 1    metFORMIN ER (GLUCOPHAGE XR) 500 mg tablet TAKE 2 TABLETS TWICE DAILY WITH MEALS 360 Tab 1    dulaglutide (Trulicity) 1.5 EV/0.6 mL sub-q pen 0.5 mL by SubCUTAneous route every seven (7) days. 12 Each 3    Accu-Chek Patricia Plus Meter misc USE AS DIRECTED TO CHECK BLOOD SUGAR (FOR DIABETES) 1 Each 0    cpap machine kit by Does Not Apply route.  Blood-Glucose Meter monitoring kit Use to check blood sugar 3 times daily. Dx E11.9. Please dispense brand preferred by insurance. 1 Kit 0    glucose blood VI test strips (ASCENSIA AUTODISC VI, ONE TOUCH ULTRA TEST VI) strip Use to check blood sugar 3 times daily. Dx E11.9. Please dispense brand preferred by insurance. 100 Strip 11    lancets misc Use to check blood sugar 3 times daily. Dx E11.9. Please dispense brand preferred by insurance. 805 Each 11    folic acid/multivit-min/lutein (CENTRUM SILVER PO) Take  by mouth.  lisinopriL (PRINIVIL, ZESTRIL) 20 mg tablet Take 1 Tab by mouth daily. 30 Tab 1    Syringe, Disposable, 3 mL syrg Take as directed 30 Syringe 1    Needle, Disp, 23 G 23 gauge x 1 1/4\" ndle Use to inject 0.5 ml every 7 days 30 Pen Needle 1    Needle, Disp, 18 G 18 gauge x 1\" ndle Use to aspirate 0.5 ml every 7 days 30 Pen Needle 1    Wheel Chair maura Assistance with ambulation, gait instability 1 Each 0    Back Brace misc 1 Device by Does Not Apply route daily as needed for Pain.  One, lumbosacral orthosis/back brace with metal struts      Medically necessary 1 Device 0    ALPRAZolam (XANAX) 0.5 mg tablet Take 1 Tab by mouth two (2) times daily as needed for Anxiety. Max Daily Amount: 1 mg. 30 Tab 0    polyethylene glycol (MIRALAX) 17 gram packet Take 1 Packet by mouth daily. Hold for loose stools (Patient taking differently: Take 17 g by mouth daily as needed. Hold for loose stools) 10 Each 0    naloxone 4 mg/actuation spry 4 mg by Nasal route as needed.  1 Box 0     Allergies   Allergen Reactions    Bactrim [Sulfamethoprim] Rash    Opana [Oxymorphone] Other (comments)     Feels like bug crawling under skin and drowziness     Past Medical History:   Diagnosis Date    Abdominal adhesions 7/30/2014    Back muscle spasm 7/30/2014    Back pain, lumbosacral     BPH (benign prostatic hyperplasia)     Cellulitis     Constipation, chronic 7/30/2014    DDD (degenerative disc disease), lumbar 7/30/2014    DDD (degenerative disc disease), thoracic 7/30/2014    Depression     Diabetes ([address]     DM (diabetes mellitus) ([address] 7/30/2014    Frequent falls 7/30/2014    TRUE (generalized anxiety disorder) 7/30/2014    High cholesterol     HTN (hypertension) 7/30/2014    Hydronephrosis     Hyperlipidemia 7/30/2014    Hypertension     Hypogonadism in male     Incomplete bladder emptying     Insomnia secondary to chronic pain 7/30/2014    Kidney stones     LBP radiating to both legs 7/30/2014    Lumbar facet arthropathy 7/30/2014    Lumbar nerve root impingement 7/30/2014    Lumbosacral radiculopathy at S1 7/30/2014    Major depression 7/30/2014    Nausea & vomiting     Neurological disorder     sciatica    MANISH (obstructive sleep apnea) 7/30/2014    S/P lumbar laminectomy 7/30/2014    S/P lumbar spinal fusion 7/30/2014    Small vessel disease ([address]     Thoracic compression fracture ([address] 07/30/2014    Unspecified sleep apnea     USES CPAP EVERY NIGHT     Social History     Tobacco Use    Smoking status: Never Smoker    Smokeless tobacco: Never Used   Substance Use Topics    Alcohol use: Yes       ROS: See HPI  Objective:     Patient-Reported Vitals 5/17/2021   Patient-Reported Weight -   Patient-Reported Height -   Patient-Reported Pulse 75   Patient-Reported Temperature -   Patient-Reported SpO2 -   Patient-Reported Systolic  536   Patient-Reported Diastolic 75      General: alert, cooperative, no distress   Mental  status: normal mood, behavior, speech, dress, motor activity, and thought processes, able to follow commands   HENT: NCAT   Neck: no visualized mass   Resp: no respiratory distress   Neuro: no gross deficits   Skin: no discoloration or lesions of concern on visible areas   Psychiatric: normal affect, consistent with stated mood, no evidence of hallucinations     Additional exam findings: We discussed the expected course, resolution and complications of the diagnosis(es) in detail. Medication risks, benefits, costs, interactions, and alternatives were discussed as indicated. I advised him to contact the office if his condition worsens, changes or fails to improve as anticipated. He expressed understanding with the diagnosis(es) and plan. Lorraine Rodriguez, was evaluated through a synchronous (real-time) audio-video encounter. The patient (or guardian if applicable) is aware that this is a billable service. Verbal consent to proceed has been obtained within the past 12 months. The visit was conducted pursuant to the emergency declaration under the [address] [place] [address] authority and the [employer] Act. Patient identification was verified, and a caregiver was present when appropriate. The patient was located in a state where the provider was credentialed to provide care.     Joaquni Kowalski MD

## 2021-05-21 DIAGNOSIS — E11.9 DIABETES MELLITUS WITHOUT COMPLICATION (HCC): ICD-10-CM

## 2021-05-21 RX ORDER — METFORMIN HYDROCHLORIDE 500 MG/1
TABLET, EXTENDED RELEASE ORAL
Qty: 360 TABLET | Refills: 1 | Status: SHIPPED | OUTPATIENT
Start: 2021-05-21 | End: 2021-08-27

## 2021-05-25 ENCOUNTER — OFFICE VISIT (OUTPATIENT)
Dept: ORTHOPEDIC SURGERY | Age: 66
End: 2021-05-25
Payer: MEDICARE

## 2021-05-25 VITALS
WEIGHT: 295 LBS | HEIGHT: 74 IN | OXYGEN SATURATION: 96 % | HEART RATE: 80 BPM | BODY MASS INDEX: 37.86 KG/M2 | TEMPERATURE: 96.9 F

## 2021-05-25 DIAGNOSIS — M25.371 ANKLE INSTABILITY, RIGHT: ICD-10-CM

## 2021-05-25 DIAGNOSIS — S86.312A TEAR OF PERONEAL TENDON, LEFT, INITIAL ENCOUNTER: ICD-10-CM

## 2021-05-25 DIAGNOSIS — M25.371 RIGHT ANKLE INSTABILITY: Primary | ICD-10-CM

## 2021-05-25 DIAGNOSIS — S84.11XA INJURY OF RIGHT PERONEAL NERVE, INITIAL ENCOUNTER: ICD-10-CM

## 2021-05-25 DIAGNOSIS — R29.898 ANKLE WEAKNESS: ICD-10-CM

## 2021-05-25 DIAGNOSIS — S86.311D TEAR OF PERONEAL TENDON, RIGHT, SUBSEQUENT ENCOUNTER: ICD-10-CM

## 2021-05-25 PROCEDURE — G8417 CALC BMI ABV UP PARAM F/U: HCPCS | Performed by: ORTHOPAEDIC SURGERY

## 2021-05-25 PROCEDURE — 3288F FALL RISK ASSESSMENT DOCD: CPT | Performed by: ORTHOPAEDIC SURGERY

## 2021-05-25 PROCEDURE — 1100F PTFALLS ASSESS-DOCD GE2>/YR: CPT | Performed by: ORTHOPAEDIC SURGERY

## 2021-05-25 PROCEDURE — G8756 NO BP MEASURE DOC: HCPCS | Performed by: ORTHOPAEDIC SURGERY

## 2021-05-25 PROCEDURE — 99214 OFFICE O/P EST MOD 30 MIN: CPT | Performed by: ORTHOPAEDIC SURGERY

## 2021-05-25 PROCEDURE — 3017F COLORECTAL CA SCREEN DOC REV: CPT | Performed by: ORTHOPAEDIC SURGERY

## 2021-05-25 PROCEDURE — G8427 DOCREV CUR MEDS BY ELIG CLIN: HCPCS | Performed by: ORTHOPAEDIC SURGERY

## 2021-05-25 PROCEDURE — 73610 X-RAY EXAM OF ANKLE: CPT | Performed by: ORTHOPAEDIC SURGERY

## 2021-05-25 PROCEDURE — G9717 DOC PT DX DEP/BP F/U NT REQ: HCPCS | Performed by: ORTHOPAEDIC SURGERY

## 2021-05-25 PROCEDURE — G8536 NO DOC ELDER MAL SCRN: HCPCS | Performed by: ORTHOPAEDIC SURGERY

## 2021-05-25 RX ORDER — CANE TIPS
EACH MISCELLANEOUS
COMMUNITY

## 2021-05-25 NOTE — PROGRESS NOTES
AMBULATORY PROGRESS NOTE      Patient: Victor Manuel Carranza             MRN: [id number]     SSN: [id number] Body mass index is 37.88 kg/m². YOB: 1955     AGE: 77 y.o. EX: male    PCP: Latasha Guevara MD       IMPRESSION //  DIAGNOSIS AND TREATMENT PLAN        Victor Manuel Carranza has a diagnosis of:      I had lengthy discussion with his individual, who is having worsening pain discomfort, and instability of the right ankle. He has a right peroneal tendon deficient right foot, has no eversion power, has some  plantar flexion strength weakness, as well her plantarflexion strength weakness as well. Toes inverters are work again, he has dorsiflexion dorsiflexors are working. An arthrodesis, I think with tendon transfer associated tib tendon transfer, will still I feel give him more support. In this individual, who has a large BMI, I am concerned that if I did tendon transfers only, on him he says potential for higher failure. This tendon transfer would more than likely consist of a transfer of the anterior or posterior tibial tendons, possible transfer the FHL tendon. EMG nerve conduction studies, will be conducted, assess the conduction signal to the FHL, as clinically has some's mild weakness to plantar flexion of the FHL. His inverters, and dorsiflexors, are functioning quite well. He is having worsening pain discomfort and instability ankle, and is requesting to have surgical intervention. DIAGNOSES    1. Right ankle instability    2. Tear of peroneal tendon, right, subsequent encounter    3. Tear of peroneal tendon, left, initial encounter    4. Ankle instability, right    5. Ankle weakness    6.  Injury of right peroneal nerve, initial encounter                     SCHEDULE SURGERY     Scheduling Instructions:      Scheduling Instructions            PATIENT NAME: Radha Madden Nataliia             PROCEDURE LOCATION: [address] [facility] [employer]       LENGTH OF PROCEDURE: 3 HOURS       ASSISTANT:  yes Abebe CONNOLLY       PROCEDURE DATE:   To be determined       ADMIT: Same Day Admit       (M25.371) Right ankle instability  (primary encounter diagnosis)       Plan: AMB POC XRAY, ANKLE; COMPLETE, 3+ VIE, NOVEL             CORONAVIRUS (COVID-19), CBC WITH AUTOMATED             DIFF, METABOLIC PANEL, COMPREHENSIVE, EKG, 12             LEAD, SUBSEQUENT, XR CHEST PA LAT, PROTHROMBIN             TIME + INR, VITAMIN D, 25 HYDROXY             (S86.311D) Tear of peroneal tendon, right, subsequent encounter       Plan: REFERRAL TO NEUROLOGY, NOVEL CORONAVIRUS             (COVID-19), CBC WITH AUTOMATED DIFF, METABOLIC             PANEL, COMPREHENSIVE, EKG, 12 LEAD, SUBSEQUENT,             PROTHROMBIN TIME + INR, VITAMIN D, 25 HYDROXY                   PROCEDURE: Right subtalar joint arthrodesis CPT 01101, talonavicular arthrodesis [id number]:[id number], bone grafting,  VIVIGEN one grafting cpt 48925, talonavicular capsular release CPT 56048, possible tendon transfer: Anterior tibial tendon transfer  40012            ANESTHESIA: general anesthesia and regional anesthesia             EQUIPMENT:                   HARDWARE: Synthes headless cannulated screws: Large, screws, Synthes bone staples, [employer] system      ALLOGRAFT: Allograft, VIVIGEN one grafting      C ARM: C-arm needed yes SURGERY REP:  , yes       PHONE NUMBER:             LABS PREOP       CBC with diff, CMP, PT/INR, CXR PA/LATERAL, EKG 12 Lead, 25 HYDROXY VITAMIN D             CLEARANCES: PCP yes we will need clearance from PCP             [address] [facility] Patient on Blood Thinners?: No: He is not on any blood thinners               Orders Placed This Encounter           AMB POC XRAY, ANKLE; COMPLETE, 3+ VIE           XR CHEST PA LAT           NOVEL CORONAVIRUS (COVID-19)           CBC WITH AUTOMATED DIFF           METABOLIC PANEL, COMPREHENSIVE           PROTHROMBIN TIME + INR           VITAMIN D, 25 HYDROXY           REFERRAL TO NEUROLOGY           EKG, 12 LEAD, SUBSEQUENT                 AMB POC XRAY, ANKLE; COMPLETE, 3+ VIE     Order Specific Question:   Reason for Exam     Answer:   PAIN    XR CHEST PA LAT     Standing Status:   Future     Standing Expiration Date:   7/24/2021     Scheduling Instructions:      Please recheck to confirm if:      1. Patient has Allergry to IV contrast dye      2. Patient is pregnant     Order Specific Question:   Reason for Exam     Answer:   Preop Risk stratification    NOVEL CORONAVIRUS (COVID-19)     Standing Status:   Future     Standing Expiration Date:   7/24/2021     Scheduling Instructions:      1) Due to current limited availability of the COVID-19 PCR test, tests will be prioritized and may not be completed.              2) Order only if the test result will change clinical management or necessary for a return to mission-critical employment decision.              3) Print and instruct patient to adhere to CDC home isolation program. (Link Above)              4) Set up or refer patient for a monitoring program.              5) Have patient sign up for and leverage [email or web address] (if not previously done). Order Specific Question:   Is this test for diagnosis or screening? Answer:   Diagnosis of ill patient     Order Specific Question:   Symptomatic for COVID-19 as defined by CDC? Answer:   No     Order Specific Question:   Hospitalized for COVID-19? Answer:   No     Order Specific Question:   Admitted to ICU for COVID-19? Answer:   No     Order Specific Question:   Employed in healthcare setting? Answer:   No     Order Specific Question:   Resident in a congregate (group) care setting? Answer:   No     Order Specific Question:   Previously tested for COVID-19?      Answer:   No    CBC WITH AUTOMATED DIFF     Standing Status:   Future     Standing Expiration Date:   1/70/6557    METABOLIC PANEL, COMPREHENSIVE     Standing Status:   Future Standing Expiration Date:   7/24/2021    PROTHROMBIN TIME + INR     Standing Status:   Future     Standing Expiration Date:   7/24/2021    VITAMIN D, 25 HYDROXY     Standing Status:   Future     Standing Expiration Date:   5/25/2022    REFERRAL TO NEUROLOGY     Referral Priority:   Routine     Referral Type:   Consultation     Referral Reason:   Specialty Services Required     Requested Specialty:   Neurology     Number of Visits Requested:   1    EKG, 12 LEAD, SUBSEQUENT     preop     Standing Status:   Future     Standing Expiration Date:   11/25/2021     Order Specific Question:   Reason for Exam:     Answer:   risk stratify    Cane maura     Sig: by Does Not Apply route. PLAN:    1. I will obtain a 3-view x-ray of his right ankle in the office today. 2. I will provide a referral to a neurologist: Please assess right posterior tibial and anterior tendon/ and FHL electromyography data. 3. I will perform a fusion surgery on his right ankle. 4.  PCP, will need to see him, prior to surgery  5. Will need weightbearing foot x-rays, to determine the pitch of the TN region. RTO-after he has his EMG nerve conduction studies, by the neurologist.    Aaron Joseluis  expresses understanding of the diagnosis, treatment plan, and all of their proposed questions were answered to their satisfaction. Patient education has been provided re the diagnoses. HPI //  Jerrica [address] A 77 y.o. male who is a/an  established patient, presenting to my outpatient office for evaluation of  the following chief complaint(s):     Chief Complaint   Patient presents with    Ankle Pain     right ankle     At [facility] patient was here with his wife to discuss treatment options for his right foot and ankle deficit, and deformity. He has diabetic neuropathy peroneal tendon insufficiency over pull of the hindfoot at the medial inverters (muscles.     Since LOV pt continued to endorse worsening right ankle pain. He notes the right ankle pain has not helped his right ankle pain. Pt communicates he feels like one day he is going to wake up and not be able to ambulate. Pt endorses right ankle pain that radiates upward to his back. Pt notes he was scheduled for a fusion surgery by me, but it was not followed through to the COVID-19 pandemic. Denies any h/o of multiple sclerosis, previous stroke, high blood pressure, usage of steroids, or kidney issues. He has a small heart vessel disease, and has seen a cardiologist before.        Visit Vitals  Pulse 80   Temp 96.9 °F (36.1 °C) (Temporal)   Ht 6' 2\" (1.88 m)   Wt 295 lb (133.8 kg)   SpO2 96%   BMI 37.88 kg/m²       Appearance: Alert, well appearing and pleasant patient who is in no distress, oriented to person, place/time, and who follows commands. This patient is unaccompanied in the examination room ( wife was present at his prior evaluation)  Dementia: no dementia  Psychiatric: Affect and mood are appropriate. Patient arrives to office via: with assistive device: cane and SMO  HEENT: Head normocephalic & atraumatic. Both pupils are round, non icteric sclera              Eye: EOM are intact and sclera are clear               Neck: ROM WNL and JVD neck is not present              Hearings Intact, does not require hearing aid device  Respiratory: Breathing is unlabored without accessory chest muscle use  Cardiovascular/Peripheral Vascular: Normal Pulses to each foot     ANKLE/FOOT right     Gait: uses cane and SMO  Tenderness: No specific tenderness, today while examined him in the studies in a seated position  Cutaneous: He does have some swelling, but this 1-2+ pitting edema to his right and left lower extremity at the supra Maller region ankle primarily in towards the mid tib-fib regions. His calf on each side is distinctly soft non tender without any nodules.   Joint Motion: His right foot, is held inverted, can isabella into neutral recommend to neutral hypertension neutral, he does have Pez cavovarus type alignment however. Joint / Tendon Stability: No Ankle or Subtalar instability or joint laxity.                                           No peroneal sublux ability or dislocation  Alignment: Pes cavus alignment bilaterally                     Varus alignment of the right tibia, bowing of the RLE relative to the LLE. Neuro: Decreased monofilament testing to the plantar hindfoot              Cannot isabella              DF 4/5              PF 5/5              Inversion 5/5              Anterior tibial tendon 5/5              Posterior tibial tendon 5/5              EDC 5/5              EHL 4/5   FHL: 4-/5     Vascular: NL foot/ankle pulses.                  1+ Pitting edema, bilaterally  Lymphatics: No extremity lymphedema, No calf swelling, no tenderness to calf muscles. CHART REVIEW     Irene Knowles has been experiencing pain and discomfort confirmed as outlined in the pain assessment outlined below.  was reviewed by Alf Juares MD on 5/25/2021. Pain Assessment  5/25/2021   Location of Pain Ankle   Location Modifiers Right   Severity of Pain 8   Quality of Pain Sharp;Locking; Popping   Duration of Pain Persistent   Frequency of Pain Constant   Aggravating Factors Stairs; Walking;Standing;Bending   Limiting Behavior Yes   Relieving Factors Nothing   Result of Injury No        Shannan William  has a past medical history of Abdominal adhesions (7/30/2014), Back muscle spasm (7/30/2014), Back pain, lumbosacral, BPH (benign prostatic hyperplasia), Cellulitis, Constipation, chronic (7/30/2014), DDD (degenerative disc disease), lumbar (7/30/2014), DDD (degenerative disc disease), thoracic (7/30/2014), Depression, Diabetes ([address] DM (diabetes mellitus) ([address] (7/30/2014), Frequent falls (7/30/2014), TRUE (generalized anxiety disorder) (7/30/2014), High cholesterol, HTN (hypertension) (7/30/2014), Hydronephrosis, Hyperlipidemia (7/30/2014), Hypertension, Hypogonadism in male, Incomplete bladder emptying, Insomnia secondary to chronic pain (7/30/2014), Kidney stones, LBP radiating to both legs (7/30/2014), Lumbar facet arthropathy (7/30/2014), Lumbar nerve root impingement (7/30/2014), Lumbosacral radiculopathy at S1 (7/30/2014), Major depression (7/30/2014), Nausea & vomiting, Neurological disorder, MANISH (obstructive sleep apnea) (7/30/2014), S/P lumbar laminectomy (7/30/2014), S/P lumbar spinal fusion (7/30/2014), Small vessel disease ([address] Thoracic compression fracture ([address] (07/30/2014), and Unspecified sleep apnea.      Patients is employed at:         Past Medical History:   Diagnosis Date    Abdominal adhesions 7/30/2014    Back muscle spasm 7/30/2014    Back pain, lumbosacral     BPH (benign prostatic hyperplasia)     Cellulitis     Constipation, chronic 7/30/2014    DDD (degenerative disc disease), lumbar 7/30/2014    DDD (degenerative disc disease), thoracic 7/30/2014    Depression     Diabetes ([address]     DM (diabetes mellitus) ([address] 7/30/2014    Frequent falls 7/30/2014    TRUE (generalized anxiety disorder) 7/30/2014    High cholesterol     HTN (hypertension) 7/30/2014    Hydronephrosis     Hyperlipidemia 7/30/2014    Hypertension     Hypogonadism in male     Incomplete bladder emptying     Insomnia secondary to chronic pain 7/30/2014    Kidney stones     LBP radiating to both legs 7/30/2014    Lumbar facet arthropathy 7/30/2014    Lumbar nerve root impingement 7/30/2014    Lumbosacral radiculopathy at S1 7/30/2014    Major depression 7/30/2014    Nausea & vomiting     Neurological disorder     sciatica    MANISH (obstructive sleep apnea) 7/30/2014    S/P lumbar laminectomy 7/30/2014    S/P lumbar spinal fusion 7/30/2014    Small vessel disease ([address]     Thoracic compression fracture ([address] 07/30/2014    Unspecified sleep apnea     USES CPAP EVERY NIGHT     Past Surgical History:   Procedure Laterality Date    HX APPENDECTOMY      HX BACK SURGERY      Lumbar fusion    HX HERNIA REPAIR  1992    HX OTHER SURGICAL      EMG - S1 disorder    HX OTHER SURGICAL  10/2017    spinal cord stimulator    HX VASECTOMY  1985     Current Outpatient Medications   Medication Sig    Cane maura by Does Not Apply route.  metFORMIN ER (GLUCOPHAGE XR) 500 mg tablet TAKE 2 TABLETS TWICE DAILY WITH MEALS    solifenacin (VESICARE) 5 mg tablet Take 1 Tab by mouth daily.  montelukast (SINGULAIR) 10 mg tablet TAKE 1 TABLET BY MOUTH DAILY    empagliflozin (Jardiance) 25 mg tablet Take 1 Tab by mouth daily.  buPROPion SR (WELLBUTRIN SR) 150 mg SR tablet TAKE 1 TABLET EVERY DAY    isosorbide mononitrate ER (IMDUR) 60 mg CR tablet TAKE 1 TABLET EVERY MORNING    simvastatin (ZOCOR) 10 mg tablet TAKE 1 TABLET EVERY NIGHT    pramipexole (MIRAPEX) 0.75 mg tablet TAKE 3 TABLETS ONE TIME DAILY    metoprolol succinate (TOPROL-XL) 50 mg XL tablet TAKE 1 TABLET EVERY DAY    dulaglutide (Trulicity) 1.5 TW/1.9 mL sub-q pen 0.5 mL by SubCUTAneous route every seven (7) days.  Accu-Chek Patricia Plus Meter misc USE AS DIRECTED TO CHECK BLOOD SUGAR (FOR DIABETES)    cpap machine kit by Does Not Apply route.  Blood-Glucose Meter monitoring kit Use to check blood sugar 3 times daily. Dx E11.9. Please dispense brand preferred by insurance.  glucose blood VI test strips (ASCENSIA AUTODISC VI, ONE TOUCH ULTRA TEST VI) strip Use to check blood sugar 3 times daily. Dx E11.9. Please dispense brand preferred by insurance.  lancets misc Use to check blood sugar 3 times daily. Dx E11.9. Please dispense brand preferred by insurance.  folic acid/multivit-min/lutein (CENTRUM SILVER PO) Take  by mouth.  lisinopriL (PRINIVIL, ZESTRIL) 20 mg tablet Take 1 Tab by mouth daily.     Syringe, Disposable, 3 mL syrg Take as directed    Needle, Disp, 23 G 23 gauge x 1 1/4\" ndle Use to inject 0.5 ml every 7 days    Needle, Disp, 18 G 18 gauge x 1\" ndle Use to aspirate 0.5 ml every 7 days   [employer] maura Assistance with ambulation, gait instability    polyethylene glycol (MIRALAX) 17 gram packet Take 1 Packet by mouth daily. Hold for loose stools (Patient taking differently: Take 17 g by mouth daily as needed. Hold for loose stools)    naloxone 4 mg/actuation spry 4 mg by Nasal route as needed.  Back Brace misc 1 Device by Does Not Apply route daily as needed for Pain. One, lumbosacral orthosis/back brace with metal struts      Medically necessary    ALPRAZolam (XANAX) 0.5 mg tablet Take 1 Tab by mouth two (2) times daily as needed for Anxiety. Max Daily Amount: 1 mg. (Patient not taking: Reported on 5/25/2021)     No current facility-administered medications for this visit. Allergies   Allergen Reactions    Bactrim [Sulfamethoprim] Rash    Opana [Oxymorphone] Other (comments)     Feels like bug crawling under skin and drowziness     Social History     Occupational History    Not on file   Tobacco Use    Smoking status: Never Smoker    Smokeless tobacco: Never Used   Substance and Sexual Activity    Alcohol use:  Yes    Drug use: No    Sexual activity: Not Currently     Partners: Female     Birth control/protection: Surgical     Family History   Problem Relation Age of Onset    Diabetes Mother     Hypertension Mother     Stroke Mother     Heart Attack Father     Diabetes Father     Stroke Father     Diabetes Sister     Stroke Brother         DIAGNOSTIC LAB DATA      Lab Results   Component Value Date/Time    Hemoglobin A1c 7.3 (H) 05/15/2021 07:28 AM    Hemoglobin A1c 8.1 (H) 02/12/2021 09:37 AM    Hemoglobin A1c 7.8 (H) 10/30/2020 03:37 PM    //   Lab Results   Component Value Date/Time    Glucose 134 (H) 02/12/2021 09:37 AM    Glucose (POC) 93 02/25/2019 10:49 AM        Lab Results   Component Value Date/Time    Hemoglobin A1c (POC) 8.1 09/13/2019 01:58 PM         Lab Results   Component Value Date/Time    Vitamin D 25-Hydroxy 38.6 12/07/2016 09:27 AM         REVIEW OF SYSTEMS : 5/25/2021  ALL BELOW ARE Negative except : SEE HPI     All other systems reviewed and are negative. 12 point review of systems otherwise negative unless noted in HPI. DIAGNOSTIC IMAGING /ORDERS      ANKLE X RAYS 3 VIEWS Right   X RAYS AT [address] [place] [address]  5/25/2021    WEIGHT BEARING    X RAYS AT [address] [place] [address]  5/25/2021    Bones: No fractures or dislocations. No focal osteolytic or osteoblastic process     Bone Spurs: No significant bone spurs  Alignment: Ankle mortise alignment is congruent, Tibial plafond and talar dome intact. No Osteochondral defects seen   the ankle subtalar and transverse tarsal joints are still well-maintained the lateral radiographic x-ray. There is no evidence of metatarsus adductus, evidence of inversion of the hindfoot. Joint: No Significant OA changes present  Soft Tissues: Normal, No radiopaque foreign body     No abnormal calcific densities to soft tissues    No ankle joint effusion in lateral projection. Mineralization: Suggests no Osteopenia    I have personally reviewed the results of the above study. The interpretation of this study is my professional opinion             On this date 05/25/2021 I have spent 42 minutes reviewing previous notes, test results and face to face with the patient discussing the diagnosis and importance of compliance with the treatment plan as well as documenting on the day of the visit. An electronic signature was used to authenticate this note. Disclaimer: Sections of this note are dictated using utilizing voice recognition software, which may have resulted in some phonetic based errors in grammar and contents. Even though attempts were made to correct all the mistakes, some may have been missed, and remained in the body of the document. If questions arise, please contact our department.        Virgil Von may have a reminder for a \"due or due soon\" health maintenance. I have asked that he contact his primary care provider for follow-up on this health maintenance. Michelle Torres, as dictated by Renaldo Kahn MD  5/25/2021  8:36 AM

## 2021-05-26 DIAGNOSIS — M25.371 INSTABILITY OF ANKLE JOINT, RIGHT: Primary | ICD-10-CM

## 2021-06-20 DIAGNOSIS — I10 ESSENTIAL HYPERTENSION: ICD-10-CM

## 2021-06-21 RX ORDER — METOPROLOL SUCCINATE 50 MG/1
TABLET, EXTENDED RELEASE ORAL
Qty: 90 TABLET | Refills: 1 | Status: SHIPPED | OUTPATIENT
Start: 2021-06-21 | End: 2021-11-10

## 2021-06-21 RX ORDER — PRAMIPEXOLE DIHYDROCHLORIDE 0.75 MG/1
TABLET ORAL
Qty: 270 TABLET | Refills: 1 | Status: SHIPPED | OUTPATIENT
Start: 2021-06-21 | End: 2021-11-15

## 2021-07-12 ENCOUNTER — TELEPHONE (OUTPATIENT)
Dept: FAMILY MEDICINE CLINIC | Age: 66
End: 2021-07-12

## 2021-07-12 DIAGNOSIS — Z01.812 PRE-OPERATIVE LABORATORY EXAMINATION: ICD-10-CM

## 2021-07-12 DIAGNOSIS — S86.311D TEAR OF PERONEAL TENDON, RIGHT, SUBSEQUENT ENCOUNTER: ICD-10-CM

## 2021-07-12 DIAGNOSIS — Z01.811 PRE-OPERATIVE RESPIRATORY EXAMINATION: ICD-10-CM

## 2021-07-12 DIAGNOSIS — Z01.810 PRE-OPERATIVE CARDIOVASCULAR EXAMINATION: ICD-10-CM

## 2021-07-12 DIAGNOSIS — M25.371 RIGHT ANKLE INSTABILITY: Primary | ICD-10-CM

## 2021-07-12 NOTE — TELEPHONE ENCOUNTER
Date of surgery 7/23/2021  Type of surgery Right ankle tendon transfer or possible fusion  Facility that procedure will be performed [facility] name that will perform procedure Dr. Enoch Horowitz  Anesthesia type General   Pre op testing date  Clear patient in note of forms will be faxed to [facility] of contact in specialty office:   Name Isa Garett    phone [phone]    fax [phone]

## 2021-07-14 NOTE — TELEPHONE ENCOUNTER
Spoke with Yee Pacheco and she indicated that surgery for right now is postponed to an unknown date if at all possible. Dr. Shubham Ho needs to talk to patient about results.

## 2021-07-14 NOTE — TELEPHONE ENCOUNTER
2:45 double book on July 16th will work? ? What surgery and when is the surgery? ?  They would have all pre op labs done? ?    Haseeb Murillo

## 2021-07-19 ENCOUNTER — OFFICE VISIT (OUTPATIENT)
Dept: ORTHOPEDIC SURGERY | Age: 66
End: 2021-07-19
Payer: MEDICARE

## 2021-07-19 VITALS
WEIGHT: 291 LBS | RESPIRATION RATE: 15 BRPM | OXYGEN SATURATION: 90 % | HEART RATE: 86 BPM | HEIGHT: 74 IN | BODY MASS INDEX: 37.35 KG/M2

## 2021-07-19 DIAGNOSIS — S84.11XD INJURY OF RIGHT PERONEAL NERVE, SUBSEQUENT ENCOUNTER: Primary | ICD-10-CM

## 2021-07-19 DIAGNOSIS — M25.371 RIGHT ANKLE INSTABILITY: ICD-10-CM

## 2021-07-19 DIAGNOSIS — R29.898 ANKLE WEAKNESS: ICD-10-CM

## 2021-07-19 DIAGNOSIS — S86.311D TEAR OF PERONEAL TENDON, RIGHT, SUBSEQUENT ENCOUNTER: ICD-10-CM

## 2021-07-19 DIAGNOSIS — M25.371 ANKLE INSTABILITY, RIGHT: ICD-10-CM

## 2021-07-19 DIAGNOSIS — M79.671 RIGHT FOOT PAIN: ICD-10-CM

## 2021-07-19 PROCEDURE — 3288F FALL RISK ASSESSMENT DOCD: CPT | Performed by: ORTHOPAEDIC SURGERY

## 2021-07-19 PROCEDURE — G8427 DOCREV CUR MEDS BY ELIG CLIN: HCPCS | Performed by: ORTHOPAEDIC SURGERY

## 2021-07-19 PROCEDURE — 73620 X-RAY EXAM OF FOOT: CPT | Performed by: ORTHOPAEDIC SURGERY

## 2021-07-19 PROCEDURE — G8756 NO BP MEASURE DOC: HCPCS | Performed by: ORTHOPAEDIC SURGERY

## 2021-07-19 PROCEDURE — G8536 NO DOC ELDER MAL SCRN: HCPCS | Performed by: ORTHOPAEDIC SURGERY

## 2021-07-19 PROCEDURE — 1100F PTFALLS ASSESS-DOCD GE2>/YR: CPT | Performed by: ORTHOPAEDIC SURGERY

## 2021-07-19 PROCEDURE — 99214 OFFICE O/P EST MOD 30 MIN: CPT | Performed by: ORTHOPAEDIC SURGERY

## 2021-07-19 PROCEDURE — G9717 DOC PT DX DEP/BP F/U NT REQ: HCPCS | Performed by: ORTHOPAEDIC SURGERY

## 2021-07-19 PROCEDURE — G8417 CALC BMI ABV UP PARAM F/U: HCPCS | Performed by: ORTHOPAEDIC SURGERY

## 2021-07-19 PROCEDURE — 3017F COLORECTAL CA SCREEN DOC REV: CPT | Performed by: ORTHOPAEDIC SURGERY

## 2021-07-19 NOTE — PROGRESS NOTES
AMBULATORY PROGRESS NOTE      Patient: Pérez Benoit             MRN: [id number]     SSN: [id number] Body mass index is 37.36 kg/m². YOB: 1955     AGE: 77 y.o. 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 Dr. Eloise Howell, performed EMG nerve conduction study, him on him most recently. I needed obtain the actual reading results of this. Clinically, patient has no plantarflexion strength, no eversion strength, has limited dorsiflexion strength, and has pretty strong inversion strength. She really has a peroneal deficient, tibial nerve deficient her plantarflexion deficient foot    And has poor control of the ankle. Nonsurgically, and an [place] brace will serve him the best, otherwise surgical invention, would consist of a ankle arthrodesis tibiotalar subtalar arthrodesis possibly TN talonavicular arthrodesis to controlled abduction of his foot at the hindfoot. The discussion was conducted with him and his wife. Recommendations nondecisional surgery is profound neuropathy, from diabetes, but also has motor neuropathy. Is effectively peroneal deficient foot, tibial nerve deficient foot. He clearly needs support, your to longer walk and stand and have better posture. Biola Orf is recommended first for him, at this current time she require any surgical invention, this would be an ankle or hindfoot arthrodesis with a talonavicular arthrodesis as well. Nonoperative nonsurgical surgery at this current time. DIAGNOSES    1. Injury of right peroneal nerve, subsequent encounter    2. Tear of peroneal tendon, right, subsequent encounter    3. Right ankle instability    4. Ankle instability, right    5. Ankle weakness    6.  Right foot pain        Orders Placed This Encounter    Generic Supply Order     Right custom [place] Brace    [id number] Foot 2V     Order Specific Question:   Weight bearing? Answer:   Yes        PLAN:    1. Custom [place] brace: This will provide 3D mechanical support, triplanar support optimize his ability to stand and walk and have improved balance. He is tried over-the-counter inserts, custom extended SMO braces, but nothing is uniformly giving him the support that he needs. He is failed the extended SMO brace, as when he stands he still walks with his hindfoot and some varus, and that the brace is putting pressure on the medial side of his skin so far there is no wound, but with continues of his excellent asthma brace, he will develop my opinion, and medial ankle wound  2. Continue use of a walker for balance or cane      RTO-6-week follow-up    Pooja William  expresses understanding of the diagnosis, treatment plan, and all of their proposed questions were answered to their satisfaction. Patient education has been provided re the diagnoses. HPI //  Jerrica [address] A 77 y.o. male who is a/an  established patient, presenting to my outpatient office for evaluation of  the following chief complaint(s):     Chief Complaint   Patient presents with    Foot Pain     right foot pain, discuss sx       At [facility] patient presented with right ankle pain. Obtained a 3-view x ray of his right ankle. Provided a referral to a neurologist to assess his right posterior tibial and anterior tendon and FHL electromyography data. I plan to perform a fusion surgery on his right ankle. Instructed pt to see PCP prior to surgery. I will obtain weightbearing foot x rays . Since LOV    Visit Vitals  Pulse 86   Resp 15   Ht 6' 2\" (1.88 m)   Wt 291 lb (132 kg)   SpO2 90%   BMI 37.36 kg/m²       Appearance: Alert, well appearing and pleasant patient who is in no distress, oriented to person, place/time, and who follows commands. This patient is accompanied in the examination room by his  spouse. There is signs of: no dementia  Psychiatric: Affect/mood are appropriate. Speech normal in context and clarity, memory intact grossly, no involuntary movements - tremors. Patient arrives to office via: with assistive device: Cane an extended SMO brace on this right side  H EENT (2): Head normocephalic & atraumatic. Eye: pupils are round// EOM are intact // Neck: ROM WNL  // Hearings Intact   Respiratory: Breathing non labored     ANKLE/FOOT right    Gait:  Limp, and has a inverted it, right hindfoot, is apparent on the effective peroneal deficient foot with a foot drop  Tenderness: mild fifth interspace, responds of pressure on this region,  Cutaneous:   WNL. Joint Motion: Has functional range of motion of the ankle passively and hindfoot passively, is correctable to neutral at this current time  Joint / Tendon Stability: Ankle instability, due to his foot being peroneal deficient foot and tibial nerve deficient foot, which he has effectively no eversion strength, has very poor plantarflexion strength, has overpull of the anterior tibial and FDL tendons, and slight lower pole anterior tibial tendon. Alignment: mild varus Hindfoot,    Neuro Motor/Sensory: NL/NL  Vascular: NL foot/ankle pulses,   Lymphatics: No extremity lymphedema, No calf swelling, no tenderness to calf muscles. CHART REVIEW     Feliberto Kim has been experiencing pain and discomfort confirmed as outlined in the pain assessment outlined below.  was reviewed by Eddie Melgar. Shubham Ho MD on 7/19/2021.      Pain Assessment  7/19/2021   Location of Pain Foot   Location Modifiers Right   Severity of Pain 10   Quality of Pain Aching   Duration of Pain Persistent   Frequency of Pain Constant   Aggravating Factors Walking   Limiting Behavior -   Relieving Factors -   Result of Injury -        Josiane William  has a past medical history of Abdominal adhesions (7/30/2014), Back muscle spasm (7/30/2014), Back pain, lumbosacral, BPH (benign prostatic hyperplasia), Cellulitis, Constipation, chronic (7/30/2014), DDD (degenerative disc disease), lumbar (7/30/2014), DDD (degenerative disc disease), thoracic (7/30/2014), Depression, Diabetes ([address] DM (diabetes mellitus) ([address] (7/30/2014), Frequent falls (7/30/2014), TRUE (generalized anxiety disorder) (7/30/2014), High cholesterol, HTN (hypertension) (7/30/2014), Hydronephrosis, Hyperlipidemia (7/30/2014), Hypertension, Hypogonadism in male, Incomplete bladder emptying, Insomnia secondary to chronic pain (7/30/2014), Kidney stones, LBP radiating to both legs (7/30/2014), Lumbar facet arthropathy (7/30/2014), Lumbar nerve root impingement (7/30/2014), Lumbosacral radiculopathy at S1 (7/30/2014), Major depression (7/30/2014), Nausea & vomiting, Neurological disorder, MANISH (obstructive sleep apnea) (7/30/2014), S/P lumbar laminectomy (7/30/2014), S/P lumbar spinal fusion (7/30/2014), Small vessel disease ([address] Thoracic compression fracture ([address] (07/30/2014), and Unspecified sleep apnea.      Patients is employed at:         Past Medical History:   Diagnosis Date    Abdominal adhesions 7/30/2014    Back muscle spasm 7/30/2014    Back pain, lumbosacral     BPH (benign prostatic hyperplasia)     Cellulitis     Constipation, chronic 7/30/2014    DDD (degenerative disc disease), lumbar 7/30/2014    DDD (degenerative disc disease), thoracic 7/30/2014    Depression     Diabetes ([address]     DM (diabetes mellitus) ([address] 7/30/2014    Frequent falls 7/30/2014    TRUE (generalized anxiety disorder) 7/30/2014    High cholesterol     HTN (hypertension) 7/30/2014    Hydronephrosis     Hyperlipidemia 7/30/2014    Hypertension     Hypogonadism in male     Incomplete bladder emptying     Insomnia secondary to chronic pain 7/30/2014    Kidney stones     LBP radiating to both legs 7/30/2014    Lumbar facet arthropathy 7/30/2014    Lumbar nerve root impingement 7/30/2014    Lumbosacral radiculopathy at S1 7/30/2014    Major depression 7/30/2014    Nausea & vomiting     Neurological disorder     sciatica    MANISH (obstructive sleep apnea) 7/30/2014    S/P lumbar laminectomy 7/30/2014    S/P lumbar spinal fusion 7/30/2014    Small vessel disease (HCC)     Thoracic compression fracture ([address] 07/30/2014    Unspecified sleep apnea     USES CPAP EVERY NIGHT     Past Surgical History:   Procedure Laterality Date    HX APPENDECTOMY      HX BACK SURGERY      Lumbar fusion    HX HERNIA REPAIR  1992    HX OTHER SURGICAL      EMG - S1 disorder    HX OTHER SURGICAL  10/2017    spinal cord stimulator    HX VASECTOMY  1985     Current Outpatient Medications   Medication Sig    buPROPion SR (WELLBUTRIN SR) 150 mg SR tablet TAKE 1 TABLET EVERY DAY    simvastatin (ZOCOR) 10 mg tablet TAKE 1 TABLET EVERY NIGHT    isosorbide mononitrate ER (IMDUR) 60 mg CR tablet TAKE 1 TABLET EVERY MORNING    metoprolol succinate (TOPROL-XL) 50 mg XL tablet TAKE 1 TABLET EVERY DAY    pramipexole (MIRAPEX) 0.75 mg tablet TAKE 3 TABLETS ONE TIME DAILY    Cane maura by Does Not Apply route.  metFORMIN ER (GLUCOPHAGE XR) 500 mg tablet TAKE 2 TABLETS TWICE DAILY WITH MEALS    solifenacin (VESICARE) 5 mg tablet Take 1 Tab by mouth daily.  montelukast (SINGULAIR) 10 mg tablet TAKE 1 TABLET BY MOUTH DAILY    empagliflozin (Jardiance) 25 mg tablet Take 1 Tab by mouth daily.  dulaglutide (Trulicity) 1.5 GINO/5.7 mL sub-q pen 0.5 mL by SubCUTAneous route every seven (7) days.  Accu-Chek Patricia Plus Meter misc USE AS DIRECTED TO CHECK BLOOD SUGAR (FOR DIABETES)    cpap machine kit by Does Not Apply route.  Blood-Glucose Meter monitoring kit Use to check blood sugar 3 times daily. Dx E11.9. Please dispense brand preferred by insurance.  glucose blood VI test strips (ASCENSIA AUTODISC VI, ONE TOUCH ULTRA TEST VI) strip Use to check blood sugar 3 times daily. Dx E11.9. Please dispense brand preferred by insurance.  lancets misc Use to check blood sugar 3 times daily. Dx E11.9. Please dispense brand preferred by insurance.  folic acid/multivit-min/lutein (CENTRUM SILVER PO) Take  by mouth.  lisinopriL (PRINIVIL, ZESTRIL) 20 mg tablet Take 1 Tab by mouth daily.  Syringe, Disposable, 3 mL syrg Take as directed    Needle, Disp, 23 G 23 gauge x 1 1/4\" ndle Use to inject 0.5 ml every 7 days    Needle, Disp, 18 G 18 gauge x 1\" ndle Use to aspirate 0.5 ml every 7 days   [place]-Jessica Chair maura Assistance with ambulation, gait instability    polyethylene glycol (MIRALAX) 17 gram packet Take 1 Packet by mouth daily. Hold for loose stools (Patient taking differently: Take 17 g by mouth daily as needed. Hold for loose stools)    naloxone 4 mg/actuation spry 4 mg by Nasal route as needed.  Back Brace [facility] 1 Device by Does Not Apply route daily as needed for Pain. One, lumbosacral orthosis/back brace with metal struts      Medically necessary    ALPRAZolam (XANAX) 0.5 mg tablet Take 1 Tab by mouth two (2) times daily as needed for Anxiety. Max Daily Amount: 1 mg. (Patient not taking: Reported on 5/25/2021)     No current facility-administered medications for this visit. Allergies   Allergen Reactions    Bactrim [Sulfamethoprim] Rash    Opana [Oxymorphone] Other (comments)     Feels like bug crawling under skin and drowziness     Social History     Occupational History    Not on file   Tobacco Use    Smoking status: Never Smoker    Smokeless tobacco: Never Used   Substance and Sexual Activity    Alcohol use:  Yes    Drug use: No    Sexual activity: Not Currently     Partners: Female     Birth control/protection: Surgical     Family History   Problem Relation Age of Onset    Diabetes Mother     Hypertension Mother     Stroke Mother     Heart Attack Father     Diabetes Father     Stroke Father     Diabetes Sister     Stroke Brother         DIAGNOSTIC LAB DATA Lab Results   Component Value Date/Time    Hemoglobin A1c 7.3 (H) 05/15/2021 07:28 AM    Hemoglobin A1c 8.1 (H) 02/12/2021 09:37 AM    Hemoglobin A1c 7.8 (H) 10/30/2020 03:37 PM    //   Lab Results   Component Value Date/Time    Glucose 134 (H) 02/12/2021 09:37 AM    Glucose (POC) 93 02/25/2019 10:49 AM        Lab Results   Component Value Date/Time    Hemoglobin A1c (POC) 8.1 09/13/2019 01:58 PM         Lab Results   Component Value Date/Time    Vitamin D 25-Hydroxy 38.6 12/07/2016 09:27 AM         REVIEW OF SYSTEMS : 7/19/2021  ALL BELOW ARE Negative except : SEE HPI     All other systems reviewed and are negative. 12 point review of systems otherwise negative unless noted in HPI. DIAGNOSTIC IMAGING /ORDERS       Orders Placed This Encounter    Generic Supply Order     Right custom [place] Brace    [id number] Foot 2V     Order Specific Question:   Weight bearing? Answer:   Yes      FOOT X RAYS 2 VIEWS Right   7/19/2021    WEIGHT BEARING    X RAYS AT [address] [place] [address]  7/19/2021      Bones: No fractures or dislocations. No focal osteolytic or osteoblastic process     Bone Spurs: No significant bone spurs  Foot Alignment: Pes cavus type of alignment high arch foot, the ankle is concentric aligned on lateral x-ray, the ankle sitting nicely in the mortise, with some evidence of inversion overpull, but no varus or valgus malalignment with ankle. Metatarsus adductus, mild, on these weightbearing films. Joint Condition: No Significant OA  Soft Tissues: Normal, No radiopaque foreign body and No abnormal calcific densities to soft tissues   No ankle joint effusion in lateral projection. Mineralization: Suggests  no Osteopenia    I have personally reviewed the results of the above study and the interpretation of this study is my professional opinion     I have reviewed the results of the above study. The interpretation of this study is my professional opinion.             On this date 07/19/2021 I have spent 35 minutes reviewing previous notes, test results and face to face with the patient discussing the diagnosis and importance of compliance with the treatment plan as well as documenting on the day of the visit. An electronic signature was used to authenticate this note. Disclaimer: Sections of this note are dictated using utilizing voice recognition software, which may have resulted in some phonetic based errors in grammar and contents. Even though attempts were made to correct all the mistakes, some may have been missed, and remained in the body of the document. If questions arise, please contact our department. Ibrahima Roy may have a reminder for a \"due or due soon\" health maintenance. I have asked that he contact his primary care provider for follow-up on this health maintenance. Belia Smith, as dictated by Ba Kahn MD  7/19/2021  8:15 AM

## 2021-08-16 ENCOUNTER — TELEPHONE (OUTPATIENT)
Dept: FAMILY MEDICINE CLINIC | Age: 66
End: 2021-08-16

## 2021-08-16 NOTE — TELEPHONE ENCOUNTER
This pharmacy faxed over request for the following prescriptions to be filled:    Medication requested :   Requested Prescriptions     Pending Prescriptions Disp Refills    montelukast (SINGULAIR) 10 mg tablet 90 Tablet 1     PCP: Nahum Philippe or Print: [employer]  Mail order or Local pharmacy Mail Order    Scheduled appointment if not seen by current providers in office: lov 5/17/2021 f/u 9/22/2021

## 2021-08-16 NOTE — TELEPHONE ENCOUNTER
Called pt to schedule follow up appt. Appt has been scheduled for 9/22/2021 and pt wanted to know does he need to get his a1c checked prior to his visit. Please advise.

## 2021-08-17 NOTE — TELEPHONE ENCOUNTER
Contacted patient and verified identity using name and date of birth (2- identifiers)  Spoke with patient and advised that we will order labs at office visit.  He is not overdue for A1C

## 2021-08-20 RX ORDER — MONTELUKAST SODIUM 10 MG/1
TABLET ORAL
Qty: 90 TABLET | Refills: 1 | Status: SHIPPED | OUTPATIENT
Start: 2021-08-20 | End: 2021-09-29

## 2021-08-26 DIAGNOSIS — E11.9 DIABETES MELLITUS WITHOUT COMPLICATION (HCC): ICD-10-CM

## 2021-08-27 RX ORDER — METFORMIN HYDROCHLORIDE 500 MG/1
TABLET, EXTENDED RELEASE ORAL
Qty: 360 TABLET | Refills: 1 | Status: SHIPPED | OUTPATIENT
Start: 2021-08-27 | End: 2022-03-08 | Stop reason: SDUPTHER

## 2021-08-27 NOTE — TELEPHONE ENCOUNTER
This pharmacy faxed over request for the following prescriptions to be filled:    Medication requested :   Requested Prescriptions     Pending Prescriptions Disp Refills    Blood-Glucose Meter (True Metrix Glucose Meter) misc       Sig: Check blood sugar 3 times daily    glucose blood VI test strips (True Metrix Glucose Test Strip) strip 100 Strip 0     Sig: Check levels 3 times daily    lancets (TRUEplus Lancets) 33 gauge misc 100 Package 0     Sig: Check blood sugars 3 times daily    alcohol swabs (BD Single Use Swabs Regular) padm 120 Pad 0     Sig: Check blood sugar 3 times daily     [facility] or Print:  [employer]   Mail order or Local pharmacy Mail Order     Scheduled appointment if not seen by current providers in office:  LOV 5/17/2021 f/u 9/22/2021 pt's Ins cover this Glucose Meter Now.  Thank you

## 2021-08-30 RX ORDER — ISOPROPYL ALCOHOL 70 ML/100ML
SWAB TOPICAL
Qty: 300 PAD | Refills: 5 | Status: SHIPPED | OUTPATIENT
Start: 2021-08-30 | End: 2022-09-06

## 2021-08-30 RX ORDER — LANCETS 33 GAUGE
EACH MISCELLANEOUS
Qty: 300 PACKAGE | Refills: 5 | Status: SHIPPED | OUTPATIENT
Start: 2021-08-30 | End: 2022-09-06

## 2021-08-30 RX ORDER — CALCIUM CITRATE/VITAMIN D3 200MG-6.25
TABLET ORAL
Qty: 100 STRIP | Refills: 0 | Status: SHIPPED | OUTPATIENT
Start: 2021-08-30 | End: 2021-09-24 | Stop reason: SDUPTHER

## 2021-08-30 RX ORDER — BLOOD-GLUCOSE METER
EACH MISCELLANEOUS
Qty: 1 EACH | Refills: 0 | Status: SHIPPED | OUTPATIENT
Start: 2021-08-30 | End: 2022-10-04

## 2021-09-22 ENCOUNTER — VIRTUAL VISIT (OUTPATIENT)
Dept: FAMILY MEDICINE CLINIC | Age: 66
End: 2021-09-22
Payer: MEDICARE

## 2021-09-22 DIAGNOSIS — Z00.00 MEDICARE ANNUAL WELLNESS VISIT, SUBSEQUENT: ICD-10-CM

## 2021-09-22 DIAGNOSIS — Z86.59 HISTORY OF DEPRESSION: ICD-10-CM

## 2021-09-22 DIAGNOSIS — F41.1 GAD (GENERALIZED ANXIETY DISORDER): ICD-10-CM

## 2021-09-22 DIAGNOSIS — M96.1 LUMBAR POST-LAMINECTOMY SYNDROME: ICD-10-CM

## 2021-09-22 DIAGNOSIS — Z12.11 SCREENING FOR COLON CANCER: ICD-10-CM

## 2021-09-22 DIAGNOSIS — E11.40 TYPE 2 DIABETES MELLITUS WITH DIABETIC NEUROPATHY, WITHOUT LONG-TERM CURRENT USE OF INSULIN (HCC): ICD-10-CM

## 2021-09-22 DIAGNOSIS — R97.20 ELEVATED PSA: Primary | ICD-10-CM

## 2021-09-22 PROCEDURE — G8536 NO DOC ELDER MAL SCRN: HCPCS | Performed by: FAMILY MEDICINE

## 2021-09-22 PROCEDURE — 3051F HG A1C>EQUAL 7.0%<8.0%: CPT | Performed by: FAMILY MEDICINE

## 2021-09-22 PROCEDURE — G8756 NO BP MEASURE DOC: HCPCS | Performed by: FAMILY MEDICINE

## 2021-09-22 PROCEDURE — G8417 CALC BMI ABV UP PARAM F/U: HCPCS | Performed by: FAMILY MEDICINE

## 2021-09-22 PROCEDURE — G8427 DOCREV CUR MEDS BY ELIG CLIN: HCPCS | Performed by: FAMILY MEDICINE

## 2021-09-22 PROCEDURE — 3017F COLORECTAL CA SCREEN DOC REV: CPT | Performed by: FAMILY MEDICINE

## 2021-09-22 PROCEDURE — 99214 OFFICE O/P EST MOD 30 MIN: CPT | Performed by: FAMILY MEDICINE

## 2021-09-22 PROCEDURE — 1101F PT FALLS ASSESS-DOCD LE1/YR: CPT | Performed by: FAMILY MEDICINE

## 2021-09-22 PROCEDURE — 2022F DILAT RTA XM EVC RTNOPTHY: CPT | Performed by: FAMILY MEDICINE

## 2021-09-22 PROCEDURE — G9717 DOC PT DX DEP/BP F/U NT REQ: HCPCS | Performed by: FAMILY MEDICINE

## 2021-09-22 PROCEDURE — G0439 PPPS, SUBSEQ VISIT: HCPCS | Performed by: FAMILY MEDICINE

## 2021-09-22 NOTE — PATIENT INSTRUCTIONS
Medicare Wellness Visit, Male    The best way to live healthy is to have a lifestyle where you eat a well-balanced diet, exercise regularly, limit alcohol use, and quit all forms of tobacco/nicotine, if applicable. Regular preventive services are another way to keep healthy. Preventive services (vaccines, screening tests, monitoring & exams) can help personalize your care plan, which helps you manage your own care. Screening tests can find health problems at the earliest stages, when they are easiest to treat. [facility] follows the current, evidence-based guidelines published by the [facility] Robert Denzel ([facility]) when recommending preventive services for our patients. Because we follow these guidelines, sometimes recommendations change over time as research supports it. (For example, a prostate screening blood test is no longer routinely recommended for men with no symptoms). Of course, you and your doctor may decide to screen more often for some diseases, based on your risk and co-morbidities (chronic disease you are already diagnosed with). Preventive services for you include:  - Medicare offers their members a free annual wellness visit, which is time for you and your primary care provider to discuss and plan for your preventive service needs. Take advantage of this benefit every year!  -All adults over age 72 should receive the recommended pneumonia vaccines. Current USPSTF guidelines recommend a series of two vaccines for the best pneumonia protection.   -All adults should have a flu vaccine yearly and tetanus vaccine every 10 years.  -All adults age 48 and older should receive the shingles vaccines (series of two vaccines).        -All adults age 38-68 who are overweight should have a diabetes screening test once every three years.   -Other screening tests & preventive services for persons with diabetes include: an eye exam to screen for diabetic retinopathy, a kidney function test, a foot exam, and stricter control over your cholesterol.   -Cardiovascular screening for adults with routine risk involves an electrocardiogram (ECG) at intervals determined by the provider.   -Colorectal cancer screening should be done for adults age 54-65 with no increased risk factors for colorectal cancer. There are a number of acceptable methods of screening for this type of cancer. Each test has its own benefits and drawbacks. Discuss with your provider what is most appropriate for you during your annual wellness visit. The different tests include: colonoscopy (considered the best screening method), a fecal occult blood test, a fecal DNA test, and sigmoidoscopy.  -All adults born between [facility] should be screened once for Hepatitis C.  -An Abdominal Aortic Aneurysm (AAA) Screening is recommended for men age 73-68 who has ever smoked in their lifetime.      Here is a list of your current Health Maintenance items (your personalized list of preventive services) with a due date:  Health Maintenance Due   Topic Date Due    COVID-19 Vaccine (1) Never done    Shingles Vaccine (1 of 2) Never done    Diabetic Foot Care  08/02/2018    Pneumococcal Vaccine (2 of 2 - PPSV23) 02/19/2020    Eye Exam  08/24/2020    Albumin Urine Test  07/03/2021    Cholesterol Test   07/03/2021    Yearly Flu Vaccine (1) 09/01/2021

## 2021-09-22 NOTE — PROGRESS NOTES
Arden Aguilar is a 77 y.o. male who was seen by synchronous (real-time) audio-video technology on 9/22/2021 for Other (Diabetes, hypertension, anxiety, elevated PSA, annual wellness)        Assessment & Plan:   Diagnoses and all orders for this visit:    Elevated PSA: Been following urology with compliance. Reviewed their notes. No new recommendation. He is asymptomatic during the visit. Type 2 diabetes mellitus with diabetic neuropathy, without long-term current use of insulin ([address]): Checking blood sugar at home. Fasting sugar is between 110 220. No hyper or hypoglycemic symptoms. We will recheck labs. Continue current plan. If A1c goes up we will consider doing Endo referral.  At this time done the referral to pharmacist again for diabetic education and medication management.    -     LIPID PANEL; Future  -     MICROALBUMIN, UR, RAND W/ MICROALB/CREAT RATIO; Future  -     METABOLIC PANEL, COMPREHENSIVE; Future  -     HEMOGLOBIN A1C WITH EAG; Future  -     TSH 3RD GENERATION; Future  -     CBC W/O DIFF; Future  -     REFERRAL TO PHARMACIST    Lumbar post-laminectomy syndrome: Fairly stable. Following Ortho    History of depression: Fairly stable    TRUE (generalized anxiety disorder): Fairly stable. No panic attack. Not following any specialist    Screening for colon cancer  -     OCCULT BLOOD IMMUNOASSAY,DIAGNOSTIC; Future    Medicare annual wellness visit, subsequent    Patient understood agree with the plan  Review health maintenance  See Medicare wellness note regarding health maintenance detail  Follow-up in 4 months. Patient was advised to call the office to make an appointment. Please note that this dictation was completed with [employer], the computer voice recognition software. Quite often unanticipated grammatical, syntax, homophones, and other interpretive errors are inadvertently transcribed by the computer software. Please disregard these errors.   Please excuse any errors that have escaped final proofreading. [id number]  Subjective:   Done visit through doxy  History of diabetes. Checking blood sugar at home. No hyper or hypoglycemic symptoms. Fasting blood sugar is between 1 10-1 20. Taking medication with compliance. No side effects. Also trying to work on a diet modification. Recently frequent fall due to right knee gave up. Following Ortho/Dr. Veronica Pride. Using walker, cane and wheelchair as needed. Able to ambulate and do his ADL within the house. Hypertension. Checking vitals at home. Fairly stable. Asymptomatic. During visit did not appear in any acute distress. Hyperlipidemia. On statin. No side effects. Denies any headache, dizziness, no chest pain or trouble breathing, no arm or leg weakness. No nausea or vomiting, no weight or appetite changes, no mood changes . No urine or bowel complains, no palpitation, no diaphoresis. No abdominal pain. No cold or cough. No leg swelling. No fever. No sleep trouble. Elevated PSA. Been following urology. Reviewed their notes and no new recommendation. Has not completed sleep test yet. Reordered it today  Review prior labs. Lab Results   Component Value Date/Time    WBC 6.8 07/03/2020 09:16 AM    HGB 13.6 07/03/2020 09:16 AM    HCT 43.6 07/03/2020 09:16 AM    PLATELET 506 77/91/4561 09:16 AM    MCV 83.0 07/03/2020 09:16 AM     Lab Results   Component Value Date/Time    Sodium 143 02/12/2021 09:37 AM    Potassium 4.6 02/12/2021 09:37 AM    Chloride 109 02/12/2021 09:37 AM    CO2 29 02/12/2021 09:37 AM    Anion gap 5 02/12/2021 09:37 AM    Glucose 134 (H) 02/12/2021 09:37 AM    BUN 19 (H) 02/12/2021 09:37 AM    Creatinine 1.16 02/12/2021 09:37 AM    BUN/Creatinine ratio 16 02/12/2021 09:37 AM    GFR est AA >60 02/12/2021 09:37 AM    GFR est non-AA >60 02/12/2021 09:37 AM    Calcium 8.8 02/12/2021 09:37 AM    Bilirubin, total 0.4 02/12/2021 09:37 AM    Alk.  phosphatase 95 02/12/2021 09:37 AM    Protein, total 7.6 02/12/2021 09:37 AM Albumin 4.1 02/12/2021 09:37 AM    Globulin 3.5 02/12/2021 09:37 AM    A-G Ratio 1.2 02/12/2021 09:37 AM    ALT (SGPT) 36 02/12/2021 09:37 AM    AST (SGOT) 21 02/12/2021 09:37 AM     Lab Results   Component Value Date/Time    Cholesterol, total 137 07/03/2020 09:16 AM    HDL Cholesterol 39 (L) 07/03/2020 09:16 AM    LDL, calculated 59 07/03/2020 09:16 AM    VLDL, calculated 39 07/03/2020 09:16 AM    Triglyceride 195 (H) 07/03/2020 09:16 AM    CHOL/HDL Ratio 3.5 07/03/2020 09:16 AM     Lab Results   Component Value Date/Time    TSH 1.25 07/03/2020 09:16 AM     Lab Results   Component Value Date/Time    Hemoglobin A1c 7.3 (H) 05/15/2021 07:28 AM    Hemoglobin A1c (POC) 8.1 09/13/2019 01:58 PM     Lab Results   Component Value Date/Time    Microalbumin/Creat ratio (mg/g creat) 26 07/03/2020 09:17 AM    MICROALBUMIN,MG/DAY 14.8 06/13/2016 08:43 AM    Microalbumin,urine random 4.06 (H) 07/03/2020 09:17 AM     Lab Results   Component Value Date/Time    Vitamin D 25-Hydroxy 38.6 12/07/2016 09:27 AM       Lab Results   Component Value Date/Time    Prostate Specific Ag 3.97 05/17/2021 03:57 PM    Prostate Specific Ag 4.3 (H) 02/12/2021 09:37 AM    Prostate Specific Ag 3.7 11/20/2019 09:10 AM    Prostate Specific Ag 4.8 (H) 10/04/2019 10:01 AM    Prostate Specific Ag 1.2 03/17/2015 12:00 AM         Prior to Admission medications    Medication Sig Start Date End Date Taking?  Authorizing Provider   Blood-Glucose Meter (True Metrix Glucose Meter) misc Check blood sugar 3 times daily 8/30/21  Yes Alexsander Castrejon MD   glucose blood VI test strips (True Metrix Glucose Test Strip) strip Check levels 3 times daily 8/30/21  Yes Alexsander Castrejon MD   lancets (TRUEplus Lancets) 33 gauge misc Check blood sugars 3 times daily 8/30/21  Yes Alexsander Castrejon MD   alcohol swabs (BD Single Use Swabs Regular) padm Check blood sugar 3 times daily 8/30/21  Yes Alexsander Castrejon MD   metFORMIN ER (GLUCOPHAGE XR) 500 mg tablet TAKE 2 TABLETS TWICE DAILY WITH MEALS 8/27/21  Yes Wu Hercules MD   montelukast (SINGULAIR) 10 mg tablet TAKE 1 TABLET BY MOUTH DAILY 8/20/21  Yes Wu Hercules MD   alfuzosin SR (UROXATRAL) 10 mg SR tablet Take 1 Tablet by mouth daily (after dinner). 8/10/21  Yes Hilda Fry MD   buPROPion SR [facility] SR) 150 mg SR tablet TAKE 1 TABLET EVERY DAY 7/7/21  Yes Wu Hercules MD   simvastatin (ZOCOR) 10 mg tablet TAKE 1 TABLET EVERY NIGHT 7/7/21  Yes Wu Hercules MD   isosorbide mononitrate ER (IMDUR) 60 mg CR tablet TAKE 1 TABLET EVERY MORNING 7/7/21  Yes Wu Hercules MD   pramipexole (MIRAPEX) 0.75 mg tablet TAKE 3 TABLETS ONE TIME DAILY 6/21/21  Yes Wu Hercules MD   Elvira Proper by Does Not Apply route. Yes Provider, Historical   solifenacin (VESICARE) 5 mg tablet Take 1 Tab by mouth daily. 5/17/21  Yes Danita Bravo NP   empagliflozin (Jardiance) 25 mg tablet Take 1 Tab by mouth daily. 2/17/21  Yes Wu Hercules MD   dulaglutide (Trulicity) 1.5 CS/5.6 mL sub-q pen 0.5 mL by SubCUTAneous route every seven (7) days. 11/2/20  Yes Wu Hercules MD   Accu-Chek Patricia Plus Meter misc USE AS DIRECTED TO CHECK BLOOD SUGAR (FOR DIABETES) 9/16/20  Yes Wu Hercules MD   cpap machine kit by Does Not Apply route. Yes Provider, Historical   Blood-Glucose Meter monitoring kit Use to check blood sugar 3 times daily. Dx E11.9. Please dispense brand preferred by insurance. 6/17/20  Yes Wu Hercules MD   glucose blood VI test strips (ASCENSIA AUTODISC VI, ONE TOUCH ULTRA TEST VI) strip Use to check blood sugar 3 times daily. Dx E11.9. Please dispense brand preferred by insurance. 6/17/20  Yes Wu Hercules MD   lancets misc Use to check blood sugar 3 times daily. Dx E11.9. Please dispense brand preferred by insurance. 6/17/20  Yes Wu Hercules MD   folic acid/multivit-min/lutein (CENTRUM SILVER PO) Take  by mouth.    Yes Provider, Historical   lisinopriL (PRINIVIL, ZESTRIL) 20 mg tablet Take 1 Tab by mouth daily. 5/18/20  Yes Fadi Velez MD   Syringe, Disposable, 3 mL syrg Take as directed 4/15/20  Yes Emanuel Izquierdo MD   Needle, Disp, 23 G 23 gauge x 1 1/4\" ndle Use to inject 0.5 ml every 7 days 4/15/20  Yes Emanuel Izquierdo MD   Needle, Disp, 18 G 18 gauge x 1\" ndle Use to aspirate 0.5 ml every 7 days 4/15/20  Yes Emanuel Izquierdo MD   Wheel Chair maura Assistance with ambulation, gait instability 2/28/20  Yes Mi Christianson NP   naloxone 4 mg/actuation spry 4 mg by Nasal route as needed. 5/4/17  Yes Russell Herman MD   Back Brace misc 1 Device by Does Not Apply route daily as needed for Pain. One, lumbosacral orthosis/back brace with metal struts      Medically necessary 3/16/16  Yes Russell Herman MD   metoprolol succinate (TOPROL-XL) 50 mg XL tablet TAKE 1 TABLET EVERY DAY 6/21/21   Fadi Velez MD   ALPRAZolam Oletha Poynette) 0.5 mg tablet Take 1 Tab by mouth two (2) times daily as needed for Anxiety. Max Daily Amount: 1 mg. Patient not taking: Reported on 8/10/2021 9/13/19 9/22/21  Mi Christianson NP   polyethylene glycol (MIRALAX) 17 gram packet Take 1 Packet by mouth daily. Hold for loose stools  Patient taking differently: Take 17 g by mouth daily as needed.  Hold for loose stools 1/18/19   Nestor Mckeon NP     Patient Active Problem List    Diagnosis Date Noted    Type 2 diabetes mellitus with diabetic neuropathy ([facility] 05/10/2019    Severe obesity ([facility] 04/26/2019    Kidney stone 02/25/2019    Sepsis ([facility] 01/14/2019    UTI (urinary tract infection) 01/14/2019    Status post insertion of spinal cord stimulator 09/14/2017    Chronic pain syndrome 06/15/2017    Lumbar post-laminectomy syndrome 06/15/2017    Lumbar and sacral osteoarthritis 06/15/2017    Anemia 12/07/2016    Advanced care planning/counseling discussion 08/08/2016    Chronic midline low back pain with sciatica 05/16/2016    History of depression 06/02/2015    Thoracic or lumbosacral neuritis or radiculitis, unspecified 01/26/2015    Postlaminectomy syndrome, lumbar region 01/26/2015    DDD (degenerative disc disease), lumbar 07/30/2014    Lumbar facet arthropathy 07/30/2014    S/P lumbar laminectomy 07/30/2014    S/P lumbar spinal fusion 07/30/2014    Lumbar nerve root impingement 07/30/2014    Lumbosacral radiculopathy at S1 07/30/2014    DDD (degenerative disc disease), thoracic 07/30/2014    Thoracic compression fracture (HCC) 07/30/2014    Back muscle spasm 07/30/2014    Major depression 07/30/2014    TRUE (generalized anxiety disorder) 07/30/2014    Abdominal adhesions 07/30/2014    Constipation, chronic 07/30/2014    Insomnia secondary to chronic pain 07/30/2014    MANISH (obstructive sleep apnea) 07/30/2014    DM (diabetes mellitus) ([facility] [address] 07/30/2014    HTN (hypertension) 07/30/2014    Hyperlipidemia 07/30/2014    Frequent falls 07/30/2014     Current Outpatient Medications   Medication Sig Dispense Refill    Blood-Glucose Meter (True Metrix Glucose Meter) misc Check blood sugar 3 times daily 1 Each 0    glucose blood VI test strips (True Metrix Glucose Test Strip) strip Check levels 3 times daily 100 Strip 0    lancets (TRUEplus Lancets) 33 gauge misc Check blood sugars 3 times daily 300 Package 5    alcohol swabs (BD Single Use Swabs Regular) padm Check blood sugar 3 times daily 300 Pad 5    metFORMIN ER (GLUCOPHAGE XR) 500 mg tablet TAKE 2 TABLETS TWICE DAILY WITH MEALS 360 Tablet 1    montelukast (SINGULAIR) 10 mg tablet TAKE 1 TABLET BY MOUTH DAILY 90 Tablet 1    alfuzosin SR (UROXATRAL) 10 mg SR tablet Take 1 Tablet by mouth daily (after dinner).  90 Tablet 3    buPROPion SR (WELLBUTRIN SR) 150 mg SR tablet TAKE 1 TABLET EVERY DAY 90 Tablet 1    simvastatin (ZOCOR) 10 mg tablet TAKE 1 TABLET EVERY NIGHT 90 Tablet 1    isosorbide mononitrate ER (IMDUR) 60 mg CR tablet TAKE 1 TABLET EVERY MORNING 90 Tablet 1    pramipexole (MIRAPEX) 0.75 mg tablet TAKE 3 TABLETS ONE TIME DAILY 270 Tablet 1    Cane maura by Does Not Apply route.  solifenacin (VESICARE) 5 mg tablet Take 1 Tab by mouth daily. 90 Tab 3    empagliflozin (Jardiance) 25 mg tablet Take 1 Tab by mouth daily. 90 Tab 1    dulaglutide (Trulicity) 1.5 GY/8.3 mL sub-q pen 0.5 mL by SubCUTAneous route every seven (7) days. 12 Each 3    Accu-Chek Patricia Plus Meter misc USE AS DIRECTED TO CHECK BLOOD SUGAR (FOR DIABETES) 1 Each 0    cpap machine kit by Does Not Apply route.  Blood-Glucose Meter monitoring kit Use to check blood sugar 3 times daily. Dx E11.9. Please dispense brand preferred by insurance. 1 Kit 0    glucose blood VI test strips (ASCENSIA AUTODISC VI, ONE TOUCH ULTRA TEST VI) strip Use to check blood sugar 3 times daily. Dx E11.9. Please dispense brand preferred by insurance. 100 Strip 11    lancets misc Use to check blood sugar 3 times daily. Dx E11.9. Please dispense brand preferred by insurance. 590 Each 11    folic acid/multivit-min/lutein (CENTRUM SILVER PO) Take  by mouth.  lisinopriL (PRINIVIL, ZESTRIL) 20 mg tablet Take 1 Tab by mouth daily. 30 Tab 1    Syringe, Disposable, 3 mL syrg Take as directed 30 Syringe 1    Needle, Disp, 23 G 23 gauge x 1 1/4\" ndle Use to inject 0.5 ml every 7 days 30 Pen Needle 1    Needle, Disp, 18 G 18 gauge x 1\" ndle Use to aspirate 0.5 ml every 7 days 30 Pen Needle 1    Wheel Chair maura Assistance with ambulation, gait instability 1 Each 0    naloxone 4 mg/actuation spry 4 mg by Nasal route as needed. 1 Box 0    Back Brace misc 1 Device by Does Not Apply route daily as needed for Pain. One, lumbosacral orthosis/back brace with metal struts      Medically necessary 1 Device 0    metoprolol succinate (TOPROL-XL) 50 mg XL tablet TAKE 1 TABLET EVERY DAY 90 Tablet 1    polyethylene glycol (MIRALAX) 17 gram packet Take 1 Packet by mouth daily. Hold for loose stools (Patient taking differently: Take 17 g by mouth daily as needed.  Hold for loose stools) 10 Each 0     Allergies   Allergen Reactions    Bactrim [Sulfamethoprim] Rash    Opana [Oxymorphone] Other (comments)     Feels like bug crawling under skin and drowziness     Past Medical History:   Diagnosis Date    Abdominal adhesions 7/30/2014    Back muscle spasm 7/30/2014    Back pain, lumbosacral     BPH (benign prostatic hyperplasia)     Cellulitis     Constipation, chronic 7/30/2014    DDD (degenerative disc disease), lumbar 7/30/2014    DDD (degenerative disc disease), thoracic 7/30/2014    Depression     Diabetes [facility]     DM (diabetes mellitus) ([facility] [address] 7/30/2014    Frequent falls 7/30/2014    TRUE (generalized anxiety disorder) 7/30/2014    High cholesterol     HTN (hypertension) 7/30/2014    Hydronephrosis     Hyperlipidemia 7/30/2014    Hypertension     Hypogonadism in male     Incomplete bladder emptying     Insomnia secondary to chronic pain 7/30/2014    Kidney stones     LBP radiating to both legs 7/30/2014    Lumbar facet arthropathy 7/30/2014    Lumbar nerve root impingement 7/30/2014    Lumbosacral radiculopathy at S1 7/30/2014    Major depression 7/30/2014    Nausea & vomiting     Neurological disorder     sciatica    MANISH (obstructive sleep apnea) 7/30/2014    S/P lumbar laminectomy 7/30/2014    S/P lumbar spinal fusion 7/30/2014    Small vessel disease (HCC)     Thoracic compression fracture ([facility] [address] 07/30/2014    Unspecified sleep apnea     USES CPAP EVERY NIGHT     Social History     Tobacco Use    Smoking status: Never Smoker    Smokeless tobacco: Never Used   Substance Use Topics    Alcohol use: Yes       ROS: See HPI    Objective:     Patient-Reported Vitals 9/22/2021   Patient-Reported Weight -   Patient-Reported Height -   Patient-Reported Pulse 89   Patient-Reported Temperature -   Patient-Reported SpO2 -   Patient-Reported Systolic  716   Patient-Reported Diastolic 63      General: alert, cooperative, no distress   Mental  status: normal mood, behavior, speech, dress, motor activity, and thought processes, able to follow commands   HENT: NCAT   Neck: no visualized mass   Resp: no respiratory distress   Neuro: no gross deficits   Skin: no discoloration or lesions of concern on visible areas   Psychiatric: normal affect, consistent with stated mood, no evidence of hallucinations     Additional exam findings: We discussed the expected course, resolution and complications of the diagnosis(es) in detail. Medication risks, benefits, costs, interactions, and alternatives were discussed as indicated. I advised him to contact the office if his condition worsens, changes or fails to improve as anticipated. He expressed understanding with the diagnosis(es) and plan. [address], was evaluated through a synchronous (real-time) audio-video encounter. The patient (or guardian if applicable) is aware that this is a billable service. Verbal consent to proceed has been obtained within the past 12 months. The visit was conducted pursuant to the emergency declaration under the [facility], [address] [place] [address] authority and the [employer] General Act. Patient identification was verified, and a caregiver was present when appropriate. The patient was located in a state where the provider was credentialed to provide care.     Sukhjinder Dixon MD

## 2021-09-22 NOTE — PROGRESS NOTES
This is the Subsequent Medicare Annual Wellness Exam, performed 12 months or more after the Initial AWV or the last Subsequent AWV    I have reviewed the patient's medical history in detail and updated the computerized patient record. Assessment/Plan   Education and counseling provided:  Are appropriate based on today's review and evaluation   Discussed 5 years health plan. Discussed advance directive and see ACP note from today     1. Elevated PSA  2. Type 2 diabetes mellitus with diabetic neuropathy, without long-term current use of insulin (HCC)  -     LIPID PANEL; Future  -     MICROALBUMIN, UR, RAND W/ MICROALB/CREAT RATIO; Future  -     METABOLIC PANEL, COMPREHENSIVE; Future  -     HEMOGLOBIN A1C WITH EAG; Future  -     TSH 3RD GENERATION; Future  -     CBC W/O DIFF; Future  3. Lumbar post-laminectomy syndrome  4. History of depression  5. TRUE (generalized anxiety disorder)  6. Screening for colon cancer  7.  Medicare annual wellness visit, subsequent       Depression Risk Factor Screening:     3 most recent PHQ Screens 9/22/2021   PHQ Not Done -   Little interest or pleasure in doing things Not at all   Feeling down, depressed, irritable, or hopeless Not at all   Total Score PHQ 2 0   Trouble falling or staying asleep, or sleeping too much -   Feeling tired or having little energy -   Poor appetite, weight loss, or overeating -   Feeling bad about yourself - or that you are a failure or have let yourself or your family down -   Trouble concentrating on things such as school, work, reading, or watching TV -   Moving or speaking so slowly that other people could have noticed; or the opposite being so fidgety that others notice -   Thoughts of being better off dead, or hurting yourself in some way -   PHQ 9 Score -   How difficult have these problems made it for you to do your work, take care of your home and get along with others -       Alcohol Risk Screen    Do you average more than 1 drink per night or more than 7 drinks a week: No    In the past three months have you have had more than 4 drinks containing alcohol on one occasion: No        Functional Ability and Level of Safety:    Hearing: Hearing is good. Activities of Daily Living: The home contains: handrails, grab bars and rugs  Patient does total self care      Ambulation: with mild difficulty . Use cane , walker    Fall Risk:  Fall Risk Assessment, last 12 mths 9/22/2021   Able to walk? Yes   Fall in past 12 months? 1   Do you feel unsteady? 1   Are you worried about falling 1   Is TUG test greater than 12 seconds? (No Data)   Is the gait abnormal? 1   Number of falls in past 12 months 2   Fall with injury?  0      Abuse Screen:  Patient is not abused       Cognitive Screening    Has your family/caregiver stated any concerns about your memory: no        Health Maintenance Due     Health Maintenance Due   Topic Date Due    COVID-19 Vaccine (1) Never done    Shingrix Vaccine Age 50> (1 of 2) Never done    Foot Exam Q1  08/02/2018    Pneumococcal 65+ years (2 of 2 - PPSV23) 02/19/2020    Eye Exam Retinal or Dilated  08/24/2020    MICROALBUMIN Q1  07/03/2021    Lipid Screen  07/03/2021    Flu Vaccine (1) 09/01/2021   Will get flu shot and pneumonia shot from the pharmacy  He will call today to make an appointment for an eye exam  Labs been ordered and instructed him to complete the labs    Patient Care Team   Patient Care Team:  Annie Zepeda MD as PCP - General (Family Medicine)  Annie Zepeda MD as PCP - REHABILITATION [facility] Empaneled Provider  Chelsea Franco MD (Physical Medicine and Rehabilitation)  Kathy Ba MD (Orthopedic Surgery)  Chantel Saucedo MD (Urology)  Kathy Ba MD (Orthopedic Surgery)    History     Patient Active Problem List   Diagnosis Code    DDD (degenerative disc disease), lumbar M51.36    Lumbar facet arthropathy M47.816    S/P lumbar laminectomy Z98.890    S/P lumbar spinal fusion Z98.1    Lumbar nerve root impingement M54.16    Lumbosacral radiculopathy at S1 M54.17    DDD (degenerative disc disease), thoracic M51.34    Thoracic compression fracture (HCC) S22.000A    Back muscle spasm M62.830    Major depression F32.9    TRUE (generalized anxiety disorder) F41.1    Abdominal adhesions K66.0    Constipation, chronic K59.09    Insomnia secondary to chronic pain G89.29, G47.01    MANISH (obstructive sleep apnea) G47.33    DM (diabetes mellitus) ([facility]) E11.9    HTN (hypertension) I10    Hyperlipidemia E78.5    Frequent falls R29.6    Thoracic or lumbosacral neuritis or radiculitis, unspecified [id number]    Postlaminectomy syndrome, lumbar region M96.1    History of depression Z86.59    Chronic midline low back pain with sciatica M54.40, G89.29    Advanced care planning/counseling discussion Z71.89    Anemia D64.9    Chronic pain syndrome G89.4    Lumbar post-laminectomy syndrome M96.1    Lumbar and sacral osteoarthritis M47.817    Sepsis ([facility]) A41.9    UTI (urinary tract infection) N39.0    Kidney stone N20.0    Severe obesity ([facility]) E66.01    Type 2 diabetes mellitus with diabetic neuropathy ([facility]) E11.40    Status post insertion of spinal cord stimulator Z96.89     Past Medical History:   Diagnosis Date    Abdominal adhesions 7/30/2014    Back muscle spasm 7/30/2014    Back pain, lumbosacral     BPH (benign prostatic hyperplasia)     Cellulitis     Constipation, chronic 7/30/2014    DDD (degenerative disc disease), lumbar 7/30/2014    DDD (degenerative disc disease), thoracic 7/30/2014    Depression     Diabetes ([facility]     DM (diabetes mellitus) ([facility] 7/30/2014    Frequent falls 7/30/2014    TRUE (generalized anxiety disorder) 7/30/2014    High cholesterol     HTN (hypertension) 7/30/2014    Hydronephrosis     Hyperlipidemia 7/30/2014    Hypertension     Hypogonadism in male     Incomplete bladder emptying     Insomnia secondary to chronic pain 7/30/2014    Kidney stones     LBP radiating to both legs 7/30/2014    Lumbar facet arthropathy 7/30/2014    Lumbar nerve root impingement 7/30/2014    Lumbosacral radiculopathy at S1 7/30/2014    Major depression 7/30/2014    Nausea & vomiting     Neurological disorder     sciatica    MANISH (obstructive sleep apnea) 7/30/2014    S/P lumbar laminectomy 7/30/2014    S/P lumbar spinal fusion 7/30/2014    Small vessel disease (HCC)     Thoracic compression fracture ([address] 07/30/2014    Unspecified sleep apnea     USES CPAP EVERY NIGHT      Past Surgical History:   Procedure Laterality Date    HX APPENDECTOMY      HX BACK SURGERY      Lumbar fusion    HX HERNIA REPAIR  1992    HX OTHER SURGICAL      EMG - S1 disorder    HX OTHER SURGICAL  10/2017    spinal cord stimulator    HX VASECTOMY  1985     Current Outpatient Medications   Medication Sig Dispense Refill    Blood-Glucose Meter (True Metrix Glucose Meter) misc Check blood sugar 3 times daily 1 Each 0    glucose blood VI test strips (True Metrix Glucose Test Strip) strip Check levels 3 times daily 100 Strip 0    lancets (TRUEplus Lancets) 33 gauge misc Check blood sugars 3 times daily 300 Package 5    alcohol swabs (BD Single Use Swabs Regular) padm Check blood sugar 3 times daily 300 Pad 5    metFORMIN ER (GLUCOPHAGE XR) 500 mg tablet TAKE 2 TABLETS TWICE DAILY WITH MEALS 360 Tablet 1    montelukast (SINGULAIR) 10 mg tablet TAKE 1 TABLET BY MOUTH DAILY 90 Tablet 1    alfuzosin SR (UROXATRAL) 10 mg SR tablet Take 1 Tablet by mouth daily (after dinner). 90 Tablet 3    buPROPion SR (WELLBUTRIN SR) 150 mg SR tablet TAKE 1 TABLET EVERY DAY 90 Tablet 1    simvastatin (ZOCOR) 10 mg tablet TAKE 1 TABLET EVERY NIGHT 90 Tablet 1    isosorbide mononitrate ER (IMDUR) 60 mg CR tablet TAKE 1 TABLET EVERY MORNING 90 Tablet 1    pramipexole (MIRAPEX) 0.75 mg tablet TAKE 3 TABLETS ONE TIME DAILY 270 Tablet 1    Cane maura by Does Not Apply route.       solifenacin (VESICARE) 5 mg tablet Take 1 Tab by mouth daily. 90 Tab 3    empagliflozin (Jardiance) 25 mg tablet Take 1 Tab by mouth daily. 90 Tab 1    dulaglutide (Trulicity) 1.5 UZ/7.8 mL sub-q pen 0.5 mL by SubCUTAneous route every seven (7) days. 12 Each 3    Accu-Chek Patricia Plus Meter misc USE AS DIRECTED TO CHECK BLOOD SUGAR (FOR DIABETES) 1 Each 0    cpap machine kit by Does Not Apply route.  Blood-Glucose Meter monitoring kit Use to check blood sugar 3 times daily. Dx E11.9. Please dispense brand preferred by insurance. 1 Kit 0    glucose blood VI test strips (ASCENSIA AUTODISC VI, ONE TOUCH ULTRA TEST VI) strip Use to check blood sugar 3 times daily. Dx E11.9. Please dispense brand preferred by insurance. 100 Strip 11    lancets misc Use to check blood sugar 3 times daily. Dx E11.9. Please dispense brand preferred by insurance. 349 Each 11    folic acid/multivit-min/lutein (CENTRUM SILVER PO) Take  by mouth.  lisinopriL (PRINIVIL, ZESTRIL) 20 mg tablet Take 1 Tab by mouth daily. 30 Tab 1    Syringe, Disposable, 3 mL syrg Take as directed 30 Syringe 1    Needle, Disp, 23 G 23 gauge x 1 1/4\" ndle Use to inject 0.5 ml every 7 days 30 Pen Needle 1    Needle, Disp, 18 G 18 gauge x 1\" ndle Use to aspirate 0.5 ml every 7 days 30 Pen Needle 1    Wheel Chair maura Assistance with ambulation, gait instability 1 Each 0    naloxone 4 mg/actuation spry 4 mg by Nasal route as needed. 1 Box 0    Back Brace misc 1 Device by Does Not Apply route daily as needed for Pain. One, lumbosacral orthosis/back brace with metal struts      Medically necessary 1 Device 0    metoprolol succinate (TOPROL-XL) 50 mg XL tablet TAKE 1 TABLET EVERY DAY 90 Tablet 1    polyethylene glycol (MIRALAX) 17 gram packet Take 1 Packet by mouth daily. Hold for loose stools (Patient taking differently: Take 17 g by mouth daily as needed.  Hold for loose stools) 10 Each 0     Allergies   Allergen Reactions    Bactrim [Sulfamethoprim] Rash    Opana [Oxymorphone] Other (comments)     Feels like bug crawling under skin and drowziness       Family History   Problem Relation Age of Onset    Diabetes Mother     Hypertension Mother     Stroke Mother     Heart Attack Father     Diabetes Father     Stroke Father     Diabetes Sister     Stroke Brother      Social History     Tobacco Use    Smoking status: Never Smoker    Smokeless tobacco: Never Used   Substance Use Topics    Alcohol use: Yes       Katty Mendosa Nataliia, who was evaluated through a synchronous (real-time) audio-video encounter, and/or his healthcare decision maker, is aware that it is a billable service, with coverage as determined by his insurance carrier. He provided verbal consent to proceed: Yes, and patient identification was verified. It was conducted pursuant to the emergency declaration under the [facility], [address] [place] [address] authority and the [employer] General Act. A caregiver was present when appropriate. Ability to conduct physical exam was limited. I was in the office. The patient was at home.     Tony Jackson MD

## 2021-09-22 NOTE — ACP (ADVANCE CARE PLANNING)
Advance Care Planning     General Advance Care Planning (ACP) Conversation      Date of Conversation: 9/22/2021  Conducted with: Patient with Decision Making Capacity    Healthcare Decision Maker:     Primary Decision Maker: Miryam William - Spouse - [phone]    Secondary Decision Maker: Samreen Gómez - [phone]  Click here to complete [place] including selection of the Healthcare Decision Maker Relationship (ie \"Primary\")      Today we discussed advance directive , he has an advance directive at home and will provide a copy. he dose want resuccitation initially and then his wife to make a decision.      Content/Action Overview:   see above   Reviewed DNR/DNI and patient elects Full Code (Attempt Resuscitation)      Length of Voluntary ACP Conversation in minutes:  <16 minutes (Non-Billable)    Marbella Vivar MD

## 2021-09-24 NOTE — TELEPHONE ENCOUNTER
This pharmacy faxed over request for the following prescriptions to be filled:    Medication requested :   Requested Prescriptions     Pending Prescriptions Disp Refills    glucose blood VI test strips (True Metrix Glucose Test Strip) strip 100 Strip 0     Sig: Check levels 3 times daily     PCP: [id number] or Print: [employer]     Mail order or Local pharmacy Mail Order     Scheduled appointment if not seen by current providers in office:  Lov 9/22/2021 No f/u up Scheduled at this time.   Due 1/22/2022

## 2021-09-27 RX ORDER — CALCIUM CITRATE/VITAMIN D3 200MG-6.25
TABLET ORAL
Qty: 100 STRIP | Refills: 5 | Status: SHIPPED | OUTPATIENT
Start: 2021-09-27 | End: 2022-02-21

## 2021-10-12 DIAGNOSIS — M25.371 RIGHT ANKLE INSTABILITY: ICD-10-CM

## 2021-10-12 DIAGNOSIS — S86.311D TEAR OF PERONEAL TENDON, RIGHT, SUBSEQUENT ENCOUNTER: ICD-10-CM

## 2021-10-12 DIAGNOSIS — Z01.810 PRE-OPERATIVE CARDIOVASCULAR EXAMINATION: ICD-10-CM

## 2021-10-12 DIAGNOSIS — Z01.811 PRE-OPERATIVE RESPIRATORY EXAMINATION: ICD-10-CM

## 2021-10-29 ENCOUNTER — HOSPITAL ENCOUNTER (OUTPATIENT)
Dept: LAB | Age: 66
Discharge: HOME | End: 2021-10-29
Payer: MEDICARE

## 2021-10-29 DIAGNOSIS — E11.40 TYPE 2 DIABETES MELLITUS WITH DIABETIC NEUROPATHY, WITHOUT LONG-TERM CURRENT USE OF INSULIN (HCC): ICD-10-CM

## 2021-10-29 LAB
ALBUMIN SERPL-MCNC: 4.1 G/DL (ref 3.4–5)
ALBUMIN/GLOB SERPL: 1.2 {RATIO} (ref 0.8–1.7)
ALP SERPL-CCNC: 81 U/L (ref 45–117)
ALT SERPL-CCNC: 34 U/L (ref 16–61)
ANION GAP SERPL CALC-SCNC: 10 MMOL/L (ref 3–18)
AST SERPL-CCNC: 23 U/L (ref 10–38)
BILIRUB SERPL-MCNC: 0.4 MG/DL (ref 0.2–1)
BUN SERPL-MCNC: 13 MG/DL (ref 7–18)
BUN/CREAT SERPL: 10 (ref 12–20)
CALCIUM SERPL-MCNC: 9.1 MG/DL (ref 8.5–10.1)
CHLORIDE SERPL-SCNC: 104 MMOL/L (ref 100–111)
CHOLEST SERPL-MCNC: 104 MG/DL
CO2 SERPL-SCNC: 24 MMOL/L (ref 21–32)
CREAT SERPL-MCNC: 1.28 MG/DL (ref 0.6–1.3)
ERYTHROCYTE [DISTWIDTH] IN BLOOD BY AUTOMATED COUNT: 14.3 % (ref 11.6–14.5)
EST. AVERAGE GLUCOSE BLD GHB EST-MCNC: 151 MG/DL
GLOBULIN SER CALC-MCNC: 3.4 G/DL (ref 2–4)
GLUCOSE SERPL-MCNC: 132 MG/DL (ref 74–99)
HBA1C MFR BLD: 6.9 % (ref 4.2–5.6)
HCT VFR BLD AUTO: 47.3 % (ref 36–48)
HDLC SERPL-MCNC: 38 MG/DL (ref 40–60)
HDLC SERPL: 2.7 {RATIO} (ref 0–5)
HGB BLD-MCNC: 14 G/DL (ref 13–16)
LDLC SERPL CALC-MCNC: 47 MG/DL (ref 0–100)
LIPID PROFILE,FLP: ABNORMAL
MCH RBC QN AUTO: 25.2 PG (ref 24–34)
MCHC RBC AUTO-ENTMCNC: 29.6 G/DL (ref 31–37)
MCV RBC AUTO: 85.1 FL (ref 78–100)
PLATELET # BLD AUTO: 273 K/UL (ref 135–420)
PMV BLD AUTO: 10.1 FL (ref 9.2–11.8)
POTASSIUM SERPL-SCNC: 4.4 MMOL/L (ref 3.5–5.5)
PROT SERPL-MCNC: 7.5 G/DL (ref 6.4–8.2)
RBC # BLD AUTO: 5.56 M/UL (ref 4.35–5.65)
SODIUM SERPL-SCNC: 138 MMOL/L (ref 136–145)
TRIGL SERPL-MCNC: 95 MG/DL (ref ?–150)
TSH SERPL DL<=0.05 MIU/L-ACNC: 1.09 UIU/ML (ref 0.36–3.74)
VLDLC SERPL CALC-MCNC: 19 MG/DL
WBC # BLD AUTO: 8.4 K/UL (ref 4.6–13.2)

## 2021-10-29 PROCEDURE — 36415 COLL VENOUS BLD VENIPUNCTURE: CPT

## 2021-10-29 PROCEDURE — 85027 COMPLETE CBC AUTOMATED: CPT

## 2021-10-29 PROCEDURE — 84443 ASSAY THYROID STIM HORMONE: CPT

## 2021-10-29 PROCEDURE — 80053 COMPREHEN METABOLIC PANEL: CPT

## 2021-10-29 PROCEDURE — 83036 HEMOGLOBIN GLYCOSYLATED A1C: CPT

## 2021-10-29 PROCEDURE — 80061 LIPID PANEL: CPT

## 2021-11-01 NOTE — PROGRESS NOTES
A1c improved compare to before. Continue current plan. Still pending microalbumin result. Stay well hydrated. Further discussion on follow up visit.

## 2021-11-04 NOTE — PROGRESS NOTES
Contacted patient and verified identity using name and date of birth (2- identifiers)  Spoke with patient and he verbalized understanding of A1c improved compare to before. Continue current plan. Still pending microalbumin result. Stay well hydrated. Further discussion on follow up visit    He was unable to leave a urine for Micro. Can you place another order and patient will have done next week.

## 2021-11-05 DIAGNOSIS — E11.9 WELL CONTROLLED DIABETES MELLITUS (HCC): Primary | ICD-10-CM

## 2021-11-10 DIAGNOSIS — I10 ESSENTIAL HYPERTENSION: ICD-10-CM

## 2021-11-10 RX ORDER — METOPROLOL SUCCINATE 50 MG/1
TABLET, EXTENDED RELEASE ORAL
Qty: 90 TABLET | Refills: 1 | Status: SHIPPED | OUTPATIENT
Start: 2021-11-10 | End: 2022-04-11

## 2021-11-15 RX ORDER — PRAMIPEXOLE DIHYDROCHLORIDE 0.75 MG/1
TABLET ORAL
Qty: 270 TABLET | Refills: 1 | Status: SHIPPED | OUTPATIENT
Start: 2021-11-15 | End: 2022-04-11

## 2021-12-09 ENCOUNTER — TELEPHONE (OUTPATIENT)
Dept: FAMILY MEDICINE CLINIC | Age: 66
End: 2021-12-09

## 2021-12-09 NOTE — TELEPHONE ENCOUNTER
Pt came into the office and dropped off [email or web address] re enrollment paperwork.  Please call pt at your earliest convenience

## 2021-12-15 DIAGNOSIS — E11.40 TYPE 2 DIABETES MELLITUS WITH DIABETIC NEUROPATHY, WITHOUT LONG-TERM CURRENT USE OF INSULIN (HCC): ICD-10-CM

## 2021-12-15 NOTE — TELEPHONE ENCOUNTER
This pharmacy faxed over request for the following prescriptions to be filled:    Medication requested :   Requested Prescriptions     Pending Prescriptions Disp Refills    dulaglutide (Trulicity) 1.5 TK/2.6 mL sub-q pen 12 Each 3     Si.5 mL by SubCUTAneous route every seven (7) days. PCP: [id number] or Print: [facility]   Mail order or Local pharmacy mail Order     Scheduled appointment if not seen by current providers in office:  [address] [place] [address] 2021 No f/u up Scheduled at this time.   LMOV to make appt

## 2021-12-16 RX ORDER — DULAGLUTIDE 1.5 MG/.5ML
1.5 INJECTION, SOLUTION SUBCUTANEOUS
Qty: 12 EACH | Refills: 3 | Status: SHIPPED | OUTPATIENT
Start: 2021-12-16 | End: 2022-05-12 | Stop reason: ALTCHOICE

## 2021-12-28 ENCOUNTER — TELEPHONE (OUTPATIENT)
Dept: FAMILY MEDICINE CLINIC | Age: 66
End: 2021-12-28

## 2021-12-28 NOTE — TELEPHONE ENCOUNTER
Left a message on patient's mobile phone advising [facility] form has been completed. Please return call to \[facility]\"", [phone] press option 3 for . Ask for Carli Henry. Need to know if patient wants to pick-up form or if he wants us to fax it.

## 2021-12-28 NOTE — TELEPHONE ENCOUNTER
Pt states that he was returning Roula's call about a foprm that was being filled out. He states that there is a fax # on the form and could Roula please fax it to that #.  Thank you

## 2021-12-28 NOTE — TELEPHONE ENCOUNTER
Attempted to contact patient regarding [employer] form, but no answer. A [employer] message has been sent to patient, as well.

## 2021-12-29 NOTE — TELEPHONE ENCOUNTER
Per patient request, [employer] Simsmer form has been faxed using number on form. A fax confirmation has been received.

## 2022-01-28 DIAGNOSIS — E11.40 TYPE 2 DIABETES MELLITUS WITH DIABETIC NEUROPATHY, WITHOUT LONG-TERM CURRENT USE OF INSULIN (HCC): ICD-10-CM

## 2022-01-28 NOTE — TELEPHONE ENCOUNTER
LOV 09/22/2021  F/U 02/01/2022  Needs a 30 day supply to hold him over until he gets the patient assistance paperwork completed and approved. Paperwork give to Nga Humphrey for completion.

## 2022-01-31 DIAGNOSIS — E11.40 TYPE 2 DIABETES MELLITUS WITH DIABETIC NEUROPATHY, WITHOUT LONG-TERM CURRENT USE OF INSULIN (HCC): ICD-10-CM

## 2022-02-01 ENCOUNTER — OFFICE VISIT (OUTPATIENT)
Dept: FAMILY MEDICINE CLINIC | Age: 67
End: 2022-02-01
Payer: MEDICARE

## 2022-02-01 VITALS
BODY MASS INDEX: 37.47 KG/M2 | SYSTOLIC BLOOD PRESSURE: 132 MMHG | WEIGHT: 292 LBS | HEIGHT: 74 IN | RESPIRATION RATE: 16 BRPM | OXYGEN SATURATION: 97 % | DIASTOLIC BLOOD PRESSURE: 80 MMHG | HEART RATE: 89 BPM | TEMPERATURE: 97.9 F

## 2022-02-01 DIAGNOSIS — I10 PRIMARY HYPERTENSION: ICD-10-CM

## 2022-02-01 DIAGNOSIS — E11.40 TYPE 2 DIABETES MELLITUS WITH DIABETIC NEUROPATHY, WITHOUT LONG-TERM CURRENT USE OF INSULIN (HCC): Primary | ICD-10-CM

## 2022-02-01 DIAGNOSIS — F33.9 EPISODE OF RECURRENT MAJOR DEPRESSIVE DISORDER, UNSPECIFIED DEPRESSION EPISODE SEVERITY (HCC): ICD-10-CM

## 2022-02-01 DIAGNOSIS — G47.30 SLEEP APNEA, UNSPECIFIED TYPE: ICD-10-CM

## 2022-02-01 DIAGNOSIS — Z23 ENCOUNTER FOR IMMUNIZATION: ICD-10-CM

## 2022-02-01 DIAGNOSIS — R06.00 DYSPNEA, UNSPECIFIED TYPE: ICD-10-CM

## 2022-02-01 DIAGNOSIS — Z98.890 S/P LUMBAR LAMINECTOMY: ICD-10-CM

## 2022-02-01 DIAGNOSIS — M25.571 CHRONIC PAIN OF RIGHT ANKLE: ICD-10-CM

## 2022-02-01 DIAGNOSIS — G89.29 CHRONIC PAIN OF RIGHT ANKLE: ICD-10-CM

## 2022-02-01 DIAGNOSIS — R07.9 CHEST PAIN, UNSPECIFIED TYPE: ICD-10-CM

## 2022-02-01 DIAGNOSIS — G89.29 CHRONIC PAIN OF RIGHT KNEE: ICD-10-CM

## 2022-02-01 DIAGNOSIS — M25.561 CHRONIC PAIN OF RIGHT KNEE: ICD-10-CM

## 2022-02-01 DIAGNOSIS — E66.01 SEVERE OBESITY (HCC): ICD-10-CM

## 2022-02-01 PROCEDURE — G8752 SYS BP LESS 140: HCPCS | Performed by: FAMILY MEDICINE

## 2022-02-01 PROCEDURE — 1101F PT FALLS ASSESS-DOCD LE1/YR: CPT | Performed by: FAMILY MEDICINE

## 2022-02-01 PROCEDURE — 90694 VACC AIIV4 NO PRSRV 0.5ML IM: CPT | Performed by: FAMILY MEDICINE

## 2022-02-01 PROCEDURE — 3046F HEMOGLOBIN A1C LEVEL >9.0%: CPT | Performed by: FAMILY MEDICINE

## 2022-02-01 PROCEDURE — 90732 PPSV23 VACC 2 YRS+ SUBQ/IM: CPT | Performed by: FAMILY MEDICINE

## 2022-02-01 PROCEDURE — G8427 DOCREV CUR MEDS BY ELIG CLIN: HCPCS | Performed by: FAMILY MEDICINE

## 2022-02-01 PROCEDURE — G9717 DOC PT DX DEP/BP F/U NT REQ: HCPCS | Performed by: FAMILY MEDICINE

## 2022-02-01 PROCEDURE — 2022F DILAT RTA XM EVC RTNOPTHY: CPT | Performed by: FAMILY MEDICINE

## 2022-02-01 PROCEDURE — G8754 DIAS BP LESS 90: HCPCS | Performed by: FAMILY MEDICINE

## 2022-02-01 PROCEDURE — 3017F COLORECTAL CA SCREEN DOC REV: CPT | Performed by: FAMILY MEDICINE

## 2022-02-01 PROCEDURE — G8417 CALC BMI ABV UP PARAM F/U: HCPCS | Performed by: FAMILY MEDICINE

## 2022-02-01 PROCEDURE — 99214 OFFICE O/P EST MOD 30 MIN: CPT | Performed by: FAMILY MEDICINE

## 2022-02-01 PROCEDURE — G8536 NO DOC ELDER MAL SCRN: HCPCS | Performed by: FAMILY MEDICINE

## 2022-02-01 PROCEDURE — G0008 ADMIN INFLUENZA VIRUS VAC: HCPCS | Performed by: FAMILY MEDICINE

## 2022-02-01 PROCEDURE — G0009 ADMIN PNEUMOCOCCAL VACCINE: HCPCS | Performed by: FAMILY MEDICINE

## 2022-02-01 RX ORDER — ASPIRIN 81 MG/1
81 TABLET ORAL DAILY
Qty: 90 TABLET | Refills: 1 | Status: SHIPPED | OUTPATIENT
Start: 2022-02-01

## 2022-02-01 RX ORDER — NITROGLYCERIN 0.4 MG/1
TABLET SUBLINGUAL
Qty: 10 TABLET | Refills: 0 | Status: SHIPPED | OUTPATIENT
Start: 2022-02-01 | End: 2022-02-07

## 2022-02-01 NOTE — PATIENT INSTRUCTIONS
Chest Pain: Care Instructions  Your Care Instructions     There are many things that can cause chest pain. Some are not serious and will get better on their own in a few days. But some kinds of chest pain need more testing and treatment. Your doctor may have recommended a follow-up visit in the next 8 to 12 hours. If you are not getting better, you may need more tests or treatment. Even though your doctor has released you, you still need to watch for any problems. The doctor carefully checked you, but sometimes problems can develop later. If you have new symptoms or if your symptoms do not get better, get medical care right away. If you have worse or different chest pain or pressure that lasts more than 5 minutes or you passed out (lost consciousness), call 911 or seek other emergency help right away. A medical visit is only one step in your treatment. Even if you feel better, you still need to do what your doctor recommends, such as going to all suggested follow-up appointments and taking medicines exactly as directed. This will help you recover and help prevent future problems. How can you care for yourself at home? · Rest until you feel better. · Take your medicine exactly as prescribed. Call your doctor if you think you are having a problem with your medicine. · Do not drive after taking a prescription pain medicine. When should you call for help? Call 911 if:     · You passed out (lost consciousness).     · You have severe difficulty breathing.     · You have symptoms of a heart attack. These may include:  ? Chest pain or pressure, or a strange feeling in your chest.  ? Sweating. ? Shortness of breath. ? Nausea or vomiting. ? Pain, pressure, or a strange feeling in your back, neck, jaw, or upper belly or in one or both shoulders or arms. ? Lightheadedness or sudden weakness. ? A fast or irregular heartbeat.   After you call 911, the  may tell you to chew 1 adult-strength or 2 to 4 low-dose aspirin. Wait for an ambulance. Do not try to drive yourself. Call your doctor today if:     · You have any trouble breathing.     · Your chest pain gets worse.     · You are dizzy or lightheaded, or you feel like you may faint.     · You are not getting better as expected.     · You are having new or different chest pain. Where can you learn more? Go to http://www.pérez.com/  Enter A120 in the search box to learn more about \"Chest Pain: Care Instructions. \"  Current as of: July 1, 2021               Content Version: 13.0  © 0685-2930 [email or web address]. Care instructions adapted under license by [employer] (which disclaims liability or warranty for this information). If you have questions about a medical condition or this instruction, always ask your healthcare professional. [address] any warranty or liability for your use of this information. Nutrition Tips for Diabetes: After Your Visit  Your Care Instructions  A healthy diet is important to manage diabetes. It helps you lose weight (if you need to) and keep it off. It gives you the nutrition and energy your body needs and helps prevent heart disease. But a diet for diabetes does not mean that you have to eat special foods. You can eat what your family eats, including occasional sweets and other favorites. But you do have to pay attention to how often you eat and how much you eat of certain foods. The right plan for you will give you meals that help you keep your blood sugar at healthy levels. Try to eat a variety of foods and to spread carbohydrate throughout the day. Carbohydrate raises blood sugar higher and more quickly than any other nutrient does. Carbohydrate is found in sugar, breads and cereals, fruit, starchy vegetables such as potatoes and corn, and milk and yogurt. You may want to work with a dietitian or diabetes educator to help you plan meals and snacks.  A dietitian or diabetes educator also can help you lose weight if that is one of your goals. The following tips can help you enjoy your meals and stay healthy. Follow-up care is a key part of your treatment and safety. Be sure to make and go to all appointments, and call your doctor if you are having problems. Its also a good idea to know your test results and keep a list of the medicines you take. How can you care for yourself at home? · Learn which foods have carbohydrate and how much carbohydrate to eat. A dietitian or diabetes educator can help you learn to keep track of how much carbohydrate you eat. · Spread carbohydrate throughout the day. Eat some carbohydrate at all meals, but do not eat too much at any one time. · Plan meals to include food from all the food groups. These are the food groups and some example portion sizes:  ¨ Grains: 1 slice of bread (1 ounce), ½ cup of cooked cereal, and 1/3 cup of cooked pasta or rice. These have about 15 grams of carbohydrate in a serving. Choose whole grains such as whole wheat bread or crackers, oatmeal, and brown rice more often than refined grains. ¨ Fruit: 1 small fresh fruit, such as an apple or orange; ½ of a banana; ½ cup of chopped, cooked, or canned fruit; ½ cup of fruit juice; 1 cup of melon or raspberries; and 2 tablespoons of dried fruit. These have about 15 grams of carbohydrate in a serving. ¨ Dairy: 1 cup of nonfat or low-fat milk and 2/3 cup of plain yogurt. These have about 15 grams of carbohydrate in a serving. ¨ Protein foods: Beef, chicken, turkey, fish, eggs, tofu, cheese, cottage cheese, and peanut butter. A serving size of meat is 3 ounces, which is about the size of a deck of cards. Examples of meat substitute serving sizes (equal to 1 ounce of meat) are 1/4 cup of cottage cheese, 1 egg, 1 tablespoon of peanut butter, and ½ cup of tofu. These have very little or no carbohydrate per serving.   ¨ Vegetables: Starchy vegetables such as ½ cup of cooked dried beans, peas, potatoes, or corn have about 15 grams of carbohydrate. Nonstarchy vegetables have very little carbohydrate, such as 1 cup of raw leafy vegetables (such as spinach), ½ cup of other vegetables (cooked or chopped), and 3/4 cup of vegetable juice. · Use the plate format to plan meals. It is a good, quick way to make sure that you have a balanced meal. It also helps you spread carbohydrate throughout the day. You divide your plate by types of foods. Put vegetables on half the plate, meat or meat substitutes on one-quarter of the plate, and a grain or starchy vegetable (such as brown rice or a potato) in the final quarter of the plate. To this you can add a small piece of fruit and 1 cup of milk or yogurt, depending on how much carbohydrate you are supposed to eat at a meal.  · Talk to your dietitian or diabetes educator about ways to add limited amounts of sweets into your meal plan. You can eat these foods now and then, as long as you include the amount of carbohydrate they have in your daily carbohydrate allowance. · If you drink alcohol, limit it to no more than 1 drink a day for women and 2 drinks a day for men. If you are pregnant, no amount of alcohol is known to be safe. · Protein, fat, and fiber do not raise blood sugar as much as carbohydrate does. If you eat a lot of these nutrients in a meal, your blood sugar will rise more slowly than it would otherwise. · Limit saturated fats, such as those from meat and dairy products. Try to replace it with monounsaturated fat, such as olive oil. This is a healthier choice because people who have diabetes are at higher-than-average risk of heart disease. But use a modest amount of olive oil. A tablespoon of olive oil has 14 grams of fat and 120 calories. · Exercise lowers blood sugar. If you take insulin by shots or pump, you can use less than you would if you were not exercising. Keep in mind that timing matters.  If you exercise within 1 hour after a meal, your body may need less insulin for that meal than it would if you exercised 3 hours after the meal. Test your blood sugar to find out how exercise affects your need for insulin. · Exercise on most days of the week. Aim for at least 30 minutes. Exercise helps you stay at a healthy weight and helps your body use insulin. Walking is an easy way to get exercise. Gradually increase the amount you walk every day. You also may want to swim, bike, or do other activities. When you eat out  · Learn to estimate the serving sizes of foods that have carbohydrate. If you measure food at home, it will be easier to estimate the amount in a serving of restaurant food. · If the meal you order has too much carbohydrate (such as potatoes, corn, or baked beans), ask to have a low-carbohydrate food instead. Ask for a salad or green vegetables. · If you use insulin, check your blood sugar before and after eating out to help you plan how much to eat in the future. · If you eat more carbohydrate at a meal than you had planned, take a walk or do other exercise. This will help lower your blood sugar. Where can you learn more? Go to [email or web address].be  Enter I050 in the search box to learn more about \"Nutrition Tips for Diabetes: After Your Visit. \"   © 0179-9217 [employer]. Care instructions adapted under license by [employer] (which disclaims liability or warranty for this information). This care instruction is for use with your licensed healthcare professional. If you have questions about a medical condition or this instruction, always ask your healthcare professional. [place] [address] any warranty or liability for your use of this information. Content Version: 95.7.310838; Current as of: June 4, 2014                 Low Sodium Diet (2,000 Milligram): Care Instructions  Overview     Limiting sodium can be an important part of managing some health problems.   The most common source of sodium is salt. People get most of the salt in their diet from canned, prepared, and packaged foods. Fast food and restaurant meals also are very high in sodium. Your doctor will probably limit your sodium to less than 2,000 milligrams (mg) a day. This limit counts all the sodium in prepared and packaged foods and any salt you add to your food. Follow-up care is a key part of your treatment and safety. Be sure to make and go to all appointments, and call your doctor if you are having problems. It's also a good idea to know your test results and keep a list of the medicines you take. How can you care for yourself at home? Read food labels  · Read labels on cans and food packages. The labels tell you how much sodium is in each serving. Make sure that you look at the serving size. If you eat more than the serving size, you have eaten more sodium. · Food labels also tell you the Percent Daily Value for sodium. Choose products with low Percent Daily Values for sodium. · Be aware that sodium can come in forms other than salt, including monosodium glutamate (MSG), sodium citrate, and sodium bicarbonate (baking soda). MSG is often added to Asian food. When you eat out, you can sometimes ask for food without MSG or added salt. Buy low-sodium foods  · Buy foods that are labeled \"unsalted\" (no salt added), \"sodium-free\" (less than 5 mg of sodium per serving), or \"low-sodium\" (140 mg or less of sodium per serving). Foods labeled \"reduced-sodium\" and \"light sodium\" may still have too much sodium. Be sure to read the label to see how much sodium you are getting. · Buy fresh vegetables, or frozen vegetables without added sauces. Buy low-sodium versions of canned vegetables, soups, and other canned goods. Prepare low-sodium meals  · Cut back on the amount of salt you use in cooking. This will help you adjust to the taste. Do not add salt after cooking.  One teaspoon of salt has about 2,300 mg of sodium. · Take the salt shaker off the table. · Flavor your food with garlic, lemon juice, onion, vinegar, herbs, and spices. Do not use soy sauce, lite soy sauce, steak sauce, onion salt, garlic salt, celery salt, or ketchup on your food. · Use low-sodium salad dressings, sauces, and ketchup. Or make your own salad dressings and sauces without adding salt. · Use less salt (or none) when recipes call for it. You can often use half the salt a recipe calls for without losing flavor. Other foods such as rice, pasta, and grains do not need added salt. · Rinse canned vegetables, and cook them in fresh water. This removes some--but not all--of the salt. · Avoid water that is naturally high in sodium or that has been treated with water softeners, which add sodium. If you buy bottled water, read the label and choose a sodium-free brand. Avoid high-sodium foods  · Avoid eating:  ? Smoked, cured, salted, and canned meat, fish, and poultry. ? Ham, dumont, hot dogs, and luncheon meats. ? Regular, hard, and processed cheese and regular peanut butter. ? Crackers with salted tops, and other salted snack foods such as pretzels, chips, and salted popcorn. ? Frozen prepared meals, unless labeled low-sodium. ? Canned and dried soups, broths, and bouillon, unless labeled sodium-free or low-sodium. ? Canned vegetables, unless labeled sodium-free or low-sodium. ? [place] fries, pizza, tacos, and other fast foods. ? Pickles, olives, ketchup, and other condiments, especially soy sauce, unless labeled sodium-free or low-sodium. Where can you learn more? Go to http://www.gray.com/  Enter V843 in the search box to learn more about \"Low Sodium Diet (2,000 Milligram): Care Instructions. \"  Current as of: December 17, 2020               Content Version: 13.0  © 7344-9837 [employer].    Care instructions adapted under license by [employer] (which disclaims liability or warranty for this information). If you have questions about a medical condition or this instruction, always ask your healthcare professional. [place] [address] any warranty or liability for your use of this information.

## 2022-02-01 NOTE — PROGRESS NOTES
Chief Complaint   Patient presents with    Diabetes    Hypertension    Cholesterol Problem    Elevated PSA    Back Pain     flare up-started 3-4 months ago     1. \"Have you been to the ER, urgent care clinic since your last visit? Hospitalized since your last visit? \" No    2. \"Have you seen or consulted any other health care providers outside of the [address] [place] [address] since your last visit? \" No     3. For patients aged 39-70: Has the patient had a colonoscopy / FIT/ Cologuard?  Yes - no Care Gap present

## 2022-02-01 NOTE — PROGRESS NOTES
HISTORY OF PRESENT ILLNESS  Bubba Rehman is a 77 y.o. male. HPI: Here for follow-up. Diabetes. No hyper or hypoglycemic symptoms. Has seen Ms. Anna Riggs regarding diabetic education and wanted to have a follow-up appointment with her. Advised to schedule it at the . During visit sitting comfortable without any acute distress. Breathes heavily. Uses his sleep apnea machine daily with compliance. No wheezing. No cough cold. Does feel shortness of breath with exertion. According to patient he had cardiac catheter done almost 12 years ago. Has on and off chest pain. Not associated with any sweating or shortness of breath but does relieved by nitroglycerin. Need to take the nitroglycerin once in couple of months. Currently using it from prior PCP. I have discussed with him that he needs further evaluation regarding his current symptoms. Agrees with it. Meantime will order EKG and chest x-ray. Sending to cardiology for further recommendation. He is on statin and beta-blocker. I will ask him to take baby aspirin daily. Elevated PSA. Currently stable symptomatically. Been following urology and the recommendation. No frequency or urgency. No blood in urine. Hypertension. Vitals been stable today. Taking medication with Ativan. No side effects. Trying to work on a diet modification. But not much exercise. On and off low back pain. Currently bothering both knees. He has been following Dr. Consuelo Bravo. For headache or dizziness. No chest pain at this time. No wheezing. No cold cough. No palpitation or diaphoresis. No nausea vomiting or abdominal pain. No urinary or bowel complaint at this time. Mood has been stable. No appetite or weight changes.     Visit Vitals  /80 (BP 1 Location: Left arm, BP Patient Position: Sitting, BP Cuff Size: Large adult)   Pulse 89   Temp 97.9 °F (36.6 °C) (Temporal)   Resp 16   Ht 6' 2\" (1.88 m)   Wt 292 lb (132.5 kg)   SpO2 97%   BMI 37.49 kg/m²     Review medication list, vitals, problem list,allergies. Lab Results   Component Value Date/Time    WBC 8.4 10/29/2021 10:30 AM    HGB 14.0 10/29/2021 10:30 AM    HCT 47.3 10/29/2021 10:30 AM    PLATELET 056 59/30/1525 10:30 AM    MCV 85.1 10/29/2021 10:30 AM     Lab Results   Component Value Date/Time    Sodium 138 10/29/2021 10:30 AM    Potassium 4.4 10/29/2021 10:30 AM    Chloride 104 10/29/2021 10:30 AM    CO2 24 10/29/2021 10:30 AM    Anion gap 10 10/29/2021 10:30 AM    Glucose 132 (H) 10/29/2021 10:30 AM    BUN 13 10/29/2021 10:30 AM    Creatinine 1.28 10/29/2021 10:30 AM    BUN/Creatinine ratio 10 (L) 10/29/2021 10:30 AM    GFR est AA >60 10/29/2021 10:30 AM    GFR est non-AA 56 (L) 10/29/2021 10:30 AM    Calcium 9.1 10/29/2021 10:30 AM    Bilirubin, total 0.4 10/29/2021 10:30 AM    Alk.  phosphatase 81 10/29/2021 10:30 AM    Protein, total 7.5 10/29/2021 10:30 AM    Albumin 4.1 10/29/2021 10:30 AM    Globulin 3.4 10/29/2021 10:30 AM    A-G Ratio 1.2 10/29/2021 10:30 AM    ALT (SGPT) 34 10/29/2021 10:30 AM    AST (SGOT) 23 10/29/2021 10:30 AM     Lab Results   Component Value Date/Time    Cholesterol, total 104 10/29/2021 10:30 AM    HDL Cholesterol 38 (L) 10/29/2021 10:30 AM    LDL, calculated 47 10/29/2021 10:30 AM    VLDL, calculated 19 10/29/2021 10:30 AM    Triglyceride 95 10/29/2021 10:30 AM    CHOL/HDL Ratio 2.7 10/29/2021 10:30 AM     Lab Results   Component Value Date/Time    TSH 1.09 10/29/2021 10:30 AM     Lab Results   Component Value Date/Time    Hemoglobin A1c 6.9 (H) 10/29/2021 10:30 AM    Hemoglobin A1c (POC) 8.1 09/13/2019 01:58 PM     Lab Results   Component Value Date/Time    Vitamin D 25-Hydroxy 38.6 12/07/2016 09:27 AM       Lab Results   Component Value Date/Time    Microalbumin/Creat ratio (mg/g creat) 26 07/03/2020 09:17 AM    MICROALBUMIN,MG/DAY 14.8 06/13/2016 08:43 AM    Microalbumin,urine random 4.06 (H) 07/03/2020 09:17 AM       ROS: See HPI    Physical Exam  Constitutional:       General: He is not in acute distress. Cardiovascular:      Rate and Rhythm: Normal rate and regular rhythm. Heart sounds: Normal heart sounds. Abdominal:      General: Bowel sounds are normal.      Palpations: Abdomen is soft. Tenderness: There is no abdominal tenderness. Musculoskeletal:      Cervical back: Neck supple. Comments: Mild pitting edema both lower extremity. No calf tenderness   Neurological:      Mental Status: He is oriented to person, place, and time. ASSESSMENT and PLAN    ICD-10-CM ICD-9-CM    1. Type 2 diabetes mellitus with diabetic neuropathy, without long-term current use of insulin ([facility]): A1c has improved. Currently waiting for [employer] patient assistance. Paperwork done yesterday. Will be following Ms. Abdulkadir Guardado. Continue diet modification and current plan. Follow-up next visit E11.40 250.60 HEMOGLOBIN A1C WITH EAG     836.4 METABOLIC PANEL, COMPREHENSIVE      MICROALBUMIN, UR, RAND W/ MICROALB/CREAT RATIO      CANCELED: AMB POC HEMOGLOBIN A1C   2. Episode of recurrent major depressive disorder, unspecified depression episode severity ([facility].): Has been stable at this time. No concern F33.9 296.30    3. Severe obesity ([facility].): Been working on diet modification. Not able to do exercise due to other medical concern. E66.01 278.01    4. S/P lumbar laminectomy: On and off flare. Fairly stable at this time Z98.890 V45.89    5. Chronic pain of right knee: Following Ortho M25.561 719.46     G89.29 338.29    6. Chronic pain of right ankle: Following Ortho M25.571 719.47     G89.29 338.29    7. Primary hypertension:well controlled. Continue current dose of medication and low salt diet. Exercise as tolerated. I10 401.9 REFERRAL TO CARDIOLOGY      nitroglycerin (NITROSTAT) 0.4 mg SL tablet   8. Chest pain, unspecified type: On and off. See HPI. Once in couple of months need to use nitroglycerin.   Given refill on nitroglycerin but sending to cardiology for further rec commendation. He is on beta-blocker and statin. We will ask him to start aspirin R07.9 786.50 REFERRAL TO CARDIOLOGY      nitroglycerin (NITROSTAT) 0.4 mg SL tablet      EKG, 12 LEAD, INITIAL    prior h/o cath almost 12 years ago. 9. Sleep apnea, unspecified type: Using CPAP with compliance. G47.30 780.57 REFERRAL TO CARDIOLOGY      nitroglycerin (NITROSTAT) 0.4 mg SL tablet    using c-pap    10. Dyspnea, unspecified type: At this time obtaining x-ray, EKG and sending to cardiology. R06.00 786.09 XR CHEST PA LAT   Pt understood and agree with the plan   Review hM    Follow-up and Dispositions    · Return in about 3 months (around 5/1/2022) for he needs an appt with miss Jem Bender . Please note that this dictation was completed with [employer], the computer voice recognition software. Quite often unanticipated grammatical, syntax, homophones, and other interpretive errors are inadvertently transcribed by the computer software. Please disregard these errors. Please excuse any errors that have escaped final proofreading.

## 2022-02-07 ENCOUNTER — OFFICE VISIT (OUTPATIENT)
Dept: CARDIOLOGY CLINIC | Age: 67
End: 2022-02-07
Payer: MEDICARE

## 2022-02-07 VITALS
HEIGHT: 74 IN | DIASTOLIC BLOOD PRESSURE: 84 MMHG | WEIGHT: 292 LBS | HEART RATE: 82 BPM | BODY MASS INDEX: 37.47 KG/M2 | SYSTOLIC BLOOD PRESSURE: 138 MMHG | OXYGEN SATURATION: 95 %

## 2022-02-07 DIAGNOSIS — R07.9 CHEST PAIN, UNSPECIFIED TYPE: ICD-10-CM

## 2022-02-07 DIAGNOSIS — R06.02 SOB (SHORTNESS OF BREATH): Primary | ICD-10-CM

## 2022-02-07 PROCEDURE — 99204 OFFICE O/P NEW MOD 45 MIN: CPT | Performed by: INTERNAL MEDICINE

## 2022-02-07 PROCEDURE — G8428 CUR MEDS NOT DOCUMENT: HCPCS | Performed by: INTERNAL MEDICINE

## 2022-02-07 PROCEDURE — G8417 CALC BMI ABV UP PARAM F/U: HCPCS | Performed by: INTERNAL MEDICINE

## 2022-02-07 PROCEDURE — G8754 DIAS BP LESS 90: HCPCS | Performed by: INTERNAL MEDICINE

## 2022-02-07 PROCEDURE — G9717 DOC PT DX DEP/BP F/U NT REQ: HCPCS | Performed by: INTERNAL MEDICINE

## 2022-02-07 PROCEDURE — 1101F PT FALLS ASSESS-DOCD LE1/YR: CPT | Performed by: INTERNAL MEDICINE

## 2022-02-07 PROCEDURE — G8752 SYS BP LESS 140: HCPCS | Performed by: INTERNAL MEDICINE

## 2022-02-07 PROCEDURE — 3017F COLORECTAL CA SCREEN DOC REV: CPT | Performed by: INTERNAL MEDICINE

## 2022-02-07 PROCEDURE — G8536 NO DOC ELDER MAL SCRN: HCPCS | Performed by: INTERNAL MEDICINE

## 2022-02-07 PROCEDURE — 93000 ELECTROCARDIOGRAM COMPLETE: CPT | Performed by: INTERNAL MEDICINE

## 2022-02-07 RX ORDER — FUROSEMIDE 20 MG/1
20 TABLET ORAL 2 TIMES DAILY
Qty: 60 TABLET | Refills: 6 | Status: SHIPPED | OUTPATIENT
Start: 2022-02-07 | End: 2022-09-06

## 2022-02-07 NOTE — LETTER
2/7/2022    Patient: Heather Carter   YOB: 1955   Date of Visit: 2/7/2022     Nichol Flores MD  [address] [place] [address]  [address]  [id number] [place] [address]  [facility] [place]    Dear Nichol Flores MD,      Thank you for referring Mr. Dilan William to [address] [place] [address] for evaluation. My notes for this consultation are attached. If you have questions, please do not hesitate to call me. I look forward to following your patient along with you.       Sincerely,    Alexandria Marie MD

## 2022-02-07 NOTE — PROGRESS NOTES
[facility]    Mr. Terry is a 80-year-old male with a history of diabetes, hypertension, hyperlipidemia and other multiple medical problems including sleep apnea    Patient is here today for cardiac evaluation. He denies any prior history of MI or CHF. Patient has a longstanding history of dyspnea however seems to be getting worse. He has sleep apnea using CPAP machine on a daily basis. Patient used to have a chronic angina in the past for which she used to take nitroglycerin as needed. He had a cardiac catheterization 15 years ago results of which are not available but he said he had a small vessel disease and he was asked to take nitroglycerin as needed. Last time he took nitroglycerin was a month ago after long time which resolved his chest pain. He does have a chronic baseline dyspnea which significantly gets worse with minimal activity around the house. He has on and off lower extremity swelling. He denies palpitation, presyncope or syncope    Denies any nausea, vomiting, abdominal pain, fever, chills, sputum production.  No hematuria or other bleeding complaints    Past Medical History:   Diagnosis Date    Abdominal adhesions 7/30/2014    BPH (benign prostatic hyperplasia)     Cellulitis     DDD (degenerative disc disease), lumbar 7/30/2014    Depression     Diabetes ([facility] [address]     Frequent falls 7/30/2014    TRUE (generalized anxiety disorder) 7/30/2014    HLD (hyperlipidemia)     Hypertension     Hypogonadism in male     Incomplete bladder emptying     Kidney stones     Lumbar facet arthropathy 7/30/2014    Lumbar nerve root impingement 7/30/2014    Lumbosacral radiculopathy at S1 7/30/2014    Major depression 7/30/2014    Neurological disorder     sciatica    MANISH (obstructive sleep apnea) 7/30/2014    S/P lumbar laminectomy 7/30/2014    S/P lumbar spinal fusion 7/30/2014    Sleep apnea     USES CPAP EVERY NIGHT  Thoracic compression fracture ([facility] [address] 07/30/2014       Review of Systems:  Cardiac symptoms as noted above in HPI. All others negative. Denies fatigue, malaise, skin rash, joint pain, blurring vision, photophobia, neck pain, hemoptysis, chronic cough, nausea, vomiting, hematuria, burning micturition, BRBPR, chronic headaches. Current Outpatient Medications   Medication Sig    nitroglycerin (NITROSTAT) 0.4 mg SL tablet TAKE AS DIRECTED FOR UP TO 3 DOSES, REPEAT THEM EVERY 5 MINUTES    aspirin delayed-release 81 mg tablet Take 1 Tablet by mouth daily.  empagliflozin (Jardiance) 25 mg tablet Take 1 Tablet by mouth daily.  dulaglutide (Trulicity) 1.5 KV/2.8 mL sub-q pen 0.5 mL by SubCUTAneous route every seven (7) days.  isosorbide mononitrate ER (IMDUR) 60 mg CR tablet TAKE 1 TABLET EVERY MORNING    simvastatin (ZOCOR) 10 mg tablet TAKE 1 TABLET EVERY NIGHT    buPROPion SR (WELLBUTRIN SR) 150 mg SR tablet TAKE 1 TABLET EVERY DAY    pramipexole (MIRAPEX) 0.75 mg tablet TAKE 3 TABLETS ONE TIME DAILY    metoprolol succinate (TOPROL-XL) 50 mg XL tablet TAKE 1 TABLET EVERY DAY    solifenacin (VESICARE) 10 mg tablet Take 1 Tablet by mouth daily.  montelukast (SINGULAIR) 10 mg tablet TAKE 1 TABLET BY MOUTH DAILY    glucose blood VI test strips (True Metrix Glucose Test Strip) strip Check levels 3 times daily    Blood-Glucose Meter (True Metrix Glucose Meter) misc Check blood sugar 3 times daily    lancets (TRUEplus Lancets) 33 gauge misc Check blood sugars 3 times daily    alcohol swabs (BD Single Use Swabs Regular) padm Check blood sugar 3 times daily    metFORMIN ER (GLUCOPHAGE XR) 500 mg tablet TAKE 2 TABLETS TWICE DAILY WITH MEALS    alfuzosin SR (UROXATRAL) 10 mg SR tablet Take 1 Tablet by mouth daily (after dinner). Benson lorenzo by Does Not Apply route.  cpap machine kit by Does Not Apply route.  Blood-Glucose Meter monitoring kit Use to check blood sugar 3 times daily. Dx E11.9. Please dispense brand preferred by insurance.  lancets misc Use to check blood sugar 3 times daily. Dx E11.9. Please dispense brand preferred by insurance.  folic acid/multivit-min/lutein (CENTRUM SILVER PO) Take  by mouth.  lisinopriL (PRINIVIL, ZESTRIL) 20 mg tablet Take 1 Tab by mouth daily.  Syringe, Disposable, 3 mL syrg Take as directed    Needle, Disp, 23 G 23 gauge x 1 1/4\" ndle Use to inject 0.5 ml every 7 days    Needle, Disp, 18 G 18 gauge x 1\" ndle Use to aspirate 0.5 ml every 7 days   [facility] Chair maura Assistance with ambulation, gait instability    polyethylene glycol (MIRALAX) 17 gram packet Take 1 Packet by mouth daily. Hold for loose stools (Patient taking differently: Take 17 g by mouth daily as needed. Hold for loose stools)    naloxone 4 mg/actuation spry 4 mg by Nasal route as needed.  Back Brace mis 1 Device by Does Not Apply route daily as needed for Pain. One, lumbosacral orthosis/back brace with metal struts      Medically necessary     No current facility-administered medications for this visit. Past Surgical History:   Procedure Laterality Date    HX APPENDECTOMY      HX BACK SURGERY      Lumbar fusion    HX HERNIA REPAIR  1992    HX OTHER SURGICAL      EMG - S1 disorder    HX OTHER SURGICAL  10/2017    spinal cord stimulator    HX VASECTOMY  1985       Allergies and Sensitivities:  Allergies   Allergen Reactions    Bactrim [Sulfamethoprim] Rash    Opana [Oxymorphone] Other (comments)     Feels like bug crawling under skin and drowziness       Family History:  Family History   Problem Relation Age of Onset    Diabetes Mother     Hypertension Mother     Stroke Mother     Heart Attack Father     Diabetes Father     Stroke Father     Diabetes Sister     Stroke Brother        Social History:  Social History     Tobacco Use    Smoking status: Never Smoker    Smokeless tobacco: Never Used   Substance Use Topics    Alcohol use: Yes    Drug use:  No He  reports that he has never smoked. He has never used smokeless tobacco.  He  reports current alcohol use. Physical Exam:  BP Readings from Last 3 Encounters:   02/07/22 138/84   02/01/22 132/80   05/18/20 138/80         Pulse Readings from Last 3 Encounters:   02/07/22 82   02/01/22 89   07/19/21 86          Wt Readings from Last 3 Encounters:   02/07/22 132.5 kg (292 lb)   02/01/22 132.5 kg (292 lb)   10/12/21 132.5 kg (292 lb)       Constitutional: Oriented to person, place, and time. HENT: Head: Normocephalic and atraumatic. Eyes: Conjunctivae and extraocular motions are normal.   Neck: No JVD present. Carotid bruit is not appreciated. Cardiovascular: Regular rhythm. No murmur, gallop or rubs appreciated  Lung: Breath sounds normal. No respiratory distress. No ronchi or rales appreciated  Abdominal: No tenderness. No rebound and no guarding. Musculoskeletal: 1+ n pitting lower extremity edema   Lymphadenopathy:  No cervical or supraclavicular adenopathy appriciated. Neurological: No gross motor deficit noted. Skin: No visible skin rash noted.      LABS:   @  Lab Results   Component Value Date/Time    WBC 8.4 10/29/2021 10:30 AM    HGB 14.0 10/29/2021 10:30 AM    HCT 47.3 10/29/2021 10:30 AM    PLATELET 267 27/00/7954 10:30 AM    MCV 85.1 10/29/2021 10:30 AM     Lab Results   Component Value Date/Time    Sodium 138 10/29/2021 10:30 AM    Potassium 4.4 10/29/2021 10:30 AM    Chloride 104 10/29/2021 10:30 AM    CO2 24 10/29/2021 10:30 AM    Glucose 132 (H) 10/29/2021 10:30 AM    BUN 13 10/29/2021 10:30 AM    Creatinine 1.28 10/29/2021 10:30 AM     Lipids Latest Ref Rng & Units 10/29/2021 7/3/2020 1/15/2019   Chol, Total <200 MG/ 137 64   HDL 40 - 60 MG/DL 38(L) 39(L) 25(L)   LDL 0 - 100 MG/DL 47 59 18.4   Trig <150 MG/DL 95 195(H) 103   Chol/HDL Ratio 0 - 5.0   2.7 3.5 2.6   Some recent data might be hidden     Lab Results   Component Value Date/Time    ALT (SGPT) 34 10/29/2021 10:30 AM Lab Results   Component Value Date/Time    Hemoglobin A1c 6.9 (H) 10/29/2021 10:30 AM    Hemoglobin A1c (POC) 8.1 09/13/2019 01:58 PM     Lab Results   Component Value Date/Time    TSH 1.09 10/29/2021 10:30 AM       STRESS TEST (EST, PHARM, NUC, ECHO etc)    CATHETERIZATION    IMPRESSION & PLAN:  Check is 77 y.o. with multiple medical problem    Dyspnea and chest pain:  We will need to rule out angina equivalent or underlying CAD  According to patient he had a cardiac catheterization 15 years ago and was told that he has a small vessel disease. No reports available  We will proceed with nuclear stress test for risk ratification. We will proceed with echocardiogram to rule out pulmonary pretension, LV dysfunction  Continue aspirin. Currently on beta-blocker and statin  Use nitroglycerin as needed. Advised against exertional activities  Will provide Lasix 20 mg once daily for significant lower extremity edema and ongoing dyspnea    Pretension:  /84. Currently on isosorbide and also on metoprolol. Continue same. Echo ordered to rule out LV dysfunction or hypertensive cardiovascular heart disease    Hyperlipidemia:  Currently on simvastatin. Last LDL 47. Continue same    Diabetes:  Goal hemoglobin A1c less than 7 is recommended from cardiovascular standpoint colostomy globin A1c 6.9. Defer to    Sleep apnea: Currently is using CPAP machine    This plan was discussed with patient who is in agreement. Thank you for allowing me to participate in patient care. Please feel free to call me if you have any question or concern. Xin Wilkins MD  Please note: This document has been produced using voice recognition software. Unrecognized errors in transcription may be present.

## 2022-02-07 NOTE — PROGRESS NOTES
Lizz Haines presents today for   Chief Complaint   Patient presents with    New Patient     SOB, leg swelling, and history of chest pain       Dilan William preferred language for health care discussion is english/other. Is someone accompanying this pt? no    Is the patient using any DME equipment during [address]? no    Depression Screening:  3 most recent PHQ Screens 2/7/2022   PHQ Not Done -   Little interest or pleasure in doing things Not at all   Feeling down, depressed, irritable, or hopeless Not at all   Total Score PHQ 2 0   Trouble falling or staying asleep, or sleeping too much -   Feeling tired or having little energy -   Poor appetite, weight loss, or overeating -   Feeling bad about yourself - or that you are a failure or have let yourself or your family down -   Trouble concentrating on things such as school, work, reading, or watching TV -   Moving or speaking so slowly that other people could have noticed; or the opposite being so fidgety that others notice -   Thoughts of being better off dead, or hurting yourself in some way -   PHQ 9 Score -   How difficult have these problems made it for you to do your work, take care of your home and get along with others -       Learning Assessment:  Learning Assessment 2/7/2022   PRIMARY LEARNER Patient   HIGHEST LEVEL OF EDUCATION - PRIMARY LEARNER  -   BARRIERS PRIMARY LEARNER -   CO-LEARNER CAREGIVER -   PRIMARY LANGUAGE ENGLISH   LEARNER PREFERENCE PRIMARY DEMONSTRATION     -     -     -   ANSWERED BY patient   RELATIONSHIP SELF       Abuse Screening:  Abuse Screening Questionnaire 2/7/2022   Do you ever feel afraid of your partner? N   Are you in a relationship with someone who physically or mentally threatens you? N   Is it safe for you to go home? Y       Fall Risk  Fall Risk Assessment, last 12 mths 2/7/2022   Able to walk? Yes   Fall in past 12 months? 0   Do you feel unsteady? 0   Are you worried about falling 0   Is TUG test greater than 12 seconds?  - Is the gait abnormal? -   Number of falls in past 12 months -   Fall with injury? -           Pt currently taking Anticoagulant therapy? no    Pt currently taking Antiplatelet therapy ? Aspirin 81 mg      Coordination of Care:  1. Have you been to the ER, urgent care clinic since your last visit? Hospitalized since your last visit? no    2. Have you seen or consulted any other health care providers outside of the [address] [place] [address] since your last visit? Include any pap smears or colon screening.  no

## 2022-02-13 DIAGNOSIS — G47.30 SLEEP APNEA, UNSPECIFIED TYPE: ICD-10-CM

## 2022-02-13 DIAGNOSIS — R07.9 CHEST PAIN, UNSPECIFIED TYPE: ICD-10-CM

## 2022-02-13 DIAGNOSIS — I10 PRIMARY HYPERTENSION: ICD-10-CM

## 2022-02-14 RX ORDER — NITROGLYCERIN 0.4 MG/1
TABLET SUBLINGUAL
Qty: 30 TABLET | Refills: 0 | Status: SHIPPED | OUTPATIENT
Start: 2022-02-14

## 2022-02-16 ENCOUNTER — VIRTUAL VISIT (OUTPATIENT)
Dept: INTERNAL MEDICINE CLINIC | Age: 67
End: 2022-02-16

## 2022-02-16 DIAGNOSIS — E11.40 TYPE 2 DIABETES MELLITUS WITH DIABETIC NEUROPATHY, WITHOUT LONG-TERM CURRENT USE OF INSULIN (HCC): Primary | ICD-10-CM

## 2022-02-16 NOTE — PROGRESS NOTES
Pharmacy Progress Note - Diabetes Management    Assessment / Plan:   Diabetes Management:  - Per ADA guidelines, Pt's A1c is at goal of < 7%. - Current SMBG(s)/CGM trend is at fasting goals majority of the time per patient report   - Patient states that he is doing well with current medications with no side effect concerns  - He is also continuing to be mindful of his diet and is cooking more at home which allows him to better control portion sizes and types of meals he is eating   - Will check on [employer] patient assistance application as patient states he has not heard anything from the company yet regarding approval for [id number]  - Continue Trulicity 1.5 mg once weekly, Jardiance 25 mg once daily and metformin ER 1000 mg twice daily  - Follow up in 3 months          S/O: Mr. Ivette Hancock is a 77 y.o. male, referred by Dr. José Storm MD was seen today for diabetes management follow up. Patient's last A1c was 6.9% in October 2021. Interim update: Patient states that he has been doing much better over the past 6 months or so. He was not able to get his ankle surgery; however, he is now wearing a boot which he states significantly reduces the pain for him. He still is not able to exercise, but he is trying to cook at home more and cut back on sweets and decrease his portion sizes of carbs. Current anti-hyperglycemic regimen include(s):    - Trulicity 1.5 mg once weekly  - Jardiance 25 mg once daily  - Metformin ER 1000 mg twice daily     Patient reports adherence to his current medication regimen. ROS:  Today, Pt endorses:  - Symptoms of Hyperglycemia: none  - Symptoms of Hypoglycemia: none    Self Monitoring Blood Glucose (SMBG) or CGM:  - Brought in home glucometer/blood glucose log/CGM reader today:  yes  - Conducts/scans:  Once daily Fasting    - Patient states that majority of his fasting readings range between 90 - 120  - His 14 day avg on his meter is 127, which may include some evening readings     Nutrition/Lifestyle Modifications:  - Overall, patient states he is doing much better with his diet and is cooking at home more  - He does acknowledge that he can do a better job with increasing vegetables into his routine   - He is drinking mostly unsweet tea and diet pepsi, but wants to drink more water    - He is limiting sweets and portion sizes of carbs, but states he needs to work on night time snacking     - Physical Activity:   - Limited due to ankle pain     The ASCVD Risk score (Enid Aguilar, et al., 2013) failed to calculate for the following reasons: The valid total cholesterol range is 130 to 320 mg/dL     Vitals:   Wt Readings from Last 3 Encounters:   02/07/22 292 lb (132.5 kg)   02/01/22 292 lb (132.5 kg)   10/12/21 292 lb (132.5 kg)     BP Readings from Last 3 Encounters:   02/07/22 138/84   02/01/22 132/80   05/18/20 138/80     Pulse Readings from Last 3 Encounters:   02/07/22 82   02/01/22 89   07/19/21 86       Past Medical History:   Diagnosis Date    Abdominal adhesions 7/30/2014    BPH (benign prostatic hyperplasia)     Cellulitis     DDD (degenerative disc disease), lumbar 7/30/2014    Depression     Diabetes ([facility] [address]     Frequent falls 7/30/2014    TRUE (generalized anxiety disorder) 7/30/2014    HLD (hyperlipidemia)     Hypertension     Hypogonadism in male     Incomplete bladder emptying     Kidney stones     Lumbar facet arthropathy 7/30/2014    Lumbar nerve root impingement 7/30/2014    Lumbosacral radiculopathy at S1 7/30/2014    Major depression 7/30/2014    Neurological disorder     sciatica    MANISH (obstructive sleep apnea) 7/30/2014    S/P lumbar laminectomy 7/30/2014    S/P lumbar spinal fusion 7/30/2014    Sleep apnea     USES CPAP EVERY NIGHT    Thoracic compression fracture ([facility]) 07/30/2014     Allergies   Allergen Reactions    Bactrim [Sulfamethoprim] Rash    Opana [Oxymorphone] Other (comments)     Feels like bug crawling under skin and drowziness       Current Outpatient Medications   Medication Sig    nitroglycerin (NITROSTAT) 0.4 mg SL tablet DISSOLVE ONE TABLET UNDER TONGUE AS NEEDED FOR CHEST PAIN EVERY 5 MINUTES FOR UP TO 3 DOSES    isosorbide mononitrate ER (IMDUR) 60 mg CR tablet TAKE 1 TABLET EVERY MORNING    buPROPion SR (WELLBUTRIN SR) 150 mg SR tablet TAKE 1 TABLET EVERY DAY    simvastatin (ZOCOR) 10 mg tablet TAKE 1 TABLET EVERY NIGHT    montelukast (SINGULAIR) 10 mg tablet TAKE 1 TABLET EVERY DAY    furosemide (LASIX) 20 mg tablet Take 1 Tablet by mouth two (2) times a day.  aspirin delayed-release 81 mg tablet Take 1 Tablet by mouth daily.  empagliflozin (Jardiance) 25 mg tablet Take 1 Tablet by mouth daily.  dulaglutide (Trulicity) 1.5 OZ/3.0 mL sub-q pen 0.5 mL by SubCUTAneous route every seven (7) days.  pramipexole (MIRAPEX) 0.75 mg tablet TAKE 3 TABLETS ONE TIME DAILY    metoprolol succinate (TOPROL-XL) 50 mg XL tablet TAKE 1 TABLET EVERY DAY    solifenacin (VESICARE) 10 mg tablet Take 1 Tablet by mouth daily.  glucose blood VI test strips (True Metrix Glucose Test Strip) strip Check levels 3 times daily    Blood-Glucose Meter (True Metrix Glucose Meter) misc Check blood sugar 3 times daily    lancets (TRUEplus Lancets) 33 gauge misc Check blood sugars 3 times daily    alcohol swabs (BD Single Use Swabs Regular) padm Check blood sugar 3 times daily    metFORMIN ER (GLUCOPHAGE XR) 500 mg tablet TAKE 2 TABLETS TWICE DAILY WITH MEALS    alfuzosin SR (UROXATRAL) 10 mg SR tablet Take 1 Tablet by mouth daily (after dinner). Ephriam Mcburney devi by Does Not Apply route.  cpap machine kit by Does Not Apply route.  Blood-Glucose Meter monitoring kit Use to check blood sugar 3 times daily. Dx E11.9. Please dispense brand preferred by insurance.  lancets misc Use to check blood sugar 3 times daily. Dx E11.9. Please dispense brand preferred by insurance.     folic acid/multivit-min/lutein (CENTRUM SILVER PO) Take  by mouth.  lisinopriL (PRINIVIL, ZESTRIL) 20 mg tablet Take 1 Tab by mouth daily.  Syringe, Disposable, 3 mL syrg Take as directed    Needle, Disp, 23 G 23 gauge x 1 1/4\" ndle Use to inject 0.5 ml every 7 days    Needle, Disp, 18 G 18 gauge x 1\" ndle Use to aspirate 0.5 ml every 7 days   [facility] Chair maura Assistance with ambulation, gait instability    polyethylene glycol (MIRALAX) 17 gram packet Take 1 Packet by mouth daily. Hold for loose stools (Patient taking differently: Take 17 g by mouth daily as needed. Hold for loose stools)    naloxone 4 mg/actuation spry 4 mg by Nasal route as needed.  Back Brace misc 1 Device by Does Not Apply route daily as needed for Pain. One, lumbosacral orthosis/back brace with metal struts      Medically necessary     No current facility-administered medications for this visit. Lab Results   Component Value Date/Time    Sodium 138 10/29/2021 10:30 AM    Potassium 4.4 10/29/2021 10:30 AM    Chloride 104 10/29/2021 10:30 AM    CO2 24 10/29/2021 10:30 AM    Anion gap 10 10/29/2021 10:30 AM    Glucose 132 (H) 10/29/2021 10:30 AM    BUN 13 10/29/2021 10:30 AM    Creatinine 1.28 10/29/2021 10:30 AM    BUN/Creatinine ratio 10 (L) 10/29/2021 10:30 AM    GFR est AA >60 10/29/2021 10:30 AM    GFR est non-AA 56 (L) 10/29/2021 10:30 AM    Calcium 9.1 10/29/2021 10:30 AM    Bilirubin, total 0.4 02/14/2022 04:00 PM    Alk.  phosphatase 88 02/14/2022 04:00 PM    Protein, total 8.2 02/14/2022 04:00 PM    Albumin 5.1 (H) 02/14/2022 04:00 PM    Globulin 3.4 10/29/2021 10:30 AM    A-G Ratio 1.2 10/29/2021 10:30 AM    ALT (SGPT) 22 02/14/2022 04:00 PM       Lab Results   Component Value Date/Time    Cholesterol, total 104 10/29/2021 10:30 AM    HDL Cholesterol 38 (L) 10/29/2021 10:30 AM    LDL, calculated 47 10/29/2021 10:30 AM    VLDL, calculated 19 10/29/2021 10:30 AM    Triglyceride 95 10/29/2021 10:30 AM    CHOL/HDL Ratio 2.7 10/29/2021 10:30 AM       Lab Results Component Value Date/Time    WBC 8.4 10/29/2021 10:30 AM    HGB 16.1 02/14/2022 04:00 PM    HCT 50.5 02/14/2022 04:00 PM    PLATELET 260 54/69/6262 10:30 AM    MCV 85.1 10/29/2021 10:30 AM       Lab Results   Component Value Date/Time    Microalbumin/Creat ratio (mg/g creat) 26 07/03/2020 09:17 AM    MICROALBUMIN,MG/DAY 14.8 06/13/2016 08:43 AM    Microalbumin,urine random 4.06 (H) 07/03/2020 09:17 AM       HbA1c:  Lab Results   Component Value Date/Time    Hemoglobin A1c 6.9 (H) 10/29/2021 10:30 AM    Hemoglobin A1c (POC) 8.1 09/13/2019 01:58 PM     No components found for: 2     Last Point of Care HGB A1C  Hemoglobin A1c (POC)   Date Value Ref Range Status   09/13/2019 8.1 % Final        CrCl cannot be calculated (Unknown ideal weight.). Patient verbalized understanding of the information presented and all of the patients questions were answered. AVS was handed to the patient. Patient advised to call the office with any additional questions or concerns. Thank you,  Igor Welch. NAIMA Kitchen            For Pharmacy Admin Tracking Only     CPA in place:  Yes   Recommendation Provided To: Patient/Caregiver: 0 via Virtual Visit   Time Spent (min): 45

## 2022-02-21 RX ORDER — CALCIUM CITRATE/VITAMIN D3 200MG-6.25
TABLET ORAL
Qty: 300 STRIP | Refills: 5 | Status: SHIPPED | OUTPATIENT
Start: 2022-02-21

## 2022-03-18 PROBLEM — Z96.89 STATUS POST INSERTION OF SPINAL CORD STIMULATOR: Status: ACTIVE | Noted: 2017-09-14

## 2022-03-18 PROBLEM — A41.9 SEPSIS (HCC): Status: ACTIVE | Noted: 2019-01-14

## 2022-03-19 PROBLEM — G89.4 CHRONIC PAIN SYNDROME: Status: ACTIVE | Noted: 2017-06-15

## 2022-03-19 PROBLEM — M96.1 LUMBAR POST-LAMINECTOMY SYNDROME: Status: ACTIVE | Noted: 2017-06-15

## 2022-03-19 PROBLEM — E66.01 SEVERE OBESITY (HCC): Status: ACTIVE | Noted: 2019-04-26

## 2022-03-19 PROBLEM — M47.817 LUMBAR AND SACRAL OSTEOARTHRITIS: Status: ACTIVE | Noted: 2017-06-15

## 2022-03-19 PROBLEM — N20.0 KIDNEY STONE: Status: ACTIVE | Noted: 2019-02-25

## 2022-03-19 PROBLEM — E11.40 TYPE 2 DIABETES MELLITUS WITH DIABETIC NEUROPATHY (HCC): Status: ACTIVE | Noted: 2019-05-10

## 2022-03-20 PROBLEM — N39.0 UTI (URINARY TRACT INFECTION): Status: ACTIVE | Noted: 2019-01-14

## 2022-03-30 ENCOUNTER — TELEPHONE (OUTPATIENT)
Dept: CARDIOLOGY CLINIC | Age: 67
End: 2022-03-30

## 2022-04-01 ENCOUNTER — TELEPHONE (OUTPATIENT)
Dept: CARDIOLOGY CLINIC | Age: 67
End: 2022-04-01

## 2022-04-01 NOTE — TELEPHONE ENCOUNTER
----- Message from Selina Vides MD sent at 3/31/2022  3:11 PM EDT -----  Cardiac testing appears normal.  Inform patient please    Thanks  SP

## 2022-04-04 ENCOUNTER — OFFICE VISIT (OUTPATIENT)
Dept: CARDIOLOGY CLINIC | Age: 67
End: 2022-04-04
Payer: MEDICARE

## 2022-04-04 VITALS
SYSTOLIC BLOOD PRESSURE: 124 MMHG | HEART RATE: 73 BPM | HEIGHT: 74 IN | BODY MASS INDEX: 39.78 KG/M2 | OXYGEN SATURATION: 97 % | DIASTOLIC BLOOD PRESSURE: 64 MMHG | WEIGHT: 310 LBS

## 2022-04-04 DIAGNOSIS — I10 PRIMARY HYPERTENSION: ICD-10-CM

## 2022-04-04 DIAGNOSIS — E78.5 HYPERLIPIDEMIA, UNSPECIFIED HYPERLIPIDEMIA TYPE: Primary | ICD-10-CM

## 2022-04-04 PROCEDURE — G8417 CALC BMI ABV UP PARAM F/U: HCPCS | Performed by: INTERNAL MEDICINE

## 2022-04-04 PROCEDURE — G8754 DIAS BP LESS 90: HCPCS | Performed by: INTERNAL MEDICINE

## 2022-04-04 PROCEDURE — G8432 DEP SCR NOT DOC, RNG: HCPCS | Performed by: INTERNAL MEDICINE

## 2022-04-04 PROCEDURE — G8428 CUR MEDS NOT DOCUMENT: HCPCS | Performed by: INTERNAL MEDICINE

## 2022-04-04 PROCEDURE — 1101F PT FALLS ASSESS-DOCD LE1/YR: CPT | Performed by: INTERNAL MEDICINE

## 2022-04-04 PROCEDURE — 3017F COLORECTAL CA SCREEN DOC REV: CPT | Performed by: INTERNAL MEDICINE

## 2022-04-04 PROCEDURE — G8752 SYS BP LESS 140: HCPCS | Performed by: INTERNAL MEDICINE

## 2022-04-04 PROCEDURE — G8536 NO DOC ELDER MAL SCRN: HCPCS | Performed by: INTERNAL MEDICINE

## 2022-04-04 PROCEDURE — 99214 OFFICE O/P EST MOD 30 MIN: CPT | Performed by: INTERNAL MEDICINE

## 2022-04-04 NOTE — LETTER
4/4/2022    Patient: Ozzy Rangel   YOB: 1955   Date of Visit: 4/4/2022     Ronaldo Adan, [address] [facility] [address]  [address] [address] [place] [id number]  [address] [facility] [address]    Dear Ronaldo Adan MD,      Thank you for referring Mr. Dilan William to [address] [place] [address] for evaluation. My notes for this consultation are attached. If you have questions, please do not hesitate to call me. I look forward to following your patient along with you.       Sincerely,    Uday Garsia MD

## 2022-04-04 NOTE — PROGRESS NOTES
[facility]    Mr. Terry is a 79 y.o. male with a history of diabetes, hypertension, hyperlipidemia and other multiple medical problems including sleep apnea    Patient is here today for cardiac evaluation. He denies any prior history of MI or CHF. He denies any cardiac symptoms since last time. He says that his dyspnea may be slightly better since last visit as he is taking Lasix with good response. He denies any chest pain or chest tightness. He denies any use of nitroglycerin since last visit. He had very random episode of chest tingling sensation lasting for less than a minute which happens probably once or twice a month. Nonexertional.  Not associate with nausea vomiting or diaphoresis. Not radiating. He has no exertional chest pain or chest tightness  Denies any nausea, vomiting, abdominal pain, fever, chills, sputum production. No hematuria or other bleeding complaints    Past Medical History:   Diagnosis Date    Abdominal adhesions 7/30/2014    BPH (benign prostatic hyperplasia)     Cellulitis     DDD (degenerative disc disease), lumbar 7/30/2014    Depression     Diabetes ([address]     Frequent falls 7/30/2014    TRUE (generalized anxiety disorder) 7/30/2014    HLD (hyperlipidemia)     Hypertension     Hypogonadism in male     Incomplete bladder emptying     Kidney stones     Lumbar facet arthropathy 7/30/2014    Lumbar nerve root impingement 7/30/2014    Lumbosacral radiculopathy at S1 7/30/2014    Major depression 7/30/2014    Neurological disorder     sciatica    MANISH (obstructive sleep apnea) 7/30/2014    S/P lumbar laminectomy 7/30/2014    S/P lumbar spinal fusion 7/30/2014    Sleep apnea     USES CPAP EVERY NIGHT    Thoracic compression fracture ([address] 07/30/2014       Review of Systems:  Cardiac symptoms as noted above in HPI. All others negative.   Denies fatigue, malaise, skin rash, joint pain, blurring vision, photophobia, neck pain, hemoptysis, chronic cough, nausea, vomiting, hematuria, burning micturition, BRBPR, chronic headaches. Current Outpatient Medications   Medication Sig    testosterone cypionate (DEPOTESTOTERONE CYPIONATE) 200 mg/mL injection 0.5 mL by IntraMUSCular route every seven (7) days. Max Daily Amount: 100 mg.  cephALEXin (KEFLEX) 500 mg capsule Take 1 Capsule by mouth four (4) times daily.  metFORMIN ER (GLUCOPHAGE XR) 500 mg tablet Take 2 Tablets by mouth daily (with dinner).  glucose blood VI test strips (True Metrix Glucose Test Strip) strip TEST BLOOD SUGAR THREE TIMES DAILY    nitroglycerin (NITROSTAT) 0.4 mg SL tablet DISSOLVE ONE TABLET UNDER TONGUE AS NEEDED FOR CHEST PAIN EVERY 5 MINUTES FOR UP TO 3 DOSES    isosorbide mononitrate ER (IMDUR) 60 mg CR tablet TAKE 1 TABLET EVERY MORNING    buPROPion SR (WELLBUTRIN SR) 150 mg SR tablet TAKE 1 TABLET EVERY DAY    simvastatin (ZOCOR) 10 mg tablet TAKE 1 TABLET EVERY NIGHT    montelukast (SINGULAIR) 10 mg tablet TAKE 1 TABLET EVERY DAY    furosemide (LASIX) 20 mg tablet Take 1 Tablet by mouth two (2) times a day.  aspirin delayed-release 81 mg tablet Take 1 Tablet by mouth daily.  empagliflozin (Jardiance) 25 mg tablet Take 1 Tablet by mouth daily.  dulaglutide (Trulicity) 1.5 BT/5.3 mL sub-q pen 0.5 mL by SubCUTAneous route every seven (7) days.  pramipexole (MIRAPEX) 0.75 mg tablet TAKE 3 TABLETS ONE TIME DAILY    metoprolol succinate (TOPROL-XL) 50 mg XL tablet TAKE 1 TABLET EVERY DAY    solifenacin (VESICARE) 10 mg tablet Take 1 Tablet by mouth daily.  Blood-Glucose Meter (True Metrix Glucose Meter) misc Check blood sugar 3 times daily    lancets (TRUEplus Lancets) 33 gauge misc Check blood sugars 3 times daily    alcohol swabs (BD Single Use Swabs Regular) padm Check blood sugar 3 times daily    alfuzosin SR (UROXATRAL) 10 mg SR tablet Take 1 Tablet by mouth daily (after dinner).     Cane maura by Does Not Apply route.  cpap machine kit by Does Not Apply route.  Blood-Glucose Meter monitoring kit Use to check blood sugar 3 times daily. Dx E11.9. Please dispense brand preferred by insurance.  lancets misc Use to check blood sugar 3 times daily. Dx E11.9. Please dispense brand preferred by insurance.  folic acid/multivit-min/lutein (CENTRUM SILVER PO) Take  by mouth.  lisinopriL (PRINIVIL, ZESTRIL) 20 mg tablet Take 1 Tab by mouth daily.  Syringe, Disposable, 3 mL syrg Take as directed    Needle, Disp, 23 G 23 gauge x 1 1/4\" ndle Use to inject 0.5 ml every 7 days    Needle, Disp, 18 G 18 gauge x 1\" ndle Use to aspirate 0.5 ml every 7 days   MessageMe Chair maura Assistance with ambulation, gait instability    polyethylene glycol (MIRALAX) 17 gram packet Take 1 Packet by mouth daily. Hold for loose stools (Patient taking differently: Take 17 g by mouth daily as needed. Hold for loose stools)    naloxone 4 mg/actuation spry 4 mg by Nasal route as needed.  Back Brace misc 1 Device by Does Not Apply route daily as needed for Pain. One, lumbosacral orthosis/back brace with metal struts      Medically necessary     No current facility-administered medications for this visit.        Past Surgical History:   Procedure Laterality Date    HX APPENDECTOMY      HX BACK SURGERY      Lumbar fusion    HX HERNIA REPAIR  1992    HX OTHER SURGICAL      EMG - S1 disorder    HX OTHER SURGICAL  10/2017    spinal cord stimulator    HX VASECTOMY  1985       Allergies and Sensitivities:  Allergies   Allergen Reactions    Bactrim [Sulfamethoprim] Rash    Opana [Oxymorphone] Other (comments)     Feels like bug crawling under skin and drowziness       Family History:  Family History   Problem Relation Age of Onset    Diabetes Mother     Hypertension Mother     Stroke Mother     Heart Attack Father     Diabetes Father     Stroke Father     Diabetes Sister     Stroke Brother        Social History:  Social History     Tobacco Use    Smoking status: Never Smoker    Smokeless tobacco: Never Used   Substance Use Topics    Alcohol use: Yes    Drug use: No     He  reports that he has never smoked. He has never used smokeless tobacco.  He  reports current alcohol use. Physical Exam:  BP Readings from Last 3 Encounters:   04/04/22 124/64   03/31/22 110/70   03/31/22 110/70         Pulse Readings from Last 3 Encounters:   04/04/22 73   02/07/22 82   02/01/22 89          Wt Readings from Last 3 Encounters:   04/04/22 140.6 kg (310 lb)   03/31/22 132.5 kg (292 lb)   03/31/22 132.5 kg (292 lb)       Constitutional: Oriented to person, place, and time. HENT: Head: Normocephalic and atraumatic. Neck: No JVD present. Carotid bruit is not appreciated. Cardiovascular: Regular rhythm. No murmur, gallop or rubs appreciated  Lung: Breath sounds normal. No respiratory distress. No ronchi or rales appreciated  Abdominal: No tenderness. No rebound and no guarding.    Musculoskeletal: Trace pitting lower extremity edema       LABS:   @  Lab Results   Component Value Date/Time    WBC 8.4 10/29/2021 10:30 AM    HGB 16.1 02/14/2022 04:00 PM    HCT 50.5 02/14/2022 04:00 PM    PLATELET 278 98/64/3174 10:30 AM    MCV 85.1 10/29/2021 10:30 AM     Lab Results   Component Value Date/Time    Sodium 138 10/29/2021 10:30 AM    Potassium 4.4 10/29/2021 10:30 AM    Chloride 104 10/29/2021 10:30 AM    CO2 24 10/29/2021 10:30 AM    Glucose 132 (H) 10/29/2021 10:30 AM    BUN 13 10/29/2021 10:30 AM    Creatinine 1.28 10/29/2021 10:30 AM     Lipids Latest Ref Rng & Units 10/29/2021 7/3/2020 1/15/2019   Chol, Total <200 MG/ 137 64   HDL 40 - 60 MG/DL 38(L) 39(L) 25(L)   LDL 0 - 100 MG/DL 47 59 18.4   Trig <150 MG/DL 95 195(H) 103   Chol/HDL Ratio 0 - 5.0   2.7 3.5 2.6   Some recent data might be hidden     Lab Results   Component Value Date/Time    ALT (SGPT) 22 02/14/2022 04:00 PM     Lab Results   Component Value Date/Time    Hemoglobin A1c 6.9 (H) 10/29/2021 10:30 AM    Hemoglobin A1c (POC) 8.1 09/13/2019 01:58 PM     Lab Results   Component Value Date/Time    TSH 1.09 10/29/2021 10:30 AM     03/31/22    ECHO ADULT COMPLETE 03/31/2022 3/31/2022  Interpretation Summary    Left Ventricle: Left ventricle size is normal. Mildly increased wall thickness. Normal wall motion. Normal left ventricular systolic function with a visually estimated EF of 55 - 60%. Normal diastolic function.   Aorta: Mildly dilated aortic root. Ao Root diameter is 3.5 cm.   Left Atrium: Left atrial volume index is normal (16-34 mL/m2). LA Vol Index A/L is 29 mL/m2. NUCLEAR CARDIAC STRESS TEST 03/31/2022 3/31/2022  Interpretation Summary    Stress Test: A pharmacological stress test was performed using lexiscan.   Nuclear Findings: LVEF measures 64%. TID ratio is 1.01.    Nuclear Findings: LV perfusion is probably normal.  There is a small fixed basal inferior defect, that is likely artifact. No ischemia.   ECG: Stress ECG was not diagnostic due to baseline ECG abnormality.   Low risk stress test.    IMPRESSION & PLAN:  Check is 79 y.o. with multiple medical problem    Dyspnea and chest pain:  Nuclear stress test in 03/2022, low risk  Echocardiogram in 03/2022 with normal EF and no major valvular heart disease  According to patient he had a cardiac catheterization 15 years ago and was told that he has a small vessel disease. No reports available  Continue aspirin and beta-blocker. Overall dyspnea is much better since last visit after starting Lasix. Edema has improved. Discussed with the patient the finding of the stress test and echocardiogram, low risk and he was reassured    Hypertension  /64. Currently on isosorbide, beta-blocker and lisinopril. Continue same. Hyperlipidemia:  Currently on simvastatin. Last LDL 47. He would like to discontinue simvastatin as he had a stroke or CVA. His LDL is 47.   In 2019 his LDL was only 18. I will discontinue simvastatin for now and repeat fasting blood profile in 3 months off of simvastatin and see if he needs simvastatin at that point  Ideally he should be on lipid-lowering agent because of his diabetes however he complains of some myalgias and would like to try with diet and exercise program    Diabetes:  Goal hemoglobin A1c less than 7 is recommended from cardiovascular standpoint colostomy globin A1c 6.9. Defer to    Sleep apnea: Currently is using CPAP machine    This plan was discussed with patient who is in agreement. Thank you for allowing me to participate in patient care. Please feel free to call me if you have any question or concern. Debbra Seip, MD  Please note: This document has been produced using voice recognition software. Unrecognized errors in transcription may be present.

## 2022-04-10 DIAGNOSIS — I10 ESSENTIAL HYPERTENSION: ICD-10-CM

## 2022-04-11 RX ORDER — METOPROLOL SUCCINATE 50 MG/1
TABLET, EXTENDED RELEASE ORAL
Qty: 90 TABLET | Refills: 1 | Status: SHIPPED | OUTPATIENT
Start: 2022-04-11 | End: 2022-08-31

## 2022-04-11 RX ORDER — PRAMIPEXOLE DIHYDROCHLORIDE 0.75 MG/1
TABLET ORAL
Qty: 270 TABLET | Refills: 1 | Status: SHIPPED | OUTPATIENT
Start: 2022-04-11 | End: 2022-08-31

## 2022-04-30 DIAGNOSIS — E11.9 DIABETES MELLITUS WITHOUT COMPLICATION (HCC): ICD-10-CM

## 2022-05-02 ENCOUNTER — HOSPITAL ENCOUNTER (OUTPATIENT)
Dept: LAB | Age: 67
Discharge: HOME | End: 2022-05-02
Payer: MEDICARE

## 2022-05-02 DIAGNOSIS — E11.65 POORLY CONTROLLED DIABETES MELLITUS (HCC): Primary | ICD-10-CM

## 2022-05-02 DIAGNOSIS — N28.9 RENAL INSUFFICIENCY: Primary | ICD-10-CM

## 2022-05-02 DIAGNOSIS — N18.30 STAGE 3 CHRONIC KIDNEY DISEASE, UNSPECIFIED WHETHER STAGE 3A OR 3B CKD (HCC): ICD-10-CM

## 2022-05-02 DIAGNOSIS — E11.40 TYPE 2 DIABETES MELLITUS WITH DIABETIC NEUROPATHY, WITHOUT LONG-TERM CURRENT USE OF INSULIN (HCC): ICD-10-CM

## 2022-05-02 LAB
ALBUMIN SERPL-MCNC: 4.1 G/DL (ref 3.4–5)
ALBUMIN/GLOB SERPL: 1.2 {RATIO} (ref 0.8–1.7)
ALP SERPL-CCNC: 77 U/L (ref 45–117)
ALT SERPL-CCNC: 33 U/L (ref 16–61)
ANION GAP SERPL CALC-SCNC: 8 MMOL/L (ref 3–18)
AST SERPL-CCNC: 29 U/L (ref 10–38)
BILIRUB SERPL-MCNC: 0.4 MG/DL (ref 0.2–1)
BUN SERPL-MCNC: 26 MG/DL (ref 7–18)
BUN/CREAT SERPL: 17 (ref 12–20)
CALCIUM SERPL-MCNC: 9.5 MG/DL (ref 8.5–10.1)
CHLORIDE SERPL-SCNC: 104 MMOL/L (ref 100–111)
CO2 SERPL-SCNC: 26 MMOL/L (ref 21–32)
CREAT SERPL-MCNC: 1.53 MG/DL (ref 0.6–1.3)
EST. AVERAGE GLUCOSE BLD GHB EST-MCNC: 171 MG/DL
GLOBULIN SER CALC-MCNC: 3.5 G/DL (ref 2–4)
GLUCOSE SERPL-MCNC: 134 MG/DL (ref 74–99)
HBA1C MFR BLD: 7.6 % (ref 4.2–5.6)
POTASSIUM SERPL-SCNC: 3.9 MMOL/L (ref 3.5–5.5)
PROT SERPL-MCNC: 7.6 G/DL (ref 6.4–8.2)
SODIUM SERPL-SCNC: 138 MMOL/L (ref 136–145)

## 2022-05-02 PROCEDURE — 83036 HEMOGLOBIN GLYCOSYLATED A1C: CPT

## 2022-05-02 PROCEDURE — 36415 COLL VENOUS BLD VENIPUNCTURE: CPT

## 2022-05-02 PROCEDURE — 80053 COMPREHEN METABOLIC PANEL: CPT

## 2022-05-02 RX ORDER — METFORMIN HYDROCHLORIDE 500 MG/1
1000 TABLET, EXTENDED RELEASE ORAL
Qty: 180 TABLET | Refills: 1 | Status: SHIPPED | OUTPATIENT
Start: 2022-05-02 | End: 2022-05-03 | Stop reason: ALTCHOICE

## 2022-05-02 NOTE — PROGRESS NOTES
A1C and renal function went up. Sending to endo and nephrology. Please let pt know. Further discussion on follow up visit.

## 2022-05-03 ENCOUNTER — OFFICE VISIT (OUTPATIENT)
Dept: FAMILY MEDICINE CLINIC | Age: 67
End: 2022-05-03
Payer: MEDICARE

## 2022-05-03 VITALS
TEMPERATURE: 98 F | DIASTOLIC BLOOD PRESSURE: 70 MMHG | HEART RATE: 77 BPM | RESPIRATION RATE: 16 BRPM | SYSTOLIC BLOOD PRESSURE: 118 MMHG | OXYGEN SATURATION: 94 %

## 2022-05-03 DIAGNOSIS — R97.20 ELEVATED PSA: ICD-10-CM

## 2022-05-03 DIAGNOSIS — N28.9 RENAL INSUFFICIENCY: ICD-10-CM

## 2022-05-03 DIAGNOSIS — E11.40 TYPE 2 DIABETES MELLITUS WITH DIABETIC NEUROPATHY, WITHOUT LONG-TERM CURRENT USE OF INSULIN (HCC): Primary | ICD-10-CM

## 2022-05-03 DIAGNOSIS — Z98.890 S/P LUMBAR LAMINECTOMY: ICD-10-CM

## 2022-05-03 DIAGNOSIS — G47.33 OSA (OBSTRUCTIVE SLEEP APNEA): ICD-10-CM

## 2022-05-03 DIAGNOSIS — M79.89 LEG SWELLING: ICD-10-CM

## 2022-05-03 DIAGNOSIS — Z98.1 S/P LUMBAR SPINAL FUSION: ICD-10-CM

## 2022-05-03 DIAGNOSIS — F41.1 GAD (GENERALIZED ANXIETY DISORDER): ICD-10-CM

## 2022-05-03 DIAGNOSIS — M96.1 LUMBAR POST-LAMINECTOMY SYNDROME: ICD-10-CM

## 2022-05-03 DIAGNOSIS — E66.01 SEVERE OBESITY (HCC): ICD-10-CM

## 2022-05-03 DIAGNOSIS — I10 PRIMARY HYPERTENSION: ICD-10-CM

## 2022-05-03 PROBLEM — Z96.89 SPINAL CORD STIMULATOR STATUS: Status: ACTIVE | Noted: 2022-05-03

## 2022-05-03 PROCEDURE — G8427 DOCREV CUR MEDS BY ELIG CLIN: HCPCS | Performed by: FAMILY MEDICINE

## 2022-05-03 PROCEDURE — 3017F COLORECTAL CA SCREEN DOC REV: CPT | Performed by: FAMILY MEDICINE

## 2022-05-03 PROCEDURE — 1101F PT FALLS ASSESS-DOCD LE1/YR: CPT | Performed by: FAMILY MEDICINE

## 2022-05-03 PROCEDURE — G8752 SYS BP LESS 140: HCPCS | Performed by: FAMILY MEDICINE

## 2022-05-03 PROCEDURE — 99214 OFFICE O/P EST MOD 30 MIN: CPT | Performed by: FAMILY MEDICINE

## 2022-05-03 PROCEDURE — G8536 NO DOC ELDER MAL SCRN: HCPCS | Performed by: FAMILY MEDICINE

## 2022-05-03 PROCEDURE — G8510 SCR DEP NEG, NO PLAN REQD: HCPCS | Performed by: FAMILY MEDICINE

## 2022-05-03 PROCEDURE — G8417 CALC BMI ABV UP PARAM F/U: HCPCS | Performed by: FAMILY MEDICINE

## 2022-05-03 PROCEDURE — 3051F HG A1C>EQUAL 7.0%<8.0%: CPT | Performed by: FAMILY MEDICINE

## 2022-05-03 PROCEDURE — 2022F DILAT RTA XM EVC RTNOPTHY: CPT | Performed by: FAMILY MEDICINE

## 2022-05-03 PROCEDURE — G8754 DIAS BP LESS 90: HCPCS | Performed by: FAMILY MEDICINE

## 2022-05-03 RX ORDER — AMITRIPTYLINE HYDROCHLORIDE 10 MG/1
10 TABLET, FILM COATED ORAL
Qty: 30 TABLET | Refills: 1 | Status: SHIPPED | OUTPATIENT
Start: 2022-05-03 | End: 2022-08-02

## 2022-05-03 RX ORDER — GLIPIZIDE 10 MG/1
10 TABLET ORAL 2 TIMES DAILY
Qty: 60 TABLET | Refills: 3 | Status: SHIPPED | OUTPATIENT
Start: 2022-05-03 | End: 2022-05-18

## 2022-05-03 RX ORDER — GABAPENTIN 300 MG/1
300 CAPSULE ORAL 3 TIMES DAILY
COMMUNITY

## 2022-05-03 NOTE — PROGRESS NOTES
HISTORY OF PRESENT ILLNESS  Joshua Ortiz is a 79 y.o. male. HPI: Here for follow-up. Multiple medical concern. Poorly controlled diabetes. Fadi Riosvarun he is not eating back. Will work on more compliance with the diet modification. He is following pharmacist Ms. Pari Beckford regarding diabetic education and has an appointment tomorrow. Noted elevated renal function. On the diuretics as well. Leg swelling has been fairly stable. Mild pitting edema. No open sores. No chest pain or shortness of breath. Fadi Riosvarun he has a history of depression back to make sure on his plate/social responsibilities and being rescheduled. On Wellbutrin. Helping. Still feels like lack of motivation. No anxiety or any panic attacks. Sleep is fair. Appetite has been fair. No urinary or bowel complaint. Currently following urology regarding elevated PSA. Biopsy benign. Will follow the recommendations. Discussed the need for Endo and nephrology referral.  He wants to hold off on any referral at this time. On lisinopril and statin. At this time  Wants to come off of metformin due to worried about warnings and side effects Contini. I will take him off and the creatinine went up to 1.53 and given glipizide. He does check blood sugar on and off and average readings below 140. No hyper or hypoglycemic symptoms. Chronic lower back pain. Radiculopathy. Prior back surgery laminectomy and fusion. Still has the radiating pain in lower extremity. No loss of urine or bowel control. Does have a spinal stimulator. Used to follow Dr. Mona Arauz but has not seen them since few months. Used to be on gabapentin. At this time taking only as needed from prior prescription. Discussed Elavil and he agrees to take it. That might help his depression as well. Discussed medication side effects.       Visit Vitals  /70 (BP 1 Location: Left upper arm, BP Patient Position: Sitting, BP Cuff Size: Adult)   Pulse 77   Temp 98 °F (36.7 °C) (Temporal) Resp 16   SpO2 94%     Review medication list, vitals, problem list,allergies. Lab Results   Component Value Date/Time    WBC 8.4 10/29/2021 10:30 AM    HGB 16.1 02/14/2022 04:00 PM    HCT 50.5 02/14/2022 04:00 PM    PLATELET 439 45/96/2878 10:30 AM    MCV 85.1 10/29/2021 10:30 AM     Lab Results   Component Value Date/Time    Sodium 138 05/02/2022 07:25 AM    Potassium 3.9 05/02/2022 07:25 AM    Chloride 104 05/02/2022 07:25 AM    CO2 26 05/02/2022 07:25 AM    Anion gap 8 05/02/2022 07:25 AM    Glucose 134 (H) 05/02/2022 07:25 AM    BUN 26 (H) 05/02/2022 07:25 AM    Creatinine 1.53 (H) 05/02/2022 07:25 AM    BUN/Creatinine ratio 17 05/02/2022 07:25 AM    GFR est AA 55 (L) 05/02/2022 07:25 AM    GFR est non-AA 46 (L) 05/02/2022 07:25 AM    Calcium 9.5 05/02/2022 07:25 AM    Bilirubin, total 0.4 05/02/2022 07:25 AM    Alk.  phosphatase 77 05/02/2022 07:25 AM    Protein, total 7.6 05/02/2022 07:25 AM    Albumin 4.1 05/02/2022 07:25 AM    Globulin 3.5 05/02/2022 07:25 AM    A-G Ratio 1.2 05/02/2022 07:25 AM    ALT (SGPT) 33 05/02/2022 07:25 AM    AST (SGOT) 29 05/02/2022 07:25 AM     Lab Results   Component Value Date/Time    Cholesterol, total 104 10/29/2021 10:30 AM    HDL Cholesterol 38 (L) 10/29/2021 10:30 AM    LDL, calculated 47 10/29/2021 10:30 AM    VLDL, calculated 19 10/29/2021 10:30 AM    Triglyceride 95 10/29/2021 10:30 AM    CHOL/HDL Ratio 2.7 10/29/2021 10:30 AM     Lab Results   Component Value Date/Time    TSH 1.09 10/29/2021 10:30 AM     Lab Results   Component Value Date/Time    Prostate Specific Ag 7.48 (H) 03/25/2022 01:18 PM    Prostate Specific Ag 7.31 (H) 02/14/2022 04:00 PM    Prostate Specific Ag 3.97 05/17/2021 03:57 PM    Prostate Specific Ag 4.3 (H) 02/12/2021 09:37 AM    Prostate Specific Ag 1.2 03/17/2015 12:00 AM     Lab Results   Component Value Date/Time    Vitamin D 25-Hydroxy 38.6 12/07/2016 09:27 AM       Lab Results   Component Value Date/Time    Microalbumin/Creat ratio (mg/g creat) 26 07/03/2020 09:17 AM    MICROALBUMIN,MG/DAY 14.8 06/13/2016 08:43 AM    Microalbumin,urine random 4.06 (H) 07/03/2020 09:17 AM         ROS: See HPI    Physical Exam  Constitutional:       General: He is not in acute distress. Cardiovascular:      Rate and Rhythm: Normal rate and regular rhythm. Heart sounds: Normal heart sounds. Abdominal:      General: Bowel sounds are normal.      Palpations: Abdomen is soft. Tenderness: There is no abdominal tenderness. Musculoskeletal:      Cervical back: Neck supple. Comments: Mild pitting edema bilaterally. No open sores or calf tenderness   Neurological:      Mental Status: He is oriented to person, place, and time. Psychiatric:         Behavior: Behavior normal.         ASSESSMENT and PLAN    ICD-10-CM ICD-9-CM    1. Type 2 diabetes mellitus with diabetic neuropathy, without long-term current use of insulin ([address]): A1c went up. The main changes in the medication and is entering the inside. He says has been effective. He wants to come off of metformin due to worry about side effects on TV. His creatinine went up to 1.5. We will hold off on metformin at this time. Advised to keep an appointment tomorrow with Ms. Vidal Booth. He is being noncompliant with diabetic diet and he will work on it. We will follow-up next visit E11.40 250.60 empagliflozin (Jardiance) 25 mg tablet     357.2 glipiZIDE (GLUCOTROL) 10 mg tablet   2. Severe obesity ([address]): Discussed importance of weight loss. Limitation in exercise due to back pain. Discussed diet choices and portion control E66.01 278.01    3. Lumbar post-laminectomy syndrome: He has a spinal stimulator present. Still poorly controlled pain. Will give trial of Elavil M96.1 722.83 amitriptyline (ELAVIL) 10 mg tablet   4. S/P lumbar laminectomy  Z98.890 V45.89 amitriptyline (ELAVIL) 10 mg tablet   5. S/P lumbar spinal fusion  Z98.1 V45.4 amitriptyline (ELAVIL) 10 mg tablet   6.  TRUE (generalized anxiety disorder): [facility]. No change in mental status after decreasing dose from 300-1 50. Discussed Cymbalta and if he tolerated amitriptyline well we will consider tapering Wellbutrin and starting Cymbalta F41.1 300.02    7. MANISH (obstructive sleep apnea): Using CPAP with compliance G47.33 327.23    8. Primary hypertension:well controlled. Continue current dose of medication and low salt diet. Exercise as tolerated. I10 401.9    9. Elevated PSA: Post prostate biopsy. Following urology and the recommendation R97.20 790.93    10. Renal insufficiency: Could be multiple reason. Diabetes, diuretics etc.  Avoid NSAIDs. N28.9 593.9    11. Leg swelling: On Lasix. Discussed low-salt diet and leg elevation M79.89 729.81    Patient understood and agreed with the plan  Reminded to complete an eye exam    Follow-up and Dispositions    · Return in about 1 month (around 6/3/2022). Please note that this dictation was completed with [employer], the computer voice recognition software. Quite often unanticipated grammatical, syntax, homophones, and other interpretive errors are inadvertently transcribed by the computer software. Please disregard these errors. Please excuse any errors that have escaped final proofreading.

## 2022-05-03 NOTE — PATIENT INSTRUCTIONS
Nutrition Tips for Diabetes: After Your Visit  Your Care Instructions  A healthy diet is important to manage diabetes. It helps you lose weight (if you need to) and keep it off. It gives you the nutrition and energy your body needs and helps prevent heart disease. But a diet for diabetes does not mean that you have to eat special foods. You can eat what your family eats, including occasional sweets and other favorites. But you do have to pay attention to how often you eat and how much you eat of certain foods. The right plan for you will give you meals that help you keep your blood sugar at healthy levels. Try to eat a variety of foods and to spread carbohydrate throughout the day. Carbohydrate raises blood sugar higher and more quickly than any other nutrient does. Carbohydrate is found in sugar, breads and cereals, fruit, starchy vegetables such as potatoes and corn, and milk and yogurt. You may want to work with a dietitian or diabetes educator to help you plan meals and snacks. A dietitian or diabetes educator also can help you lose weight if that is one of your goals. The following tips can help you enjoy your meals and stay healthy. Follow-up care is a key part of your treatment and safety. Be sure to make and go to all appointments, and call your doctor if you are having problems. Its also a good idea to know your test results and keep a list of the medicines you take. How can you care for yourself at home? · Learn which foods have carbohydrate and how much carbohydrate to eat. A dietitian or diabetes educator can help you learn to keep track of how much carbohydrate you eat. · Spread carbohydrate throughout the day. Eat some carbohydrate at all meals, but do not eat too much at any one time. · Plan meals to include food from all the food groups.  These are the food groups and some example portion sizes:  ¨ Grains: 1 slice of bread (1 ounce), ½ cup of cooked cereal, and 1/3 cup of cooked pasta or rice. These have about 15 grams of carbohydrate in a serving. Choose whole grains such as whole wheat bread or crackers, oatmeal, and brown rice more often than refined grains. ¨ Fruit: 1 small fresh fruit, such as an apple or orange; ½ of a banana; ½ cup of chopped, cooked, or canned fruit; ½ cup of fruit juice; 1 cup of melon or raspberries; and 2 tablespoons of dried fruit. These have about 15 grams of carbohydrate in a serving. ¨ Dairy: 1 cup of nonfat or low-fat milk and 2/3 cup of plain yogurt. These have about 15 grams of carbohydrate in a serving. ¨ Protein foods: Beef, chicken, turkey, fish, eggs, tofu, cheese, cottage cheese, and peanut butter. A serving size of meat is 3 ounces, which is about the size of a deck of cards. Examples of meat substitute serving sizes (equal to 1 ounce of meat) are 1/4 cup of cottage cheese, 1 egg, 1 tablespoon of peanut butter, and ½ cup of tofu. These have very little or no carbohydrate per serving. ¨ Vegetables: Starchy vegetables such as ½ cup of cooked dried beans, peas, potatoes, or corn have about 15 grams of carbohydrate. Nonstarchy vegetables have very little carbohydrate, such as 1 cup of raw leafy vegetables (such as spinach), ½ cup of other vegetables (cooked or chopped), and 3/4 cup of vegetable juice. · Use the plate format to plan meals. It is a good, quick way to make sure that you have a balanced meal. It also helps you spread carbohydrate throughout the day. You divide your plate by types of foods. Put vegetables on half the plate, meat or meat substitutes on one-quarter of the plate, and a grain or starchy vegetable (such as brown rice or a potato) in the final quarter of the plate. To this you can add a small piece of fruit and 1 cup of milk or yogurt, depending on how much carbohydrate you are supposed to eat at a meal.  · Talk to your dietitian or diabetes educator about ways to add limited amounts of sweets into your meal plan.  You can eat these foods now and then, as long as you include the amount of carbohydrate they have in your daily carbohydrate allowance. · If you drink alcohol, limit it to no more than 1 drink a day for women and 2 drinks a day for men. If you are pregnant, no amount of alcohol is known to be safe. · Protein, fat, and fiber do not raise blood sugar as much as carbohydrate does. If you eat a lot of these nutrients in a meal, your blood sugar will rise more slowly than it would otherwise. · Limit saturated fats, such as those from meat and dairy products. Try to replace it with monounsaturated fat, such as olive oil. This is a healthier choice because people who have diabetes are at higher-than-average risk of heart disease. But use a modest amount of olive oil. A tablespoon of olive oil has 14 grams of fat and 120 calories. · Exercise lowers blood sugar. If you take insulin by shots or pump, you can use less than you would if you were not exercising. Keep in mind that timing matters. If you exercise within 1 hour after a meal, your body may need less insulin for that meal than it would if you exercised 3 hours after the meal. Test your blood sugar to find out how exercise affects your need for insulin. · Exercise on most days of the week. Aim for at least 30 minutes. Exercise helps you stay at a healthy weight and helps your body use insulin. Walking is an easy way to get exercise. Gradually increase the amount you walk every day. You also may want to swim, bike, or do other activities. When you eat out  · Learn to estimate the serving sizes of foods that have carbohydrate. If you measure food at home, it will be easier to estimate the amount in a serving of restaurant food. · If the meal you order has too much carbohydrate (such as potatoes, corn, or baked beans), ask to have a low-carbohydrate food instead. Ask for a salad or green vegetables.   · If you use insulin, check your blood sugar before and after eating out to help you plan how much to eat in the future. · If you eat more carbohydrate at a meal than you had planned, take a walk or do other exercise. This will help lower your blood sugar. Where can you learn more? Go to [employer]  Enter R957 in the search box to learn more about \"Nutrition Tips for Diabetes: After Your Visit. \"   © 2508-4698 [employer]. Care instructions adapted under license by [facility] (which disclaims liability or warranty for this information). This care instruction is for use with your licensed healthcare professional. If you have questions about a medical condition or this instruction, always ask your healthcare professional. [address] any warranty or liability for your use of this information. Content Version: 18.5.495998; Current as of: June 4, 2014                 Learning About Low Back Pain  What is low back pain? Low back pain is pain that can occur anywhere below the ribs and above the legs. It is very common. Almost everyone has it at one time or another. Low back pain can be:  · Acute. This is new pain that can last a few days to a few weeksat the most a few months. · Chronic. This pain can last for more than a few months. Sometimes it can last for years. What are some myths about low back pain? Here are some common myths about low back painand the facts:  Myth: \"I need to rest my back when I have back pain. \"   Fact: Staying active won't hurt you. It may help you get better faster. Myth: \"I need prescription pain medicine. \"   Fact: It's best to try to let time and being active heal your back. Opioid pain medicinessuch as hydrocodone or oxycodoneusually don't work any better than over-the-counter medicines like ibuprofen or naproxen. And opioids can cause serious problems like opioid use disorder or overdose. Moderate to severe opioid use disorder is sometimes called addiction.   Myth: \"I need a test like an X-ray or an MRI to diagnose my low back pain. \"   Fact: Getting a test right away won't help you get better faster. And it could lead you down a treatment path you may not need, since most people get better on their own. What causes low back pain? In most cases, there isn't a clear cause. This can be frustrating, because your back hurts and there's no obvious reason. Your back pain can be caused by:  Overuse or muscle strain. This can happen from playing sports, lifting heavy things, or not being physically fit. A herniated disc. This is a problem with the cushion between the bones in your back. Arthritis. With age, you may have changes in your bones that can narrow the space around your nerves. Other causes. In rare cases, the cause is a serious illness like an infection or cancer. But there are usually other symptoms too. What are the symptoms? Back pain can come on quickly or over time. You may feel:  · Pain in your hips or buttock. · Leg pain, numbness, tingling, or weakness. When a nerve gets squeezedsuch as from a disc problem or arthritisyou may have symptoms in your leg or foot. You can even have leg symptoms from a back problem without having any pain in your back. · Pain that's sharp or dull, sometimes with stiffness or muscle spasms. It may be in one small area or over a broad area. But even bad pain doesn't mean that it's caused by something serious. How is low back pain diagnosed? A physical exam is the main way to diagnose low back pain. Your doctor may examine your back, check your nerves by testing your reflexes, and make sure that your muscles are strong. Your doctor also will ask questions about your back and overall health. Most people don't need any tests right away. Tests often don't show the reason for your pain. If your pain lasts more than 6 weeks or you have symptoms that your doctor is more concerned about, then your doctor may order tests.  These may include an X-ray, a CT scan, or an MRI. Sometimes other tests such as a bone scan or nerve conduction test may be done. How is low back pain treated? Most acute low back pain gets better on its own within a few weeks, no matter what the cause. Time and doing usual activities are all that most people need to feel better. Using heat or ice and taking over-the-counter pain medicine also can help while your body heals. If you aren't getting better on your own or your pain is very bad, your doctor may recommend:  · Physical therapy. · Spinal manipulation, such as by a chiropractor. · Acupuncture. · Massage. · Injections of steroid medicine in your back (especially for pain that involves your legs). If you have chronic low back pain, treatment will help you understand and manage your pain. Treatment may include:  · Staying active. This may include walking or doing back exercises. · Physical therapy. · Medicines. Some of these medicines are also used for other problems, like depression. · Pain management. Your doctor may have you see a pain specialist.  · Counseling. Having chronic pain can be hard. It may help to talk to someone who can help you cope with your pain. Surgery isn't needed for most people. But it may help some types of low back pain. Follow-up care is a key part of your treatment and safety. Be sure to make and go to all appointments, and call your doctor if you are having problems. It's also a good idea to know your test results and keep a list of the medicines you take. When should you call for help? Call 911 anytime you think you may need emergency care. For example, call if:  · You can't move a leg at all. Call your doctor now or seek immediate medical care if:  · You have new or worse symptoms in your legs, belly, or buttocks. Symptoms may include:  ? Numbness or tingling. ? Weakness. ? Pain. · You lose bladder or bowel control.   Watch closely for changes in your health, and be sure to contact your doctor if:  · Along with the back pain, you have a fever, lose weight, or don't feel well. · You do not get better as expected. Where can you learn more? Go to http://www.gray.com/  Enter A007 in the search box to learn more about \"Learning About Low Back Pain. \"  Current as of: July 1, 2021               Content Version: 13.2  © 2006-2022 [employer]. Care instructions adapted under license by [email or web address] (which disclaims liability or warranty for this information). If you have questions about a medical condition or this instruction, always ask your healthcare professional. [place] [address] any warranty or liability for your use of this information.

## 2022-05-03 NOTE — PROGRESS NOTES
1. Have you been to the ER, urgent care clinic since your last visit? Hospitalized since your last visit? No    2. Have you seen or consulted any other health care providers outside of the [address] [place] [address] since your last visit? Include any pap smears or colon screening.  No    Chief Complaint   Patient presents with    Diabetes    Hypertension    Sleep Apnea

## 2022-05-04 ENCOUNTER — VIRTUAL VISIT (OUTPATIENT)
Dept: INTERNAL MEDICINE CLINIC | Age: 67
End: 2022-05-04

## 2022-05-04 DIAGNOSIS — E11.40 TYPE 2 DIABETES MELLITUS WITH DIABETIC NEUROPATHY, WITHOUT LONG-TERM CURRENT USE OF INSULIN (HCC): Primary | ICD-10-CM

## 2022-05-04 NOTE — PROGRESS NOTES
Pharmacy Progress Note - Diabetes Management    Assessment / Plan:   Diabetes Management:  - Per ADA guidelines, Pt's A1c is not at goal of < 7%. - Current SMBG(s)/CGM trend has been variable over the past few months per patient report due to stress and not focusing very much on his diet. He states that he has been eating more quick meals/fast food which has been more convenient for him but not as healthy   - Discussed increasing Trulicity up to 3 mg once weekly to assist with control. He is in agreement with this; will submit dose adjustment to [address] and glipizide. Patient states that he was denied acceptance into the [facility] Cares program for Robina Gamboa, will follow up on this   - Encouraged patient to continue working on diet as much as possible      - Patient to follow up with PCP next month for continued management         S/O: Mr. Lawyer Betancourt is a 79 y.o. male, referred by Dr. Matt Pillai MD was seen today for diabetes management follow up. Patient's last A1c was 7.6% in May 2022. Current anti-hyperglycemic regimen include(s):    - Trulicity 1.5 mg once weekly  - Jardiance 25 mg once daily  - Glipizide 10 mg twice daily    Patient reports adherence to his current medication regimen. Patient chose to stop metformin due to side effect concerns. ROS:  Today, Pt endorses:  - Symptoms of Hyperglycemia: none  - Symptoms of Hypoglycemia: none    Self Monitoring Blood Glucose (SMBG) or CGM:  - Brought in home glucometer/blood glucose log/CGM reader today:  yes  - Conducts/scans:  Once daily Fasting    - Blood sugars have been variable due to diet per patient report (more fast food)  - Fasting readings have been in the 120 range, and this morning his blood sugar was actually 109   - Patient has not been checking blood sugars post-prandially     Nutrition/Lifestyle Modifications:  - Patient states that he had to go back to eating more convenient fast food due to having a lot going on    Last visit:   - Overall, patient states he is doing much better with his diet and is cooking at home more  - He does acknowledge that he can do a better job with increasing vegetables into his routine   - He is drinking mostly unsweet tea and diet pepsi, but wants to drink more water    - He is limiting sweets and portion sizes of carbs, but states he needs to work on night time snacking     - Physical Activity:   - Limited due to ankle pain and now lower back pain     The ASCVD Risk score (Edouard Browning., et al., 2013) failed to calculate for the following reasons: The valid total cholesterol range is 130 to 320 mg/dL     Vitals:   Wt Readings from Last 3 Encounters:   04/29/22 295 lb (133.8 kg)   04/15/22 310 lb (140.6 kg)   04/04/22 310 lb (140.6 kg)     BP Readings from Last 3 Encounters:   05/03/22 118/70   04/04/22 124/64   03/31/22 110/70     Pulse Readings from Last 3 Encounters:   05/03/22 77   04/04/22 73   02/07/22 82       Past Medical History:   Diagnosis Date    Abdominal adhesions 7/30/2014    BPH (benign prostatic hyperplasia)     Cellulitis     DDD (degenerative disc disease), lumbar 7/30/2014    Depression     Diabetes ([facility] [address]     Frequent falls 7/30/2014    TRUE (generalized anxiety disorder) 7/30/2014    HLD (hyperlipidemia)     Hypertension     Hypogonadism in male     Incomplete bladder emptying     Kidney stones     Lumbar facet arthropathy 7/30/2014    Lumbar nerve root impingement 7/30/2014    Lumbosacral radiculopathy at S1 7/30/2014    Major depression 7/30/2014    Neurological disorder     sciatica    MANISH (obstructive sleep apnea) 7/30/2014    S/P lumbar laminectomy 7/30/2014    S/P lumbar spinal fusion 7/30/2014    Sleep apnea     USES CPAP EVERY NIGHT    Thoracic compression fracture (HCC) 07/30/2014     Allergies   Allergen Reactions    Bactrim [Sulfamethoprim] Rash    Opana [Oxymorphone] Other (comments)     Feels like bug crawling under skin and drowziness Current Outpatient Medications   Medication Sig    dulaglutide (Trulicity) 3 KT/4.8 mL pnij 3 mg by SubCUTAneous route every seven (7) days.  alfuzosin SR (UROXATRAL) 10 mg SR tablet TAKE 1 TABLET BY MOUTH DAILY AFTER DINNER    empagliflozin (Jardiance) 25 mg tablet Take 1 Tablet by mouth daily.  glipiZIDE (GLUCOTROL) 10 mg tablet Take 1 Tablet by mouth two (2) times a day.  gabapentin (NEURONTIN) 300 mg capsule Take 300 mg by mouth three (3) times daily. Taking as needed which was given from Dr Irene Fairbanks in the past    amitriptyline (ELAVIL) 10 mg tablet Take 1 Tablet by mouth nightly.  dutasteride (AVODART) 0.5 mg capsule Take 1 Capsule by mouth daily (after dinner).  simvastatin (ZOCOR) 10 mg tablet     metoprolol succinate (TOPROL-XL) 50 mg XL tablet TAKE 1 TABLET EVERY DAY    pramipexole (MIRAPEX) 0.75 mg tablet TAKE 3 TABLETS ONE TIME DAILY    testosterone cypionate (DEPOTESTOTERONE CYPIONATE) 200 mg/mL injection 0.5 mL by IntraMUSCular route every seven (7) days. Max Daily Amount: 100 mg.    glucose blood VI test strips (True Metrix Glucose Test Strip) strip TEST BLOOD SUGAR THREE TIMES DAILY    nitroglycerin (NITROSTAT) 0.4 mg SL tablet DISSOLVE ONE TABLET UNDER TONGUE AS NEEDED FOR CHEST PAIN EVERY 5 MINUTES FOR UP TO 3 DOSES    isosorbide mononitrate ER (IMDUR) 60 mg CR tablet TAKE 1 TABLET EVERY MORNING    buPROPion SR (WELLBUTRIN SR) 150 mg SR tablet TAKE 1 TABLET EVERY DAY    montelukast (SINGULAIR) 10 mg tablet TAKE 1 TABLET EVERY DAY    furosemide (LASIX) 20 mg tablet Take 1 Tablet by mouth two (2) times a day.  aspirin delayed-release 81 mg tablet Take 1 Tablet by mouth daily.  solifenacin (VESICARE) 10 mg tablet Take 1 Tablet by mouth daily.     Blood-Glucose Meter (True Metrix Glucose Meter) misc Check blood sugar 3 times daily    lancets (TRUEplus Lancets) 33 gauge misc Check blood sugars 3 times daily    alcohol swabs (BD Single Use Swabs Regular) padm Check blood sugar 3 times daily    Cane maura by Does Not Apply route.  cpap machine kit by Does Not Apply route.  Blood-Glucose Meter monitoring kit Use to check blood sugar 3 times daily. Dx E11.9. Please dispense brand preferred by insurance.  lancets misc Use to check blood sugar 3 times daily. Dx E11.9. Please dispense brand preferred by insurance.  folic acid/multivit-min/lutein (CENTRUM SILVER PO) Take  by mouth.  lisinopriL (PRINIVIL, ZESTRIL) 20 mg tablet Take 1 Tab by mouth daily.  Syringe, Disposable, 3 mL syrg Take as directed    Needle, Disp, 23 G 23 gauge x 1 1/4\" ndle Use to inject 0.5 ml every 7 days    Needle, Disp, 18 G 18 gauge x 1\" ndle Use to aspirate 0.5 ml every 7 days   [place] maura Assistance with ambulation, gait instability    polyethylene glycol (MIRALAX) 17 gram packet Take 1 Packet by mouth daily. Hold for loose stools (Patient taking differently: Take 17 g by mouth daily as needed. Hold for loose stools)    naloxone 4 mg/actuation spry 4 mg by Nasal route as needed.  Back Brace misc 1 Device by Does Not Apply route daily as needed for Pain. One, lumbosacral orthosis/back brace with metal struts      Medically necessary     No current facility-administered medications for this visit. Lab Results   Component Value Date/Time    Sodium 138 05/02/2022 07:25 AM    Potassium 3.9 05/02/2022 07:25 AM    Chloride 104 05/02/2022 07:25 AM    CO2 26 05/02/2022 07:25 AM    Anion gap 8 05/02/2022 07:25 AM    Glucose 134 (H) 05/02/2022 07:25 AM    BUN 26 (H) 05/02/2022 07:25 AM    Creatinine 1.53 (H) 05/02/2022 07:25 AM    BUN/Creatinine ratio 17 05/02/2022 07:25 AM    GFR est AA 55 (L) 05/02/2022 07:25 AM    GFR est non-AA 46 (L) 05/02/2022 07:25 AM    Calcium 9.5 05/02/2022 07:25 AM    Bilirubin, total 0.4 05/02/2022 07:25 AM    Alk.  phosphatase 77 05/02/2022 07:25 AM    Protein, total 7.6 05/02/2022 07:25 AM    Albumin 4.1 05/02/2022 07:25 AM    Globulin 3.5 05/02/2022 07:25 AM    A-G Ratio 1.2 05/02/2022 07:25 AM    ALT (SGPT) 33 05/02/2022 07:25 AM       Lab Results   Component Value Date/Time    Cholesterol, total 104 10/29/2021 10:30 AM    HDL Cholesterol 38 (L) 10/29/2021 10:30 AM    LDL, calculated 47 10/29/2021 10:30 AM    VLDL, calculated 19 10/29/2021 10:30 AM    Triglyceride 95 10/29/2021 10:30 AM    CHOL/HDL Ratio 2.7 10/29/2021 10:30 AM       Lab Results   Component Value Date/Time    WBC 8.4 10/29/2021 10:30 AM    HGB 16.1 02/14/2022 04:00 PM    HCT 50.5 02/14/2022 04:00 PM    PLATELET 062 34/07/7837 10:30 AM    MCV 85.1 10/29/2021 10:30 AM       Lab Results   Component Value Date/Time    Microalbumin/Creat ratio (mg/g creat) 26 07/03/2020 09:17 AM    MICROALBUMIN,MG/DAY 14.8 06/13/2016 08:43 AM    Microalbumin,urine random 4.06 (H) 07/03/2020 09:17 AM       HbA1c:  Lab Results   Component Value Date/Time    Hemoglobin A1c 7.6 (H) 05/02/2022 07:25 AM    Hemoglobin A1c (POC) 8.1 09/13/2019 01:58 PM     No components found for: 2     Last Point of Care HGB A1C  Hemoglobin A1c (POC)   Date Value Ref Range Status   09/13/2019 8.1 % Final        Estimated Creatinine Clearance: 68.1 mL/min (A) (by C-G formula based on SCr of 1.53 mg/dL (H)). Patient verbalized understanding of the information presented and all of the patients questions were answered. AVS was handed to the patient. Patient advised to call the office with any additional questions or concerns. Thank you,  RACHELLE KincaidS            For Pharmacy Admin Tracking Only     CPA in place:  Yes   Recommendation Provided To: Patient/Caregiver: 3 via Virtual Visit   Intervention Detail: Discontinued Rx: 1, reason: Therapy Complete, Dose Adjustment: 1, reason: Therapy Optimization and New Rx: 1, reason: Needs Additional Therapy   Gap Closed?: No   Intervention Accepted By: Patient/Caregiver: 3   Time Spent (min): 45

## 2022-05-04 NOTE — Clinical Note
Hello! Increased Trulicity to 3 mg once weekly given increase in A1c to 7.6%. He will continue on glipizide and Jardiance. However, he is no longer eligible for the Jardiance patient assistance program. He states the price of the medication is starting to become too much for him, so I am going to see if he would be eligible for Daniella Cobia if you are okay with changing to that SGLT2i.      Thanks,  Guillermo Posada

## 2022-05-12 RX ORDER — DULAGLUTIDE 3 MG/.5ML
3 INJECTION, SOLUTION SUBCUTANEOUS
Qty: 12 EACH | Refills: 3 | Status: SHIPPED | OUTPATIENT
Start: 2022-05-12

## 2022-05-16 ENCOUNTER — PATIENT MESSAGE (OUTPATIENT)
Dept: INTERNAL MEDICINE CLINIC | Age: 67
End: 2022-05-16

## 2022-05-16 NOTE — TELEPHONE ENCOUNTER
For Waqas Barbosa in place:  Yes   Recommendation Provided To: Patient/Caregiver: 1 via [address]  and Other: 1   Intervention Detail: Patient Access Assistance/Sample Provided   Gap Closed?: No   Intervention Accepted By: Patient/Caregiver: 1 and Other: 1   Time Spent (min): 30

## 2022-06-01 ENCOUNTER — TELEPHONE (OUTPATIENT)
Dept: CARDIOLOGY CLINIC | Age: 67
End: 2022-06-01

## 2022-06-01 NOTE — TELEPHONE ENCOUNTER
----- Message from Kendell Navarrete MD sent at 5/31/2022  8:36 AM EDT -----  Regarding: FW: Isosorbide   He can come see me or Yun Vogel to discuss change in medication  I think it may be ok to change it    Thanks  ----- Message -----  From: Rolly Baires RN  Sent: 5/26/2022  12:28 PM EDT  To: Kendell Navarrete MD  Subject: FW: Isosorbide                                     ----- Message -----  From: Katty Craven  Sent: 5/26/2022  11:33 AM EDT  To: [facility] Nurse Pool  Subject: Isosorbide                                       I havent received a response and was wondering what Dr Valentina Ren is considering       Thank you   Katty Craven

## 2022-06-01 NOTE — TELEPHONE ENCOUNTER
Left message on voicemail to call back to schedule an appointment for either with [facility] or Dr Crys Rosas     About medication changes

## 2022-06-02 ENCOUNTER — TELEPHONE (OUTPATIENT)
Dept: INTERNAL MEDICINE CLINIC | Age: 67
End: 2022-06-02

## 2022-06-04 NOTE — TELEPHONE ENCOUNTER
Informed patient of PharmD departure. Provided guidance to the office regarding completion of [employer] patient assistance application. Instructed patient to follow up on this once he sees PCP next week. He will continue to follow with PCP for diabetes management at this time. He expressed understanding. Thank you,  Eliane Magana. NAIMA Gallegos            For Pharmacy Admin Tracking Only     CPA in place:  Yes   Recommendation Provided To: Patient/Caregiver: 0 via Telephone   Intervention Accepted By: Patient/Caregiver: 0   Time Spent (min): 20

## 2022-06-06 ENCOUNTER — TELEPHONE (OUTPATIENT)
Dept: FAMILY MEDICINE CLINIC | Age: 67
End: 2022-06-06

## 2022-06-06 NOTE — PROGRESS NOTES
Ankit Clayton presents today to review medications as he was wondering if any of them could be discontinued. He states that he has been feeling well and offers no cardiac complaints. He states that he takes a lot of medications and was just wondering if something could be discontinued. The 2 medications he would like for us to consider stopping are the Toprol XL and the Imdur. He is a 79year old male with history of diabetes, hypertension, hyperlipidemia, sleep apnea (uses CPAP), obesity, and anxiety. He had a lumbar laminectomy and lumbar spinal fusion in 2014. He uses a back brace, cane, wheelchair. He was last seen by Dr. Jhona Kruse in early April 2022. He had an echo and nuclear stress test done in March 2022 and the echo showed an EF of 55-60%, normal wall motion, normal diastolic function, and dilated aortic root at 3.5cm. The stress test showed probably normal LV perfusion with no ischemia. Denies chest pain, tightness, heaviness, and palpitations. Denies shortness of breath at rest, dyspnea on exertion, orthopnea and PND. Denies abdominal bloating. Denies lightheadedness, dizziness, and syncope. Denies lower extremity edema and claudication. Denies nausea, vomiting, diarrhea, melena, hematochezia. Denies hematuria, urgency, frequency. Denies fever, chills. He admits to back pain.       PMH:  Past Medical History:   Diagnosis Date    Abdominal adhesions 7/30/2014    BPH (benign prostatic hyperplasia)     Cellulitis     DDD (degenerative disc disease), lumbar 7/30/2014    Depression     Diabetes ([facility] [address]     Frequent falls 7/30/2014    TRUE (generalized anxiety disorder) 7/30/2014    HLD (hyperlipidemia)     Hypertension     Hypogonadism in male     Incomplete bladder emptying     Kidney stones     Lumbar facet arthropathy 7/30/2014    Lumbar nerve root impingement 7/30/2014    Lumbosacral radiculopathy at S1 7/30/2014    Major depression 7/30/2014    Neurological disorder     sciatica    MANISH (obstructive sleep apnea) 7/30/2014    S/P lumbar laminectomy 7/30/2014    S/P lumbar spinal fusion 7/30/2014    Sleep apnea     USES CPAP EVERY NIGHT    Thoracic compression fracture ([address] 07/30/2014       PSH:  Past Surgical History:   Procedure Laterality Date    HX APPENDECTOMY      HX BACK SURGERY      Lumbar fusion    HX HERNIA REPAIR  1992    HX OTHER SURGICAL      EMG - S1 disorder    HX OTHER SURGICAL  10/2017    spinal cord stimulator    HX VASECTOMY  1985       MEDS:  Current Outpatient Medications   Medication Sig    dulaglutide (Trulicity) 3 EE/6.6 mL pnij 3 mg by SubCUTAneous route every seven (7) days.  alfuzosin SR (UROXATRAL) 10 mg SR tablet TAKE 1 TABLET BY MOUTH DAILY AFTER DINNER    empagliflozin (Jardiance) 25 mg tablet Take 1 Tablet by mouth daily.  gabapentin (NEURONTIN) 300 mg capsule Take 300 mg by mouth three (3) times daily. Taking as needed which was given from Dr Ang Zelaya in the past    amitriptyline (ELAVIL) 10 mg tablet Take 1 Tablet by mouth nightly.  dutasteride (AVODART) 0.5 mg capsule Take 1 Capsule by mouth daily (after dinner).  simvastatin (ZOCOR) 10 mg tablet Take 10 mg by mouth nightly.  metoprolol succinate (TOPROL-XL) 50 mg XL tablet TAKE 1 TABLET EVERY DAY    pramipexole (MIRAPEX) 0.75 mg tablet TAKE 3 TABLETS ONE TIME DAILY    testosterone cypionate (DEPOTESTOTERONE CYPIONATE) 200 mg/mL injection 0.5 mL by IntraMUSCular route every seven (7) days.  Max Daily Amount: 100 mg.    glucose blood VI test strips (True Metrix Glucose Test Strip) strip TEST BLOOD SUGAR THREE TIMES DAILY    nitroglycerin (NITROSTAT) 0.4 mg SL tablet DISSOLVE ONE TABLET UNDER TONGUE AS NEEDED FOR CHEST PAIN EVERY 5 MINUTES FOR UP TO 3 DOSES    isosorbide mononitrate ER (IMDUR) 60 mg CR tablet TAKE 1 TABLET EVERY MORNING    buPROPion SR (WELLBUTRIN SR) 150 mg SR tablet TAKE 1 TABLET EVERY DAY    montelukast (SINGULAIR) 10 mg tablet TAKE 1 TABLET EVERY DAY    furosemide (LASIX) 20 mg tablet Take 1 Tablet by mouth two (2) times a day.  aspirin delayed-release 81 mg tablet Take 1 Tablet by mouth daily.  solifenacin (VESICARE) 10 mg tablet Take 1 Tablet by mouth daily.  Blood-Glucose Meter (True Metrix Glucose Meter) misc Check blood sugar 3 times daily    lancets (TRUEplus Lancets) 33 gauge misc Check blood sugars 3 times daily    alcohol swabs (BD Single Use Swabs Regular) padm Check blood sugar 3 times daily    Cane maura by Does Not Apply route.  cpap machine kit by Does Not Apply route.  Blood-Glucose Meter monitoring kit Use to check blood sugar 3 times daily. Dx E11.9. Please dispense brand preferred by insurance.  lancets misc Use to check blood sugar 3 times daily. Dx E11.9. Please dispense brand preferred by insurance.  folic acid/multivit-min/lutein (CENTRUM SILVER PO) Take  by mouth.  lisinopriL (PRINIVIL, ZESTRIL) 20 mg tablet Take 1 Tab by mouth daily.  Syringe, Disposable, 3 mL syrg Take as directed    Needle, Disp, 23 G 23 gauge x 1 1/4\" ndle Use to inject 0.5 ml every 7 days    Needle, Disp, 18 G 18 gauge x 1\" ndle Use to aspirate 0.5 ml every 7 days   [employer] maura Assistance with ambulation, gait instability    polyethylene glycol (MIRALAX) 17 gram packet Take 1 Packet by mouth daily. Hold for loose stools (Patient taking differently: Take 17 g by mouth daily as needed. Hold for loose stools)    naloxone 4 mg/actuation spry 4 mg by Nasal route as needed.  Back Brace misc 1 Device by Does Not Apply route daily as needed for Pain. One, lumbosacral orthosis/back brace with metal struts      Medically necessary     No current facility-administered medications for this visit.        Allergies and Sensitivities:  Allergies   Allergen Reactions    Bactrim [Sulfamethoprim] Rash    Opana [Oxymorphone] Other (comments)     Feels like bug crawling under skin and drowziness       Family History:  Family History   Problem Relation Age of Onset    Diabetes Mother     Hypertension Mother     Stroke Mother     Heart Attack Father     Diabetes Father     Stroke Father     Diabetes Sister     Stroke Brother        Social History:  He  reports that he has never smoked. He has never used smokeless tobacco.  He  reports current alcohol use. Physical:  Visit Vitals  /70 (BP 1 Location: Left upper arm, BP Patient Position: Sitting, BP Cuff Size: Large adult)   Pulse 85   Ht 6' 2\" (1.88 m)   Wt 139.3 kg (307 lb)   SpO2 96%   BMI 39.42 kg/m²         Exam:  Neck:  Supple, no JVD, no carotid bruits  CV:  Normal S1 and  S2, no murmurs, rubs, or gallops noted  Lungs:  Clear to ausculation throughout, no wheezes or rales  Abd:  Soft, non-tender, obese with good bowel sounds. No hepatosplenomegaly  Extremities:  No edema      Data:  EKG:  Not done today      LABS:  Lab Results   Component Value Date/Time    Sodium 138 05/02/2022 07:25 AM    Potassium 3.9 05/02/2022 07:25 AM    Chloride 104 05/02/2022 07:25 AM    CO2 26 05/02/2022 07:25 AM    Glucose 134 (H) 05/02/2022 07:25 AM    BUN 26 (H) 05/02/2022 07:25 AM    Creatinine 1.53 (H) 05/02/2022 07:25 AM     Lab Results   Component Value Date/Time    Cholesterol, total 104 10/29/2021 10:30 AM    HDL Cholesterol 38 (L) 10/29/2021 10:30 AM    LDL, calculated 47 10/29/2021 10:30 AM    Triglyceride 95 10/29/2021 10:30 AM    CHOL/HDL Ratio 2.7 10/29/2021 10:30 AM     Lab Results   Component Value Date/Time    ALT (SGPT) 33 05/02/2022 07:25 AM         Impression/Plan:  1. Hypertension, blood pressure well controlled  2. Hyperlipidemia, on simvastatin 10mg  3. Obstructive sleep apnea, uses CPAP  4. Diabetes mellitus, recommend Hgb A1c less than 7% from cardiac standpoint  5. Obesity  6. History of lumbar laminectomy and lumbar spinal fusion    Mr. William was seen today to review his medications and to see if any of his cardiac medications could be discontinued.   In particular, he is inquiring about Toprol XL and Imdur. His medications were reviewed with him and purpose of each of the medications was discussed. A majority of his medications are for his diabetes and several items on the list are medical equipment (glucose monitor, strips, lancets, cane, back brace, etc.). Of the 2 cardiac medications that he is is requesting be discontinued, the Imdur can be discontinued. He states that he was started on it because he thought he had angina a few years ago. He has not had any chest pain. We discussed the importance of Toprol XL (cardioprotective benefits) and he has agreed to continue this medication. I informed him that both medications have an effect on his blood pressure and if both are removed, his blood pressure may then become elevated and his medications would have to be adjusted again. His blood pressure today is well controlled at 120/70. I asked that he monitor his blood pressures at home and record the readings. If he notices blood pressures consistently above 140/90, he is to call the office. His questions were answered. Time spent:  30 minutes    He will follow-up with Dr. Coleman Carbajal as scheduled and as needed. Reuben Dubin MSN, FNP-BC    Please note:  Portions of this chart were created with Dragon medical speech to text program.  Unrecognized errors may be present.

## 2022-06-06 NOTE — TELEPHONE ENCOUNTER
Pt would like to know if the forms have been faxed for his medications. He would like you to let him know throught the mychart.  Thank you

## 2022-06-09 NOTE — TELEPHONE ENCOUNTER
Patient Assistance forms have been faxed. Will send message through [employer] to advise patient.

## 2022-06-13 ENCOUNTER — OFFICE VISIT (OUTPATIENT)
Dept: CARDIOLOGY CLINIC | Age: 67
End: 2022-06-13
Payer: MEDICARE

## 2022-06-13 VITALS
WEIGHT: 307 LBS | BODY MASS INDEX: 39.4 KG/M2 | HEART RATE: 85 BPM | HEIGHT: 74 IN | DIASTOLIC BLOOD PRESSURE: 70 MMHG | SYSTOLIC BLOOD PRESSURE: 120 MMHG | OXYGEN SATURATION: 96 %

## 2022-06-13 DIAGNOSIS — E78.5 HYPERLIPIDEMIA, UNSPECIFIED HYPERLIPIDEMIA TYPE: ICD-10-CM

## 2022-06-13 DIAGNOSIS — I10 PRIMARY HYPERTENSION: Primary | ICD-10-CM

## 2022-06-13 PROCEDURE — 3017F COLORECTAL CA SCREEN DOC REV: CPT | Performed by: NURSE PRACTITIONER

## 2022-06-13 PROCEDURE — G8427 DOCREV CUR MEDS BY ELIG CLIN: HCPCS | Performed by: NURSE PRACTITIONER

## 2022-06-13 PROCEDURE — G8536 NO DOC ELDER MAL SCRN: HCPCS | Performed by: NURSE PRACTITIONER

## 2022-06-13 PROCEDURE — G8417 CALC BMI ABV UP PARAM F/U: HCPCS | Performed by: NURSE PRACTITIONER

## 2022-06-13 PROCEDURE — G8752 SYS BP LESS 140: HCPCS | Performed by: NURSE PRACTITIONER

## 2022-06-13 PROCEDURE — 1123F ACP DISCUSS/DSCN MKR DOCD: CPT | Performed by: NURSE PRACTITIONER

## 2022-06-13 PROCEDURE — 1101F PT FALLS ASSESS-DOCD LE1/YR: CPT | Performed by: NURSE PRACTITIONER

## 2022-06-13 PROCEDURE — G8432 DEP SCR NOT DOC, RNG: HCPCS | Performed by: NURSE PRACTITIONER

## 2022-06-13 PROCEDURE — 99214 OFFICE O/P EST MOD 30 MIN: CPT | Performed by: NURSE PRACTITIONER

## 2022-06-13 PROCEDURE — G8754 DIAS BP LESS 90: HCPCS | Performed by: NURSE PRACTITIONER

## 2022-06-13 NOTE — PATIENT INSTRUCTIONS
Okay to discontinue isosorbide mononitrate  All other medications to remain the same  Monitor blood pressures at home and check at least 2-3 hours after taking your medications in the AM.  Record readings.   If blood pressure is consistently above 140/90, please call the office  Follow-up with Dr. Doris Hitchcock as scheduled and as needed

## 2022-06-13 NOTE — PROGRESS NOTES
Madhu Arzate presents today for   Chief Complaint   Patient presents with    Follow-up     2 month    Shortness of Breath     exertion       Dilan William preferred language for health care discussion is english/other. Is someone accompanying this pt? no    Is the patient using any DME equipment during [address]? cane    Depression Screening:  3 most recent PHQ Screens 6/13/2022   PHQ Not Done -   Little interest or pleasure in doing things Not at all   Feeling down, depressed, irritable, or hopeless Not at all   Total Score PHQ 2 0   Trouble falling or staying asleep, or sleeping too much -   Feeling tired or having little energy -   Poor appetite, weight loss, or overeating -   Feeling bad about yourself - or that you are a failure or have let yourself or your family down -   Trouble concentrating on things such as school, work, reading, or watching TV -   Moving or speaking so slowly that other people could have noticed; or the opposite being so fidgety that others notice -   Thoughts of being better off dead, or hurting yourself in some way -   PHQ 9 Score -   How difficult have these problems made it for you to do your work, take care of your home and get along with others -       Learning Assessment:  Learning Assessment 6/13/2022   PRIMARY LEARNER Patient   HIGHEST LEVEL OF EDUCATION - PRIMARY LEARNER  -   BARRIERS PRIMARY LEARNER -   CO-LEARNER CAREGIVER -   PRIMARY LANGUAGE ENGLISH   LEARNER PREFERENCE PRIMARY DEMONSTRATION     -     -     -   ANSWERED BY patient   RELATIONSHIP SELF       Abuse Screening:  Abuse Screening Questionnaire 6/13/2022   Do you ever feel afraid of your partner? N   Are you in a relationship with someone who physically or mentally threatens you? N   Is it safe for you to go home? Y       Fall Risk  Fall Risk Assessment, last 12 mths 6/13/2022   Able to walk? Yes   Fall in past 12 months? 0   Do you feel unsteady?  0   Are you worried about falling 0   Is TUG test greater than 12 Reason for Call:  Medication or medication refill:    Do you use a [facility]?  Name of the pharmacy and phone number for the current request:  N/A    Name of the medication requested: Atorvastatin and Metformin    Other request: Pt is on a road trip until July 2nd. He forgot these two meds at home. Pt is wondering if it's okay to be without meds for a month, or does he need to be on them. If so please refill for a month. Pt is not sure which Pharmacy he will be using since he's on the road, please call him at time of refill (if Provider decided to do so) to see where he is at and will fill at nearest [facility].    Can we leave a detailed message on this number? YES    Phone number patient can be reached at: Cell number on file:    Telephone Information:   Mobile [phone]       Best Time: ANY    Call taken on 6/7/2022 at 8:14 AM by Cem Silva       seconds? -   Is the gait abnormal? -   Number of falls in past 12 months -   Fall with injury? -           Pt currently taking Anticoagulant therapy? no    Pt currently taking Antiplatelet therapy ? Aspirin 81 mg daily      Coordination of Care:  1. Have you been to the ER, urgent care clinic since your last visit? Hospitalized since your last visit? no    2. Have you seen or consulted any other health care providers outside of the [address] [place] [address] since your last visit? Include any pap smears or colon screening.  no

## 2022-07-06 RX ORDER — MONTELUKAST SODIUM 10 MG/1
TABLET ORAL
Qty: 90 TABLET | Refills: 1 | Status: SHIPPED | OUTPATIENT
Start: 2022-07-06

## 2022-08-03 ENCOUNTER — TELEPHONE (OUTPATIENT)
Dept: FAMILY MEDICINE CLINIC | Age: 67
End: 2022-08-03

## 2022-08-05 NOTE — TELEPHONE ENCOUNTER
Spoke with patient (two identifiers verified) to advise Dr. Marcos Vela will determine if any new labs orders are needed during visit on 9/13/22. Patient verbalized understanding.

## 2022-08-31 DIAGNOSIS — I10 ESSENTIAL HYPERTENSION: ICD-10-CM

## 2022-08-31 RX ORDER — METOPROLOL SUCCINATE 50 MG/1
TABLET, EXTENDED RELEASE ORAL
Qty: 90 TABLET | Refills: 1 | Status: SHIPPED | OUTPATIENT
Start: 2022-08-31

## 2022-08-31 RX ORDER — PRAMIPEXOLE DIHYDROCHLORIDE 0.75 MG/1
TABLET ORAL
Qty: 270 TABLET | Refills: 1 | Status: SHIPPED | OUTPATIENT
Start: 2022-08-31

## 2022-09-03 DIAGNOSIS — R06.02 SOB (SHORTNESS OF BREATH): ICD-10-CM

## 2022-09-06 RX ORDER — ISOPROPYL ALCOHOL 70 ML/100ML
SWAB TOPICAL
Qty: 300 PAD | Refills: 5 | Status: SHIPPED | OUTPATIENT
Start: 2022-09-06

## 2022-09-06 RX ORDER — LANCETS 33 GAUGE
EACH MISCELLANEOUS
Qty: 300 EACH | Refills: 5 | Status: SHIPPED | OUTPATIENT
Start: 2022-09-06

## 2022-09-06 RX ORDER — FUROSEMIDE 20 MG/1
TABLET ORAL
Qty: 60 TABLET | Refills: 6 | Status: SHIPPED | OUTPATIENT
Start: 2022-09-06

## 2022-09-08 ENCOUNTER — HOSPITAL ENCOUNTER (EMERGENCY)
Age: 67
Discharge: HOME | End: 2022-09-08
Attending: EMERGENCY MEDICINE
Payer: MEDICARE

## 2022-09-08 ENCOUNTER — APPOINTMENT (OUTPATIENT)
Dept: GENERAL RADIOLOGY | Age: 67
End: 2022-09-08
Attending: EMERGENCY MEDICINE
Payer: MEDICARE

## 2022-09-08 VITALS
DIASTOLIC BLOOD PRESSURE: 59 MMHG | SYSTOLIC BLOOD PRESSURE: 117 MMHG | RESPIRATION RATE: 19 BRPM | OXYGEN SATURATION: 96 % | HEART RATE: 98 BPM | TEMPERATURE: 97.8 F

## 2022-09-08 DIAGNOSIS — R10.9 ABDOMINAL PAIN, ACUTE: Primary | ICD-10-CM

## 2022-09-08 LAB
ALBUMIN SERPL-MCNC: 4.5 G/DL (ref 3.4–5)
ALBUMIN/GLOB SERPL: 1.1 {RATIO} (ref 0.8–1.7)
ALP SERPL-CCNC: 124 U/L (ref 45–117)
ALT SERPL-CCNC: 53 U/L (ref 16–61)
ANION GAP SERPL CALC-SCNC: 12 MMOL/L (ref 3–18)
APTT PPP: 25.1 SEC (ref 23–36.4)
AST SERPL-CCNC: 33 U/L (ref 10–38)
BASOPHILS # BLD: 0.1 K/UL (ref 0–0.1)
BASOPHILS NFR BLD: 1 % (ref 0–2)
BILIRUB SERPL-MCNC: 0.8 MG/DL (ref 0.2–1)
BUN SERPL-MCNC: 28 MG/DL (ref 7–18)
BUN/CREAT SERPL: 13 (ref 12–20)
CALCIUM SERPL-MCNC: 10 MG/DL (ref 8.5–10.1)
CHLORIDE SERPL-SCNC: 101 MMOL/L (ref 100–111)
CO2 SERPL-SCNC: 25 MMOL/L (ref 21–32)
COVID-19 RAPID TEST, COVR: NOT DETECTED
CREAT SERPL-MCNC: 2.15 MG/DL (ref 0.6–1.3)
DIFFERENTIAL METHOD BLD: ABNORMAL
EOSINOPHIL # BLD: 0.1 K/UL (ref 0–0.4)
EOSINOPHIL NFR BLD: 1 % (ref 0–5)
ERYTHROCYTE [DISTWIDTH] IN BLOOD BY AUTOMATED COUNT: 14.6 % (ref 11.6–14.5)
GLOBULIN SER CALC-MCNC: 4.2 G/DL (ref 2–4)
GLUCOSE SERPL-MCNC: 233 MG/DL (ref 74–99)
HCT VFR BLD AUTO: 51.9 % (ref 36–48)
HGB BLD-MCNC: 16.8 G/DL (ref 13–16)
IMM GRANULOCYTES # BLD AUTO: 0 K/UL (ref 0–0.04)
IMM GRANULOCYTES NFR BLD AUTO: 0 % (ref 0–0.5)
INR PPP: 1 (ref 0.8–1.2)
LACTATE BLD-SCNC: 1.82 MMOL/L (ref 0.4–2)
LIPASE SERPL-CCNC: 126 U/L (ref 73–393)
LYMPHOCYTES # BLD: 0.9 K/UL (ref 0.9–3.6)
LYMPHOCYTES NFR BLD: 10 % (ref 21–52)
MCH RBC QN AUTO: 28.5 PG (ref 24–34)
MCHC RBC AUTO-ENTMCNC: 32.4 G/DL (ref 31–37)
MCV RBC AUTO: 88 FL (ref 78–100)
MONOCYTES # BLD: 1.4 K/UL (ref 0.05–1.2)
MONOCYTES NFR BLD: 14 % (ref 3–10)
NEUTS SEG # BLD: 7.1 K/UL (ref 1.8–8)
NEUTS SEG NFR BLD: 74 % (ref 40–73)
NRBC # BLD: 0 K/UL (ref 0–0.01)
NRBC BLD-RTO: 0 PER 100 WBC
PLATELET # BLD AUTO: 352 K/UL (ref 135–420)
PMV BLD AUTO: 9.9 FL (ref 9.2–11.8)
POTASSIUM SERPL-SCNC: 3.7 MMOL/L (ref 3.5–5.5)
PROT SERPL-MCNC: 8.7 G/DL (ref 6.4–8.2)
PROTHROMBIN TIME: 13.2 SEC (ref 11.5–15.2)
RBC # BLD AUTO: 5.9 M/UL (ref 4.35–5.65)
SODIUM SERPL-SCNC: 138 MMOL/L (ref 136–145)
SOURCE, COVRS: NORMAL
TROPONIN-HIGH SENSITIVITY: 14 NG/L (ref 0–78)
TROPONIN-HIGH SENSITIVITY: 21 NG/L (ref 0–78)
WBC # BLD AUTO: 9.5 K/UL (ref 4.6–13.2)

## 2022-09-08 PROCEDURE — 71045 X-RAY EXAM CHEST 1 VIEW: CPT

## 2022-09-08 PROCEDURE — 99285 EMERGENCY DEPT VISIT HI MDM: CPT

## 2022-09-08 PROCEDURE — 83690 ASSAY OF LIPASE: CPT

## 2022-09-08 PROCEDURE — 74011250637 HC RX REV CODE- 250/637: Performed by: EMERGENCY MEDICINE

## 2022-09-08 PROCEDURE — 80053 COMPREHEN METABOLIC PANEL: CPT

## 2022-09-08 PROCEDURE — 83605 ASSAY OF LACTIC ACID: CPT

## 2022-09-08 PROCEDURE — 85610 PROTHROMBIN TIME: CPT

## 2022-09-08 PROCEDURE — 93005 ELECTROCARDIOGRAM TRACING: CPT

## 2022-09-08 PROCEDURE — 96374 THER/PROPH/DIAG INJ IV PUSH: CPT

## 2022-09-08 PROCEDURE — 87635 SARS-COV-2 COVID-19 AMP PRB: CPT

## 2022-09-08 PROCEDURE — 85025 COMPLETE CBC W/AUTO DIFF WBC: CPT

## 2022-09-08 PROCEDURE — 96375 TX/PRO/DX INJ NEW DRUG ADDON: CPT

## 2022-09-08 PROCEDURE — 74011250637 HC RX REV CODE- 250/637

## 2022-09-08 PROCEDURE — 74011250636 HC RX REV CODE- 250/636: Performed by: EMERGENCY MEDICINE

## 2022-09-08 PROCEDURE — 84484 ASSAY OF TROPONIN QUANT: CPT

## 2022-09-08 PROCEDURE — 85730 THROMBOPLASTIN TIME PARTIAL: CPT

## 2022-09-08 PROCEDURE — 96376 TX/PRO/DX INJ SAME DRUG ADON: CPT

## 2022-09-08 RX ORDER — ONDANSETRON 2 MG/ML
4 INJECTION INTRAMUSCULAR; INTRAVENOUS
Status: COMPLETED | OUTPATIENT
Start: 2022-09-08 | End: 2022-09-08

## 2022-09-08 RX ORDER — NITROGLYCERIN 0.4 MG/1
0.4 TABLET SUBLINGUAL
Status: COMPLETED | OUTPATIENT
Start: 2022-09-08 | End: 2022-09-08

## 2022-09-08 RX ORDER — ASPIRIN 325 MG
TABLET ORAL
Status: COMPLETED
Start: 2022-09-08 | End: 2022-09-08

## 2022-09-08 RX ORDER — ONDANSETRON 4 MG/1
4 TABLET, FILM COATED ORAL
Qty: 12 TABLET | Refills: 0 | Status: SHIPPED | OUTPATIENT
Start: 2022-09-08 | End: 2022-10-04

## 2022-09-08 RX ORDER — MORPHINE SULFATE 4 MG/ML
4 INJECTION INTRAVENOUS
Status: COMPLETED | OUTPATIENT
Start: 2022-09-08 | End: 2022-09-08

## 2022-09-08 RX ADMIN — ASPIRIN 325 MG ORAL TABLET 325 MG: 325 PILL ORAL at 02:53

## 2022-09-08 RX ADMIN — SODIUM CHLORIDE 2000 ML: 900 INJECTION, SOLUTION INTRAVENOUS at 02:40

## 2022-09-08 RX ADMIN — ONDANSETRON 4 MG: 2 INJECTION INTRAMUSCULAR; INTRAVENOUS at 05:38

## 2022-09-08 RX ADMIN — ONDANSETRON 4 MG: 2 INJECTION INTRAMUSCULAR; INTRAVENOUS at 02:45

## 2022-09-08 RX ADMIN — NITROGLYCERIN 0.4 MG: 0.4 TABLET, ORALLY DISINTEGRATING SUBLINGUAL at 02:39

## 2022-09-08 RX ADMIN — MORPHINE SULFATE 4 MG: 4 INJECTION, SOLUTION INTRAMUSCULAR; INTRAVENOUS at 02:49

## 2022-09-08 NOTE — ED NOTES
Assuming care of patient. Patient complaining of mid chest pain, vomiting, \"feeling tired x yesterday\". Patient states he's unable to tolerate fluids, food. Provider at bedside.

## 2022-09-08 NOTE — ED TRIAGE NOTES
Pt presents with c/o dizziness and SOB starting this morning, with vomiting starting this afternoon. Denies Diarrhea.

## 2022-09-08 NOTE — ED NOTES
Pt reports having chest pain yesterday that was alleviated with Nitro.  Last nitro taken 9/7/2022 0200

## 2022-09-08 NOTE — ED NOTES
I have reviewed discharge instructions with the patient. The patient verbalized understanding. Patient looks comfortable, left ED in stable condition. No acute distress noted.

## 2022-09-08 NOTE — ED PROVIDER NOTES
EMERGENCY DEPARTMENT HISTORY AND PHYSICAL EXAM    2:26 AM      Date: 9/8/2022  Patient Name: Vladislav Barrera    History of Presenting Illness     Chief Complaint   Patient presents with    Shortness of Breath    Dizziness    Vomiting       History Provided By: PATIENT  Location/Duration/Severity/Modifying factors   Pt IS A 80 yo male who presents with c/o dizziness and SOB starting this morning, with vomiting starting this afternoon. Denies Diarrhea. He states he has small vessel heart disease. The history is provided by the patient. Shortness of Breath  Associated symptoms include chest pain (vague epigastric discomfort) and vomiting. Pertinent negatives include no fever, no wheezing, no abdominal pain and no rash. Dizziness  Associated symptoms include shortness of breath, chest pain (vague epigastric discomfort), vomiting and nausea. Pertinent negatives include no confusion. Vomiting   Associated symptoms include myalgias. Pertinent negatives include no fever, no abdominal pain and no arthralgias. PCP: Shelby Dc MD    Current Outpatient Medications   Medication Sig Dispense Refill    lancets (TRUEplus Lancets) 33 gauge misc CHECK BLOOD SUGAR THREE TIMES DAILY 300 Each 5    DropSafe Alcohol Prep Pads padm USE AS DIRECTED WHEN CHECKING BLOOD SUGAR THREE TIMES DAILY 300 Pad 5    furosemide (LASIX) 20 mg tablet TAKE 1 TABLET BY MOUTH TWICE DAILY 60 Tablet 6    pramipexole (MIRAPEX) 0.75 mg tablet TAKE 3 TABLETS ONE TIME DAILY 270 Tablet 1    metoprolol succinate (TOPROL-XL) 50 mg XL tablet TAKE 1 TABLET EVERY DAY 90 Tablet 1    sildenafil citrate (VIAGRA) 100 mg tablet Take 1 Tablet by mouth as needed for Erectile Dysfunction (do not take more than 1 tab in 36 hour period).  30 Tablet 3    glipiZIDE (GLUCOTROL) 10 mg tablet TAKE 1 TABLET TWICE DAILY 180 Tablet 1    amitriptyline (ELAVIL) 10 mg tablet TAKE 1 TABLET EVERY NIGHT 90 Tablet 1    simvastatin (ZOCOR) 10 mg tablet TAKE 1 TABLET EVERY NIGHT 90 Tablet 1    buPROPion SR (WELLBUTRIN SR) 150 mg SR tablet TAKE 1 TABLET EVERY DAY 90 Tablet 1    montelukast (SINGULAIR) 10 mg tablet TAKE 1 TABLET EVERY DAY 90 Tablet 1    dapagliflozin (FARXIGA) 10 mg tab tablet Take 1 Tablet by mouth daily. 90 Tablet 1    dulaglutide (Trulicity) 3 YC/8.5 mL pnij 3 mg by SubCUTAneous route every seven (7) days. 12 Each 3    alfuzosin SR (UROXATRAL) 10 mg SR tablet TAKE 1 TABLET BY MOUTH DAILY AFTER DINNER 90 Tablet 3    gabapentin (NEURONTIN) 300 mg capsule Take 300 mg by mouth three (3) times daily. Taking as needed which was given from Dr Dorothy Meredith in the past      dutasteride (AVODART) 0.5 mg capsule Take 1 Capsule by mouth daily (after dinner). 90 Capsule 3    testosterone cypionate (DEPOTESTOTERONE CYPIONATE) 200 mg/mL injection 0.5 mL by IntraMUSCular route every seven (7) days. Max Daily Amount: 100 mg. 10 mL 0    glucose blood VI test strips (True Metrix Glucose Test Strip) strip TEST BLOOD SUGAR THREE TIMES DAILY 300 Strip 5    nitroglycerin (NITROSTAT) 0.4 mg SL tablet DISSOLVE ONE TABLET UNDER TONGUE AS NEEDED FOR CHEST PAIN EVERY 5 MINUTES FOR UP TO 3 DOSES 30 Tablet 0    aspirin delayed-release 81 mg tablet Take 1 Tablet by mouth daily. 90 Tablet 1    solifenacin (VESICARE) 10 mg tablet Take 1 Tablet by mouth daily. 90 Tablet 3    Blood-Glucose Meter (True Metrix Glucose Meter) misc Check blood sugar 3 times daily 1 Each 0    Cane maura by Does Not Apply route.  cpap machine kit by Does Not Apply route.  Blood-Glucose Meter monitoring kit Use to check blood sugar 3 times daily. Dx E11.9. Please dispense brand preferred by insurance. 1 Kit 0    lancets misc Use to check blood sugar 3 times daily. Dx E11.9. Please dispense brand preferred by insurance. 702 Each 11    folic acid/multivit-min/lutein (CENTRUM SILVER PO) Take  by mouth.  lisinopriL (PRINIVIL, ZESTRIL) 20 mg tablet Take 1 Tab by mouth daily.  30 Tab 1    Syringe, Disposable, 3 mL syrg Take as directed 30 Syringe 1    Needle, Disp, 23 G 23 gauge x 1 1/4\" ndle Use to inject 0.5 ml every 7 days 30 Pen Needle 1    Needle, Disp, 18 G 18 gauge x 1\" ndle Use to aspirate 0.5 ml every 7 days 30 Pen Needle 1    Wheel Chair maura Assistance with ambulation, gait instability 1 Each 0    polyethylene glycol (MIRALAX) 17 gram packet Take 1 Packet by mouth daily. Hold for loose stools (Patient taking differently: Take 17 g by mouth daily as needed. Hold for loose stools) 10 Each 0    naloxone 4 mg/actuation spry 4 mg by Nasal route as needed. 1 Box 0    Back Brace misc 1 Device by Does Not Apply route daily as needed for Pain.  One, lumbosacral orthosis/back brace with metal struts      Medically necessary 1 Device 0       Past History     Past Medical History:  Past Medical History:   Diagnosis Date    Abdominal adhesions 7/30/2014    BPH (benign prostatic hyperplasia)     Cellulitis     DDD (degenerative disc disease), lumbar 7/30/2014    Depression     Diabetes ([address]     Frequent falls 7/30/2014    TRUE (generalized anxiety disorder) 7/30/2014    HLD (hyperlipidemia)     Hypertension     Hypogonadism in male     Incomplete bladder emptying     Kidney stones     Lumbar facet arthropathy 7/30/2014    Lumbar nerve root impingement 7/30/2014    Lumbosacral radiculopathy at S1 7/30/2014    Major depression 7/30/2014    Neurological disorder     sciatica    MANISH (obstructive sleep apnea) 7/30/2014    S/P lumbar laminectomy 7/30/2014    S/P lumbar spinal fusion 7/30/2014    Sleep apnea     USES CPAP EVERY NIGHT    Thoracic compression fracture ([address] 07/30/2014       Past Surgical History:  Past Surgical History:   Procedure Laterality Date    HX APPENDECTOMY      HX BACK SURGERY      Lumbar fusion    HX HERNIA REPAIR  1992    HX OTHER SURGICAL      EMG - S1 disorder    HX OTHER SURGICAL  10/2017    spinal cord stimulator    HX VASECTOMY  1985       Family History:  Family History   Problem Relation Age of Onset    Diabetes Mother     Hypertension Mother     Stroke Mother     Heart Attack Father     Diabetes Father     Stroke Father     Diabetes Sister     Stroke Brother        Social History:  Social History     Tobacco Use    Smoking status: Never    Smokeless tobacco: Never   Vaping Use    Vaping Use: Never used   Substance Use Topics    Alcohol use: Yes    Drug use: No       Allergies: Allergies   Allergen Reactions    Bactrim [Sulfamethoprim] Rash    Opana [Oxymorphone] Other (comments)     Feels like bug crawling under skin and drowziness         Review of Systems     Review of Systems   Constitutional:  Positive for fatigue. Negative for activity change, appetite change and fever. HENT:  Positive for drooling and trouble swallowing. Negative for congestion and hearing loss. Eyes:  Negative for photophobia and discharge. Respiratory:  Positive for shortness of breath. Negative for wheezing. Cardiovascular:  Positive for chest pain (vague epigastric discomfort). Gastrointestinal:  Positive for nausea and vomiting. Negative for abdominal pain. Genitourinary:  Positive for dysuria. Negative for flank pain and urgency. Musculoskeletal:  Positive for myalgias. Negative for arthralgias. Skin:  Negative for rash. Neurological:  Positive for dizziness. Negative for light-headedness. Psychiatric/Behavioral:  Negative for behavioral problems, confusion and hallucinations. Physical Exam   Visit Vitals  BP (!) 143/79 (BP 1 Location: Left upper arm, BP Patient Position: Sitting)   Pulse (!) 128   Temp 97.8 °F (36.6 °C)   Resp 26   SpO2 94%     Physical Exam  Vitals and nursing note reviewed. Constitutional:       General: He is not in acute distress. Appearance: He is well-developed. HENT:      Head: Normocephalic.    Eyes:      Conjunctiva/sclera: Conjunctivae normal.      Pupils: Pupils are equal, round, and reactive to light. Cardiovascular:      Rate and Rhythm: Normal rate and regular rhythm. Heart sounds: Normal heart sounds. No murmur heard. Pulmonary:      Effort: Pulmonary effort is normal. No respiratory distress. Breath sounds: Normal breath sounds. No wheezing or rales. Chest:      Chest wall: No tenderness. Abdominal:      General: Bowel sounds are normal. There is no distension. Palpations: Abdomen is soft. Tenderness: There is no abdominal tenderness. There is no rebound. Musculoskeletal:         General: No tenderness. Normal range of motion. Cervical back: Normal range of motion and neck supple. Skin:     General: Skin is warm and dry. Findings: No rash. Neurological:      Mental Status: He is alert and oriented to person, place, and time. Cranial Nerves: No cranial nerve deficit. Motor: No abnormal muscle tone. Coordination: Coordination normal.   Psychiatric:         Behavior: Behavior normal.         Thought Content:  Thought content normal.         Judgment: Judgment normal.         Diagnostic Study Results     Labs -  Recent Results (from the past 12 hour(s))   POC LACTIC ACID    Collection Time: 09/08/22  2:11 AM   Result Value Ref Range    Lactic Acid (POC) 1.82 0.40 - 2.00 mmol/L   EKG, 12 LEAD, INITIAL    Collection Time: 09/08/22  2:11 AM   Result Value Ref Range    Ventricular Rate 113 BPM    Atrial Rate 113 BPM    P-R Interval 128 ms    QRS Duration 86 ms    Q-T Interval 336 ms    QTC Calculation (Bezet) 460 ms    Calculated P Axis 62 degrees    Calculated R Axis 84 degrees    Calculated T Axis 53 degrees    Diagnosis       Sinus tachycardia  Nonspecific ST abnormality  Abnormal ECG  When compared with ECG of 31-MAR-2022 08:44,  ST now depressed in Lateral leads  Nonspecific T wave abnormality no longer evident in Lateral leads         Radiologic Studies -   XR CHEST PORT    (Results Pending)   Interpreted by me    Lab tests: interpreted by me    Medical Decision Making   I am the first provider for this patient. I reviewed the vital signs, available nursing notes, past medical history, past surgical history, family history and social history. Vital Signs-Reviewed the patient's vital signs. EKG: interpreted by me    Records Reviewed:  (Time of Review: 2:26 AM)    ED Course: Progress Notes, Reevaluation, and Consults:   Patient had epigastric pain with severe nausea and vomiting. Patient symptoms resolved with treatment of morphine and zofran. EGK - and initial troponin were normal.  After patient had a large vomiting episode his symptoms resolved. Re-assess affter 2 1/2 hours patient remained asymptomatic. Second troponin and repeat EKG were normal.         Provider Notes (Medical Decision Making): MI, SBO, food poisoning, volvous, angina,    MDM      Procedures    Critical Care Time: 35 minutes      Diagnosis     Clinical Impression: abdominal pain    Disposition: D/C home. F/U PCP in 2 days. Return to ER prn. Follow-up Information    None          Patient's Medications   Start Taking    No medications on file   Continue Taking    ALFUZOSIN SR (UROXATRAL) 10 MG SR TABLET    TAKE 1 TABLET BY MOUTH DAILY AFTER DINNER    AMITRIPTYLINE (ELAVIL) 10 MG TABLET    TAKE 1 TABLET EVERY NIGHT    ASPIRIN DELAYED-RELEASE 81 MG TABLET    Take 1 Tablet by mouth daily. BACK BRACE MISC    1 Device by Does Not Apply route daily as needed for Pain. One, lumbosacral orthosis/back brace with metal struts      Medically necessary    BLOOD-GLUCOSE METER (TRUE METRIX GLUCOSE METER) MISC    Check blood sugar 3 times daily    BLOOD-GLUCOSE METER MONITORING KIT    Use to check blood sugar 3 times daily. Dx E11.9. Please dispense brand preferred by insurance. BUPROPION SR (WELLBUTRIN SR) 150 MG SR TABLET    TAKE 1 TABLET EVERY DAY    CANE SHAQUILLE    by Does Not Apply route. CPAP MACHINE KIT    by Does Not Apply route.     DAPAGLIFLOZIN (FARXIGA) 10 MG TAB TABLET    Take 1 Tablet by mouth daily. DROPSAFE ALCOHOL PREP PADS PADM    USE AS DIRECTED WHEN CHECKING BLOOD SUGAR THREE TIMES DAILY    DULAGLUTIDE (TRULICITY) 3 WD/4.3 ML PNIJ    3 mg by SubCUTAneous route every seven (7) days. DUTASTERIDE (AVODART) 0.5 MG CAPSULE    Take 1 Capsule by mouth daily (after dinner). FOLIC ACID/MULTIVIT-MIN/LUTEIN (CENTRUM SILVER PO)    Take  by mouth. FUROSEMIDE (LASIX) 20 MG TABLET    TAKE 1 TABLET BY MOUTH TWICE DAILY    GABAPENTIN (NEURONTIN) 300 MG CAPSULE    Take 300 mg by mouth three (3) times daily. Taking as needed which was given from Dr Kristina Smith in the past    GLIPIZIDE (GLUCOTROL) 10 MG TABLET    TAKE 1 TABLET TWICE DAILY    GLUCOSE BLOOD VI TEST STRIPS (TRUE METRIX GLUCOSE TEST STRIP) STRIP    TEST BLOOD SUGAR THREE TIMES DAILY    LANCETS (TRUEPLUS LANCETS) 33 GAUGE MISC    CHECK BLOOD SUGAR THREE TIMES DAILY    LANCETS MISC    Use to check blood sugar 3 times daily. Dx E11.9. Please dispense brand preferred by insurance. LISINOPRIL (PRINIVIL, ZESTRIL) 20 MG TABLET    Take 1 Tab by mouth daily. METOPROLOL SUCCINATE (TOPROL-XL) 50 MG XL TABLET    TAKE 1 TABLET EVERY DAY    MONTELUKAST (SINGULAIR) 10 MG TABLET    TAKE 1 TABLET EVERY DAY    NALOXONE 4 MG/ACTUATION SPRY    4 mg by Nasal route as needed. NEEDLE, DISP, 18 G 18 GAUGE X 1\" NDLE    Use to aspirate 0.5 ml every 7 days    NEEDLE, DISP, 23 G 23 GAUGE X 1 1/4\" NDLE    Use to inject 0.5 ml every 7 days    NITROGLYCERIN (NITROSTAT) 0.4 MG SL TABLET    DISSOLVE ONE TABLET UNDER TONGUE AS NEEDED FOR CHEST PAIN EVERY 5 MINUTES FOR UP TO 3 DOSES    POLYETHYLENE GLYCOL (MIRALAX) 17 GRAM PACKET    Take 1 Packet by mouth daily. Hold for loose stools    PRAMIPEXOLE (MIRAPEX) 0.75 MG TABLET    TAKE 3 TABLETS ONE TIME DAILY    SILDENAFIL CITRATE (VIAGRA) 100 MG TABLET    Take 1 Tablet by mouth as needed for Erectile Dysfunction (do not take more than 1 tab in 36 hour period).     SIMVASTATIN (ZOCOR) 10 MG TABLET TAKE 1 TABLET EVERY NIGHT    SOLIFENACIN (VESICARE) 10 MG TABLET    Take 1 Tablet by mouth daily. SYRINGE, DISPOSABLE, 3 ML SYRG    Take as directed    TESTOSTERONE CYPIONATE (DEPOTESTOTERONE CYPIONATE) 200 MG/ML INJECTION    0.5 mL by IntraMUSCular route every seven (7) days. Max Daily Amount: 100 mg. [address] with ambulation, gait instability   These Medications have changed    No medications on file   Stop Taking    No medications on file     Disclaimer: Sections of this note are dictated using utilizing voice recognition software. Minor typographical errors may be present. If questions arise, please do not hesitate to contact me or call our department.

## 2022-09-09 LAB
ATRIAL RATE: 111 BPM
ATRIAL RATE: 113 BPM
ATRIAL RATE: 99 BPM
CALCULATED P AXIS, ECG09: 35 DEGREES
CALCULATED P AXIS, ECG09: 47 DEGREES
CALCULATED P AXIS, ECG09: 62 DEGREES
CALCULATED R AXIS, ECG10: 59 DEGREES
CALCULATED R AXIS, ECG10: 71 DEGREES
CALCULATED R AXIS, ECG10: 84 DEGREES
CALCULATED T AXIS, ECG11: 34 DEGREES
CALCULATED T AXIS, ECG11: 40 DEGREES
CALCULATED T AXIS, ECG11: 53 DEGREES
DIAGNOSIS, 93000: NORMAL
P-R INTERVAL, ECG05: 128 MS
P-R INTERVAL, ECG05: 136 MS
P-R INTERVAL, ECG05: 160 MS
Q-T INTERVAL, ECG07: 336 MS
Q-T INTERVAL, ECG07: 342 MS
Q-T INTERVAL, ECG07: 374 MS
QRS DURATION, ECG06: 84 MS
QRS DURATION, ECG06: 86 MS
QRS DURATION, ECG06: 86 MS
QTC CALCULATION (BEZET), ECG08: 460 MS
QTC CALCULATION (BEZET), ECG08: 465 MS
QTC CALCULATION (BEZET), ECG08: 479 MS
VENTRICULAR RATE, ECG03: 111 BPM
VENTRICULAR RATE, ECG03: 113 BPM
VENTRICULAR RATE, ECG03: 99 BPM

## 2022-09-12 ENCOUNTER — TELEPHONE (OUTPATIENT)
Dept: FAMILY MEDICINE CLINIC | Age: 67
End: 2022-09-12

## 2022-09-12 NOTE — TELEPHONE ENCOUNTER
Soraida Islas from [address] [place] [address] [place] would like to know if you received the order for the scooter. Please advise.

## 2022-09-13 ENCOUNTER — OFFICE VISIT (OUTPATIENT)
Dept: FAMILY MEDICINE CLINIC | Age: 67
End: 2022-09-13
Payer: MEDICARE

## 2022-09-13 VITALS
RESPIRATION RATE: 16 BRPM | OXYGEN SATURATION: 94 % | DIASTOLIC BLOOD PRESSURE: 74 MMHG | TEMPERATURE: 97.1 F | HEART RATE: 79 BPM | SYSTOLIC BLOOD PRESSURE: 138 MMHG | BODY MASS INDEX: 40.7 KG/M2 | WEIGHT: 315 LBS

## 2022-09-13 DIAGNOSIS — G47.30 SLEEP APNEA, UNSPECIFIED TYPE: ICD-10-CM

## 2022-09-13 DIAGNOSIS — N28.9 RENAL INSUFFICIENCY: ICD-10-CM

## 2022-09-13 DIAGNOSIS — R11.0 NAUSEA: ICD-10-CM

## 2022-09-13 DIAGNOSIS — R06.00 DYSPNEA, UNSPECIFIED TYPE: ICD-10-CM

## 2022-09-13 DIAGNOSIS — E11.40 TYPE 2 DIABETES MELLITUS WITH DIABETIC NEUROPATHY, WITHOUT LONG-TERM CURRENT USE OF INSULIN (HCC): Primary | ICD-10-CM

## 2022-09-13 DIAGNOSIS — N18.30 STAGE 3 CHRONIC KIDNEY DISEASE, UNSPECIFIED WHETHER STAGE 3A OR 3B CKD (HCC): ICD-10-CM

## 2022-09-13 LAB — HBA1C MFR BLD HPLC: 7 %

## 2022-09-13 PROCEDURE — 1123F ACP DISCUSS/DSCN MKR DOCD: CPT | Performed by: FAMILY MEDICINE

## 2022-09-13 PROCEDURE — G8427 DOCREV CUR MEDS BY ELIG CLIN: HCPCS | Performed by: FAMILY MEDICINE

## 2022-09-13 PROCEDURE — 2022F DILAT RTA XM EVC RTNOPTHY: CPT | Performed by: FAMILY MEDICINE

## 2022-09-13 PROCEDURE — 1101F PT FALLS ASSESS-DOCD LE1/YR: CPT | Performed by: FAMILY MEDICINE

## 2022-09-13 PROCEDURE — 3017F COLORECTAL CA SCREEN DOC REV: CPT | Performed by: FAMILY MEDICINE

## 2022-09-13 PROCEDURE — 83036 HEMOGLOBIN GLYCOSYLATED A1C: CPT | Performed by: FAMILY MEDICINE

## 2022-09-13 PROCEDURE — 99214 OFFICE O/P EST MOD 30 MIN: CPT | Performed by: FAMILY MEDICINE

## 2022-09-13 PROCEDURE — G8536 NO DOC ELDER MAL SCRN: HCPCS | Performed by: FAMILY MEDICINE

## 2022-09-13 PROCEDURE — G8417 CALC BMI ABV UP PARAM F/U: HCPCS | Performed by: FAMILY MEDICINE

## 2022-09-13 PROCEDURE — G8754 DIAS BP LESS 90: HCPCS | Performed by: FAMILY MEDICINE

## 2022-09-13 PROCEDURE — 3051F HG A1C>EQUAL 7.0%<8.0%: CPT | Performed by: FAMILY MEDICINE

## 2022-09-13 PROCEDURE — G8752 SYS BP LESS 140: HCPCS | Performed by: FAMILY MEDICINE

## 2022-09-13 PROCEDURE — G8510 SCR DEP NEG, NO PLAN REQD: HCPCS | Performed by: FAMILY MEDICINE

## 2022-09-13 RX ORDER — DOXYCYCLINE 100 MG/1
100 TABLET ORAL 2 TIMES DAILY
Qty: 10 TABLET | Refills: 0 | Status: SHIPPED | OUTPATIENT
Start: 2022-09-13 | End: 2022-09-18

## 2022-09-13 NOTE — PROGRESS NOTES
HISTORY OF PRESENT ILLNESS  Cecily Hatchet is a 79 y.o. male. HPI: Here for follow-up after emergency room visit. Went to ER as worsening of shortness of breath. Review of records. EKG showed no acute finding and troponin was negative. Noted acute elevation of creatinine. He has nausea and green vomitus. Was feeling abdominal discomfort and taking PPI as improved chest and abdominal discomfort. Had no diarrhea or constipation. Does have a history of diabetes. A1c today around 7. Fasting sugar is around 1 20-1 40. No hyper or hypoglycemic symptoms at this time x-ray showed small density might be scarring. No audible wheezing. Not using any extra respiratory muscle. According to patient checking oxygen saturation at home and sometimes vitals in lower 90s. During office visit while sitting it was 94% and ambulating at hallway and post ambulation oxygen saturation was 96. No chest pain. No diaphoresis. Currently no nausea or vomiting. He does feel mucus coming from the throat and it is green-colored. No fever. No recent weight or appetite changes. Does use CPAP but having some discomfort. Has not set up with a new sleep specialist.  Never smoked. Blood pressure has been stable. Heart rate has been stable. No depression or anxiety. According to patient wife noticed him stop breathing. Visit Vitals  /74 (BP 1 Location: Left upper arm, BP Patient Position: Sitting, BP Cuff Size: Adult)   Pulse 79   Temp 97.1 °F (36.2 °C) (Temporal)   Resp 16   Wt 317 lb (143.8 kg)   SpO2 94%   BMI 40.70 kg/m²     Review medication list, vitals, problem list,allergies.    Lab Results   Component Value Date/Time    WBC 9.5 09/08/2022 02:10 AM    HGB 16.8 (H) 09/08/2022 02:10 AM    HCT 51.9 (H) 09/08/2022 02:10 AM    PLATELET 960 51/28/3981 02:10 AM    MCV 88.0 09/08/2022 02:10 AM     Lab Results   Component Value Date/Time    Sodium 138 09/08/2022 02:10 AM    Potassium 3.7 09/08/2022 02:10 AM    Chloride 101 09/08/2022 02:10 AM    CO2 25 09/08/2022 02:10 AM    Anion gap 12 09/08/2022 02:10 AM    Glucose 233 (H) 09/08/2022 02:10 AM    BUN 28 (H) 09/08/2022 02:10 AM    Creatinine 2.15 (H) 09/08/2022 02:10 AM    BUN/Creatinine ratio 13 09/08/2022 02:10 AM    GFR est AA 37 (L) 09/08/2022 02:10 AM    GFR est non-AA 31 (L) 09/08/2022 02:10 AM    Calcium 10.0 09/08/2022 02:10 AM    Bilirubin, total 0.8 09/08/2022 02:10 AM    Alk. phosphatase 124 (H) 09/08/2022 02:10 AM    Protein, total 8.7 (H) 09/08/2022 02:10 AM    Albumin 4.5 09/08/2022 02:10 AM    Globulin 4.2 (H) 09/08/2022 02:10 AM    A-G Ratio 1.1 09/08/2022 02:10 AM    ALT (SGPT) 53 09/08/2022 02:10 AM    AST (SGOT) 33 09/08/2022 02:10 AM     Lab Results   Component Value Date/Time    Cholesterol, total 104 10/29/2021 10:30 AM    HDL Cholesterol 38 (L) 10/29/2021 10:30 AM    LDL, calculated 47 10/29/2021 10:30 AM    VLDL, calculated 19 10/29/2021 10:30 AM    Triglyceride 95 10/29/2021 10:30 AM    CHOL/HDL Ratio 2.7 10/29/2021 10:30 AM     Lab Results   Component Value Date/Time    TSH 1.09 10/29/2021 10:30 AM     Lab Results   Component Value Date/Time    Hemoglobin A1c 7.6 (H) 05/02/2022 07:25 AM    Hemoglobin A1c (POC) 7.0 09/13/2022 03:56 PM     Lab Results   Component Value Date/Time    Vitamin D 25-Hydroxy 38.6 12/07/2016 09:27 AM       Lab Results   Component Value Date/Time    Microalbumin/Creat ratio (mg/g creat) 26 07/03/2020 09:17 AM    MICROALBUMIN,MG/DAY 14.8 06/13/2016 08:43 AM    Microalbumin,urine random 4.06 (H) 07/03/2020 09:17 AM     XR Results (most recent):  Results from Hospital Encounter encounter on 09/08/22    XR CHEST PORT    Narrative  EXAM: XR CHEST PORT    INDICATION: SOB    COMPARISON: 1/14/2019. FINDINGS: A single view of the chest demonstrates minimal streaky opacities at  the lung bases. The cardiac and mediastinal contours and pulmonary vascularity  are normal. The bones and soft tissues are within normal limits.  Overlying leads  and tubes noted. Impression  Minimal streaky densities at the lung bases, likely subsegmental atelectasis or  scarring. Blanchie Bevels ECHO:    Left Ventricle: Left ventricle size is normal. Mildly increased wall thickness. Normal wall motion. Normal left ventricular systolic function with a visually estimated EF of 55 - 60%. Normal diastolic function. Aorta: Mildly dilated aortic root. Ao Root diameter is 3.5 cm. Left Atrium: Left atrial volume index is normal (16-34 mL/m2). LA Vol Index A/L is 29 mL/m2. Nuclear stress test:  Interpretation Summary         Stress Test: A pharmacological stress test was performed using lexiscan. Nuclear Findings: LVEF measures 64%. TID ratio is 1.01. Nuclear Findings: LV perfusion is probably normal.  There is a small fixed basal inferior defect, that is likely artifact. No ischemia. ECG: Stress ECG was not diagnostic due to baseline ECG abnormality. Low risk stress test.       ROS: See HPI    Physical Exam  Constitutional:       General: He is not in acute distress. Cardiovascular:      Rate and Rhythm: Normal rate and regular rhythm. Heart sounds: Normal heart sounds. Pulmonary:      Effort: No respiratory distress. Breath sounds: No wheezing. Abdominal:      General: Bowel sounds are normal.      Palpations: Abdomen is soft. Tenderness: There is no abdominal tenderness. Musculoskeletal:         General: No swelling. Neurological:      Mental Status: He is oriented to person, place, and time. Psychiatric:         Behavior: Behavior normal.       ASSESSMENT and PLAN    ICD-10-CM ICD-9-CM    1. Type 2 diabetes mellitus with diabetic neuropathy, without long-term current use of insulin (HCC) :A1c has improved compare to before. Diet modification. Continue current plan. E11.40 250.60 AMB POC HEMOGLOBIN A1C     357.2       2. Dyspnea, unspecified type : Chest x-ray showed no acute finding. Questionable density. Will give antibiotic. ?  Pnemonia as feeling greenish sputum from the mucus when try to clear the throat. Checking labs and x-ray again. Elevated neutrophil and elevated H&H. ? Infectious vs inflammatory process. R06.00 786.09 REFERRAL TO PULMONARY DISEASE      NT-PRO BNP      METABOLIC PANEL, BASIC      XR CHEST PA LAT      3. Stage 3 chronic kidney disease, unspecified whether stage 3a or 3b CKD (HCC) : cr elevated. Discussed avoid NSAID. Sending to nephrology and recheck labs. N18.30 585.3 CBC WITH AUTOMATED DIFF      REFERRAL TO NEPHROLOGY    elevated cr 2.25       4. Nausea : improved at this time. Greenish vomiting. If after antibiotic he feels better will observe. R11.0 787.02       5. Sleep apnea, unspecified type : not established care yet with new sleep specialit. Using c-pap but having difficulties. Currently sudden onset of shortness of breath. Oxygen saturation has been stable around 96% during ambulation. Blood pressure and HR stable. Able to talk in full sentence and no audible wheezing. Advised to go to Er if problem gets worse or shortness of breath gets worse. G47.30 780.57 REFERRAL TO PULMONARY DISEASE      Pt understood and agree with the plan   Before closing the note , called patient as noted he has not done labs and x-ray. Left voice mail to patient to complete ordered test.   Follow-up and Dispositions    Return in about 2 weeks (around 9/27/2022). Please note that this dictation was completed with [employer], the [employer] voice recognition software. Quite often unanticipated grammatical, syntax, homophones, and other interpretive errors are inadvertently transcribed by the computer software. Please disregard these errors. Please excuse any errors that have escaped final proofreading.

## 2022-09-13 NOTE — PROGRESS NOTES
1. Have you been to the ER, urgent care clinic since your last visit? Hospitalized since your last visit? [facility] 9/08/22 acute abdominal pain    2. Have you seen or consulted any other health care providers outside of the [address] [place] [address] since your last visit? Include any pap smears or colon screening.  No    Chief Complaint   Patient presents with    Diabetes    Dizziness    Vomiting     Episode last week    Hospital Follow Up     [facility] 9/08/22    Breathing Problem

## 2022-09-14 ENCOUNTER — HOSPITAL ENCOUNTER (OUTPATIENT)
Dept: LAB | Age: 67
Discharge: HOME | End: 2022-09-14
Payer: MEDICARE

## 2022-09-14 ENCOUNTER — PATIENT OUTREACH (OUTPATIENT)
Dept: CASE MANAGEMENT | Age: 67
End: 2022-09-14

## 2022-09-14 DIAGNOSIS — R06.00 DYSPNEA, UNSPECIFIED TYPE: ICD-10-CM

## 2022-09-14 DIAGNOSIS — N18.30 STAGE 3 CHRONIC KIDNEY DISEASE, UNSPECIFIED WHETHER STAGE 3A OR 3B CKD (HCC): ICD-10-CM

## 2022-09-14 LAB
ANION GAP SERPL CALC-SCNC: 6 MMOL/L (ref 3–18)
BASOPHILS # BLD: 0.1 K/UL (ref 0–0.1)
BASOPHILS NFR BLD: 1 % (ref 0–2)
BNP SERPL-MCNC: 147 PG/ML (ref 0–900)
BUN SERPL-MCNC: 22 MG/DL (ref 7–18)
BUN/CREAT SERPL: 15 (ref 12–20)
CALCIUM SERPL-MCNC: 9.5 MG/DL (ref 8.5–10.1)
CHLORIDE SERPL-SCNC: 105 MMOL/L (ref 100–111)
CO2 SERPL-SCNC: 26 MMOL/L (ref 21–32)
CREAT SERPL-MCNC: 1.42 MG/DL (ref 0.6–1.3)
DIFFERENTIAL METHOD BLD: ABNORMAL
EOSINOPHIL # BLD: 0.3 K/UL (ref 0–0.4)
EOSINOPHIL NFR BLD: 3 % (ref 0–5)
ERYTHROCYTE [DISTWIDTH] IN BLOOD BY AUTOMATED COUNT: 14.3 % (ref 11.6–14.5)
GLUCOSE SERPL-MCNC: 243 MG/DL (ref 74–99)
HCT VFR BLD AUTO: 45 % (ref 36–48)
HGB BLD-MCNC: 14.1 G/DL (ref 13–16)
IMM GRANULOCYTES # BLD AUTO: 0.2 K/UL (ref 0–0.04)
IMM GRANULOCYTES NFR BLD AUTO: 2 % (ref 0–0.5)
LYMPHOCYTES # BLD: 1.5 K/UL (ref 0.9–3.6)
LYMPHOCYTES NFR BLD: 16 % (ref 21–52)
MCH RBC QN AUTO: 28.1 PG (ref 24–34)
MCHC RBC AUTO-ENTMCNC: 31.3 G/DL (ref 31–37)
MCV RBC AUTO: 89.6 FL (ref 78–100)
MONOCYTES # BLD: 0.8 K/UL (ref 0.05–1.2)
MONOCYTES NFR BLD: 8 % (ref 3–10)
NEUTS SEG # BLD: 6.5 K/UL (ref 1.8–8)
NEUTS SEG NFR BLD: 70 % (ref 40–73)
NRBC # BLD: 0 K/UL (ref 0–0.01)
NRBC BLD-RTO: 0 PER 100 WBC
PLATELET # BLD AUTO: 261 K/UL (ref 135–420)
PMV BLD AUTO: 10.2 FL (ref 9.2–11.8)
POTASSIUM SERPL-SCNC: 4.2 MMOL/L (ref 3.5–5.5)
RBC # BLD AUTO: 5.02 M/UL (ref 4.35–5.65)
SODIUM SERPL-SCNC: 137 MMOL/L (ref 136–145)
WBC # BLD AUTO: 9.4 K/UL (ref 4.6–13.2)

## 2022-09-14 PROCEDURE — 85025 COMPLETE CBC W/AUTO DIFF WBC: CPT

## 2022-09-14 PROCEDURE — 83880 ASSAY OF NATRIURETIC PEPTIDE: CPT

## 2022-09-14 PROCEDURE — 36415 COLL VENOUS BLD VENIPUNCTURE: CPT

## 2022-09-14 PROCEDURE — 80048 BASIC METABOLIC PNL TOTAL CA: CPT

## 2022-09-16 ENCOUNTER — DOCUMENTATION ONLY (OUTPATIENT)
Dept: PULMONOLOGY | Age: 67
End: 2022-09-16

## 2022-09-16 NOTE — PROGRESS NOTES
Labs stable. No signs of volume over load.  If his sob and oxygen saturation is still dropping would advise to go to ER

## 2022-09-16 NOTE — PROGRESS NOTES
Lf vm for pt to call in regs to new pt sleep apt ref by Dr. Aisha Banerjee; when/where if any evals/studies/cpap/dme?

## 2022-09-19 ENCOUNTER — PATIENT OUTREACH (OUTPATIENT)
Dept: CASE MANAGEMENT | Age: 67
End: 2022-09-19

## 2022-09-22 ENCOUNTER — TELEPHONE (OUTPATIENT)
Dept: FAMILY MEDICINE CLINIC | Age: 67
End: 2022-09-22

## 2022-09-22 NOTE — TELEPHONE ENCOUNTER
Pt states that Dr Francisco Ham called wanting to know how he was doing. He said that he was doing better.  Thank you

## 2022-09-22 NOTE — PROGRESS NOTES
Tried to call pt to see how he is doing?/ tried to call twice before and today again left message to call back to office and if no improvement in symptoms need to go to ER or urgent care

## 2022-09-28 DIAGNOSIS — E11.40 TYPE 2 DIABETES MELLITUS WITH DIABETIC NEUROPATHY, WITHOUT LONG-TERM CURRENT USE OF INSULIN (HCC): ICD-10-CM

## 2022-09-30 ENCOUNTER — APPOINTMENT (OUTPATIENT)
Dept: GENERAL RADIOLOGY | Age: 67
DRG: 871 | End: 2022-09-30
Attending: PHYSICIAN ASSISTANT
Payer: MEDICARE

## 2022-09-30 ENCOUNTER — APPOINTMENT (OUTPATIENT)
Dept: CT IMAGING | Age: 67
DRG: 871 | End: 2022-09-30
Attending: EMERGENCY MEDICINE
Payer: MEDICARE

## 2022-09-30 ENCOUNTER — HOSPITAL ENCOUNTER (INPATIENT)
Age: 67
LOS: 3 days | Discharge: HOME HEALTH | DRG: 871 | End: 2022-10-04
Attending: EMERGENCY MEDICINE | Admitting: STUDENT IN AN ORGANIZED HEALTH CARE EDUCATION/TRAINING PROGRAM
Payer: MEDICARE

## 2022-09-30 ENCOUNTER — PATIENT OUTREACH (OUTPATIENT)
Dept: CASE MANAGEMENT | Age: 67
End: 2022-09-30

## 2022-09-30 DIAGNOSIS — R50.9 FEVER, UNSPECIFIED FEVER CAUSE: ICD-10-CM

## 2022-09-30 DIAGNOSIS — J96.01 ACUTE RESPIRATORY FAILURE WITH HYPOXIA (HCC): Primary | ICD-10-CM

## 2022-09-30 LAB
ALBUMIN SERPL-MCNC: 3.8 G/DL (ref 3.4–5)
ALBUMIN/GLOB SERPL: 1 {RATIO} (ref 0.8–1.7)
ALP SERPL-CCNC: 106 U/L (ref 45–117)
ALT SERPL-CCNC: 35 U/L (ref 16–61)
ANION GAP SERPL CALC-SCNC: 10 MMOL/L (ref 3–18)
APTT PPP: 30.3 SEC (ref 23–36.4)
ARTERIAL PATENCY WRIST A: POSITIVE
AST SERPL-CCNC: 23 U/L (ref 10–38)
BASE DEFICIT BLD-SCNC: 2.3 MMOL/L
BASOPHILS # BLD: 0 K/UL (ref 0–0.1)
BASOPHILS NFR BLD: 1 % (ref 0–2)
BDY SITE: ABNORMAL
BILIRUB SERPL-MCNC: 0.8 MG/DL (ref 0.2–1)
BNP SERPL-MCNC: 515 PG/ML (ref 0–900)
BUN SERPL-MCNC: 16 MG/DL (ref 7–18)
BUN/CREAT SERPL: 12 (ref 12–20)
CALCIUM SERPL-MCNC: 8.9 MG/DL (ref 8.5–10.1)
CHLORIDE SERPL-SCNC: 103 MMOL/L (ref 100–111)
CO2 SERPL-SCNC: 26 MMOL/L (ref 21–32)
CREAT SERPL-MCNC: 1.31 MG/DL (ref 0.6–1.3)
D DIMER PPP FEU-MCNC: 0.48 UG/ML(FEU)
DIFFERENTIAL METHOD BLD: ABNORMAL
EOSINOPHIL # BLD: 0.1 K/UL (ref 0–0.4)
EOSINOPHIL NFR BLD: 1 % (ref 0–5)
ERYTHROCYTE [DISTWIDTH] IN BLOOD BY AUTOMATED COUNT: 14.4 % (ref 11.6–14.5)
GAS FLOW.O2 O2 DELIVERY SYS: ABNORMAL L/MIN
GLOBULIN SER CALC-MCNC: 3.9 G/DL (ref 2–4)
GLUCOSE SERPL-MCNC: 146 MG/DL (ref 74–99)
HCO3 BLD-SCNC: 21.1 MMOL/L (ref 22–26)
HCT VFR BLD AUTO: 46.7 % (ref 36–48)
HGB BLD-MCNC: 14.4 G/DL (ref 13–16)
IMM GRANULOCYTES # BLD AUTO: 0.1 K/UL (ref 0–0.04)
IMM GRANULOCYTES NFR BLD AUTO: 1 % (ref 0–0.5)
INR PPP: 1 (ref 0.8–1.2)
LACTATE BLD-SCNC: 1.01 MMOL/L (ref 0.4–2)
LYMPHOCYTES # BLD: 0.5 K/UL (ref 0.9–3.6)
LYMPHOCYTES NFR BLD: 6 % (ref 21–52)
MAGNESIUM SERPL-MCNC: 2.2 MG/DL (ref 1.6–2.6)
MCH RBC QN AUTO: 27.8 PG (ref 24–34)
MCHC RBC AUTO-ENTMCNC: 30.8 G/DL (ref 31–37)
MCV RBC AUTO: 90.2 FL (ref 78–100)
MONOCYTES # BLD: 0.8 K/UL (ref 0.05–1.2)
MONOCYTES NFR BLD: 9 % (ref 3–10)
NEUTS SEG # BLD: 7.1 K/UL (ref 1.8–8)
NEUTS SEG NFR BLD: 83 % (ref 40–73)
NRBC # BLD: 0 K/UL (ref 0–0.01)
NRBC BLD-RTO: 0 PER 100 WBC
O2/TOTAL GAS SETTING VFR VENT: 36 %
PCO2 BLD: 32 MMHG (ref 35–45)
PH BLD: 7.43 [PH] (ref 7.35–7.45)
PHOSPHATE SERPL-MCNC: 2 MG/DL (ref 2.5–4.9)
PLATELET # BLD AUTO: 190 K/UL (ref 135–420)
PMV BLD AUTO: 9.8 FL (ref 9.2–11.8)
PO2 BLD: 65 MMHG (ref 80–100)
POTASSIUM SERPL-SCNC: 3.8 MMOL/L (ref 3.5–5.5)
PROCALCITONIN SERPL-MCNC: 0.85 NG/ML
PROT SERPL-MCNC: 7.7 G/DL (ref 6.4–8.2)
PROTHROMBIN TIME: 13.9 SEC (ref 11.5–15.2)
RBC # BLD AUTO: 5.18 M/UL (ref 4.35–5.65)
SAO2 % BLD: 93.1 % (ref 92–97)
SERVICE CMNT-IMP: ABNORMAL
SODIUM SERPL-SCNC: 139 MMOL/L (ref 136–145)
SPECIMEN TYPE: ABNORMAL
TOTAL RESP. RATE, ITRR: 18
TROPONIN-HIGH SENSITIVITY: 32 NG/L (ref 0–78)
WBC # BLD AUTO: 8.6 K/UL (ref 4.6–13.2)

## 2022-09-30 PROCEDURE — 74011250637 HC RX REV CODE- 250/637: Performed by: EMERGENCY MEDICINE

## 2022-09-30 PROCEDURE — 94640 AIRWAY INHALATION TREATMENT: CPT

## 2022-09-30 PROCEDURE — 36600 WITHDRAWAL OF ARTERIAL BLOOD: CPT

## 2022-09-30 PROCEDURE — 85379 FIBRIN DEGRADATION QUANT: CPT

## 2022-09-30 PROCEDURE — 84145 PROCALCITONIN (PCT): CPT

## 2022-09-30 PROCEDURE — 84100 ASSAY OF PHOSPHORUS: CPT

## 2022-09-30 PROCEDURE — 83605 ASSAY OF LACTIC ACID: CPT

## 2022-09-30 PROCEDURE — 74011000250 HC RX REV CODE- 250: Performed by: EMERGENCY MEDICINE

## 2022-09-30 PROCEDURE — 87040 BLOOD CULTURE FOR BACTERIA: CPT

## 2022-09-30 PROCEDURE — 83880 ASSAY OF NATRIURETIC PEPTIDE: CPT

## 2022-09-30 PROCEDURE — 85025 COMPLETE CBC W/AUTO DIFF WBC: CPT

## 2022-09-30 PROCEDURE — 71275 CT ANGIOGRAPHY CHEST: CPT

## 2022-09-30 PROCEDURE — 84484 ASSAY OF TROPONIN QUANT: CPT

## 2022-09-30 PROCEDURE — 85610 PROTHROMBIN TIME: CPT

## 2022-09-30 PROCEDURE — 74011000636 HC RX REV CODE- 636: Performed by: EMERGENCY MEDICINE

## 2022-09-30 PROCEDURE — 99285 EMERGENCY DEPT VISIT HI MDM: CPT

## 2022-09-30 PROCEDURE — 85730 THROMBOPLASTIN TIME PARTIAL: CPT

## 2022-09-30 PROCEDURE — 80053 COMPREHEN METABOLIC PANEL: CPT

## 2022-09-30 PROCEDURE — 82803 BLOOD GASES ANY COMBINATION: CPT

## 2022-09-30 PROCEDURE — 71045 X-RAY EXAM CHEST 1 VIEW: CPT

## 2022-09-30 PROCEDURE — 74011250636 HC RX REV CODE- 250/636: Performed by: EMERGENCY MEDICINE

## 2022-09-30 PROCEDURE — 83735 ASSAY OF MAGNESIUM: CPT

## 2022-09-30 PROCEDURE — 93005 ELECTROCARDIOGRAM TRACING: CPT

## 2022-09-30 RX ORDER — ACETAMINOPHEN 500 MG
1000 TABLET ORAL
Status: COMPLETED | OUTPATIENT
Start: 2022-09-30 | End: 2022-09-30

## 2022-09-30 RX ORDER — SODIUM CHLORIDE 0.9 % (FLUSH) 0.9 %
5-10 SYRINGE (ML) INJECTION AS NEEDED
Status: DISCONTINUED | OUTPATIENT
Start: 2022-09-30 | End: 2022-10-04 | Stop reason: HOSPADM

## 2022-09-30 RX ORDER — IPRATROPIUM BROMIDE AND ALBUTEROL SULFATE 2.5; .5 MG/3ML; MG/3ML
3 SOLUTION RESPIRATORY (INHALATION)
Status: COMPLETED | OUTPATIENT
Start: 2022-09-30 | End: 2022-09-30

## 2022-09-30 RX ADMIN — IPRATROPIUM BROMIDE AND ALBUTEROL SULFATE 3 ML: 2.5; .5 SOLUTION RESPIRATORY (INHALATION) at 20:20

## 2022-09-30 RX ADMIN — ACETAMINOPHEN 1000 MG: 500 TABLET ORAL at 21:02

## 2022-09-30 RX ADMIN — IOPAMIDOL 100 ML: 755 INJECTION, SOLUTION INTRAVENOUS at 22:57

## 2022-09-30 RX ADMIN — SODIUM CHLORIDE 1000 ML: 900 INJECTION, SOLUTION INTRAVENOUS at 20:19

## 2022-09-30 NOTE — Clinical Note
Status[de-identified] INPATIENT [101]   Type of Bed: Medical [8]   Inpatient Hospitalization Certified Necessary for the Following Reasons: 3.  Patient receiving treatment that can only be provided in an inpatient setting (further clarification in H&P documentation)   Admitting Diagnosis: Acute respiratory failure with hypoxia [facility]) [[id number]]   Admitting Diagnosis: Fever [[id number]]   Admitting Physician: Leopold Penner [[id number]]   Attending Physician: Leopold Penner [[id number]]   Estimated Length of Stay: 2 Midnights   Discharge Plan[de-identified] [facility]

## 2022-10-01 PROBLEM — J96.01 ACUTE RESPIRATORY FAILURE WITH HYPOXIA (HCC): Status: ACTIVE | Noted: 2022-10-01

## 2022-10-01 PROBLEM — R50.9 FEVER: Status: ACTIVE | Noted: 2022-10-01

## 2022-10-01 LAB
AMORPH CRY URNS QL MICRO: ABNORMAL
ANION GAP SERPL CALC-SCNC: 5 MMOL/L (ref 3–18)
APPEARANCE UR: ABNORMAL
B PERT DNA SPEC QL NAA+PROBE: NOT DETECTED
BACTERIA URNS QL MICRO: ABNORMAL /HPF
BASOPHILS # BLD: 0 K/UL (ref 0–0.1)
BASOPHILS NFR BLD: 1 % (ref 0–2)
BILIRUB UR QL: NEGATIVE
BORDETELLA PARAPERTUSSIS PCR, BORPAR: NOT DETECTED
BUN SERPL-MCNC: 15 MG/DL (ref 7–18)
BUN/CREAT SERPL: 12 (ref 12–20)
C PNEUM DNA SPEC QL NAA+PROBE: NOT DETECTED
CALCIUM SERPL-MCNC: 8.9 MG/DL (ref 8.5–10.1)
CHLORIDE SERPL-SCNC: 105 MMOL/L (ref 100–111)
CO2 SERPL-SCNC: 26 MMOL/L (ref 21–32)
COLOR UR: YELLOW
CREAT SERPL-MCNC: 1.24 MG/DL (ref 0.6–1.3)
DIFFERENTIAL METHOD BLD: ABNORMAL
EOSINOPHIL # BLD: 0.1 K/UL (ref 0–0.4)
EOSINOPHIL NFR BLD: 1 % (ref 0–5)
EPITH CASTS URNS QL MICRO: NEGATIVE /LPF (ref 0–5)
ERYTHROCYTE [DISTWIDTH] IN BLOOD BY AUTOMATED COUNT: 14.4 % (ref 11.6–14.5)
EST. AVERAGE GLUCOSE BLD GHB EST-MCNC: 146 MG/DL
FLUAV SUBTYP SPEC NAA+PROBE: NOT DETECTED
FLUBV RNA SPEC QL NAA+PROBE: NOT DETECTED
GLUCOSE BLD STRIP.AUTO-MCNC: 119 MG/DL (ref 70–110)
GLUCOSE BLD STRIP.AUTO-MCNC: 141 MG/DL (ref 70–110)
GLUCOSE BLD STRIP.AUTO-MCNC: 222 MG/DL (ref 70–110)
GLUCOSE SERPL-MCNC: 132 MG/DL (ref 74–99)
GLUCOSE UR STRIP.AUTO-MCNC: 500 MG/DL
HADV DNA SPEC QL NAA+PROBE: NOT DETECTED
HBA1C MFR BLD: 6.7 % (ref 4.2–5.6)
HCOV 229E RNA SPEC QL NAA+PROBE: NOT DETECTED
HCOV HKU1 RNA SPEC QL NAA+PROBE: NOT DETECTED
HCOV NL63 RNA SPEC QL NAA+PROBE: NOT DETECTED
HCOV OC43 RNA SPEC QL NAA+PROBE: NOT DETECTED
HCT VFR BLD AUTO: 43.8 % (ref 36–48)
HGB BLD-MCNC: 13.6 G/DL (ref 13–16)
HGB UR QL STRIP: NEGATIVE
HMPV RNA SPEC QL NAA+PROBE: NOT DETECTED
HPIV1 RNA SPEC QL NAA+PROBE: NOT DETECTED
HPIV2 RNA SPEC QL NAA+PROBE: NOT DETECTED
HPIV3 RNA SPEC QL NAA+PROBE: NOT DETECTED
HPIV4 RNA SPEC QL NAA+PROBE: NOT DETECTED
IMM GRANULOCYTES # BLD AUTO: 0 K/UL (ref 0–0.04)
IMM GRANULOCYTES NFR BLD AUTO: 1 % (ref 0–0.5)
INR PPP: 1.1 (ref 0.8–1.2)
KETONES UR QL STRIP.AUTO: 15 MG/DL
LEUKOCYTE ESTERASE UR QL STRIP.AUTO: ABNORMAL
LYMPHOCYTES # BLD: 0.7 K/UL (ref 0.9–3.6)
LYMPHOCYTES NFR BLD: 10 % (ref 21–52)
M PNEUMO DNA SPEC QL NAA+PROBE: NOT DETECTED
MCH RBC QN AUTO: 28.3 PG (ref 24–34)
MCHC RBC AUTO-ENTMCNC: 31.1 G/DL (ref 31–37)
MCV RBC AUTO: 91.1 FL (ref 78–100)
MONOCYTES # BLD: 0.9 K/UL (ref 0.05–1.2)
MONOCYTES NFR BLD: 12 % (ref 3–10)
NEUTS SEG # BLD: 5.4 K/UL (ref 1.8–8)
NEUTS SEG NFR BLD: 76 % (ref 40–73)
NITRITE UR QL STRIP.AUTO: POSITIVE
NRBC # BLD: 0 K/UL (ref 0–0.01)
NRBC BLD-RTO: 0 PER 100 WBC
PH UR STRIP: >8.5 [PH] (ref 5–8)
PLATELET # BLD AUTO: 183 K/UL (ref 135–420)
PMV BLD AUTO: 9.3 FL (ref 9.2–11.8)
POTASSIUM SERPL-SCNC: 3.7 MMOL/L (ref 3.5–5.5)
PROT UR STRIP-MCNC: 100 MG/DL
PROTHROMBIN TIME: 14.5 SEC (ref 11.5–15.2)
RBC # BLD AUTO: 4.81 M/UL (ref 4.35–5.65)
RBC #/AREA URNS HPF: ABNORMAL /HPF (ref 0–5)
RSV RNA SPEC QL NAA+PROBE: NOT DETECTED
RV+EV RNA SPEC QL NAA+PROBE: NOT DETECTED
SARS-COV-2 PCR, COVPCR: NOT DETECTED
SODIUM SERPL-SCNC: 136 MMOL/L (ref 136–145)
SP GR UR REFRACTOMETRY: >1.03 (ref 1–1.03)
TRI-PHOS CRY URNS QL MICRO: ABNORMAL
UROBILINOGEN UR QL STRIP.AUTO: 1 EU/DL (ref 0.2–1)
WBC # BLD AUTO: 7.2 K/UL (ref 4.6–13.2)
WBC URNS QL MICRO: ABNORMAL /HPF (ref 0–4)

## 2022-10-01 PROCEDURE — 80048 BASIC METABOLIC PNL TOTAL CA: CPT

## 2022-10-01 PROCEDURE — 0202U NFCT DS 22 TRGT SARS-COV-2: CPT

## 2022-10-01 PROCEDURE — 74011636637 HC RX REV CODE- 636/637: Performed by: STUDENT IN AN ORGANIZED HEALTH CARE EDUCATION/TRAINING PROGRAM

## 2022-10-01 PROCEDURE — 74011000250 HC RX REV CODE- 250: Performed by: EMERGENCY MEDICINE

## 2022-10-01 PROCEDURE — 99221 1ST HOSP IP/OBS SF/LOW 40: CPT | Performed by: STUDENT IN AN ORGANIZED HEALTH CARE EDUCATION/TRAINING PROGRAM

## 2022-10-01 PROCEDURE — 74011250637 HC RX REV CODE- 250/637: Performed by: INTERNAL MEDICINE

## 2022-10-01 PROCEDURE — 74011000258 HC RX REV CODE- 258: Performed by: STUDENT IN AN ORGANIZED HEALTH CARE EDUCATION/TRAINING PROGRAM

## 2022-10-01 PROCEDURE — 81001 URINALYSIS AUTO W/SCOPE: CPT

## 2022-10-01 PROCEDURE — 74011250636 HC RX REV CODE- 250/636: Performed by: EMERGENCY MEDICINE

## 2022-10-01 PROCEDURE — 2709999900 HC NON-CHARGEABLE SUPPLY

## 2022-10-01 PROCEDURE — 77030027138 HC INCENT SPIROMETER -A

## 2022-10-01 PROCEDURE — 74011250637 HC RX REV CODE- 250/637: Performed by: EMERGENCY MEDICINE

## 2022-10-01 PROCEDURE — 74011000250 HC RX REV CODE- 250: Performed by: STUDENT IN AN ORGANIZED HEALTH CARE EDUCATION/TRAINING PROGRAM

## 2022-10-01 PROCEDURE — 82962 GLUCOSE BLOOD TEST: CPT

## 2022-10-01 PROCEDURE — 85025 COMPLETE CBC W/AUTO DIFF WBC: CPT

## 2022-10-01 PROCEDURE — 65270000029 HC RM PRIVATE

## 2022-10-01 PROCEDURE — 83036 HEMOGLOBIN GLYCOSYLATED A1C: CPT

## 2022-10-01 PROCEDURE — 94640 AIRWAY INHALATION TREATMENT: CPT

## 2022-10-01 PROCEDURE — 85610 PROTHROMBIN TIME: CPT

## 2022-10-01 PROCEDURE — 74011250636 HC RX REV CODE- 250/636: Performed by: STUDENT IN AN ORGANIZED HEALTH CARE EDUCATION/TRAINING PROGRAM

## 2022-10-01 PROCEDURE — 74011250637 HC RX REV CODE- 250/637: Performed by: STUDENT IN AN ORGANIZED HEALTH CARE EDUCATION/TRAINING PROGRAM

## 2022-10-01 RX ORDER — LISINOPRIL 20 MG/1
20 TABLET ORAL DAILY
Status: DISCONTINUED | OUTPATIENT
Start: 2022-10-01 | End: 2022-10-04 | Stop reason: HOSPADM

## 2022-10-01 RX ORDER — ACETAMINOPHEN 325 MG/1
650 TABLET ORAL ONCE
Status: COMPLETED | OUTPATIENT
Start: 2022-10-01 | End: 2022-10-01

## 2022-10-01 RX ORDER — BUPROPION HYDROCHLORIDE 150 MG/1
150 TABLET, EXTENDED RELEASE ORAL DAILY
Status: DISCONTINUED | OUTPATIENT
Start: 2022-10-01 | End: 2022-10-04 | Stop reason: HOSPADM

## 2022-10-01 RX ORDER — HYDROCODONE BITARTRATE AND ACETAMINOPHEN 5; 325 MG/1; MG/1
1 TABLET ORAL
Status: DISCONTINUED | OUTPATIENT
Start: 2022-10-01 | End: 2022-10-04 | Stop reason: HOSPADM

## 2022-10-01 RX ORDER — ALFUZOSIN HYDROCHLORIDE 10 MG/1
10 TABLET, EXTENDED RELEASE ORAL DAILY
Status: DISCONTINUED | OUTPATIENT
Start: 2022-10-01 | End: 2022-10-04 | Stop reason: HOSPADM

## 2022-10-01 RX ORDER — FUROSEMIDE 20 MG/1
20 TABLET ORAL 2 TIMES DAILY
Status: DISCONTINUED | OUTPATIENT
Start: 2022-10-01 | End: 2022-10-04 | Stop reason: HOSPADM

## 2022-10-01 RX ORDER — ENOXAPARIN SODIUM 100 MG/ML
30 INJECTION SUBCUTANEOUS 2 TIMES DAILY
Status: DISCONTINUED | OUTPATIENT
Start: 2022-10-01 | End: 2022-10-04 | Stop reason: HOSPADM

## 2022-10-01 RX ORDER — EMPAGLIFLOZIN 25 MG/1
TABLET, FILM COATED ORAL
Qty: 90 TABLET | Refills: 0 | OUTPATIENT
Start: 2022-10-01

## 2022-10-01 RX ORDER — POLYETHYLENE GLYCOL 3350 17 G/17G
17 POWDER, FOR SOLUTION ORAL DAILY
Status: DISCONTINUED | OUTPATIENT
Start: 2022-10-01 | End: 2022-10-04 | Stop reason: HOSPADM

## 2022-10-01 RX ORDER — TROSPIUM CHLORIDE 20 MG/1
20 TABLET, FILM COATED ORAL EVERY 12 HOURS
Status: DISCONTINUED | OUTPATIENT
Start: 2022-10-01 | End: 2022-10-04 | Stop reason: HOSPADM

## 2022-10-01 RX ORDER — AMITRIPTYLINE HYDROCHLORIDE 10 MG/1
10 TABLET, FILM COATED ORAL
Status: DISCONTINUED | OUTPATIENT
Start: 2022-10-01 | End: 2022-10-04 | Stop reason: HOSPADM

## 2022-10-01 RX ORDER — INSULIN GLARGINE 100 [IU]/ML
15 INJECTION, SOLUTION SUBCUTANEOUS
Status: DISCONTINUED | OUTPATIENT
Start: 2022-10-01 | End: 2022-10-04 | Stop reason: HOSPADM

## 2022-10-01 RX ORDER — ACETAMINOPHEN 325 MG/1
650 TABLET ORAL
Status: DISCONTINUED | OUTPATIENT
Start: 2022-10-01 | End: 2022-10-04 | Stop reason: HOSPADM

## 2022-10-01 RX ORDER — PRAMIPEXOLE DIHYDROCHLORIDE 0.25 MG/1
0.75 TABLET ORAL 3 TIMES DAILY
Status: DISCONTINUED | OUTPATIENT
Start: 2022-10-01 | End: 2022-10-04 | Stop reason: HOSPADM

## 2022-10-01 RX ORDER — DEXTROSE MONOHYDRATE 100 MG/ML
0-250 INJECTION, SOLUTION INTRAVENOUS AS NEEDED
Status: DISCONTINUED | OUTPATIENT
Start: 2022-10-01 | End: 2022-10-04 | Stop reason: HOSPADM

## 2022-10-01 RX ORDER — GABAPENTIN 300 MG/1
300 CAPSULE ORAL 3 TIMES DAILY
Status: DISCONTINUED | OUTPATIENT
Start: 2022-10-01 | End: 2022-10-04 | Stop reason: HOSPADM

## 2022-10-01 RX ORDER — MAGNESIUM SULFATE 100 %
4 CRYSTALS MISCELLANEOUS AS NEEDED
Status: DISCONTINUED | OUTPATIENT
Start: 2022-10-01 | End: 2022-10-04 | Stop reason: HOSPADM

## 2022-10-01 RX ORDER — INSULIN LISPRO 100 [IU]/ML
INJECTION, SOLUTION INTRAVENOUS; SUBCUTANEOUS
Status: DISCONTINUED | OUTPATIENT
Start: 2022-10-01 | End: 2022-10-04 | Stop reason: HOSPADM

## 2022-10-01 RX ORDER — SODIUM CHLORIDE 0.9 % (FLUSH) 0.9 %
5-40 SYRINGE (ML) INJECTION AS NEEDED
Status: DISCONTINUED | OUTPATIENT
Start: 2022-10-01 | End: 2022-10-04 | Stop reason: HOSPADM

## 2022-10-01 RX ORDER — DUTASTERIDE 0.5 MG/1
0.5 CAPSULE, LIQUID FILLED ORAL
Status: DISCONTINUED | OUTPATIENT
Start: 2022-10-01 | End: 2022-10-04 | Stop reason: HOSPADM

## 2022-10-01 RX ORDER — ONDANSETRON 2 MG/ML
4 INJECTION INTRAMUSCULAR; INTRAVENOUS
Status: DISCONTINUED | OUTPATIENT
Start: 2022-10-01 | End: 2022-10-04 | Stop reason: HOSPADM

## 2022-10-01 RX ORDER — ASPIRIN 81 MG/1
81 TABLET ORAL DAILY
Status: DISCONTINUED | OUTPATIENT
Start: 2022-10-01 | End: 2022-10-04 | Stop reason: HOSPADM

## 2022-10-01 RX ORDER — METOPROLOL SUCCINATE 50 MG/1
50 TABLET, EXTENDED RELEASE ORAL DAILY
Status: DISCONTINUED | OUTPATIENT
Start: 2022-10-01 | End: 2022-10-04 | Stop reason: HOSPADM

## 2022-10-01 RX ORDER — ONDANSETRON 4 MG/1
4 TABLET, ORALLY DISINTEGRATING ORAL
Status: DISCONTINUED | OUTPATIENT
Start: 2022-10-01 | End: 2022-10-04 | Stop reason: HOSPADM

## 2022-10-01 RX ORDER — SODIUM,POTASSIUM PHOSPHATES 280-250MG
2 POWDER IN PACKET (EA) ORAL ONCE
Status: DISPENSED | OUTPATIENT
Start: 2022-10-01 | End: 2022-10-01

## 2022-10-01 RX ORDER — SODIUM,POTASSIUM PHOSPHATES 280-250MG
2 POWDER IN PACKET (EA) ORAL ONCE
Status: COMPLETED | OUTPATIENT
Start: 2022-10-01 | End: 2022-10-01

## 2022-10-01 RX ORDER — ATORVASTATIN CALCIUM 10 MG/1
10 TABLET, FILM COATED ORAL
Status: DISCONTINUED | OUTPATIENT
Start: 2022-10-01 | End: 2022-10-04 | Stop reason: HOSPADM

## 2022-10-01 RX ORDER — SODIUM CHLORIDE 0.9 % (FLUSH) 0.9 %
5-40 SYRINGE (ML) INJECTION EVERY 8 HOURS
Status: DISCONTINUED | OUTPATIENT
Start: 2022-10-01 | End: 2022-10-04 | Stop reason: HOSPADM

## 2022-10-01 RX ORDER — POLYETHYLENE GLYCOL 3350 17 G/17G
17 POWDER, FOR SOLUTION ORAL DAILY PRN
Status: DISCONTINUED | OUTPATIENT
Start: 2022-10-01 | End: 2022-10-04 | Stop reason: HOSPADM

## 2022-10-01 RX ORDER — IPRATROPIUM BROMIDE AND ALBUTEROL SULFATE 2.5; .5 MG/3ML; MG/3ML
3 SOLUTION RESPIRATORY (INHALATION)
Status: COMPLETED | OUTPATIENT
Start: 2022-10-01 | End: 2022-10-01

## 2022-10-01 RX ORDER — MONTELUKAST SODIUM 10 MG/1
10 TABLET ORAL DAILY
Status: DISCONTINUED | OUTPATIENT
Start: 2022-10-01 | End: 2022-10-04 | Stop reason: HOSPADM

## 2022-10-01 RX ADMIN — CEFTRIAXONE 2 G: 2 INJECTION, POWDER, FOR SOLUTION INTRAMUSCULAR; INTRAVENOUS at 07:00

## 2022-10-01 RX ADMIN — INSULIN LISPRO 4 UNITS: 100 INJECTION, SOLUTION INTRAVENOUS; SUBCUTANEOUS at 21:49

## 2022-10-01 RX ADMIN — GABAPENTIN 300 MG: 300 CAPSULE ORAL at 16:54

## 2022-10-01 RX ADMIN — PRAMIPEXOLE DIHYDROCHLORIDE 0.75 MG: 0.25 TABLET ORAL at 10:41

## 2022-10-01 RX ADMIN — HYDROCODONE BITARTRATE AND ACETAMINOPHEN 1 TABLET: 5; 325 TABLET ORAL at 23:07

## 2022-10-01 RX ADMIN — ENOXAPARIN SODIUM 30 MG: 100 INJECTION SUBCUTANEOUS at 18:50

## 2022-10-01 RX ADMIN — DOXYCYCLINE 100 MG: 100 INJECTION, POWDER, LYOPHILIZED, FOR SOLUTION INTRAVENOUS at 23:51

## 2022-10-01 RX ADMIN — SODIUM CHLORIDE, PRESERVATIVE FREE 10 ML: 5 INJECTION INTRAVENOUS at 12:16

## 2022-10-01 RX ADMIN — GABAPENTIN 300 MG: 300 CAPSULE ORAL at 21:33

## 2022-10-01 RX ADMIN — LISINOPRIL 20 MG: 20 TABLET ORAL at 10:41

## 2022-10-01 RX ADMIN — GABAPENTIN 300 MG: 300 CAPSULE ORAL at 10:41

## 2022-10-01 RX ADMIN — HYDROCODONE BITARTRATE AND ACETAMINOPHEN 1 TABLET: 5; 325 TABLET ORAL at 16:57

## 2022-10-01 RX ADMIN — FUROSEMIDE 20 MG: 20 TABLET ORAL at 18:49

## 2022-10-01 RX ADMIN — ASPIRIN 81 MG: 81 TABLET, COATED ORAL at 10:41

## 2022-10-01 RX ADMIN — ONDANSETRON 4 MG: 2 INJECTION INTRAMUSCULAR; INTRAVENOUS at 09:08

## 2022-10-01 RX ADMIN — TROSPIUM CHLORIDE 20 MG: 20 TABLET, FILM COATED ORAL at 10:41

## 2022-10-01 RX ADMIN — MONTELUKAST 10 MG: 10 TABLET, FILM COATED ORAL at 10:41

## 2022-10-01 RX ADMIN — ATORVASTATIN CALCIUM 10 MG: 10 TABLET, FILM COATED ORAL at 21:33

## 2022-10-01 RX ADMIN — DOXYCYCLINE 100 MG: 100 INJECTION, POWDER, LYOPHILIZED, FOR SOLUTION INTRAVENOUS at 09:10

## 2022-10-01 RX ADMIN — IPRATROPIUM BROMIDE AND ALBUTEROL SULFATE 3 ML: 2.5; .5 SOLUTION RESPIRATORY (INHALATION) at 00:41

## 2022-10-01 RX ADMIN — PRAMIPEXOLE DIHYDROCHLORIDE 0.75 MG: 0.25 TABLET ORAL at 21:33

## 2022-10-01 RX ADMIN — DOXYCYCLINE 100 MG: 100 INJECTION, POWDER, LYOPHILIZED, FOR SOLUTION INTRAVENOUS at 12:16

## 2022-10-01 RX ADMIN — SODIUM CHLORIDE, PRESERVATIVE FREE 10 ML: 5 INJECTION INTRAVENOUS at 21:54

## 2022-10-01 RX ADMIN — ACETAMINOPHEN 650 MG: 325 TABLET, FILM COATED ORAL at 08:20

## 2022-10-01 RX ADMIN — POTASSIUM & SODIUM PHOSPHATES POWDER PACK 280-160-250 MG 2 PACKET: 280-160-250 PACK at 18:49

## 2022-10-01 RX ADMIN — Medication 15 UNITS: at 21:54

## 2022-10-01 RX ADMIN — ENOXAPARIN SODIUM 30 MG: 100 INJECTION SUBCUTANEOUS at 10:49

## 2022-10-01 RX ADMIN — AMITRIPTYLINE HYDROCHLORIDE 10 MG: 10 TABLET, FILM COATED ORAL at 21:33

## 2022-10-01 RX ADMIN — FUROSEMIDE 20 MG: 20 TABLET ORAL at 10:41

## 2022-10-01 RX ADMIN — POLYETHYLENE GLYCOL 3350 17 G: 17 POWDER, FOR SOLUTION ORAL at 10:40

## 2022-10-01 RX ADMIN — BUPROPION HYDROCHLORIDE 150 MG: 150 TABLET, EXTENDED RELEASE ORAL at 10:41

## 2022-10-01 RX ADMIN — ACETAMINOPHEN 650 MG: 325 TABLET ORAL at 10:36

## 2022-10-01 RX ADMIN — TROSPIUM CHLORIDE 20 MG: 20 TABLET, FILM COATED ORAL at 21:33

## 2022-10-01 RX ADMIN — MULTIPLE VITAMINS W/ MINERALS TAB 1 TABLET: TAB at 10:41

## 2022-10-01 RX ADMIN — METOPROLOL SUCCINATE 50 MG: 50 TABLET, EXTENDED RELEASE ORAL at 10:41

## 2022-10-01 RX ADMIN — Medication 15 UNITS: at 09:00

## 2022-10-01 RX ADMIN — DUTASTERIDE 0.5 MG: 0.5 CAPSULE, LIQUID FILLED ORAL at 18:50

## 2022-10-01 NOTE — PROGRESS NOTES
Patient was admitted earlier today by my colleague. I saw patient in follow-up. Patient complains of some dysuria when he urinates. Continue empiric antibiotics. Follow cultures. ID consult. Check echocardiogram.  Incentive spirometry.   Discussed with patient

## 2022-10-01 NOTE — ED PROVIDER NOTES
EMERGENCY DEPARTMENT HISTORY AND PHYSICAL EXAM      Date: 9/30/2022  Patient Name: Raiza Sommers      History of Presenting Illness     No chief complaint on file. History Provided By: Patient    Location/Duration/Severity/Modifying factors   Patient is a 68-year-old male who presents to the emergency room with a chief complaint of chills and shortness of breath. Patient presents by medic for symptoms onset over the last 3 days. Somewhat of a difficult historian he states that he was working on his deck 3 days ago and symptoms seem to start after that. Complains primarily of chills and difficulty breathing denies any history of chronic lung disease or any heart problems that he is aware of. He was seen earlier in the month for chest pain and abdominal pain. He was discharged after a essentially negative work-up. [facility] does not use any home oxygen per EMS patient was saturating in the mid 80s on their arrival and was placed on 4 L. He states he was on nocturnal CPAP but no chronic oxygen        There are no other complaints, changes, or physical findings at this time.     PCP: Yaima Muñoz MD    Current Facility-Administered Medications   Medication Dose Route Frequency Provider Last Rate Last Admin    cefTRIAXone (ROCEPHIN) 2 g in sterile water (preservative free) 20 mL IV syringe  2 g IntraVENous Q24H Babawale, Minerva A, DO   2 g at 10/01/22 0700    doxycycline (VIBRAMYCIN) 100 mg in 0.9% sodium chloride (MBP/ADV) 100 mL MBP  100 mg IntraVENous Q12H Babawale, Minerva A,  mL/hr at 10/01/22 1216 100 mg at 10/01/22 1216    alfuzosin SR (UROXATRAL) tablet 10 mg  10 mg Oral DAILY Babawale, Minerva A, DO        amitriptyline (ELAVIL) tablet 10 mg  10 mg Oral QHS Babawale, Minerva A, DO        aspirin delayed-release tablet 81 mg  81 mg Oral DAILY Babawale, Minerva A, DO   81 mg at 10/01/22 1041    buPROPion SR (WELLBUTRIN SR) tablet 150 mg  150 mg Oral DAILY Babawale, Minerva A, DO   150 mg at 10/01/22 1041    dutasteride (AVODART) capsule 0.5 mg  0.5 mg Oral PCD Babawale, Minerva A, DO        multivitamin, tx-iron-ca-min (THERA-M w/ IRON) tablet 1 Tablet  1 Tablet Oral DAILY Babawale, Minerva A, DO   1 Tablet at 10/01/22 1041    furosemide (LASIX) tablet 20 mg  20 mg Oral BID Babawale, Minerva A, DO   20 mg at 10/01/22 1041    gabapentin (NEURONTIN) capsule 300 mg  300 mg Oral TID Babawale, Minerva A, DO   300 mg at 10/01/22 1654    lisinopriL (PRINIVIL, ZESTRIL) tablet 20 mg  20 mg Oral DAILY Babawale, Minerva A, DO   20 mg at 10/01/22 1041    metoprolol succinate (TOPROL-XL) XL tablet 50 mg  50 mg Oral DAILY Babawale, Minerva A, DO   50 mg at 10/01/22 1041    montelukast (SINGULAIR) tablet 10 mg  10 mg Oral DAILY Babawale, Minerva A, DO   10 mg at 10/01/22 1041    polyethylene glycol (MIRALAX) packet 17 g  17 g Oral DAILY Babawale, Minerva A, DO   17 g at 10/01/22 1040    pramipexole (MIRAPEX) tablet 0.75 mg  0.75 mg Oral TID Babawale, Minerva A, DO   0.75 mg at 10/01/22 1041    atorvastatin (LIPITOR) tablet 10 mg  10 mg Oral QHS Babawale, Minerva A, DO        trospium (SANCTURA) tablet 20 mg  20 mg Oral Q12H Babawale, Minerva A, DO   20 mg at 10/01/22 1041    sodium chloride (NS) flush 5-40 mL  5-40 mL IntraVENous Q8H Babawale, Minerva A, DO   10 mL at 10/01/22 1216    sodium chloride (NS) flush 5-40 mL  5-40 mL IntraVENous PRN Babawale, Minerva A, DO        acetaminophen (TYLENOL) tablet 650 mg  650 mg Oral Q6H PRN Babawale, Minerva A, DO   650 mg at 10/01/22 1036    Or    acetaminophen (TYLENOL) suppository 650 mg  650 mg Rectal Q6H PRN Babawale, Minerva A, DO        polyethylene glycol (MIRALAX) packet 17 g  17 g Oral DAILY PRN Babawale, Minerva A, DO        ondansetron (ZOFRAN ODT) tablet 4 mg  4 mg Oral Q8H PRN Babawale, Minerva A, DO        Or    ondansetron (ZOFRAN) injection 4 mg  4 mg IntraVENous Q6H PRN Babawale, Minerva A, DO   4 mg at 10/01/22 0908    enoxaparin (LOVENOX) injection 30 mg  30 mg SubCUTAneous BID Babawale, Minerva A, DO   30 mg at 10/01/22 1049    insulin glargine (LANTUS) injection 15 Units  15 Units SubCUTAneous QHS Babawale, Minerva A, DO   15 Units at 10/01/22 0900    insulin lispro (HUMALOG) injection   SubCUTAneous AC&HS Babawale, Minerva A, DO        glucose chewable tablet 16 g  4 Tablet Oral PRN Babawale, Minerva A, DO        glucagon (GLUCAGEN) injection 1 mg  1 mg IntraMUSCular PRN Babawale, Minerva A, DO        dextrose 10% infusion 0-250 mL  0-250 mL IntraVENous PRN Babawale, Minerva A, DO        HYDROcodone-acetaminophen (NORCO) 5-325 mg per tablet 1 Tablet  1 Tablet Oral Q6H PRN Sreekanth Betancur MD   1 Tablet at 10/01/22 1657    potassium, sodium phosphates (NEUTRA-PHOS) packet 2 Packet  2 Packet Oral ONCE Babawale, Minerva A, DO        sodium chloride (NS) flush 5-10 mL  5-10 mL IntraVENous PRN Babawale, Minerva A, DO           Past History     Past Medical History:  Past Medical History:   Diagnosis Date    Abdominal adhesions 7/30/2014    BPH (benign prostatic hyperplasia)     Cellulitis     DDD (degenerative disc disease), lumbar 7/30/2014    Depression     Diabetes ([address]     Frequent falls 7/30/2014    TRUE (generalized anxiety disorder) 7/30/2014    HLD (hyperlipidemia)     Hypertension     Hypogonadism in male     Incomplete bladder emptying     Kidney stones     Lumbar facet arthropathy 7/30/2014    Lumbar nerve root impingement 7/30/2014    Lumbosacral radiculopathy at S1 7/30/2014    Major depression 7/30/2014    Neurological disorder     sciatica    MANISH (obstructive sleep apnea) 7/30/2014    S/P lumbar laminectomy 7/30/2014    S/P lumbar spinal fusion 7/30/2014    Sleep apnea     USES CPAP EVERY NIGHT    Thoracic compression fracture ([facility] [address] 07/30/2014       Past Surgical History:  Past Surgical History:   Procedure Laterality Date    HX APPENDECTOMY      HX BACK SURGERY      Lumbar fusion    HX HERNIA REPAIR  1992    HX OTHER SURGICAL      EMG - S1 disorder    HX OTHER SURGICAL  10/2017 spinal cord stimulator    HX VASECTOMY  1985       Family History:  Family History   Problem Relation Age of Onset    Diabetes Mother     Hypertension Mother     Stroke Mother     Heart Attack Father     Diabetes Father     Stroke Father     Diabetes Sister     Stroke Brother        Social History:  Social History     Tobacco Use    Smoking status: Never    Smokeless tobacco: Never   Vaping Use    Vaping Use: Never used   Substance Use Topics    Alcohol use: Yes    Drug use: No       Allergies: Allergies   Allergen Reactions    Bactrim [Sulfamethoprim] Rash    Opana [Oxymorphone] Other (comments)     Feels like bug crawling under skin and drowziness         Review of Systems     Review of Systems   Constitutional:  Positive for chills and fever. HENT:  Negative for congestion and rhinorrhea. Eyes:  Negative for photophobia and visual disturbance. Respiratory:  Positive for shortness of breath. Negative for cough. Cardiovascular:  Negative for chest pain. Gastrointestinal:  Negative for abdominal pain, diarrhea, nausea and vomiting. Genitourinary:  Negative for dysuria, frequency and urgency. Musculoskeletal:  Negative for myalgias. Skin:  Negative for rash. Neurological:  Positive for weakness. Negative for headaches. All other systems reviewed and are negative. Physical Exam     Physical Exam  Vitals and nursing note reviewed. Constitutional:       General: He is in acute distress. Appearance: Normal appearance. He is obese. He is ill-appearing. He is not toxic-appearing. HENT:      Head: Normocephalic and atraumatic. Right Ear: External ear normal.      Left Ear: External ear normal.      Nose: Nose normal. No congestion or rhinorrhea. Mouth/Throat:      Mouth: Mucous membranes are moist.      Pharynx: Oropharynx is clear. Eyes:      Conjunctiva/sclera: Conjunctivae normal.      Pupils: Pupils are equal, round, and reactive to light.    Cardiovascular:      Rate and Rhythm: Regular rhythm. Tachycardia present. Pulses: Normal pulses. Heart sounds: Normal heart sounds. No murmur heard. No friction rub. Pulmonary:      Comments: Increased respiratory effort, mild to moderately tachypneic not in overt respiratory distress. Occasional scattered wheezes at the bases. Abdominal:      General: Abdomen is flat. Tenderness: There is no abdominal tenderness. There is no guarding or rebound. Musculoskeletal:         General: No swelling or tenderness. Normal range of motion. Cervical back: Normal range of motion and neck supple. No rigidity or tenderness. Right lower leg: Edema present. Left lower leg: Edema present. Comments: Trace edema both lower extremities. No redness or induration   Skin:     General: Skin is warm and dry. Capillary Refill: Capillary refill takes less than 2 seconds. Neurological:      General: No focal deficit present. Mental Status: He is alert. Motor: No weakness. Lab and Diagnostic Study Results     Labs -  Recent Results (from the past 24 hour(s))   CBC WITH AUTOMATED DIFF    Collection Time: 09/30/22  8:20 PM   Result Value Ref Range    WBC 8.6 4.6 - 13.2 K/uL    RBC 5.18 4.35 - 5.65 M/uL    HGB 14.4 13.0 - 16.0 g/dL    HCT 46.7 36.0 - 48.0 %    MCV 90.2 78.0 - 100.0 FL    MCH 27.8 24.0 - 34.0 PG    MCHC 30.8 (L) 31.0 - 37.0 g/dL    RDW 14.4 11.6 - 14.5 %    PLATELET 089 715 - 171 K/uL    MPV 9.8 9.2 - 11.8 FL    NRBC 0.0 0  WBC    ABSOLUTE NRBC 0.00 0.00 - 0.01 K/uL    NEUTROPHILS 83 (H) 40 - 73 %    LYMPHOCYTES 6 (L) 21 - 52 %    MONOCYTES 9 3 - 10 %    EOSINOPHILS 1 0 - 5 %    BASOPHILS 1 0 - 2 %    IMMATURE GRANULOCYTES 1 (H) 0.0 - 0.5 %    ABS. NEUTROPHILS 7.1 1.8 - 8.0 K/UL    ABS. LYMPHOCYTES 0.5 (L) 0.9 - 3.6 K/UL    ABS. MONOCYTES 0.8 0.05 - 1.2 K/UL    ABS. EOSINOPHILS 0.1 0.0 - 0.4 K/UL    ABS. BASOPHILS 0.0 0.0 - 0.1 K/UL    ABS. IMM.  GRANS. 0.1 (H) 0.00 - 0.04 K/UL    DF AUTOMATED     METABOLIC PANEL, COMPREHENSIVE    Collection Time: 09/30/22  8:20 PM   Result Value Ref Range    Sodium 139 136 - 145 mmol/L    Potassium 3.8 3.5 - 5.5 mmol/L    Chloride 103 100 - 111 mmol/L    CO2 26 21 - 32 mmol/L    Anion gap 10 3.0 - 18 mmol/L    Glucose 146 (H) 74 - 99 mg/dL    BUN 16 7.0 - 18 MG/DL    Creatinine 1.31 (H) 0.6 - 1.3 MG/DL    BUN/Creatinine ratio 12 12 - 20      GFR est AA >60 >60 ml/min/1.73m2    GFR est non-AA 55 (L) >60 ml/min/1.73m2    Calcium 8.9 8.5 - 10.1 MG/DL    Bilirubin, total 0.8 0.2 - 1.0 MG/DL    ALT (SGPT) 35 16 - 61 U/L    AST (SGOT) 23 10 - 38 U/L    Alk.  phosphatase 106 45 - 117 U/L    Protein, total 7.7 6.4 - 8.2 g/dL    Albumin 3.8 3.4 - 5.0 g/dL    Globulin 3.9 2.0 - 4.0 g/dL    A-G Ratio 1.0 0.8 - 1.7     TROPONIN-HIGH SENSITIVITY    Collection Time: 09/30/22  8:20 PM   Result Value Ref Range    Troponin-High Sensitivity 32 0 - 78 ng/L   NT-PRO BNP    Collection Time: 09/30/22  8:20 PM   Result Value Ref Range    NT pro- 0 - 900 PG/ML   CULTURE, BLOOD    Collection Time: 09/30/22  8:20 PM    Specimen: Blood   Result Value Ref Range    Special Requests: RIGHT  AC        Culture result: NO GROWTH AFTER 9 HOURS     PROCALCITONIN    Collection Time: 09/30/22  8:20 PM   Result Value Ref Range    Procalcitonin 0.85 ng/mL   PROTHROMBIN TIME + INR    Collection Time: 09/30/22  8:20 PM   Result Value Ref Range    Prothrombin time 13.9 11.5 - 15.2 sec    INR 1.0 0.8 - 1.2     MAGNESIUM    Collection Time: 09/30/22  8:20 PM   Result Value Ref Range    Magnesium 2.2 1.6 - 2.6 mg/dL   PHOSPHORUS    Collection Time: 09/30/22  8:20 PM   Result Value Ref Range    Phosphorus 2.0 (L) 2.5 - 4.9 MG/DL   PTT    Collection Time: 09/30/22  8:20 PM   Result Value Ref Range    aPTT 30.3 23.0 - 36.4 SEC   D DIMER    Collection Time: 09/30/22  8:20 PM   Result Value Ref Range    D DIMER 0.48 (H) <0.46 ug/ml(FEU)   CULTURE, BLOOD    Collection Time: 09/30/22  8:25 PM    Specimen: Blood   Result Value Ref Range    Special Requests: RIGHT  HAND        Culture result: NO GROWTH AFTER 9 HOURS     POC LACTIC ACID    Collection Time: 09/30/22  8:27 PM   Result Value Ref Range    Lactic Acid (POC) 1.01 0.40 - 2.00 mmol/L   EKG, 12 LEAD, INITIAL    Collection Time: 09/30/22  8:29 PM   Result Value Ref Range    Ventricular Rate 113 BPM    Atrial Rate 113 BPM    P-R Interval 156 ms    QRS Duration 78 ms    Q-T Interval 286 ms    QTC Calculation (Bezet) 392 ms    Calculated P Axis 41 degrees    Calculated R Axis 61 degrees    Calculated T Axis 45 degrees    Diagnosis       Sinus tachycardia  Nonspecific ST and T wave abnormality  Abnormal ECG  When compared with ECG of 08-SEP-2022 04:58,  No significant change was found     BLOOD GAS, ARTERIAL POC    Collection Time: 09/30/22  9:14 PM   Result Value Ref Range    Device: NASAL CANNULA      FIO2 (POC) 36 %    pH (POC) 7.43 7.35 - 7.45      pCO2 (POC) 32.0 (L) 35.0 - 45.0 MMHG    pO2 (POC) 65 (L) 80 - 100 MMHG    HCO3 (POC) 21.1 (L) 22 - 26 MMOL/L    sO2 (POC) 93.1 92 - 97 %    Base deficit (POC) 2.3 mmol/L    Allens test (POC) Positive      Total resp.  rate 18      Site RIGHT RADIAL      Specimen type (POC) ARTERIAL      Performed by Maricruz Jimenes    RESPIRATORY VIRUS PANEL W/COVID-19, PCR    Collection Time: 10/01/22 12:45 AM    Specimen: Nasopharyngeal   Result Value Ref Range    Adenovirus Not detected NOTD      Coronavirus 229E Not detected NOTD      Coronavirus HKU1 Not detected NOTD      Coronavirus CVNL63 Not detected NOTD      Coronavirus OC43 Not detected NOTD      SARS-CoV-2, PCR Not detected NOTD      Metapneumovirus Not detected NOTD      Rhinovirus and Enterovirus Not detected NOTD      Influenza A Not detected NOTD      Influenza B Not detected NOTD      Parainfluenza 1 Not detected NOTD      Parainfluenza 2 Not detected NOTD      Parainfluenza 3 Not detected NOTD      Parainfluenza virus 4 Not detected NOTD      RSV by PCR Not detected NOTD      B. parapertussis, PCR Not detected NOTD      Bordetella pertussis - PCR Not detected NOTD      Chlamydophila pneumoniae DNA, QL, PCR Not detected NOTD      Mycoplasma pneumoniae DNA, QL, PCR Not detected NOTD     URINALYSIS W/ RFLX MICROSCOPIC    Collection Time: 10/01/22  8:30 AM   Result Value Ref Range    Color YELLOW      Appearance TURBID      Specific gravity >1.030 (H) 1.003 - 1.030    pH (UA) >8.5 (H) 5.0 - 8.0    Protein 100 (A) NEG mg/dL    Glucose 500 (A) NEG mg/dL    Ketone 15 (A) NEG mg/dL    Bilirubin Negative NEG      Blood Negative NEG      Urobilinogen 1.0 0.2 - 1.0 EU/dL    Nitrites Positive (A) NEG      Leukocyte Esterase TRACE (A) NEG     URINE MICROSCOPIC ONLY    Collection Time: 10/01/22  8:30 AM   Result Value Ref Range    WBC 10 to 15 0 - 4 /hpf    RBC 0 to 2 0 - 5 /hpf    Epithelial cells Negative 0 - 5 /lpf    Bacteria 4+ (A) NEG /hpf    Amorphous Crystals 2+ (A) NEG    Triple Phosphate crystals 2+ (A) NEG   PROTHROMBIN TIME + INR    Collection Time: 10/01/22  9:17 AM   Result Value Ref Range    Prothrombin time 14.5 11.5 - 15.2 sec    INR 1.1 0.8 - 1.2     HEMOGLOBIN A1C WITH EAG    Collection Time: 10/01/22  9:17 AM   Result Value Ref Range    Hemoglobin A1c 6.7 (H) 4.2 - 5.6 %    Est. average glucose 146 mg/dL   CBC WITH AUTOMATED DIFF    Collection Time: 10/01/22  9:17 AM   Result Value Ref Range    WBC 7.2 4.6 - 13.2 K/uL    RBC 4.81 4.35 - 5.65 M/uL    HGB 13.6 13.0 - 16.0 g/dL    HCT 43.8 36.0 - 48.0 %    MCV 91.1 78.0 - 100.0 FL    MCH 28.3 24.0 - 34.0 PG    MCHC 31.1 31.0 - 37.0 g/dL    RDW 14.4 11.6 - 14.5 %    PLATELET 895 437 - 321 K/uL    MPV 9.3 9.2 - 11.8 FL    NRBC 0.0 0  WBC    ABSOLUTE NRBC 0.00 0.00 - 0.01 K/uL    NEUTROPHILS 76 (H) 40 - 73 %    LYMPHOCYTES 10 (L) 21 - 52 %    MONOCYTES 12 (H) 3 - 10 %    EOSINOPHILS 1 0 - 5 %    BASOPHILS 1 0 - 2 %    IMMATURE GRANULOCYTES 1 (H) 0.0 - 0.5 %    ABS. NEUTROPHILS 5.4 1.8 - 8.0 K/UL    ABS.  LYMPHOCYTES 0.7 (L) 0.9 - 3.6 K/UL    ABS. MONOCYTES 0.9 0.05 - 1.2 K/UL    ABS. EOSINOPHILS 0.1 0.0 - 0.4 K/UL    ABS. BASOPHILS 0.0 0.0 - 0.1 K/UL    ABS. IMM. GRANS. 0.0 0.00 - 0.04 K/UL    DF AUTOMATED     METABOLIC PANEL, BASIC    Collection Time: 10/01/22  9:17 AM   Result Value Ref Range    Sodium 136 136 - 145 mmol/L    Potassium 3.7 3.5 - 5.5 mmol/L    Chloride 105 100 - 111 mmol/L    CO2 26 21 - 32 mmol/L    Anion gap 5 3.0 - 18 mmol/L    Glucose 132 (H) 74 - 99 mg/dL    BUN 15 7.0 - 18 MG/DL    Creatinine 1.24 0.6 - 1.3 MG/DL    BUN/Creatinine ratio 12 12 - 20      GFR est AA >60 >60 ml/min/1.73m2    GFR est non-AA 58 (L) >60 ml/min/1.73m2    Calcium 8.9 8.5 - 10.1 MG/DL   GLUCOSE, POC    Collection Time: 10/01/22 10:12 AM   Result Value Ref Range    Glucose (POC) 141 (H) 70 - 110 mg/dL   GLUCOSE, POC    Collection Time: 10/01/22  4:29 PM   Result Value Ref Range    Glucose (POC) 119 (H) 70 - 110 mg/dL         Radiologic Studies -   CTA CHEST W OR W WO CONT   Final Result   :      No pulmonary embolism or other acute findings. Thank you for this referral.      XR CHEST SNGL V   Final Result      Hypoinflation of the lungs likely with some superimposed vascular congestion. Medical Decision Making and ED Course   - I am the first and primary provider for this patient AND AM THE PRIMARY PROVIDER OF RECORD. - I reviewed the vital signs, available nursing notes, past medical history, past surgical history, family history and social history. - Initial assessment performed. The patients presenting problems have been discussed, and the staff are in agreement with the care plan formulated and outlined with them. I have encouraged them to ask questions as they arise throughout their visit. Vital Signs-Reviewed the patient's vital signs.     Patient Vitals for the past 12 hrs:   Temp Pulse Resp BP SpO2   10/01/22 1627 97.9 °F (36.6 °C) 74 18 108/70 96 %   10/01/22 1114 98.4 °F (36.9 °C) 84 17 117/73 97 % 10/01/22 1020 -- -- -- 122/65 --   10/01/22 1014 98.4 °F (36.9 °C) 85 16 116/69 97 %   10/01/22 0844 99 °F (37.2 °C) 90 14 (!) 160/68 97 %   10/01/22 0829 -- 91 13 (!) 183/73 95 %   10/01/22 0814 -- 95 18 (!) 176/85 95 %   10/01/22 0759 -- 97 22 (!) 162/86 96 %   10/01/22 0744 -- 83 23 128/71 98 %   10/01/22 0729 -- 82 15 133/64 98 %   10/01/22 0714 -- 81 -- 139/67 97 %   10/01/22 0659 -- 90 14 (!) 143/61 93 %   10/01/22 0644 -- 81 13 (!) 172/74 98 %   10/01/22 0629 -- 92 15 (!) 182/116 99 %   10/01/22 0614 -- 87 21 (!) 152/83 99 %       EKG interpretation: See ED course for my interpretation of EKG(s). Records Reviewed: Nursing Notes, Old Medical Records, Previous Radiology Studies, and Previous Laboratory Studies        Provider Notes (Medical Decision Making):     MDM  Number of Diagnoses or Management Options  Diagnosis management comments: 54-year-old patient presented to the emergency room with complaints of shortness of breath and chills. Found to be very febrile on his arrival here. He has occasional wheezes but moving good air. He is found to be hypoxic by EMS which was reproduced here in our blood gas despite the supplemental oxygen. He adamantly denies using any home oxygen no previous diagnosis of chronic lung disease. DDx: Bronchitis, bronchospasm, COPD, asthma, acute coronary syndrome, CHF, pleural effusion, pneumothorax, pneumonia, COVID-19, viral illness, etc.    Unclear etiology. We will do CTA to rule out PE given tachycardia and hypoxia. Also febrile. Waiting for his viral swab. CTA did not demonstrate any acute etiology for his symptoms. We will need to rule out COVID or other cause. His procalcitonin is in intermediate range, will start antibiotics. Case discussed with Dr Panchito Diaz for admission. ED Course:       ED Course as of 10/01/22 1804   Sat Oct 01, 2022   0107 EKG interpretation: Sinus tachycardia rate of 113 no ST elevation or ST depression.   No T wave inversions. Overall sinus tachycardia nonspecific ST and T wave changes but no ischemic changes [NIKKY]      ED Course User Index  [NIKKY] Vinod Reilly DO     ------------------------------------------------------------------------------------------------------------        Consultations:       Consultations: -  Hospitalist Consultant: Dr. Barbara Culver: We have asked for emergent assistance with regard to this patient. We have discussed the patients HPI, ROS, PE and results this far. They will come and evaluate the patient for admission. Procedures and Critical Care       Performed by: Nayeli Macdonald DO    Procedures             CRITICAL CARE NOTE :  9:16 PM  CRITICAL CARE TIME: I have spent 35 minutes of critical care time involved in lab review, consultations with specialist, family decision-making, and documentation. During this entire length of time I was immediately available to the patient. Critical Care: The reason for providing this level of medical care for this critically ill patient was due a critical illness that impaired one or more vital organ systems such that there was a high probability of imminent or life threatening deterioration in the patients condition. This care involved high complexity decision making to assess, manipulate, and support vital system functions, to treat this vital organ system failure and to prevent further life threatening deterioration of the patients condition. Time is exclusive of procedural and teaching time. DO Nayeli Rodríguez DO        Disposition         Diagnosis: 1. Acute Respiratory Failure with Hypoxemia  2. Fever    Disposition: Admit    Follow-up Information    None         Current Discharge Medication List        CONTINUE these medications which have NOT CHANGED    Details   ondansetron hcl (Zofran) 4 mg tablet Take 1 Tablet by mouth every eight (8) hours as needed for Nausea.   Qty: 12 Tablet, Refills: 0      furosemide (LASIX) 20 mg tablet TAKE 1 TABLET BY MOUTH TWICE DAILY  Qty: 60 Tablet, Refills: 6    Associated Diagnoses: SOB (shortness of breath)      pramipexole (MIRAPEX) 0.75 mg tablet TAKE 3 TABLETS ONE TIME DAILY  Qty: 270 Tablet, Refills: 1      metoprolol succinate (TOPROL-XL) 50 mg XL tablet TAKE 1 TABLET EVERY DAY  Qty: 90 Tablet, Refills: 1    Associated Diagnoses: Essential hypertension      glipiZIDE (GLUCOTROL) 10 mg tablet TAKE 1 TABLET TWICE DAILY  Qty: 180 Tablet, Refills: 1    Associated Diagnoses: Type 2 diabetes mellitus with diabetic neuropathy, without long-term current use of insulin (Formerly [facility])      amitriptyline (ELAVIL) 10 mg tablet TAKE 1 TABLET EVERY NIGHT  Qty: 90 Tablet, Refills: 1    Associated Diagnoses: Lumbar post-laminectomy syndrome; S/P lumbar laminectomy; S/P lumbar spinal fusion      simvastatin (ZOCOR) 10 mg tablet TAKE 1 TABLET EVERY NIGHT  Qty: 90 Tablet, Refills: 1      buPROPion SR (WELLBUTRIN SR) 150 mg SR tablet TAKE 1 TABLET EVERY DAY  Qty: 90 Tablet, Refills: 1      montelukast (SINGULAIR) 10 mg tablet TAKE 1 TABLET EVERY DAY  Qty: 90 Tablet, Refills: 1      dapagliflozin (FARXIGA) 10 mg tab tablet Take 1 Tablet by mouth daily. Qty: 90 Tablet, Refills: 1      dulaglutide (Trulicity) 3 LB/1.5 mL pnij 3 mg by SubCUTAneous route every seven (7) days. Qty: 12 Each, Refills: 3    Comments: [facility] - Dose adjustment. D/c 1.5 mg dose  Associated Diagnoses: Type 2 diabetes mellitus with diabetic neuropathy, without long-term current use of insulin (Formerly [facility])      alfuzosin SR (UROXATRAL) 10 mg SR tablet TAKE 1 TABLET BY MOUTH DAILY AFTER DINNER  Qty: 90 Tablet, Refills: 3    Comments: ZERO refills remain on this prescription. Your patient is requesting advance approval of refills for this medication to PREVENT ANY MISSED DOSES      gabapentin (NEURONTIN) 300 mg capsule Take 300 mg by mouth three (3) times daily.  Taking as needed which was given from Dr Millie Rand in the past      dutasteride (AVODART) 0.5 mg capsule Take 1 Capsule by mouth daily (after dinner). Qty: 90 Capsule, Refills: 3      testosterone cypionate (DEPOTESTOTERONE CYPIONATE) 200 mg/mL injection 0.5 mL by IntraMUSCular route every seven (7) days. Max Daily Amount: 100 mg. Qty: 10 mL, Refills: 0    Associated Diagnoses: Male hypogonadism      aspirin delayed-release 81 mg tablet Take 1 Tablet by mouth daily. Qty: 90 Tablet, Refills: 1    Associated Diagnoses: Chest pain, unspecified type      solifenacin (VESICARE) 10 mg tablet Take 1 Tablet by mouth daily. Qty: 90 Tablet, Refills: 3      cpap machine kit by Does Not Apply route. lisinopriL (PRINIVIL, ZESTRIL) 20 mg tablet Take 1 Tab by mouth daily. Qty: 30 Tab, Refills: 1    Associated Diagnoses: Essential hypertension; Type 2 diabetes mellitus with diabetic neuropathy, without long-term current use of insulin ([address]      ! ! lancets (TRUEplus Lancets) 33 gauge misc CHECK BLOOD SUGAR THREE TIMES DAILY  Qty: 300 Each, Refills: 5      DropSafe Alcohol Prep Pads padm USE AS DIRECTED WHEN CHECKING BLOOD SUGAR THREE TIMES DAILY  Qty: 300 Pad, Refills: 5      sildenafil citrate (VIAGRA) 100 mg tablet Take 1 Tablet by mouth as needed for Erectile Dysfunction (do not take more than 1 tab in 36 hour period). Qty: 30 Tablet, Refills: 3      glucose blood VI test strips (True Metrix Glucose Test Strip) strip TEST BLOOD SUGAR THREE TIMES DAILY  Qty: 300 Strip, Refills: 5      nitroglycerin (NITROSTAT) 0.4 mg SL tablet DISSOLVE ONE TABLET UNDER TONGUE AS NEEDED FOR CHEST PAIN EVERY 5 MINUTES FOR UP TO 3 DOSES  Qty: 30 Tablet, Refills: 0    Associated Diagnoses: Primary hypertension; Chest pain, unspecified type; Sleep apnea, unspecified type      Blood-Glucose Meter (True Metrix Glucose Meter) misc Check blood sugar 3 times daily  Qty: 1 Each, Refills: 0      Cane maura by Does Not Apply route. Blood-Glucose Meter monitoring kit Use to check blood sugar 3 times daily. Dx E11.9.  Please dispense brand preferred by insurance. Qty: 1 Kit, Refills: 0    Associated Diagnoses: Type 2 diabetes mellitus with diabetic neuropathy, without long-term current use of insulin ([address]      ! ! lancets misc Use to check blood sugar 3 times daily. Dx E11.9. Please dispense brand preferred by insurance. Qty: 100 Each, Refills: 11    Associated Diagnoses: Type 2 diabetes mellitus with diabetic neuropathy, without long-term current use of insulin (HCC)      folic acid/multivit-min/lutein (CENTRUM SILVER PO) Take  by mouth. Syringe, Disposable, 3 mL syrg Take as directed  Qty: 30 Syringe, Refills: 1      Needle, Disp, 23 G 23 gauge x 1 1/4\" ndle Use to inject 0.5 ml every 7 days  Qty: 30 Pen Needle, Refills: 1    Associated Diagnoses: Low testosterone      Needle, Disp, 18 G 18 gauge x 1\" ndle Use to aspirate 0.5 ml every 7 days  Qty: 30 Pen Needle, Refills: 1    Associated Diagnoses: Low testosterone      Wheel Chair maura Assistance with ambulation, gait instability  Qty: 1 Each, Refills: 0    Associated Diagnoses: Leg pain, bilateral; Gait instability      polyethylene glycol (MIRALAX) 17 gram packet Take 1 Packet by mouth daily. Hold for loose stools  Qty: 10 Each, Refills: 0      naloxone 4 mg/actuation spry 4 mg by Nasal route as needed. Qty: 1 Box, Refills: 0      Back Brace misc 1 Device by Does Not Apply route daily as needed for Pain. One, lumbosacral orthosis/back brace with metal struts      Medically necessary  Qty: 1 Device, Refills: 0       !! - Potential duplicate medications found. Please discuss with provider. Diagnosis     Clinical Impression: No diagnosis found. Attestations:    Daniaana Scott, DO    Please note that this dictation was completed with [employer] the [email or web address] voice recognition software. Quite often unanticipated grammatical, syntax, homophones, and other interpretive errors are inadvertently transcribed by the computer software. Please disregard these errors.   Please excuse any errors that have escaped final proofreading. Thank you.

## 2022-10-01 NOTE — PROGRESS NOTES
Pharmacist Review and Automatic Dose Adjustment of Prophylactic Enoxaparin    *Review reason for admission/hospital problem list*    The reviewing pharmacist has made an adjustment to the ordered enoxaparin dose or converted to UFH per the approved [facility] protocol and table as identified below. Nhung Sumner is a 79 y.o. male. No lab exists for component: CREATININE  9/30/22  SCr = 1.31    CrCl cannot be calculated (Unknown ideal weight.).   Weight on 9/13/22 = 143.79 kg    Height:   Ht Readings from Last 1 Encounters:   06/13/22 188 cm (74\")     Weight:  Wt Readings from Last 1 Encounters:   09/13/22 143.8 kg (317 lb)               Plan: Based upon the patient's weight and renal function, the ordered enoxaparin dose of 40 mg SC Daily has been changed/converted to 30 mg SC BID      Thank you,  Teddie Barthel, [facility]

## 2022-10-01 NOTE — ED NOTES
Patient brought in EMS for worsening SOB, states that it started 2 days ago. Patient brought in on 4L NC at 94%. Does not normally wear 02 at baseline.

## 2022-10-01 NOTE — H&P
History & Physical    Patient: Sophie Cho MRN: [id number]  CSN: [id number]    YOB: 1955  Age: 79 y.o. Sex: male      DOA: 9/30/2022  CC: Fever and shortness of breath    PCP: Eddi Rosales MD       HPI:     Sophie Cho is a 79 y.o. male with past medical history significant for hypertension diabetes who presented to the ED with 3 days of shortness of breath. EMS reported oxygen saturation of 85% on room air. He was found to be febrile to 102.7; ABG on 4 L nasal cannula shows PaO2 of 65. He was sent in the emergency room a week or so ago for chest pain and nausea vomiting. He said his chest pain has since resolved    Review of Systems  GENERAL: Fever, chill, malaise  HEENT: No change in vision, no ear ache, tinnitus, no sore throat or sinus congestion. NECK: No pain or stiffness. PULMONARY: shortness of breath, no cough or wheeze. Cardiovascular: No chest pain/pressure. no pnd / orthopnea  GASTROINTESTINAL: No abd pain, No nausea/vomiting, No diarrhea, No melena or bright red blood per rectum. GENITOURINARY: No urinary frequency, No urgency or pain with urination. MUSCULOSKELETAL: No joint or muscle pain, no back pain, no recent trauma. DERMATOLOGIC: No rash, no itching, no lesions. HEMATOLOGICAL: No easy bruising or bleeding.    NEUROLOGIC: No headache, No seizures, No generalized weakness       Past Medical History:   Diagnosis Date    Abdominal adhesions 7/30/2014    BPH (benign prostatic hyperplasia)     Cellulitis     DDD (degenerative disc disease), lumbar 7/30/2014    Depression     Diabetes ([facility] [address]     Frequent falls 7/30/2014    TRUE (generalized anxiety disorder) 7/30/2014    HLD (hyperlipidemia)     Hypertension     Hypogonadism in male     Incomplete bladder emptying     Kidney stones     Lumbar facet arthropathy 7/30/2014    Lumbar nerve root impingement 7/30/2014    Lumbosacral radiculopathy at S1 7/30/2014    Major depression 7/30/2014    Neurological disorder sciatica    MANISH (obstructive sleep apnea) 7/30/2014    S/P lumbar laminectomy 7/30/2014    S/P lumbar spinal fusion 7/30/2014    Sleep apnea     USES CPAP EVERY NIGHT    Thoracic compression fracture ([address] 07/30/2014       Past Surgical History:   Procedure Laterality Date    HX APPENDECTOMY      HX BACK SURGERY      Lumbar fusion    HX HERNIA REPAIR  1992    HX OTHER SURGICAL      EMG - S1 disorder    HX OTHER SURGICAL  10/2017    spinal cord stimulator    HX VASECTOMY  1985       Family History   Problem Relation Age of Onset    Diabetes Mother     Hypertension Mother     Stroke Mother     Heart Attack Father     Diabetes Father     Stroke Father     Diabetes Sister     Stroke Brother        Social History     Socioeconomic History    Marital status:    Tobacco Use    Smoking status: Never    Smokeless tobacco: Never   Vaping Use    Vaping Use: Never used   Substance and Sexual Activity    Alcohol use: Yes    Drug use: No    Sexual activity: Not Currently     Partners: Female     Birth control/protection: Surgical       Prior to Admission medications    Medication Sig Start Date End Date Taking? Authorizing Provider   ondansetron hcl (Zofran) 4 mg tablet Take 1 Tablet by mouth every eight (8) hours as needed for Nausea.  9/8/22   Tristan Cristina MD   lancets (TRUEplus Lancets) 33 gauge misc CHECK BLOOD SUGAR THREE TIMES DAILY 9/6/22   Samia Cash MD   DropSafe Alcohol Prep Pads padm USE AS DIRECTED WHEN CHECKING BLOOD SUGAR THREE TIMES DAILY 9/6/22   Samia Cash MD   furosemide (LASIX) 20 mg tablet TAKE 1 TABLET BY MOUTH TWICE DAILY 9/6/22   Dianelys Calvillo MD   pramipexole (MIRAPEX) 0.75 mg tablet TAKE 3 TABLETS ONE TIME DAILY 8/31/22   Samia Cash MD   metoprolol succinate (TOPROL-XL) 50 mg XL tablet TAKE 1 TABLET EVERY DAY 8/31/22   Samia Cash MD   sildenafil citrate (VIAGRA) 100 mg tablet Take 1 Tablet by mouth as needed for Erectile Dysfunction (do not take more than 1 tab in 36 hour period). 8/3/22   Maida Salazar MD   glipiZIDE (GLUCOTROL) 10 mg tablet TAKE 1 TABLET TWICE DAILY 8/2/22   Albertina Davis MD   amitriptyline (ELAVIL) 10 mg tablet TAKE 1 TABLET EVERY NIGHT 8/2/22   Sameer WINTERS MD   simvastatin (ZOCOR) 10 mg tablet TAKE 1 TABLET EVERY NIGHT 7/11/22   Albertina Davis MD   buPROPion SR [facility] SR) 150 mg SR tablet TAKE 1 TABLET EVERY DAY 7/11/22   Albertina Davis MD   montelukast (SINGULAIR) 10 mg tablet TAKE 1 TABLET EVERY DAY 7/6/22   Albertina Davis MD   dapagliflozin (FARXIGA) 10 mg tab tablet Take 1 Tablet by mouth daily. 6/29/22   Albertina Davis MD   dulaglutide (Trulicity) 3 RM/4.7 mL pnij 3 mg by SubCUTAneous route every seven (7) days. 5/12/22   Albertina Davis MD   alfuzosin SR (UROXATRAL) 10 mg SR tablet TAKE 1 TABLET BY MOUTH DAILY AFTER DINNER 5/3/22   Maida Salazar MD   gabapentin (NEURONTIN) 300 mg capsule Take 300 mg by mouth three (3) times daily. Taking as needed which was given from Dr Millie Rand in the past    Provider, Historical   dutasteride (AVODART) 0.5 mg capsule Take 1 Capsule by mouth daily (after dinner). 4/29/22   MOHSEN Lawrence   testosterone cypionate (DEPOTESTOTERONE CYPIONATE) 200 mg/mL injection 0.5 mL by IntraMUSCular route every seven (7) days. Max Daily Amount: 100 mg. 3/31/22   Maida Salazar MD   glucose blood VI test strips (True Metrix Glucose Test Strip) strip TEST BLOOD SUGAR THREE TIMES DAILY 2/21/22   Albertina Davis MD   nitroglycerin (NITROSTAT) 0.4 mg SL tablet DISSOLVE ONE TABLET UNDER TONGUE AS NEEDED FOR CHEST PAIN EVERY 5 MINUTES FOR UP TO 3 DOSES 2/14/22   Albertina Davis MD   aspirin delayed-release 81 mg tablet Take 1 Tablet by mouth daily. 2/1/22   Albertina Davis MD   solifenacin (VESICARE) 10 mg tablet Take 1 Tablet by mouth daily.  10/12/21   Maida Salazar MD   Blood-Glucose Meter (True Metrix Glucose Meter) misc Check blood sugar 3 times daily 8/30/21   Albertina Davis MD Cane maura by Does Not Apply route. Provider, Historical   cpap machine kit by Does Not Apply route. Provider, Historical   Blood-Glucose Meter monitoring kit Use to check blood sugar 3 times daily. Dx E11.9. Please dispense brand preferred by insurance. 6/17/20   Дмитрий Pelayo MD   lancets misc Use to check blood sugar 3 times daily. Dx E11.9. Please dispense brand preferred by insurance. 6/17/20   Дмитрий Pelayo MD   folic acid/multivit-min/lutein (CENTRUM SILVER PO) Take  by mouth. Provider, Historical   lisinopriL (PRINIVIL, ZESTRIL) 20 mg tablet Take 1 Tab by mouth daily. 5/18/20   Дмитрий Pelayo MD   Syringe, Disposable, 3 mL syrg Take as directed 4/15/20   Vicky Davis MD   Needle, Disp, 23 G 23 gauge x 1 1/4\" ndle Use to inject 0.5 ml every 7 days 4/15/20   Vicky Davis MD   Needle, Disp, 18 G 18 gauge x 1\" ndle Use to aspirate 0.5 ml every 7 days 4/15/20   Vicky Davis MD   Wheel Chair maura Assistance with ambulation, gait instability 2/28/20   Tony Hitchcock, NP   polyethylene glycol (MIRALAX) 17 gram packet Take 1 Packet by mouth daily. Hold for loose stools  Patient taking differently: Take 17 g by mouth daily as needed. Hold for loose stools 1/18/19   Benny Channel B, NP   naloxone 4 mg/actuation spry 4 mg by Nasal route as needed. 5/4/17   Erinn Ceballos MD   Back Brace [facility] 1 Device by Does Not Apply route daily as needed for Pain. One, lumbosacral orthosis/back brace with metal struts      Medically necessary 3/16/16   Erinn Ceballos MD       Allergies   Allergen Reactions    Bactrim [Sulfamethoprim] Rash    Opana [Oxymorphone] Other (comments)     Feels like bug crawling under skin and drowziness              Physical Exam:      Visit Vitals  BP (!) 137/59   Pulse 85   Temp 98.5 °F (36.9 °C)   Resp 12   SpO2 97%       Physical Exam:  Tele: sinus rhythm  General:  Sleepy but cooperative, no distress, appears stated age. HEENT: Normocephalic, atraumatic. Conjunctivae/corneas clear. PERRL, EOMs intact. Nares normal. No drainage or sinus tenderness. Supple neck, trachea midline, no adenopathy. Lungs:   Clear to auscultation which scattered rhonci   Heart:  Regular rate and rhythm, S1, S2 normal, no murmur   Abdomen: Soft, non-tender. Bowel sounds normal.    Extremities:  Trace LE edema. Normal cap refill   Pulses: 2+ and symmetric all extremities. Skin:  Warm, dry. No rashes or lesions   Neurologic:  AAOx3, No focal motor or sensory deficit   Psych:             Normal affect, insight and judgement      Lab/Data Review:  Labs: Results:       Chemistry Recent Labs     09/30/22 2020   *      K 3.8      CO2 26   BUN 16   CREA 1.31*   CA 8.9   AGAP 10   BUCR 12      TP 7.7   ALB 3.8   GLOB 3.9   AGRAT 1.0      CBC w/Diff Recent Labs     09/30/22 2020   WBC 8.6   RBC 5.18   HGB 14.4   HCT 46.7      GRANS 83*   LYMPH 6*   EOS 1      Coagulation Recent Labs     09/30/22 2020   PTP 13.9   INR 1.0   APTT 30.3       Iron/Ferritin No results for input(s): IRON in the last 72 hours. No lab exists for component: TIBCCALC   BNP No results for input(s): BNPP in the last 72 hours. Cardiac Enzymes No results for input(s): CPK, CKND1, FAIZA in the last 72 hours.     No lab exists for component: CKRMB, TROIP   Liver Enzymes Recent Labs     09/30/22 2020   TP 7.7   ALB 3.8         Thyroid Studies Lab Results   Component Value Date/Time    TSH 1.09 10/29/2021 10:30 AM          All Micro Results       Procedure Component Value Units Date/Time    RESPIRATORY VIRUS PANEL W/COVID-19, PCR [[id number]] Collected: 10/01/22 0045    Order Status: Completed Specimen: Nasopharyngeal Updated: 10/01/22 0147     Adenovirus Not detected        Coronavirus 229E Not detected        Coronavirus HKU1 Not detected        Coronavirus CVNL63 Not detected        Coronavirus OC43 Not detected        SARS-CoV-2, PCR Not detected        Metapneumovirus Not detected Rhinovirus and Enterovirus Not detected        Influenza A Not detected        Influenza B Not detected        Parainfluenza 1 Not detected        Parainfluenza 2 Not detected        Parainfluenza 3 Not detected        Parainfluenza virus 4 Not detected        RSV by PCR Not detected        B. parapertussis, PCR Not detected        Bordetella pertussis - PCR Not detected        Chlamydophila pneumoniae DNA, QL, PCR Not detected        Mycoplasma pneumoniae DNA, QL, PCR Not detected       COVID-19 RAPID TEST [[id number]] Collected: 09/30/22 2100    Order Status: Canceled     CULTURE, BLOOD [[id number]] Collected: 09/30/22 2025    Order Status: Sent Specimen: Blood Updated: 09/30/22 2046    CULTURE, BLOOD [[id number]] Collected: 09/30/22 2020    Order Status: Sent Specimen: Blood Updated: 09/30/22 2033    CULTURE, MRSA [[id number]]     Order Status: Sent Specimen: Nasal             Imaging Reviewed:  CT Results  (Last 48 hours)                 09/30/22 2257  CTA CHEST W OR W WO CONT Final result    Impression:  :       No pulmonary embolism or other acute findings. Thank you for this referral.       Narrative:  CTA chest- pulmonary embolus protocol        Field comparison Indications: Dyspnea. Technique: Utilizing pulmonary embolus protocol, thin section axial images were   obtained from the thoracic inlet into the upper abdomen after the uneventful   administration of IV contrast. Coronal and sagittal maximum intensity projection   (MIP) post-processed images were generated to better define pulmonary artery   anatomy and enhance sensitivity for detection of pulmonary emboli.    Contrast used: 100 cc Isovue 370       All CT scans at this facility are performed using dose optimization technique as   appropriate to a performed exam, to include automated exposure control,   adjustment of the mA and/or kV according to patient size (including appropriate   matching for site-specific examinations), or use of iterative reconstruction   technique. Comparison: None. Findings:       Adequate quality opacification. There are no filling defects within the main,   lobar or segmental pulmonary arteries. The main pulmonary artery has a normal   caliber. The thoracic aorta is nonaneurysmal. The base of the neck is   unremarkable. There are few mildly prominent mediastinal lymph nodes. No hilar   adenopathy is seen. There are mild dependent changes within the lungs. No focal   infiltrate. No pleural effusion. The upper abdomen appears unremarkable. There is an epidural catheter within the thoracic canal.       No acute osseous findings. XR Results (most recent):  Results from Hospital Encounter encounter on 09/30/22    XR CHEST SNGL V    Narrative  Portable Chest    CPT CODE: 55983    HISTORY: Shortness of breath. FINDINGS:    Comparison 9/8/2022. Wires overlie the patient. Low lung volumes. Increased prominence of the  vasculature. No dense consolidation. No large effusion or pneumothorax. Intraspinal lead similar in positioning. Impression  Hypoinflation of the lungs likely with some superimposed vascular congestion. Assessment:   Active Problems:    Fever (10/1/2022)      Acute respiratory failure with hypoxia (HCC) (10/1/2022)      DM (diabetes mellitus) ([facility] [address] (7/30/2014)      Hyperlipidemia (7/30/2014)      Plan:   Fever: Physical exam nonfocal.  Unclear etiology. No leukocytosis. ED started on antibiotics for presumed respiratory infection.  -Follow blood culture  -continue ceftriaxone and doxycycline for potential CAP treatment    Acute respiratory failure with hypoxia: New oxygen requirement. Only history of MANISH with CPAP that is compliant. Negative viral panel now, no PE or focal consolidation on CT scan. No cardiac symptoms.   Echo from March 2022 with preserved EF.   -Continue supplemental oxygen as needed to keep sat greater than 94%   -Can consider repeating RVP in 24 hours   -Consider further work-up to look for pulmonary hypertension as a potential cause of hypoxemia    Diabetes: Holding home oral antidiabetic   -Glucose check with correctional insulin    Hypertension: Continue home med  Chronic pain: Continue home gabapentin  Anxiety and depression: Continue home med      Risk of deterioration:  [x]Low    []Moderate  []High     Prophylaxis:  [x]Lovenox  []Coumadin  []Hep SQ  []SCDs  []H2B/PPI     Disposition:  [x]Home w/ Family   []HH PT,OT,RN   []SNF/LTC   []SAH/Rehab     Discussed Code Status:         [x]Full Code      []DNR           Point of contact: Juma Bennett [phone]  ___________________________________________________     Care Plan discussed with:    [x]Patient   []Family    []ED Care Manager  [x]ED Doc   []Specialist :  Total Time Coordinating Admission:     40 minutes    []Total Critical Care Time:   30 minutes    [address] [place] [address]  10/1/2022, 3:40 AM

## 2022-10-01 NOTE — PROGRESS NOTES
Trospium 20 mg po Q12hr was therapeutically interchanged for Vesicare 10 mg daily per the P &T Committee approved Therapeutic Interchanges Policy.

## 2022-10-01 NOTE — ED NOTES
Patient ate 2/3 of his breakfast and felt nauseated and vomited a small amount of food eaten given zofran with good results patient reports decreased nausea.

## 2022-10-02 ENCOUNTER — APPOINTMENT (OUTPATIENT)
Dept: ULTRASOUND IMAGING | Age: 67
DRG: 871 | End: 2022-10-02
Attending: EMERGENCY MEDICINE
Payer: MEDICARE

## 2022-10-02 LAB
ANION GAP SERPL CALC-SCNC: 5 MMOL/L (ref 3–18)
ATRIAL RATE: 113 BPM
BASOPHILS # BLD: 0.1 K/UL (ref 0–0.1)
BASOPHILS NFR BLD: 1 % (ref 0–2)
BNP SERPL-MCNC: 324 PG/ML (ref 0–900)
BUN SERPL-MCNC: 16 MG/DL (ref 7–18)
BUN/CREAT SERPL: 13 (ref 12–20)
CALCIUM SERPL-MCNC: 8.4 MG/DL (ref 8.5–10.1)
CALCULATED P AXIS, ECG09: 41 DEGREES
CALCULATED R AXIS, ECG10: 61 DEGREES
CALCULATED T AXIS, ECG11: 45 DEGREES
CHLORIDE SERPL-SCNC: 107 MMOL/L (ref 100–111)
CO2 SERPL-SCNC: 28 MMOL/L (ref 21–32)
CREAT SERPL-MCNC: 1.23 MG/DL (ref 0.6–1.3)
DIAGNOSIS, 93000: NORMAL
DIFFERENTIAL METHOD BLD: ABNORMAL
EOSINOPHIL # BLD: 0.3 K/UL (ref 0–0.4)
EOSINOPHIL NFR BLD: 4 % (ref 0–5)
ERYTHROCYTE [DISTWIDTH] IN BLOOD BY AUTOMATED COUNT: 14.4 % (ref 11.6–14.5)
GLUCOSE BLD STRIP.AUTO-MCNC: 124 MG/DL (ref 70–110)
GLUCOSE BLD STRIP.AUTO-MCNC: 140 MG/DL (ref 70–110)
GLUCOSE BLD STRIP.AUTO-MCNC: 148 MG/DL (ref 70–110)
GLUCOSE BLD STRIP.AUTO-MCNC: 98 MG/DL (ref 70–110)
GLUCOSE SERPL-MCNC: 98 MG/DL (ref 74–99)
HCT VFR BLD AUTO: 43.5 % (ref 36–48)
HGB BLD-MCNC: 13.2 G/DL (ref 13–16)
IMM GRANULOCYTES # BLD AUTO: 0.1 K/UL (ref 0–0.04)
IMM GRANULOCYTES NFR BLD AUTO: 1 % (ref 0–0.5)
LYMPHOCYTES # BLD: 1.5 K/UL (ref 0.9–3.6)
LYMPHOCYTES NFR BLD: 23 % (ref 21–52)
MAGNESIUM SERPL-MCNC: 2.5 MG/DL (ref 1.6–2.6)
MCH RBC QN AUTO: 28.3 PG (ref 24–34)
MCHC RBC AUTO-ENTMCNC: 30.3 G/DL (ref 31–37)
MCV RBC AUTO: 93.1 FL (ref 78–100)
MONOCYTES # BLD: 1.1 K/UL (ref 0.05–1.2)
MONOCYTES NFR BLD: 17 % (ref 3–10)
NEUTS SEG # BLD: 3.5 K/UL (ref 1.8–8)
NEUTS SEG NFR BLD: 54 % (ref 40–73)
NRBC # BLD: 0 K/UL (ref 0–0.01)
NRBC BLD-RTO: 0 PER 100 WBC
P-R INTERVAL, ECG05: 156 MS
PLATELET # BLD AUTO: 210 K/UL (ref 135–420)
PMV BLD AUTO: 10.2 FL (ref 9.2–11.8)
POTASSIUM SERPL-SCNC: 3.9 MMOL/L (ref 3.5–5.5)
Q-T INTERVAL, ECG07: 286 MS
QRS DURATION, ECG06: 78 MS
QTC CALCULATION (BEZET), ECG08: 392 MS
RBC # BLD AUTO: 4.67 M/UL (ref 4.35–5.65)
SODIUM SERPL-SCNC: 140 MMOL/L (ref 136–145)
VENTRICULAR RATE, ECG03: 113 BPM
WBC # BLD AUTO: 6.6 K/UL (ref 4.6–13.2)

## 2022-10-02 PROCEDURE — 74011250637 HC RX REV CODE- 250/637: Performed by: STUDENT IN AN ORGANIZED HEALTH CARE EDUCATION/TRAINING PROGRAM

## 2022-10-02 PROCEDURE — 94660 CPAP INITIATION&MGMT: CPT

## 2022-10-02 PROCEDURE — 82962 GLUCOSE BLOOD TEST: CPT

## 2022-10-02 PROCEDURE — 99232 SBSQ HOSP IP/OBS MODERATE 35: CPT | Performed by: EMERGENCY MEDICINE

## 2022-10-02 PROCEDURE — 74011000258 HC RX REV CODE- 258: Performed by: STUDENT IN AN ORGANIZED HEALTH CARE EDUCATION/TRAINING PROGRAM

## 2022-10-02 PROCEDURE — 74011250637 HC RX REV CODE- 250/637: Performed by: EMERGENCY MEDICINE

## 2022-10-02 PROCEDURE — 83880 ASSAY OF NATRIURETIC PEPTIDE: CPT

## 2022-10-02 PROCEDURE — 74011636637 HC RX REV CODE- 636/637: Performed by: STUDENT IN AN ORGANIZED HEALTH CARE EDUCATION/TRAINING PROGRAM

## 2022-10-02 PROCEDURE — 36415 COLL VENOUS BLD VENIPUNCTURE: CPT

## 2022-10-02 PROCEDURE — 80048 BASIC METABOLIC PNL TOTAL CA: CPT

## 2022-10-02 PROCEDURE — 74011250636 HC RX REV CODE- 250/636: Performed by: STUDENT IN AN ORGANIZED HEALTH CARE EDUCATION/TRAINING PROGRAM

## 2022-10-02 PROCEDURE — 97161 PT EVAL LOW COMPLEX 20 MIN: CPT

## 2022-10-02 PROCEDURE — 74011000250 HC RX REV CODE- 250: Performed by: STUDENT IN AN ORGANIZED HEALTH CARE EDUCATION/TRAINING PROGRAM

## 2022-10-02 PROCEDURE — 65270000029 HC RM PRIVATE

## 2022-10-02 PROCEDURE — 85025 COMPLETE CBC W/AUTO DIFF WBC: CPT

## 2022-10-02 PROCEDURE — 76770 US EXAM ABDO BACK WALL COMP: CPT

## 2022-10-02 PROCEDURE — 83735 ASSAY OF MAGNESIUM: CPT

## 2022-10-02 RX ADMIN — DUTASTERIDE 0.5 MG: 0.5 CAPSULE, LIQUID FILLED ORAL at 17:48

## 2022-10-02 RX ADMIN — GABAPENTIN 300 MG: 300 CAPSULE ORAL at 21:32

## 2022-10-02 RX ADMIN — Medication 15 UNITS: at 21:33

## 2022-10-02 RX ADMIN — MONTELUKAST 10 MG: 10 TABLET, FILM COATED ORAL at 09:31

## 2022-10-02 RX ADMIN — FUROSEMIDE 20 MG: 20 TABLET ORAL at 09:31

## 2022-10-02 RX ADMIN — PRAMIPEXOLE DIHYDROCHLORIDE 0.75 MG: 0.25 TABLET ORAL at 16:25

## 2022-10-02 RX ADMIN — ATORVASTATIN CALCIUM 10 MG: 10 TABLET, FILM COATED ORAL at 21:32

## 2022-10-02 RX ADMIN — SODIUM CHLORIDE, PRESERVATIVE FREE 10 ML: 5 INJECTION INTRAVENOUS at 21:36

## 2022-10-02 RX ADMIN — AMITRIPTYLINE HYDROCHLORIDE 10 MG: 10 TABLET, FILM COATED ORAL at 21:32

## 2022-10-02 RX ADMIN — TROSPIUM CHLORIDE 20 MG: 20 TABLET, FILM COATED ORAL at 21:32

## 2022-10-02 RX ADMIN — SODIUM CHLORIDE, PRESERVATIVE FREE 10 ML: 5 INJECTION INTRAVENOUS at 06:34

## 2022-10-02 RX ADMIN — ENOXAPARIN SODIUM 30 MG: 100 INJECTION SUBCUTANEOUS at 09:33

## 2022-10-02 RX ADMIN — METOPROLOL SUCCINATE 50 MG: 50 TABLET, EXTENDED RELEASE ORAL at 09:31

## 2022-10-02 RX ADMIN — GABAPENTIN 300 MG: 300 CAPSULE ORAL at 16:25

## 2022-10-02 RX ADMIN — PRAMIPEXOLE DIHYDROCHLORIDE 0.75 MG: 0.25 TABLET ORAL at 23:31

## 2022-10-02 RX ADMIN — DOXYCYCLINE 100 MG: 100 INJECTION, POWDER, LYOPHILIZED, FOR SOLUTION INTRAVENOUS at 13:37

## 2022-10-02 RX ADMIN — PRAMIPEXOLE DIHYDROCHLORIDE 0.75 MG: 0.25 TABLET ORAL at 09:32

## 2022-10-02 RX ADMIN — GABAPENTIN 300 MG: 300 CAPSULE ORAL at 09:32

## 2022-10-02 RX ADMIN — TROSPIUM CHLORIDE 20 MG: 20 TABLET, FILM COATED ORAL at 09:32

## 2022-10-02 RX ADMIN — FUROSEMIDE 20 MG: 20 TABLET ORAL at 17:48

## 2022-10-02 RX ADMIN — HYDROCODONE BITARTRATE AND ACETAMINOPHEN 1 TABLET: 5; 325 TABLET ORAL at 05:17

## 2022-10-02 RX ADMIN — HYDROCODONE BITARTRATE AND ACETAMINOPHEN 1 TABLET: 5; 325 TABLET ORAL at 14:05

## 2022-10-02 RX ADMIN — ALFUZOSIN HYDROCHLORIDE 10 MG: 10 TABLET ORAL at 09:32

## 2022-10-02 RX ADMIN — LISINOPRIL 20 MG: 20 TABLET ORAL at 09:31

## 2022-10-02 RX ADMIN — HYDROCODONE BITARTRATE AND ACETAMINOPHEN 1 TABLET: 5; 325 TABLET ORAL at 20:14

## 2022-10-02 RX ADMIN — ENOXAPARIN SODIUM 30 MG: 100 INJECTION SUBCUTANEOUS at 17:47

## 2022-10-02 RX ADMIN — ASPIRIN 81 MG: 81 TABLET, COATED ORAL at 09:32

## 2022-10-02 RX ADMIN — CEFTRIAXONE 2 G: 2 INJECTION, POWDER, FOR SOLUTION INTRAMUSCULAR; INTRAVENOUS at 00:56

## 2022-10-02 RX ADMIN — BUPROPION HYDROCHLORIDE 150 MG: 150 TABLET, EXTENDED RELEASE ORAL at 09:32

## 2022-10-02 RX ADMIN — DOXYCYCLINE 100 MG: 100 INJECTION, POWDER, LYOPHILIZED, FOR SOLUTION INTRAVENOUS at 23:32

## 2022-10-02 RX ADMIN — MULTIPLE VITAMINS W/ MINERALS TAB 1 TABLET: TAB at 09:32

## 2022-10-02 RX ADMIN — POLYETHYLENE GLYCOL 3350 17 G: 17 POWDER, FOR SOLUTION ORAL at 09:33

## 2022-10-02 NOTE — PROGRESS NOTES
[facility] Hospitalist Group  Progress Note    Patient: Pancho Joseph Age: 79 y.o. : 1955 MR#: [id number] SSN: [id number]  Date/Time: 10/2/2022    Subjective:     Patient is sitting in bed in no apparent distress, feels a little better. Denies any nausea vomiting or abdominal pain. Denies any cough. Denies any diarrhea. Still requiring oxygen    Assessment/Plan:     Fevers  UTI  Hypoxia  Obstructive sleep apnea  Hypertension  History of chronic pain  Diabetes type 2  Obesity Body mass index is 40.28 kg/m². PLAN  Continue antibiotics, follow cultures  Await ID input  Unclear why patient is hypoxic.   CTA chest noted  Will consult pulmonary in the morning  Monitor blood pressures  Monitor sugars, Lantus and sliding scale  CPAP nightly  PT OT  Discussed with patient  Home oxygen evaluation prior to discharge    Case discussed with:  [x]Patient  []Family  []Nursing  []Case Management  DVT Prophylaxis:  [x]Lovenox  []Hep SQ  []SCDs  []Coumadin   []On Heparin gtt    Objective:   VS: Visit Vitals  /70   Pulse 75   Temp 98 °F (36.7 °C)   Resp 20   Wt 142.3 kg (313 lb 11.4 oz)   SpO2 93%   BMI 40.28 kg/m²      Tmax/24hrs: Temp (24hrs), Av.1 °F (36.7 °C), Min:97.5 °F (36.4 °C), Max:98.5 °F (36.9 °C)    Input/Output:   Intake/Output Summary (Last 24 hours) at 10/2/2022 1801  Last data filed at 10/2/2022 0517  Gross per 24 hour   Intake 600 ml   Output 1050 ml   Net -450 ml       General:  Awake, alert  Cardiovascular:  S1S2+, RRR  Pulmonary:  CTA b/l  GI:  Soft, BS+, NT, ND, obese  Extremities:  No edema      Labs:    Recent Results (from the past 24 hour(s))   GLUCOSE, POC    Collection Time: 10/01/22  9:32 PM   Result Value Ref Range    Glucose (POC) 222 (H) 70 - 110 mg/dL   CBC WITH AUTOMATED DIFF    Collection Time: 10/02/22  3:32 AM   Result Value Ref Range    WBC 6.6 4.6 - 13.2 K/uL    RBC 4.67 4.35 - 5.65 M/uL    HGB 13.2 13.0 - 16.0 g/dL    HCT 43.5 36.0 - 48.0 %    MCV 93.1 78.0 - 100.0 FL    MCH 28.3 24.0 - 34.0 PG    MCHC 30.3 (L) 31.0 - 37.0 g/dL    RDW 14.4 11.6 - 14.5 %    PLATELET 731 098 - 026 K/uL    MPV 10.2 9.2 - 11.8 FL    NRBC 0.0 0  WBC    ABSOLUTE NRBC 0.00 0.00 - 0.01 K/uL    NEUTROPHILS 54 40 - 73 %    LYMPHOCYTES 23 21 - 52 %    MONOCYTES 17 (H) 3 - 10 %    EOSINOPHILS 4 0 - 5 %    BASOPHILS 1 0 - 2 %    IMMATURE GRANULOCYTES 1 (H) 0.0 - 0.5 %    ABS. NEUTROPHILS 3.5 1.8 - 8.0 K/UL    ABS. LYMPHOCYTES 1.5 0.9 - 3.6 K/UL    ABS. MONOCYTES 1.1 0.05 - 1.2 K/UL    ABS. EOSINOPHILS 0.3 0.0 - 0.4 K/UL    ABS. BASOPHILS 0.1 0.0 - 0.1 K/UL    ABS. IMM.  GRANS. 0.1 (H) 0.00 - 0.04 K/UL    DF AUTOMATED     METABOLIC PANEL, BASIC    Collection Time: 10/02/22  3:32 AM   Result Value Ref Range    Sodium 140 136 - 145 mmol/L    Potassium 3.9 3.5 - 5.5 mmol/L    Chloride 107 100 - 111 mmol/L    CO2 28 21 - 32 mmol/L    Anion gap 5 3.0 - 18 mmol/L    Glucose 98 74 - 99 mg/dL    BUN 16 7.0 - 18 MG/DL    Creatinine 1.23 0.6 - 1.3 MG/DL    BUN/Creatinine ratio 13 12 - 20      GFR est AA >60 >60 ml/min/1.73m2    GFR est non-AA 59 (L) >60 ml/min/1.73m2    Calcium 8.4 (L) 8.5 - 10.1 MG/DL   MAGNESIUM    Collection Time: 10/02/22  3:32 AM   Result Value Ref Range    Magnesium 2.5 1.6 - 2.6 mg/dL   NT-PRO BNP    Collection Time: 10/02/22  3:32 AM   Result Value Ref Range    NT pro- 0 - 900 PG/ML   GLUCOSE, POC    Collection Time: 10/02/22  7:52 AM   Result Value Ref Range    Glucose (POC) 98 70 - 110 mg/dL   GLUCOSE, POC    Collection Time: 10/02/22 11:22 AM   Result Value Ref Range    Glucose (POC) 124 (H) 70 - 110 mg/dL   GLUCOSE, POC    Collection Time: 10/02/22  4:05 PM   Result Value Ref Range    Glucose (POC) 140 (H) 70 - 110 mg/dL     Additional Data Reviewed:      Signed By: Stephanie Dudley MD     October 2, 2022

## 2022-10-02 NOTE — PROGRESS NOTES
Problem: Diabetes Self-Management  Goal: *Disease process and treatment process  Description: Define diabetes and identify own type of diabetes; list 3 options for treating diabetes. Outcome: Progressing Towards Goal  Goal: *Incorporating nutritional management into lifestyle  Description: Describe effect of type, amount and timing of food on blood glucose; list 3 methods for planning meals. Outcome: Progressing Towards Goal  Goal: *Incorporating physical activity into lifestyle  Description: State effect of exercise on blood glucose levels. Outcome: Progressing Towards Goal  Goal: *Developing strategies to promote health/change behavior  Description: Define the ABC's of diabetes; identify appropriate screenings, schedule and personal plan for screenings. Outcome: Progressing Towards Goal  Goal: *Using medications safely  Description: State effect of diabetes medications on diabetes; name diabetes medication taking, action and side effects. Outcome: Progressing Towards Goal  Goal: *Monitoring blood glucose, interpreting and using results  Description: Identify recommended blood glucose targets  and personal targets. Outcome: Progressing Towards Goal  Goal: *Prevention, detection, treatment of acute complications  Description: List symptoms of hyper- and hypoglycemia; describe how to treat low blood sugar and actions for lowering  high blood glucose level. Outcome: Progressing Towards Goal  Goal: *Prevention, detection and treatment of chronic complications  Description: Define the natural course of diabetes and describe the relationship of blood glucose levels to long term complications of diabetes.   Outcome: Progressing Towards Goal  Goal: *Developing strategies to address psychosocial issues  Description: Describe feelings about living with diabetes; identify support needed and support network  Outcome: Progressing Towards Goal  Goal: *Sick day guidelines  Outcome: Progressing Towards Goal  Goal: *Patient Specific Goal (EDIT GOAL, INSERT TEXT)  Outcome: Progressing Towards Goal     Problem: Patient Education: Go to Patient Education Activity  Goal: Patient/Family Education  Outcome: Progressing Towards Goal     Problem: Falls - Risk of  Goal: *Absence of Falls  Description: Document Kylie Skill Fall Risk and appropriate interventions in the flowsheet.   Outcome: Progressing Towards Goal  Note: Fall Risk Interventions:            Medication Interventions: Bed/chair exit alarm, Patient to call before getting OOB, Teach patient to arise slowly                   Problem: Patient Education: Go to Patient Education Activity  Goal: Patient/Family Education  Outcome: Progressing Towards Goal     Problem: Pain  Goal: *Control of Pain  Outcome: Progressing Towards Goal     Problem: Patient Education: Go to Patient Education Activity  Goal: Patient/Family Education  Outcome: Progressing Towards Goal     Problem: Hypertension  Goal: *Blood pressure within specified parameters  Outcome: Progressing Towards Goal  Goal: *Fluid volume balance  Outcome: Progressing Towards Goal  Goal: *Labs within defined limits  Outcome: Progressing Towards Goal     Problem: Patient Education: Go to Patient Education Activity  Goal: Patient/Family Education  Outcome: Progressing Towards Goal     Problem: Gas Exchange - Impaired  Goal: *Absence of hypoxia  Outcome: Progressing Towards Goal     Problem: Patient Education: Go to Patient Education Activity  Goal: Patient/Family Education  Outcome: Progressing Towards Goal

## 2022-10-02 NOTE — PROGRESS NOTES
conducted an initial consultation and Spiritual Assessment for [employer], who is a 79 y.o.,male. Patients Primary Language is: Lois David. According to the patients EMR Christianity Affiliation is: [place].      The reason the Patient came to the hospital is:   Patient Active Problem List    Diagnosis Date Noted    Fever 10/01/2022    Acute respiratory failure with hypoxia ([address] 10/01/2022    Chronic renal disease, stage III 09/13/2022    Spinal cord stimulator status 05/03/2022    Type 2 diabetes mellitus with diabetic neuropathy ([address] 05/10/2019    Severe obesity ([address] 04/26/2019    Kidney stone 02/25/2019    Sepsis ([address] 01/14/2019    UTI (urinary tract infection) 01/14/2019    Status post insertion of spinal cord stimulator 09/14/2017    Chronic pain syndrome 06/15/2017    Lumbar post-laminectomy syndrome 06/15/2017    Lumbar and sacral osteoarthritis 06/15/2017    Anemia 12/07/2016    Advanced care planning/counseling discussion 08/08/2016    Chronic midline low back pain with sciatica 05/16/2016    History of depression 06/02/2015    Thoracic or lumbosacral neuritis or radiculitis, unspecified 01/26/2015    Postlaminectomy syndrome, lumbar region 01/26/2015    DDD (degenerative disc disease), lumbar 07/30/2014    Lumbar facet arthropathy 07/30/2014    S/P lumbar laminectomy 07/30/2014    S/P lumbar spinal fusion 07/30/2014    Lumbar nerve root impingement 07/30/2014    Lumbosacral radiculopathy at S1 07/30/2014    DDD (degenerative disc disease), thoracic 07/30/2014    Thoracic compression fracture ([address] 07/30/2014    Back muscle spasm 07/30/2014    TRUE (generalized anxiety disorder) 07/30/2014    Abdominal adhesions 07/30/2014    Constipation, chronic 07/30/2014    Insomnia secondary to chronic pain 07/30/2014    MANISH (obstructive sleep apnea) 07/30/2014    DM (diabetes mellitus) ([address] 07/30/2014    HTN (hypertension) 07/30/2014    Hyperlipidemia 07/30/2014    Frequent falls 07/30/2014        The  provided the following Interventions:  Initiated a relationship of care and support. Provided information about Spiritual Care Services. Chart reviewed. The following outcomes where achieved:  Patient already has an Advance Medical Directive at home. See Adv Care Plan. Assessment:  Patient does not have any Episcopalian/cultural needs that will affect patients preferences in health care. Plan:  Chaplains will continue to follow and will provide pastoral care on an as needed/requested basis.  recommends bedside caregivers page  on duty if patient shows signs of acute spiritual or emotional distress.     [address]  [phone])

## 2022-10-02 NOTE — PROGRESS NOTES
Problem: Mobility Impaired (Adult and Pediatric)  Goal: *Acute Goals and Plan of Care (Insert Text)  Outcome: Progressing Towards Goal  Note: Physical Therapy Goals  Initiated 10/2/2022 and to be accomplished within 7 day(s)  1. Patient will move from supine to sit and sit to supine , scoot up and down, and roll side to side in bed with independence. 2.  Patient will transfer from bed to chair and chair to bed with independence using the least restrictive device. 3.  Patient will perform sit to stand with independence. 4.  Patient will ambulate with independence for 100 feet with the least restrictive device. 5.  Patient will ascend/descend 5 stairs with 1 handrail(s) with modified independence. PLOF: independent walking without AD use   PHYSICAL THERAPY EVALUATION    Patient: You Holt (50 y.o. male)  Date: 10/2/2022  Primary Diagnosis: Acute respiratory failure with hypoxia (HCC) [J96.01]  Fever [R50.9]       Precautions: falls, orthostatic hypotension       ASSESSMENT :  Based on the objective data described below, the patient presents with orthostatic hypotension, shortness of breath and general strength decline. Patient shows need of assistance with sit to stand, transfers and gait. Patient was able to perform gait without AD but shows increased lateral swaying and decrease step clearance that needed minimal assistance on trunk to provide kinesthetic correction. Patient responded well with skilled education with planned physical therapy management and shows potential to benefit from skilled physical therapy. Patient will benefit from skilled intervention to address the above impairments.   Patient's rehabilitation potential is considered to be Good  Factors which may influence rehabilitation potential include:   [x]         None noted  []         Mental ability/status  []         Medical condition  []         Home/family situation and support systems  []         Safety awareness  [] Pain tolerance/management  []         Other:      PLAN :  Recommendations and Planned Interventions:   [x]           Bed Mobility Training             [x]    Neuromuscular Re-Education  [x]           Transfer Training                   []    Orthotic/Prosthetic Training  [x]           Gait Training                          []    Modalities  [x]           Therapeutic Exercises           []    Edema Management/Control  [x]           Therapeutic Activities            [x]    Family Training/Education  [x]           Patient Education  []           Other (comment):    Frequency/Duration: Patient will be followed by physical therapy 1-2 times per day/4-7 days per week to address goals. Further Equipment Recommendations for Discharge: rolling walker and N/A    AMPAC: Current research shows that an AM-PAC score of 18 (14 without stairs) or greater is associated with a discharge to the patient's home setting. Based on an AM-PAC score of 18/24 and their current functional mobility deficits, it is recommended that the patient have 2-3 sessions per week of Physical Therapy at d/c to increase the patient's independence. This AMPAC score should be considered in conjunction with interdisciplinary team recommendations to determine the most appropriate discharge setting. Patient's social support, diagnosis, medical stability, and prior level of function should also be taken into consideration. SUBJECTIVE:   Patient stated lightheaded when I get up.     OBJECTIVE DATA SUMMARY:     Past Medical History:   Diagnosis Date    Abdominal adhesions 7/30/2014    BPH (benign prostatic hyperplasia)     Cellulitis     DDD (degenerative disc disease), lumbar 7/30/2014    Depression     Diabetes ([facility]     Frequent falls 7/30/2014    TRUE (generalized anxiety disorder) 7/30/2014    HLD (hyperlipidemia)     Hypertension     Hypogonadism in male     Incomplete bladder emptying     Kidney stones     Lumbar facet arthropathy 7/30/2014 Lumbar nerve root impingement 7/30/2014    Lumbosacral radiculopathy at S1 7/30/2014    Major depression 7/30/2014    Neurological disorder     sciatica    MANISH (obstructive sleep apnea) 7/30/2014    S/P lumbar laminectomy 7/30/2014    S/P lumbar spinal fusion 7/30/2014    Sleep apnea     USES CPAP EVERY NIGHT    Thoracic compression fracture ([address] 07/30/2014     Past Surgical History:   Procedure Laterality Date    HX APPENDECTOMY      HX BACK SURGERY      Lumbar fusion    HX HERNIA REPAIR  1992    HX OTHER SURGICAL      EMG - S1 disorder    HX OTHER SURGICAL  10/2017    spinal cord stimulator    HX VASECTOMY  1985     Barriers to Learning/Limitations: None  Compensate with: N/A  Home Situation:  Home Situation  Home Environment: Private residence  # Steps to Enter: 5  Rails to Enter: Yes  Hand Rails : Bilateral  Wheelchair Ramp: No  One/Two Story Residence: Two story  # of Interior Steps: 15  Living Alone: No  Support Systems: Spouse/Significant Other  Patient Expects to be Discharged to[de-identified] Home with home health  Current DME Used/Available at Home: Cane, straight, Commode, bedside, Walker, rolling  Critical Behavior:  Neurologic State: Alert  Orientation Level: Oriented X4  Cognition: Appropriate decision making  Psychosocial  Purposeful Interaction: Yes  Pt Identified Daily Priority: Clinical issues (comment)  Caritas Process: Nurture loving kindness;Establish trust;Teaching/learning; Attend basic human needs;Create healing environment  Caring Interventions: Reassure; Therapeutic modalities  Reassure: Informing;Caring rounds  Therapeutic Modalities: Deep breathing; Intentional therapeutic touch;Music/Imagery channel ([facility] only)  Other Caring Modalities: Purposeful Rounding  Skin Condition/Temp: Dry;Warm  Skin Integrity: Excoriation (Left groin)  Skin Integumentary  Skin Color: Appropriate for ethnicity  Skin Condition/Temp: Dry;Warm  Skin Integrity: Excoriation (Left groin)     Strength:    Strength: Generally decreased, functional    Posture:  Posture (WDL): Exceptions to WDL  Posture Assessment: Forward head  Functional Mobility:  Bed Mobility:  Rolling: Supervision  Supine to Sit: Supervision  Sit to Supine: Supervision  Scooting: Supervision  Transfers:  Sit to Stand: Minimum assistance  Stand to Sit: Minimum assistance  Stand Pivot Transfers: Minimum assistance       Balance:   Sitting: Intact  Standing: Impaired; With support  Standing - Static: Fair  Standing - Dynamic : Fair    Ambulation/Gait Training:  Distance (ft): 10 Feet (ft)  Ambulation - Level of Assistance: Minimal assistance  Gait Description (WDL): Exceptions to WDL  Gait Abnormalities: Decreased step clearance; Path deviations;Trunk sway increased    Activity Tolerance:   Please refer to the flowsheet for vital signs taken during this treatment. After treatment:   []         Patient left in no apparent distress sitting up in chair  [x]         Patient left in no apparent distress in bed  [x]         Call bell left within reach  [x]         Nursing notified  [x]         Caregiver present  []         Bed alarm activated  []         SCDs applied    COMMUNICATION/EDUCATION:   [x]         Role of Physical Therapy in the acute care setting. [x]         Fall prevention education was provided and the patient/caregiver indicated understanding. [x]         Patient/family have participated as able in goal setting and plan of care. []         Patient/family agree to work toward stated goals and plan of care. []         Patient understands intent and goals of therapy, but is neutral about his/her participation. []         Patient is unable to participate in goal setting/plan of care: ongoing with therapy staff.  []         Other:     Thank you for this referral.  Anna Murillo, PT   Time Calculation: 15 mins      Eval Complexity: History: MEDIUM  Complexity : 1-2 comorbidities / personal factors will impact the outcome/ POC Exam:LOW Complexity : 1-2 Standardized tests and measures addressing body structure, function, activity limitation and / or participation in recreation  Presentation: LOW Complexity : Stable, uncomplicated  Clinical Decision Making:Low Complexity    Overall Complexity:LOW     [employer] AM-PAC® Basic Mobility Inpatient Short Form (6-Clicks) Version 2    How much HELP from another person does the patient currently need    (If the patient hasn't done an activity recently, how much help from another person do you think he/she would need if he/she tried?)   Total (Total A or Dep)   A Lot  (Mod to Max A)   A Little (Sup or Min A)   None (Mod I to I)   Turning from your back to your side while in a flat bed without using bedrails? [] 1 [] 2 [x] 3 [] 4   2. Moving from lying on your back to sitting on the side of a flat bed without using bedrails? [] 1 [] 2 [x] 3 [] 4   3. Moving to and from a bed to a chair (including a wheelchair)? [] 1 [] 2 [x] 3 [] 4   4. Standing up from a chair using your arms (e.g., wheelchair, or bedside chair)? [] 1 [] 2 [x] 3 [] 4   5. Walking in hospital room? [] 1 [] 2 [x] 3 [] 4   6. Climbing 3-5 steps with a railing?+   [] 1 [] 2 [x] 3 [] 4   +If stair climbing cannot be assessed, skip item #6. Sum responses from items 1-5. Current research shows that an AM-PAC score of 18 (14 without stairs) or greater is associated with a discharge to the patient's home setting. Based on an AM-PAC score of 18/24 and their current functional mobility deficits, it is recommended that the patient have 2-3 sessions per week of Physical Therapy at d/c to increase the patient's independence.

## 2022-10-02 NOTE — ROUTINE PROCESS
Bedside and Verbal shift change report given to Muna Adams, RN (oncoming nurse) by Bita Lane RN (offgoing nurse). Report included the following information SBAR, Kardex, MAR and Recent Results.     SITUATION:  Code Status: Full Code  Reason for Admission: Acute respiratory failure with hypoxia ([facility] [J96.01]  Fever [R50.9]  Hospital day: 1  Problem List:       Hospital Problems  Date Reviewed: 10/1/2022            Codes Class Noted POA    Fever ICD-10-CM: R50.9  ICD-9-CM: 780.60  10/1/2022 Unknown        Acute respiratory failure with hypoxia ([facility] ICD-10-CM: J96.01  ICD-9-CM: 518.81  10/1/2022 Unknown        DM (diabetes mellitus) ([facility] ICD-10-CM: E11.9  ICD-9-CM: 250.00  7/30/2014 Yes        Hyperlipidemia ICD-10-CM: E78.5  ICD-9-CM: 272.4  7/30/2014 Yes           BACKGROUND:   Past Medical History:   Past Medical History:   Diagnosis Date    Abdominal adhesions 7/30/2014    BPH (benign prostatic hyperplasia)     Cellulitis     DDD (degenerative disc disease), lumbar 7/30/2014    Depression     Diabetes ([facility]     Frequent falls 7/30/2014    TREU (generalized anxiety disorder) 7/30/2014    HLD (hyperlipidemia)     Hypertension     Hypogonadism in male     Incomplete bladder emptying     Kidney stones     Lumbar facet arthropathy 7/30/2014    Lumbar nerve root impingement 7/30/2014    Lumbosacral radiculopathy at S1 7/30/2014    Major depression 7/30/2014    Neurological disorder     sciatica    MANISH (obstructive sleep apnea) 7/30/2014    S/P lumbar laminectomy 7/30/2014    S/P lumbar spinal fusion 7/30/2014    Sleep apnea     USES CPAP EVERY NIGHT    Thoracic compression fracture ([facility] 07/30/2014      Patient taking anticoagulants yes    Patient has a defibrillator: no    History of shots YES for example, flu, pneumonia, tetanus   Isolation History NO for example, MRSA, CDiff    ASSESSMENT:  Changes in Assessment Throughout Shift: NONE  Significant Changes in 24 hours (for example, RR/code, fall)  Patient has Central Line: no   Patient has [place] Cath: no   Mobility Issues  PT  IV Patency  OR Checklist  Pending Tests    Last Vitals:  Vitals w/ MEWS Score (last day)       Date/Time MEWS Score Pulse Resp Temp BP Level of Consciousness SpO2    10/02/22 0355 1 79 20 97.5 °F (36.4 °C) 110/76 0 94 %    10/02/22 0017 -- -- -- -- -- -- 97 %    10/02/22 0007 2 77 18 98 °F (36.7 °C) 95/60 0 97 %    10/01/22 2014 1 80 17 98.3 °F (36.8 °C) 112/71 0 93 %    10/01/22 1627 1 74 18 97.9 °F (36.6 °C) 108/70 0 96 %    10/01/22 1114 1 84 17 98.4 °F (36.9 °C) 117/73 0 97 %    10/01/22 1020 -- -- -- -- 122/65 -- --    10/01/22 1014 1 85 16 98.4 °F (36.9 °C) 116/69 0 97 %    10/01/22 0844 0 90 14 99 °F (37.2 °C) 160/68 0 97 %    10/01/22 0829 -- 91 13 -- 183/73 -- 95 %    10/01/22 0814 -- 95 18 -- 176/85 -- 95 %    10/01/22 0759 -- 97 22 -- 162/86 -- 96 %    10/01/22 0744 -- 83 23 -- 128/71 -- 98 %    10/01/22 0729 -- 82 15 -- 133/64 -- 98 %    10/01/22 0714 -- 81 -- -- 139/67 -- 97 %    10/01/22 0659 -- 90 14 -- 143/61 -- 93 %    10/01/22 0644 -- 81 13 -- 172/74 -- 98 %    10/01/22 0629 -- 92 15 -- 182/116 -- 99 %    10/01/22 0614 -- 87 21 -- 152/83 -- 99 %    10/01/22 0559 -- 92 14 -- 167/70 -- 98 %    10/01/22 0544 -- 80 12 -- 153/67 -- --    10/01/22 0530 -- 84 22 -- 151/71 -- --    10/01/22 0515 -- 79 14 -- 138/77 -- --    10/01/22 0500 -- 82 18 -- 164/81 -- 99 %    10/01/22 0430 -- 86 15 -- 164/81 -- 100 %    10/01/22 0415 -- 93 17 -- 164/78 -- 98 %    10/01/22 0345 -- 86 12 -- 180/78 -- 90 %    10/01/22 0330 -- 89 27 -- 139/66 -- 98 %    10/01/22 0315 -- 82 15 -- 142/72 -- 97 %    10/01/22 0300 -- 78 14 -- 118/55 -- 97 %    10/01/22 0245 -- 90 29 -- 123/76 -- 97 %    10/01/22 0234 0 85 12 98.5 °F (36.9 °C) 137/59 0 97 %    10/01/22 0215 -- 88 14 -- 123/57 -- --    10/01/22 0200 -- 94 -- -- 121/40 -- 99 %    10/01/22 0145 -- 80 -- -- 108/53 -- 100 %    10/01/22 0100 -- 77 -- -- 143/87 -- --    10/01/22 0045 -- 86 -- -- -- -- -- 10/01/22 0027 -- 84 27 -- -- -- --    10/01/22 0014 -- 90 26 -- -- -- --    10/01/22 0008 -- 87 25 -- -- -- --    10/01/22 0000 -- 87 19 -- 115/60 -- 93 %          PAIN    Pain Assessment    Pain Intensity 1: 0 (10/02/22 0615)    Pain Location 1: Generalized    Pain Intervention(s) 1: Medication (see MAR)    Patient Stated Pain Goal: 0  Intervention effective: yes  Time of last intervention: 0517 Reassessment Completed: yes   Other actions taken for pain: Distraction    Last 3 Weights:  Last 3 Recorded Weights in this Encounter    10/02/22 0051   Weight: 142.3 kg (313 lb 11.4 oz)   Weight change:     INTAKE/OUPUT    Current Shift: 10/01 1901 - 10/02 0700  In: 600 [P.O.:600]  Out: 1050 [Urine:1050]    Last three shifts: No intake/output data recorded. RECOMMENDATIONS AND DISCHARGE PLANNING  Patient needs and requests: Pain Management    Pending tests/procedures: labs     Discharge plan for patient: Home    Discharge planning Needs or Barriers: None    Estimated Discharge Date: 10/05/2022 Posted on Whiteboard in Patients Room: yes       \"HEALS\" SAFETY CHECK  A safety check occurred in the patient's room between off going nurse and oncoming nurse listed above. The safety check included the below items:    H  High Alert Medications Verify all high alert medication drips (heparin, PCA, etc.)  E  Equipment Suction is set up for ALL patients (with yanker)  Red plugs utilized for all equipment (IV pumps, etc.)  WOWs wiped down at end of shift. Room stocked with oxygen, suction, and other unit-specific supplies  A  Alarms Bed alarm is set for fall risk patients  Ensure chair alarm is in place and activated if patient is up in a chair  L  Lines Check IV for any infiltration  [place] bag is empty if patient has a [place]   Tubing and IV bags are labeled  S  Safety  Room is clean, patient is clean, and equipment is clean. Hallways are clear from equipment besides carts.    Fall bracelet on for fall risk patients  Ensure room is clear and free of clutter  Suction is set up for ALL patients (with yanker)  Hallways are clear from equipment besides carts.    Isolation precautions followed, supplies available outside room, sign posted    Sonya Cordova RN

## 2022-10-02 NOTE — PROGRESS NOTES
10/02/22 0017   Oxygen Therapy   O2 Sat (%) 97 %   O2 Device CPAP mask   CPAP/BIPAP   CPAP/BIPAP Start/Stop On   Device Mode CPAP   $$ CPAP Daily Yes   EPAP (cm H2O) 18 cm H2O   Vt Spont (ml) 454 ml   Backup Rate 8   Leak (Estimated) 13 L/min   Pt's Home Machine No   Biomedical Check Performed Yes   Settings Verified Yes

## 2022-10-03 ENCOUNTER — APPOINTMENT (OUTPATIENT)
Dept: CT IMAGING | Age: 67
DRG: 871 | End: 2022-10-03
Attending: INTERNAL MEDICINE
Payer: MEDICARE

## 2022-10-03 ENCOUNTER — APPOINTMENT (OUTPATIENT)
Dept: NON INVASIVE DIAGNOSTICS | Age: 67
DRG: 871 | End: 2022-10-03
Attending: EMERGENCY MEDICINE
Payer: MEDICARE

## 2022-10-03 LAB
ANION GAP SERPL CALC-SCNC: 8 MMOL/L (ref 3–18)
BASOPHILS # BLD: 0.1 K/UL (ref 0–0.1)
BASOPHILS NFR BLD: 1 % (ref 0–2)
BUN SERPL-MCNC: 17 MG/DL (ref 7–18)
BUN/CREAT SERPL: 14 (ref 12–20)
CALCIUM SERPL-MCNC: 8.8 MG/DL (ref 8.5–10.1)
CHLORIDE SERPL-SCNC: 106 MMOL/L (ref 100–111)
CO2 SERPL-SCNC: 28 MMOL/L (ref 21–32)
CREAT SERPL-MCNC: 1.19 MG/DL (ref 0.6–1.3)
DIFFERENTIAL METHOD BLD: ABNORMAL
ECHO AO ASC DIAM: 3.2 CM
ECHO AO ASCENDING AORTA INDEX: 1.22 CM/M2
ECHO AO ROOT DIAM: 3.5 CM
ECHO AO ROOT INDEX: 1.33 CM/M2
ECHO LA VOL 2C: 53 ML (ref 18–58)
ECHO LA VOL 4C: 50 ML (ref 18–58)
ECHO LA VOLUME AREA LENGTH: 58 ML
ECHO LA VOLUME INDEX A2C: 20 ML/M2 (ref 16–34)
ECHO LA VOLUME INDEX A4C: 19 ML/M2 (ref 16–34)
ECHO LA VOLUME INDEX AREA LENGTH: 22 ML/M2 (ref 16–34)
ECHO LV E' LATERAL VELOCITY: 8 CM/S
ECHO LV E' SEPTAL VELOCITY: 9 CM/S
ECHO LV FRACTIONAL SHORTENING: 34 % (ref 28–44)
ECHO LV INTERNAL DIMENSION DIASTOLE INDEX: 1.79 CM/M2
ECHO LV INTERNAL DIMENSION DIASTOLIC: 4.7 CM (ref 4.2–5.9)
ECHO LV INTERNAL DIMENSION SYSTOLIC INDEX: 1.18 CM/M2
ECHO LV INTERNAL DIMENSION SYSTOLIC: 3.1 CM
ECHO LV IVSD: 1.2 CM (ref 0.6–1)
ECHO LV MASS 2D: 199.6 G (ref 88–224)
ECHO LV MASS INDEX 2D: 75.9 G/M2 (ref 49–115)
ECHO LV POSTERIOR WALL DIASTOLIC: 1.1 CM (ref 0.6–1)
ECHO LV RELATIVE WALL THICKNESS RATIO: 0.47
ECHO LVOT AREA: 3.5 CM2
ECHO LVOT DIAM: 2.1 CM
ECHO LVOT MEAN GRADIENT: 3 MMHG
ECHO LVOT PEAK GRADIENT: 5 MMHG
ECHO LVOT PEAK VELOCITY: 1.2 M/S
ECHO LVOT STROKE VOLUME INDEX: 33.7 ML/M2
ECHO LVOT SV: 88.6 ML
ECHO LVOT VTI: 25.6 CM
ECHO MV A VELOCITY: 0.81 M/S
ECHO MV E DECELERATION TIME (DT): 184.9 MS
ECHO MV E VELOCITY: 0.86 M/S
ECHO MV E/A RATIO: 1.06
ECHO MV E/E' LATERAL: 10.75
ECHO MV E/E' RATIO (AVERAGED): 10.15
ECHO MV E/E' SEPTAL: 9.56
ECHO RV FREE WALL PEAK S': 18 CM/S
ECHO RV TAPSE: 2.3 CM (ref 1.7–?)
EOSINOPHIL # BLD: 0.3 K/UL (ref 0–0.4)
EOSINOPHIL NFR BLD: 5 % (ref 0–5)
ERYTHROCYTE [DISTWIDTH] IN BLOOD BY AUTOMATED COUNT: 14.5 % (ref 11.6–14.5)
GLUCOSE BLD STRIP.AUTO-MCNC: 105 MG/DL (ref 70–110)
GLUCOSE BLD STRIP.AUTO-MCNC: 114 MG/DL (ref 70–110)
GLUCOSE BLD STRIP.AUTO-MCNC: 126 MG/DL (ref 70–110)
GLUCOSE BLD STRIP.AUTO-MCNC: 127 MG/DL (ref 70–110)
GLUCOSE SERPL-MCNC: 113 MG/DL (ref 74–99)
HCT VFR BLD AUTO: 46.7 % (ref 36–48)
HGB BLD-MCNC: 14.1 G/DL (ref 13–16)
IMM GRANULOCYTES # BLD AUTO: 0.1 K/UL (ref 0–0.04)
IMM GRANULOCYTES NFR BLD AUTO: 1 % (ref 0–0.5)
LYMPHOCYTES # BLD: 1.7 K/UL (ref 0.9–3.6)
LYMPHOCYTES NFR BLD: 28 % (ref 21–52)
MAGNESIUM SERPL-MCNC: 2.3 MG/DL (ref 1.6–2.6)
MCH RBC QN AUTO: 27.5 PG (ref 24–34)
MCHC RBC AUTO-ENTMCNC: 30.2 G/DL (ref 31–37)
MCV RBC AUTO: 91.2 FL (ref 78–100)
MONOCYTES # BLD: 0.8 K/UL (ref 0.05–1.2)
MONOCYTES NFR BLD: 14 % (ref 3–10)
NEUTS SEG # BLD: 3.1 K/UL (ref 1.8–8)
NEUTS SEG NFR BLD: 52 % (ref 40–73)
NRBC # BLD: 0 K/UL (ref 0–0.01)
NRBC BLD-RTO: 0 PER 100 WBC
PLATELET # BLD AUTO: 228 K/UL (ref 135–420)
PMV BLD AUTO: 9.9 FL (ref 9.2–11.8)
POTASSIUM SERPL-SCNC: 4.4 MMOL/L (ref 3.5–5.5)
RBC # BLD AUTO: 5.12 M/UL (ref 4.35–5.65)
SODIUM SERPL-SCNC: 142 MMOL/L (ref 136–145)
WBC # BLD AUTO: 6 K/UL (ref 4.6–13.2)

## 2022-10-03 PROCEDURE — 74011636637 HC RX REV CODE- 636/637: Performed by: STUDENT IN AN ORGANIZED HEALTH CARE EDUCATION/TRAINING PROGRAM

## 2022-10-03 PROCEDURE — 74011000636 HC RX REV CODE- 636: Performed by: EMERGENCY MEDICINE

## 2022-10-03 PROCEDURE — 74177 CT ABD & PELVIS W/CONTRAST: CPT

## 2022-10-03 PROCEDURE — 74011000250 HC RX REV CODE- 250: Performed by: STUDENT IN AN ORGANIZED HEALTH CARE EDUCATION/TRAINING PROGRAM

## 2022-10-03 PROCEDURE — 36415 COLL VENOUS BLD VENIPUNCTURE: CPT

## 2022-10-03 PROCEDURE — 74011250636 HC RX REV CODE- 250/636: Performed by: STUDENT IN AN ORGANIZED HEALTH CARE EDUCATION/TRAINING PROGRAM

## 2022-10-03 PROCEDURE — 74011250637 HC RX REV CODE- 250/637: Performed by: STUDENT IN AN ORGANIZED HEALTH CARE EDUCATION/TRAINING PROGRAM

## 2022-10-03 PROCEDURE — 2709999900 HC NON-CHARGEABLE SUPPLY

## 2022-10-03 PROCEDURE — 85025 COMPLETE CBC W/AUTO DIFF WBC: CPT

## 2022-10-03 PROCEDURE — 94660 CPAP INITIATION&MGMT: CPT

## 2022-10-03 PROCEDURE — 83735 ASSAY OF MAGNESIUM: CPT

## 2022-10-03 PROCEDURE — 74011250637 HC RX REV CODE- 250/637: Performed by: EMERGENCY MEDICINE

## 2022-10-03 PROCEDURE — 82962 GLUCOSE BLOOD TEST: CPT

## 2022-10-03 PROCEDURE — 80048 BASIC METABOLIC PNL TOTAL CA: CPT

## 2022-10-03 PROCEDURE — 93306 TTE W/DOPPLER COMPLETE: CPT

## 2022-10-03 PROCEDURE — 87086 URINE CULTURE/COLONY COUNT: CPT

## 2022-10-03 PROCEDURE — 65270000029 HC RM PRIVATE

## 2022-10-03 PROCEDURE — 74011000258 HC RX REV CODE- 258: Performed by: STUDENT IN AN ORGANIZED HEALTH CARE EDUCATION/TRAINING PROGRAM

## 2022-10-03 PROCEDURE — 99232 SBSQ HOSP IP/OBS MODERATE 35: CPT | Performed by: EMERGENCY MEDICINE

## 2022-10-03 RX ADMIN — GABAPENTIN 300 MG: 300 CAPSULE ORAL at 23:05

## 2022-10-03 RX ADMIN — TROSPIUM CHLORIDE 20 MG: 20 TABLET, FILM COATED ORAL at 20:52

## 2022-10-03 RX ADMIN — METOPROLOL SUCCINATE 50 MG: 50 TABLET, EXTENDED RELEASE ORAL at 09:43

## 2022-10-03 RX ADMIN — GABAPENTIN 300 MG: 300 CAPSULE ORAL at 16:05

## 2022-10-03 RX ADMIN — PRAMIPEXOLE DIHYDROCHLORIDE 0.75 MG: 0.25 TABLET ORAL at 16:06

## 2022-10-03 RX ADMIN — LISINOPRIL 20 MG: 20 TABLET ORAL at 09:43

## 2022-10-03 RX ADMIN — MONTELUKAST 10 MG: 10 TABLET, FILM COATED ORAL at 09:43

## 2022-10-03 RX ADMIN — MULTIPLE VITAMINS W/ MINERALS TAB 1 TABLET: TAB at 09:43

## 2022-10-03 RX ADMIN — DOXYCYCLINE 100 MG: 100 INJECTION, POWDER, LYOPHILIZED, FOR SOLUTION INTRAVENOUS at 12:43

## 2022-10-03 RX ADMIN — PRAMIPEXOLE DIHYDROCHLORIDE 0.75 MG: 0.25 TABLET ORAL at 09:44

## 2022-10-03 RX ADMIN — ENOXAPARIN SODIUM 30 MG: 100 INJECTION SUBCUTANEOUS at 18:10

## 2022-10-03 RX ADMIN — ATORVASTATIN CALCIUM 10 MG: 10 TABLET, FILM COATED ORAL at 23:05

## 2022-10-03 RX ADMIN — HYDROCODONE BITARTRATE AND ACETAMINOPHEN 1 TABLET: 5; 325 TABLET ORAL at 04:08

## 2022-10-03 RX ADMIN — AMITRIPTYLINE HYDROCHLORIDE 10 MG: 10 TABLET, FILM COATED ORAL at 23:05

## 2022-10-03 RX ADMIN — ALFUZOSIN HYDROCHLORIDE 10 MG: 10 TABLET ORAL at 09:43

## 2022-10-03 RX ADMIN — ASPIRIN 81 MG: 81 TABLET, COATED ORAL at 09:43

## 2022-10-03 RX ADMIN — BUPROPION HYDROCHLORIDE 150 MG: 150 TABLET, EXTENDED RELEASE ORAL at 09:43

## 2022-10-03 RX ADMIN — GABAPENTIN 300 MG: 300 CAPSULE ORAL at 09:43

## 2022-10-03 RX ADMIN — CEFTRIAXONE 2 G: 2 INJECTION, POWDER, FOR SOLUTION INTRAMUSCULAR; INTRAVENOUS at 00:38

## 2022-10-03 RX ADMIN — DUTASTERIDE 0.5 MG: 0.5 CAPSULE, LIQUID FILLED ORAL at 18:17

## 2022-10-03 RX ADMIN — POLYETHYLENE GLYCOL 3350 17 G: 17 POWDER, FOR SOLUTION ORAL at 09:43

## 2022-10-03 RX ADMIN — FUROSEMIDE 20 MG: 20 TABLET ORAL at 09:43

## 2022-10-03 RX ADMIN — HYDROCODONE BITARTRATE AND ACETAMINOPHEN 1 TABLET: 5; 325 TABLET ORAL at 20:57

## 2022-10-03 RX ADMIN — IOPAMIDOL 100 ML: 612 INJECTION, SOLUTION INTRAVENOUS at 13:34

## 2022-10-03 RX ADMIN — FUROSEMIDE 20 MG: 20 TABLET ORAL at 18:10

## 2022-10-03 RX ADMIN — SODIUM CHLORIDE, PRESERVATIVE FREE 10 ML: 5 INJECTION INTRAVENOUS at 18:09

## 2022-10-03 RX ADMIN — Medication 15 UNITS: at 22:00

## 2022-10-03 RX ADMIN — SODIUM CHLORIDE, PRESERVATIVE FREE 10 ML: 5 INJECTION INTRAVENOUS at 06:59

## 2022-10-03 RX ADMIN — TROSPIUM CHLORIDE 20 MG: 20 TABLET, FILM COATED ORAL at 09:43

## 2022-10-03 RX ADMIN — ENOXAPARIN SODIUM 30 MG: 100 INJECTION SUBCUTANEOUS at 09:42

## 2022-10-03 NOTE — PROGRESS NOTES
OT order received and chart reviewed. OT screen completed with pt reporting that he has been performing all self care independently since yesterday and has been feeling much better. Pt reports that he cleaned room this am and walking around room without AD. Pt declines OT services at this time. Will complete order.  Thank you, Danelle Tay, OTRL

## 2022-10-03 NOTE — PROGRESS NOTES
Problem: Diabetes Self-Management  Goal: *Disease process and treatment process  Description: Define diabetes and identify own type of diabetes; list 3 options for treating diabetes. Outcome: Progressing Towards Goal  Goal: *Incorporating nutritional management into lifestyle  Description: Describe effect of type, amount and timing of food on blood glucose; list 3 methods for planning meals. Outcome: Progressing Towards Goal  Goal: *Incorporating physical activity into lifestyle  Description: State effect of exercise on blood glucose levels. Outcome: Progressing Towards Goal  Goal: *Developing strategies to promote health/change behavior  Description: Define the ABC's of diabetes; identify appropriate screenings, schedule and personal plan for screenings. Outcome: Progressing Towards Goal  Goal: *Using medications safely  Description: State effect of diabetes medications on diabetes; name diabetes medication taking, action and side effects. Outcome: Progressing Towards Goal  Goal: *Monitoring blood glucose, interpreting and using results  Description: Identify recommended blood glucose targets  and personal targets. Outcome: Progressing Towards Goal  Goal: *Prevention, detection, treatment of acute complications  Description: List symptoms of hyper- and hypoglycemia; describe how to treat low blood sugar and actions for lowering  high blood glucose level. Outcome: Progressing Towards Goal  Goal: *Prevention, detection and treatment of chronic complications  Description: Define the natural course of diabetes and describe the relationship of blood glucose levels to long term complications of diabetes.   Outcome: Progressing Towards Goal  Goal: *Developing strategies to address psychosocial issues  Description: Describe feelings about living with diabetes; identify support needed and support network  Outcome: Progressing Towards Goal  Goal: *Sick day guidelines  Outcome: Progressing Towards Goal  Goal: *Patient Specific Goal (EDIT GOAL, INSERT TEXT)  Outcome: Progressing Towards Goal     Problem: Patient Education: Go to Patient Education Activity  Goal: Patient/Family Education  Outcome: Progressing Towards Goal     Problem: Falls - Risk of  Goal: *Absence of Falls  Description: Document Mg Noguera Fall Risk and appropriate interventions in the flowsheet.   Outcome: Progressing Towards Goal  Note: Fall Risk Interventions:            Medication Interventions: Bed/chair exit alarm, Patient to call before getting OOB, Teach patient to arise slowly                   Problem: Patient Education: Go to Patient Education Activity  Goal: Patient/Family Education  Outcome: Progressing Towards Goal     Problem: Pain  Goal: *Control of Pain  Outcome: Progressing Towards Goal     Problem: Patient Education: Go to Patient Education Activity  Goal: Patient/Family Education  Outcome: Progressing Towards Goal     Problem: Hypertension  Goal: *Blood pressure within specified parameters  Outcome: Progressing Towards Goal  Goal: *Fluid volume balance  Outcome: Progressing Towards Goal  Goal: *Labs within defined limits  Outcome: Progressing Towards Goal     Problem: Patient Education: Go to Patient Education Activity  Goal: Patient/Family Education  Outcome: Progressing Towards Goal     Problem: Gas Exchange - Impaired  Goal: *Absence of hypoxia  Outcome: Progressing Towards Goal     Problem: Patient Education: Go to Patient Education Activity  Goal: Patient/Family Education  Outcome: Progressing Towards Goal     Problem: Patient Education: Go to Patient Education Activity  Goal: Patient/Family Education  Outcome: Progressing Towards Goal

## 2022-10-03 NOTE — CONSULTS
Infectious Disease Consultation Note        Reason:fever,unclear etiology    Current abx Prior abx   Ceftriaxone, doxycycline since 10/1/22      Lines:       Assessment :  79 y.o. male with past medical history significant for hypertension diabetes who presented to the ED on 9/30/22 with 3 days of shortness of breath. Hospitalization at [facility] 1/2019 for sepsis  due to klebsiella bloodstream infection (positive blood culture 1/14, negative blood culture 1/16), right kidney pyelonephritis, complicated UTI due to klebsiella. s/p right retrogram pyelogram, right ureteral stent placement, urethral dilatation on 1/15/19     Clinical presentation consistent with sepsis-present on admission likely due to cystitis    Increased frequency of urination prior to admission along with significant bacteriuria/pyuria likely suggest cystitis. Unfortunately no urine culture available from admission. Current antibiotics will likely mask the urine culture results at this time. Shortness of breath on admission could be response to sepsis. No definitive clinical/radiological evidence to suggest pneumonia, pulmonary infection    Patient seems to be clinically responding to current antibiotics. Prior history of complicated urinary tract infection/obstructive uropathy. Recommend to obtain imaging studies to rule out complicated UTI     Recommendations:    Continue ceftriaxone. Discontinue doxycycline  Obtain urine culture  Obtain CT abdomen/pelvis to rule out obstructive uropathy  Monitor CBC, temperature, clinically     1. Thank you for consultation request. Above plan was discussed in details with patient,  and dr Melissa Patterson. Please call me if any further questions or concerns. Will continue to participate in the care of this patient. HPI:    79 y.o. male with past medical history significant for hypertension diabetes who presented to the ED on 9/30/22 with 3 days of shortness of breath.       I obtained history by talking to patient, review of records. EMS reported oxygen saturation of 85% on room air. He was found to be febrile to 102.7; ABG on 4 L nasal cannula shows PaO2 of 65. He was sent in the emergency room a week or so ago for chest pain and nausea vomiting. He said his chest pain has since resolved. He states that no work-up was done for his nausea. About 3 days prior to presentation he noted that he lost control over his urinating. He had increased frequency of urination along with wetting his bed. Also had subjective sense of fever/chills. He came to the emergency room with these complaints. He had 4+ bacteria in urine analysis on admission. No urine cultures were sent. Patient was initiated on broad-spectrum antibiotics. I have been consulted since source of fever on admission not clear. Patient states that he feels better today compared to day of admission. No further breath. He has more control over his urine. Did have subjective sense of feeling hot and sweaty this AM.  Denies any burning while urinating. Denies any flank pain. No recent sick contacts.       Past Medical History:   Diagnosis Date    Abdominal adhesions 7/30/2014    BPH (benign prostatic hyperplasia)     Cellulitis     DDD (degenerative disc disease), lumbar 7/30/2014    Depression     Diabetes ([address]     Frequent falls 7/30/2014    TRUE (generalized anxiety disorder) 7/30/2014    HLD (hyperlipidemia)     Hypertension     Hypogonadism in male     Incomplete bladder emptying     Kidney stones     Lumbar facet arthropathy 7/30/2014    Lumbar nerve root impingement 7/30/2014    Lumbosacral radiculopathy at S1 7/30/2014    Major depression 7/30/2014    Neurological disorder     sciatica    MANISH (obstructive sleep apnea) 7/30/2014    S/P lumbar laminectomy 7/30/2014    S/P lumbar spinal fusion 7/30/2014    Sleep apnea     USES CPAP EVERY NIGHT    Thoracic compression fracture ([address] 07/30/2014       Past Surgical History:   Procedure Laterality Date    HX APPENDECTOMY      HX BACK SURGERY      Lumbar fusion    [facility]    HX OTHER SURGICAL      EMG - S1 disorder    HX OTHER SURGICAL  10/2017    spinal cord stimulator    HX VASECTOMY  1985       Current Discharge Medication List        CONTINUE these medications which have NOT CHANGED    Details   ondansetron hcl (Zofran) 4 mg tablet Take 1 Tablet by mouth every eight (8) hours as needed for Nausea. Qty: 12 Tablet, Refills: 0      furosemide (LASIX) 20 mg tablet TAKE 1 TABLET BY MOUTH TWICE DAILY  Qty: 60 Tablet, Refills: 6    Associated Diagnoses: SOB (shortness of breath)      pramipexole (MIRAPEX) 0.75 mg tablet TAKE 3 TABLETS ONE TIME DAILY  Qty: 270 Tablet, Refills: 1      metoprolol succinate (TOPROL-XL) 50 mg XL tablet TAKE 1 TABLET EVERY DAY  Qty: 90 Tablet, Refills: 1    Associated Diagnoses: Essential hypertension      glipiZIDE (GLUCOTROL) 10 mg tablet TAKE 1 TABLET TWICE DAILY  Qty: 180 Tablet, Refills: 1    Associated Diagnoses: Type 2 diabetes mellitus with diabetic neuropathy, without long-term current use of insulin (HCC)      amitriptyline (ELAVIL) 10 mg tablet TAKE 1 TABLET EVERY NIGHT  Qty: 90 Tablet, Refills: 1    Associated Diagnoses: Lumbar post-laminectomy syndrome; S/P lumbar laminectomy; S/P lumbar spinal fusion      simvastatin (ZOCOR) 10 mg tablet TAKE 1 TABLET EVERY NIGHT  Qty: 90 Tablet, Refills: 1      buPROPion SR (WELLBUTRIN SR) 150 mg SR tablet TAKE 1 TABLET EVERY DAY  Qty: 90 Tablet, Refills: 1      montelukast (SINGULAIR) 10 mg tablet TAKE 1 TABLET EVERY DAY  Qty: 90 Tablet, Refills: 1      dapagliflozin (FARXIGA) 10 mg tab tablet Take 1 Tablet by mouth daily. Qty: 90 Tablet, Refills: 1      dulaglutide (Trulicity) 3 TK/3.2 mL pnij 3 mg by SubCUTAneous route every seven (7) days. Qty: 12 Each, Refills: 3    Comments: Emily Hernández - Dose adjustment.  D/c 1.5 mg dose  Associated Diagnoses: Type 2 diabetes mellitus with diabetic neuropathy, without long-term current use of insulin (HCC)      alfuzosin SR (UROXATRAL) 10 mg SR tablet TAKE 1 TABLET BY MOUTH DAILY AFTER DINNER  Qty: 90 Tablet, Refills: 3    Comments: ZERO refills remain on this prescription. Your patient is requesting advance approval of refills for this medication to PREVENT ANY MISSED DOSES      gabapentin (NEURONTIN) 300 mg capsule Take 300 mg by mouth three (3) times daily. Taking as needed which was given from Dr Roman Moya in the past      dutasteride (AVODART) 0.5 mg capsule Take 1 Capsule by mouth daily (after dinner). Qty: 90 Capsule, Refills: 3      testosterone cypionate (DEPOTESTOTERONE CYPIONATE) 200 mg/mL injection 0.5 mL by IntraMUSCular route every seven (7) days. Max Daily Amount: 100 mg. Qty: 10 mL, Refills: 0    Associated Diagnoses: Male hypogonadism      aspirin delayed-release 81 mg tablet Take 1 Tablet by mouth daily. Qty: 90 Tablet, Refills: 1    Associated Diagnoses: Chest pain, unspecified type      solifenacin (VESICARE) 10 mg tablet Take 1 Tablet by mouth daily. Qty: 90 Tablet, Refills: 3      cpap machine kit by Does Not Apply route. lisinopriL (PRINIVIL, ZESTRIL) 20 mg tablet Take 1 Tab by mouth daily. Qty: 30 Tab, Refills: 1    Associated Diagnoses: Essential hypertension; Type 2 diabetes mellitus with diabetic neuropathy, without long-term current use of insulin ([facility]      ! ! lancets (TRUEplus Lancets) 33 gauge misc CHECK BLOOD SUGAR THREE TIMES DAILY  Qty: 300 Each, Refills: 5      DropSafe Alcohol Prep Pads padm USE AS DIRECTED WHEN CHECKING BLOOD SUGAR THREE TIMES DAILY  Qty: 300 Pad, Refills: 5      sildenafil citrate (VIAGRA) 100 mg tablet Take 1 Tablet by mouth as needed for Erectile Dysfunction (do not take more than 1 tab in 36 hour period).   Qty: 30 Tablet, Refills: 3      glucose blood VI test strips (True Metrix Glucose Test Strip) strip TEST BLOOD SUGAR THREE TIMES DAILY  Qty: 300 Strip, Refills: 5      nitroglycerin (NITROSTAT) 0.4 mg SL tablet DISSOLVE ONE TABLET UNDER TONGUE AS NEEDED FOR CHEST PAIN EVERY 5 MINUTES FOR UP TO 3 DOSES  Qty: 30 Tablet, Refills: 0    Associated Diagnoses: Primary hypertension; Chest pain, unspecified type; Sleep apnea, unspecified type      Blood-Glucose Meter (True Metrix Glucose Meter) misc Check blood sugar 3 times daily  Qty: 1 Each, Refills: 0      Cane maura by Does Not Apply route. Blood-Glucose Meter monitoring kit Use to check blood sugar 3 times daily. Dx E11.9. Please dispense brand preferred by insurance. Qty: 1 Kit, Refills: 0    Associated Diagnoses: Type 2 diabetes mellitus with diabetic neuropathy, without long-term current use of insulin ([facility]      ! ! lancets misc Use to check blood sugar 3 times daily. Dx E11.9. Please dispense brand preferred by insurance. Qty: 100 Each, Refills: 11    Associated Diagnoses: Type 2 diabetes mellitus with diabetic neuropathy, without long-term current use of insulin ([facility])      folic acid/multivit-min/lutein (CENTRUM SILVER PO) Take  by mouth. Syringe, Disposable, 3 mL syrg Take as directed  Qty: 30 Syringe, Refills: 1      Needle, Disp, 23 G 23 gauge x 1 1/4\" ndle Use to inject 0.5 ml every 7 days  Qty: 30 Pen Needle, Refills: 1    Associated Diagnoses: Low testosterone      Needle, Disp, 18 G 18 gauge x 1\" ndle Use to aspirate 0.5 ml every 7 days  Qty: 30 Pen Needle, Refills: 1    Associated Diagnoses: Low testosterone      Wheel Chair maura Assistance with ambulation, gait instability  Qty: 1 Each, Refills: 0    Associated Diagnoses: Leg pain, bilateral; Gait instability      polyethylene glycol (MIRALAX) 17 gram packet Take 1 Packet by mouth daily. Hold for loose stools  Qty: 10 Each, Refills: 0      naloxone 4 mg/actuation spry 4 mg by Nasal route as needed. Qty: 1 Box, Refills: 0      Back Brace misc 1 Device by Does Not Apply route daily as needed for Pain.  One, lumbosacral orthosis/back brace with metal struts      Medically necessary  Qty: 1 Device, Refills: 0       !! - Potential duplicate medications found. Please discuss with provider.           Current Facility-Administered Medications   Medication Dose Route Frequency    cefTRIAXone (ROCEPHIN) 2 g in sterile water (preservative free) 20 mL IV syringe  2 g IntraVENous Q24H    doxycycline (VIBRAMYCIN) 100 mg in 0.9% sodium chloride (MBP/ADV) 100 mL MBP  100 mg IntraVENous Q12H    alfuzosin SR (UROXATRAL) tablet 10 mg  10 mg Oral DAILY    amitriptyline (ELAVIL) tablet 10 mg  10 mg Oral QHS    aspirin delayed-release tablet 81 mg  81 mg Oral DAILY    buPROPion SR (WELLBUTRIN SR) tablet 150 mg  150 mg Oral DAILY    dutasteride (AVODART) capsule 0.5 mg  0.5 mg Oral PCD    multivitamin, tx-iron-ca-min (THERA-M w/ IRON) tablet 1 Tablet  1 Tablet Oral DAILY    furosemide (LASIX) tablet 20 mg  20 mg Oral BID    gabapentin (NEURONTIN) capsule 300 mg  300 mg Oral TID    lisinopriL (PRINIVIL, ZESTRIL) tablet 20 mg  20 mg Oral DAILY    metoprolol succinate (TOPROL-XL) XL tablet 50 mg  50 mg Oral DAILY    montelukast (SINGULAIR) tablet 10 mg  10 mg Oral DAILY    polyethylene glycol (MIRALAX) packet 17 g  17 g Oral DAILY    pramipexole (MIRAPEX) tablet 0.75 mg  0.75 mg Oral TID    atorvastatin (LIPITOR) tablet 10 mg  10 mg Oral QHS    trospium (SANCTURA) tablet 20 mg  20 mg Oral Q12H    sodium chloride (NS) flush 5-40 mL  5-40 mL IntraVENous Q8H    sodium chloride (NS) flush 5-40 mL  5-40 mL IntraVENous PRN    acetaminophen (TYLENOL) tablet 650 mg  650 mg Oral Q6H PRN    Or    acetaminophen (TYLENOL) suppository 650 mg  650 mg Rectal Q6H PRN    polyethylene glycol (MIRALAX) packet 17 g  17 g Oral DAILY PRN    ondansetron (ZOFRAN ODT) tablet 4 mg  4 mg Oral Q8H PRN    Or    ondansetron (ZOFRAN) injection 4 mg  4 mg IntraVENous Q6H PRN    enoxaparin (LOVENOX) injection 30 mg  30 mg SubCUTAneous BID    insulin glargine (LANTUS) injection 15 Units  15 Units SubCUTAneous QHS    insulin lispro (HUMALOG) injection   SubCUTAneous AC&HS    glucose chewable tablet 16 g  4 Tablet Oral PRN    glucagon (GLUCAGEN) injection 1 mg  1 mg IntraMUSCular PRN    dextrose 10% infusion 0-250 mL  0-250 mL IntraVENous PRN    HYDROcodone-acetaminophen (NORCO) 5-325 mg per tablet 1 Tablet  1 Tablet Oral Q6H PRN    sodium chloride (NS) flush 5-10 mL  5-10 mL IntraVENous PRN       Allergies: Bactrim [sulfamethoprim] and Opana [oxymorphone]    Family History   Problem Relation Age of Onset    Diabetes Mother     Hypertension Mother     Stroke Mother     Heart Attack Father     Diabetes Father     Stroke Father     Diabetes Sister     Stroke Brother      Social History     Socioeconomic History    Marital status:      Spouse name: Not on file    Number of children: Not on file    Years of education: Not on file    Highest education level: Not on file   Occupational History    Not on file   Tobacco Use    Smoking status: Never    Smokeless tobacco: Never   Vaping Use    Vaping Use: Never used   Substance and Sexual Activity    Alcohol use: Yes    Drug use: No    Sexual activity: Not Currently     Partners: Female     Birth control/protection: Surgical   Other Topics Concern    Not on file   Social History Narrative    Not on file     Social Determinants of Health     Financial Resource Strain: Not on file   Food Insecurity: Not on file   Transportation Needs: Not on file   Physical Activity: Not on file   Stress: Not on file   Social Connections: Not on file   Intimate Partner Violence: Not on file   Housing Stability: Not on file     Social History     Tobacco Use   Smoking Status Never   Smokeless Tobacco Never        Temp (24hrs), Av.7 °F (36.5 °C), Min:97.3 °F (36.3 °C), Max:98.2 °F (36.8 °C)    Visit Vitals  /83 (BP 1 Location: Left upper arm)   Pulse 74   Temp 97.4 °F (36.3 °C)   Resp 20   Wt 142.2 kg (313 lb 7.9 oz)   SpO2 96%   BMI 40.25 kg/m²       ROS: 12 point ROS obtained in details.  Pertinent positives as mentioned in HPI,   otherwise negative    Physical Exam:    General: obese male laying on the bed/sitting on the  bed AAOx3 in no acute distress. General:   awake alert and oriented    HEENT:  Normocephalic, atraumatic, EOMI, no scleral icterus or pallor; no conjunctival hemmohage;  nasal and oral mucous are moist and without evidence of lesions. No thrush. Neck supple, no bruits. Lymph Nodes:   no cervical, axillary or inguinal adenopathy    Lungs:   non-labored, bilaterally clear to auscultation- no crackles wheezes rales or rhonchi    Heart:  RRR, s1 and s2; no rubs or gallops, no edema    Abdomen:  soft, non-distended, active bowel sounds, no hepatomegaly, no splenomegaly. non tender    Genitourinary:  deferred    Extremities:   no clubbing, cyanosis; no joint effusions or swelling; Full ROM of all large joints to the upper and lower extremities; muscle mass appropriate for age    Neurologic:  No gross focal sensory or motor abnormalities; Speech appropriate.  Cranial nerves intact                         Skin:  No rash/ulcers    Back:  no spinal or paraspinal muscle tenderness or rigidity, no CVA tenderness       Psychiatric:  No suicidal or homicidal ideations, appropriate mood and affect          Labs: Results:   Chemistry Recent Labs     10/03/22  0420 10/02/22  0332 10/01/22  0917 09/30/22  2020   * 98 132* 146*    140 136 139   K 4.4 3.9 3.7 3.8    107 105 103   CO2 28 28 26 26   BUN 17 16 15 16   CREA 1.19 1.23 1.24 1.31*   CA 8.8 8.4* 8.9 8.9   AGAP 8 5 5 10   BUCR 14 13 12 12   AP  --   --   --  106   TP  --   --   --  7.7   ALB  --   --   --  3.8   GLOB  --   --   --  3.9   AGRAT  --   --   --  1.0      CBC w/Diff Recent Labs     10/03/22  0420 10/02/22  0332 10/01/22  0917   WBC 6.0 6.6 7.2   RBC 5.12 4.67 4.81   HGB 14.1 13.2 13.6   HCT 46.7 43.5 43.8    210 183   GRANS 52 54 76*   LYMPH 28 23 10*   EOS 5 4 1      Microbiology Recent Labs     09/30/22 2025 09/30/22 2020   CULT NO GROWTH 3 DAYS NO GROWTH 3 DAYS          RADIOLOGY:    All available imaging studies/reports in [facility] for this admission were reviewed      Disclaimer: Sections of this note are dictated utilizing voice recognition software, which may have resulted in some phonetic based errors in grammar and contents. Even though attempts were made to correct all the mistakes, some may have been missed, and remained in the body of the document. If questions arise, please contact our department.     Dr. Rafael Leone, Infectious Disease Specialist  [phone]  October 3, 2022  8:05 AM

## 2022-10-03 NOTE — DIABETES MGMT
Diabetes/ Glycemic Control Plan of Care    Recommendations:   1.) correctional and basal insulin as ordered. Assessment: Patient with history of T2DM and noted list of home diabetes meds: Farxiga, glipizide, and Trulicity SC inj every 7 days was admitted on 9/30/2022 with report of worsening shortness of breath. POC BG range  on 10/02/2022  POC BG at time of this review: 105, 126    Patient reported improved BG and A1c was lowered once Trulicity was added to his regimen. Patient verbalized understanding about use on insulin while he is in house. DX:   1. Acute respiratory failure with hypoxia ([address]        2. Fever, unspecified fever cause           Fasting/ Morning blood glucose:   Lab Results   Component Value Date/Time    Glucose 113 (H) 10/03/2022 04:20 AM    Glucose (POC) 126 (H) 10/03/2022 11:58 AM     IV Fluids containing dextrose: none    Steroids:  None    Blood glucose values: Within target range (70-180mg/dL):  YES    Current insulin orders:   Basal lantus insulin 15 units daily at bedtime  Correctional lispro insulin. Normal sensitivity dose    Total Daily Dose previous 24 hours: 15 units of lantus insulin    Current A1c:   Lab Results   Component Value Date/Time    Hemoglobin A1c 6.7 (H) 10/01/2022 09:17 AM    Hemoglobin A1c (POC) 7.0 09/13/2022 03:56 PM      equivalent  to ave Blood Glucose of 146 mg/dl for 2-3 months prior to admission    Adequate glycemic control PTA: YES    Nutrition/Diet:   Active Orders   Diet    ADULT DIET Regular; 4 carb choices (60 gm/meal)      Meal Intake:  No data found. Supplement Intake:  No data found. Home diabetes medications: Verified with patient  Key Antihyperglycemic Medications               glipiZIDE (GLUCOTROL) 10 mg tablet (Taking) TAKE 1 TABLET TWICE DAILY    dapagliflozin (FARXIGA) 10 mg tab tablet (Taking) Take 1 Tablet by mouth daily.     dulaglutide (Trulicity) 3 GR/4.4 mL pnij (Taking) 3 mg by SubCUTAneous route every seven (7) days.            Plan/Goals:   Blood glucose will be within target of 70 - 180 mg/dl within 72 hours  Reinforce dietary and medication compliance at home.             Education:  [] Refer to Diabetes Education Record                       [x] Education not indicated at this time     Holly Morales RN [facility]  Office: [phone]

## 2022-10-03 NOTE — PROGRESS NOTES
Problem: Diabetes Self-Management  Goal: *Disease process and treatment process  Description: Define diabetes and identify own type of diabetes; list 3 options for treating diabetes. Outcome: Progressing Towards Goal  Goal: *Incorporating nutritional management into lifestyle  Description: Describe effect of type, amount and timing of food on blood glucose; list 3 methods for planning meals. Outcome: Progressing Towards Goal  Goal: *Incorporating physical activity into lifestyle  Description: State effect of exercise on blood glucose levels. Outcome: Progressing Towards Goal  Goal: *Developing strategies to promote health/change behavior  Description: Define the ABC's of diabetes; identify appropriate screenings, schedule and personal plan for screenings. Outcome: Progressing Towards Goal  Goal: *Using medications safely  Description: State effect of diabetes medications on diabetes; name diabetes medication taking, action and side effects. Outcome: Progressing Towards Goal  Goal: *Monitoring blood glucose, interpreting and using results  Description: Identify recommended blood glucose targets  and personal targets. Outcome: Progressing Towards Goal  Goal: *Prevention, detection, treatment of acute complications  Description: List symptoms of hyper- and hypoglycemia; describe how to treat low blood sugar and actions for lowering  high blood glucose level. Outcome: Progressing Towards Goal  Goal: *Prevention, detection and treatment of chronic complications  Description: Define the natural course of diabetes and describe the relationship of blood glucose levels to long term complications of diabetes.   Outcome: Progressing Towards Goal  Goal: *Developing strategies to address psychosocial issues  Description: Describe feelings about living with diabetes; identify support needed and support network  Outcome: Progressing Towards Goal  Goal: *Sick day guidelines  Outcome: Progressing Towards Goal  Goal: *Patient Specific Goal (EDIT GOAL, INSERT TEXT)  Outcome: Progressing Towards Goal     Problem: Patient Education: Go to Patient Education Activity  Goal: Patient/Family Education  Outcome: Progressing Towards Goal     Problem: Falls - Risk of  Goal: *Absence of Falls  Description: Document Laverne Clements Fall Risk and appropriate interventions in the flowsheet.   Outcome: Progressing Towards Goal  Note: Fall Risk Interventions:            Medication Interventions: Patient to call before getting OOB, Teach patient to arise slowly                   Problem: Patient Education: Go to Patient Education Activity  Goal: Patient/Family Education  Outcome: Progressing Towards Goal     Problem: Pain  Goal: *Control of Pain  Outcome: Progressing Towards Goal     Problem: Patient Education: Go to Patient Education Activity  Goal: Patient/Family Education  Outcome: Progressing Towards Goal     Problem: Hypertension  Goal: *Blood pressure within specified parameters  Outcome: Progressing Towards Goal  Goal: *Fluid volume balance  Outcome: Progressing Towards Goal  Goal: *Labs within defined limits  Outcome: Progressing Towards Goal     Problem: Patient Education: Go to Patient Education Activity  Goal: Patient/Family Education  Outcome: Progressing Towards Goal     Problem: Gas Exchange - Impaired  Goal: *Absence of hypoxia  Outcome: Progressing Towards Goal     Problem: Patient Education: Go to Patient Education Activity  Goal: Patient/Family Education  Outcome: Progressing Towards Goal     Problem: Patient Education: Go to Patient Education Activity  Goal: Patient/Family Education  Outcome: Progressing Towards Goal

## 2022-10-03 NOTE — PROGRESS NOTES
[facility] Hospitalist Group  Progress Note    Patient: Sudarshan Stafford Age: 79 y.o. : 1955 MR#: [id number] SSN: [id number]  Date/Time: 10/3/2022    Subjective:     Patient is sitting in bed in no apparent distress. Awake and alert. Currently off oxygen. Still has some dysuria. Has dyspnea on exertion    Assessment/Plan:     Fevers  UTI  Hypoxia  Obstructive sleep apnea  Hypertension  History of chronic pain  Diabetes type 2  Obesity Body mass index is 40.19 kg/m². PLAN  Continue antibiotics per ID. Discussed with ID physician  Patient is currently off oxygen. Home oxygen evaluation.   Monitor blood pressures  Lantus, sliding scale insulin  CPAP nightly  Physical therapy and Occupational Therapy  Discussed with patient  Continue CPAP  Echo did not demonstrate pulmonary hypertension    Case discussed with:  [x]Patient  []Family  []Nursing  []Case Management  DVT Prophylaxis:  [x]Lovenox  []Hep SQ  []SCDs  []Coumadin   []On Heparin gtt    Objective:   VS: Visit Vitals  /79   Pulse 78   Temp 98.6 °F (37 °C)   Resp 20   Ht 6' 2\" (1.88 m)   Wt 142 kg (313 lb)   SpO2 93%   BMI 40.19 kg/m²      Tmax/24hrs: Temp (24hrs), Av.8 °F (36.6 °C), Min:97.3 °F (36.3 °C), Max:98.6 °F (37 °C)    Input/Output:   Intake/Output Summary (Last 24 hours) at 10/3/2022 1738  Last data filed at 10/3/2022 0409  Gross per 24 hour   Intake 360 ml   Output 1300 ml   Net -940 ml       General:  Awake, alert  Cardiovascular:  S1S2+, RRR  Pulmonary:  CTA b/l  GI:  Soft, BS+, NT, ND, obese  Extremities:  No edema        Labs:    Recent Results (from the past 24 hour(s))   GLUCOSE, POC    Collection Time: 10/02/22  9:31 PM   Result Value Ref Range    Glucose (POC) 148 (H) 70 - 110 mg/dL   CBC WITH AUTOMATED DIFF    Collection Time: 10/03/22  4:20 AM   Result Value Ref Range    WBC 6.0 4.6 - 13.2 K/uL    RBC 5.12 4.35 - 5.65 M/uL    HGB 14.1 13.0 - 16.0 g/dL    HCT 46.7 36.0 - 48.0 %    MCV 91.2 78.0 - 100.0 FL    MCH 27.5 24.0 - 34.0 PG    MCHC 30.2 (L) 31.0 - 37.0 g/dL    RDW 14.5 11.6 - 14.5 %    PLATELET 525 232 - 297 K/uL    MPV 9.9 9.2 - 11.8 FL    NRBC 0.0 0  WBC    ABSOLUTE NRBC 0.00 0.00 - 0.01 K/uL    NEUTROPHILS 52 40 - 73 %    LYMPHOCYTES 28 21 - 52 %    MONOCYTES 14 (H) 3 - 10 %    EOSINOPHILS 5 0 - 5 %    BASOPHILS 1 0 - 2 %    IMMATURE GRANULOCYTES 1 (H) 0.0 - 0.5 %    ABS. NEUTROPHILS 3.1 1.8 - 8.0 K/UL    ABS. LYMPHOCYTES 1.7 0.9 - 3.6 K/UL    ABS. MONOCYTES 0.8 0.05 - 1.2 K/UL    ABS. EOSINOPHILS 0.3 0.0 - 0.4 K/UL    ABS. BASOPHILS 0.1 0.0 - 0.1 K/UL    ABS. IMM.  GRANS. 0.1 (H) 0.00 - 0.04 K/UL    DF AUTOMATED     METABOLIC PANEL, BASIC    Collection Time: 10/03/22  4:20 AM   Result Value Ref Range    Sodium 142 136 - 145 mmol/L    Potassium 4.4 3.5 - 5.5 mmol/L    Chloride 106 100 - 111 mmol/L    CO2 28 21 - 32 mmol/L    Anion gap 8 3.0 - 18 mmol/L    Glucose 113 (H) 74 - 99 mg/dL    BUN 17 7.0 - 18 MG/DL    Creatinine 1.19 0.6 - 1.3 MG/DL    BUN/Creatinine ratio 14 12 - 20      GFR est AA >60 >60 ml/min/1.73m2    GFR est non-AA >60 >60 ml/min/1.73m2    Calcium 8.8 8.5 - 10.1 MG/DL   MAGNESIUM    Collection Time: 10/03/22  4:20 AM   Result Value Ref Range    Magnesium 2.3 1.6 - 2.6 mg/dL   GLUCOSE, POC    Collection Time: 10/03/22  8:17 AM   Result Value Ref Range    Glucose (POC) 105 70 - 110 mg/dL   ECHO ADULT COMPLETE    Collection Time: 10/03/22 11:49 AM   Result Value Ref Range    IVSd 1.2 (A) 0.6 - 1.0 cm    LVIDd 4.7 4.2 - 5.9 cm    LVIDs 3.1 cm    LVOT Diameter 2.1 cm    LVPWd 1.1 (A) 0.6 - 1.0 cm    LVOT Peak Gradient 5 mmHg    LVOT Mean Gradient 3 mmHg    LVOT SV 88.6 ml    LVOT Peak Velocity 1.2 m/s    LVOT VTI 25.6 cm    RV Free Wall Peak S' 18 cm/s    LA Volume A/L 58 mL    LA Volume 2C 53 18 - 58 mL    LA Volume 4C 50 18 - 58 mL    MV A Velocity 0.81 m/s    MV E Wave Deceleration Time 184.9 ms    MV E Velocity 0.86 m/s    LV E' Lateral Velocity 8 cm/s    LV E' Septal Velocity 9 cm/s    TAPSE 2.3 1.7 cm    Ascending Aorta 3.2 cm    Aortic Root 3.5 cm    Fractional Shortening 2D 34 28 - 44 %    LVIDd Index 1.79 cm/m2    LVIDs Index 1.18 cm/m2    LV RWT Ratio 0.47     LV Mass 2D 199.6 88 - 224 g    LV Mass 2D Index 75.9 49 - 115 g/m2    MV E/A 1.06     E/E' Ratio (Averaged) 10.15     E/E' Lateral 10.75     E/E' Septal 9.56     LA Volume Index A/L 22 16 - 34 mL/m2    LVOT Stroke Volume Index 33.7 mL/m2    LVOT Area 3.5 cm2    LA Volume Index 2C 20 16 - 34 mL/m2    LA Volume Index 4C 19 16 - 34 mL/m2    Ao Root Index 1.33 cm/m2    Ascending Aorta Index 1.22 cm/m2   GLUCOSE, POC    Collection Time: 10/03/22 11:58 AM   Result Value Ref Range    Glucose (POC) 126 (H) 70 - 110 mg/dL   GLUCOSE, POC    Collection Time: 10/03/22  4:31 PM   Result Value Ref Range    Glucose (POC) 127 (H) 70 - 110 mg/dL     Additional Data Reviewed:      Signed By: Sanjay Lin MD     October 3, 2022

## 2022-10-03 NOTE — ROUTINE PROCESS
Bedside and Verbal shift change report given to Kwaku Spencer RN (oncoming nurse) by Robert Ledesma RN (offgoing nurse). Report included the following information SBAR, Kardex, MAR and Recent Results.     SITUATION:  Code Status: Full Code  Reason for Admission: Acute respiratory failure with hypoxia ([facility] [address] [J96.01]  Fever [R50.9]  Hospital day: 2  Problem List:       Hospital Problems  Date Reviewed: 10/1/2022            Codes Class Noted POA    Fever ICD-10-CM: R50.9  ICD-9-CM: 780.60  10/1/2022 Unknown        Acute respiratory failure with hypoxia [facility]) ICD-10-CM: J96.01  ICD-9-CM: 518.81  10/1/2022 Unknown        DM (diabetes mellitus) ([facility] [address] ICD-10-CM: E11.9  ICD-9-CM: 250.00  7/30/2014 Yes        Hyperlipidemia ICD-10-CM: E78.5  ICD-9-CM: 272.4  7/30/2014 Yes         BACKGROUND:   Past Medical History:   Past Medical History:   Diagnosis Date    Abdominal adhesions 7/30/2014    BPH (benign prostatic hyperplasia)     Cellulitis     DDD (degenerative disc disease), lumbar 7/30/2014    Depression     Diabetes ([facility] [address]     Frequent falls 7/30/2014    TRUE (generalized anxiety disorder) 7/30/2014    HLD (hyperlipidemia)     Hypertension     Hypogonadism in male     Incomplete bladder emptying     Kidney stones     Lumbar facet arthropathy 7/30/2014    Lumbar nerve root impingement 7/30/2014    Lumbosacral radiculopathy at S1 7/30/2014    Major depression 7/30/2014    Neurological disorder     sciatica    MANISH (obstructive sleep apnea) 7/30/2014    S/P lumbar laminectomy 7/30/2014    S/P lumbar spinal fusion 7/30/2014    Sleep apnea     USES CPAP EVERY NIGHT    Thoracic compression fracture ([facility] [address] 07/30/2014      Patient taking anticoagulants yes    Patient has a defibrillator: no    History of shots YES for example, flu, pneumonia, tetanus   Isolation History NO for example, MRSA, CDiff    ASSESSMENT:  Changes in Assessment Throughout Shift: NONE  Significant Changes in 24 hours (for example, RR/code, fall)  Patient has Central Line: no   Patient has [place] Cath: no   Mobility Issues  PT  IV Patency  OR Checklist  Pending Tests    Last Vitals:  Vitals w/ MEWS Score (last day)       Date/Time MEWS Score Pulse Resp Temp BP Level of Consciousness SpO2    10/03/22 0356 1 74 20 97.4 °F (36.3 °C) 131/83 0 96 %    10/03/22 0003 1 76 20 97.4 °F (36.3 °C) 126/72 0 97 %    10/02/22 2029 1 75 20 97.3 °F (36.3 °C) 151/79 0 95 %    10/02/22 1606 1 75 20 98 °F (36.7 °C) 123/70 0 93 %    10/02/22 1126 1 79 20 98.2 °F (36.8 °C) 126/76 0 94 %    10/02/22 0754 1 80 20 98.5 °F (36.9 °C) 123/78 0 95 %    10/02/22 0355 1 79 20 97.5 °F (36.4 °C) 110/76 0 94 %    10/02/22 0017 -- -- -- -- -- -- 97 %    10/02/22 0007 2 77 18 98 °F (36.7 °C) 95/60 0 97 %          PAIN    Pain Assessment    Pain Intensity 1: 0 (10/03/22 0507)    Pain Location 1: Generalized    Pain Intervention(s) 1: Medication (see MAR)    Patient Stated Pain Goal: 0  Intervention effective: yes  Time of last intervention: 0408 Reassessment Completed: yes   Other actions taken for pain: Distraction    Last 3 Weights:  Last 3 Recorded Weights in this Encounter    10/02/22 0051 10/03/22 0615   Weight: 142.3 kg (313 lb 11.4 oz) 142.2 kg (313 lb 7.9 oz)   Weight change: -0.1 kg (-3.5 oz)    INTAKE/OUPUT    Current Shift: No intake/output data recorded. Last three shifts: 10/01 1901 - 10/03 0700  In: 12 [P.O.:960]  Out: 2350 [Urine:2350]    RECOMMENDATIONS AND DISCHARGE PLANNING  Patient needs and requests: Pain Management    Pending tests/procedures: labs     Discharge plan for patient: Home    Discharge planning Needs or Barriers: None    Estimated Discharge Date: 10/05/2022 Posted on Whiteboard in Patients Room: yes       \"HEALS\" SAFETY CHECK  A safety check occurred in the patient's room between off going nurse and oncoming nurse listed above.     The safety check included the below items:    H  High Alert Medications Verify all high alert medication drips (heparin, PCA, etc.)  E  Equipment Suction is set up for ALL patients (with rakesh)  Red plugs utilized for all equipment (IV pumps, etc.)  WOWs wiped down at end of shift. Room stocked with oxygen, suction, and other unit-specific supplies  A  Alarms Bed alarm is set for fall risk patients  Ensure chair alarm is in place and activated if patient is up in a chair  L  Lines Check IV for any infiltration  [place] bag is empty if patient has a [place]   Tubing and IV bags are labeled  S  Safety  Room is clean, patient is clean, and equipment is clean. Hallways are clear from equipment besides carts. Fall bracelet on for fall risk patients  Ensure room is clear and free of clutter  Suction is set up for ALL patients (with rakesh)  Hallways are clear from equipment besides carts.    Isolation precautions followed, supplies available outside room, sign posted    Champ Aviles RN

## 2022-10-03 NOTE — PROGRESS NOTES
..  Reason for Admission:  Acute respiratory failure with hypoxia ([facility] [address] [J96.01]  Fever [R50.9]                 RUR Score:    11%            Plan for utilizing home health: Yes                      Likelihood of Readmission:   LOW                         Transition of Care Plan:              Initial assessment completed with patient. Cognitive status of patient: oriented to time, place, person and situation. Face sheet information confirmed:  yes. The patient designates wife to participate in his discharge plan and to receive any needed information. This patient lives in a single family home , two levels with five steps to enter. Patient resides with  wife. Patient is able to navigate steps as needed. Prior to hospitalization, patient was considered to be independent with ADLs/IADLS : yes . I  Patient has a current ACP document on file: no      The patient and wife will be available to transport patient home upon discharge. The patient already has Cane, scooter and CPAP medical equipment available in the home. Patient is not currently active with home health. I Patient has not stayed in a skilled nursing facility or rehab. Was  stay within last 60 days : no     This patient is on dialysis :no      List of available Home Health agencies were provided and reviewed with the patient prior to discharge. Freedom of choice signed: yes, for any local Home health agency Currently, the discharge plan is Home with [facility]. The patient states that he can obtain his medications from the pharmacy, and take his medications as directed. Patient's current insurance is [facility]       Care Management Interventions  PCP Verified by CM:  Yes  Mode of Transport at Discharge:  (pt's spouse will provide transport at [place])  Transition of Care Consult (CM Consult): Discharge Planning  Physical Therapy Consult: Yes  Occupational Therapy Consult: Yes  Support Systems: Spouse/Significant Other, Child(seun)  Confirm Follow Up Transport: Family (pt'swife will provide transport at dc)  Discharge Location  Patient Expects to be Discharged to[de-identified] Home with home health        RADHA Peterson  Care Manager

## 2022-10-04 ENCOUNTER — HOME HEALTH ADMISSION (OUTPATIENT)
Dept: HOME HEALTH SERVICES | Facility: HOME HEALTH | Age: 67
End: 2022-10-04
Payer: MEDICARE

## 2022-10-04 VITALS
RESPIRATION RATE: 20 BRPM | DIASTOLIC BLOOD PRESSURE: 78 MMHG | TEMPERATURE: 98 F | WEIGHT: 313 LBS | HEART RATE: 79 BPM | SYSTOLIC BLOOD PRESSURE: 125 MMHG | BODY MASS INDEX: 40.17 KG/M2 | OXYGEN SATURATION: 99 % | HEIGHT: 74 IN

## 2022-10-04 LAB
BACTERIA SPEC CULT: NORMAL
BNP SERPL-MCNC: 143 PG/ML (ref 0–900)
GLUCOSE BLD STRIP.AUTO-MCNC: 108 MG/DL (ref 70–110)
GLUCOSE BLD STRIP.AUTO-MCNC: 109 MG/DL (ref 70–110)
SERVICE CMNT-IMP: NORMAL

## 2022-10-04 PROCEDURE — 36415 COLL VENOUS BLD VENIPUNCTURE: CPT

## 2022-10-04 PROCEDURE — 74011250637 HC RX REV CODE- 250/637: Performed by: STUDENT IN AN ORGANIZED HEALTH CARE EDUCATION/TRAINING PROGRAM

## 2022-10-04 PROCEDURE — 74011250637 HC RX REV CODE- 250/637: Performed by: EMERGENCY MEDICINE

## 2022-10-04 PROCEDURE — 74011000250 HC RX REV CODE- 250: Performed by: STUDENT IN AN ORGANIZED HEALTH CARE EDUCATION/TRAINING PROGRAM

## 2022-10-04 PROCEDURE — 99239 HOSP IP/OBS DSCHRG MGMT >30: CPT | Performed by: EMERGENCY MEDICINE

## 2022-10-04 PROCEDURE — 74011250636 HC RX REV CODE- 250/636: Performed by: STUDENT IN AN ORGANIZED HEALTH CARE EDUCATION/TRAINING PROGRAM

## 2022-10-04 PROCEDURE — 82962 GLUCOSE BLOOD TEST: CPT

## 2022-10-04 PROCEDURE — 83880 ASSAY OF NATRIURETIC PEPTIDE: CPT

## 2022-10-04 RX ORDER — CEFUROXIME AXETIL 500 MG/1
500 TABLET ORAL EVERY 12 HOURS
Qty: 14 TABLET | Refills: 0 | Status: SHIPPED | OUTPATIENT
Start: 2022-10-04 | End: 2022-10-11

## 2022-10-04 RX ORDER — CEFUROXIME AXETIL 250 MG/1
500 TABLET ORAL EVERY 12 HOURS
Status: DISCONTINUED | OUTPATIENT
Start: 2022-10-04 | End: 2022-10-04 | Stop reason: HOSPADM

## 2022-10-04 RX ORDER — CEFUROXIME AXETIL 250 MG/1
250 TABLET ORAL EVERY 12 HOURS
Status: DISCONTINUED | OUTPATIENT
Start: 2022-10-04 | End: 2022-10-04

## 2022-10-04 RX ADMIN — PRAMIPEXOLE DIHYDROCHLORIDE 0.75 MG: 0.25 TABLET ORAL at 00:01

## 2022-10-04 RX ADMIN — CEFUROXIME AXETIL 500 MG: 250 TABLET ORAL at 14:30

## 2022-10-04 RX ADMIN — ASPIRIN 81 MG: 81 TABLET, COATED ORAL at 09:00

## 2022-10-04 RX ADMIN — ENOXAPARIN SODIUM 30 MG: 100 INJECTION SUBCUTANEOUS at 09:15

## 2022-10-04 RX ADMIN — HYDROCODONE BITARTRATE AND ACETAMINOPHEN 1 TABLET: 5; 325 TABLET ORAL at 09:04

## 2022-10-04 RX ADMIN — METOPROLOL SUCCINATE 50 MG: 50 TABLET, EXTENDED RELEASE ORAL at 09:04

## 2022-10-04 RX ADMIN — ALFUZOSIN HYDROCHLORIDE 10 MG: 10 TABLET ORAL at 09:03

## 2022-10-04 RX ADMIN — GABAPENTIN 300 MG: 300 CAPSULE ORAL at 09:04

## 2022-10-04 RX ADMIN — MONTELUKAST 10 MG: 10 TABLET, FILM COATED ORAL at 09:04

## 2022-10-04 RX ADMIN — PRAMIPEXOLE DIHYDROCHLORIDE 0.75 MG: 0.25 TABLET ORAL at 09:04

## 2022-10-04 RX ADMIN — FUROSEMIDE 20 MG: 20 TABLET ORAL at 09:04

## 2022-10-04 RX ADMIN — TROSPIUM CHLORIDE 20 MG: 20 TABLET, FILM COATED ORAL at 09:04

## 2022-10-04 RX ADMIN — BUPROPION HYDROCHLORIDE 150 MG: 150 TABLET, EXTENDED RELEASE ORAL at 09:04

## 2022-10-04 RX ADMIN — POLYETHYLENE GLYCOL 3350 17 G: 17 POWDER, FOR SOLUTION ORAL at 09:03

## 2022-10-04 RX ADMIN — CEFTRIAXONE 2 G: 2 INJECTION, POWDER, FOR SOLUTION INTRAMUSCULAR; INTRAVENOUS at 00:02

## 2022-10-04 RX ADMIN — LISINOPRIL 20 MG: 20 TABLET ORAL at 09:04

## 2022-10-04 RX ADMIN — MULTIPLE VITAMINS W/ MINERALS TAB 1 TABLET: TAB at 09:04

## 2022-10-04 NOTE — DISCHARGE SUMMARY
[address], [address]     DISCHARGE SUMMARY    Name: Raya Vega MRN: [id number]   Age / Sex: 79 y.o. / male CSN: [id number]   YOB: 1955 Length of Stay: 3 days   Admit Date: 9/30/2022 Discharge Date:        PRIMARY CARE PHYSICIAN: Pete Jacobson MD      DISCHARGE DIAGNOSES:    Fevers  UTI  Hypoxia  Obstructive sleep apnea  Hypertension  History of chronic pain  Diabetes type 2  Obesity Body mass index is 40.19 kg/m². CONSULTS CALLED: id      PROCEDURES DONE: none      COURSE IN THE HOSPITAL: This is a 49-year-old male with a history of sleep apnea and hypertension who presented to the ED with some shortness of breath and fevers. Patient was evaluated and was admitted. There was concern for UTI. Patient was started on broad-spectrum antibiotics. Cultures were followed. Patient was initially hypoxic which later improved. Patient started to feel better. PT OT were consulted and patient was mobilized. ID made recommendations for discharge antibiotics. Discharge plans and medications were discussed with the patient. Patient was discharged home      MEDICATIONS ON DISCHARGE:    Current Discharge Medication List        START taking these medications    Details   cefUROXime (CEFTIN) 500 mg tablet Take 1 Tablet by mouth every twelve (12) hours for 14 doses.   Qty: 14 Tablet, Refills: 0  Start date: 10/4/2022, End date: 10/11/2022           CONTINUE these medications which have NOT CHANGED    Details   furosemide (LASIX) 20 mg tablet TAKE 1 TABLET BY MOUTH TWICE DAILY  Qty: 60 Tablet, Refills: 6    Associated Diagnoses: SOB (shortness of breath)      pramipexole (MIRAPEX) 0.75 mg tablet TAKE 3 TABLETS ONE TIME DAILY  Qty: 270 Tablet, Refills: 1      metoprolol succinate (TOPROL-XL) 50 mg XL tablet TAKE 1 TABLET EVERY DAY  Qty: 90 Tablet, Refills: 1    Associated Diagnoses: Essential hypertension      glipiZIDE (GLUCOTROL) 10 mg tablet TAKE 1 TABLET TWICE DAILY  Qty: 180 Tablet, Refills: 1    Associated Diagnoses: Type 2 diabetes mellitus with diabetic neuropathy, without long-term current use of insulin (HCC)      amitriptyline (ELAVIL) 10 mg tablet TAKE 1 TABLET EVERY NIGHT  Qty: 90 Tablet, Refills: 1    Associated Diagnoses: Lumbar post-laminectomy syndrome; S/P lumbar laminectomy; S/P lumbar spinal fusion      simvastatin (ZOCOR) 10 mg tablet TAKE 1 TABLET EVERY NIGHT  Qty: 90 Tablet, Refills: 1      buPROPion SR (WELLBUTRIN SR) 150 mg SR tablet TAKE 1 TABLET EVERY DAY  Qty: 90 Tablet, Refills: 1      montelukast (SINGULAIR) 10 mg tablet TAKE 1 TABLET EVERY DAY  Qty: 90 Tablet, Refills: 1      dapagliflozin (FARXIGA) 10 mg tab tablet Take 1 Tablet by mouth daily. Qty: 90 Tablet, Refills: 1      dulaglutide (Trulicity) 3 ER/0.5 mL pnij 3 mg by SubCUTAneous route every seven (7) days. Qty: 12 Each, Refills: 3    Comments: [facility] - Dose adjustment. D/c 1.5 mg dose  Associated Diagnoses: Type 2 diabetes mellitus with diabetic neuropathy, without long-term current use of insulin ([facility])      alfuzosin SR (UROXATRAL) 10 mg SR tablet TAKE 1 TABLET BY MOUTH DAILY AFTER DINNER  Qty: 90 Tablet, Refills: 3    Comments: ZERO refills remain on this prescription. Your patient is requesting advance approval of refills for this medication to PREVENT ANY MISSED DOSES      gabapentin (NEURONTIN) 300 mg capsule Take 300 mg by mouth three (3) times daily. Taking as needed which was given from Dr Marlo Serrato in the past      dutasteride (AVODART) 0.5 mg capsule Take 1 Capsule by mouth daily (after dinner). Qty: 90 Capsule, Refills: 3      testosterone cypionate (DEPOTESTOTERONE CYPIONATE) 200 mg/mL injection 0.5 mL by IntraMUSCular route every seven (7) days. Max Daily Amount: 100 mg. Qty: 10 mL, Refills: 0    Associated Diagnoses: Male hypogonadism      aspirin delayed-release 81 mg tablet Take 1 Tablet by mouth daily.   Qty: 90 Tablet, Refills: 1    Associated Diagnoses: Chest pain, unspecified type      solifenacin (VESICARE) 10 mg tablet Take 1 Tablet by mouth daily. Qty: 90 Tablet, Refills: 3      cpap machine kit by Does Not Apply route. lisinopriL (PRINIVIL, ZESTRIL) 20 mg tablet Take 1 Tab by mouth daily. Qty: 30 Tab, Refills: 1    Associated Diagnoses: Essential hypertension; Type 2 diabetes mellitus with diabetic neuropathy, without long-term current use of insulin ([facility])      lancets (TRUEplus Lancets) 33 gauge misc CHECK BLOOD SUGAR THREE TIMES DAILY  Qty: 300 Each, Refills: 5      DropSafe Alcohol Prep Pads padm USE AS DIRECTED WHEN CHECKING BLOOD SUGAR THREE TIMES DAILY  Qty: 300 Pad, Refills: 5      glucose blood VI test strips (True Metrix Glucose Test Strip) strip TEST BLOOD SUGAR THREE TIMES DAILY  Qty: 300 Strip, Refills: 5      nitroglycerin (NITROSTAT) 0.4 mg SL tablet DISSOLVE ONE TABLET UNDER TONGUE AS NEEDED FOR CHEST PAIN EVERY 5 MINUTES FOR UP TO 3 DOSES  Qty: 30 Tablet, Refills: 0    Associated Diagnoses: Primary hypertension; Chest pain, unspecified type; Sleep apnea, unspecified type      Cane maura by Does Not Apply route. Blood-Glucose Meter monitoring kit Use to check blood sugar 3 times daily. Dx E11.9. Please dispense brand preferred by insurance. Qty: 1 Kit, Refills: 0    Associated Diagnoses: Type 2 diabetes mellitus with diabetic neuropathy, without long-term current use of insulin ([facility])      folic acid/multivit-min/lutein (CENTRUM SILVER PO) Take  by mouth.       Needle, Disp, 23 G 23 gauge x 1 1/4\" ndle Use to inject 0.5 ml every 7 days  Qty: 30 Pen Needle, Refills: 1    Associated Diagnoses: Low testosterone      Needle, Disp, 18 G 18 gauge x 1\" ndle Use to aspirate 0.5 ml every 7 days  Qty: 30 Pen Needle, Refills: 1    Associated Diagnoses: Low testosterone      Wheel Chair maura Assistance with ambulation, gait instability  Qty: 1 Each, Refills: 0    Associated Diagnoses: Leg pain, bilateral; Gait instability      polyethylene glycol (MIRALAX) 17 gram packet Take 1 Packet by mouth daily. Hold for loose stools  Qty: 10 Each, Refills: 0      naloxone 4 mg/actuation spry 4 mg by Nasal route as needed. Qty: 1 Box, Refills: 0      Back Brace misc 1 Device by Does Not Apply route daily as needed for Pain. One, lumbosacral orthosis/back brace with metal struts      Medically necessary  Qty: 1 Device, Refills: 0           STOP taking these medications       ondansetron hcl (Zofran) 4 mg tablet Comments:   Reason for Stopping:         sildenafil citrate (VIAGRA) 100 mg tablet Comments:   Reason for Stopping:         Blood-Glucose Meter (True Metrix Glucose Meter) misc Comments:   Reason for Stopping:         Syringe, Disposable, 3 mL syrg Comments:   Reason for Stopping:                 DISCHARGE VITAL SIGNS:  Visit Vitals  /78   Pulse 79   Temp 98 °F (36.7 °C)   Resp 20   Ht 6' 2\" (1.88 m)   Wt 142 kg (313 lb)   SpO2 99%   BMI 40.19 kg/m²         CONDITION ON DISCHARGE: Stable. DISPOSITION: home      FOLLOW-UP RECOMMENDATIONS:   Follow-up Information       Follow up With Specialties Details Why Contact Info    [facility] [address]  [phone]              OTHER INSTRUCTIONS:        TIME SPENT ON DISCHARGE ACTIVITIES: More than 35 minutes. Dragon medical dictation software was used for portions of this report. Unintended errors may occur.       Signed:  Neo Rivas MD      10/4/2022

## 2022-10-04 NOTE — PROGRESS NOTES
Discharge planning      Discharge order noted for today. Pt has been accepted to [facility]. Patient and agreeable to the transition plan today. Transport has been arranged with family. Patient's  home health  orders have been forwarded to [facility] via eic. Updated bedside RN, Tonya/Angélica,  to the transition plan.   Discharge information has been documented on the AVS.     JIM Martinez, RN  Pager # [phone]  Care Manager

## 2022-10-04 NOTE — PROGRESS NOTES
Infectious Disease progress Note        Reason:fever,unclear etiology    Current abx Prior abx   Ceftriaxone since 10/1/22  Doxycycline 10/1-10/3     Lines:       Assessment :  79 y.o. male with past medical history significant for hypertension diabetes who presented to the ED on 9/30/22 with 3 days of shortness of breath. Hospitalization at [facility] 1/2019 for sepsis  due to klebsiella bloodstream infection (positive blood culture 1/14, negative blood culture 1/16), right kidney pyelonephritis, complicated UTI due to klebsiella. s/p right retrogram pyelogram, right ureteral stent placement, urethral dilatation on 1/15/19     Clinical presentation consistent with sepsis-present on admission likely due to cystitis    Increased frequency of urination prior to admission along with significant bacteriuria/pyuria likely suggest cystitis. Unfortunately no urine culture available from admission. Current antibiotics will likely mask the urine culture results at this time. Shortness of breath on admission could be response to sepsis. No definitive clinical/radiological evidence to suggest pneumonia, pulmonary infection    Patient seems to be clinically responding to current antibiotics. Resolved urinary incontinence. No fevers. No evidence of obstructive uropathy noted on CT scan 10/3/2022    Urine culture 10/3-pending     Recommendations:    D/c ceftriaxone. Start p.o. cefuroxime till 10/10/2022  I will follow-up urine culture results and modify antibiotics as needed if patient discharged prior to finalization of urine culture results     Above plan was discussed in details with patient,  and dr Prashanth Banda. Please call me if any further questions or concerns. Will continue to participate in the care of this patient. HPI:      Patient states that he feels better. Resolved urinary incontinence. Denies any flank pain. No recent sick contacts.       Past Medical History:   Diagnosis Date    Abdominal adhesions 7/30/2014    BPH (benign prostatic hyperplasia)     Cellulitis     DDD (degenerative disc disease), lumbar 7/30/2014    Depression     Diabetes ([facility] [address]     Frequent falls 7/30/2014    TRUE (generalized anxiety disorder) 7/30/2014    HLD (hyperlipidemia)     Hypertension     Hypogonadism in male     Incomplete bladder emptying     Kidney stones     Lumbar facet arthropathy 7/30/2014    Lumbar nerve root impingement 7/30/2014    Lumbosacral radiculopathy at S1 7/30/2014    Major depression 7/30/2014    Neurological disorder     sciatica    MANISH (obstructive sleep apnea) 7/30/2014    S/P lumbar laminectomy 7/30/2014    S/P lumbar spinal fusion 7/30/2014    Sleep apnea     USES CPAP EVERY NIGHT    Thoracic compression fracture ([facility] [address] 07/30/2014       Past Surgical History:   Procedure Laterality Date    HX APPENDECTOMY      HX BACK SURGERY      Lumbar fusion    HX HERNIA REPAIR  1992    HX OTHER SURGICAL      EMG - S1 disorder    HX OTHER SURGICAL  10/2017    spinal cord stimulator    [address]       Current Discharge Medication List        CONTINUE these medications which have NOT CHANGED    Details   ondansetron hcl (Zofran) 4 mg tablet Take 1 Tablet by mouth every eight (8) hours as needed for Nausea.   Qty: 12 Tablet, Refills: 0      furosemide (LASIX) 20 mg tablet TAKE 1 TABLET BY MOUTH TWICE DAILY  Qty: 60 Tablet, Refills: 6    Associated Diagnoses: SOB (shortness of breath)      pramipexole (MIRAPEX) 0.75 mg tablet TAKE 3 TABLETS ONE TIME DAILY  Qty: 270 Tablet, Refills: 1      metoprolol succinate (TOPROL-XL) 50 mg XL tablet TAKE 1 TABLET EVERY DAY  Qty: 90 Tablet, Refills: 1    Associated Diagnoses: Essential hypertension      glipiZIDE (GLUCOTROL) 10 mg tablet TAKE 1 TABLET TWICE DAILY  Qty: 180 Tablet, Refills: 1    Associated Diagnoses: Type 2 diabetes mellitus with diabetic neuropathy, without long-term current use of insulin (HCC)      amitriptyline (ELAVIL) 10 mg tablet TAKE 1 TABLET EVERY NIGHT  Qty: 90 Tablet, Refills: 1    Associated Diagnoses: Lumbar post-laminectomy syndrome; S/P lumbar laminectomy; S/P lumbar spinal fusion      simvastatin (ZOCOR) 10 mg tablet TAKE 1 TABLET EVERY NIGHT  Qty: 90 Tablet, Refills: 1      buPROPion SR (WELLBUTRIN SR) 150 mg SR tablet TAKE 1 TABLET EVERY DAY  Qty: 90 Tablet, Refills: 1      montelukast (SINGULAIR) 10 mg tablet TAKE 1 TABLET EVERY DAY  Qty: 90 Tablet, Refills: 1      dapagliflozin (FARXIGA) 10 mg tab tablet Take 1 Tablet by mouth daily. Qty: 90 Tablet, Refills: 1      dulaglutide (Trulicity) 3 QU/2.1 mL pnij 3 mg by SubCUTAneous route every seven (7) days. Qty: 12 Each, Refills: 3    Comments: Emily Hernández - Dose adjustment. D/c 1.5 mg dose  Associated Diagnoses: Type 2 diabetes mellitus with diabetic neuropathy, without long-term current use of insulin ([facility])      alfuzosin SR (UROXATRAL) 10 mg SR tablet TAKE 1 TABLET BY MOUTH DAILY AFTER DINNER  Qty: 90 Tablet, Refills: 3    Comments: ZERO refills remain on this prescription. Your patient is requesting advance approval of refills for this medication to PREVENT ANY MISSED DOSES      gabapentin (NEURONTIN) 300 mg capsule Take 300 mg by mouth three (3) times daily. Taking as needed which was given from Dr Raoul Schlatter in the past      dutasteride (AVODART) 0.5 mg capsule Take 1 Capsule by mouth daily (after dinner). Qty: 90 Capsule, Refills: 3      testosterone cypionate (DEPOTESTOTERONE CYPIONATE) 200 mg/mL injection 0.5 mL by IntraMUSCular route every seven (7) days. Max Daily Amount: 100 mg. Qty: 10 mL, Refills: 0    Associated Diagnoses: Male hypogonadism      aspirin delayed-release 81 mg tablet Take 1 Tablet by mouth daily. Qty: 90 Tablet, Refills: 1    Associated Diagnoses: Chest pain, unspecified type      solifenacin (VESICARE) 10 mg tablet Take 1 Tablet by mouth daily. Qty: 90 Tablet, Refills: 3      cpap machine kit by Does Not Apply route.       lisinopriL (PRINIVIL, ZESTRIL) 20 mg tablet Take 1 Tab by mouth daily. Qty: 30 Tab, Refills: 1    Associated Diagnoses: Essential hypertension; Type 2 diabetes mellitus with diabetic neuropathy, without long-term current use of insulin ([address]      ! ! lancets (TRUEplus Lancets) 33 gauge misc CHECK BLOOD SUGAR THREE TIMES DAILY  Qty: 300 Each, Refills: 5      DropSafe Alcohol Prep Pads padm USE AS DIRECTED WHEN CHECKING BLOOD SUGAR THREE TIMES DAILY  Qty: 300 Pad, Refills: 5      sildenafil citrate (VIAGRA) 100 mg tablet Take 1 Tablet by mouth as needed for Erectile Dysfunction (do not take more than 1 tab in 36 hour period). Qty: 30 Tablet, Refills: 3      glucose blood VI test strips (True Metrix Glucose Test Strip) strip TEST BLOOD SUGAR THREE TIMES DAILY  Qty: 300 Strip, Refills: 5      nitroglycerin (NITROSTAT) 0.4 mg SL tablet DISSOLVE ONE TABLET UNDER TONGUE AS NEEDED FOR CHEST PAIN EVERY 5 MINUTES FOR UP TO 3 DOSES  Qty: 30 Tablet, Refills: 0    Associated Diagnoses: Primary hypertension; Chest pain, unspecified type; Sleep apnea, unspecified type      Blood-Glucose Meter (True Metrix Glucose Meter) misc Check blood sugar 3 times daily  Qty: 1 Each, Refills: 0      Cane maura by Does Not Apply route. Blood-Glucose Meter monitoring kit Use to check blood sugar 3 times daily. Dx E11.9. Please dispense brand preferred by insurance. Qty: 1 Kit, Refills: 0    Associated Diagnoses: Type 2 diabetes mellitus with diabetic neuropathy, without long-term current use of insulin ([address]      ! ! lancets misc Use to check blood sugar 3 times daily. Dx E11.9. Please dispense brand preferred by insurance. Qty: 100 Each, Refills: 11    Associated Diagnoses: Type 2 diabetes mellitus with diabetic neuropathy, without long-term current use of insulin (HCC)      folic acid/multivit-min/lutein (CENTRUM SILVER PO) Take  by mouth.       Syringe, Disposable, 3 mL syrg Take as directed  Qty: 30 Syringe, Refills: 1      Needle, Disp, 23 G 23 gauge x 1 1/4\" ndle Use to inject 0.5 ml every 7 days  Qty: 30 Pen Needle, Refills: 1    Associated Diagnoses: Low testosterone      Needle, Disp, 18 G 18 gauge x 1\" ndle Use to aspirate 0.5 ml every 7 days  Qty: 30 Pen Needle, Refills: 1    Associated Diagnoses: Low testosterone      Wheel Chair maura Assistance with ambulation, gait instability  Qty: 1 Each, Refills: 0    Associated Diagnoses: Leg pain, bilateral; Gait instability      polyethylene glycol (MIRALAX) 17 gram packet Take 1 Packet by mouth daily. Hold for loose stools  Qty: 10 Each, Refills: 0      naloxone 4 mg/actuation spry 4 mg by Nasal route as needed. Qty: 1 Box, Refills: 0      Back Brace misc 1 Device by Does Not Apply route daily as needed for Pain. One, lumbosacral orthosis/back brace with metal struts      Medically necessary  Qty: 1 Device, Refills: 0       !! - Potential duplicate medications found. Please discuss with provider.           Current Facility-Administered Medications   Medication Dose Route Frequency    cefTRIAXone (ROCEPHIN) 2 g in sterile water (preservative free) 20 mL IV syringe  2 g IntraVENous Q24H    alfuzosin SR (UROXATRAL) tablet 10 mg  10 mg Oral DAILY    amitriptyline (ELAVIL) tablet 10 mg  10 mg Oral QHS    aspirin delayed-release tablet 81 mg  81 mg Oral DAILY    buPROPion SR (WELLBUTRIN SR) tablet 150 mg  150 mg Oral DAILY    dutasteride (AVODART) capsule 0.5 mg  0.5 mg Oral PCD    multivitamin, tx-iron-ca-min (THERA-M w/ IRON) tablet 1 Tablet  1 Tablet Oral DAILY    furosemide (LASIX) tablet 20 mg  20 mg Oral BID    gabapentin (NEURONTIN) capsule 300 mg  300 mg Oral TID    lisinopriL (PRINIVIL, ZESTRIL) tablet 20 mg  20 mg Oral DAILY    metoprolol succinate (TOPROL-XL) XL tablet 50 mg  50 mg Oral DAILY    montelukast (SINGULAIR) tablet 10 mg  10 mg Oral DAILY    polyethylene glycol (MIRALAX) packet 17 g  17 g Oral DAILY    pramipexole (MIRAPEX) tablet 0.75 mg  0.75 mg Oral TID    atorvastatin (LIPITOR) tablet 10 mg  10 mg Oral QHS    trospium (SANCTURA) tablet 20 mg  20 mg Oral Q12H    sodium chloride (NS) flush 5-40 mL  5-40 mL IntraVENous Q8H    sodium chloride (NS) flush 5-40 mL  5-40 mL IntraVENous PRN    acetaminophen (TYLENOL) tablet 650 mg  650 mg Oral Q6H PRN    Or    acetaminophen (TYLENOL) suppository 650 mg  650 mg Rectal Q6H PRN    polyethylene glycol (MIRALAX) packet 17 g  17 g Oral DAILY PRN    ondansetron (ZOFRAN ODT) tablet 4 mg  4 mg Oral Q8H PRN    Or    ondansetron (ZOFRAN) injection 4 mg  4 mg IntraVENous Q6H PRN    enoxaparin (LOVENOX) injection 30 mg  30 mg SubCUTAneous BID    insulin glargine (LANTUS) injection 15 Units  15 Units SubCUTAneous QHS    insulin lispro (HUMALOG) injection   SubCUTAneous AC&HS    glucose chewable tablet 16 g  4 Tablet Oral PRN    glucagon (GLUCAGEN) injection 1 mg  1 mg IntraMUSCular PRN    dextrose 10% infusion 0-250 mL  0-250 mL IntraVENous PRN    HYDROcodone-acetaminophen (NORCO) 5-325 mg per tablet 1 Tablet  1 Tablet Oral Q6H PRN    sodium chloride (NS) flush 5-10 mL  5-10 mL IntraVENous PRN       Allergies: Bactrim [sulfamethoprim] and Opana [oxymorphone]    Family History   Problem Relation Age of Onset    Diabetes Mother     Hypertension Mother     Stroke Mother     Heart Attack Father     Diabetes Father     Stroke Father     Diabetes Sister     Stroke Brother      Social History     Socioeconomic History    Marital status:      Spouse name: Not on file    Number of children: Not on file    Years of education: Not on file    Highest education level: Not on file   Occupational History    Not on file   Tobacco Use    Smoking status: Never    Smokeless tobacco: Never   Vaping Use    Vaping Use: Never used   Substance and Sexual Activity    Alcohol use: Yes    Drug use: No    Sexual activity: Not Currently     Partners: Female     Birth control/protection: Surgical   Other Topics Concern    Not on file   Social History Narrative    Not on file     Social Determinants of Health     Financial Resource Strain: Not on file   Food Insecurity: Not on file   Transportation Needs: Not on file   Physical Activity: Not on file   Stress: Not on file   Social Connections: Not on file   Intimate Partner Violence: Not on file   Housing Stability: Not on file     Social History     Tobacco Use   Smoking Status Never   Smokeless Tobacco Never        Temp (24hrs), Av °F (36.7 °C), Min:97.5 °F (36.4 °C), Max:98.6 °F (37 °C)    Visit Vitals  BP (!) 145/88   Pulse 78   Temp 98.1 °F (36.7 °C)   Resp 20   Ht 6' 2\" (1.88 m)   Wt 142 kg (313 lb)   SpO2 94%   BMI 40.19 kg/m²       ROS: 12 point ROS obtained in details. Pertinent positives as mentioned in HPI,   otherwise negative    Physical Exam:    General: obese male laying on the bed/sitting on the  bed AAOx3 in no acute distress. General:   awake alert and oriented    HEENT:  Normocephalic, atraumatic, EOMI, no scleral icterus or pallor; no conjunctival hemmohage;  nasal and oral mucous are moist and without evidence of lesions. No thrush. Neck supple, no bruits. Lymph Nodes:   no cervical, axillary or inguinal adenopathy    Lungs:   non-labored, bilaterally clear to auscultation- no crackles wheezes rales or rhonchi    Heart:  RRR, s1 and s2; no rubs or gallops, no edema    Abdomen:  soft, non-distended, active bowel sounds, no hepatomegaly, no splenomegaly. non tender    Genitourinary:  deferred    Extremities:   no clubbing, cyanosis; no joint effusions or swelling; Full ROM of all large joints to the upper and lower extremities; muscle mass appropriate for age    Neurologic:  No gross focal sensory or motor abnormalities; Speech appropriate.  Cranial nerves intact                         Skin:  No rash/ulcers    Back:  no spinal or paraspinal muscle tenderness or rigidity, no CVA tenderness       Psychiatric:  No suicidal or homicidal ideations, appropriate mood and affect          Labs: Results:   Chemistry Recent Labs     10/03/22  0420 10/02/22  0332 10/01/22  9168 * 98 132*    140 136   K 4.4 3.9 3.7    107 105   CO2 28 28 26   BUN 17 16 15   CREA 1.19 1.23 1.24   CA 8.8 8.4* 8.9   AGAP 8 5 5   BUCR 14 13 12        CBC w/Diff Recent Labs     10/03/22  0420 10/02/22  0332 10/01/22  0917   WBC 6.0 6.6 7.2   RBC 5.12 4.67 4.81   HGB 14.1 13.2 13.6   HCT 46.7 43.5 43.8    210 183   GRANS 52 54 76*   LYMPH 28 23 10*   EOS 5 4 1        Microbiology No results for input(s): CULT in the last 72 hours. RADIOLOGY:    All available imaging studies/reports in [facility] for this admission were reviewed      Disclaimer: Sections of this note are dictated utilizing voice recognition software, which may have resulted in some phonetic based errors in grammar and contents. Even though attempts were made to correct all the mistakes, some may have been missed, and remained in the body of the document. If questions arise, please contact our department.     Dr. Enedina Roach, Infectious Disease Specialist  [phone]  October 4, 2022  8:05 AM

## 2022-10-04 NOTE — PROGRESS NOTES
PT treatment attempted. Pt found supine in bed reporting waiting for discharge. Pt stating he has been ambulating in room and out in cruz without AD. Reporting no questions or concerns regarding mobility. Declined need for skilled PT while admitted. Will sign off.      Thank you for this referral  Jarocho Larson PT DPT

## 2022-10-04 NOTE — HOME CARE
Received home health referral for [facility] for (SN, PT, OT). Spoke with patient in room;  patient identifiers verified. Explained home care services and routines. Demographics verified including insurance, phone and address confirmed. Patient has the following DME: has CPAP & scooter. Caregivers available spouse as primary. Orders noted and arranged to be processed to central intake.       ---   Kailey Georges LPN  [facility] - INPATIENT Liaison

## 2022-10-04 NOTE — PROGRESS NOTES
Discharge teaching completed at bedside with patient. Opportunity provided for clarifying questions. All answered to patient satisfaction. IV removed. EKG leads removed. ID removed and shredded.

## 2022-10-04 NOTE — PROGRESS NOTES
patient walked down the cruz from his room to nurses station and did not de sat and maintained at 96% o2 room air. patient was short of breath and did c/o being tired on the way back to the room.

## 2022-10-04 NOTE — PROGRESS NOTES
Patient is sitting in bed in no apparent distress, feels better today. Wishes to go home. I discussed with ID and she cleared patient for discharge. I discussed discharge plans and medications with the patient. Home with home health care today.

## 2022-10-04 NOTE — PROGRESS NOTES
Problem: Diabetes Self-Management  Goal: *Disease process and treatment process  Description: Define diabetes and identify own type of diabetes; list 3 options for treating diabetes. Outcome: Progressing Towards Goal  Goal: *Incorporating nutritional management into lifestyle  Description: Describe effect of type, amount and timing of food on blood glucose; list 3 methods for planning meals. Outcome: Progressing Towards Goal  Goal: *Incorporating physical activity into lifestyle  Description: State effect of exercise on blood glucose levels. Outcome: Progressing Towards Goal  Goal: *Developing strategies to promote health/change behavior  Description: Define the ABC's of diabetes; identify appropriate screenings, schedule and personal plan for screenings. Outcome: Progressing Towards Goal  Goal: *Using medications safely  Description: State effect of diabetes medications on diabetes; name diabetes medication taking, action and side effects. Outcome: Progressing Towards Goal  Goal: *Monitoring blood glucose, interpreting and using results  Description: Identify recommended blood glucose targets  and personal targets. Outcome: Progressing Towards Goal  Goal: *Prevention, detection, treatment of acute complications  Description: List symptoms of hyper- and hypoglycemia; describe how to treat low blood sugar and actions for lowering  high blood glucose level. Outcome: Progressing Towards Goal  Goal: *Prevention, detection and treatment of chronic complications  Description: Define the natural course of diabetes and describe the relationship of blood glucose levels to long term complications of diabetes.   Outcome: Progressing Towards Goal  Goal: *Developing strategies to address psychosocial issues  Description: Describe feelings about living with diabetes; identify support needed and support network  Outcome: Progressing Towards Goal  Goal: *Sick day guidelines  Outcome: Progressing Towards Goal  Goal: *Patient Specific Goal (EDIT GOAL, INSERT TEXT)  Outcome: Progressing Towards Goal     Problem: Patient Education: Go to Patient Education Activity  Goal: Patient/Family Education  Outcome: Progressing Towards Goal     Problem: Falls - Risk of  Goal: *Absence of Falls  Description: Document Geraldo Wagoner Fall Risk and appropriate interventions in the flowsheet.   Outcome: Progressing Towards Goal  Note: Fall Risk Interventions:            Medication Interventions: Teach patient to arise slowly, Patient to call before getting OOB                   Problem: Patient Education: Go to Patient Education Activity  Goal: Patient/Family Education  Outcome: Progressing Towards Goal     Problem: Pain  Goal: *Control of Pain  Outcome: Progressing Towards Goal     Problem: Patient Education: Go to Patient Education Activity  Goal: Patient/Family Education  Outcome: Progressing Towards Goal     Problem: Hypertension  Goal: *Blood pressure within specified parameters  Outcome: Progressing Towards Goal  Goal: *Fluid volume balance  Outcome: Progressing Towards Goal  Goal: *Labs within defined limits  Outcome: Progressing Towards Goal     Problem: Patient Education: Go to Patient Education Activity  Goal: Patient/Family Education  Outcome: Progressing Towards Goal     Problem: Gas Exchange - Impaired  Goal: *Absence of hypoxia  Outcome: Progressing Towards Goal     Problem: Patient Education: Go to Patient Education Activity  Goal: Patient/Family Education  Outcome: Progressing Towards Goal     Problem: Patient Education: Go to Patient Education Activity  Goal: Patient/Family Education  Outcome: Progressing Towards Goal

## 2022-10-04 NOTE — DISCHARGE INSTRUCTIONS
DISCHARGE SUMMARY from Nurse    PATIENT INSTRUCTIONS:    After general anesthesia or intravenous sedation, for 24 hours or while taking prescription Narcotics:  Limit your activities  Do not drive and operate hazardous machinery  Do not make important personal or business decisions  Do  not drink alcoholic beverages  If you have not urinated within 8 hours after discharge, please contact your surgeon on call. Report the following to your surgeon:  Excessive pain, swelling, redness or odor of or around the surgical area  Temperature over 100.5  Nausea and vomiting lasting longer than 4 hours or if unable to take medications  Any signs of decreased circulation or nerve impairment to extremity: change in color, persistent  numbness, tingling, coldness or increase pain  Any questions    What to do at Home:  Recommended activity: Activity as tolerated,     If you experience any of the following symptoms chest pain, shortness of breath, fever greater than 100.5, nausea, vomiting, pain unrelieved by medication, please follow up with Miriam Bowden.    *  Please give a list of your current medications to your Primary Care Provider. *  Please update this list whenever your medications are discontinued, doses are      changed, or new medications (including over-the-counter products) are added. *  Please carry medication information at all times in case of emergency situations. These are general instructions for a healthy lifestyle:    No smoking/ No tobacco products/ Avoid exposure to second hand smoke  Surgeon General's Warning:  Quitting smoking now greatly reduces serious risk to your health.     Obesity, smoking, and sedentary lifestyle greatly increases your risk for illness    A healthy diet, regular physical exercise & weight monitoring are important for maintaining a healthy lifestyle    You may be retaining fluid if you have a history of heart failure or if you experience any of the following symptoms: Weight gain of 3 pounds or more overnight or 5 pounds in a week, increased swelling in our hands or feet or shortness of breath while lying flat in bed. Please call your doctor as soon as you notice any of these symptoms; do not wait until your next office visit. Patient armband removed and shredded   MyChart Activation    Thank you for requesting access to [employer]. Please follow the instructions below to securely access and download your online medical record. [employer] allows you to send messages to your doctor, view your test results, renew your prescriptions, schedule appointments, and more. How Do I Sign Up? In your internet browser, go to [email or web address]  Click on the First Time User? Click Here link in the Sign In box. You will be redirect to the New Member Sign Up page. Enter your [employer] Access Code exactly as it appears below. You will not need to use this code after youve completed the sign-up process. If you do not sign up before the expiration date, you must request a new code. [employer] Access Code: Activation code not generated  Current [employer] Status: Active (This is the date your [employer] access code will )    Enter the last four digits of your Social Security Number (xxxx) and Date of Birth (mm/dd/yyyy) as indicated and click Submit. You will be taken to the next sign-up page. Create a [employer] ID. This will be your [employer] login ID and cannot be changed, so think of one that is secure and easy to remember. Create a [employer] password. You can change your password at any time. Enter your Password Reset Question and Answer. This can be used at a later time if you forget your password. Enter your e-mail address. You will receive e-mail notification when new information is available in [address] Click Sign Up. You can now view and download portions of your medical record.   Click the [place] link to download a portable copy of your medical information. Additional Information    If you have questions, please visit the Frequently Asked Questions section of the [employer] website at [email or web address] Remember, [employer] is NOT to be used for urgent needs. For medical emergencies, dial 911. The discharge information has been reviewed with the {PATIENT PARENT GUARDIAN:49310}. The {PATIENT PARENT GUARDIAN:98428} verbalized understanding. Discharge medications reviewed with the {Dishcarge meds reviewed [facility]:43969} and appropriate educational materials and side effects teaching were provided.   ___________________________________________________________________________________________________________________________________

## 2022-10-05 ENCOUNTER — PATIENT OUTREACH (OUTPATIENT)
Dept: CASE MANAGEMENT | Age: 67
End: 2022-10-05

## 2022-10-05 NOTE — PROGRESS NOTES
Care Transitions Initial Call  ( Left message)      Call within 2 business days of discharge: Yes     Patient: Adriano Paulino Patient : 1955 MRN: [id number]    Last Discharge  Street       Date Complaint Diagnosis Description Type Department Provider    22 Shortness of breath Acute respiratory failure with hypoxia ([facility] [address] . .. ED to Hosp-Admission (Discharged) (ADMIT) Zack aGrcia MD; Torres Olivia... Was this an external facility discharge? No   [facility]     Challenges to be reviewed by the provider   Additional needs identified to be addressed with provider: no  none         Method of communication with provider :none   Per Chart Review:     CTN reviewed COVID-19 related testing which was available at this time. Test results were negative. Please see laboratory testing / results for additional verification or information as needed. Advance Care Planning:   Does patient have an Advance Directive:  Not noted to be on file at this time. .     Inpatient Readmission Risk score: Unplanned Readmit Risk Score: 11.7    Was this a readmission? no   Patient stated reason for the admission: n/a     Patients top risk factors for readmission:   Medical Condition     Interventions to address risk factors: CTN followed up with review of discharge information as available. Discharge Summary is not available for review at this time. Care Transitions Nurse ( CTN) attempted to contact patient via telephone call. There was no response. A voicemail message was left requesting a non-emergency return telephone call. CTN  contact information provided. No medical information was left on the voicemail message. Home Health/Outpatient orders at discharge:  yes  [address]: [address]   Date of initial visit: To Be Scheduled, per chart review. Durable Medical Equipment ordered at discharge: None noted at this time. Appointments. If no appointment was previously scheduled, appointment scheduling offered:  n/a  . Is follow up appointment scheduled within 7 days of discharge? yes. [facility] follow up appointment(s):   Future Appointments   Date Time Provider Blair Ursula   10/6/2022  1:30 PM [facility]   10/7/2022 11:30 AM DO CORINNE StoreySC BS AMB   10/11/2022 11:00 AM Tete Grullon PA-C [address]   1/2/2023 10:15 AM Rigoberto Muhammad MD [facility]     [facility] follow up appointment(s): Urology     Plan for follow-up call in 7-10 days /as needed based on severity of symptoms and risk factors. Plan for next call: Attempt to follow up with Initial Transition of Care contact and follow up. CTN provided contact information for future needs. Goals Addressed                   This Visit's Progress     Prevent complications post hospitalization. Supportive resources in place to maintain patient in the community (ie. Home Health, DME equipment, refer to, medication assistant plan, etc.)        Target Date:  11-5-22     10/5/2022     [facility] to provide skilled home care visits. Understands red flags post discharge.

## 2022-10-05 NOTE — PROGRESS NOTES
1. \"Have you been to the ER, urgent care clinic since your last visit? Hospitalized since your last visit? \" Yes When: [facility] ED 9/30/22 fever    2. \"Have you seen or consulted any other health care providers outside of the [address] [place] [address] since your last visit? \" No     3. For patients aged 39-70: Has the patient had a colonoscopy / FIT/ Cologuard? Yes - Care Gap present.  Most recent result on file 3/21/19 cologuard      Chief Complaint   Patient presents with    Hospital Follow Up    Shortness of Breath    UTI

## 2022-10-06 ENCOUNTER — PATIENT OUTREACH (OUTPATIENT)
Dept: CASE MANAGEMENT | Age: 67
End: 2022-10-06

## 2022-10-06 ENCOUNTER — OFFICE VISIT (OUTPATIENT)
Dept: FAMILY MEDICINE CLINIC | Age: 67
End: 2022-10-06
Payer: MEDICARE

## 2022-10-06 VITALS
HEIGHT: 74 IN | WEIGHT: 306 LBS | HEART RATE: 88 BPM | RESPIRATION RATE: 16 BRPM | TEMPERATURE: 97.2 F | OXYGEN SATURATION: 93 % | BODY MASS INDEX: 39.27 KG/M2 | DIASTOLIC BLOOD PRESSURE: 70 MMHG | SYSTOLIC BLOOD PRESSURE: 134 MMHG

## 2022-10-06 DIAGNOSIS — R06.00 DYSPNEA, UNSPECIFIED TYPE: ICD-10-CM

## 2022-10-06 DIAGNOSIS — N39.0 URINARY TRACT INFECTION WITHOUT HEMATURIA, SITE UNSPECIFIED: ICD-10-CM

## 2022-10-06 DIAGNOSIS — R80.9 PROTEINURIA, UNSPECIFIED TYPE: ICD-10-CM

## 2022-10-06 DIAGNOSIS — G47.30 SLEEP APNEA, UNSPECIFIED TYPE: ICD-10-CM

## 2022-10-06 DIAGNOSIS — N30.90 CYSTITIS: ICD-10-CM

## 2022-10-06 DIAGNOSIS — R68.83 CHILLS: Primary | ICD-10-CM

## 2022-10-06 DIAGNOSIS — E11.9 WELL CONTROLLED DIABETES MELLITUS (HCC): ICD-10-CM

## 2022-10-06 PROBLEM — E11.22 TYPE 2 DIABETES MELLITUS WITH CHRONIC KIDNEY DISEASE (HCC): Status: ACTIVE | Noted: 2022-10-06

## 2022-10-06 LAB
BACTERIA SPEC CULT: NORMAL
BACTERIA SPEC CULT: NORMAL
SERVICE CMNT-IMP: NORMAL
SERVICE CMNT-IMP: NORMAL

## 2022-10-06 PROCEDURE — 99496 TRANSJ CARE MGMT HIGH F2F 7D: CPT | Performed by: FAMILY MEDICINE

## 2022-10-06 PROCEDURE — G8427 DOCREV CUR MEDS BY ELIG CLIN: HCPCS | Performed by: FAMILY MEDICINE

## 2022-10-06 NOTE — PROGRESS NOTES
HISTORY OF PRESENT ILLNESS  You Holt is a 79 y.o. male. HPI: Here for follow-up after hospital discharge. He was admitted on 9/30/2022 to 10/4/2022. Reviewed discharge summary. .  Reach out call from nurse navigator into business day. Reviewed discharge summary. Went to emergency room by 911 as he was having fever and chills. Worsening of shortness of breath. Negative COVID and respiratory infection. Positive blood culture with Klebsiella. Also CT scan showed cystitis. She was started on antibiotic and discharged on oral antibiotic till 10/10/2022 per ID consult. Has coming up appointment with the urology next week. Has a history of ureteric stent. Currently no urinary complaint. Noted on urine analysis positive nitrate, presence of glucose and protein. Urine culture negative. Currently taking antibiotic without any side effects. Appetite fair. No nausea vomiting or abdominal pain. No bowel complaint. Still feels chills and cold sensations. No known thyroid problem. Able to talk in full sentence but does feel shortness of breath on exertion and prolonged talking. Oxygen saturation around 93 at rest.  Has coming up appointment tomorrow with the pulmonary. We will follow the recommendation. History of sleep apnea. Using CPAP with compliance. No cough or wheezing. No chest pain. Does feel profuse sweating on and off. Denies any specific anxiety or depression but does feel things anxiousness on and off. Blood pressure initially was elevated. Repeat has improved. No leg swelling. His wife was accompanied with him. Discussed about details. His A1c improved in the recent hospital labs to 6.7. Advised to check blood pressure and blood sugar when he feels chills. Today no fever during visit. CTA chest negative for embolism. Echo was done on October 4 showed no acute finding.   CT Results (most recent):  Results from Hospital Encounter encounter on 09/30/22    CT ABD PELV W CONT    Narrative  CT of abdomen and pelvis with contrast    INDICATION: evaluate for hydronephrosis/pyelonephritis, complicated UTI    COMPARISON: None. TECHNIQUE: 5 mm helical scan to the abdomen and pelvis is obtained  from the  diaphragm to the symphysis pubis after uneventful nonionic IV contrast  administration. All CT scans at this facility performed using dose optimization techniques as  appreciated to a performed exam, to include automated exposure control,  adjustment of the mA and or KU according to patient size (including appropriate  matching for site specific examination), or use of iterative reconstruction  technique. FINDINGS: This study is suboptimal due to delayed scan with contrast in  secretory phase. Lung Bases: Clear. Liver: Normal.  Gallbladder: Normal.  Biliary System: No ductal dilatation. Spleen: Normal.  Pancreas: Normal.  Bowel: Small hiatal hernia. The small and large bowel are nondilated. Adrenal Glands: Normal.  Kidneys: Residual contrast identified in nondilated bilateral renal collecting  system. , Therefore renal calculi evaluation cannot be performed. No obstructive  uropathy. The bilateral ureters are opacified by contrast and nondilated. Lower genitourinary system: The bladder appears normal. No bladder calculi. The  prostate appears normal.    Peritoneum/Retroperitoneum: No adenopathy. Vasculature: Unremarkable for age. Other:  No free fluid. Osseous structures:  Unremarkable for age. Impression  1. Suboptimal study due to delayed scan with contrast in secretory phase. Nephrolithiasis evaluation cannot be performed. No obstructive uropathy. 2. The kidneys appear unremarkable with no definite focal abnormality. Thank you for this referral.  Narrative & Impression   EXAM:  US RETROPERITONEUM COMP     INDICATION:  Dysuria, UTI     COMPARISON: CT abdomen/pelvis 1/15/2019.      TECHNIQUE:  Real-time sonography of the kidneys, retroperitoneum and bladder was performed  with multiple static images obtained. FINDINGS:  Exam is limited due to overlying bowel gas. The kidneys have normal echogenicity with no mass, stone or hydronephrosis. Lobulated renal contours noted. The right kidney measures 12.2 cm and the left  kidney measures 12.7 cm in length. The urinary bladder demonstrates mural thickening. No suspect calculus. IMPRESSION  Bladder wall thickening. Correlate for cystitis. CTA chest- pulmonary embolus protocol      Field comparison Indications: Dyspnea. Technique: Utilizing pulmonary embolus protocol, thin section axial images were  obtained from the thoracic inlet into the upper abdomen after the uneventful  administration of IV contrast. Coronal and sagittal maximum intensity projection  (MIP) post-processed images were generated to better define pulmonary artery  anatomy and enhance sensitivity for detection of pulmonary emboli. Contrast used: 100 cc Isovue 370     All CT scans at this facility are performed using dose optimization technique as  appropriate to a performed exam, to include automated exposure control,  adjustment of the mA and/or kV according to patient size (including appropriate  matching for site-specific examinations), or use of iterative reconstruction  technique. Comparison: None. Findings:     Adequate quality opacification. There are no filling defects within the main,  lobar or segmental pulmonary arteries. The main pulmonary artery has a normal  caliber. The thoracic aorta is nonaneurysmal. The base of the neck is  unremarkable. There are few mildly prominent mediastinal lymph nodes. No hilar  adenopathy is seen. There are mild dependent changes within the lungs. No focal  infiltrate. No pleural effusion. The upper abdomen appears unremarkable. There is an epidural catheter within the thoracic canal.     No acute osseous findings.      IMPRESSION  :     No pulmonary embolism or other acute findings. 09/30/22    ECHO ADULT COMPLETE 10/03/2022 10/3/2022    Interpretation Summary    Left Ventricle: Normal left ventricular systolic function with a visually estimated EF of 55 - 60%. Left ventricle size is normal. Increased wall thickness. Findings consistent with mild concentric hypertrophy. Normal wall motion. Right Atrium: Right atrium is dilated. Aorta: Normal sized ascending aorta. Dilated aortic root. Ao Root diameter is 3.5 cm. Signed by: Nii Newton MD on 10/3/2022  1:40 PM      Visit Vitals  /70 (BP 1 Location: Left upper arm, BP Patient Position: Sitting, BP Cuff Size: Adult)   Pulse 88   Temp 97.2 °F (36.2 °C) (Temporal)   Resp 16   Ht 6' 2\" (1.88 m)   Wt 306 lb (138.8 kg)   SpO2 93%   BMI 39.29 kg/m²     Review medication list, vitals, problem list,allergies. Lab Results   Component Value Date/Time    WBC 6.0 10/03/2022 04:20 AM    HGB 14.1 10/03/2022 04:20 AM    HCT 46.7 10/03/2022 04:20 AM    PLATELET 991 80/55/9098 04:20 AM    MCV 91.2 10/03/2022 04:20 AM     Lab Results   Component Value Date/Time    Sodium 142 10/03/2022 04:20 AM    Potassium 4.4 10/03/2022 04:20 AM    Chloride 106 10/03/2022 04:20 AM    CO2 28 10/03/2022 04:20 AM    Anion gap 8 10/03/2022 04:20 AM    Glucose 113 (H) 10/03/2022 04:20 AM    BUN 17 10/03/2022 04:20 AM    Creatinine 1.19 10/03/2022 04:20 AM    BUN/Creatinine ratio 14 10/03/2022 04:20 AM    GFR est AA >60 10/03/2022 04:20 AM    GFR est non-AA >60 10/03/2022 04:20 AM    Calcium 8.8 10/03/2022 04:20 AM    Bilirubin, total 0.8 09/30/2022 08:20 PM    Alk.  phosphatase 106 09/30/2022 08:20 PM    Protein, total 7.7 09/30/2022 08:20 PM    Albumin 3.8 09/30/2022 08:20 PM    Globulin 3.9 09/30/2022 08:20 PM    A-G Ratio 1.0 09/30/2022 08:20 PM    ALT (SGPT) 35 09/30/2022 08:20 PM    AST (SGOT) 23 09/30/2022 08:20 PM     Lab Results   Component Value Date/Time    Cholesterol, total 104 10/29/2021 10:30 AM    HDL Cholesterol 38 (L) 10/29/2021 10:30 AM    LDL, calculated 47 10/29/2021 10:30 AM    VLDL, calculated 19 10/29/2021 10:30 AM    Triglyceride 95 10/29/2021 10:30 AM    CHOL/HDL Ratio 2.7 10/29/2021 10:30 AM         ROS: See HPI    Physical Exam  Constitutional:       General: He is not in acute distress. Cardiovascular:      Rate and Rhythm: Normal rate and regular rhythm. Heart sounds: Normal heart sounds. Abdominal:      General: Bowel sounds are normal.      Palpations: Abdomen is soft. Tenderness: There is no abdominal tenderness. Musculoskeletal:         General: No swelling. Neurological:      Mental Status: He is oriented to person, place, and time. Psychiatric:         Behavior: Behavior normal.       ASSESSMENT and PLAN    ICD-10-CM ICD-9-CM    1. Chills: Positive blood culture with Klebsiella. On oral antibiotic intent and per ID recommendation. Advised to continue current plan. Keep the appointment with pulmonary tomorrow for further recommendation on chronic shortness of breath. Advised to keep appointment with urology coming up early next week as recent hospitalization for cystitis and prior history of ureteric stent. Sending to nephrology as proteinuria. R68.83 780.64       2. Dyspnea, unspecified type: We will follow pulmonary and the recommendation R06.00 786.09       3. Urinary tract infection without hematuria, site unspecified N: Currently on oral antibiotic. Further instruction per urology N39.0 599.0       4. Proteinuria, unspecified type: Sending to nephrology R80.9 791.0 REFERRAL TO NEPHROLOGY      5. Well controlled diabetes mellitus ([facility] [address]): A1c around 6.7 on recent hospital labs. E11.9 250.00       6. Sleep apnea, unspecified type: Using CPAP with compliance G47.30 780.57       7. Cystitis  N30.90 595.9     stent in ureter       Patient and wife both understood and agreed with the plan    Follow-up and Dispositions    Return in about 1 month (around 11/6/2022).      Please note that this dictation was completed with Dragon, the computer voice recognition software. Quite often unanticipated grammatical, syntax, homophones, and other interpretive errors are inadvertently transcribed by the computer software. Please disregard these errors. Please excuse any errors that have escaped final proofreading.

## 2022-10-06 NOTE — PROGRESS NOTES
Care Transitions Initial Call    Call within 2 business days of discharge: yes     Patient: Raya Vega Patient : 1955 MRN: [id number]    Last Discharge  Street       Date Complaint Diagnosis Description Type Department Provider    22 Shortness of breath Acute respiratory failure with hypoxia ([facility] [address] . .. ED to Hosp-Admission (Discharged) (ADMIT) Eri Booker MD; Wilfrido Holland... Care Transitions Nurse ( CTN)  spoke with patient briefly  via telephone call. Patient advises that he is getting ready to go to a doctor's appointment. CTN to call back at a later time or on another day.

## 2022-10-07 ENCOUNTER — HOSPITAL ENCOUNTER (OUTPATIENT)
Dept: LAB | Age: 67
Discharge: HOME | End: 2022-10-07
Payer: MEDICARE

## 2022-10-07 ENCOUNTER — HOME CARE VISIT (OUTPATIENT)
Dept: SCHEDULING | Facility: HOME HEALTH | Age: 67
End: 2022-10-07
Payer: MEDICARE

## 2022-10-07 ENCOUNTER — OFFICE VISIT (OUTPATIENT)
Dept: PULMONOLOGY | Age: 67
End: 2022-10-07
Payer: MEDICARE

## 2022-10-07 ENCOUNTER — PATIENT OUTREACH (OUTPATIENT)
Dept: CASE MANAGEMENT | Age: 67
End: 2022-10-07

## 2022-10-07 VITALS
RESPIRATION RATE: 18 BRPM | TEMPERATURE: 97.8 F | OXYGEN SATURATION: 97 % | SYSTOLIC BLOOD PRESSURE: 120 MMHG | HEART RATE: 78 BPM | DIASTOLIC BLOOD PRESSURE: 80 MMHG

## 2022-10-07 VITALS
HEIGHT: 74 IN | RESPIRATION RATE: 20 BRPM | DIASTOLIC BLOOD PRESSURE: 76 MMHG | OXYGEN SATURATION: 94 % | TEMPERATURE: 97.2 F | HEART RATE: 85 BPM | WEIGHT: 302 LBS | BODY MASS INDEX: 38.76 KG/M2 | SYSTOLIC BLOOD PRESSURE: 139 MMHG

## 2022-10-07 DIAGNOSIS — R06.83 SNORING: ICD-10-CM

## 2022-10-07 DIAGNOSIS — R06.00 DYSPNEA, UNSPECIFIED TYPE: Primary | ICD-10-CM

## 2022-10-07 DIAGNOSIS — D72.19 PERIPHERAL EOSINOPHILIA: ICD-10-CM

## 2022-10-07 DIAGNOSIS — R53.83 FATIGUE, UNSPECIFIED TYPE: ICD-10-CM

## 2022-10-07 DIAGNOSIS — E66.9 OBESITY (BMI 30-39.9): ICD-10-CM

## 2022-10-07 DIAGNOSIS — G47.19 EXCESSIVE DAYTIME SLEEPINESS: ICD-10-CM

## 2022-10-07 DIAGNOSIS — Z86.69 HISTORY OF OBSTRUCTIVE SLEEP APNEA: ICD-10-CM

## 2022-10-07 LAB
ALBUMIN SERPL-MCNC: 4.1 G/DL (ref 3.4–5)
ALBUMIN/GLOB SERPL: 1.1 {RATIO} (ref 0.8–1.7)
ALP SERPL-CCNC: 106 U/L (ref 45–117)
ALT SERPL-CCNC: 65 U/L (ref 16–61)
ANION GAP SERPL CALC-SCNC: 8 MMOL/L (ref 3–18)
AST SERPL-CCNC: 35 U/L (ref 10–38)
BASOPHILS # BLD: 0.1 K/UL (ref 0–0.1)
BASOPHILS NFR BLD: 1 % (ref 0–2)
BILIRUB SERPL-MCNC: 0.7 MG/DL (ref 0.2–1)
BUN SERPL-MCNC: 17 MG/DL (ref 7–18)
BUN/CREAT SERPL: 11 (ref 12–20)
CALCIUM SERPL-MCNC: 9.4 MG/DL (ref 8.5–10.1)
CHLORIDE SERPL-SCNC: 99 MMOL/L (ref 100–111)
CO2 SERPL-SCNC: 27 MMOL/L (ref 21–32)
CREAT SERPL-MCNC: 1.49 MG/DL (ref 0.6–1.3)
DIFFERENTIAL METHOD BLD: ABNORMAL
EOSINOPHIL # BLD: 0.3 K/UL (ref 0–0.4)
EOSINOPHIL NFR BLD: 3 % (ref 0–5)
ERYTHROCYTE [DISTWIDTH] IN BLOOD BY AUTOMATED COUNT: 14.6 % (ref 11.6–14.5)
GLOBULIN SER CALC-MCNC: 3.9 G/DL (ref 2–4)
GLUCOSE SERPL-MCNC: 153 MG/DL (ref 74–99)
HCT VFR BLD AUTO: 48.8 % (ref 36–48)
HGB BLD-MCNC: 15.2 G/DL (ref 13–16)
IMM GRANULOCYTES # BLD AUTO: 0.2 K/UL (ref 0–0.04)
IMM GRANULOCYTES NFR BLD AUTO: 2 % (ref 0–0.5)
LYMPHOCYTES # BLD: 2.1 K/UL (ref 0.9–3.6)
LYMPHOCYTES NFR BLD: 21 % (ref 21–52)
MAGNESIUM SERPL-MCNC: 2.4 MG/DL (ref 1.6–2.6)
MCH RBC QN AUTO: 28.4 PG (ref 24–34)
MCHC RBC AUTO-ENTMCNC: 31.1 G/DL (ref 31–37)
MCV RBC AUTO: 91.2 FL (ref 78–100)
MONOCYTES # BLD: 0.8 K/UL (ref 0.05–1.2)
MONOCYTES NFR BLD: 8 % (ref 3–10)
NEUTS SEG # BLD: 6.7 K/UL (ref 1.8–8)
NEUTS SEG NFR BLD: 65 % (ref 40–73)
NRBC # BLD: 0 K/UL (ref 0–0.01)
NRBC BLD-RTO: 0 PER 100 WBC
PLATELET # BLD AUTO: 300 K/UL (ref 135–420)
PMV BLD AUTO: 10 FL (ref 9.2–11.8)
POTASSIUM SERPL-SCNC: 4.3 MMOL/L (ref 3.5–5.5)
PROT SERPL-MCNC: 8 G/DL (ref 6.4–8.2)
RBC # BLD AUTO: 5.35 M/UL (ref 4.35–5.65)
SODIUM SERPL-SCNC: 134 MMOL/L (ref 136–145)
WBC # BLD AUTO: 10.2 K/UL (ref 4.6–13.2)

## 2022-10-07 PROCEDURE — 3017F COLORECTAL CA SCREEN DOC REV: CPT | Performed by: INTERNAL MEDICINE

## 2022-10-07 PROCEDURE — 1111F DSCHRG MED/CURRENT MED MERGE: CPT | Performed by: INTERNAL MEDICINE

## 2022-10-07 PROCEDURE — G8417 CALC BMI ABV UP PARAM F/U: HCPCS | Performed by: INTERNAL MEDICINE

## 2022-10-07 PROCEDURE — 83735 ASSAY OF MAGNESIUM: CPT

## 2022-10-07 PROCEDURE — 400018 HH-NO PAY CLAIM PROCEDURE

## 2022-10-07 PROCEDURE — G8427 DOCREV CUR MEDS BY ELIG CLIN: HCPCS | Performed by: INTERNAL MEDICINE

## 2022-10-07 PROCEDURE — G8536 NO DOC ELDER MAL SCRN: HCPCS | Performed by: INTERNAL MEDICINE

## 2022-10-07 PROCEDURE — 3331090001 HH PPS REVENUE CREDIT

## 2022-10-07 PROCEDURE — 80053 COMPREHEN METABOLIC PANEL: CPT

## 2022-10-07 PROCEDURE — G8510 SCR DEP NEG, NO PLAN REQD: HCPCS | Performed by: INTERNAL MEDICINE

## 2022-10-07 PROCEDURE — 1123F ACP DISCUSS/DSCN MKR DOCD: CPT | Performed by: INTERNAL MEDICINE

## 2022-10-07 PROCEDURE — 1101F PT FALLS ASSESS-DOCD LE1/YR: CPT | Performed by: INTERNAL MEDICINE

## 2022-10-07 PROCEDURE — G8752 SYS BP LESS 140: HCPCS | Performed by: INTERNAL MEDICINE

## 2022-10-07 PROCEDURE — G8754 DIAS BP LESS 90: HCPCS | Performed by: INTERNAL MEDICINE

## 2022-10-07 PROCEDURE — G0151 HHCP-SERV OF PT,EA 15 MIN: HCPCS

## 2022-10-07 PROCEDURE — 400013 HH SOC

## 2022-10-07 PROCEDURE — 85025 COMPLETE CBC W/AUTO DIFF WBC: CPT

## 2022-10-07 PROCEDURE — 3331090002 HH PPS REVENUE DEBIT

## 2022-10-07 PROCEDURE — 99204 OFFICE O/P NEW MOD 45 MIN: CPT | Performed by: INTERNAL MEDICINE

## 2022-10-07 PROCEDURE — G0299 HHS/HOSPICE OF RN EA 15 MIN: HCPCS

## 2022-10-07 RX ORDER — ALBUTEROL SULFATE 90 UG/1
1 AEROSOL, METERED RESPIRATORY (INHALATION)
Qty: 1 EACH | Refills: 3 | Status: SHIPPED | OUTPATIENT
Start: 2022-10-07

## 2022-10-07 NOTE — HOME HEALTH
Skilled services/Home bound verification:     Skilled Reason for admission/summary of clinical condition:  79 y.o. male with past medical history significant for hypertension diabetes who presented to the ED on 9/30/22 with 3 days of shortness of breath. Hospitalization at [address] [place] [address] 1/2019 for sepsis  due to klebsiella bloodstream infection (positive blood culture 1/14, negative blood culture 1/16), right kidney pyelonephritis, complicated UTI due to klebsiella. s/p right retrogram pyelogram, rig ht ureteral stent placement, urethral dilatation on 1/15/19       . This patient is homebound for the following reasons Requires considerable and taxing effort to leave the home . Caregiver: spouse. Caregiver assists with IADL's. Medications reconciled and all medications are available in the home this visit. High risk medication teaching regarding anticoagulants, hyperglycemic agents or opiod narcotics performed (specify)dapagliflozin,  glipizide for risk of hypoglycemia ,    Marco A Holguin MD notified of any discrepancies/look a like medications/medication interactions no severe interaction noted . Home health supplies by type and quantity ordered/delivered this visit include: none    Patient education provided this visit to include: HEP,fall prevention, dc planning    Patient level of understanding of education provided: Patient was able to partially teach back HEP     Sharps Education Provided: Clinician instructed patient/CG on proper disposal of sharps: Containers should be made of hard plastic, be puncture-resistant and leakproof,   such as a laundry detergent or bleach bottle.  When the container is ¾ full, it should be sealed with tape and labeled   DO NOT RECYCLE prior to discarding in the regular trash.      Patient response to procedure performed:  Patient needed visual cues for HEP    Home exercise program/Homework provided: . Patient educated with starting with HEP in seated including hip flexion, knee extension, hip abduction, ankle pumps x 20 reps each for purpose of increasing MMT on BLE. Educated with gait using SPC at all times    Pt/Caregiver instructed on plan of care and are agreeable to plan of care at this time. Physician Saran Leggett MD notified of patient admission to home health and plan of care including anticipated frequency of 1w1 2w4 for PT     Discharge planning discussed with patient and caregiver. Discharge planning as follows: dc to family with MD supervision when all goals are met . Pt/Caregiver did verbalize understanding of discharge planning. Next MD appointment TBD. Patient/caregiver encouraged/instructed to keep appointment as lack of follow through with physician appointment could result in discontinuation of home care services for non-compliance. PMHx:  Abdominal adhesions 7/30/201 4    BPH (benign prostatic hyperplasia)      Cellulitis      DDD (degenerative disc disease), lumbar 7/30/2014    Depression      Diabetes ([address]      Frequent falls 7/30/2014    TRUE (generalized anxiety disorder) 7/30/2014    HLD (hyperlipidemia)      Hypertension      Hypogonadism in male      Incomplete bladder emptying      Kidney stones      Lumbar facet arthropathy 7/30/2014    Lumbar nerve root impingement 7/30/2014    L umbosacral radiculopathy at S1 7/30/2014    Major depression 7/30/2014    Neurological disorder        sciatica    MANISH (obstructive sleep apnea) 7/30/2014    S/P lumbar laminectomy 7/30/2014    S/P lumbar spinal fusion 7/30/2014    Sleep apnea        USES CPAP EVERY NIGHT    Thoracic compression fracture ([facility] [address] 07/30/2014   S: pain on RLE and back 4/10 that is managed by rest. Difficulty with gait and transfers   O:Patients Goals= Be able to go back to PLOF  Wound/Incision: location, description, drainage: none  PLOF: Lives in a 2 level house with spouse, has >4 steps to enter main floor and a stair lift to go to 2nd floor.  Patient prior to referral was independent in all aspects of ADL's and IADLs and did was using [facility] for gait   STRENGTH R hip flexor, extensor 3/5, R knee flexor and extensor 3/5, L hip and knee flexor and extensor 4-/5  FTSTS 28 seconds  TUG 27 seconds  BALANCE Tinetti 11/28 high fall risk due to reduced strength on BLE, gait deviation and decreased efficiency of balance reactions. Patient demonstrated poor use of hip and ankle strategy during standing balance activity. Patient requires support from AD at all times for safety and stability. GAIT Patient was able to ambulate without AD x 30 feet with supervision with noted antalgic gait on RLE, decreased step height and stride length on RLE with wide CRISTOBAL and slow paced. Patient was educated with heel to toe gait pattern techinique and importance of using AD at all times to prevent LOB. BED MOBILITY Patient needed supervision with bed mobility   TRANSFERS Patient needed Min A x1 with bed, chair and toilet transfers and educated with importance of using SPC   A: PT evaluation completed, POC established, Med rec done, HEP established  P:Home Safety eval/recommendations: Home health physical therapy initial evaluation performed. Patient demonstrates decreased strength, balance, and endurance which increases patient's overall fall risk and burden of care. Patient would benefit from home health physical therapy to improve balance, strength, and endurance which would decrease fall risk and allow patient to return to prior level of function once all functional goals or full rehab potential is met. Patient educated with starting with HEP in seated including hip flexion, knee extension, hip abduction, ankle pumps x 20 reps each for purpose of increasing MMT on BLE.  Educated with gait using SPC at all times

## 2022-10-07 NOTE — TELEPHONE ENCOUNTER
Order for patient's scooter was faxed back to [facility] ago. Patient had office visit with Dr. Yaakov Shay on 10/06/22 and he stated [employer] received order for scooter repair/battery. No further assistance is needed, per patient. Closing encounter.

## 2022-10-07 NOTE — PROGRESS NOTES
Care Transitions Initial Call    Call within 2 business days of discharge: Yes     Patient: Willie Mackey Patient : 1955 MRN: [id number]    Last Discharge  Street       Date Complaint Diagnosis Description Type Department Provider    22 Shortness of breath Acute respiratory failure with hypoxia ([facility] [address] . .. ED to Hosp-Admission (Discharged) (ADMIT) Gayle Trejo MD; Sydni Sharpe... Care Transitions Nurse ( CTN)  attempted to contact patient via telephone call for follow up. Patient's spouse answered the phone call and related that patient was not available. Spouse related that patient had attended a follow up medical appointment today. No further outreach planned at this time. CTN contact information provided for follow up as needed. Transition of Care resolved at this time. Ambulatory Care Manager with [address] Coordination Team notified of resolution of Transition of Care outreach.

## 2022-10-07 NOTE — LETTER
10/9/2022    Patient: Lizz Haines   YOB: 1955   Date of Visit: 10/7/2022     Tanda Lesches, MD  [address] [place] [address]  [address]  [id number] [place] [address]  [address]    Dear Tanda Lesches, MD,      Thank you for referring Mr. Dilan William to [address] [place] [address] for evaluation. My notes for this consultation are attached. If you have questions, please do not hesitate to call me. I look forward to following your patient along with you.       Sincerely,    Kelley Acosta, DO

## 2022-10-07 NOTE — PROGRESS NOTES
Adriano Navarroveland presents today for   Chief Complaint   Patient presents with    New Patient    Sleep Apnea    CPAP       Is someone accompanying this pt? Yes - spouse    Is the patient using any DME equipment during OV? Yes - CPAP     -[employer]    Have you ever had a sleep study done before? Yes; Where: unknown, When: 10 + years ago     Depression Screening:  3 most recent PHQ Screens 10/7/2022   PHQ Not Done -   Little interest or pleasure in doing things Not at all   Feeling down, depressed, irritable, or hopeless Not at all   Total Score PHQ 2 0   Trouble falling or staying asleep, or sleeping too much -   Feeling tired or having little energy -   Poor appetite, weight loss, or overeating -   Feeling bad about yourself - or that you are a failure or have let yourself or your family down -   Trouble concentrating on things such as school, work, reading, or watching TV -   Moving or speaking so slowly that other people could have noticed; or the opposite being so fidgety that others notice -   Thoughts of being better off dead, or hurting yourself in some way -   PHQ 9 Score -   How difficult have these problems made it for you to do your work, take care of your home and get along with others -       [place] Sleepiness Scale:  [place] Sleepiness Scale  10/7/2022   Sitting and Reading 3   Watching TV 3   Sitting, inactive in a public place (e.g. a movie theater or meeting) 2   As a passenger in a car for an hour, without a break 1   Lying down to rest in the afternoon, when circumstances permit 3   Sitting and talking to someone 2   Sitting quietly after lunch without alcohol 3   In a car, while stopped for a few minutes in traffic 0   [place] Sleepiness Score 17       Stop-Bang:  Stop Bang 10/7/2022   Does the patient snore loudly (louder than talking or loud enough to be heard through closed doors)?  1   Does the patient often feel tired, fatigued, or sleepy during the daytime, even after a \"good\" night's sleep? 1   Has anyone ever observed the patient stop breathing during their sleep? 1   Does the patient have or are they being treated for high blood pressure? 1   Is the patient's BMI greater than 35? 1   Is your neck circumference greater than 17 inches (Male) or 16 inches (Female)? 1   Is the patient older than 48? 1   Is the patient male? 1   MANISH Score 8       Neck Circumference:  23\"      Coordination of Care:  1. Have you been to the ER, urgent care clinic since your last visit? Hospitalized since your last visit? No    2. Have you seen or consulted any other health care providers outside of the [address] [place] [address] since your last visit? Include any pap smears or colon screening. N/a    Medication list has been updated according to patient. Patient declines the influenza vaccine at this time.
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
## 2022-10-07 NOTE — PATIENT INSTRUCTIONS
Please call our clinic back at [phone] or send a message on [facility] if you have any questions or concerns or if you are experiencing any of the following: You have not received a follow up appointment within 30 days prior the recommended follow up time. If you are not tolerating treatment plan and/or not able to obtain equipment or prescribed medication(s). if you are experiencing any difficulties with the [employer]  (DME) Company you may be using or is assigned to you. Two weeks have passed and you have not received an appointment for a scheduled procedure. Two weeks have passed since you underwent a test and/or procedure and you have not received your results. If you are using a CPAP/BIPAP, or Home Ventilator Device- Please note the following. Currently, many DMEs are experiencing supply chain difficulties and orders for equipment may be back logged several weeks to months. Your  Durable Medical Equipment (DME ) company is supposed to provide you with replacement filters, tubing and masks. You can either call your DME when you need new supplies or you can arrange for an automatic shipment schedule. Your need to be seen by our office at lat minimum of every 12 months in order to renew the prescription for these supplies. Please make note of who your DME company is and their phone number. Please make sure that you clean your mask and hosing on a regular basis. Your DME can provide you with additional information regarding proper care and cleaning of your device           Safety is strongly encouraged. You should not drive if sleepy, tired, distracted and/or  fatigued. If a procedure or imaging study has been ordered for your by this clinic please call the [place] at [facility] or [place] at  [address] [place] [address] [place] and blood work do not require appointments.   They are considered \"Walk-In\" services and can be obtained at either [facility] or [facility] Blood work is performed at the Laboratory (Lab) from [facility] - 04:00pm      -Thank you       Instructions for how to correctly use a Metered Dose Inhaler (MDI)            Learning About CPAP for Sleep Apnea  What is CPAP? CPAP is a small machine that you use at home every night while you sleep. It increases air pressure in your throat to keep your airway open. When you have sleep apnea, this can help you sleep better, feel better, and avoid future health problems. CPAP stands for \"continuous positive airway pressure. \"  The CPAP machine will have one of the following:  A mask that covers your nose and mouth  A mask that covers your nose only  A nasal pillow that covers only the openings of your nose  Why is it done? CPAP is usually the best treatment for obstructive sleep apnea. It is the first treatment choice and the most widely used. CPAP:  Helps you have more normal sleep, so you feel less sleepy and more alert during the daytime. May help keep heart failure or other heart problems from getting worse. May help lower your blood pressure. If you have a bed partner, they may also sleep better when you use a CPAP. That's because you aren't snoring or restless. Your doctor may suggest CPAP if you have: Moderate to severe sleep apnea. Sleep apnea and coronary artery disease (CAD). Sleep apnea and heart failure. What are the side effects? Some people who use CPAP have:  A dry or stuffy nose and a sore throat. Irritated skin on the face. Bloating. How can you care for yourself? If using CPAP is not comfortable, or if you have certain side effects, work with your doctor to fix them. Here are some things you can try:  Be sure the mask, nasal mask, or nasal pillow fits well. See if your doctor can adjust the pressure of your CPAP. If your nose or mouth is dry, set the machine to deliver warmer or wetter air. Or try using a humidifier.   If your nose is runny or stuffy, talk to your doctor about using a decongestant medicine or steroid nasal spray. Be safe with medicines. Read and follow all instructions on the label. Do not use the medicine longer than the label says. Your doctor may also help you with problems like swallowing air, bloating, or claustrophobia. Talk to your doctor if you're still having problems. If these things don't help, you might try a different type of machine. Where can you learn more? Go to http://iza-poornima.info/  Enter Lucy Neumann in the search box to learn more about \"Learning About CPAP for Sleep Apnea. \"  Current as of: July 6, 2021               Content Version: 13.2  © 2006-2022 Healthwise, Incorporated. Care instructions adapted under license by [employer] technology (which disclaims liability or warranty for this information). If you have questions about a medical condition or this instruction, always ask your healthcare professional. [place] [address] any warranty or liability for your use of this information.

## 2022-10-08 PROCEDURE — 3331090001 HH PPS REVENUE CREDIT

## 2022-10-08 PROCEDURE — 3331090002 HH PPS REVENUE DEBIT

## 2022-10-09 VITALS
HEART RATE: 76 BPM | TEMPERATURE: 98.2 F | OXYGEN SATURATION: 97 % | SYSTOLIC BLOOD PRESSURE: 132 MMHG | RESPIRATION RATE: 18 BRPM | DIASTOLIC BLOOD PRESSURE: 78 MMHG

## 2022-10-09 PROCEDURE — 3331090002 HH PPS REVENUE DEBIT

## 2022-10-09 PROCEDURE — 3331090001 HH PPS REVENUE CREDIT

## 2022-10-10 ENCOUNTER — HOME CARE VISIT (OUTPATIENT)
Dept: HOME HEALTH SERVICES | Facility: HOME HEALTH | Age: 67
End: 2022-10-10
Payer: MEDICARE

## 2022-10-10 PROCEDURE — 3331090002 HH PPS REVENUE DEBIT

## 2022-10-10 PROCEDURE — 3331090001 HH PPS REVENUE CREDIT

## 2022-10-10 NOTE — PROGRESS NOTES
Low sodium. Elevated creatinine. Sending to nephrology. Please make sure patient has an appointment. One of the liver function elevated. Work on a low-fat diet. Avoid drinking alcohol if any. Will observe for now. Elevated hematocrit. Could be from inflammation.   We will discuss further on follow-up visit

## 2022-10-10 NOTE — HOME HEALTH
Skilled Reason for admission/summary of clinical condition:  UTI  This patient is homebound for the following reasons Requires considerable and taxing effort to leave the home  and Requires the assistance of 1 or more persons to leave the home   Caregiver: wife. Caregiver assists with iadl, adl, meds, transportation  Medications reconciled and all medications are available in the home this visit. The following education was provided regarding medications: sn instructed patient on lasix, use for edema management and potential for urinary incontinence, drops in BP and falls  Medications  are effective at this time. High risk medication teaching regarding anticoagulants, hyperglycemic agents or opoid narcotics performed: sn instruction insulin admin, dose and compliance  notified of any discrepancies/look a like medications/medication interactions na  Home health supplies by type and quantity ordered/delivered this visit include: lab supplies  Patient education provided this visit to include: Patient is 70-year-old  male who lives with wife. Past medical history for UTI, IDDM, hypertension. Patient with recent hospital stay for sob, UTI.  patient with hx of dm.  patient c/o bilateral foot pain. sn instructed patient on alt pain relief measures. Skilled nurse instructed patient to monitor diabetic feet daily for reddened areas, open areas. Patient using cane at times, slow gait, some balance issues. Skilled nurse instructed patient to change positions slowly to prevent dizziness and falls into use walker and to wear shoes or non-ski socks to prevent falls  venipuncture x1 to right af fr multiple labs. specimen obtained, needle removed, pressure held. labs to [facility].     Patient level of understanding of education provided: good  Sharps Education Provided: Clinician instructed patient/CG on proper disposal of sharps: Containers should be made of hard plastic, be puncture-resistant and leakproof, such as a laundry detergent or bleach bottle.  When the container is ¾ full, it should be sealed with tape and labeled   DO NOT RECYCLE prior to discarding in the regular trash.    Patient response to procedure performed:  tolerated lab draws without issue  Home exercise program/Homework provided: sn instructed patient to perform deep breathing exercises, 5-6 breaths 5 x daily to promote air exchange and prevent pneumonia   Pt/Caregiver instructed on plan of care and are agreeable to plan of care at this time. Physician Heidy Heart MD notified of patient admission to home health and plan of care including anticipated frequency of 1w1, 2w1, 1w4 and treatments/interventions/modalities of pt/ot  Discharge planning discussed with patient and caregiver. Discharge planning as follows: dc when goals met  Pt/Caregiver did verbalize understanding of discharge planning. Next MD appointment 10/11 with urology. Patient/caregiver encouraged/instructed to keep appointment as lack of follow through with physician appointment could result in discontinuation of home care services for non-compliance.

## 2022-10-11 PROCEDURE — 3331090002 HH PPS REVENUE DEBIT

## 2022-10-11 PROCEDURE — 3331090001 HH PPS REVENUE CREDIT

## 2022-10-12 ENCOUNTER — HOME CARE VISIT (OUTPATIENT)
Dept: HOME HEALTH SERVICES | Facility: HOME HEALTH | Age: 67
End: 2022-10-12
Payer: MEDICARE

## 2022-10-12 ENCOUNTER — HOME CARE VISIT (OUTPATIENT)
Dept: SCHEDULING | Facility: HOME HEALTH | Age: 67
End: 2022-10-12
Payer: MEDICARE

## 2022-10-12 PROCEDURE — 3331090002 HH PPS REVENUE DEBIT

## 2022-10-12 PROCEDURE — 3331090001 HH PPS REVENUE CREDIT

## 2022-10-12 NOTE — ADT AUTH CERT NOTES
Pt weaned to Room Air on 10/3 and discharged on Room Air.    10/04/22 0742 98.1 °F (36.7 °C) 78 145/88 Abnormal  107 - - 20 94 % Room Air

## 2022-10-13 ENCOUNTER — HOME CARE VISIT (OUTPATIENT)
Dept: SCHEDULING | Facility: HOME HEALTH | Age: 67
End: 2022-10-13
Payer: MEDICARE

## 2022-10-13 PROCEDURE — G0157 HHC PT ASSISTANT EA 15: HCPCS

## 2022-10-13 PROCEDURE — 3331090002 HH PPS REVENUE DEBIT

## 2022-10-13 PROCEDURE — 3331090001 HH PPS REVENUE CREDIT

## 2022-10-14 ENCOUNTER — HOME CARE VISIT (OUTPATIENT)
Dept: HOME HEALTH SERVICES | Facility: HOME HEALTH | Age: 67
End: 2022-10-14
Payer: MEDICARE

## 2022-10-14 PROCEDURE — 3331090001 HH PPS REVENUE CREDIT

## 2022-10-14 PROCEDURE — 3331090002 HH PPS REVENUE DEBIT

## 2022-10-15 PROCEDURE — 3331090001 HH PPS REVENUE CREDIT

## 2022-10-15 PROCEDURE — 3331090002 HH PPS REVENUE DEBIT

## 2022-10-16 VITALS
HEART RATE: 83 BPM | TEMPERATURE: 98.2 F | DIASTOLIC BLOOD PRESSURE: 78 MMHG | OXYGEN SATURATION: 98 % | RESPIRATION RATE: 19 BRPM | SYSTOLIC BLOOD PRESSURE: 122 MMHG

## 2022-10-16 PROCEDURE — 3331090001 HH PPS REVENUE CREDIT

## 2022-10-16 PROCEDURE — 3331090002 HH PPS REVENUE DEBIT

## 2022-10-17 PROCEDURE — 3331090002 HH PPS REVENUE DEBIT

## 2022-10-17 PROCEDURE — 3331090001 HH PPS REVENUE CREDIT

## 2022-10-17 NOTE — PROGRESS NOTES
Spoke with patient (two identifiers verified) and his wife Nya Hall to advise lab results showed low sodium and elevated creatinine.  Dr. Padilla Arthur has referred him to nephrology at [facility]. Patient and his wife Nya Hall were given the phone number to nephrology office ([phone]) and advised them to  office today to request an appointment. They verbalized understanding and are aware documentation has already been sent to [facility]. Also, patient was advised one of the liver function test was elevated. He was advised to work on a low-fat diet and to avoid drinking alcohol, if any. Patient and wife verbalized understanding; stated patient does not drink any alcohol. Dr. Padilla Arthur will observe for now. Also, patient was advised he has elevated hematocrit. This could be from inflammation.  Dr. Padilla Arthur will discuss further at follow-up visit. Patient and his wife verbalized understanding. They were reminded next appointment with Dr. Padilla Arthur is on 11/03/22 at 10:00 AM. They verbalized understanding.

## 2022-10-17 NOTE — CASE COMMUNICATION
Patient declined 2 PT visits this week so far secondary to appointments already scheduled. Will resume on thursday.

## 2022-10-18 ENCOUNTER — TELEPHONE (OUTPATIENT)
Dept: PULMONOLOGY | Age: 67
End: 2022-10-18

## 2022-10-18 ENCOUNTER — HOME CARE VISIT (OUTPATIENT)
Dept: SCHEDULING | Facility: HOME HEALTH | Age: 67
End: 2022-10-18
Payer: MEDICARE

## 2022-10-18 ENCOUNTER — HOME CARE VISIT (OUTPATIENT)
Dept: HOME HEALTH SERVICES | Facility: HOME HEALTH | Age: 67
End: 2022-10-18
Payer: MEDICARE

## 2022-10-18 VITALS
RESPIRATION RATE: 18 BRPM | TEMPERATURE: 98 F | HEART RATE: 78 BPM | OXYGEN SATURATION: 98 % | TEMPERATURE: 98.2 F | DIASTOLIC BLOOD PRESSURE: 77 MMHG | SYSTOLIC BLOOD PRESSURE: 122 MMHG | DIASTOLIC BLOOD PRESSURE: 78 MMHG | HEART RATE: 83 BPM | OXYGEN SATURATION: 95 % | SYSTOLIC BLOOD PRESSURE: 122 MMHG | RESPIRATION RATE: 18 BRPM

## 2022-10-18 PROCEDURE — G0157 HHC PT ASSISTANT EA 15: HCPCS

## 2022-10-18 PROCEDURE — 3331090001 HH PPS REVENUE CREDIT

## 2022-10-18 PROCEDURE — 3331090002 HH PPS REVENUE DEBIT

## 2022-10-18 NOTE — TELEPHONE ENCOUNTER
Josias Wolff from [facility] [employer] would like to speak with a nurse or doctor in regards to her recommendation for Home sleep study for pt.  Please call [phone]

## 2022-10-19 PROCEDURE — 3331090002 HH PPS REVENUE DEBIT

## 2022-10-19 PROCEDURE — 3331090001 HH PPS REVENUE CREDIT

## 2022-10-19 NOTE — HOME HEALTH
SUBJECTIVE: Patient was present in home and reports \"I am feeling okay\". Patient denies any recent falls or medication changes. CAREGIVER INVOLVEMENT/ASSISTANCE NEEDED FOR: Patient lives in a two story home with his wife who is assisting patient as needed with meal preparations but he is able to perform ADLS on his own at this time. OBJECTIVE:  See interventions. PATIENT RESPONSE TO TREATMENT:  Patient was motivated and willing to participate in PT but is limited today secondary to presence on R ankle instability. He was encourgaed today to follow up with PCP or orthopedic MD to address - he is agreeable. PATIENT LEVEL OF UNDERSTANDING OF EDUCATION PROVIDED:  Educated patient on need to use AD during all standing and gait tasks until MD states otherwise to provide stability and balance while decreasing patients fall risk. ASSESSMENT OF PROGRESS TOWARD GOALS: Patient was seen today for a PT follow up and was able to perform initial therapy tasks without increase in pain but does report much fatigue with all tasks performed- often sitting rest breaks are required. Patient performed sit to stands from kitchen surface with BUE support and CGA. He was able to work on sitting strengthening tasks which were updated and added to HEP today- he reports understanding and intent to comply. HOME EXERCISE PROGRAM: Patient was instructed on sitting BLE therex with AROM as follows: ankle pumps, LAQ, knee flexion, hip marching, hip abduction with LE extended x10 reps each all 3x per day as able. THE FOLLOWING DISCHARGE PLANNING WAS DISCUSSED WITH THE PATIENT/CAREGIVER: Plan to DC patient once PT goals are met. PLAN FOR NEXT VISIT: Plan in coming visit to increase therex reps and gait training as patient is able.

## 2022-10-20 ENCOUNTER — HOME CARE VISIT (OUTPATIENT)
Dept: HOME HEALTH SERVICES | Facility: HOME HEALTH | Age: 67
End: 2022-10-20
Payer: MEDICARE

## 2022-10-20 ENCOUNTER — PATIENT OUTREACH (OUTPATIENT)
Dept: CASE MANAGEMENT | Age: 67
End: 2022-10-20

## 2022-10-20 PROCEDURE — 3331090002 HH PPS REVENUE DEBIT

## 2022-10-20 PROCEDURE — 3331090001 HH PPS REVENUE CREDIT

## 2022-10-20 NOTE — PROGRESS NOTES
Complex Case Management      Date/Time:  10/20/2022 9:59 AM    Method of communication with patient:phone    [address] [facility] ([facility]) contacted the patient by telephone to perform Ambulatory Care Coordination. Verified name and  (PHI) with patient as identifiers. Provided introduction to self, and explanation of the Ambulatory Care Manager's role. Reviewed most recent clinic visit w/ patient who verbalized understanding. Patient given an opportunity to ask questions. Top Challenges reviewed with the Provider   N/a     Hx of Abdominal adhesions, BPH, DDD, Depression, DM2, Anxiety, HLD, Htn, sciatica, MANISH, CKD  States doing ok after most recent admission. Aware of upcoming appts. States currently some cold sx. Will call office if it gets worse. No other questions/issues    The patient agrees to contact the PCP office or [facility] [address] [facility] for questions related to their healthcare. Provided contact information for future reference. Disease Specific:   N/A    Home Health Active: No    DME Active: No    Barriers to care? none    Advance Care Planning:   Does patient have an Advance Directive:  not on file; education provided     Medication(s):   Medication reconciliation was not performed with patient, who verbalizes understanding of administration of home medications. There were no barriers to obtaining medications identified at this time. Referral to Pharm D needed: no     Current Outpatient Medications   Medication Sig    mirabegron ER (Myrbetriq) 50 mg ER tablet Take 1 Tablet by mouth daily. albuterol (PROVENTIL HFA, VENTOLIN HFA, PROAIR HFA) 90 mcg/actuation inhaler Take 1 Puff by inhalation every four (4) hours as needed for Wheezing. cholecalciferol (VITAMIN D3) (1000 Units /25 mcg) tablet Take 1,000 Units by mouth daily. melatonin 5 mg cap capsule Take 5 mg by mouth nightly. ascorbic acid, vitamin C, (VITAMIN C) 500 mg tablet Take 500 mg by mouth daily.     lancets (TRUEplus Lancets) 33 gauge misc CHECK BLOOD SUGAR THREE TIMES DAILY    [employer]Safe Alcohol Prep Pads padm USE AS DIRECTED WHEN CHECKING BLOOD SUGAR THREE TIMES DAILY    furosemide (LASIX) 20 mg tablet TAKE 1 TABLET BY MOUTH TWICE DAILY    pramipexole (MIRAPEX) 0.75 mg tablet TAKE 3 TABLETS ONE TIME DAILY (Patient taking differently: TAKE 3 TABLETS ONE TIME DAILY orally )    metoprolol succinate (TOPROL-XL) 50 mg XL tablet TAKE 1 TABLET EVERY DAY (Patient taking differently: TAKE 1 TABLET EVERY DAY orally )    glipiZIDE (GLUCOTROL) 10 mg tablet TAKE 1 TABLET TWICE DAILY (Patient taking differently: TAKE 1 TABLET TWICE DAILY orally )    amitriptyline (ELAVIL) 10 mg tablet TAKE 1 TABLET EVERY NIGHT (Patient taking differently: TAKE 1 TABLET EVERY NIGHT orally)    simvastatin (ZOCOR) 10 mg tablet TAKE 1 TABLET EVERY NIGHT (Patient taking differently: TAKE 1 TABLET EVERY NIGHT orally)    buPROPion SR (WELLBUTRIN SR) 150 mg SR tablet TAKE 1 TABLET EVERY DAY (Patient taking differently: TAKE 1 TABLET EVERY DAY orally)    montelukast (SINGULAIR) 10 mg tablet TAKE 1 TABLET EVERY DAY (Patient taking differently: TAKE 1 TABLET EVERY  day orally )    dapagliflozin (FARXIGA) 10 mg tab tablet Take 1 Tablet by mouth daily. dulaglutide (Trulicity) 3 EV/3.9 mL pnij 3 mg by SubCUTAneous route every seven (7) days. alfuzosin SR (UROXATRAL) 10 mg SR tablet TAKE 1 TABLET BY MOUTH DAILY AFTER DINNER    gabapentin (NEURONTIN) 300 mg capsule Take 300 mg by mouth three (3) times daily. dutasteride (AVODART) 0.5 mg capsule Take 1 Capsule by mouth daily (after dinner). testosterone cypionate (DEPOTESTOTERONE CYPIONATE) 200 mg/mL injection 0.5 mL by IntraMUSCular route every seven (7) days.  Max Daily Amount: 100 mg.    glucose blood VI test strips (True Metrix Glucose Test Strip) strip TEST BLOOD SUGAR THREE TIMES DAILY    nitroglycerin (NITROSTAT) 0.4 mg SL tablet DISSOLVE ONE TABLET UNDER TONGUE AS NEEDED FOR CHEST PAIN EVERY 5 MINUTES FOR UP TO 3 DOSES (Patient taking differently: 0.4 mg by SubLINGual route every five (5) minutes as needed for Chest Pain. DISSOLVE ONE TABLET UNDER TONGUE AS NEEDED FOR CHEST PAIN EVERY 5 MINUTES FOR UP TO 3 DOSES)    aspirin delayed-release 81 mg tablet Take 1 Tablet by mouth daily. solifenacin (VESICARE) 10 mg tablet Take 1 Tablet by mouth daily. Cane maura by Does Not Apply route. cpap machine kit by Does Not Apply route. Blood-Glucose Meter monitoring kit Use to check blood sugar 3 times daily. Dx E11.9. Please dispense brand preferred by insurance. folic acid/multivit-min/lutein (CENTRUM SILVER PO) Take 1 Tablet by mouth daily. lisinopriL (PRINIVIL, ZESTRIL) 20 mg tablet Take 1 Tab by mouth daily. Needle, Disp, 23 G 23 gauge x 1 1/4\" ndle Use to inject 0.5 ml every 7 days    Needle, Disp, 18 G 18 gauge x 1\" ndle Use to aspirate 0.5 ml every 7 days    Wheel Chair maura Assistance with ambulation, gait instability    polyethylene glycol (MIRALAX) 17 gram packet Take 1 Packet by mouth daily. Hold for loose stools (Patient taking differently: Take 17 g by mouth daily. dissolve in 8 oz liquid )    naloxone 4 mg/actuation spry 4 mg by Nasal route as needed. Back Brace misc 1 Device by Does Not Apply route daily as needed for Pain. One, lumbosacral orthosis/back brace with metal struts      Medically necessary     No current facility-administered medications for this visit.        BS follow up appointment(s):   Future Appointments   Date Time Provider Blair Vergara   10/20/2022 11:00 AM [facility]   10/21/2022 To Be Determined Janet Eckert [facility]   10/25/2022 To Be Determined [facility]   10/27/2022 To Be Determined [facility]   10/28/2022 To Be Determined Janet Eckert [facility]   11/1/2022 To Be Determined Gustavo Randolph [facility] 11/3/2022 To Be Determined Barber Richards, PT [facility] HR [facility]   11/3/2022 10:00 AM Isabel Perry MD [facility] BS AMB   11/4/2022 To Be Determined Linda Castellon [facility]   11/9/2022  8:30 PM [facility] SLEEP RM 2 [facility]   11/11/2022 To Be Determined Linda Castellon [facility]   11/17/2022 11:00 AM [facility] BS AMB   12/8/2022  9:00 AM Sofi Burroughs, PT Aylin Link SCHED   12/14/2022  9:20 AM Finn Ty, PT [facility] JOSEFINA SCHED   1/2/2023 10:15 AM Selena Montiel MD [address] BS AMB   4/11/2023 11:00 AM TANVI Roca-BS follow up appointment(s):      Goals Addressed                   This Visit's Progress     Attends follow up appointments on schedule        10/20/22 Patient will attend all scheduled appointments through 1/20/23       Knowledge and adherence of prescribed medication (ie. action, side effects, missed dose, etc.).        10/20/22 Pt will take all medications prescribed to be evaluated on each outreach through 1/20/23       COMPLETED: Prevent complications post hospitalization. COMPLETED: Supportive resources in place to maintain patient in the community (ie. Home Health, DME equipment, refer to, medication assistant plan, etc.)        Target Date:  11-5-22     10/5/2022     [facility] COMPLETED: Understands red flags post discharge.

## 2022-10-21 ENCOUNTER — HOME CARE VISIT (OUTPATIENT)
Dept: SCHEDULING | Facility: HOME HEALTH | Age: 67
End: 2022-10-21
Payer: MEDICARE

## 2022-10-21 PROCEDURE — 3331090002 HH PPS REVENUE DEBIT

## 2022-10-21 PROCEDURE — 3331090001 HH PPS REVENUE CREDIT

## 2022-10-21 PROCEDURE — G0300 HHS/HOSPICE OF LPN EA 15 MIN: HCPCS

## 2022-10-22 PROCEDURE — 3331090001 HH PPS REVENUE CREDIT

## 2022-10-22 PROCEDURE — 3331090002 HH PPS REVENUE DEBIT

## 2022-10-23 VITALS
DIASTOLIC BLOOD PRESSURE: 88 MMHG | OXYGEN SATURATION: 97 % | TEMPERATURE: 97.7 F | SYSTOLIC BLOOD PRESSURE: 140 MMHG | HEART RATE: 89 BPM | RESPIRATION RATE: 16 BRPM

## 2022-10-23 PROCEDURE — 3331090002 HH PPS REVENUE DEBIT

## 2022-10-23 PROCEDURE — 3331090001 HH PPS REVENUE CREDIT

## 2022-10-24 ENCOUNTER — TELEPHONE (OUTPATIENT)
Dept: PULMONOLOGY | Age: 67
End: 2022-10-24

## 2022-10-24 PROCEDURE — 3331090002 HH PPS REVENUE DEBIT

## 2022-10-24 PROCEDURE — 3331090001 HH PPS REVENUE CREDIT

## 2022-10-24 NOTE — HOME HEALTH
SUBJECTIVE: Patient was present in home and reports \"I am feeling okay\". Patient denies any recent falls or medication changes. CAREGIVER INVOLVEMENT/ASSISTANCE NEEDED FOR: Patient lives in a two story home with his wife who is assisting patient as needed with meal preparations but he is able to perform ADLS on his own at this time. OBJECTIVE:  See interventions. PATIENT RESPONSE TO TREATMENT:  Patient was motivated and willing to participate in PT but is limited today secondary to presence on R ankle instability. He was encourgaed today to follow up with PCP or orthopedic MD to address - he is agreeable. PATIENT LEVEL OF UNDERSTANDING OF EDUCATION PROVIDED:  Educated patient on need to use AD during all standing and gait tasks until MD states otherwise to provide stability and balance while decreasing patients fall risk. ASSESSMENT OF PROGRESS TOWARD GOALS: Patient was seen today for a PT follow up and was able to perform sit to stands from kitchen chair with SBA and BUE support- he has improved form and stability noted with better ankle support boots on today (similar to hiking boots), He also demonstrates improved gait training with these but still reports instability on RLE- patient now has an appointment for next week to follow up with an ankle specialist to address these concerns as suggested by therapist to prevent further injury. Reviewed HEP with patient and he reports understanding and compliance. HOME EXERCISE PROGRAM: Patient was instructed on sitting BLE therex with AROM as follows: ankle pumps, LAQ, knee flexion, hip marching, hip abduction with LE extended x10 reps each all 3x per day as able. THE FOLLOWING DISCHARGE PLANNING WAS DISCUSSED WITH THE PATIENT/CAREGIVER: Plan to DC patient once PT goals are met. PLAN FOR NEXT VISIT: Plan in coming visit to increase therex reps and gait training as patient is able.

## 2022-10-24 NOTE — TELEPHONE ENCOUNTER
Piter Vogel from [address] [place] [address] [phone] wants to speak with someone about authorization.

## 2022-10-24 NOTE — HOME HEALTH
SKILLED REASON for visit: uti CAREGIVER INVOLVEMENT: spouse is available as needed for assistance with iadls, adls, meal prep, medication management, taking to md appointments. MEDICATIONS: reviewed and all medications are available in the home this visit. Patient denies any medication changes at this time of assessment. EDUCATION PROVIDED: regarding medications, medication interactions, and look alike medications (specify): reviewed side effects, purposes, dosage, frequencies. High risk medication teaching regarding anticoagulants, hyperglycemic agents or opiod narcotics performed (specify) na Medications are effective at this time. SUPPLIES: by type and quantity ordered/delivered this visit include: PATIENT EDUCATION ON THIS VISIT:uti/dc plans PATIENT LEVEL OF UNDERSTANDING: comprehension education that was provided at this time by engaging in all education provided and is able to verbalize understanding, pt denies any questions or concerns at this time. SKILLED CARE PERFORMED THIS VISITn/aPATIENT RESPONSE TO PROCEDURE PERFORMED: n/a: goals/teaching reviewed. patient is progressing towards goals at this time, Patient's Progress towards personal goals: pt reporting they are progressing towards their goals at this time. Home exercise program:hep deep breathing Continued need for the following skills: dc Plan for next visit: routine Patient and/or caregiver notified and agrees to changes in the Plan of Care NA The following discharge planning was discussed with the pt/caregiver: Patient will be discharged once education has completed, patient is medically stable and independent with care.  Sharps used

## 2022-10-25 ENCOUNTER — HOME CARE VISIT (OUTPATIENT)
Dept: HOME HEALTH SERVICES | Facility: HOME HEALTH | Age: 67
End: 2022-10-25
Payer: MEDICARE

## 2022-10-25 PROCEDURE — 3331090001 HH PPS REVENUE CREDIT

## 2022-10-25 PROCEDURE — 3331090002 HH PPS REVENUE DEBIT

## 2022-10-26 PROCEDURE — 3331090002 HH PPS REVENUE DEBIT

## 2022-10-26 PROCEDURE — 3331090001 HH PPS REVENUE CREDIT

## 2022-10-27 ENCOUNTER — HOME CARE VISIT (OUTPATIENT)
Dept: HOME HEALTH SERVICES | Facility: HOME HEALTH | Age: 67
End: 2022-10-27
Payer: MEDICARE

## 2022-10-27 PROCEDURE — 3331090002 HH PPS REVENUE DEBIT

## 2022-10-27 PROCEDURE — 3331090001 HH PPS REVENUE CREDIT

## 2022-10-28 ENCOUNTER — PATIENT OUTREACH (OUTPATIENT)
Dept: CASE MANAGEMENT | Age: 67
End: 2022-10-28

## 2022-10-28 PROCEDURE — 3331090001 HH PPS REVENUE CREDIT

## 2022-10-28 PROCEDURE — 3331090002 HH PPS REVENUE DEBIT

## 2022-10-29 PROCEDURE — 3331090001 HH PPS REVENUE CREDIT

## 2022-10-29 PROCEDURE — 3331090002 HH PPS REVENUE DEBIT

## 2022-10-30 PROCEDURE — 3331090002 HH PPS REVENUE DEBIT

## 2022-10-30 PROCEDURE — 3331090001 HH PPS REVENUE CREDIT

## 2022-10-31 ENCOUNTER — HOME CARE VISIT (OUTPATIENT)
Dept: HOME HEALTH SERVICES | Facility: HOME HEALTH | Age: 67
End: 2022-10-31
Payer: MEDICARE

## 2022-10-31 PROCEDURE — 3331090001 HH PPS REVENUE CREDIT

## 2022-10-31 PROCEDURE — 3331090002 HH PPS REVENUE DEBIT

## 2022-11-01 PROCEDURE — 3331090001 HH PPS REVENUE CREDIT

## 2022-11-01 PROCEDURE — 3331090002 HH PPS REVENUE DEBIT

## 2022-11-02 PROCEDURE — 3331090002 HH PPS REVENUE DEBIT

## 2022-11-02 PROCEDURE — 3331090001 HH PPS REVENUE CREDIT

## 2022-11-02 NOTE — PROGRESS NOTES
Physician Progress Note      Piero Lindsey  CSN #:                  [id number]  :                       1955  ADMIT DATE:       2022 7:56 PM  [address] [place] DATE:        10/4/2022 4:25 PM  RESPONDING  PROVIDER #:        Reginaldo Hernandez MD          QUERY TEXT:    Pt admitted with Fever and Cystitis. Per ID consult note clinical presentation consistent with Sepsis likely due to cystitis. If possible, please document in progress notes and discharge summary:    The medical record reflects the following:  Risk Factors: Cystitis, DM, Acute respiratory failure    Clinical Indicators:  Per ID PN 10/4:  *  Clinical presentation consistent with sepsis-present on admission likely due to cystitis  ? *  Increased frequency of urination prior to admission along with significant bacteriuria/pyuria likely suggest cystitis. Unfortunately no urine culture available from admission. Current antibiotics will likely mask the urine culture results at this time. ?*  Shortness of breath on admission could be response to sepsis.   No definitive clinical/radiological evidence to suggest pneumonia, pulmonary infection    * Temp 102.7  * HR>90 up to 114  * Acute respiratory failure    Treatment: IV ABX, UA, urine and blood cultures, serial CBCs and Lactic acids, ID consult    Thank you,  Shaen WHEATLEYN, RN, CRCR  Please contact me for any questions or concerns regarding this query at [email or web address]  Options provided:  -- Sepsis confirmed present on admission  -- Sepsis with associated acute respiratory failure confirmed present on admission  -- Sepsis ruled out  -- Defer to ID consultant documentation regarding sepsis  -- Other - I will add my own diagnosis  -- Disagree - Not applicable / Not valid  -- Disagree - Clinically unable to determine / Unknown  -- Refer to Clinical Documentation Reviewer    PROVIDER RESPONSE TEXT:    The diagnosis of sepsis with associated acute respiratory failure is confirmed as present on admission.     Query created by: Rick Negrete on 11/2/2022 3:38 PM      Electronically signed by:  Arcelia Burden MD Corrinne Glade MD 11/2/2022 4:02 PM

## 2022-11-02 NOTE — CASE COMMUNICATION
Patient reports he is still extemely sick and can't get out of bed for PT- he reports his family is there with him.

## 2022-11-03 PROCEDURE — 3331090001 HH PPS REVENUE CREDIT

## 2022-11-03 PROCEDURE — 3331090002 HH PPS REVENUE DEBIT

## 2022-11-04 PROCEDURE — 3331090002 HH PPS REVENUE DEBIT

## 2022-11-04 PROCEDURE — 3331090001 HH PPS REVENUE CREDIT

## 2022-11-05 ENCOUNTER — HOME CARE VISIT (OUTPATIENT)
Dept: HOME HEALTH SERVICES | Facility: HOME HEALTH | Age: 67
End: 2022-11-05
Payer: MEDICARE

## 2022-11-05 PROCEDURE — 3331090001 HH PPS REVENUE CREDIT

## 2022-11-05 PROCEDURE — 3331090002 HH PPS REVENUE DEBIT

## 2022-11-06 NOTE — HOME HEALTH
Patient was seen for admission and 1 PTA visit for strengthening and gait training, Patient was unable to locate the rest of the frequency thus being discharged from home care. [place] scoring based on 10/18/22 visit

## 2022-11-07 NOTE — PROGRESS NOTES
Noted. Pt did not keep his follow up appt. PT stated he is sick. Would you please check with him , does he need an appt? ?  What is going on??

## 2022-11-09 ENCOUNTER — PATIENT OUTREACH (OUTPATIENT)
Dept: CASE MANAGEMENT | Age: 67
End: 2022-11-09

## 2022-11-17 ENCOUNTER — OFFICE VISIT (OUTPATIENT)
Dept: PULMONOLOGY | Age: 67
End: 2022-11-17
Payer: MEDICARE

## 2022-11-17 VITALS — WEIGHT: 315 LBS | BODY MASS INDEX: 40.43 KG/M2 | HEIGHT: 74 IN

## 2022-11-17 DIAGNOSIS — R06.00 DYSPNEA, UNSPECIFIED TYPE: Primary | ICD-10-CM

## 2022-11-17 PROCEDURE — 94010 BREATHING CAPACITY TEST: CPT | Performed by: INTERNAL MEDICINE

## 2022-11-21 NOTE — PROGRESS NOTES
See below message. Pt went to ER as well. Still PT sent a message that he is sick and refusing PT. Improved No

## 2022-12-01 NOTE — PROGRESS NOTES
1. \"Have you been to the ER, urgent care clinic since your last visit? Hospitalized since your last visit? \" Yes When: [facility] [address] LOV: 10/11/22 for Acute maxillary sinusitis; contact suspected exposure to COVID-19.    2. \"Have you seen or consulted any other health care providers outside of the [address] [place] [address] since your last visit? \" Yes When: [facility] [address] [place], [place] [address]d: 10/11/22. Nephrology Dr. Gino Martin: 11/23/22. 3. For patients aged 39-70: Has the patient had a colonoscopy / FIT/ Cologuard? Yes - Care Gap present.  Most recent result on file 3/21/19 Cologuard test        Chief Complaint   Patient presents with    Annual Wellness Visit    Diabetes    Breathing Problem    Weight Management    Medication Evaluation     Wants to discuss [place] and Trulicity medication

## 2022-12-02 ENCOUNTER — HOSPITAL ENCOUNTER (OUTPATIENT)
Dept: SLEEP MEDICINE | Age: 67
End: 2022-12-02
Attending: INTERNAL MEDICINE
Payer: MEDICARE

## 2022-12-02 DIAGNOSIS — R06.00 DYSPNEA, UNSPECIFIED TYPE: ICD-10-CM

## 2022-12-02 DIAGNOSIS — R06.83 SNORING: ICD-10-CM

## 2022-12-02 DIAGNOSIS — Z86.69 HISTORY OF OBSTRUCTIVE SLEEP APNEA: ICD-10-CM

## 2022-12-02 DIAGNOSIS — G47.19 EXCESSIVE DAYTIME SLEEPINESS: ICD-10-CM

## 2022-12-02 DIAGNOSIS — D72.19 PERIPHERAL EOSINOPHILIA: ICD-10-CM

## 2022-12-02 DIAGNOSIS — R53.83 FATIGUE, UNSPECIFIED TYPE: ICD-10-CM

## 2022-12-02 DIAGNOSIS — E66.9 OBESITY (BMI 30-39.9): ICD-10-CM

## 2022-12-02 PROCEDURE — 95810 POLYSOM 6/> YRS 4/> PARAM: CPT

## 2022-12-03 VITALS
DIASTOLIC BLOOD PRESSURE: 82 MMHG | HEIGHT: 74 IN | SYSTOLIC BLOOD PRESSURE: 158 MMHG | HEART RATE: 85 BPM | BODY MASS INDEX: 39.14 KG/M2 | TEMPERATURE: 96.2 F | WEIGHT: 305 LBS

## 2022-12-03 NOTE — PROGRESS NOTES
Sleep study completed per physician order. Patient discharged from sleep center. Patient awake, alert and able to leave the facility under their own power. Instructed to follow up with their sleep physician for results.

## 2022-12-03 NOTE — PROGRESS NOTES
Patient arrived for sleep study. Physician orders were reviewed. Patient education to include initiation of PAP therapy per protocol. Patient questions were answered. The patient demonstrated good understanding of the positive airway pressure device.

## 2022-12-05 ENCOUNTER — VIRTUAL VISIT (OUTPATIENT)
Dept: FAMILY MEDICINE CLINIC | Age: 67
End: 2022-12-05
Payer: MEDICARE

## 2022-12-05 DIAGNOSIS — Z00.00 MEDICARE ANNUAL WELLNESS VISIT, SUBSEQUENT: ICD-10-CM

## 2022-12-05 DIAGNOSIS — Z96.89 SPINAL CORD STIMULATOR STATUS: ICD-10-CM

## 2022-12-05 DIAGNOSIS — N39.41 URGE INCONTINENCE OF URINE: ICD-10-CM

## 2022-12-05 DIAGNOSIS — E11.65 POORLY CONTROLLED DIABETES MELLITUS (HCC): Primary | ICD-10-CM

## 2022-12-05 DIAGNOSIS — H91.93 HEARING PROBLEM OF BOTH EARS: ICD-10-CM

## 2022-12-05 DIAGNOSIS — G47.30 SLEEP APNEA, UNSPECIFIED TYPE: ICD-10-CM

## 2022-12-05 DIAGNOSIS — M79.89 LEG SWELLING: ICD-10-CM

## 2022-12-05 DIAGNOSIS — R06.2 WHEEZING: ICD-10-CM

## 2022-12-05 DIAGNOSIS — R06.00 DYSPNEA, UNSPECIFIED TYPE: ICD-10-CM

## 2022-12-05 DIAGNOSIS — R05.9 COUGH, UNSPECIFIED TYPE: ICD-10-CM

## 2022-12-05 PROCEDURE — G0439 PPPS, SUBSEQ VISIT: HCPCS | Performed by: FAMILY MEDICINE

## 2022-12-05 PROCEDURE — 2022F DILAT RTA XM EVC RTNOPTHY: CPT | Performed by: FAMILY MEDICINE

## 2022-12-05 PROCEDURE — G8756 NO BP MEASURE DOC: HCPCS | Performed by: FAMILY MEDICINE

## 2022-12-05 PROCEDURE — 99214 OFFICE O/P EST MOD 30 MIN: CPT | Performed by: FAMILY MEDICINE

## 2022-12-05 PROCEDURE — 1123F ACP DISCUSS/DSCN MKR DOCD: CPT | Performed by: FAMILY MEDICINE

## 2022-12-05 PROCEDURE — G8536 NO DOC ELDER MAL SCRN: HCPCS | Performed by: FAMILY MEDICINE

## 2022-12-05 PROCEDURE — 3017F COLORECTAL CA SCREEN DOC REV: CPT | Performed by: FAMILY MEDICINE

## 2022-12-05 PROCEDURE — 1101F PT FALLS ASSESS-DOCD LE1/YR: CPT | Performed by: FAMILY MEDICINE

## 2022-12-05 PROCEDURE — 3044F HG A1C LEVEL LT 7.0%: CPT | Performed by: FAMILY MEDICINE

## 2022-12-05 PROCEDURE — G8427 DOCREV CUR MEDS BY ELIG CLIN: HCPCS | Performed by: FAMILY MEDICINE

## 2022-12-05 PROCEDURE — G8510 SCR DEP NEG, NO PLAN REQD: HCPCS | Performed by: FAMILY MEDICINE

## 2022-12-05 PROCEDURE — G8417 CALC BMI ABV UP PARAM F/U: HCPCS | Performed by: FAMILY MEDICINE

## 2022-12-05 RX ORDER — CODEINE PHOSPHATE AND GUAIFENESIN 10; 100 MG/5ML; MG/5ML
SOLUTION ORAL
COMMUNITY
Start: 2022-10-22

## 2022-12-05 RX ORDER — FLUTICASONE PROPIONATE 50 MCG
SPRAY, SUSPENSION (ML) NASAL
COMMUNITY
Start: 2022-10-22

## 2022-12-05 RX ORDER — BUMETANIDE 1 MG/1
1 TABLET ORAL DAILY
Qty: 30 TABLET | Refills: 1 | Status: SHIPPED | OUTPATIENT
Start: 2022-12-05

## 2022-12-05 RX ORDER — FLUTICASONE PROPIONATE AND SALMETEROL 250; 50 UG/1; UG/1
1 POWDER RESPIRATORY (INHALATION) EVERY 12 HOURS
Qty: 60 EACH | Refills: 1 | Status: SHIPPED | OUTPATIENT
Start: 2022-12-05

## 2022-12-05 NOTE — PROGRESS NOTES
This is the Subsequent Medicare Annual Wellness Exam, performed 12 months or more after the Initial AWV or the last Subsequent AWV    I have reviewed the patient's medical history in detail and updated the computerized patient record. Assessment/Plan   Education and counseling provided:  Are appropriate based on today's review and evaluation  Discussed advance directive. See ACP note from today. Discussed 5 years health plan. 1. Poorly controlled diabetes mellitus ([address]  -     REFERRAL TO PHARMACIST  2. Spinal cord stimulator status  3. Urge incontinence of urine  4. Dyspnea, unspecified type  -     fluticasone propion-salmeteroL (ADVAIR/WIXELA) 250-50 mcg/dose diskus inhaler; Take 1 Puff by inhalation every twelve (12) hours. , Normal, Disp-60 Each, R-1  5. Wheezing  -     fluticasone propion-salmeteroL (ADVAIR/WIXELA) 250-50 mcg/dose diskus inhaler; Take 1 Puff by inhalation every twelve (12) hours. , Normal, Disp-60 Each, R-1  6. Leg swelling  -     bumetanide (BUMEX) 1 mg tablet; Take 1 Tablet by mouth daily. , Normal, Disp-30 Tablet, R-1  7. Sleep apnea, unspecified type  8. Cough, unspecified type  9.  Medicare annual wellness visit, subsequent       Depression Risk Factor Screening:     3 most recent PHQ Screens 12/5/2022   PHQ Not Done -   Little interest or pleasure in doing things Not at all   Feeling down, depressed, irritable, or hopeless Not at all   Total Score PHQ 2 0   Trouble falling or staying asleep, or sleeping too much -   Feeling tired or having little energy -   Poor appetite, weight loss, or overeating -   Feeling bad about yourself - or that you are a failure or have let yourself or your family down -   Trouble concentrating on things such as school, work, reading, or watching TV -   Moving or speaking so slowly that other people could have noticed; or the opposite being so fidgety that others notice -   Thoughts of being better off dead, or hurting yourself in some way -   PHQ 9 Score -   How difficult have these problems made it for you to do your work, take care of your home and get along with others -       Alcohol & Drug Abuse Risk Screen    Do you average more than 1 drink per night or more than 7 drinks a week: No    In the past three months have you have had more than 4 drinks containing alcohol on one occasion: No       Opioid Risk: (Low risk score <55, High risk score ?55)  Opioid risk score: 16      Click here to complete the Controlled Substance Monitoring SmartForm    Last PDMP [place] as Reviewed:  Review User Review Instant Review Result              Functional Ability and Level of Safety:    Hearing: Hearing difficulties. Sending to ENT for further evaluation. Activities of Daily Living: The home contains: no safety equipment. Patient does total self care      Ambulation: with mild difficulty. Use cane      Fall Risk:  Fall Risk Assessment, last 12 mths 12/5/2022   Able to walk? Yes   Fall in past 12 months? 0   Do you feel unsteady? 1   Are you worried about falling 1   Is TUG test greater than 12 seconds? -   Is the gait abnormal? -   Number of falls in past 12 months -   Fall with injury? -      Abuse Screen:  Patient is not abused       Cognitive Screening    Has your family/caregiver stated any concerns about your memory: no        Health Maintenance Due     Health Maintenance Due   Topic Date Due    Shingrix Vaccine Age 49> (1 of 2) Never done    Foot Exam Q1  08/02/2018    Eye Exam Retinal or Dilated  08/24/2020    MICROALBUMIN Q1  07/03/2021    COVID-19 Vaccine (5 - Booster) 10/18/2021    Colorectal Cancer Screening Combo  03/21/2022    Flu Vaccine (1) 08/01/2022    Lipid Screen  10/29/2022   Checking labs before next visit.   Reminded to complete eye exam.  Has a fit test he will complete the test when he goes for the blood work  We discussed the immunization on next visit    Patient Care Team   Patient Care Team:  José Storm MD as PCP - General (Family Medicine)  Rj Corbin MD as PCP - [facility]  Araceli Vang MD (Physical Medicine and Rehabilitation Physician)  Mike Maxwell MD (Orthopedic Surgery)  Cleopatra Gilliam MD (Urology)  Mike Maxwell MD (Orthopedic Surgery)  Johnna Ledezma, RN as Ambulatory Care Manager    History     Patient Active Problem List   Diagnosis Code    DDD (degenerative disc disease), lumbar M51.36    Lumbar facet arthropathy M47.816    S/P lumbar laminectomy Z98.890    S/P lumbar spinal fusion Z98.1    Lumbar nerve root impingement M54.16    Lumbosacral radiculopathy at S1 M54.17    DDD (degenerative disc disease), thoracic M51.34    Thoracic compression fracture (HCC) S22.000A    Back muscle spasm M62.830    TRUE (generalized anxiety disorder) F41.1    Abdominal adhesions K66.0    Constipation, chronic K59.09    Insomnia secondary to chronic pain G89.29, G47.01    MANISH (obstructive sleep apnea) G47.33    DM (diabetes mellitus) ([facility] E11.9    HTN (hypertension) I10    Hyperlipidemia E78.5    Frequent falls R29.6    Thoracic or lumbosacral neuritis or radiculitis, unspecified [id number]    Postlaminectomy syndrome, lumbar region M96.1    History of depression Z86.59    Chronic midline low back pain with sciatica M54.40, G89.29    Advanced care planning/counseling discussion Z71.89    Anemia D64.9    Chronic pain syndrome G89.4    Lumbar post-laminectomy syndrome M96.1    Lumbar and sacral osteoarthritis M47.817    Sepsis (HCC) A41.9    UTI (urinary tract infection) N39.0    Kidney stone N20.0    Severe obesity (HCC) E66.01    Type 2 diabetes mellitus with diabetic neuropathy (HCC) E11.40    Status post insertion of spinal cord stimulator Z96.89    Spinal cord stimulator status Z96.89    Chronic renal disease, stage III N18.30    Fever R50.9    Acute respiratory failure with hypoxia ([facility]) J96.01    Type 2 diabetes mellitus with chronic kidney disease ([facility] E11.22     Past Medical History:   Diagnosis Date    Abdominal adhesions 07/30/2014    BPH (benign prostatic hyperplasia)     Cellulitis     Chronic pain 10/08/11    Back pain due a work injury    DDD (degenerative disc disease), lumbar 07/30/2014    Depression     Diabetes ([address]     Frequent falls 07/30/2014    TRUE (generalized anxiety disorder) 07/30/2014    HLD (hyperlipidemia)     Hypertension     Hypogonadism in male     Incomplete bladder emptying     Kidney stones     Lumbar facet arthropathy 07/30/2014    Lumbar nerve root impingement 07/30/2014    Lumbosacral radiculopathy at S1 07/30/2014    Major depression 07/30/2014    Neurological disorder     sciatica    MANISH (obstructive sleep apnea) 07/30/2014    S/P lumbar laminectomy 07/30/2014    S/P lumbar spinal fusion 07/30/2014    Sleep apnea     USES CPAP EVERY NIGHT    Thoracic compression fracture ([address] 07/30/2014      Past Surgical History:   Procedure Laterality Date    HX APPENDECTOMY      HX BACK SURGERY      Lumbar fusion    HX HERNIA REPAIR  1992    HX OTHER SURGICAL      EMG - S1 disorder    HX OTHER SURGICAL  10/2017    spinal cord stimulator    HX VASECTOMY  1985     Current Outpatient Medications   Medication Sig Dispense Refill    bumetanide (BUMEX) 1 mg tablet Take 1 Tablet by mouth daily. 30 Tablet 1    fluticasone propion-salmeteroL (ADVAIR/WIXELA) 250-50 mcg/dose diskus inhaler Take 1 Puff by inhalation every twelve (12) hours. 60 Each 1    trospium (SANCTURA) 20 mg tablet Take 1 Tablet by mouth Before breakfast and dinner. 90 Tablet 3    albuterol (PROVENTIL HFA, VENTOLIN HFA, PROAIR HFA) 90 mcg/actuation inhaler Take 1 Puff by inhalation every four (4) hours as needed for Wheezing. 1 Each 3    cholecalciferol (VITAMIN D3) (1000 Units /25 mcg) tablet Take 1,000 Units by mouth daily. melatonin 5 mg cap capsule Take 5 mg by mouth nightly. ascorbic acid, vitamin C, (VITAMIN C) 500 mg tablet Take 500 mg by mouth daily.       lancets (TRUEplus Lancets) 33 gauge misc CHECK BLOOD SUGAR THREE TIMES DAILY 300 Each 5    DropSafe Alcohol Prep Pads padm USE AS DIRECTED WHEN CHECKING BLOOD SUGAR THREE TIMES DAILY 300 Pad 5    pramipexole (MIRAPEX) 0.75 mg tablet TAKE 3 TABLETS ONE TIME DAILY (Patient taking differently: No sig reported) 270 Tablet 1    metoprolol succinate (TOPROL-XL) 50 mg XL tablet TAKE 1 TABLET EVERY DAY 90 Tablet 1    glipiZIDE (GLUCOTROL) 10 mg tablet TAKE 1 TABLET TWICE DAILY 180 Tablet 1    amitriptyline (ELAVIL) 10 mg tablet TAKE 1 TABLET EVERY NIGHT 90 Tablet 1    simvastatin (ZOCOR) 10 mg tablet TAKE 1 TABLET EVERY NIGHT 90 Tablet 1    buPROPion SR (WELLBUTRIN SR) 150 mg SR tablet TAKE 1 TABLET EVERY DAY 90 Tablet 1    montelukast (SINGULAIR) 10 mg tablet TAKE 1 TABLET EVERY DAY (Patient taking differently: No sig reported) 90 Tablet 1    dulaglutide (Trulicity) 3 RC/1.6 mL pnij 3 mg by SubCUTAneous route every seven (7) days. 12 Each 3    alfuzosin SR (UROXATRAL) 10 mg SR tablet TAKE 1 TABLET BY MOUTH DAILY AFTER DINNER 90 Tablet 3    gabapentin (NEURONTIN) 300 mg capsule Take 300 mg by mouth three (3) times daily. dutasteride (AVODART) 0.5 mg capsule Take 1 Capsule by mouth daily (after dinner). 90 Capsule 3    testosterone cypionate (DEPOTESTOTERONE CYPIONATE) 200 mg/mL injection 0.5 mL by IntraMUSCular route every seven (7) days. Max Daily Amount: 100 mg. 10 mL 0    glucose blood VI test strips (True Metrix Glucose Test Strip) strip TEST BLOOD SUGAR THREE TIMES DAILY 300 Strip 5    aspirin delayed-release 81 mg tablet Take 1 Tablet by mouth daily. 90 Tablet 1    Cane maura by Does Not Apply route. cpap machine kit by Does Not Apply route. Blood-Glucose Meter monitoring kit Use to check blood sugar 3 times daily. Dx E11.9. Please dispense brand preferred by insurance. 1 Kit 0    folic acid/multivit-min/lutein (CENTRUM SILVER PO) Take 1 Tablet by mouth daily. lisinopriL (PRINIVIL, ZESTRIL) 20 mg tablet Take 1 Tab by mouth daily.  30 Tab 1    Needle, Disp, 23 G 23 gauge x 1 1/4\" ndle Use to inject 0.5 ml every 7 days 30 Pen Needle 1    Needle, Disp, 18 G 18 gauge x 1\" ndle Use to aspirate 0.5 ml every 7 days 30 Pen Needle 1    Wheel Chair maura Assistance with ambulation, gait instability 1 Each 0    polyethylene glycol (MIRALAX) 17 gram packet Take 1 Packet by mouth daily. Hold for loose stools (Patient taking differently: Take 17 g by mouth daily. dissolve in 8 oz liquid) 10 Each 0    naloxone 4 mg/actuation spry 4 mg by Nasal route as needed. 1 Box 0    Back Brace misc 1 Device by Does Not Apply route daily as needed for Pain. One, lumbosacral orthosis/back brace with metal struts      Medically necessary 1 Device 0    fluticasone propionate (FLONASE) 50 mcg/actuation nasal spray       guaiFENesin-codeine (ROBITUSSIN AC) 100-10 mg/5 mL solution       nitroglycerin (NITROSTAT) 0.4 mg SL tablet DISSOLVE ONE TABLET UNDER TONGUE AS NEEDED FOR CHEST PAIN EVERY 5 MINUTES FOR UP TO 3 DOSES (Patient taking differently: 0.4 mg by SubLINGual route every five (5) minutes as needed for Chest Pain. DISSOLVE ONE TABLET UNDER TONGUE AS NEEDED FOR CHEST PAIN EVERY 5 MINUTES FOR UP TO 3 DOSES) 30 Tablet 0     Allergies   Allergen Reactions    Bactrim [Sulfamethoprim] Rash     Bactrim DS, per patient. Opana [Oxymorphone] Other (comments)     Feels like bug crawling under skin and drowziness       Family History   Problem Relation Age of Onset    Diabetes Mother     Hypertension Mother     Stroke Mother     Heart Disease Mother     Heart Attack Father     Diabetes Father     Stroke Father     Heart Disease Father     Diabetes Sister     Stroke Brother      Social History     Tobacco Use    Smoking status: Never    Smokeless tobacco: Never   Substance Use Topics    Alcohol use: Yes       Kal William, was evaluated through a synchronous (real-time) audio-video encounter.  The patient (or guardian if applicable) is aware that this is a billable service, which includes applicable co-pays. This Virtual Visit was conducted with patient's (and/or legal guardian's) consent. The visit was conducted pursuant to the emergency declaration under the [address] [place] [address] and the [employer] General Act. Patient identification was verified, and a caregiver was present when appropriate. The patient was located at: Home: [address] [place] [id number]  The provider was located at:  Facility (Appt Department): [address] [place] [address],Fourth Floor [address] [phone]       Kaelyn Hercules MD

## 2022-12-05 NOTE — PATIENT INSTRUCTIONS
Medicare Wellness Visit, Male    The best way to live healthy is to have a lifestyle where you eat a well-balanced diet, exercise regularly, limit alcohol use, and quit all forms of tobacco/nicotine, if applicable. Regular preventive services are another way to keep healthy. Preventive services (vaccines, screening tests, monitoring & exams) can help personalize your care plan, which helps you manage your own care. Screening tests can find health problems at the earliest stages, when they are easiest to treat. [facility] follows the current, evidence-based guidelines published by the [facility] ([facility]) when recommending preventive services for our patients. Because we follow these guidelines, sometimes recommendations change over time as research supports it. (For example, a prostate screening blood test is no longer routinely recommended for men with no symptoms). Of course, you and your doctor may decide to screen more often for some diseases, based on your risk and co-morbidities (chronic disease you are already diagnosed with). Preventive services for you include:  - Medicare offers their members a free annual wellness visit, which is time for you and your primary care provider to discuss and plan for your preventive service needs. Take advantage of this benefit every year!  -All adults over age 72 should receive the recommended pneumonia vaccines. Current USPSTF guidelines recommend a series of two vaccines for the best pneumonia protection.   -All adults should have a flu vaccine yearly and tetanus vaccine every 10 years.  -All adults age 48 and older should receive the shingles vaccines (series of two vaccines).        -All adults age 38-68 who are overweight should have a diabetes screening test once every three years.   -Other screening tests & preventive services for persons with diabetes include: an eye exam to screen for diabetic retinopathy, a kidney function test, a foot exam, and stricter control over your cholesterol.   -Cardiovascular screening for adults with routine risk involves an electrocardiogram (ECG) at intervals determined by the provider.   -Colorectal cancer screening should be done for adults age 54-65 with no increased risk factors for colorectal cancer. There are a number of acceptable methods of screening for this type of cancer. Each test has its own benefits and drawbacks. Discuss with your provider what is most appropriate for you during your annual wellness visit. The different tests include: colonoscopy (considered the best screening method), a fecal occult blood test, a fecal DNA test, and sigmoidoscopy.  -All adults born between [facility] should be screened once for Hepatitis C.  -An Abdominal Aortic Aneurysm (AAA) Screening is recommended for men age 73-68 who has ever smoked in their lifetime.      Here is a list of your current Health Maintenance items (your personalized list of preventive services) with a due date:  Health Maintenance Due   Topic Date Due    Shingles Vaccine (1 of 2) Never done    Diabetic Foot Care  08/02/2018    Eye Exam  08/24/2020    Albumin Urine Test  07/03/2021    COVID-19 Vaccine (5 - Booster) 10/18/2021    Colorectal Screening  03/21/2022    Yearly Flu Vaccine (1) 08/01/2022    Cholesterol Test   10/29/2022

## 2022-12-05 NOTE — PROGRESS NOTES
Raya Vega is a 79 y.o. male who was seen by synchronous (real-time) audio-video technology on 12/5/2022 for Annual Wellness Visit, Diabetes, Breathing Problem, Weight Management, and Medication Evaluation (Wants to discuss Farxiga and Trulicity medication)        Assessment & Plan:   Diagnoses and all orders for this visit:    1. Poorly controlled diabetes mellitus ([address]): A1c around 6.7. The UTI episode. Heidi Olp putting on a hold. Consulting pharmacist about coverage for Jardiance and medication management. Continue diet and lifestyle modification. Sending to medical weight loss program for further assistance. We can consider injectable which might help with the weight loss as well. Will obtain recommendation from the pharmacist to plan on coverage and also done medical weight loss program referral    -     REFERRAL TO PHARMACIST  -     REFERRAL TO WEIGHT LOSS  -     MICROALBUMIN, UR, RAND W/ MICROALB/CREAT RATIO; Future  -     HEMOGLOBIN A1C WITH EAG; Future  -     METABOLIC PANEL, COMPREHENSIVE; Future    2. Spinal cord stimulator status: presence of stimulator. Will observe. 3. Urge incontinence of urine: No blood in the urine. Has a spinal cord stimulator. Has coming up appointment with the urologist on eighth we will follow the recommendation    4. Dyspnea, unspecified type: At this time has shortness of breath on exertion. Echo showed no acute finding. Wheezing on and off. Leg swelling. I will change Lasix to Bumex. Also starting Advair. Advised to make a follow-up appointment with Dr. Gulshan Fletcher. Also he has to contact them regarding unsuccessful sleep study  -     fluticasone propion-salmeteroL (ADVAIR/WIXELA) 250-50 mcg/dose diskus inhaler; Take 1 Puff by inhalation every twelve (12) hours. 5. Wheezing: See above  -     fluticasone propion-salmeteroL (ADVAIR/WIXELA) 250-50 mcg/dose diskus inhaler; Take 1 Puff by inhalation every twelve (12) hours.     6. Leg swelling: Leg swelling still present. Lasix not helping much. Echo showed no acute finding. We will change it to Bumex. See if it makes him feel better. Also starting Advair for dyspnea and wheezing  -     bumetanide (BUMEX) 1 mg tablet; Take 1 Tablet by mouth daily. 7. Sleep apnea, unspecified type: Using CPAP with compliance     8. Cough, unspecified type: On and off. See above    9. Medicare annual wellness visit, subsequent    10. Hearing problem of both ears: Sending to ENT  -     REFERRAL TO ENT-OTOLARYNGOLOGY    11. BMI 39.0-39.9,adult : Diet and lifestyle modification  -     REFERRAL TO WEIGHT LOSS    Patient understood and agreed with the plan  Follow-up and Dispositions    Return in about 2 months (around 2/5/2023). I spent at least 25-30 minutes on this visit with this established patient. Please note that this dictation was completed with [place], the [employer] voice recognition software. Quite often unanticipated grammatical, syntax, homophones, and other interpretive errors are inadvertently transcribed by the computer software. Please disregard these errors. Please excuse any errors that have escaped final proofreading. [id number]  Subjective:     Done visit through [facility]  Multiple medical concern  Has been feeling dyspnea, wheezing, shortness of breath on exertion. Has seen Dr. Claudean Coder for sleep apnea. Unable to fall asleep so unable to complete the sleep study. He will contact their office for further recommendation. Meantime I have advised that we will start Advair and take albuterol as needed. Will send message to Dr. Gemma Joshua. During visit did not appear in any acute distress. No extra respiratory muscle use or any audible wheezing. No fever cold cough. No chest pain. No palpitation or diaphoresis. He has chronic back pain and spinal stimulator. Lately feeling urgency of urination. Not able to hold the urine in a timely manner. Has coming up appointment with urology on 8.   Advised to discuss these concerns with them. No burning or frequency of urination. Diabetes. A1c fairly stable. Recent diagnosis of UTI. Derik Cea was advised to be on hold by nephrology. At this time A1c in prediabetic range. Advised to stop the Sal Cea and we can consider Jardiance. According to patient has some coverage concern. Will send message to our pharmacist to assess test with the coverage and medication management. He agrees to do that referral.  Some hearing concerns. An ENT referral.  Hypertension. Vitals been stable at home. We will continue current plan. Denies any headache or dizziness. No chest pain. No nausea vomiting or abdominal pain. No bowel complaint at this time. Appetite is fair. Using CPAP with compliance. Renal insufficiency: Avoiding NSAIDs. Lab Results   Component Value Date/Time    WBC 10.2 10/07/2022 02:30 PM    HGB 15.2 10/07/2022 02:30 PM    HCT 48.8 (H) 10/07/2022 02:30 PM    PLATELET 716 82/10/8766 02:30 PM    MCV 91.2 10/07/2022 02:30 PM     Lab Results   Component Value Date/Time    Sodium 134 (L) 10/07/2022 02:30 PM    Potassium 4.3 10/07/2022 02:30 PM    Chloride 99 (L) 10/07/2022 02:30 PM    CO2 27 10/07/2022 02:30 PM    Anion gap 8 10/07/2022 02:30 PM    Glucose 153 (H) 10/07/2022 02:30 PM    BUN 17 10/07/2022 02:30 PM    Creatinine 1.49 (H) 10/07/2022 02:30 PM    BUN/Creatinine ratio 11 (L) 10/07/2022 02:30 PM    GFR est AA >60 10/03/2022 04:20 AM    GFR est non-AA >60 10/03/2022 04:20 AM    Calcium 9.4 10/07/2022 02:30 PM    Bilirubin, total 0.7 10/07/2022 02:30 PM    Alk.  phosphatase 106 10/07/2022 02:30 PM    Protein, total 8.0 10/07/2022 02:30 PM    Albumin 4.1 10/07/2022 02:30 PM    Globulin 3.9 10/07/2022 02:30 PM    A-G Ratio 1.1 10/07/2022 02:30 PM    ALT (SGPT) 65 (H) 10/07/2022 02:30 PM    AST (SGOT) 35 10/07/2022 02:30 PM       Lab Results   Component Value Date/Time    Cholesterol, total 104 10/29/2021 10:30 AM    HDL Cholesterol 38 (L) 10/29/2021 10:30 AM    LDL, calculated 47 10/29/2021 10:30 AM    VLDL, calculated 19 10/29/2021 10:30 AM    Triglyceride 95 10/29/2021 10:30 AM    CHOL/HDL Ratio 2.7 10/29/2021 10:30 AM     Lab Results   Component Value Date/Time    TSH 1.09 10/29/2021 10:30 AM       Lab Results   Component Value Date/Time    Hemoglobin A1c 6.7 (H) 10/01/2022 09:17 AM    Hemoglobin A1c (POC) 7.0 09/13/2022 03:56 PM     Lab Results   Component Value Date/Time    Prostate Specific Ag 7.48 (H) 03/25/2022 01:18 PM    Prostate Specific Ag 7.31 (H) 02/14/2022 04:00 PM    Prostate Specific Ag 3.97 05/17/2021 03:57 PM    Prostate Specific Ag 4.3 (H) 02/12/2021 09:37 AM    Prostate Specific Ag 1.2 03/17/2015 12:00 AM       Lab Results   Component Value Date/Time    Vitamin D 25-Hydroxy 38.6 12/07/2016 09:27 AM       Lab Results   Component Value Date/Time    Microalbumin/Creat ratio (mg/g creat) 26 07/03/2020 09:17 AM    MICROALBUMIN,MG/DAY 14.8 06/13/2016 08:43 AM    Microalbumin,urine random 4.06 (H) 07/03/2020 09:17 AM         Prior to Admission medications    Medication Sig Start Date End Date Taking? Authorizing Provider   bumetanide (BUMEX) 1 mg tablet Take 1 Tablet by mouth daily. 12/5/22  Yes Whitley Castanon MD   fluticasone propion-salmeteroL (ADVAIR/WIXELA) 250-50 mcg/dose diskus inhaler Take 1 Puff by inhalation every twelve (12) hours. 12/5/22  Yes Whitley Castanon MD   trospium (SANCTURA) 20 mg tablet Take 1 Tablet by mouth Before breakfast and dinner. 10/21/22  Yes Kelley Jackson PA-C   albuterol (PROVENTIL HFA, VENTOLIN HFA, PROAIR HFA) 90 mcg/actuation inhaler Take 1 Puff by inhalation every four (4) hours as needed for Wheezing. 10/7/22  Yes Perello, Carletha Knee, DO   cholecalciferol (VITAMIN D3) (1000 Units /25 mcg) tablet Take 1,000 Units by mouth daily. Yes Provider, Historical   melatonin 5 mg cap capsule Take 5 mg by mouth nightly.    Yes Provider, Historical   ascorbic acid, vitamin C, (VITAMIN C) 500 mg tablet Take 500 mg by mouth daily. Yes Provider, Historical   lancets (TRUEplus Lancets) 33 gauge misc CHECK BLOOD SUGAR THREE TIMES DAILY 9/6/22  Yes Dyana Ugalde MD   DropSafe Alcohol Prep Pads padm USE AS DIRECTED WHEN CHECKING BLOOD SUGAR THREE TIMES DAILY 9/6/22  Yes Dyana Ugalde MD   pramipexole (MIRAPEX) 0.75 mg tablet TAKE 3 TABLETS ONE TIME DAILY  Patient taking differently: No sig reported 8/31/22  Yes Dyana Ugalde MD   metoprolol succinate (TOPROL-XL) 50 mg XL tablet TAKE 1 TABLET EVERY DAY 8/31/22  Yes Dyana Ugalde MD   glipiZIDE (GLUCOTROL) 10 mg tablet TAKE 1 TABLET TWICE DAILY 8/2/22  Yes Dyana Ugalde MD   amitriptyline (ELAVIL) 10 mg tablet TAKE 1 TABLET EVERY NIGHT 8/2/22  Yes Dyana Ugalde MD   simvastatin (ZOCOR) 10 mg tablet TAKE 1 TABLET EVERY NIGHT 7/11/22  Yes Dyana Ugalde MD   buPROPion [facility] SR) 150 mg SR tablet TAKE 1 TABLET EVERY DAY 7/11/22  Yes Dyana Ugalde MD   montelukast (SINGULAIR) 10 mg tablet TAKE 1 TABLET EVERY DAY  Patient taking differently: No sig reported 7/6/22  Yes Dyana Ugalde MD   dulaglutide (Trulicity) 3 AP/5.2 mL pnij 3 mg by SubCUTAneous route every seven (7) days. 5/12/22  Yes Dyana Ugalde MD   alfuzosin SR (UROXATRAL) 10 mg SR tablet TAKE 1 TABLET BY MOUTH DAILY AFTER DINNER 5/3/22  Yes Derrill Ormond, MD   gabapentin (NEURONTIN) 300 mg capsule Take 300 mg by mouth three (3) times daily. Yes Provider, Historical   dutasteride (AVODART) 0.5 mg capsule Take 1 Capsule by mouth daily (after dinner). 4/29/22  Yes Sigifredo Castle PA   testosterone cypionate (DEPOTESTOTERONE CYPIONATE) 200 mg/mL injection 0.5 mL by IntraMUSCular route every seven (7) days. Max Daily Amount: 100 mg. 3/31/22  Yes Derrill Ormond, MD   glucose blood VI test strips (True Metrix Glucose Test Strip) strip TEST BLOOD SUGAR THREE TIMES DAILY 2/21/22  Yes Dyana Ugalde MD   aspirin delayed-release 81 mg tablet Take 1 Tablet by mouth daily.  2/1/22  Yes Oneil Mon, Georgene Ales, MD Severiano Bowers by Does Not Apply route. Yes Provider, Historical   cpap machine kit by Does Not Apply route. Yes Provider, Historical   Blood-Glucose Meter monitoring kit Use to check blood sugar 3 times daily. Dx E11.9. Please dispense brand preferred by insurance. 6/17/20  Yes Pete Jacobson MD   folic acid/multivit-min/lutein (CENTRUM SILVER PO) Take 1 Tablet by mouth daily. Yes Provider, Historical   lisinopriL (PRINIVIL, ZESTRIL) 20 mg tablet Take 1 Tab by mouth daily. 5/18/20  Yes Pete Jacobson MD   Needle, Disp, 23 G 23 gauge x 1 1/4\" ndle Use to inject 0.5 ml every 7 days 4/15/20  Yes Shaq Sanchez MD   Needle, Disp, 18 G 18 gauge x 1\" ndle Use to aspirate 0.5 ml every 7 days 4/15/20  Yes Shaq Sanchez MD   Wheel Chair maura Assistance with ambulation, gait instability 2/28/20  Yes Rolly Ling NP   polyethylene glycol (MIRALAX) 17 gram packet Take 1 Packet by mouth daily. Hold for loose stools  Patient taking differently: Take 17 g by mouth daily. dissolve in 8 oz liquid 1/18/19  Yes Priscluis m VO NP   naloxone 4 mg/actuation spry 4 mg by Nasal route as needed. 5/4/17  Yes Fred Morales MD   Back Brace misc 1 Device by Does Not Apply route daily as needed for Pain. One, lumbosacral orthosis/back brace with metal struts      Medically necessary 3/16/16  Yes Fred Morales MD   fluticasone propionate [facility]) 50 mcg/actuation nasal spray  10/22/22   Provider, Historical   guaiFENesin-codeine (ROBITUSSIN AC) 100-10 mg/5 mL solution  10/22/22   Provider, Historical   furosemide (LASIX) 20 mg tablet TAKE 1 TABLET BY MOUTH TWICE DAILY 9/6/22 12/5/22  Ana Rosa Lopez MD   dapagliflozin (FARXIGA) 10 mg tab tablet Take 1 Tablet by mouth daily.  6/29/22 12/5/22  Pete Jacobson MD   nitroglycerin (NITROSTAT) 0.4 mg SL tablet DISSOLVE ONE TABLET UNDER TONGUE AS NEEDED FOR CHEST PAIN EVERY 5 MINUTES FOR UP TO 3 DOSES  Patient taking differently: 0.4 mg by SubLINGual route every five (5) minutes as needed for Chest Pain.  DISSOLVE ONE TABLET UNDER TONGUE AS NEEDED FOR CHEST PAIN EVERY 5 MINUTES FOR UP TO 3 DOSES 2/14/22   Albertina Davis MD     Patient Active Problem List    Diagnosis Date Noted    Type 2 diabetes mellitus with chronic kidney disease ([address] 10/06/2022    Fever 10/01/2022    Acute respiratory failure with hypoxia ([address] 10/01/2022    Chronic renal disease, stage III 09/13/2022    Spinal cord stimulator status 05/03/2022    Type 2 diabetes mellitus with diabetic neuropathy ([address] 05/10/2019    Severe obesity ([address] 04/26/2019    Kidney stone 02/25/2019    Sepsis ([address] 01/14/2019    UTI (urinary tract infection) 01/14/2019    Status post insertion of spinal cord stimulator 09/14/2017    Chronic pain syndrome 06/15/2017    Lumbar post-laminectomy syndrome 06/15/2017    Lumbar and sacral osteoarthritis 06/15/2017    Anemia 12/07/2016    Advanced care planning/counseling discussion 08/08/2016    Chronic midline low back pain with sciatica 05/16/2016    History of depression 06/02/2015    Thoracic or lumbosacral neuritis or radiculitis, unspecified 01/26/2015    Postlaminectomy syndrome, lumbar region 01/26/2015    DDD (degenerative disc disease), lumbar 07/30/2014    Lumbar facet arthropathy 07/30/2014    S/P lumbar laminectomy 07/30/2014    S/P lumbar spinal fusion 07/30/2014    Lumbar nerve root impingement 07/30/2014    Lumbosacral radiculopathy at S1 07/30/2014    DDD (degenerative disc disease), thoracic 07/30/2014    Thoracic compression fracture ([address] 07/30/2014    Back muscle spasm 07/30/2014    TRUE (generalized anxiety disorder) 07/30/2014    Abdominal adhesions 07/30/2014    Constipation, chronic 07/30/2014    Insomnia secondary to chronic pain 07/30/2014    MANISH (obstructive sleep apnea) 07/30/2014    DM (diabetes mellitus) ([address] 07/30/2014    HTN (hypertension) 07/30/2014    Hyperlipidemia 07/30/2014    Frequent falls 07/30/2014       ROS    Objective: Patient-Reported Vitals 12/5/2022   Patient-Reported Weight -   Patient-Reported Height -   Patient-Reported Pulse -   Patient-Reported Temperature -   Patient-Reported SpO2 -   Patient-Reported Systolic  156   Patient-Reported Diastolic 80      General: alert, cooperative, no distress   Mental  status: normal mood, behavior, speech, dress, motor activity, and thought processes, able to follow commands   HENT: NCAT   Neck: no visualized mass   Resp: no respiratory distress   Neuro: no gross deficits   Skin: no discoloration or lesions of concern on visible areas   Psychiatric: normal affect, consistent with stated mood, no evidence of hallucinations     Additional exam findings: We discussed the expected course, resolution and complications of the diagnosis(es) in detail. Medication risks, benefits, costs, interactions, and alternatives were discussed as indicated. I advised him to contact the office if his condition worsens, changes or fails to improve as anticipated. He expressed understanding with the diagnosis(es) and plan. Isac Quintero, was evaluated through a synchronous (real-time) audio-video encounter. The patient (or guardian if applicable) is aware that this is a billable service, which includes applicable co-pays. This Virtual Visit was conducted with patient's (and/or legal guardian's) consent. The visit was conducted pursuant to the emergency declaration under the [address] [place] [address] authority and the [employer] and [employer] General Act. Patient identification was verified, and a caregiver was present when appropriate. The patient was located at: Home: [address] [place] [id number]  The provider was located at:  Facility (Appt Department): [address] [place] [address],Fourth Floor [address] [phone]        Ino Dobbins MD

## 2022-12-05 NOTE — ACP (ADVANCE CARE PLANNING)
Advance Care Planning     General Advance Care Planning (ACP) Conversation      Date of Conversation: 12/5/2022  Conducted with: Patient with Decision Making Capacity    Healthcare Decision Maker:     Primary Decision Maker: Miryam William - Spouse - [phone]    Secondary Decision Maker: Mini Casper - [phone]  Click here to complete [address] including selection of the Healthcare Decision Maker Relationship (ie \"Primary\")    Today we discussed advance directive. He has advance directive at home and will provide a copy.  Wants to be a full code     Content/Action Overview:   See above   Reviewed DNR/DNI and patient elects Full Code (Attempt Resuscitation)      Length of Voluntary ACP Conversation in minutes:  <16 minutes (Non-Billable)    Nichol Flores MD

## 2022-12-07 ENCOUNTER — OFFICE VISIT (OUTPATIENT)
Dept: PULMONOLOGY | Age: 67
End: 2022-12-07
Payer: MEDICARE

## 2022-12-07 VITALS
OXYGEN SATURATION: 96 % | TEMPERATURE: 98.1 F | WEIGHT: 315 LBS | RESPIRATION RATE: 16 BRPM | BODY MASS INDEX: 40.43 KG/M2 | DIASTOLIC BLOOD PRESSURE: 78 MMHG | SYSTOLIC BLOOD PRESSURE: 138 MMHG | HEIGHT: 74 IN | HEART RATE: 93 BPM

## 2022-12-07 DIAGNOSIS — G47.33 OSA ON CPAP: ICD-10-CM

## 2022-12-07 DIAGNOSIS — R06.00 DYSPNEA, UNSPECIFIED TYPE: ICD-10-CM

## 2022-12-07 DIAGNOSIS — R60.0 LOWER EXTREMITY EDEMA: ICD-10-CM

## 2022-12-07 DIAGNOSIS — Z99.89 OSA ON CPAP: ICD-10-CM

## 2022-12-07 DIAGNOSIS — J45.909 REACTIVE AIRWAY DISEASE WITHOUT COMPLICATION, UNSPECIFIED ASTHMA SEVERITY, UNSPECIFIED WHETHER PERSISTENT: Primary | ICD-10-CM

## 2022-12-07 DIAGNOSIS — D72.19 PERIPHERAL EOSINOPHILIA: ICD-10-CM

## 2022-12-07 DIAGNOSIS — E66.01 MORBID OBESITY WITH BMI OF 40.0-44.9, ADULT (HCC): ICD-10-CM

## 2022-12-07 PROBLEM — J18.9 PNEUMONIA, UNSPECIFIED ORGANISM: Status: ACTIVE | Noted: 2017-03-08

## 2022-12-07 PROBLEM — T36.8X5A: Status: ACTIVE | Noted: 2018-12-26

## 2022-12-07 PROBLEM — J44.9 CHRONIC OBSTRUCTIVE PULMONARY DISEASE, UNSPECIFIED (HCC): Status: ACTIVE | Noted: 2022-12-07

## 2022-12-07 PROBLEM — L27.0: Status: ACTIVE | Noted: 2018-12-26

## 2022-12-07 PROBLEM — R06.02 SHORTNESS OF BREATH: Status: ACTIVE | Noted: 2017-03-12

## 2022-12-07 RX ORDER — FLUTICASONE PROPIONATE AND SALMETEROL 250; 50 UG/1; UG/1
1 POWDER RESPIRATORY (INHALATION) 2 TIMES DAILY
Qty: 60 EACH | Refills: 0 | Status: SHIPPED | OUTPATIENT
Start: 2022-12-07 | End: 2022-12-09

## 2022-12-07 NOTE — PATIENT INSTRUCTIONS
Point of Contact Russell Sauer or Ul[place]    Please call our clinic back at [phone] or send a message on eflow if you have any questions or concerns or if you are experiencing any of the following: You have not received a follow up appointment within 30 days prior the recommended follow up time. If you are not tolerating treatment plan and/or not able to obtain equipment or prescribed medication(s). if you are experiencing any difficulties with the [employer]  (DME) Company you may be using or is assigned to you. Two weeks have passed and you have not received an appointment for a scheduled procedure. Two weeks have passed since you underwent a test and/or procedure and you have not received your results. If you are using a CPAP/BIPAP, or Home Ventilator Device- Please note the following. Currently, many DMEs are experiencing supply chain difficulties and orders for equipment may be back logged several weeks to months. Your  Durable Medical Equipment (DME ) company is supposed to provide you with replacement filters, tubing and masks. You can either call your DME when you need new supplies or you can arrange for an automatic shipment schedule. Your need to be seen by our office at lat minimum of every 12 months in order to renew the prescription for these supplies. Please make note of who your DME company is and their phone number. Please make sure that you clean your mask and hosing on a regular basis. Your DME can provide you with additional information regarding proper care and cleaning of your device           Safety is strongly encouraged. You should not drive if sleepy, tired, distracted and/or  fatigued. If a procedure or imaging study has been ordered for your by this clinic please call the [facility] at [facility] or [place] at  [address] [place] [address] and blood work do not require appointments.   They are considered \"Walk-In\" services and can be obtained at either [facility] or [facility].     Blood work is performed at the Laboratory (Lab) from 08:00am - 04:00pm      -Thank you

## 2022-12-07 NOTE — PROGRESS NOTES
Sophie Cho presents today for   Chief Complaint   Patient presents with    Follow-up    Sleep Apnea    CPAP    Fatigue    Shortness of Breath       Is someone accompanying this pt? no    Is the patient using any DME equipment during OV? Yes - CPAP    -DME Company [employer]    Depression Screening:  3 most recent PHQ Screens 12/7/2022   PHQ Not Done -   Little interest or pleasure in doing things Not at all   Feeling down, depressed, irritable, or hopeless Not at all   Total Score PHQ 2 0   Trouble falling or staying asleep, or sleeping too much -   Feeling tired or having little energy -   Poor appetite, weight loss, or overeating -   Feeling bad about yourself - or that you are a failure or have let yourself or your family down -   Trouble concentrating on things such as school, work, reading, or watching TV -   Moving or speaking so slowly that other people could have noticed; or the opposite being so fidgety that others notice -   Thoughts of being better off dead, or hurting yourself in some way -   PHQ 9 Score -   How difficult have these problems made it for you to do your work, take care of your home and get along with others -       Warren Sleepiness Scale:  Warren Sleepiness Scale  12/7/2022   Sitting and Reading 2   Watching TV 1   Sitting, inactive in a public place (e.g. a movie theater or meeting) 1   As a passenger in a car for an hour, without a break 0   Lying down to rest in the afternoon, when circumstances permit 3   Sitting and talking to someone 1   Sitting quietly after lunch without alcohol 2   In a car, while stopped for a few minutes in traffic 0   Warren Sleepiness Score 10         Coordination of Care:  1. Have you been to the ER, urgent care clinic since your last visit? Hospitalized since your last visit? Yes; Where: [facility], When: September 2022    2. Have you seen or consulted any other health care providers outside of the [address] [place] [address] [place] since your last visit?  Include any pap smears or colon screening. N/a    Medication list has been updated according to patient. Patient declines the influenza vaccine at this time.

## 2022-12-07 NOTE — PROGRESS NOTES
[facility]  Pulmonary, Critical Care, and Sleep Medicine    Office Progress Note- Initial Evaluation      Primary Care Physician: Ranjana Montana MD     Reason for Visit:  Evaluation for MANISH and Dyspnea    Assessment:  H/O MANISH: Currently CPAP 20  cwp. Lost to follow up. Patient reports he has been using CPAP every night and splits use between his ResMed S-9 and S-10 device. Patient needs supplies and since we have no prior studies, will need to re-qualify the patient Given clinical history and severity of symptoms- patient needs an in-lab study  Asthma: Mild obstructive lung disease on PFTs; Never smoker. Mild peripheral eosinophilia noted. Excessive Daytime Sleepiness (EDS): Most likely due to several causes: Medications, MANISH, Deconditioning, etc.  Dyspnea- most likely due to several causes: CAD, deconditioning, MANISH, possible asthma? . Prior peripheral eosinophil counts has been mildly elevated  Lower Extremity Edema: EF preserved on recent echo  Coronary Artery Disease (CAD): Small vessel- Medical management  Obesity: Body mass index is 41.09 kg/m². H/O Sepsis      Plan:  Order Advair to local pharmacy, while patient is awaiting delivery from mail order pharmacy  Patient to call PCP to clarify diuretic therapy  Continue Albuterol PRN- monitor response    Reschedule patient for Split sleep study for further evaluation. Potential consequences of untreated sleep apnea, and/or excessive daytime sleepiness were discussed with the patient. Educational materials provided. Treatment options including CPAP, dental appliance, weight reduction measures, positional therapy, surgeries etc were discussed. Healthy lifestyle changes to include weight loss and exercise discussed. Healthy sleep habits were reviewed and encouraged.  and workplace safety reviewed and discussed as appropriate. Drowsy and/or inattentive driving should be avoided.   Follow up with Primary Care Provider (PCP) as directed and for routine health care maintenance. Follow-up:after testing/ 3 months, sooner should new symptoms or problems arise. History of Present Illness: Mr. Sudarshan Stafford is a 79 y.o. male patient who was  referred by his PCP for report of MANISH and increasing dyspnea. diagnosed several year ago with MANISH. The history was provided by the patient, and spouse. The patient reports he has been using CPAP 20 cwp  every night. He currently has an S-10 device and a S-9 device as well. Patient only brought in the S-10 unit which has minimal use over the past 3 months. The patient states he has been using mostly his S-9 but did not bring in that device nor a trip. The patient has been replacing consumable as able and getting masks from various areas. His wife also has a lung condition and has used PAP therapy in the hospital. They will take those masks home which the patient uses. They have not been working through a DME    Our office was unable to locate patient's prior sleep study. 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 Motor vehicle accident(s) associated with drowsy driving have not occurred. Today the following is noted and/or reported: Last Clinic Visit: 10/7/22    Respiratory:  The patient continues with dyspnea on exertion  ABG from 9/30/22 PO2: 65, and no hypercarbia- well compensated  Cardiac Echo 9/30/22 with preserved EF, mild CLVH and dilated right atrium noted. PFTs were obtained and notable for Mild obstructive defect, and Mild restrictive ventilatory defect. There was some oscillatory (sawtooth) variation in inspiratory flow volume loop that can been seen in upper air-way secretions and/or redundant soft tissue. The later finding is consistent with the patient known MANISH history. Since last visit the patient was started on Advair by his PCP.  He has not received it yet from his mail order pharmacy, so he has not started it yet. His Lasix was changed to bumex. However, just like his Advair, he has not received his bumex and he stopped his lasix. His bilateral leg edema has increased. I have asked the patient to restart his lasix until he gets his Bumex and to call his PCP today. He notes some mild nasal congestion  He has had a dry cough for one week. No fevor or chills. No chest pain. No sputum or phlegm. No hemoptysis  The patient reports that his wife is sick at home and that she was diagnosed with pneumonia at [facility]    Sleep/MANISH: Since stopping his lasix he only getting up once to use the reports hearing a loud noise at times  The patient got his device several years ago. We do not have those bathroom  He continues to use his PAP device - He is on a max CPAP setting of 20 cwp. We do not have an up to date compliance report. It sounds like device is no longer communicating with the cloud  He states that he is worried that his device is about to stop working. He prior sleep studies are unavailable    We attempted an in-lab Split study but the patient was not able to sleep. The sleep lab team asked the patient to not take his night meds until he got to the sleep lab, but the patient misreported that request and did not take any of his night meds and he was not able to sleep. Also, the patient reports that he also struggle to sleep without his CPAP. As soon as he would fall asleep, he would gasp awake. I discussed with the patient that he should take his meds, we just don't want the patient taking sedating medications at home and then drive to the sleep lab or fall asleep during the set up. We will need to bring the patient back to the lab to try and restudy him ad re-qualify him for a new device. Unclear, at patient is on max CPAP, does he need BIPAP? ?   Patient denies any new sleepwalking episodes        Positive Airway Pressure (PAP) Compliance  Report & Summary Trends  DME:   Device: [id number]  Date >4 hr use % Time  (Total Time%) AHI PAP Rx Pressure @ 95% Median Use Time Leak-Median  (95%) Notes   1/7/22-4/6/22 17 (18) 3.2 CPAP 20  EPR: 1 NA 09:33:00 11.5 (27.8)                                        Stop Bang 12/7/2022 12/2/2022 10/7/2022   Does the patient snore loudly (louder than talking or loud enough to be heard through closed doors)? 1 1 1   Does the patient often feel tired, fatigued, or sleepy during the daytime, even after a \"good\" night's sleep? 1 1 1   Has anyone ever observed the patient stop breathing during their sleep? 1 1 1   Does the patient have or are they being treated for high blood pressure? 1 1 1   Is the patient's BMI greater than 35? 1 1 1   Is your neck circumference greater than 17 inches (Male) or 16 inches (Female)? 1 1 1   Is the patient older than 48? 1 1 1   Is the patient male? 1 1 1   MANISH Score 8 8 8   Has the patient been referred to Sleep Medicine? 1 1 -   Has the patient previously been diagnosed with Obstructive Sleep Apnea? 1 1 -   Treated or Untreated?  Treated Treated -       3 most recent PHQ Screens 12/7/2022   PHQ Not Done -   Little interest or pleasure in doing things Not at all   Feeling down, depressed, irritable, or hopeless Not at all   Total Score PHQ 2 0   Trouble falling or staying asleep, or sleeping too much -   Feeling tired or having little energy -   Poor appetite, weight loss, or overeating -   Feeling bad about yourself - or that you are a failure or have let yourself or your family down -   Trouble concentrating on things such as school, work, reading, or watching TV -   Moving or speaking so slowly that other people could have noticed; or the opposite being so fidgety that others notice -   Thoughts of being better off dead, or hurting yourself in some way -   PHQ 9 Score -   How difficult have these problems made it for you to do your work, take care of your home and get along with others -       [place] Scale 12/7/2022 12/2/2022 10/7/2022   Sitting and Reading 2 2 3   Watching TV 1 1 3   Sitting, inactive in a public place (e.g. a movie theater or meeting) 1 2 2   As a passenger in a car for an hour, without a break 0 0 1   Lying down to rest in the afternoon, when circumstances permit 3 3 3   Sitting and talking to someone 1 1 2   Sitting quietly after lunch without alcohol 2 3 3   In a car, while stopped for a few minutes in traffic 0 0 0   [place] Sleepiness Score 10 12 17             Immunization History:  Immunization History   Administered Date(s) Administered    COVID-19, PFIZER PURPLE top, DILUTE for use, (age 15 y+), IM, 30mcg/0.3mL 07/27/2021, 08/23/2021    COVID-19, [place] Vaccine, Vaccine Unspecified 04/01/2021, 05/01/2021    Influenza Vaccine 09/01/2021    Influenza, FLUAD, (age 72 y+), Adjuvanted 02/01/2022    Influenza, FLUARIX, FLULAVAL, FLUZONE (age 10 mo+) AND AFLURIA, (age 1 y+), PF, 0.5mL 12/07/2016, 11/02/2017, 10/25/2018, 12/10/2019    Pneumococcal Conjugate (PCV-13) 08/08/2016    Pneumococcal Polysaccharide (PPSV-23) 02/01/2022    Tdap 08/08/2016       Past Medical History:  Past Medical History:   Diagnosis Date    Abdominal adhesions 07/30/2014    BPH (benign prostatic hyperplasia)     Cellulitis     Chronic pain 10/08/11    Back pain due a work injury    DDD (degenerative disc disease), lumbar 07/30/2014    Depression     Diabetes ([address]     Frequent falls 07/30/2014    TRUE (generalized anxiety disorder) 07/30/2014    HLD (hyperlipidemia)     Hypertension     Hypogonadism in male     Incomplete bladder emptying     Kidney stones     Lumbar facet arthropathy 07/30/2014    Lumbar nerve root impingement 07/30/2014    Lumbosacral radiculopathy at S1 07/30/2014    Major depression 07/30/2014    Neurological disorder     sciatica    MANISH (obstructive sleep apnea) 07/30/2014    S/P lumbar laminectomy 07/30/2014    S/P lumbar spinal fusion 07/30/2014    Sleep apnea     USES CPAP EVERY NIGHT    Thoracic compression fracture ([address] 07/30/2014       Past Surgical History:  Past Surgical History:   Procedure Laterality Date    HX APPENDECTOMY      HX BACK SURGERY      Lumbar fusion    HX HERNIA REPAIR  1992    HX OTHER SURGICAL      EMG - S1 disorder    HX OTHER SURGICAL  10/2017    spinal cord stimulator    HX VASECTOMY  1985       Family History:  Family History   Problem Relation Age of Onset    Diabetes Mother     Hypertension Mother     Stroke Mother     Heart Disease Mother     Heart Attack Father     Diabetes Father     Stroke Father     Heart Disease Father     Diabetes Sister     Stroke Brother        Social History:  Social History     Tobacco Use    Smoking status: Never    Smokeless tobacco: Never   Vaping Use    Vaping Use: Never used   Substance Use Topics    Alcohol use: Yes    Drug use: Never        Caffeine Amount Time of last Intake Comments   Coffee None     Soda 2L/day All day and night Pepsi Zero   Tea 2L/day All day and night Decaffeinated- in place of Pepsi   Energy Drinks None     Over- the - counter stimulant pills None     Other Substances      Alcohol None     Tobacco Never     Drugs None     Other: None         Medications:  Current Outpatient Medications on File Prior to Visit   Medication Sig Dispense Refill    fluticasone propionate (FLONASE) 50 mcg/actuation nasal spray       bumetanide (BUMEX) 1 mg tablet Take 1 Tablet by mouth daily. 30 Tablet 1    trospium (SANCTURA) 20 mg tablet Take 1 Tablet by mouth Before breakfast and dinner. 90 Tablet 3    albuterol (PROVENTIL HFA, VENTOLIN HFA, PROAIR HFA) 90 mcg/actuation inhaler Take 1 Puff by inhalation every four (4) hours as needed for Wheezing. 1 Each 3    cholecalciferol (VITAMIN D3) (1000 Units /25 mcg) tablet Take 1,000 Units by mouth daily. melatonin 5 mg cap capsule Take 5 mg by mouth nightly. ascorbic acid, vitamin C, (VITAMIN C) 500 mg tablet Take 500 mg by mouth daily.       metoprolol succinate (TOPROL-XL) 50 mg XL tablet TAKE 1 TABLET EVERY DAY 90 Tablet 1    glipiZIDE (GLUCOTROL) 10 mg tablet TAKE 1 TABLET TWICE DAILY 180 Tablet 1    amitriptyline (ELAVIL) 10 mg tablet TAKE 1 TABLET EVERY NIGHT 90 Tablet 1    simvastatin (ZOCOR) 10 mg tablet TAKE 1 TABLET EVERY NIGHT 90 Tablet 1    buPROPion SR (WELLBUTRIN SR) 150 mg SR tablet TAKE 1 TABLET EVERY DAY 90 Tablet 1    montelukast (SINGULAIR) 10 mg tablet TAKE 1 TABLET EVERY DAY (Patient taking differently: No sig reported) 90 Tablet 1    dulaglutide (Trulicity) 3 CQ/4.7 mL pnij 3 mg by SubCUTAneous route every seven (7) days. 12 Each 3    alfuzosin SR (UROXATRAL) 10 mg SR tablet TAKE 1 TABLET BY MOUTH DAILY AFTER DINNER 90 Tablet 3    gabapentin (NEURONTIN) 300 mg capsule Take 300 mg by mouth three (3) times daily. dutasteride (AVODART) 0.5 mg capsule Take 1 Capsule by mouth daily (after dinner). 90 Capsule 3    testosterone cypionate (DEPOTESTOTERONE CYPIONATE) 200 mg/mL injection 0.5 mL by IntraMUSCular route every seven (7) days. Max Daily Amount: 100 mg. 10 mL 0    aspirin delayed-release 81 mg tablet Take 1 Tablet by mouth daily. 90 Tablet 1    cpap machine kit by Does Not Apply route. folic acid/multivit-min/lutein (CENTRUM SILVER PO) Take 1 Tablet by mouth daily. lisinopriL (PRINIVIL, ZESTRIL) 20 mg tablet Take 1 Tab by mouth daily. 30 Tab 1    polyethylene glycol (MIRALAX) 17 gram packet Take 1 Packet by mouth daily. Hold for loose stools (Patient taking differently: Take 17 g by mouth daily. dissolve in 8 oz liquid) 10 Each 0    guaiFENesin-codeine (ROBITUSSIN AC) 100-10 mg/5 mL solution       fluticasone propion-salmeteroL (ADVAIR/WIXELA) 250-50 mcg/dose diskus inhaler Take 1 Puff by inhalation every twelve (12) hours.  60 Each 1    lancets (TRUEplus Lancets) 33 gauge misc CHECK BLOOD SUGAR THREE TIMES DAILY 300 Each 5    DropSafe Alcohol Prep Pads padm USE AS DIRECTED WHEN CHECKING BLOOD SUGAR THREE TIMES DAILY 300 Pad 5    pramipexole (MIRAPEX) 0.75 mg tablet TAKE 3 TABLETS ONE TIME DAILY (Patient taking differently: No sig reported) 270 Tablet 1    glucose blood VI test strips (True Metrix Glucose Test Strip) strip TEST BLOOD SUGAR THREE TIMES DAILY 300 Strip 5    nitroglycerin (NITROSTAT) 0.4 mg SL tablet DISSOLVE ONE TABLET UNDER TONGUE AS NEEDED FOR CHEST PAIN EVERY 5 MINUTES FOR UP TO 3 DOSES (Patient taking differently: 0.4 mg by SubLINGual route every five (5) minutes as needed for Chest Pain. DISSOLVE ONE TABLET UNDER TONGUE AS NEEDED FOR CHEST PAIN EVERY 5 MINUTES FOR UP TO 3 DOSES) 30 Tablet 0    Cane maura by Does Not Apply route. Blood-Glucose Meter monitoring kit Use to check blood sugar 3 times daily. Dx E11.9. Please dispense brand preferred by insurance. 1 Kit 0    Needle, Disp, 23 G 23 gauge x 1 1/4\" ndle Use to inject 0.5 ml every 7 days 30 Pen Needle 1    Needle, Disp, 18 G 18 gauge x 1\" ndle Use to aspirate 0.5 ml every 7 days 30 Pen Needle 1    Wheel Chair maura Assistance with ambulation, gait instability 1 Each 0    naloxone 4 mg/actuation spry 4 mg by Nasal route as needed. 1 Box 0    Back Brace misc 1 Device by Does Not Apply route daily as needed for Pain. One, lumbosacral orthosis/back brace with metal struts      Medically necessary 1 Device 0     No current facility-administered medications on file prior to visit. Allergy:  Allergies   Allergen Reactions    Bactrim [Sulfamethoprim] Rash     Bactrim DS, per patient.     Opana [Oxymorphone] Other (comments)     Feels like bug crawling under skin and drowziness       Review of Systems  General ROS: positive for  - fatigue and sleep disturbance  negative for - chills, fever, hot flashes, malaise, or night sweats  ENT ROS: negative for - epistaxis, nasal congestion, nasal discharge, nasal polyps, oral lesions, sinus pain, sneezing, or sore throat  Hematological and Lymphatic ROS: negative for - bleeding problems, blood clots, bruising, jaundice, pallor or swollen lymph nodes  Endocrine ROS: negative for - polydipsia/polyuria, skin changes, temperature intolerance or unexpected weight changes  Respiratory ROS: positive for - shortness of breath  negative for - cough, hemoptysis, pleuritic pain, sputum changes, stridor, tachypnea, or wheezing  Cardiovascular ROS: positive for - dyspnea on exertion  negative for - chest pain, irregular heartbeat, loss of consciousness, or palpitations  Gastrointestinal ROS: no abdominal pain, change in bowel habits, or black or bloody stools  Genito-Urinary ROS: no dysuria, trouble voiding, or hematuria  Musculoskeletal ROS: walks with cane, has chronic pain  Neurological ROS: no TIA or stroke symptoms  Dermatological ROS: negative for - pruritus, rash or skin lesion changes   Psychological ROS: negative  Otherwise negative and per HPI      Physical Exam:  Blood pressure 138/78, pulse 93, temperature 98.1 °F (36.7 °C), temperature source Temporal, resp. rate 16, height 6' 2\" (1.88 m), weight 145.2 kg (320 lb), SpO2 96 %. on RA, Body mass index is 41.09 kg/m². General: No distress, acyanotic, appears stated age, cooperative, pleasant, + Obese body habitus, walks with cane  HEENT: PERRL, EOMI, throat without erythema or exudate, Tongue- dental indention on tongue, Mallampati's score 3+, Uvula- midline  Neck: Supple,  no abnormally enlarged lymph nodes, thyroid is not enlarged, non-tender, No JVD, No carotid bruits  Chest: Grossly normal.  Lungs: Moderate air entry, clear to auscultation bilaterally- no wheezing  Heart: Regular rate and rhythm, S1S2 present, without murmur. Abdomen: Obese, soft,  Extremity: Negative for cyanosis, or clubbing, 3+ pitting lower, extremity edema  Skin: Skin color, texture, turgor normal. No rashes or lesions.   Neurological: CN 2-12  and muscle tone- grossly normal.    Data Reviewed:  CBC:   Lab Results   Component Value Date/Time    WBC 10.2 10/07/2022 02:30 PM    HGB 15.2 10/07/2022 02:30 PM    HCT 48.8 (H) 10/07/2022 02:30 PM    PLATELET 676 51/29/1596 02:30 PM    MCV 91.2 10/07/2022 02:30 PM      Latest Reference Range & Units 10/2/22 03:32 10/3/22 04:20 10/7/22 14:30   EOSINOPHILS 0 - 5 % 4 5 3   ABS. EOSINOPHILS 0.0 - 0.4 K/UL 0.3 0.3 0.3       BMP:   Lab Results   Component Value Date/Time    Sodium 134 (L) 10/07/2022 02:30 PM    Potassium 4.3 10/07/2022 02:30 PM    Chloride 99 (L) 10/07/2022 02:30 PM    CO2 27 10/07/2022 02:30 PM    Anion gap 8 10/07/2022 02:30 PM    Glucose 153 (H) 10/07/2022 02:30 PM    BUN 17 10/07/2022 02:30 PM    Creatinine 1.49 (H) 10/07/2022 02:30 PM    BUN/Creatinine ratio 11 (L) 10/07/2022 02:30 PM    GFR est AA >60 10/03/2022 04:20 AM    GFR est non-AA >60 10/03/2022 04:20 AM    Calcium 9.4 10/07/2022 02:30 PM        TSH:  Lab Results   Component Value Date/Time    TSH 1.09 10/29/2021 10:30 AM    TSH 1.25 07/03/2020 09:16 AM    TSH 1.03 11/15/2019 01:29 PM    TSH 2.560 06/13/2016 08:43 AM        Latest Reference Range & Units 9/30/22 21:14   pH (POC) 7.35 - 7.45   7.43   pCO2 (POC) 35.0 - 45.0 MMHG 32.0 (L)   pO2 (POC) 80 - 100 MMHG 65 (L)   HCO3 (POC) 22 - 26 MMOL/L 21.1 (L)   sO2 (POC) 92 - 97 % 93.1   Base deficit (POC) mmol/L 2.3   FIO2 (POC) % 36   Specimen type (POC) -   ARTERIAL   Site -   RIGHT RADIAL   Device: -   NASAL CANNULA   Total resp. rate -   18   Allens test (POC) -   Positive   (L): Data is abnormally low    CARDIO:  09/30/22    ECHO ADULT COMPLETE 10/03/2022 10/3/2022    Interpretation Summary    Left Ventricle: Normal left ventricular systolic function with a visually estimated EF of 55 - 60%. Left ventricle size is normal. Increased wall thickness. Findings consistent with mild concentric hypertrophy. Normal wall motion. Right Atrium: Right atrium is dilated. Aorta: Normal sized ascending aorta. Dilated aortic root. Ao Root diameter is 3.5 cm.     Signed by: Carlos Ramos MD on 10/3/2022  1:40 PM      Imaging:  [x]I have personally reviewed the patients radiographs section   Results from [facility] [place] encounter on 09/30/22    XR CHEST SNGL V    Narrative  Portable Chest    CPT CODE: 13889    HISTORY: Shortness of breath. FINDINGS:    Comparison 9/8/2022. Wires overlie the patient. Low lung volumes. Increased prominence of the  vasculature. No dense consolidation. No large effusion or pneumothorax. Intraspinal lead similar in positioning. Impression  Hypoinflation of the lungs likely with some superimposed vascular congestion. Results from [place] encounter on 09/30/22    CT ABD PELV W CONT    Narrative  CT of abdomen and pelvis with contrast    INDICATION: evaluate for hydronephrosis/pyelonephritis, complicated UTI    COMPARISON: None. TECHNIQUE: 5 mm helical scan to the abdomen and pelvis is obtained  from the  diaphragm to the symphysis pubis after uneventful nonionic IV contrast  administration. All CT scans at this facility performed using dose optimization techniques as  appreciated to a performed exam, to include automated exposure control,  adjustment of the mA and or KU according to patient size (including appropriate  matching for site specific examination), or use of iterative reconstruction  technique. FINDINGS: This study is suboptimal due to delayed scan with contrast in  secretory phase. Lung Bases: Clear. Liver: Normal.  Gallbladder: Normal.  Biliary System: No ductal dilatation. Spleen: Normal.  Pancreas: Normal.  Bowel: Small hiatal hernia. The small and large bowel are nondilated. Adrenal Glands: Normal.  Kidneys: Residual contrast identified in nondilated bilateral renal collecting  system. , Therefore renal calculi evaluation cannot be performed. No obstructive  uropathy. The bilateral ureters are opacified by contrast and nondilated. Lower genitourinary system: The bladder appears normal. No bladder calculi. The  prostate appears normal.    Peritoneum/Retroperitoneum: No adenopathy. Vasculature: Unremarkable for age.   Other:  No free fluid. Osseous structures:  Unremarkable for age. Impression  1. Suboptimal study due to delayed scan with contrast in secretory phase. Nephrolithiasis evaluation cannot be performed. No obstructive uropathy. 2. The kidneys appear unremarkable with no definite focal abnormality. Thank you for this referral.       Cardiac Echo:     09/30/22    ECHO ADULT COMPLETE 10/03/2022 10/3/2022    Interpretation Summary    Left Ventricle: Normal left ventricular systolic function with a visually estimated EF of 55 - 60%. Left ventricle size is normal. Increased wall thickness. Findings consistent with mild concentric hypertrophy. Normal wall motion. Right Atrium: Right atrium is dilated. Aorta: Normal sized ascending aorta. Dilated aortic root. Ao Root diameter is 3.5 cm. Signed by: Ben Crouch MD on 10/3/2022  1:40 PM            Historical Sleep Testing Data:            Pulmonary Function Study Trends:    Date FVC (L) % Pred. FeV1(L) % Pred. FeV1/FVC % Pred. TLC (L) % Pred. DLCO % Pred.  Comments   11/17/22 3.60 69 2.89 74 80 108 6.06 76 25.68 78                                                      Ta Charles DO, [facility]  Pulmonary, Sleep and Critical Care Medicine

## 2022-12-07 NOTE — LETTER
12/10/2022    Patient: Chary Harper   YOB: 1955   Date of Visit: 12/7/2022     Mine Peterson, [address] [facility]  [address]  [address] [place] [address]  V[facility] [address]    Dear Mine Peterson MD,      Thank you for referring Mr. Dilan William to [address] [place] [address] for evaluation. My notes for this consultation are attached. If you have questions, please do not hesitate to call me. I look forward to following your patient along with you.       Sincerely,    Alan Buchanan, DO

## 2022-12-09 RX ORDER — FLUTICASONE PROPIONATE AND SALMETEROL 250; 50 UG/1; UG/1
POWDER RESPIRATORY (INHALATION)
Qty: 1 EACH | Refills: 4 | Status: SHIPPED | OUTPATIENT
Start: 2022-12-09

## 2022-12-10 ENCOUNTER — DOCUMENTATION ONLY (OUTPATIENT)
Dept: PULMONOLOGY | Age: 67
End: 2022-12-10

## 2022-12-11 NOTE — PROGRESS NOTES
[address], [place], [address] [facility],  [phone]     Polysomnography Report    Heena Born       Patient: Eileen Huffman Type: Diagnostic PSG   : 1955 Patient Details: Male, 79 years, Height 6' 2\",   MR#: MV   Weight 305 lbs, BMI 39.16   Physician: Yuridia Chapman DO Ref. Physician: Saida Capellan MD   Recording Technician: HARMONY Liu Recording Details: Recorded at 8:21:39 PM on 2022    Scoring Technician:    for 337.5 minutes. STUDY PROTOCOL: The study consisted of 14 channels, including left and right EOG and EEG, submental and extremity EMG, EKG, airflow, respiratory effort and oximetry measurement. The study report was generated by personal review of the raw data from the patient's sleep study. TECHNICAL: A Hypopnea was scored according to AASM guidelines. A hypopnea was scored when the nasal pressure signal dropped by 30% or greater from the pre-event baseline for greater than or equal to 10 seconds and was accompanied by at least a 3% or 4% drop in the SpO2 from pre-event baseline. For Medicare and/or Medicaid patients, hypopneas were scored with an associated 4% drop in SpO2 from pre-event baseline. PROCEDURE: Diagnostic Sleep Study      MEDICATIONS: Sanctura, Albuterol (Proventil HFA, Ventolin HFA, ProAir HFA), Vit D3, Melatonin, Vit C, Lancets, Lasix, Mirapex, Toprol-XL, Glipizide, Elavil, Zocor, Wellbutrin SR, Singulair, Farxiga, Trulicity, Uroxatral, Gabapentin, Avodart, Depotestoterone Cypionate, Nitrostat, Aspirin, Centrum Silver PO, Lisinopril, Miralax, Naloxone    SUMMARY: The blood pressure readings: Pre BP: 164/85 Post BP: 158/82. The sleep efficiency was 4%. Total Sleep Time: 0.1 hours. The patient spent 119.5 minutes awake after sleep onset. During the night, 7 awakenings were recorded. The arousal index was 120.0 per hour.  The sleep onset latency was 16.0 minutes; the stage 2 latency was NONE minutes. REM sleep was 0 percent of sleep time; slow wave sleep was 0 percent. Snoring was noted 94.3 percent of the sleep time with a count of 11. The overall AHI was 34.3 per hour. The RDI was 111.4 per hour. The PLM index was 0.0, with 0 associated with arousal hourly, on average. The time with oxygen saturation below 88% was 0 minutes. The minimum oxygen saturation was 91%. The oxygen desaturation index was 0.0. The average respiratory event lasted 10.2 seconds. The longest event was 12.6 seconds. The respiratory rate was typically between 12 and 20 breaths per minute; Devonda Horde pattern was not seen. Mouth breathing was frequently noted by recording tech. No hypnotic was reportedly taken. The patient reported sufficient sleep the previous night (7-9 hours). Sleep hygiene violation:  were not noted by the patient. Sleep was interrupted by one bathroom trip. Cardiac tracing appeared to be normal sinus rhythm, without significant atrial fibrillation, heart block, bradycardia or tachycardia. Occasional PVCs were noted. No alpha intrusion, parasomnias or EEG abnormalities occurred. Supplemental oxygen was not used during this study. Positive Airway Pressure was not used during this study. Summary:  Overall, this was an unsuccessful study. The patient was not able to achieve any meaningful sleep. The patient was able to achieve a few minutes of stage 1 sleep and obstructive events were noted during these brief periods. The patient was noted to be using his phone and eating chips in bed. DIAGNOSIS: 1) Insomnia   2) Poor Sleep Hygiene  3) Premature Ventricular Contractions (PVCs) 4) Hypertension    RECOMMENDATIONS:     This report was generated by personal review of the raw data, which was acceptable for interpretation. Have patient return for a repeat sleep study, once insomnia issues have been better optimized.   Other non-invasive treatment options are recommended were applicable and include the following: weight reduction, smoking cessation, body position training, and modification of alcohol ingestion and/or sedating agents. If these treatments are not possible or effective, an oral appliance may be an alternative and/or adjuvant non-invasive treatment. If non-invasive treatments are not possible or effective, consideration may be given to possible corrective surgical options. Healthy sleep habits are encouraged. Individuals are encouraged to obtain 7-9 hours of sleep per day.  safety is encouraged. Drowsy and/or inattentive driving should be avoided. COVID-19 precautions as recommended by the Centers for Disease Control (CDC)    Follow up with provider as directed.           Zhang Montana DO Electronically signed at 7:30:37 PM, December 10, 2022

## 2022-12-13 ENCOUNTER — VIRTUAL VISIT (OUTPATIENT)
Dept: FAMILY MEDICINE CLINIC | Age: 67
End: 2022-12-13

## 2022-12-13 DIAGNOSIS — E11.40 TYPE 2 DIABETES MELLITUS WITH DIABETIC NEUROPATHY, WITHOUT LONG-TERM CURRENT USE OF INSULIN (HCC): ICD-10-CM

## 2022-12-13 RX ORDER — DULAGLUTIDE 4.5 MG/.5ML
4.5 INJECTION, SOLUTION SUBCUTANEOUS
Qty: 16 EACH | Refills: 3 | Status: SHIPPED | COMMUNITY
Start: 2022-12-13 | End: 2023-12-13

## 2022-12-13 NOTE — Clinical Note
- Trulicity will be increased to 4.5mg weekly through [employer]. Initially, would have liked to switch him to [facility] for the added weight loss associated with it, but it is not part of the PAP. Tobias Bonds may be an option if the Trulicity at the increased dose is not beneficial. - Would recommend all SGLT2i meds, including Farxiga and Jardiance, be held per urology d/t his frequent UTIs. - Glipizide discontinued d/t likely being unnecessary while taking a GLP-1RA and may be contributing to weight gain and his dizziness noted above. - Trospium held as a trial to determine if his dizziness and unsteady gait is d/t this new medication. Advised to contact urology for further management.

## 2022-12-13 NOTE — PROGRESS NOTES
Pharmacy Progress Note - Diabetes Management       Assessment / Plan:   Diabetes Management:  Per ADA guidelines, Pt's A1c is at goal of < 7%. Pt's diabetes is well-controlled on his current regimen. Unfortunately, he is no longer able to take a SGLT2i per urology d/t frequent UTIs with this class and Marilynne Santiago was stopped. Kentrell Feli was initially discussed as an alternative GLP-1RA to provide additional appetite suppression that Trulicity was not currently providing. Unfortunately, [place] Feli is not available through his [facility] PAP. Will increase his Trulicity to 1.5VH weekly in an attempt to curb his appetite to compensate for the loss of Marilynne Santiago as well as possibly decrease his weight. Will stop his glipizide as the GLP-1RA should provide adequate prandial control. It could also be contributing to his dizziness and unsteady gait on top of weight gain. Will reassess at follow up in 4 weeks. Dizziness:  Med rec revealed a new medication, Trospium [place] Ankush), for his urge incontinence started in October. The anticholinergic effects from this medication can be exacerbated by the Elavil that he was already prescribed. He is also on several sedating medications as well, but they have been prescribed for greater than 6 months without this issue. Given that he has not noticed any improvement in his urge incontinence since starting this medication, advised to hold it and contact urology for further instruction. Advised to restart if dizziness and unsteady gait is not resolved. Recommendations to Gissell Galindo MD:    - Trulicity will be increased to 4.5mg weekly through [facility]. Initially, would have liked to switch him to Kentrell Feli for the added weight loss associated with it, but it is not part of the PAP.   Ozempic may be an option if the Trulicity at the increased dose is not beneficial.  - Would recommend all SGLT2i meds, including Farxiga and Jardiance, be held per urology d/t his frequent UTIs.  - Glipizide discontinued d/t likely being unnecessary while taking a GLP-1RA and may be contributing to weight gain and his dizziness noted above. - Trospium held as a trial to determine if his dizziness and unsteady gait is d/t this new medication. Advised to contact urology for further management. Nutrition/Lifestyle Modifications:  - Educated pt on the importance of moderating carbohydrate intake. Reviewed sources of carbohydrates and method to help determine appropriate portion sizes (e.g., Diabetes Plate Method). - Advised patient to avoid sugar-sweetened beverages and replace with water or diet/zero sugar option.  - Recommend ~30 minutes consistent, moderately intensive, exercise/day or ~150 minutes/week. Start small, stay consistent, and increase length and types of exercise, as tolerated. Patient will return to clinic in 4 week(s) for follow up. S/O: Mr. Heena Sun, a 79 y.o. male referred by Meenu Cortez MD,  has a past medical history of Abdominal adhesions (07/30/2014), BPH (benign prostatic hyperplasia), Cellulitis, Chronic pain (10/08/11), DDD (degenerative disc disease), lumbar (07/30/2014), Depression, Diabetes ([facility] [address] Frequent falls (07/30/2014), TRUE (generalized anxiety disorder) (07/30/2014), HLD (hyperlipidemia), Hypertension, Hypogonadism in male, Incomplete bladder emptying, Kidney stones, Lumbar facet arthropathy (07/30/2014), Lumbar nerve root impingement (07/30/2014), Lumbosacral radiculopathy at S1 (07/30/2014), Major depression (07/30/2014), Neurological disorder, MANISH (obstructive sleep apnea) (07/30/2014), S/P lumbar laminectomy (07/30/2014), S/P lumbar spinal fusion (07/30/2014), Sleep apnea, and Thoracic compression fracture ([address] (07/30/2014). [facility].   Patient's last A1c was:   Lab Results   Component Value Date/Time    Hemoglobin A1c 6.7 (H) 10/01/2022 09:17 AM    Hemoglobin A1c (POC) 7.0 09/13/2022 03:56 PM       Interim update: Pt was last seen by Edwin Rice MD on 05 Dec 2022 with A/P for T2DM: 'Poorly controlled diabetes mellitus ([address]): A1c around 6.7. The UTI episode. [place] putting on a hold. Consulting pharmacist about coverage for Jardiance and medication management. Continue diet and lifestyle modification. Sending to medical weight loss program for further assistance. We can consider injectable which might help with the weight loss as well. Will obtain recommendation from the pharmacist to plan on coverage and also done medical weight loss program referral'    Today: Primary focus of beginning of visit was to determine what alternatives could be used to increase weight loss. Trulicity dose increase gone over as a possibility, but pt was interested in [place]. Initially, pharmacy that handles Rx for [facility] informed me that [place] was available through the program, but went through a different pharmacy. This turned out to be false after calling [employer]. Pt was sent a message to inform him that the medication was not covered and that an increase in Trulicity will be the first step. He then requested a full med rec to find out why he is 'constantly dizzy and feeling like I'm going to fall over.'  He cares for his disabled wife and feels like he is going to drop her or fall trying to help her. Trospium was discussed as a possible etiology and he is amenable to a trial off of it. He was advised to contact urology to obtain further instruction and he verbalized understanding. He then c/o insomnia where he has no problems with sleep onset, but rather sleep duration. He uses a CPAP and was supposed to have a sleep study on 02 Dec 2022.   He was unable to fall asleep during the study because they told him not to take any of his medications the night of the test.  He was encouraged to reschedule his test and to take his medications as per usual on the night of his test and he verbalized understanding. Current anti-hyperglycemic regimen includes:    Key Antihyperglycemic Medications               glipiZIDE (GLUCOTROL) 10 mg tablet TAKE 1 TABLET TWICE DAILY    dulaglutide (Trulicity) 3 KI/1.6 mL pnij 3 mg by SubCUTAneous route every seven (7) days. Complete current medication regimen includes:  Current Outpatient Medications   Medication Sig    fluticasone propion-salmeteroL (ADVAIR/WIXELA) 250-50 mcg/dose diskus inhaler INHALE 1 PUFF BY MOUTH TWICE DAILY FOR ASTHMA    fluticasone propionate (FLONASE) 50 mcg/actuation nasal spray     guaiFENesin-codeine (ROBITUSSIN AC) 100-10 mg/5 mL solution     bumetanide (BUMEX) 1 mg tablet Take 1 Tablet by mouth daily. fluticasone propion-salmeteroL (ADVAIR/WIXELA) 250-50 mcg/dose diskus inhaler Take 1 Puff by inhalation every twelve (12) hours. trospium (SANCTURA) 20 mg tablet Take 1 Tablet by mouth Before breakfast and dinner. albuterol (PROVENTIL HFA, VENTOLIN HFA, PROAIR HFA) 90 mcg/actuation inhaler Take 1 Puff by inhalation every four (4) hours as needed for Wheezing. cholecalciferol (VITAMIN D3) (1000 Units /25 mcg) tablet Take 1,000 Units by mouth daily. melatonin 5 mg cap capsule Take 5 mg by mouth nightly. ascorbic acid, vitamin C, (VITAMIN C) 500 mg tablet Take 500 mg by mouth daily.     lancets (TRUEplus Lancets) 33 gauge misc CHECK BLOOD SUGAR THREE TIMES DAILY    DropSafe Alcohol Prep Pads padm USE AS DIRECTED WHEN CHECKING BLOOD SUGAR THREE TIMES DAILY    pramipexole (MIRAPEX) 0.75 mg tablet TAKE 3 TABLETS ONE TIME DAILY (Patient taking differently: No sig reported)    metoprolol succinate (TOPROL-XL) 50 mg XL tablet TAKE 1 TABLET EVERY DAY    glipiZIDE (GLUCOTROL) 10 mg tablet TAKE 1 TABLET TWICE DAILY    amitriptyline (ELAVIL) 10 mg tablet TAKE 1 TABLET EVERY NIGHT    simvastatin (ZOCOR) 10 mg tablet TAKE 1 TABLET EVERY NIGHT    buPROPion SR (WELLBUTRIN SR) 150 mg SR tablet TAKE 1 TABLET EVERY DAY    montelukast (SINGULAIR) 10 mg tablet TAKE 1 TABLET EVERY DAY (Patient taking differently: No sig reported)    dulaglutide (Trulicity) 3 WV/7.2 mL pnij 3 mg by SubCUTAneous route every seven (7) days. alfuzosin SR (UROXATRAL) 10 mg SR tablet TAKE 1 TABLET BY MOUTH DAILY AFTER DINNER    gabapentin (NEURONTIN) 300 mg capsule Take 300 mg by mouth three (3) times daily. dutasteride (AVODART) 0.5 mg capsule Take 1 Capsule by mouth daily (after dinner). testosterone cypionate (DEPOTESTOTERONE CYPIONATE) 200 mg/mL injection 0.5 mL by IntraMUSCular route every seven (7) days. Max Daily Amount: 100 mg.    glucose blood VI test strips (True Metrix Glucose Test Strip) strip TEST BLOOD SUGAR THREE TIMES DAILY    nitroglycerin (NITROSTAT) 0.4 mg SL tablet DISSOLVE ONE TABLET UNDER TONGUE AS NEEDED FOR CHEST PAIN EVERY 5 MINUTES FOR UP TO 3 DOSES (Patient taking differently: 0.4 mg by SubLINGual route every five (5) minutes as needed for Chest Pain. DISSOLVE ONE TABLET UNDER TONGUE AS NEEDED FOR CHEST PAIN EVERY 5 MINUTES FOR UP TO 3 DOSES)    aspirin delayed-release 81 mg tablet Take 1 Tablet by mouth daily. Cane maura by Does Not Apply route. cpap machine kit by Does Not Apply route. Blood-Glucose Meter monitoring kit Use to check blood sugar 3 times daily. Dx E11.9. Please dispense brand preferred by insurance. folic acid/multivit-min/lutein (CENTRUM SILVER PO) Take 1 Tablet by mouth daily. lisinopriL (PRINIVIL, ZESTRIL) 20 mg tablet Take 1 Tab by mouth daily. Needle, Disp, 23 G 23 gauge x 1 1/4\" ndle Use to inject 0.5 ml every 7 days    Needle, Disp, 18 G 18 gauge x 1\" ndle Use to aspirate 0.5 ml every 7 days    Wheel Chair maura Assistance with ambulation, gait instability    polyethylene glycol (MIRALAX) 17 gram packet Take 1 Packet by mouth daily. Hold for loose stools (Patient taking differently: Take 17 g by mouth daily.  dissolve in 8 oz liquid)    naloxone 4 mg/actuation spry 4 mg by Nasal route as needed. Back Brace misc 1 Device by Does Not Apply route daily as needed for Pain. One, lumbosacral orthosis/back brace with metal struts      Medically necessary     No current facility-administered medications for this visit. Allergies: Allergies   Allergen Reactions    Bactrim [Sulfamethoprim] Rash     Bactrim DS, per patient. Opana [Oxymorphone] Other (comments)     Feels like bug crawling under skin and drowziness     ROS:  Today, Pt endorses:  - Symptoms of Hyperglycemia: polyuria  - Symptoms of Hypoglycemia: none    Vitals/Labs: Wt Readings from Last 3 Encounters:   12/07/22 320 lb (145.2 kg)   12/02/22 305 lb (138.3 kg)   11/17/22 316 lb (143.3 kg)     BP Readings from Last 3 Encounters:   12/07/22 138/78   12/03/22 (!) 158/82   10/21/22 (!) 140/88     Pulse Readings from Last 3 Encounters:   12/07/22 93   12/03/22 85   10/21/22 89       Lab Results   Component Value Date/Time    Sodium 134 (L) 10/07/2022 02:30 PM    Potassium 4.3 10/07/2022 02:30 PM    Chloride 99 (L) 10/07/2022 02:30 PM    CO2 27 10/07/2022 02:30 PM    Anion gap 8 10/07/2022 02:30 PM    Glucose 153 (H) 10/07/2022 02:30 PM    BUN 17 10/07/2022 02:30 PM    Creatinine 1.49 (H) 10/07/2022 02:30 PM    BUN/Creatinine ratio 11 (L) 10/07/2022 02:30 PM    GFR est AA >60 10/03/2022 04:20 AM    GFR est non-AA >60 10/03/2022 04:20 AM    Calcium 9.4 10/07/2022 02:30 PM    Bilirubin, total 0.7 10/07/2022 02:30 PM    Alk.  phosphatase 106 10/07/2022 02:30 PM    Protein, total 8.0 10/07/2022 02:30 PM    Albumin 4.1 10/07/2022 02:30 PM    Globulin 3.9 10/07/2022 02:30 PM    A-G Ratio 1.1 10/07/2022 02:30 PM    ALT (SGPT) 65 (H) 10/07/2022 02:30 PM     Lab Results   Component Value Date/Time    Cholesterol, total 104 10/29/2021 10:30 AM    HDL Cholesterol 38 (L) 10/29/2021 10:30 AM    LDL, calculated 47 10/29/2021 10:30 AM    VLDL, calculated 19 10/29/2021 10:30 AM    Triglyceride 95 10/29/2021 10:30 AM    CHOL/HDL Ratio 2.7 10/29/2021 10:30 AM     Lab Results   Component Value Date/Time    WBC 10.2 10/07/2022 02:30 PM    HGB 15.2 10/07/2022 02:30 PM    HCT 48.8 (H) 10/07/2022 02:30 PM    PLATELET 890 10/22/1366 02:30 PM    MCV 91.2 10/07/2022 02:30 PM     Lab Results   Component Value Date/Time    Hemoglobin A1c 6.7 (H) 10/01/2022 09:17 AM    Hemoglobin A1c 7.6 (H) 05/02/2022 07:25 AM    Hemoglobin A1c 6.9 (H) 10/29/2021 10:30 AM     Hemoglobin A1c (POC)   Date Value Ref Range Status   09/13/2022 7.0 % Final        Screenings/Prevention Parameters:  -Diabetic Eye and Foot Exams:      Diabetic Foot and Eye Exam HM Status   Topic Date Due    Diabetic Foot Care  08/02/2018    Eye Exam  08/24/2020     -Microalbumin / Creatinine ratio:       Lab Results   Component Value Date/Time    Microalbumin/Creat ratio (mg/g creat) 26 07/03/2020 09:17 AM    MICROALBUMIN,MG/DAY 14.8 06/13/2016 08:43 AM    Microalbumin,urine random 4.06 (H) 07/03/2020 09:17 AM     -ASCVD Risk Score Parameters and Calculation    Race: WHITE/NON-     BP Readings from Last 3 Encounters:   12/07/22 138/78   12/03/22 (!) 158/82   10/21/22 (!) 140/88        Lab Results   Component Value Date/Time    Cholesterol, total 104 10/29/2021 10:30 AM    HDL Cholesterol 38 (L) 10/29/2021 10:30 AM    LDL, calculated 47 10/29/2021 10:30 AM    VLDL, calculated 19 10/29/2021 10:30 AM    Triglyceride 95 10/29/2021 10:30 AM    CHOL/HDL Ratio 2.7 10/29/2021 10:30 AM        Social History     Tobacco Use    Smoking status: Never    Smokeless tobacco: Never   Substance Use Topics    Alcohol use: Yes         Calculated ASCVD Risk Score: The ASCVD Risk score (Odilia DK, et al., 2019) failed to calculate for the following reasons: The valid total cholesterol range is 130 to 320 mg/dL    -Statin Safety Parameters:      Lab Results   Component Value Date/Time    ALT (SGPT) 65 (H) 10/07/2022 02:30 PM    AST (SGOT) 35 10/07/2022 02:30 PM    Alk.  phosphatase 106 10/07/2022 02:30 PM    Bilirubin, direct 0.11 02/14/2022 04:00 PM    Bilirubin, total 0.7 10/07/2022 02:30 PM     -Immunizations:      Immunization History   Administered Date(s) Administered    COVID-19, PFIZER PURPLE top, DILUTE for use, (age 15 y+), IM, 30mcg/0.3mL 07/27/2021, 08/23/2021    COVID-19, [place] Vaccine, Vaccine Unspecified 04/01/2021, 05/01/2021    Influenza Vaccine 09/01/2021    Influenza, FLUAD, (age 72 y+), Adjuvanted 02/01/2022    Influenza, FLUARIX, FLULAVAL, FLUZONE (age 10 mo+) AND AFLURIA, (age 1 y+), PF, 0.5mL 12/07/2016, 11/02/2017, 10/25/2018, 12/10/2019    Pneumococcal Conjugate (PCV-13) 08/08/2016    Pneumococcal Polysaccharide (PPSV-23) 02/01/2022    Tdap 08/08/2016       Additional Laboratory Parameters of Interest:   Estimation of renal function:  Lab Results   Component Value Date/Time    Creatinine 1.49 (H) 10/07/2022 02:30 PM    Creatinine 1.19 10/03/2022 04:20 AM    Creatinine 1.23 10/02/2022 03:32 AM    GFR est AA >60 10/03/2022 04:20 AM    GFR est AA >60 10/02/2022 03:32 AM    GFR est AA >60 10/01/2022 09:17 AM    GFR est non-AA >60 10/03/2022 04:20 AM    GFR est non-AA 59 (L) 10/02/2022 03:32 AM    GFR est non-AA 58 (L) 10/01/2022 09:17 AM     Wt Readings from Last 3 Encounters:   12/07/22 320 lb (145.2 kg)   12/02/22 305 lb (138.3 kg)   11/17/22 316 lb (143.3 kg)     Ht Readings from Last 1 Encounters:   12/07/22 6' 2\" (1.88 m)     Calculated estimated creatinine clearance: estimated creatinine clearance is 73.1 mL/min (A) (by C-G formula based on SCr of 1.49 mg/dL (H)). Vital Signs Today:    There were no vitals taken for this visit. There are no discontinued medications.     Future Appointments   Date Time Provider Blair Vergara   1/2/2023 10:15 AM Tracey Goldstein MD [facility] BS AMB   1/10/2023 10:00 AM Kev Bradley [facility] BS AMB   2/6/2023  8:45 AM Akua Yanez MD [facility] BS AMB   4/11/2023 11:00 AM TANVI Sandoval       Patient verbalized understanding of the information presented and all of the patients questions were answered. AVS was gone over with the patient. Patient advised to call the office with any additional questions or concerns. Notifications of recommendations will be sent to Elsi Higgins MD for review.       Thank you for the consult,  Alona Guardado, PharmD, BCACP, BC-[facility]        For Pharmacy Admin Tracking Only    CPA in place: Yes  Recommendation Provided To: Patient/Caregiver: 5 via Virtual Visit  Intervention Detail: Adherence Monitorin, Discontinued Rx: 1, reason: Unnecessary Med, Dose Adjustment: 1, reason: Therapy Optimization, Patient Access Assistance/Sample Provided, and Scheduled Appointment  Intervention Accepted By: Patient/Caregiver: 5  Time Spent (min):  90

## 2022-12-19 ENCOUNTER — PATIENT OUTREACH (OUTPATIENT)
Dept: CASE MANAGEMENT | Age: 67
End: 2022-12-19

## 2022-12-19 NOTE — PROGRESS NOTES
Complex Case Management      Date/Time:  2022 9:59 AM    Method of communication with patient:phone    [address] [facility] ([facility]) contacted the patient by telephone to perform Ambulatory Care Coordination. Verified name and  (PHI) with patient as identifiers. Provided introduction to self, and explanation of the Ambulatory Care Manager's role. Reviewed most recent clinic visit w/ patient who verbalized understanding. Patient given an opportunity to ask questions. Top Challenges reviewed with the Provider   N/a     Hx of Abdominal adhesions, BPH, DDD, Depression, DM2, Anxiety, HLD, Htn, sciatica, MANISH, CKD  Reports recent visit to [facility] N/[facility] but states feeling much better after that visit. Relays good visit w/ the PharmD. States holding Trospium has resulted in decreased dizziness and a more steady gate. Reports he has not yet call the urologist however, but he will. No other questions/issues    The patient agrees to contact the PCP office or the [facility] for questions related to their healthcare. Provided contact information for future reference. Disease Specific:   N/A    Home Health Active: No    DME Active: No    Barriers to care? none    Advance Care Planning:   Does patient have an Advance Directive:  not on file; education provided     Medication(s):   Medication reconciliation was not performed with patient, who verbalizes understanding of administration of home medications. There were no barriers to obtaining medications identified at this time. Referral to Pharm D needed: no     Current Outpatient Medications   Medication Sig    dulaglutide (Trulicity) 4.5 NN/0.7 mL pnij 4.5 mg by SubCUTAneous route every seven (7) days.  Indications: type 2 diabetes mellitus    fluticasone propion-salmeteroL (ADVAIR/WIXELA) 250-50 mcg/dose diskus inhaler INHALE 1 PUFF BY MOUTH TWICE DAILY FOR ASTHMA    fluticasone propionate (FLONASE) 50 mcg/actuation nasal spray guaiFENesin-codeine (ROBITUSSIN AC) 100-10 mg/5 mL solution     bumetanide (BUMEX) 1 mg tablet Take 1 Tablet by mouth daily. fluticasone propion-salmeteroL (ADVAIR/WIXELA) 250-50 mcg/dose diskus inhaler Take 1 Puff by inhalation every twelve (12) hours. trospium (SANCTURA) 20 mg tablet Take 1 Tablet by mouth Before breakfast and dinner. albuterol (PROVENTIL HFA, VENTOLIN HFA, PROAIR HFA) 90 mcg/actuation inhaler Take 1 Puff by inhalation every four (4) hours as needed for Wheezing. cholecalciferol (VITAMIN D3) (1000 Units /25 mcg) tablet Take 1,000 Units by mouth daily. melatonin 5 mg cap capsule Take 5 mg by mouth nightly. ascorbic acid, vitamin C, (VITAMIN C) 500 mg tablet Take 500 mg by mouth daily. lancets (TRUEplus Lancets) 33 gauge misc CHECK BLOOD SUGAR THREE TIMES DAILY    DropSafe Alcohol Prep Pads padm USE AS DIRECTED WHEN CHECKING BLOOD SUGAR THREE TIMES DAILY    pramipexole (MIRAPEX) 0.75 mg tablet TAKE 3 TABLETS ONE TIME DAILY (Patient taking differently: No sig reported)    metoprolol succinate (TOPROL-XL) 50 mg XL tablet TAKE 1 TABLET EVERY DAY    amitriptyline (ELAVIL) 10 mg tablet TAKE 1 TABLET EVERY NIGHT    simvastatin (ZOCOR) 10 mg tablet TAKE 1 TABLET EVERY NIGHT    buPROPion SR (WELLBUTRIN SR) 150 mg SR tablet TAKE 1 TABLET EVERY DAY    montelukast (SINGULAIR) 10 mg tablet TAKE 1 TABLET EVERY DAY (Patient taking differently: No sig reported)    alfuzosin SR (UROXATRAL) 10 mg SR tablet TAKE 1 TABLET BY MOUTH DAILY AFTER DINNER    gabapentin (NEURONTIN) 300 mg capsule Take 300 mg by mouth three (3) times daily. dutasteride (AVODART) 0.5 mg capsule Take 1 Capsule by mouth daily (after dinner). testosterone cypionate (DEPOTESTOTERONE CYPIONATE) 200 mg/mL injection 0.5 mL by IntraMUSCular route every seven (7) days.  Max Daily Amount: 100 mg.    glucose blood VI test strips (True Metrix Glucose Test Strip) strip TEST BLOOD SUGAR THREE TIMES DAILY    nitroglycerin (NITROSTAT) 0.4 mg SL tablet DISSOLVE ONE TABLET UNDER TONGUE AS NEEDED FOR CHEST PAIN EVERY 5 MINUTES FOR UP TO 3 DOSES (Patient taking differently: 0.4 mg by SubLINGual route every five (5) minutes as needed for Chest Pain. DISSOLVE ONE TABLET UNDER TONGUE AS NEEDED FOR CHEST PAIN EVERY 5 MINUTES FOR UP TO 3 DOSES)    aspirin delayed-release 81 mg tablet Take 1 Tablet by mouth daily. Cane maura by Does Not Apply route. cpap machine kit by Does Not Apply route. Blood-Glucose Meter monitoring kit Use to check blood sugar 3 times daily. Dx E11.9. Please dispense brand preferred by insurance. folic acid/multivit-min/lutein (CENTRUM SILVER PO) Take 1 Tablet by mouth daily. lisinopriL (PRINIVIL, ZESTRIL) 20 mg tablet Take 1 Tab by mouth daily. Needle, Disp, 23 G 23 gauge x 1 1/4\" ndle Use to inject 0.5 ml every 7 days    Needle, Disp, 18 G 18 gauge x 1\" ndle Use to aspirate 0.5 ml every 7 days    Wheel Chair maura Assistance with ambulation, gait instability    polyethylene glycol (MIRALAX) 17 gram packet Take 1 Packet by mouth daily. Hold for loose stools (Patient taking differently: Take 17 g by mouth daily. dissolve in 8 oz liquid)    naloxone 4 mg/actuation spry 4 mg by Nasal route as needed. Back Brace misc 1 Device by Does Not Apply route daily as needed for Pain. One, lumbosacral orthosis/back brace with metal struts      Medically necessary     No current facility-administered medications for this visit.        [facility] follow up appointment(s):   Future Appointments   Date Time Provider Blair Vergara   1/2/2023 10:15 AM Shira Mitchell MD [facility] BS AMB   1/10/2023 10:00 AM Mira Barrios [facility] BS AMB   2/6/2023  8:45 AM Whitley Castanon MD [facility]"" BS AMB   4/11/2023 11:00 AM TANVI Flores        Non-[facility] follow up appointment(s):      Goals Addressed                   This Visit's Progress     Attends follow up appointments on schedule   On track     10/20/22 Patient will attend all scheduled appointments through 1/20/23       Knowledge and adherence of prescribed medication (ie. action, side effects, missed dose, etc.).    On track     10/20/22 Pt will take all medications prescribed to be evaluated on each outreach through 1/20/23

## 2022-12-31 ENCOUNTER — HOSPITAL ENCOUNTER (OUTPATIENT)
Dept: LAB | Age: 67
Discharge: HOME | End: 2022-12-31
Payer: MEDICARE

## 2022-12-31 DIAGNOSIS — R06.00 DYSPNEA, UNSPECIFIED TYPE: ICD-10-CM

## 2022-12-31 DIAGNOSIS — E11.65 POORLY CONTROLLED DIABETES MELLITUS (HCC): ICD-10-CM

## 2022-12-31 DIAGNOSIS — R53.83 FATIGUE, UNSPECIFIED TYPE: ICD-10-CM

## 2022-12-31 DIAGNOSIS — D72.19 PERIPHERAL EOSINOPHILIA: ICD-10-CM

## 2022-12-31 LAB
25(OH)D3 SERPL-MCNC: 43 NG/ML (ref 30–100)
ALBUMIN SERPL-MCNC: 3.9 G/DL (ref 3.4–5)
ALBUMIN SERPL-MCNC: 4 G/DL (ref 3.4–5)
ALBUMIN/GLOB SERPL: 1 {RATIO} (ref 0.8–1.7)
ALP SERPL-CCNC: 111 U/L (ref 45–117)
ALT SERPL-CCNC: 35 U/L (ref 16–61)
ANION GAP SERPL CALC-SCNC: 4 MMOL/L (ref 3–18)
ANION GAP SERPL CALC-SCNC: 5 MMOL/L (ref 3–18)
APPEARANCE UR: CLEAR
AST SERPL-CCNC: 17 U/L (ref 10–38)
BILIRUB SERPL-MCNC: 0.4 MG/DL (ref 0.2–1)
BILIRUB UR QL: NEGATIVE
BUN SERPL-MCNC: 20 MG/DL (ref 7–18)
BUN SERPL-MCNC: 20 MG/DL (ref 7–18)
BUN/CREAT SERPL: 16 (ref 12–20)
BUN/CREAT SERPL: 17 (ref 12–20)
CALCIUM SERPL-MCNC: 10.1 MG/DL (ref 8.5–10.1)
CALCIUM SERPL-MCNC: 9.5 MG/DL (ref 8.5–10.1)
CALCIUM SERPL-MCNC: 9.8 MG/DL (ref 8.5–10.1)
CHLORIDE SERPL-SCNC: 103 MMOL/L (ref 100–111)
CHLORIDE SERPL-SCNC: 103 MMOL/L (ref 100–111)
CO2 SERPL-SCNC: 29 MMOL/L (ref 21–32)
CO2 SERPL-SCNC: 29 MMOL/L (ref 21–32)
COLOR UR: YELLOW
CREAT SERPL-MCNC: 1.21 MG/DL (ref 0.6–1.3)
CREAT SERPL-MCNC: 1.26 MG/DL (ref 0.6–1.3)
CREAT UR-MCNC: 77 MG/DL (ref 30–125)
CREAT UR-MCNC: 77 MG/DL (ref 30–125)
ERYTHROCYTE [DISTWIDTH] IN BLOOD BY AUTOMATED COUNT: 14.2 % (ref 11.6–14.5)
EST. AVERAGE GLUCOSE BLD GHB EST-MCNC: 209 MG/DL
GLOBULIN SER CALC-MCNC: 3.9 G/DL (ref 2–4)
GLUCOSE SERPL-MCNC: 218 MG/DL (ref 74–99)
GLUCOSE SERPL-MCNC: 222 MG/DL (ref 74–99)
GLUCOSE UR STRIP.AUTO-MCNC: NEGATIVE MG/DL
HBA1C MFR BLD: 8.9 % (ref 4.2–5.6)
HBA1C MFR BLD: 9 % (ref 4.2–5.6)
HCT VFR BLD AUTO: 49.4 % (ref 36–48)
HGB BLD-MCNC: 15.4 G/DL (ref 13–16)
HGB UR QL STRIP: NEGATIVE
KETONES UR QL STRIP.AUTO: NEGATIVE MG/DL
LEUKOCYTE ESTERASE UR QL STRIP.AUTO: NEGATIVE
MCH RBC QN AUTO: 26.5 PG (ref 24–34)
MCHC RBC AUTO-ENTMCNC: 31.2 G/DL (ref 31–37)
MCV RBC AUTO: 84.9 FL (ref 78–100)
MICROALBUMIN UR-MCNC: 1.44 MG/DL (ref 0–3)
MICROALBUMIN UR-MCNC: 1.44 MG/DL (ref 0–3)
MICROALBUMIN/CREAT UR-RTO: 19 MG/G (ref 0–30)
MICROALBUMIN/CREAT UR-RTO: 19 MG/G (ref 0–30)
NITRITE UR QL STRIP.AUTO: NEGATIVE
NRBC # BLD: 0 K/UL (ref 0–0.01)
NRBC BLD-RTO: 0 PER 100 WBC
PH UR STRIP: 6.5 [PH] (ref 5–8)
PHOSPHATE SERPL-MCNC: 3.7 MG/DL (ref 2.5–4.9)
PLATELET # BLD AUTO: 251 K/UL (ref 135–420)
PMV BLD AUTO: 10.5 FL (ref 9.2–11.8)
POTASSIUM SERPL-SCNC: 4.2 MMOL/L (ref 3.5–5.5)
POTASSIUM SERPL-SCNC: 4.3 MMOL/L (ref 3.5–5.5)
PROT SERPL-MCNC: 7.8 G/DL (ref 6.4–8.2)
PROT UR STRIP-MCNC: NEGATIVE MG/DL
PTH-INTACT SERPL-MCNC: 54.5 PG/ML (ref 18.4–88)
RBC # BLD AUTO: 5.82 M/UL (ref 4.35–5.65)
SODIUM SERPL-SCNC: 136 MMOL/L (ref 136–145)
SODIUM SERPL-SCNC: 137 MMOL/L (ref 136–145)
SP GR UR REFRACTOMETRY: 1.02 (ref 1–1.03)
T4 FREE SERPL-MCNC: 1.1 NG/DL (ref 0.7–1.5)
TSH SERPL DL<=0.05 MIU/L-ACNC: 0.66 UIU/ML (ref 0.36–3.74)
UROBILINOGEN UR QL STRIP.AUTO: 0.2 EU/DL (ref 0.2–1)
WBC # BLD AUTO: 7.2 K/UL (ref 4.6–13.2)

## 2022-12-31 PROCEDURE — 82306 VITAMIN D 25 HYDROXY: CPT

## 2022-12-31 PROCEDURE — 36415 COLL VENOUS BLD VENIPUNCTURE: CPT

## 2022-12-31 PROCEDURE — 83036 HEMOGLOBIN GLYCOSYLATED A1C: CPT

## 2022-12-31 PROCEDURE — 84439 ASSAY OF FREE THYROXINE: CPT

## 2022-12-31 PROCEDURE — 81003 URINALYSIS AUTO W/O SCOPE: CPT

## 2022-12-31 PROCEDURE — 80069 RENAL FUNCTION PANEL: CPT

## 2022-12-31 PROCEDURE — 83970 ASSAY OF PARATHORMONE: CPT

## 2022-12-31 PROCEDURE — 85027 COMPLETE CBC AUTOMATED: CPT

## 2022-12-31 PROCEDURE — 82043 UR ALBUMIN QUANTITATIVE: CPT

## 2022-12-31 PROCEDURE — 82785 ASSAY OF IGE: CPT

## 2022-12-31 PROCEDURE — 83521 IG LIGHT CHAINS FREE EACH: CPT

## 2022-12-31 PROCEDURE — 80053 COMPREHEN METABOLIC PANEL: CPT

## 2023-01-03 DIAGNOSIS — E11.65 POORLY CONTROLLED DIABETES MELLITUS (HCC): Primary | ICD-10-CM

## 2023-01-03 RX ORDER — METFORMIN HYDROCHLORIDE 500 MG/1
500 TABLET ORAL 2 TIMES DAILY WITH MEALS
Qty: 60 TABLET | Refills: 2 | Status: SHIPPED | OUTPATIENT
Start: 2023-01-03

## 2023-01-03 NOTE — PROGRESS NOTES
Sending to the pharmacist for diabetic education and further medication management.   At this time I will start him on metformin to take 2 times a day continue Trulicity and keep the appointment in February with me to discuss it further

## 2023-01-03 NOTE — PROGRESS NOTES
Patient aware of MD recommendation for diabetic education consultation  and new order for metformin two patient identifiers obtained and verified

## 2023-01-04 LAB
KAPPA LC FREE SER-MCNC: 32.6 MG/L (ref 3.3–19.4)
KAPPA LC FREE/LAMBDA FREE SER: 1.32 {RATIO} (ref 0.26–1.65)
LAMBDA LC FREE SERPL-MCNC: 24.7 MG/L (ref 5.7–26.3)

## 2023-01-05 LAB — IGE SERPL-ACNC: 56 IU/ML (ref 6–495)

## 2023-01-10 ENCOUNTER — VIRTUAL VISIT (OUTPATIENT)
Dept: FAMILY MEDICINE CLINIC | Age: 68
End: 2023-01-10

## 2023-01-10 DIAGNOSIS — E11.40 TYPE 2 DIABETES MELLITUS WITH DIABETIC NEUROPATHY, WITHOUT LONG-TERM CURRENT USE OF INSULIN (HCC): Primary | ICD-10-CM

## 2023-01-10 DIAGNOSIS — M79.89 LEG SWELLING: ICD-10-CM

## 2023-01-10 NOTE — PROGRESS NOTES
Pharmacy Progress Note - Diabetes Management       Assessment / Plan:   Diabetes Management:  Per ADA guidelines, Pt's A1c is not at goal of < 7%. Pt's glycemic control prior to his significant dietary changes was very poor. Since the new year and the increase in dose of Trulicity to 4.4VO weekly, he has had excellent glycemic control with FBG values at goal and limited NFBG values at goal.  Encouraged to continue with his dietary changes. The metformin started recently should also aid pt in maintaining glycemic control. Will reassess with staggered SMBG logs at follow up in 3 weeks. Nutrition/Lifestyle Modifications:  - Educated pt on the importance of moderating carbohydrate intake. Reviewed sources of carbohydrates and method to help determine appropriate portion sizes (e.g., Diabetes Plate Method). - Advised patient to avoid sugar-sweetened beverages and replace with water or diet/zero sugar option.  - Recommend ~30 minutes consistent, moderately intensive, exercise/day or ~150 minutes/week. Start small, stay consistent, and increase length and types of exercise, as tolerated. Patient will return to clinic in 3 week(s) for follow up.         S/O: Mr. Keira Deutsch, a 79 y.o. male referred by Isabel Perry MD,  has a past medical history of Abdominal adhesions (07/30/2014), BPH (benign prostatic hyperplasia), Cellulitis, Chronic pain (10/08/11), DDD (degenerative disc disease), lumbar (07/30/2014), Depression, Diabetes ([facility] [address] Frequent falls (07/30/2014), TRUE (generalized anxiety disorder) (07/30/2014), HLD (hyperlipidemia), Hypertension, Hypogonadism in male, Incomplete bladder emptying, Kidney stones, Lumbar facet arthropathy (07/30/2014), Lumbar nerve root impingement (07/30/2014), Lumbosacral radiculopathy at S1 (07/30/2014), Major depression (07/30/2014), Neurological disorder, MANISH (obstructive sleep apnea) (07/30/2014), S/P lumbar laminectomy (07/30/2014), S/P lumbar spinal fusion (07/30/2014), Sleep apnea, and Thoracic compression fracture ([facility] [address] (07/30/2014). Pt was seen today virtually via MyChart video visit for diabetes management. Patient's last A1c was:   Lab Results   Component Value Date/Time    Hemoglobin A1c 9.0 (H) 12/31/2022 10:39 AM    Hemoglobin A1c (POC) 7.0 09/13/2022 03:56 PM       Interim update: Pt was last seen by me on 13 Dec 2022. Per my prior note: Pt's A1c is at goal of < 7%. Pt's diabetes is well-controlled on his current regimen. Unfortunately, he is no longer able to take a SGLT2i per urology d/t frequent UTIs with this class and Ashlie France was stopped. Trisha Rana was initially discussed as an alternative GLP-1RA to provide additional appetite suppression that Trulicity was not currently providing. Unfortunately, Trisha Rana is not available through his [email or web address] PAP. Will increase his Trulicity to 5.9UA weekly in an attempt to curb his appetite to compensate for the loss of Ashlie France as well as possibly decrease his weight. Will stop his glipizide as the GLP-1RA should provide adequate prandial control. It could also be contributing to his dizziness and unsteady gait on top of weight gain. Will reassess at follow up in 4 weeks. For dizziness: Med rec revealed a new medication, Trospium Fredie Shock), for his urge incontinence started in October. The anticholinergic effects from this medication can be exacerbated by the Elavil that he was already prescribed. He is also on several sedating medications as well, but they have been prescribed for greater than 6 months without this issue. Given that he has not noticed any improvement in his urge incontinence since starting this medication, advised to hold it and contact urology for further instruction. Advised to restart if dizziness and unsteady gait is not resolved.     Pt was seen by nephrology on 04 Jan 2022 where it was noted that he is not an ideal candidate for SGLT2 inhibitor given history of urethral stricture. Today:   Pt just received the 9.3DZ dose of Trulicity last week so hasn't been on this dose for long. The metformin hasn't arrived from mail order yet. His appetite is decreased and he has cut out the majority of snacks since starting on the 3.6NF dose of Trulicity. He states that the big motivator for improving his diet has been finding out that his A1c was at 9%. He is amenable to increasing frequency of SMBG checks with a close follow up. Note: Trospium was stopped at prior visit and his dizziness sx resolved completely. He informed urology of this and will be seeing them in 2 weeks. Current anti-hyperglycemic regimen includes:    Key Antihyperglycemic Medications               metFORMIN (GLUCOPHAGE) 500 mg tablet Take 1 Tablet by mouth two (2) times daily (with meals). dulaglutide (Trulicity) 4.5 XM/4.6 mL pnij 4.5 mg by SubCUTAneous route every seven (7) days. Indications: type 2 diabetes mellitus          Complete current medication regimen includes:  Current Outpatient Medications   Medication Sig    metFORMIN (GLUCOPHAGE) 500 mg tablet Take 1 Tablet by mouth two (2) times daily (with meals). dulaglutide (Trulicity) 4.5 KF/2.7 mL pnij 4.5 mg by SubCUTAneous route every seven (7) days. Indications: type 2 diabetes mellitus    fluticasone propion-salmeteroL (ADVAIR/WIXELA) 250-50 mcg/dose diskus inhaler INHALE 1 PUFF BY MOUTH TWICE DAILY FOR ASTHMA    fluticasone propionate (FLONASE) 50 mcg/actuation nasal spray     guaiFENesin-codeine (ROBITUSSIN AC) 100-10 mg/5 mL solution     bumetanide (BUMEX) 1 mg tablet Take 1 Tablet by mouth daily. fluticasone propion-salmeteroL (ADVAIR/WIXELA) 250-50 mcg/dose diskus inhaler Take 1 Puff by inhalation every twelve (12) hours. trospium (SANCTURA) 20 mg tablet Take 1 Tablet by mouth Before breakfast and dinner.     albuterol (PROVENTIL HFA, VENTOLIN HFA, PROAIR HFA) 90 mcg/actuation inhaler Take 1 Puff by inhalation every four (4) hours as needed for Wheezing. cholecalciferol (VITAMIN D3) (1000 Units /25 mcg) tablet Take 1,000 Units by mouth daily. melatonin 5 mg cap capsule Take 5 mg by mouth nightly. ascorbic acid, vitamin C, (VITAMIN C) 500 mg tablet Take 500 mg by mouth daily. lancets (TRUEplus Lancets) 33 gauge misc CHECK BLOOD SUGAR THREE TIMES DAILY    DropSafe Alcohol Prep Pads padm USE AS DIRECTED WHEN CHECKING BLOOD SUGAR THREE TIMES DAILY    pramipexole (MIRAPEX) 0.75 mg tablet TAKE 3 TABLETS ONE TIME DAILY (Patient taking differently: No sig reported)    metoprolol succinate (TOPROL-XL) 50 mg XL tablet TAKE 1 TABLET EVERY DAY    amitriptyline (ELAVIL) 10 mg tablet TAKE 1 TABLET EVERY NIGHT    simvastatin (ZOCOR) 10 mg tablet TAKE 1 TABLET EVERY NIGHT    buPROPion SR (WELLBUTRIN SR) 150 mg SR tablet TAKE 1 TABLET EVERY DAY    montelukast (SINGULAIR) 10 mg tablet TAKE 1 TABLET EVERY DAY (Patient taking differently: No sig reported)    alfuzosin SR (UROXATRAL) 10 mg SR tablet TAKE 1 TABLET BY MOUTH DAILY AFTER DINNER    gabapentin (NEURONTIN) 300 mg capsule Take 300 mg by mouth three (3) times daily. dutasteride (AVODART) 0.5 mg capsule Take 1 Capsule by mouth daily (after dinner). testosterone cypionate (DEPOTESTOTERONE CYPIONATE) 200 mg/mL injection 0.5 mL by IntraMUSCular route every seven (7) days. Max Daily Amount: 100 mg.    glucose blood VI test strips (True Metrix Glucose Test Strip) strip TEST BLOOD SUGAR THREE TIMES DAILY    nitroglycerin (NITROSTAT) 0.4 mg SL tablet DISSOLVE ONE TABLET UNDER TONGUE AS NEEDED FOR CHEST PAIN EVERY 5 MINUTES FOR UP TO 3 DOSES (Patient taking differently: 0.4 mg by SubLINGual route every five (5) minutes as needed for Chest Pain. DISSOLVE ONE TABLET UNDER TONGUE AS NEEDED FOR CHEST PAIN EVERY 5 MINUTES FOR UP TO 3 DOSES)    aspirin delayed-release 81 mg tablet Take 1 Tablet by mouth daily. Cane maura by Does Not Apply route. cpap machine kit by Does Not Apply route. Blood-Glucose Meter monitoring kit Use to check blood sugar 3 times daily. Dx E11.9. Please dispense brand preferred by insurance. folic acid/multivit-min/lutein (CENTRUM SILVER PO) Take 1 Tablet by mouth daily. lisinopriL (PRINIVIL, ZESTRIL) 20 mg tablet Take 1 Tab by mouth daily. Needle, Disp, 23 G 23 gauge x 1 1/4\" ndle Use to inject 0.5 ml every 7 days    Needle, Disp, 18 G 18 gauge x 1\" ndle Use to aspirate 0.5 ml every 7 days    Wheel Chair maura Assistance with ambulation, gait instability    polyethylene glycol (MIRALAX) 17 gram packet Take 1 Packet by mouth daily. Hold for loose stools (Patient taking differently: Take 17 g by mouth daily. dissolve in 8 oz liquid)    naloxone 4 mg/actuation spry 4 mg by Nasal route as needed. Back Brace misc 1 Device by Does Not Apply route daily as needed for Pain. One, lumbosacral orthosis/back brace with metal struts      Medically necessary     No current facility-administered medications for this visit. Allergies: Allergies   Allergen Reactions    Bactrim [Sulfamethoprim] Rash     Bactrim DS, per patient.     Opana [Oxymorphone] Other (comments)     Feels like bug crawling under skin and drowziness       Blood Glucose Monitoring (BGM) or CGM:  - Had access to home glucometer/blood glucose log/CGM reader today:  no  - Conducts/scans: 1x daily mostly, but will sometimes check other times   - Fasting BG today: 108  - Post-prandial:   -FB, 114   -ac lunch: none    -ac dinner: 148   -hs: none    ROS:  Today, Pt endorses:  - Symptoms of Hyperglycemia: none  - Symptoms of Hypoglycemia: none    Lifestyle modification(s):  - Cut out diet drinks and is now only drinking water and unsweetened tea  - Cutting out fast food and is cooking at at home (pork, steak, ham with veggies and mashed potatoes or salad and some french fries  - Pt states that he was 301lbs this morning    Medication Adherence/Access:  - Endorses adherence to current regimen?: yes    Vitals/Labs: Wt Readings from Last 3 Encounters:   12/07/22 320 lb (145.2 kg)   12/02/22 305 lb (138.3 kg)   11/17/22 316 lb (143.3 kg)     BP Readings from Last 3 Encounters:   12/07/22 138/78   12/03/22 (!) 158/82   10/21/22 (!) 140/88     Pulse Readings from Last 3 Encounters:   12/07/22 93   12/03/22 85   10/21/22 89       Lab Results   Component Value Date/Time    Sodium 136 12/31/2022 10:36 AM    Potassium 4.3 12/31/2022 10:36 AM    Chloride 103 12/31/2022 10:36 AM    CO2 29 12/31/2022 10:36 AM    Anion gap 4 12/31/2022 10:36 AM    Glucose 222 (H) 12/31/2022 10:36 AM    BUN 20 (H) 12/31/2022 10:36 AM    Creatinine 1.26 12/31/2022 10:36 AM    BUN/Creatinine ratio 16 12/31/2022 10:36 AM    GFR est AA >60 10/03/2022 04:20 AM    GFR est non-AA >60 10/03/2022 04:20 AM    Calcium 10.1 12/31/2022 10:37 AM    Bilirubin, total 0.4 12/31/2022 10:25 AM    Alk.  phosphatase 111 12/31/2022 10:25 AM    Protein, total 7.8 12/31/2022 10:25 AM    Albumin 4.0 12/31/2022 10:36 AM    Globulin 3.9 12/31/2022 10:25 AM    A-G Ratio 1.0 12/31/2022 10:25 AM    ALT (SGPT) 35 12/31/2022 10:25 AM     Lab Results   Component Value Date/Time    Cholesterol, total 104 10/29/2021 10:30 AM    HDL Cholesterol 38 (L) 10/29/2021 10:30 AM    LDL, calculated 47 10/29/2021 10:30 AM    VLDL, calculated 19 10/29/2021 10:30 AM    Triglyceride 95 10/29/2021 10:30 AM    CHOL/HDL Ratio 2.7 10/29/2021 10:30 AM     Lab Results   Component Value Date/Time    WBC 7.2 12/31/2022 10:36 AM    HGB 15.4 12/31/2022 10:36 AM    HCT 49.4 (H) 12/31/2022 10:36 AM    PLATELET 788 12/66/7427 10:36 AM    MCV 84.9 12/31/2022 10:36 AM     Lab Results   Component Value Date/Time    Hemoglobin A1c 9.0 (H) 12/31/2022 10:39 AM    Hemoglobin A1c 8.9 (H) 12/31/2022 10:25 AM    Hemoglobin A1c 6.7 (H) 10/01/2022 09:17 AM     Hemoglobin A1c (POC)   Date Value Ref Range Status   09/13/2022 7.0 % Final        Screenings/Prevention Parameters:  -Diabetic Eye and Foot Exams: Diabetic Foot and Eye Exam HM Status   Topic Date Due    Diabetic Foot Care  08/02/2018    Eye Exam  08/24/2020     -Microalbumin / Creatinine ratio:       Lab Results   Component Value Date/Time    Microalbumin/Creat ratio (mg/g creat) 19 12/31/2022 10:40 AM    MICROALBUMIN,MG/DAY 14.8 06/13/2016 08:43 AM    Microalbumin,urine random 1.44 12/31/2022 10:40 AM     -ASCVD Risk Score Parameters and Calculation    Race: WHITE/NON-     BP Readings from Last 3 Encounters:   12/07/22 138/78   12/03/22 (!) 158/82   10/21/22 (!) 140/88        Lab Results   Component Value Date/Time    Cholesterol, total 104 10/29/2021 10:30 AM    HDL Cholesterol 38 (L) 10/29/2021 10:30 AM    LDL, calculated 47 10/29/2021 10:30 AM    VLDL, calculated 19 10/29/2021 10:30 AM    Triglyceride 95 10/29/2021 10:30 AM    CHOL/HDL Ratio 2.7 10/29/2021 10:30 AM        Social History     Tobacco Use    Smoking status: Never    Smokeless tobacco: Never   Substance Use Topics    Alcohol use: Yes         Calculated ASCVD Risk Score: The ASCVD Risk score (Odilia DK, et al., 2019) failed to calculate for the following reasons: The valid total cholesterol range is 130 to 320 mg/dL    -Statin Safety Parameters:      Lab Results   Component Value Date/Time    ALT (SGPT) 35 12/31/2022 10:25 AM    AST (SGOT) 17 12/31/2022 10:25 AM    Alk.  phosphatase 111 12/31/2022 10:25 AM    Bilirubin, direct 0.11 02/14/2022 04:00 PM    Bilirubin, total 0.4 12/31/2022 10:25 AM     -Immunizations:      Immunization History   Administered Date(s) Administered    COVID-19, PFIZER PURPLE top, DILUTE for use, (age 15 y+), IM, 30mcg/0.3mL 07/27/2021, 08/23/2021    COVID-19, [place] Vaccine, Vaccine Unspecified 04/01/2021, 05/01/2021    Influenza Vaccine 09/01/2021    Influenza, FLUAD, (age 72 y+), Adjuvanted 02/01/2022    Influenza, FLUARIX, FLULAVAL, FLUZONE (age 10 mo+) AND AFLURIA, (age 1 y+), PF, 0.5mL 12/07/2016, 11/02/2017, 10/25/2018, 12/10/2019    Pneumococcal Conjugate (PCV-13) 2016    Pneumococcal Polysaccharide (PPSV-23) 2022    Tdap 2016       Additional Laboratory Parameters of Interest:   Estimation of renal function:  Lab Results   Component Value Date/Time    Creatinine 1.26 2022 10:36 AM    Creatinine 1.21 2022 10:25 AM    Creatinine 1.49 (H) 10/07/2022 02:30 PM    GFR est AA >60 10/03/2022 04:20 AM    GFR est AA >60 10/02/2022 03:32 AM    GFR est AA >60 10/01/2022 09:17 AM    GFR est non-AA >60 10/03/2022 04:20 AM    GFR est non-AA 59 (L) 10/02/2022 03:32 AM    GFR est non-AA 58 (L) 10/01/2022 09:17 AM     Wt Readings from Last 3 Encounters:   22 320 lb (145.2 kg)   22 305 lb (138.3 kg)   22 316 lb (143.3 kg)     Ht Readings from Last 1 Encounters:   22 6' 2\" (1.88 m)     Calculated estimated creatinine clearance: CrCl cannot be calculated (Unknown ideal weight.). Vital Signs Today:    There were no vitals taken for this visit. There are no discontinued medications. Future Appointments   Date Time Provider Blair Vergara   2023  8:30 PM [facility] SLEEP RM 3 [facility]   2023 10:00 AM Ariel Christianson [facility] BS AMB   2023  8:45 AM Krunal Cho MD [facility] BS AMB   2023 11:45 AM Carlos Olmos MD [facility] BS AMB   2023 11:00 AM TANVI Doan       Patient verbalized understanding of the information presented and all of the patients questions were answered. AVS was gone over with the patient. Patient advised to call the office with any additional questions or concerns. Notifications of recommendations will be sent to Krunal Cho MD for review.       Thank you for the consult,  Mahi Rosa, PharmD, BCACP, BC-ADM        For Pharmacy Admin Tracking Only    Program: Medical Group  CPA in place: Yes  Recommendation Provided To: Patient/Caregiver: 2 via Virtual Visit  Intervention Detail: Adherence Monitorin and Scheduled Appointment  Intervention Accepted By: Patient/Caregiver: 2  Gap Closed?: No  Time Spent (min): 30

## 2023-01-11 ENCOUNTER — DOCUMENTATION ONLY (OUTPATIENT)
Dept: FAMILY MEDICINE CLINIC | Age: 68
End: 2023-01-11

## 2023-01-11 DIAGNOSIS — E11.40 TYPE 2 DIABETES MELLITUS WITH DIABETIC NEUROPATHY, WITHOUT LONG-TERM CURRENT USE OF INSULIN (HCC): ICD-10-CM

## 2023-01-11 RX ORDER — DULAGLUTIDE 4.5 MG/.5ML
4.5 INJECTION, SOLUTION SUBCUTANEOUS
Qty: 12 EACH | Refills: 4 | Status: SHIPPED | OUTPATIENT
Start: 2023-01-11 | End: 2023-04-11

## 2023-01-11 RX ORDER — DULAGLUTIDE 4.5 MG/.5ML
4.5 INJECTION, SOLUTION SUBCUTANEOUS
Qty: 12 EACH | Refills: 4 | Status: SHIPPED | OUTPATIENT
Start: 2023-01-11 | End: 2023-01-11 | Stop reason: SDUPTHER

## 2023-01-11 RX ORDER — BUMETANIDE 1 MG/1
TABLET ORAL
Qty: 60 TABLET | Refills: 0 | Status: SHIPPED | OUTPATIENT
Start: 2023-01-11

## 2023-01-11 NOTE — PROGRESS NOTES
Pt dropped off forms for [facility] to be filled out an faxed off. They were given to Roula to take to the office so that they could be expedited.  Thank you

## 2023-01-12 RX ORDER — METFORMIN HYDROCHLORIDE 500 MG/1
500 TABLET ORAL 2 TIMES DAILY WITH MEALS
Qty: 60 TABLET | Refills: 0 | Status: SHIPPED | OUTPATIENT
Start: 2023-01-12 | End: 2023-01-13

## 2023-01-12 NOTE — TELEPHONE ENCOUNTER
LOV:11/23/22  NOV:1/18/23  LABS:12/5/22
This patient contacted office for the following prescriptions to be filled:    Medication requested :   Requested Prescriptions     Pending Prescriptions Disp Refills    metFORMIN (GLUCOPHAGE) 500 mg tablet 60 Tablet 0     Sig: Take 1 Tablet by mouth two (2) times daily (with meals) for 30 days. PCP: Oj Linton  Pharmacy or Print: [email or web address]  Mail order or Local pharmacy: [id number], [place]    Scheduled appointment if not seen by current providers in office: [address] [place] [address] 12-, NOV 02-    Patient requesting 30 Day supply to last until Mail Order arrives.
Present (15 x15 bpm)

## 2023-01-13 RX ORDER — METFORMIN HYDROCHLORIDE 500 MG/1
TABLET ORAL
Qty: 180 TABLET | Refills: 0 | Status: SHIPPED | OUTPATIENT
Start: 2023-01-13

## 2023-01-18 ENCOUNTER — HOSPITAL ENCOUNTER (OUTPATIENT)
Dept: SLEEP MEDICINE | Age: 68
Discharge: HOME | End: 2023-01-18
Payer: MEDICARE

## 2023-01-18 DIAGNOSIS — Z99.89 OSA ON CPAP: ICD-10-CM

## 2023-01-18 DIAGNOSIS — G47.33 OSA ON CPAP: ICD-10-CM

## 2023-01-18 PROCEDURE — 95811 POLYSOM 6/>YRS CPAP 4/> PARM: CPT

## 2023-01-19 VITALS
WEIGHT: 304.6 LBS | TEMPERATURE: 95.5 F | DIASTOLIC BLOOD PRESSURE: 78 MMHG | BODY MASS INDEX: 39.09 KG/M2 | HEART RATE: 80 BPM | SYSTOLIC BLOOD PRESSURE: 130 MMHG | HEIGHT: 74 IN

## 2023-01-20 ENCOUNTER — PATIENT OUTREACH (OUTPATIENT)
Dept: CASE MANAGEMENT | Age: 68
End: 2023-01-20

## 2023-01-20 NOTE — PROGRESS NOTES
Patient has graduated from the Complex Case Management  program on 1/20/23. Patient/family has the ability to self-manage at this time. Care management goals have been completed. No further Ambulatory Care Manager follow up scheduled. Goals Addressed                   This Visit's Progress     COMPLETED: Attends follow up appointments on schedule        10/20/22 Patient will attend all scheduled appointments through 1/20/23       COMPLETED: Knowledge and adherence of prescribed medication (ie. action, side effects, missed dose, etc.).        10/20/22 Pt will take all medications prescribed to be evaluated on each outreach through 1/20/23              Patient has Ambulatory Care Manager's contact information for any further questions, concerns, or needs.   Patients upcoming visits:    Future Appointments   Date Time Provider Blair Vergara   1/31/2023 10:00 AM Priscilla Ge [facility] BS AMB   2/6/2023  8:45 AM Vicki Horowitz MD [facility] BS AMB   2/6/2023 11:45 AM Roxy Garcia MD [facility] BS AMB   4/11/2023 11:00 AM TANVI Bell

## 2023-01-22 DIAGNOSIS — G47.31 COMPLEX SLEEP APNEA SYNDROME: Primary | ICD-10-CM

## 2023-01-22 NOTE — PROGRESS NOTES
[address], [facility] [place],  [phone]     Polysomnography Report    Cherie Arita       Patient: Jarad Pickens Type: Split Night PSG   : 1955 Patient Details: Male, 79 years, Height 6' 3\",   MR#: MV   Weight 304.6 lbs, BMI 38.07   Physician: Cj Gutierrez DO Ref. Physician: Cr Medeiros MD   Recording Technician: HARMONY Cochran Recording Details: Recorded at 8:42:14 PM on 2023    Scoring Technician:    for 558.5 minutes. STUDY PROTOCOL: The study consisted of 14 channels, including left and right EOG and EEG, submental and extremity EMG, EKG, airflow, respiratory effort and oximetry measurement. The study report was generated by personal review of the raw data from the patient's sleep study. TECHNICAL: A Hypopnea was scored according to AASM guidelines. A hypopnea was scored when the nasal pressure signal dropped by 30% or greater from the pre-event baseline for greater than or equal to 10 seconds and was accompanied by at least a 3% or 4% drop in the SpO2 from pre-event baseline. For Medicare and/or Medicaid patients, hypopneas were scored with an associated 4% drop in SpO2 from pre-event baseline. SPLIT NIGHT CPAP REPORT  MEDICATIONS: Metformin, Bumex, Trulicity, Advair/Wixela, Flonase, Robitussin AC, Sanctura, Albuterol (Proventil HFA, Ventolin HFA, ProAir HFA), Vit D3, Melatonin, Vit C, Lancets, Mirapex, Toprol-XL, Elavil, Zocor, Wellbutrin SR, Singulair, Uroxatral, Gabapentin, Avodart, Depotesterone Cypionate, Nitrostat, Aspirin, Centrum Silver PO, Lisinopril, Miralax, Naloxone    SUMMARY: The blood pressure readings: Pre BP: 154/76 Post BP: 130/78. The sleep efficiency was 88%. During the night, 33 awakenings were recorded, with 47.5 minutes spent awake after sleep onset. The arousal index was 63.2 per hour.  The sleep onset latency was 2.0 minutes; the stage 2 latency was 6.0 minutes. REM sleep was 7 percent of sleep time; slow wave sleep was 18 percent. Snoring was noted 63.0 percent of the sleep time for a count of 415. DIAGNOSTIC PORTION:  The AHI was 127 per hour; the RDI was 130 per hour. The PLM index was 5, with 0 associated with arousal hourly, on average. The time with oxygen saturation below 88% was 6.6 minutes. The minimum oxygen saturation was 83%. The oxygen desaturation index was 79.5. The average respiratory event lasted 16 seconds. The longest event was 1 seconds. PAP PORTION:  The overall AHI was 30.9 per hour; the RDI was 42 per hour. The PLM index was 9, with 1.5 associated with arousal hourly, on average. The time with oxygen saturation below 88% was 1.8 minutes. The minimum oxygen saturation was 86%. The oxygen desaturation index was 22.9. The average respiratory event lasted 15 seconds. The longest event was 1 seconds. The respiratory rate was typically between 12 and 20 breaths per minute; Maria Eugenia Reamer pattern was not seen. Mouth breathing was not noted by recording tech. The patient takes sedating medications routinely. The patient reported sufficient sleep the previous night (7-9 hours). Sleep hygiene violation:  were not noted by the patient. Sleep was interrupted by one bathroom trip. Cardiac tracing appeared to be normal sinus rhythm, without significant atrial fibrillation, heart block, bradycardia or tachycardia. Alpha intrusion was observed. No EEG abnormalities were observed. Supplemental oxygen was not used during this study. Summary:  Overall, this was a challenging study. The patient was diagnosed with severe CSA without  during the diagnostic portion of this study. Sequential (Back-to-Back) respiratory events observed. REM sleep was not achieved during the diagnostic portion of this study. As such, the severity may be even more severe than observed during this study. The patient was then started on a CPAP titration.  Central and obstructive events persistent on maximal CPAP settings. The patient was then switched to a BIPAP titration study. CSA physiology appeared acceptably improved but not resolved at a final BIPAP setting of 23/19. REM sleep was not achieved until the patient was on a CPAP of 20 cwp    PRESSURES USED DURING THE STUDY: 10, 12, 14, 16, 18, 20, 22/18, 23/19 cwp. DIAGNOSIS: Complex Sleep Apnea (CSA)- AHI: 127, KAYE: 63    RECOMMENDATIONS:     This report was generated by personal review of the raw data, which was acceptable for interpretation. Start patient on BIPAP 23/9 cwp. Encourage patient to avoid sleeping in a supine position. Low threshold to have patient undergo in-lab PAP titration if APAP therapy is not efficacious and/or patient experiences APAP intolerance. Judicious use of central acting and potentially sedating agents, as these agents can exacerbate underlying complex respiratory events. Other non-invasive treatment options are recommended were applicable and include the following: weight reduction, smoking cessation, body position training, and modification of alcohol ingestion and/or sedating agents. If these treatments are not possible or effective, an oral appliance may be an alternative and/or adjuvant non-invasive treatment. If non-invasive treatments are not possible or effective, consideration may be given to possible corrective surgical options. Healthy sleep habits are encouraged. Individuals are encouraged to obtain 7-9 hours of sleep per day.  safety is encouraged. Drowsy and/or inattentive driving should be avoided. COVID-19 precautions as recommended by the Centers for Disease Control (CDC)    Follow up with provider as directed.       Gabriele Corrales DO Electronically signed at 5:16:49 PM, January 22, 2023

## 2023-01-31 ENCOUNTER — VIRTUAL VISIT (OUTPATIENT)
Dept: FAMILY MEDICINE CLINIC | Age: 68
End: 2023-01-31

## 2023-01-31 DIAGNOSIS — E11.40 TYPE 2 DIABETES MELLITUS WITH DIABETIC NEUROPATHY, WITHOUT LONG-TERM CURRENT USE OF INSULIN (HCC): ICD-10-CM

## 2023-01-31 RX ORDER — INSULIN PUMP SYRINGE, 3 ML
EACH MISCELLANEOUS
Qty: 1 KIT | Refills: 0 | OUTPATIENT
Start: 2023-01-31

## 2023-01-31 NOTE — PROGRESS NOTES
Pharmacy Progress Note - Diabetes Management       Assessment / Plan:   Diabetes Management:  Per ADA guidelines, Pt's A1c is not at goal of < 7%. Unable to properly assess his glycemic control d/t a faulty BG meter. New meter ordered and will reassess with SMBG logs at follow up in 3 weeks. Pt will notify clinic if BG values remain elevated with new meter. Nutrition/Lifestyle Modifications:  - Educated pt on the importance of moderating carbohydrate intake. Reviewed sources of carbohydrates and method to help determine appropriate portion sizes (e.g., Diabetes Plate Method). - Advised patient to avoid sugar-sweetened beverages and replace with water or diet/zero sugar option.  - Recommend ~30 minutes consistent, moderately intensive, exercise/day or ~150 minutes/week. Start small, stay consistent, and increase length and types of exercise, as tolerated. Patient will return to clinic in 3 week(s) for follow up. S/O: Mr. Yanique Mclain, a 79 y.o. male referred by Dyana Ugalde MD,  has a past medical history of Abdominal adhesions (07/30/2014), BPH (benign prostatic hyperplasia), Cellulitis, Chronic pain (10/08/11), DDD (degenerative disc disease), lumbar (07/30/2014), Depression, Diabetes ([facility] [address] Frequent falls (07/30/2014), TRUE (generalized anxiety disorder) (07/30/2014), HLD (hyperlipidemia), Hypertension, Hypogonadism in male, Incomplete bladder emptying, Kidney stones, Lumbar facet arthropathy (07/30/2014), Lumbar nerve root impingement (07/30/2014), Lumbosacral radiculopathy at S1 (07/30/2014), Major depression (07/30/2014), Neurological disorder, MANISH (obstructive sleep apnea) (07/30/2014), S/P lumbar laminectomy (07/30/2014), S/P lumbar spinal fusion (07/30/2014), Sleep apnea, and Thoracic compression fracture ([address] (07/30/2014). Pt was seen today virtually via MyChart video visit for diabetes management.   Patient's last A1c was:   Lab Results   Component Value Date/Time    Hemoglobin A1c 9.0 (H) 12/31/2022 10:39 AM    Hemoglobin A1c (POC) 7.0 09/13/2022 03:56 PM       Interim update: Pt was last seen by me on 10 Chris 2023. Per my prior note: Pt's A1c is not at goal of < 7%. Pt's glycemic control prior to his significant dietary changes was very poor. Since the new year and the increase in dose of Trulicity to 9.1FQ weekly, he has had excellent glycemic control with FBG values at goal and limited NFBG values at goal.  Encouraged to continue with his dietary changes. The metformin started recently should also aid pt in maintaining glycemic control. Will reassess with staggered SMBG logs at follow up in 3 weeks. Today:   Pt states that he has been eating more salad and decreased his snacks. He has cut out sweets. He was recommended to maintain 30-45 grams of carbs for breakfast and lunch and 30-60 grams of carbs for dinner to maintain insulin levels and he verbalized understanding. He is concerned that his BG meter is reading very high BG values for the past couple weeks. He checked his wife's BG values and they were also elevated. He is amenable to receiving a new meter. [facility] pharmacy called and a new Rx called in for a new meter. Current anti-hyperglycemic regimen includes:    Key Antihyperglycemic Medications               metFORMIN (GLUCOPHAGE) 500 mg tablet TAKE 1 TABLET BY MOUTH TWICE DAILY WITH MEALS    dulaglutide (Trulicity) 4.5 YE/2.6 mL pnij 4.5 mg by SubCUTAneous route every seven (7) days for 90 days. Indications: type 2 diabetes mellitus          Complete current medication regimen includes:  Current Outpatient Medications   Medication Sig    metFORMIN (GLUCOPHAGE) 500 mg tablet TAKE 1 TABLET BY MOUTH TWICE DAILY WITH MEALS    bumetanide (BUMEX) 1 mg tablet TAKE 1 TABLET EVERY DAY    dulaglutide (Trulicity) 4.5 FB/0.6 mL pnij 4.5 mg by SubCUTAneous route every seven (7) days for 90 days.  Indications: type 2 diabetes mellitus    fluticasone propion-salmeteroL (ADVAIR/WIXELA) 250-50 mcg/dose diskus inhaler INHALE 1 PUFF BY MOUTH TWICE DAILY FOR ASTHMA    fluticasone propionate (FLONASE) 50 mcg/actuation nasal spray     guaiFENesin-codeine (ROBITUSSIN AC) 100-10 mg/5 mL solution     fluticasone propion-salmeteroL (ADVAIR/WIXELA) 250-50 mcg/dose diskus inhaler Take 1 Puff by inhalation every twelve (12) hours. trospium (SANCTURA) 20 mg tablet Take 1 Tablet by mouth Before breakfast and dinner. (Patient not taking: Reported on 1/10/2023)    albuterol (PROVENTIL HFA, VENTOLIN HFA, PROAIR HFA) 90 mcg/actuation inhaler Take 1 Puff by inhalation every four (4) hours as needed for Wheezing. cholecalciferol (VITAMIN D3) (1000 Units /25 mcg) tablet Take 1,000 Units by mouth daily. melatonin 5 mg cap capsule Take 5 mg by mouth nightly. ascorbic acid, vitamin C, (VITAMIN C) 500 mg tablet Take 500 mg by mouth daily. lancets (TRUEplus Lancets) 33 gauge misc CHECK BLOOD SUGAR THREE TIMES DAILY    DropSafe Alcohol Prep Pads padm USE AS DIRECTED WHEN CHECKING BLOOD SUGAR THREE TIMES DAILY    pramipexole (MIRAPEX) 0.75 mg tablet TAKE 3 TABLETS ONE TIME DAILY (Patient taking differently: No sig reported)    metoprolol succinate (TOPROL-XL) 50 mg XL tablet TAKE 1 TABLET EVERY DAY    amitriptyline (ELAVIL) 10 mg tablet TAKE 1 TABLET EVERY NIGHT    simvastatin (ZOCOR) 10 mg tablet TAKE 1 TABLET EVERY NIGHT    buPROPion SR (WELLBUTRIN SR) 150 mg SR tablet TAKE 1 TABLET EVERY DAY    montelukast (SINGULAIR) 10 mg tablet TAKE 1 TABLET EVERY DAY (Patient taking differently: No sig reported)    alfuzosin SR (UROXATRAL) 10 mg SR tablet TAKE 1 TABLET BY MOUTH DAILY AFTER DINNER    gabapentin (NEURONTIN) 300 mg capsule Take 300 mg by mouth three (3) times daily. dutasteride (AVODART) 0.5 mg capsule Take 1 Capsule by mouth daily (after dinner). testosterone cypionate (DEPOTESTOTERONE CYPIONATE) 200 mg/mL injection 0.5 mL by IntraMUSCular route every seven (7) days.  Max Daily Amount: 100 mg.    glucose blood VI test strips (True Metrix Glucose Test Strip) strip TEST BLOOD SUGAR THREE TIMES DAILY    nitroglycerin (NITROSTAT) 0.4 mg SL tablet DISSOLVE ONE TABLET UNDER TONGUE AS NEEDED FOR CHEST PAIN EVERY 5 MINUTES FOR UP TO 3 DOSES (Patient taking differently: 0.4 mg by SubLINGual route every five (5) minutes as needed for Chest Pain. DISSOLVE ONE TABLET UNDER TONGUE AS NEEDED FOR CHEST PAIN EVERY 5 MINUTES FOR UP TO 3 DOSES)    aspirin delayed-release 81 mg tablet Take 1 Tablet by mouth daily. Cane maura by Does Not Apply route. cpap machine kit by Does Not Apply route. Blood-Glucose Meter monitoring kit Use to check blood sugar 3 times daily. Dx E11.9. Please dispense brand preferred by insurance. folic acid/multivit-min/lutein (CENTRUM SILVER PO) Take 1 Tablet by mouth daily. lisinopriL (PRINIVIL, ZESTRIL) 20 mg tablet Take 1 Tab by mouth daily. Needle, Disp, 23 G 23 gauge x 1 1/4\" ndle Use to inject 0.5 ml every 7 days    Needle, Disp, 18 G 18 gauge x 1\" ndle Use to aspirate 0.5 ml every 7 days    Wheel Chair maura Assistance with ambulation, gait instability    polyethylene glycol (MIRALAX) 17 gram packet Take 1 Packet by mouth daily. Hold for loose stools (Patient taking differently: Take 17 g by mouth daily. dissolve in 8 oz liquid)    naloxone 4 mg/actuation spry 4 mg by Nasal route as needed. Back Brace misc 1 Device by Does Not Apply route daily as needed for Pain. One, lumbosacral orthosis/back brace with metal struts      Medically necessary     No current facility-administered medications for this visit. Allergies: Allergies   Allergen Reactions    Bactrim [Sulfamethoprim] Rash     Bactrim DS, per patient.     Opana [Oxymorphone] Other (comments)     Feels like bug crawling under skin and drowziness       Blood Glucose Monitoring (BGM) or CGM:  - Had access to home glucometer/blood glucose log/CGM reader today:  no  - Conducts/scans: 2x daily   - Fasting BG today: 177  - Post-prandial: 170-200s    ROS:  Today, Pt endorses:  - Symptoms of Hyperglycemia: none  - Symptoms of Hypoglycemia: none    Lifestyle modification(s):  - as above    Medication Adherence/Access:  - Endorses adherence to current regimen?: yes    Vitals/Labs: Wt Readings from Last 3 Encounters:   01/18/23 304 lb 9.6 oz (138.2 kg)   12/07/22 320 lb (145.2 kg)   12/02/22 305 lb (138.3 kg)     BP Readings from Last 3 Encounters:   01/19/23 130/78   12/07/22 138/78   12/03/22 (!) 158/82     Pulse Readings from Last 3 Encounters:   01/19/23 80   12/07/22 93   12/03/22 85       Lab Results   Component Value Date/Time    Sodium 136 12/31/2022 10:36 AM    Potassium 4.3 12/31/2022 10:36 AM    Chloride 103 12/31/2022 10:36 AM    CO2 29 12/31/2022 10:36 AM    Anion gap 4 12/31/2022 10:36 AM    Glucose 222 (H) 12/31/2022 10:36 AM    BUN 20 (H) 12/31/2022 10:36 AM    Creatinine 1.26 12/31/2022 10:36 AM    BUN/Creatinine ratio 16 12/31/2022 10:36 AM    GFR est AA >60 10/03/2022 04:20 AM    GFR est non-AA >60 10/03/2022 04:20 AM    Calcium 10.1 12/31/2022 10:37 AM    Bilirubin, total 0.4 12/31/2022 10:25 AM    Alk.  phosphatase 111 12/31/2022 10:25 AM    Protein, total 7.8 12/31/2022 10:25 AM    Albumin 4.0 12/31/2022 10:36 AM    Globulin 3.9 12/31/2022 10:25 AM    A-G Ratio 1.0 12/31/2022 10:25 AM    ALT (SGPT) 35 12/31/2022 10:25 AM     Lab Results   Component Value Date/Time    Cholesterol, total 104 10/29/2021 10:30 AM    HDL Cholesterol 38 (L) 10/29/2021 10:30 AM    LDL, calculated 47 10/29/2021 10:30 AM    VLDL, calculated 19 10/29/2021 10:30 AM    Triglyceride 95 10/29/2021 10:30 AM    CHOL/HDL Ratio 2.7 10/29/2021 10:30 AM     Lab Results   Component Value Date/Time    WBC 7.2 12/31/2022 10:36 AM    HGB 15.4 12/31/2022 10:36 AM    HCT 49.4 (H) 12/31/2022 10:36 AM    PLATELET 820 55/85/8683 10:36 AM    MCV 84.9 12/31/2022 10:36 AM     Lab Results   Component Value Date/Time    Hemoglobin A1c 9.0 (H) 12/31/2022 10:39 AM    Hemoglobin A1c 8.9 (H) 12/31/2022 10:25 AM    Hemoglobin A1c 6.7 (H) 10/01/2022 09:17 AM     Hemoglobin A1c (POC)   Date Value Ref Range Status   09/13/2022 7.0 % Final        Screenings/Prevention Parameters:  -Diabetic Eye and Foot Exams:      Diabetic Foot and Eye Exam HM Status   Topic Date Due    Diabetic Foot Care  08/02/2018    Eye Exam  08/24/2020     -Microalbumin / Creatinine ratio:       Lab Results   Component Value Date/Time    Microalbumin/Creat ratio (mg/g creat) 19 12/31/2022 10:40 AM    MICROALBUMIN,MG/DAY 14.8 06/13/2016 08:43 AM    Microalbumin,urine random 1.44 12/31/2022 10:40 AM     -ASCVD Risk Score Parameters and Calculation    Race: WHITE/NON-     BP Readings from Last 3 Encounters:   01/19/23 130/78   12/07/22 138/78   12/03/22 (!) 158/82        Lab Results   Component Value Date/Time    Cholesterol, total 104 10/29/2021 10:30 AM    HDL Cholesterol 38 (L) 10/29/2021 10:30 AM    LDL, calculated 47 10/29/2021 10:30 AM    VLDL, calculated 19 10/29/2021 10:30 AM    Triglyceride 95 10/29/2021 10:30 AM    CHOL/HDL Ratio 2.7 10/29/2021 10:30 AM        Social History     Tobacco Use    Smoking status: Never    Smokeless tobacco: Never   Substance Use Topics    Alcohol use: Yes         Calculated ASCVD Risk Score: The ASCVD Risk score (Odilia DK, et al., 2019) failed to calculate for the following reasons: The valid total cholesterol range is 130 to 320 mg/dL    -Statin Safety Parameters:      Lab Results   Component Value Date/Time    ALT (SGPT) 35 12/31/2022 10:25 AM    AST (SGOT) 17 12/31/2022 10:25 AM    Alk.  phosphatase 111 12/31/2022 10:25 AM    Bilirubin, direct 0.11 02/14/2022 04:00 PM    Bilirubin, total 0.4 12/31/2022 10:25 AM     -Immunizations:      Immunization History   Administered Date(s) Administered    COVID-19, PFIZER PURPLE top, DILUTE for use, (age 15 y+), IM, 30mcg/0.3mL 07/27/2021, 08/23/2021    COVID-19, [place] Vaccine, Vaccine Unspecified 04/01/2021, 05/01/2021    Influenza Vaccine 09/01/2021    Influenza, FLUAD, (age 72 y+), Adjuvanted 02/01/2022    Influenza, FLUARIX, FLULAVAL, FLUZONE (age 10 mo+) AND AFLURIA, (age 1 y+), PF, 0.5mL 12/07/2016, 11/02/2017, 10/25/2018, 12/10/2019    Pneumococcal Conjugate (PCV-13) 08/08/2016    Pneumococcal Polysaccharide (PPSV-23) 02/01/2022    Tdap 08/08/2016       Additional Laboratory Parameters of Interest:   Estimation of renal function:  Lab Results   Component Value Date/Time    Creatinine 1.26 12/31/2022 10:36 AM    Creatinine 1.21 12/31/2022 10:25 AM    Creatinine 1.49 (H) 10/07/2022 02:30 PM    GFR est AA >60 10/03/2022 04:20 AM    GFR est AA >60 10/02/2022 03:32 AM    GFR est AA >60 10/01/2022 09:17 AM    GFR est non-AA >60 10/03/2022 04:20 AM    GFR est non-AA 59 (L) 10/02/2022 03:32 AM    GFR est non-AA 58 (L) 10/01/2022 09:17 AM     Wt Readings from Last 3 Encounters:   01/18/23 304 lb 9.6 oz (138.2 kg)   12/07/22 320 lb (145.2 kg)   12/02/22 305 lb (138.3 kg)     Ht Readings from Last 1 Encounters:   01/18/23 6' 2\" (1.88 m)     Calculated estimated creatinine clearance: CrCl cannot be calculated (Unknown ideal weight.). Vital Signs Today:    There were no vitals taken for this visit. There are no discontinued medications. Future Appointments   Date Time Provider Blair Vergara   2/6/2023  8:45 AM El[facility]gins [facility] BS AMB   2/6/2023 11:45 AM Josias Melchor MD [facility] AMB       Patient verbalized understanding of the information presented and all of the patients questions were answered. AVS was gone over with the patient. Patient advised to call the office with any additional questions or concerns. Notifications of recommendations will be sent to Elsi Higgins MD for review.       Thank you for the consult,  Alona Guardado, PharmD, BCACP, BC-ADM        For Pharmacy Admin Tracking Only    Program: Medical Group  CPA in place: Yes  Recommendation Provided To: Patient/Caregiver: 4 via [facility] Visit  Intervention Detail: Adherence Monitorin, Benefit Assistance, New Rx: 1, reason: Cost/Formulary Change, and Scheduled Appointment  Intervention Accepted By: Patient/Caregiver: 4  Gap Closed?: No  Time Spent (min): 30

## 2023-02-06 ENCOUNTER — VIRTUAL VISIT (OUTPATIENT)
Dept: FAMILY MEDICINE CLINIC | Age: 68
End: 2023-02-06

## 2023-02-06 ENCOUNTER — OFFICE VISIT (OUTPATIENT)
Dept: CARDIOLOGY CLINIC | Age: 68
End: 2023-02-06
Payer: MEDICARE

## 2023-02-06 VITALS
BODY MASS INDEX: 39.78 KG/M2 | WEIGHT: 310 LBS | SYSTOLIC BLOOD PRESSURE: 148 MMHG | HEIGHT: 74 IN | OXYGEN SATURATION: 94 % | HEART RATE: 102 BPM | DIASTOLIC BLOOD PRESSURE: 60 MMHG

## 2023-02-06 DIAGNOSIS — M54.9 DISCOMFORT OF BACK: ICD-10-CM

## 2023-02-06 DIAGNOSIS — J44.0 CHRONIC OBSTRUCTIVE PULMONARY DISEASE WITH ACUTE LOWER RESPIRATORY INFECTION (HCC): ICD-10-CM

## 2023-02-06 DIAGNOSIS — E78.5 DYSLIPIDEMIA: ICD-10-CM

## 2023-02-06 DIAGNOSIS — G89.4 CHRONIC PAIN SYNDROME: ICD-10-CM

## 2023-02-06 DIAGNOSIS — Z98.890 S/P LUMBAR LAMINECTOMY: ICD-10-CM

## 2023-02-06 DIAGNOSIS — I10 PRIMARY HYPERTENSION: ICD-10-CM

## 2023-02-06 DIAGNOSIS — I25.119 ATHEROSCLEROSIS OF NATIVE CORONARY ARTERY OF NATIVE HEART WITH ANGINA PECTORIS (HCC): Primary | ICD-10-CM

## 2023-02-06 DIAGNOSIS — G47.33 OSA (OBSTRUCTIVE SLEEP APNEA): ICD-10-CM

## 2023-02-06 DIAGNOSIS — I20.9 ANGINA PECTORIS, UNSPECIFIED (HCC): ICD-10-CM

## 2023-02-06 DIAGNOSIS — F33.9 EPISODE OF RECURRENT MAJOR DEPRESSIVE DISORDER, UNSPECIFIED DEPRESSION EPISODE SEVERITY (HCC): Primary | ICD-10-CM

## 2023-02-06 DIAGNOSIS — E11.65 POORLY CONTROLLED DIABETES MELLITUS (HCC): ICD-10-CM

## 2023-02-06 DIAGNOSIS — N18.30 STAGE 3 CHRONIC KIDNEY DISEASE, UNSPECIFIED WHETHER STAGE 3A OR 3B CKD (HCC): ICD-10-CM

## 2023-02-06 DIAGNOSIS — E66.01 SEVERE OBESITY (BMI 35.0-39.9) WITH COMORBIDITY (HCC): ICD-10-CM

## 2023-02-06 DIAGNOSIS — N40.0 BENIGN PROSTATIC HYPERPLASIA, UNSPECIFIED WHETHER LOWER URINARY TRACT SYMPTOMS PRESENT: ICD-10-CM

## 2023-02-06 PROCEDURE — G8536 NO DOC ELDER MAL SCRN: HCPCS | Performed by: INTERNAL MEDICINE

## 2023-02-06 PROCEDURE — 3017F COLORECTAL CA SCREEN DOC REV: CPT | Performed by: INTERNAL MEDICINE

## 2023-02-06 PROCEDURE — 3078F DIAST BP <80 MM HG: CPT | Performed by: INTERNAL MEDICINE

## 2023-02-06 PROCEDURE — G8510 SCR DEP NEG, NO PLAN REQD: HCPCS | Performed by: INTERNAL MEDICINE

## 2023-02-06 PROCEDURE — 99214 OFFICE O/P EST MOD 30 MIN: CPT | Performed by: INTERNAL MEDICINE

## 2023-02-06 PROCEDURE — 1123F ACP DISCUSS/DSCN MKR DOCD: CPT | Performed by: INTERNAL MEDICINE

## 2023-02-06 PROCEDURE — G8428 CUR MEDS NOT DOCUMENT: HCPCS | Performed by: INTERNAL MEDICINE

## 2023-02-06 PROCEDURE — 1101F PT FALLS ASSESS-DOCD LE1/YR: CPT | Performed by: INTERNAL MEDICINE

## 2023-02-06 PROCEDURE — G8417 CALC BMI ABV UP PARAM F/U: HCPCS | Performed by: INTERNAL MEDICINE

## 2023-02-06 PROCEDURE — 3077F SYST BP >= 140 MM HG: CPT | Performed by: INTERNAL MEDICINE

## 2023-02-06 RX ORDER — ISOSORBIDE MONONITRATE 30 MG/1
30 TABLET, EXTENDED RELEASE ORAL
Qty: 30 TABLET | Refills: 6 | Status: SHIPPED | OUTPATIENT
Start: 2023-02-06

## 2023-02-06 NOTE — PROGRESS NOTES
[facility]    Mr. Terry is a 79 y.o. male with a history of diabetes, hypertension, hyperlipidemia and other multiple medical problems including sleep apnea    Patient is here today for follow-up appointment he denies any prior history of MI or CHF. Patient denies any history of recent ER visit from cardiovascular standpoint since last seen  Approximately 3 weeks ago, patient was sleeping and he had to woke up from sleep because of chest discomfort she describes as sharp pressure sensation for which she had to take nitroglycerin which did not relieve the pain. He took secondary fusion and eventually pain was resolved. He had to take additional aspirin. He has stable dyspnea on exertion. He did not have any chest pain or use of nitroglycerin prior to that. He is taking his medication as prescribed. Past Medical History:   Diagnosis Date    Abdominal adhesions 07/30/2014    BPH (benign prostatic hyperplasia)     Cellulitis     Chronic pain 10/08/11    Back pain due a work injury    DDD (degenerative disc disease), lumbar 07/30/2014    Depression     Diabetes ([address]     Frequent falls 07/30/2014    TRUE (generalized anxiety disorder) 07/30/2014    HLD (hyperlipidemia)     Hypertension     Hypogonadism in male     Incomplete bladder emptying     Kidney stones     Lumbar facet arthropathy 07/30/2014    Lumbar nerve root impingement 07/30/2014    Lumbosacral radiculopathy at S1 07/30/2014    Major depression 07/30/2014    Neurological disorder     sciatica    MANISH (obstructive sleep apnea) 07/30/2014    S/P lumbar laminectomy 07/30/2014    S/P lumbar spinal fusion 07/30/2014    Sleep apnea     USES CPAP EVERY NIGHT    Thoracic compression fracture ([address] 07/30/2014       Review of Systems:  Cardiac symptoms as noted above in HPI. All others negative.   Denies fatigue, malaise, skin rash, joint pain, blurring vision, photophobia, neck pain, hemoptysis, chronic cough, nausea, vomiting, hematuria, burning micturition, BRBPR, chronic headaches. Current Outpatient Medications   Medication Sig    Blood-Glucose Meter monitoring kit Use to check blood sugar 3 times daily. Dx E11.9. Please dispense brand preferred by insurance (True Metrix)    metFORMIN (GLUCOPHAGE) 500 mg tablet TAKE 1 TABLET BY MOUTH TWICE DAILY WITH MEALS    bumetanide (BUMEX) 1 mg tablet TAKE 1 TABLET EVERY DAY    dulaglutide (Trulicity) 4.5 CS/1.8 mL pnij 4.5 mg by SubCUTAneous route every seven (7) days for 90 days. Indications: type 2 diabetes mellitus    fluticasone propion-salmeteroL (ADVAIR/WIXELA) 250-50 mcg/dose diskus inhaler INHALE 1 PUFF BY MOUTH TWICE DAILY FOR ASTHMA    fluticasone propionate (FLONASE) 50 mcg/actuation nasal spray     guaiFENesin-codeine (ROBITUSSIN AC) 100-10 mg/5 mL solution     fluticasone propion-salmeteroL (ADVAIR/WIXELA) 250-50 mcg/dose diskus inhaler Take 1 Puff by inhalation every twelve (12) hours. trospium (SANCTURA) 20 mg tablet Take 1 Tablet by mouth Before breakfast and dinner. albuterol (PROVENTIL HFA, VENTOLIN HFA, PROAIR HFA) 90 mcg/actuation inhaler Take 1 Puff by inhalation every four (4) hours as needed for Wheezing. cholecalciferol (VITAMIN D3) (1000 Units /25 mcg) tablet Take 1,000 Units by mouth daily. melatonin 5 mg cap capsule Take 5 mg by mouth nightly. ascorbic acid, vitamin C, (VITAMIN C) 500 mg tablet Take 500 mg by mouth daily.     lancets (TRUEplus Lancets) 33 gauge misc CHECK BLOOD SUGAR THREE TIMES DAILY    DropSafe Alcohol Prep Pads padm USE AS DIRECTED WHEN CHECKING BLOOD SUGAR THREE TIMES DAILY    pramipexole (MIRAPEX) 0.75 mg tablet TAKE 3 TABLETS ONE TIME DAILY (Patient taking differently: No sig reported)    metoprolol succinate (TOPROL-XL) 50 mg XL tablet TAKE 1 TABLET EVERY DAY    amitriptyline (ELAVIL) 10 mg tablet TAKE 1 TABLET EVERY NIGHT    simvastatin (ZOCOR) 10 mg tablet TAKE 1 TABLET EVERY NIGHT buPROPion SR (WELLBUTRIN SR) 150 mg SR tablet TAKE 1 TABLET EVERY DAY    montelukast (SINGULAIR) 10 mg tablet TAKE 1 TABLET EVERY DAY (Patient taking differently: No sig reported)    alfuzosin SR (UROXATRAL) 10 mg SR tablet TAKE 1 TABLET BY MOUTH DAILY AFTER DINNER    gabapentin (NEURONTIN) 300 mg capsule Take 300 mg by mouth three (3) times daily. dutasteride (AVODART) 0.5 mg capsule Take 1 Capsule by mouth daily (after dinner). testosterone cypionate (DEPOTESTOTERONE CYPIONATE) 200 mg/mL injection 0.5 mL by IntraMUSCular route every seven (7) days. Max Daily Amount: 100 mg.    glucose blood VI test strips (True Metrix Glucose Test Strip) strip TEST BLOOD SUGAR THREE TIMES DAILY    nitroglycerin (NITROSTAT) 0.4 mg SL tablet DISSOLVE ONE TABLET UNDER TONGUE AS NEEDED FOR CHEST PAIN EVERY 5 MINUTES FOR UP TO 3 DOSES (Patient taking differently: 0.4 mg by SubLINGual route every five (5) minutes as needed for Chest Pain. DISSOLVE ONE TABLET UNDER TONGUE AS NEEDED FOR CHEST PAIN EVERY 5 MINUTES FOR UP TO 3 DOSES)    aspirin delayed-release 81 mg tablet Take 1 Tablet by mouth daily. Cane maura by Does Not Apply route. cpap machine kit by Does Not Apply route. folic acid/multivit-min/lutein (CENTRUM SILVER PO) Take 1 Tablet by mouth daily. lisinopriL (PRINIVIL, ZESTRIL) 20 mg tablet Take 1 Tab by mouth daily. Needle, Disp, 23 G 23 gauge x 1 1/4\" ndle Use to inject 0.5 ml every 7 days    Needle, Disp, 18 G 18 gauge x 1\" ndle Use to aspirate 0.5 ml every 7 days    Wheel Chair maura Assistance with ambulation, gait instability    polyethylene glycol (MIRALAX) 17 gram packet Take 1 Packet by mouth daily. Hold for loose stools (Patient taking differently: Take 17 g by mouth daily. dissolve in 8 oz liquid)    naloxone 4 mg/actuation spry 4 mg by Nasal route as needed. Back Brace misc 1 Device by Does Not Apply route daily as needed for Pain.  One, lumbosacral orthosis/back brace with metal struts Medically necessary     No current facility-administered medications for this visit. Past Surgical History:   Procedure Laterality Date    HX APPENDECTOMY      HX BACK SURGERY      Lumbar fusion    HX HERNIA REPAIR  1992    HX OTHER SURGICAL      EMG - S1 disorder    HX OTHER SURGICAL  10/2017    spinal cord stimulator    HX VASECTOMY  1985       Allergies and Sensitivities:  Allergies   Allergen Reactions    Bactrim [Sulfamethoprim] Rash     Bactrim DS, per patient. Opana [Oxymorphone] Other (comments)     Feels like bug crawling under skin and drowziness       Family History:  Family History   Problem Relation Age of Onset    Diabetes Mother     Hypertension Mother     Stroke Mother     Heart Disease Mother     Heart Attack Father     Diabetes Father     Stroke Father     Heart Disease Father     Diabetes Sister     Stroke Brother        Social History:  Social History     Tobacco Use    Smoking status: Never    Smokeless tobacco: Never   Vaping Use    Vaping Use: Never used   Substance Use Topics    Alcohol use: Yes    Drug use: Never     He  reports that he has never smoked. He has never used smokeless tobacco.  He  reports current alcohol use. Physical Exam:  BP Readings from Last 3 Encounters:   02/06/23 (!) 148/60   01/19/23 130/78   12/07/22 138/78         Pulse Readings from Last 3 Encounters:   02/06/23 (!) 102   01/19/23 80   12/07/22 93          Wt Readings from Last 3 Encounters:   02/06/23 140.6 kg (310 lb)   01/18/23 138.2 kg (304 lb 9.6 oz)   12/07/22 145.2 kg (320 lb)       Constitutional: Oriented to person, place, and time. HENT: Head: Normocephalic and atraumatic. Neck: No JVD present. Carotid bruit is not appreciated. Cardiovascular: Regular rhythm. No murmur, gallop or rubs appreciated  Lung: Breath sounds normal. No respiratory distress. No ronchi or rales appreciated  Abdominal: No tenderness. No rebound and no guarding.    Musculoskeletal: Trace pitting lower extremity edema       LABS:   @  Lab Results   Component Value Date/Time    WBC 7.2 12/31/2022 10:36 AM    HGB 15.4 12/31/2022 10:36 AM    HCT 49.4 (H) 12/31/2022 10:36 AM    PLATELET 097 85/12/9504 10:36 AM    MCV 84.9 12/31/2022 10:36 AM     Lab Results   Component Value Date/Time    Sodium 136 12/31/2022 10:36 AM    Potassium 4.3 12/31/2022 10:36 AM    Chloride 103 12/31/2022 10:36 AM    CO2 29 12/31/2022 10:36 AM    Glucose 222 (H) 12/31/2022 10:36 AM    BUN 20 (H) 12/31/2022 10:36 AM    Creatinine 1.26 12/31/2022 10:36 AM     Lipids Latest Ref Rng & Units 10/29/2021 7/3/2020   Chol, Total <200 MG/ 137   HDL 40 - 60 MG/DL 38(L) 39(L)   LDL 0 - 100 MG/DL 47 59   Trig <150 MG/DL 95 195(H)   Chol/HDL Ratio 0 - 5.0   2.7 3.5   Some recent data might be hidden     Lab Results   Component Value Date/Time    ALT (SGPT) 35 12/31/2022 10:25 AM     Lab Results   Component Value Date/Time    Hemoglobin A1c 9.0 (H) 12/31/2022 10:39 AM    Hemoglobin A1c (POC) 7.0 09/13/2022 03:56 PM     Lab Results   Component Value Date/Time    TSH 0.66 12/31/2022 10:21 AM     03/31/22    ECHO ADULT COMPLETE 03/31/2022 3/31/2022  Interpretation Summary    Left Ventricle: Left ventricle size is normal. Mildly increased wall thickness. Normal wall motion. Normal left ventricular systolic function with a visually estimated EF of 55 - 60%. Normal diastolic function. Aorta: Mildly dilated aortic root. Ao Root diameter is 3.5 cm. Left Atrium: Left atrial volume index is normal (16-34 mL/m2). LA Vol Index A/L is 29 mL/m2. NUCLEAR CARDIAC STRESS TEST 03/31/2022 3/31/2022  Interpretation Summary    Stress Test: A pharmacological stress test was performed using lexiscan. Nuclear Findings: LVEF measures 64%. TID ratio is 1.01. Nuclear Findings: LV perfusion is probably normal.  There is a small fixed basal inferior defect, that is likely artifact. No ischemia. ECG: Stress ECG was not diagnostic due to baseline ECG abnormality.     Low risk stress test.    IMPRESSION & PLAN:  Check is 79 y.o. with multiple medical problem    Dyspnea and chest pain:  Nuclear stress test in 03/2022, low risk  Echocardiogram in 03/2022 with normal EF and no major valvular heart disease  According to patient he had a cardiac catheterization 15 years ago and was told that he has a small vessel disease. No reports available  Continue aspirin and beta-blocker. Episode of chest discomfort 3 weeks ago concerning for angina requiring nitroglycerin x2. Otherwise no symptoms of chest pain or chest tightness  Discussed with the patient regarding coronary evaluation, invasive however patient would like to continue only with medical management at this time. Risk-benefit alternative and complication discussed. I will start patient on Imdur 30 mg every morning for angina. If he has recurrent symptoms, he will call 911 or come to the clinic for further evaluation. Hypertension: /60. Currently on metoprolol and lisinopril. I will start patient on Imdur 30 mg daily    Hyperlipidemia: Currently on simvastatin. Last LDL 47. Recommend to repeat fasting lipid profile. Diabetes: Goal hemoglobin A1c less than 7 is recommended from cardiovascular standpoint colostomy globin A1c 9. Defer to PCP    Sleep apnea: Currently is using CPAP machine    This plan was discussed with patient who is in agreement. Thank you for allowing me to participate in patient care. Please feel free to call me if you have any question or concern. Nadine Coyne MD  Please note: This document has been produced using voice recognition software. Unrecognized errors in transcription may be present.

## 2023-02-06 NOTE — PROGRESS NOTES
1. \"Have you been to the ER, urgent care clinic since your last visit? Hospitalized since your last visit? \" No    2. \"Have you seen or consulted any other health care providers outside of the [address] [place] [address] since your last visit? \" No     3. For patients aged 39-70: Has the patient had a colonoscopy / FIT/ Cologuard? No      If the patient is female:    4. For patients aged 41-77: Has the patient had a mammogram within the past 2 years? NA - based on age or sex      11. For patients aged 21-65: Has the patient had a pap smear?  NA - based on age or sex

## 2023-02-06 NOTE — LETTER
2/6/2023    Patient: Kyara Eckert   YOB: 1955   Date of Visit: 2/6/2023     [address]   [address]  [address] [address] [address]  [facility] [address]    Dear Annie Katz MD,      Thank you for referring Mr. Dilan William to [address] [place] [address] for evaluation. My notes for this consultation are attached. If you have questions, please do not hesitate to call me. I look forward to following your patient along with you.       Sincerely,    Oswaldo Faust MD

## 2023-02-06 NOTE — PROGRESS NOTES
Slava West presents today for   Chief Complaint   Patient presents with    Follow-up     9 month fu    Chest Pain       Marizol William preferred language for health care discussion is english/other. Is someone accompanying this pt? no    Is the patient using any DME equipment during [address]? cane    Depression Screening:  3 most recent PHQ Screens 2/6/2023   PHQ Not Done -   Little interest or pleasure in doing things Not at all   Feeling down, depressed, irritable, or hopeless Not at all   Total Score PHQ 2 0   Trouble falling or staying asleep, or sleeping too much -   Feeling tired or having little energy -   Poor appetite, weight loss, or overeating -   Feeling bad about yourself - or that you are a failure or have let yourself or your family down -   Trouble concentrating on things such as school, work, reading, or watching TV -   Moving or speaking so slowly that other people could have noticed; or the opposite being so fidgety that others notice -   Thoughts of being better off dead, or hurting yourself in some way -   PHQ 9 Score -   How difficult have these problems made it for you to do your work, take care of your home and get along with others -       Learning Assessment:  Learning Assessment 2/6/2023   PRIMARY LEARNER Patient   HIGHEST LEVEL OF EDUCATION - PRIMARY LEARNER  SOME COLLEGE   BARRIERS PRIMARY LEARNER -   [facility] [employer]     -     -     -   LEARNER Lorna 11 -   ANSWERED BY VIANEY William   RELATIONSHIP SELF       Abuse Screening:  Abuse Screening Questionnaire 2/6/2023   Do you ever feel afraid of your partner? N   Are you in a relationship with someone who physically or mentally threatens you? N   Is it safe for you to go home?  Y       Fall Risk  Fall Risk Assessment, last 12 mths 2/6/2023   Able to walk? Yes   Fall in past 12 months? 1   Do you feel unsteady? 1   Are you worried about falling 1   Is TUG test greater than 12 seconds? -   Is the gait abnormal? 1   Number of falls in past 12 months (No Data)   Fall with injury? 0       Pt currently taking Anticoagulant therapy? Asa 81mg    Coordination of Care:  1. Have you been to the ER, urgent care clinic since your last visit? Hospitalized since your last visit? no    2. Have you seen or consulted any other health care providers outside of the [address] [place] [address] since your last visit? Include any pap smears or colon screening.  no

## 2023-02-06 NOTE — PATIENT INSTRUCTIONS
Call your sleep physician regarding your sleep apnea machine. Keep an appt with cardiologist regarding your chest pain. Keep blood sugar log once you receive your glucometer and keep an appt with Glenn Aid our pharmacist regarding diabetic education and mediation management. You have been referred to spine center. They should call you but if you do not hear from them please reach out to my office. Please find out which provider is giving you gabapentin. Do your labs prior next visit and call my office and make an appt to be seen in 2 months. Follow diabetic diet and low salt diet.

## 2023-02-07 DIAGNOSIS — G47.33 OSA (OBSTRUCTIVE SLEEP APNEA): Primary | ICD-10-CM

## 2023-02-07 NOTE — PROGRESS NOTES
Correction to   BIPAP order    Radha Leyva DO, [facility]    [facility]  Pulmonary, Critical Care, and Sleep Medicine

## 2023-02-09 DIAGNOSIS — M79.89 LEG SWELLING: ICD-10-CM

## 2023-02-09 DIAGNOSIS — M96.1 LUMBAR POST-LAMINECTOMY SYNDROME: ICD-10-CM

## 2023-02-09 DIAGNOSIS — Z98.1 S/P LUMBAR SPINAL FUSION: ICD-10-CM

## 2023-02-09 DIAGNOSIS — Z98.890 S/P LUMBAR LAMINECTOMY: ICD-10-CM

## 2023-02-09 RX ORDER — BUMETANIDE 1 MG/1
TABLET ORAL
Qty: 90 TABLET | Refills: 0 | Status: SHIPPED | OUTPATIENT
Start: 2023-02-09

## 2023-02-09 RX ORDER — AMITRIPTYLINE HYDROCHLORIDE 10 MG/1
TABLET, FILM COATED ORAL
Qty: 90 TABLET | Refills: 1 | Status: SHIPPED | OUTPATIENT
Start: 2023-02-09

## 2023-02-13 RX ORDER — BUPROPION HYDROCHLORIDE 150 MG/1
TABLET, EXTENDED RELEASE ORAL
Qty: 90 TABLET | Refills: 0 | Status: SHIPPED | OUTPATIENT
Start: 2023-02-13

## 2023-02-13 RX ORDER — SIMVASTATIN 10 MG
TABLET ORAL
Qty: 90 TABLET | Refills: 0 | Status: SHIPPED | OUTPATIENT
Start: 2023-02-13

## 2023-02-13 RX ORDER — MONTELUKAST SODIUM 10 MG/1
TABLET ORAL
Qty: 90 TABLET | Refills: 0 | Status: SHIPPED | OUTPATIENT
Start: 2023-02-13

## 2023-02-14 ENCOUNTER — TELEPHONE (OUTPATIENT)
Age: 68
End: 2023-02-14

## 2023-02-14 DIAGNOSIS — E11.65 POORLY CONTROLLED DIABETES MELLITUS (HCC): Primary | ICD-10-CM

## 2023-02-14 DIAGNOSIS — N18.30 STAGE 3 CHRONIC KIDNEY DISEASE, UNSPECIFIED WHETHER STAGE 3A OR 3B CKD (HCC): ICD-10-CM

## 2023-02-14 DIAGNOSIS — N28.9 RENAL INSUFFICIENCY: Primary | ICD-10-CM

## 2023-02-14 NOTE — TELEPHONE ENCOUNTER
Pt would like to speak with the nurse in regards to his cpap machine and where it is.  Please call [phone]

## 2023-02-21 ENCOUNTER — TELEPHONE (OUTPATIENT)
Age: 68
End: 2023-02-21

## 2023-02-22 RX ORDER — CALCIUM CITRATE/VITAMIN D3 200MG-6.25
TABLET ORAL
Qty: 300 STRIP | Refills: 0 | Status: SHIPPED | OUTPATIENT
Start: 2023-02-22

## 2023-02-23 DIAGNOSIS — E11.65 POORLY CONTROLLED DIABETES MELLITUS (HCC): Primary | ICD-10-CM

## 2023-02-24 ENCOUNTER — TELEPHONE (OUTPATIENT)
Age: 68
End: 2023-02-24

## 2023-02-27 ENCOUNTER — TELEPHONE (OUTPATIENT)
Age: 68
End: 2023-02-27

## 2023-02-27 NOTE — PROGRESS NOTES
Pharmacy Progress Note - Diabetes Management       Assessment / Plan:   Diabetes Management:  Per ADA guidelines, Pt's A1c is not at goal of < 7%. Pt's FBG values are typically at goal while he has some post-prandial elevations. Despite being on the highest dose of Trulicity, he is not having a decrease in appetite. He is decreasing his portion sizes on his own. He would benefit from a switch to a medication that will provide a greater effect on his appetite so that he can adhere to the smaller portion sizes. Will attempt to prescribe Wegovy to replace his Trulicity. This is a weight loss medication specifically, but has the same active drug, semaglutide, as Ozempic. Will go straight to the max dose of Wegovy given that he is already on the max dose of Trulicity. PA started, but not complete during visit. May need additional documentation for it to be approved. The equivalent dose of semaglutide to dulaglutide 4.5mg weekly is 2mg. Cayey Nap will be 2.4mg and will hopefully provide a greater benefit for his post-prandial elevations in addition to its weight loss benefits. Will reassess with SMBG logs at follow up in 2 weeks. Nutrition/Lifestyle Modifications:  - Educated pt on the importance of moderating carbohydrate intake. Reviewed sources of carbohydrates and method to help determine appropriate portion sizes (e.g., Diabetes Plate Method). - Advised patient to avoid sugar-sweetened beverages and replace with water or diet/zero sugar option.  - Recommend ~30 minutes consistent, moderately intensive, exercise/day or ~150 minutes/week. Start small, stay consistent, and increase length and types of exercise, as tolerated. Patient will return to clinic in 2 week(s) for follow up.         S/O: Mr. Hugh Kelley, a 76 y.o. male referred by West Wright MD,  has a past medical history of Abdominal adhesions, BPH (benign prostatic hyperplasia), Cellulitis, Chronic pain, DDD (degenerative disc disease), lumbar, Depression, Diabetes ([facility] [address] Frequent falls, LORRAINE (generalized anxiety disorder), HLD (hyperlipidemia), Hypertension, Hypogonadism in male, Incomplete bladder emptying, Kidney stones, Lumbar facet arthropathy, Lumbar nerve root impingement, Lumbosacral radiculopathy at S1, Major depression, Neurological disorder, MEGHA (obstructive sleep apnea), S/P lumbar laminectomy, S/P lumbar spinal fusion, Sleep apnea, and Thoracic compression fracture ([address] [facility]. Patient's last A1c was:   Hemoglobin A1C   Date Value Ref Range Status   12/31/2022 9.0 (H) 4.2 - 5.6 % Final     Comment:     (NOTE)  HbA1C Interpretive Ranges  <5.7              Normal  5.7 - 6.4         Consider Prediabetes  >6.5              Consider Diabetes       Hemoglobin A1C, POC   Date Value Ref Range Status   09/13/2022 7.0 % Final       Interim update: Pt was last seen by me on 1/31/23. Per my prior note: Pt's A1c is not at goal of < 7%. Unable to properly assess his glycemic control d/t a faulty BG meter. New meter ordered and will reassess with SMBG logs at follow up in 3 weeks. Pt will notify clinic if BG values remain elevated with new meter. Pt rescheduled his 21 Feb 2023 appointment. Today:   Pt states that he has changed his eating habits by eating smaller portions. He admits to still having some desserts after dinner, however. He is working on his diet, but states that he feels like he has not been making any progress for his weight loss. He is happy that his BG values are improving, however. Pt states that he walks \"when he can\" but has been building a deck in his backyard and states that that is rigorous. His disability prevents him from doing more traditional workouts. Pt was advised of some low-impact exercises like yoga and he verbalized understanding. Pt states that he wants a medication specifically for weight loss.   He is amenable to a switch to [facility] if he is able to afford it and insurance approves it. He is aware that [facility] would be replacing Trulicity to help with both his diabetes and weight loss.       Current anti-hyperglycemic regimen includes:    Key Antihyperglycemic Medications               metFORMIN (GLUCOPHAGE) 500 MG tablet (Taking) TAKE 1 TABLET BY MOUTH TWICE DAILY WITH MEALS    Dulaglutide (TRULICITY) 4.5 GQ/3.8ZW SOPN Inject 4.5 mg into the skin every 7 days          Complete current medication regimen includes:  Current Outpatient Medications   Medication Sig    Semaglutide-Weight Management (WEGOVY) 2.4 MG/0.75ML SOAJ SC injection Inject 2.4 mg into the skin every 7 days    isosorbide mononitrate (IMDUR) 30 MG extended release tablet Take 30 mg by mouth    metFORMIN (GLUCOPHAGE) 500 MG tablet TAKE 1 TABLET BY MOUTH TWICE DAILY WITH MEALS    Alcohol Swabs (DROPSAFE ALCOHOL PREP) 70 % PADS     Blood Glucose Monitoring Suppl (TRUE METRIX AIR GLUCOSE METER) w/Device KIT     buPROPion (WELLBUTRIN XL) 150 MG extended release tablet Take 150 mg by mouth daily    fluticasone-salmeterol (ADVAIR) 250-50 MCG/ACT AEPB diskus inhaler Inhale 1 puff into the lungs 2 times daily    TRUEplus Lancets 33G MISC     magnesium oxide (MAG-OX) 400 MG tablet Take 400 mg by mouth daily as needed    ondansetron (ZOFRAN) 4 MG tablet Take 4 mg by mouth once    TRUE METRIX BLOOD GLUCOSE TEST strip CHECK BLOOD SUGAR THREE TIMES DAILY    simvastatin (ZOCOR) 10 MG tablet TAKE 1 TABLET EVERY NIGHT    montelukast (SINGULAIR) 10 MG tablet TAKE 1 TABLET EVERY DAY    albuterol sulfate HFA (PROVENTIL;VENTOLIN;PROAIR) 108 (90 Base) MCG/ACT inhaler Inhale 1 puff into the lungs every 4 hours as needed    alfuzosin (UROXATRAL) 10 MG extended release tablet TAKE 1 TABLET BY MOUTH DAILY AFTER DINNER    amitriptyline (ELAVIL) 10 MG tablet TAKE 1 TABLET EVERY NIGHT    ascorbic acid (VITAMIN C) 500 MG tablet Take 500 mg by mouth daily    aspirin 81 MG EC tablet Take 81 mg by mouth daily    bumetanide (BUMEX) 1 MG tablet Take 1 mg by mouth daily    vitamin D (CHOLECALCIFEROL) 25 MCG (1000 UT) TABS tablet Take 1,000 Units by mouth daily    Dulaglutide (TRULICITY) 4.5 QQ/0.2ZD SOPN Inject 4.5 mg into the skin every 7 days    dutasteride (AVODART) 0.5 MG capsule Take 0.5 mg by mouth    fluticasone (FLONASE) 50 MCG/ACT nasal spray ceived the following from [facility]: Outside name: fluticasone propionate (FLONASE) 50 mcg/actuation nasal spray    gabapentin (NEURONTIN) 300 MG capsule Take 300 mg by mouth 3 times daily. lisinopril (PRINIVIL;ZESTRIL) 20 MG tablet Take 20 mg by mouth daily    Melatonin 5 MG CAPS Take 5 mg by mouth    metoprolol succinate (TOPROL XL) 50 MG extended release tablet TAKE 1 TABLET EVERY DAY    naloxone 4 MG/0.1ML LIQD nasal spray 4 mg by Nasal route as needed    nitroGLYCERIN (NITROSTAT) 0.4 MG SL tablet [The details of the medication are not available because there are pending changes by a home health clinician.]    polyethylene glycol (GLYCOLAX) 17 GM/SCOOP powder Take 17 g by mouth daily    pramipexole (MIRAPEX) 0.75 MG tablet [The details of the medication are not available because there are pending changes by a home health clinician.]    testosterone cypionate (DEPOTESTOTERONE CYPIONATE) 200 MG/ML injection Inject 100 mg into the muscle every 7 days. trospium (SANCTURA) 20 MG tablet Take 20 mg by mouth 2 times daily (before meals)     No current facility-administered medications for this visit. Allergies: Allergies   Allergen Reactions    Oxymorphone Other (See Comments)     Feels like bug crawling under skin and drowziness    Sulfamethoxazole-Trimethoprim Rash     Bactrim DS, per patient.        Blood Glucose Monitoring (BGM) or CGM:  Date FBG ac lunch ac dinner hs   2/28/2023 135      2/27/2023 115      2/26/2023 138  182    2/25/2023 2/24/2023 127  149    2/23/2023 127   207   2/22/2023 126   187   2/21/2023 125  195 2/20/2023 2/19/2023 125  170    2/18/2023 2/17/2023 125  181    2/16/2023 125  161    2/15/2023 107          ROS:  Today, Pt endorses:  - Symptoms of Hyperglycemia: none  - Symptoms of Hypoglycemia: none    Lifestyle modification(s):  - as above    Medication Adherence/Access:  - Endorses adherence to current regimen?: Yes    Vitals/Labs:  No results found for: HBA1C  Hemoglobin A1C, POC   Date Value Ref Range Status   09/13/2022 7.0 % Final        Screenings/Prevention Parameters:  -Diabetic Eye and Foot Exams:      Diabetes Management   Topic Date Due    Diabetic foot exam  08/02/2018    Diabetic retinal exam  08/24/2019    Lipids  10/29/2022     -Microalbumin / Creatinine ratio:     No results found for: LABMICR, [id number]  -ASCVD Risk Score Parameters and Calculation    The ASCVD Risk score (Peterson ZAMUDIO, et al., 2019) failed to calculate for the following reasons: The valid total cholesterol range is 130 to 320 mg/dL    -Immunizations:      Immunization History   Administered Date(s) Administered    Influenza, FLUAD, (age 72 y+), Adjuvanted, 0.5mL 02/01/2022    Influenza, FLUARIX, FLULAVAL, FLUZONE (age 10 mo+) AND AFLURIA, (age 1 y+), PF, 0.5mL 12/07/2016, 11/02/2017, 10/25/2018, 12/10/2019    Pneumococcal Conjugate 13-valent ([id number]) 08/08/2016    Pneumococcal Polysaccharide ([id number]) 02/01/2022    Tdap (Boostrix, Adacel) 08/08/2016       Additional Laboratory Parameters of Interest:   Estimation of renal function:  Lab Results   Component Value Date/Time    GFRAA >60 10/03/2022 04:20 AM    GFRAA >60 10/02/2022 03:32 AM    GFRAA >60 10/01/2022 09:17 AM     Wt Readings from Last 3 Encounters:   02/06/23 (!) 310 lb (140.6 kg)   12/07/22 (!) 320 lb (145.2 kg)   11/17/22 (!) 316 lb (143.3 kg)     Ht Readings from Last 1 Encounters:   02/06/23 6' 2\" (1.88 m)     Calculated estimated creatinine clearance: CrCl cannot be calculated (Unknown ideal weight.).     Vital Signs Today:    There were no vitals taken for this visit. There are no discontinued medications. No orders of the defined types were placed in this encounter. Future Appointments   Date Time Provider Donald Cameron   2023 10:00 AM ELHAM Freire ROSALEE [facility] BS AMB   2023 11:00 AM Francia Franco PA-C [facility] [place] Sched   2023  9:15 AM Saumil Corrinne Craw, MD [facility] BS AMB       Patient verbalized understanding of the information presented and all of the patients questions were answered. AVS was gone over with the patient. Patient advised to call the office with any additional questions or concerns. Notifications of recommendations will be sent to Bhavin Brown MD for review.       Thank you for the consult,  Marycarmen Hurd, PharmD, BCACP, BC-ADM        For Pharmacy Admin Tracking Only    Program: Medical Group  CPA in place:  Yes  Recommendation Provided To: Patient/Caregiver: 4 via Virtual Visit  Intervention Detail: Adherence Monitorin, Benefit Assistance, Discontinued Rx: 1, reason: Cost/Formulary Change, and New Rx: 1, reason: Needs Additional Therapy  Intervention Accepted By: Patient/Caregiver: 4  Gap Closed?: No   Time Spent (min): 45

## 2023-02-28 ENCOUNTER — TELEMEDICINE (OUTPATIENT)
Age: 68
End: 2023-02-28

## 2023-02-28 DIAGNOSIS — E11.65 TYPE 2 DIABETES MELLITUS WITH HYPERGLYCEMIA, WITHOUT LONG-TERM CURRENT USE OF INSULIN (HCC): ICD-10-CM

## 2023-02-28 DIAGNOSIS — E66.01 SEVERE OBESITY (HCC): Primary | ICD-10-CM

## 2023-02-28 RX ORDER — GLUCOSAM/CHON-MSM1/C/MANG/BOSW 500-416.6
TABLET ORAL
COMMUNITY
Start: 2023-01-25

## 2023-02-28 RX ORDER — ISOSORBIDE MONONITRATE 30 MG/1
30 TABLET, EXTENDED RELEASE ORAL
COMMUNITY
Start: 2023-02-06

## 2023-02-28 RX ORDER — SEMAGLUTIDE 2.4 MG/.75ML
2.4 INJECTION, SOLUTION SUBCUTANEOUS
Qty: 3 ML | Refills: 11 | Status: SHIPPED | OUTPATIENT
Start: 2023-02-28

## 2023-02-28 RX ORDER — ISOPROPYL ALCOHOL 70 ML/100ML
SWAB TOPICAL
COMMUNITY
Start: 2023-01-25

## 2023-02-28 RX ORDER — DIPHENHYDRAMINE HCL 25 MG
TABLET ORAL
COMMUNITY
Start: 2023-01-31

## 2023-02-28 RX ORDER — BUPROPION HYDROCHLORIDE 150 MG/1
150 TABLET ORAL DAILY
COMMUNITY

## 2023-02-28 RX ORDER — MAGNESIUM OXIDE 400 MG/1
400 TABLET ORAL DAILY PRN
COMMUNITY

## 2023-02-28 RX ORDER — ONDANSETRON 4 MG/1
4 TABLET, FILM COATED ORAL ONCE
COMMUNITY

## 2023-02-28 RX ORDER — FLUTICASONE PROPIONATE AND SALMETEROL 250; 50 UG/1; UG/1
1 POWDER RESPIRATORY (INHALATION) 2 TIMES DAILY
COMMUNITY
Start: 2022-12-05

## 2023-02-28 NOTE — TELEPHONE ENCOUNTER
Spoke with patient notified him that his order has been resubmitted to a company that takes his insurance. Patient encouraged to contact office if he hasn't heard from [address] [place] [address] within 1-2 weeks.

## 2023-02-28 NOTE — TELEPHONE ENCOUNTER
Spoke with patient notified him that his order has been resubmitted to a company that takes his insurance. Patient encouraged to contact office if he hasn't heard from [facility] within 1-2 weeks.

## 2023-02-28 NOTE — TELEPHONE ENCOUNTER
Spoke with patient notified him that his order has been resubmitted to a company that takes his insurance. Patient encouraged to contact office if he hasn't heard from [address] [place] [address] within 1-2 weeks.

## 2023-03-13 NOTE — PROGRESS NOTES
Pharmacy Progress Note - Diabetes Management       Assessment / Plan:   Diabetes Management:  Per ADA guidelines, Pt's A1c is not at goal of < 7%. Pt's SMBG logs per his report are all at goal.  In order to provide increased weight loss benefit, will switch pt from Trulicity to Ozempic 2mg weekly. Trulicity not removed from med list until PAP application is approved. Will work with pt to provide device training if he is able to get the medication. Will reassess his tolerability of Ozempic if he is able to obtain it and his glycemic control at follow up in 4 weeks. Nutrition/Lifestyle Modifications:  - Educated pt on the importance of moderating carbohydrate intake. Reviewed sources of carbohydrates and method to help determine appropriate portion sizes (e.g., Diabetes Plate Method). - Advised patient to avoid sugar-sweetened beverages and replace with water or diet/zero sugar option.  - Recommend ~30 minutes consistent, moderately intensive, exercise/day or ~150 minutes/week. Start small, stay consistent, and increase length and types of exercise, as tolerated. Patient will return to clinic in 4 week(s) for follow up. S/O: Mr. Ly Marshall, a 76 y.o. male referred by Dawood Omalley MD,  has a past medical history of Abdominal adhesions, BPH (benign prostatic hyperplasia), Cellulitis, Chronic pain, DDD (degenerative disc disease), lumbar, Depression, Diabetes ([address] Frequent falls, LORRAINE (generalized anxiety disorder), HLD (hyperlipidemia), Hypertension, Hypogonadism in male, Incomplete bladder emptying, Kidney stones, Lumbar facet arthropathy, Lumbar nerve root impingement, Lumbosacral radiculopathy at S1, Major depression, Neurological disorder, MEGHA (obstructive sleep apnea), S/P lumbar laminectomy, S/P lumbar spinal fusion, Sleep apnea, and Thoracic compression fracture ([address] Pt was seen today virtually via MyChart video visit for diabetes management.   Patient's last A1c was:   Hemoglobin A1C   Date Value Ref Range Status   12/31/2022 9.0 (H) 4.2 - 5.6 % Final     Comment:     (NOTE)  HbA1C Interpretive Ranges  <5.7              Normal  5.7 - 6.4         Consider Prediabetes  >6.5              Consider Diabetes       Hemoglobin A1C, POC   Date Value Ref Range Status   09/13/2022 7.0 % Final       Interim update: Pt was last seen by me on 2/28/2023. Per my prior note: Pt's A1c is not at goal of < 7%. Pt's FBG values are typically at goal while he has some post-prandial elevations. Despite being on the highest dose of Trulicity, he is not having a decrease in appetite. He is decreasing his portion sizes on his own. He would benefit from a switch to a medication that will provide a greater effect on his appetite so that he can adhere to the smaller portion sizes. Will attempt to prescribe Wegovy to replace his Trulicity. This is a weight loss medication specifically, but has the same active drug, semaglutide, as Ozempic. Will go straight to the max dose of Wegovy given that he is already on the max dose of Trulicity. PA started, but not complete during visit. May need additional documentation for it to be approved. The equivalent dose of semaglutide to dulaglutide 4.5mg weekly is 2mg. Lyndel Hails will be 2.4mg and will hopefully provide a greater benefit for his post-prandial elevations in addition to its weight loss benefits. Will reassess with SMBG logs at follow up in 2 weeks. Today:   Pt states that he has been having some GI issues for the past couple days. He states that he will contact his PCP if it worsens or doesn't improve. He states that he understands why the Lyndel Hails was denied through his insurance company. The appeal process was unsuccessful. He states that he would like to switch to Ozempic for the improved weight loss associated with the medication vs Trulicity. The PAP application was filled out online with the pt's cooperation.   He had access to his email and the application while on the video visit.       Current anti-hyperglycemic regimen includes:    Key Antihyperglycemic Medications               metFORMIN (GLUCOPHAGE) 500 MG tablet TAKE 1 TABLET BY MOUTH TWICE DAILY WITH MEALS    Dulaglutide (TRULICITY) 4.5 MB/8.9UC SOPN Inject 4.5 mg into the skin every 7 days          Complete current medication regimen includes:  Current Outpatient Medications   Medication Sig    Alcohol Swabs (DROPSAFE ALCOHOL PREP) 70 % PADS     Blood Glucose Monitoring Suppl (TRUE METRIX AIR GLUCOSE METER) w/Device KIT     buPROPion (WELLBUTRIN XL) 150 MG extended release tablet Take 150 mg by mouth daily    fluticasone-salmeterol (ADVAIR) 250-50 MCG/ACT AEPB diskus inhaler Inhale 1 puff into the lungs 2 times daily    isosorbide mononitrate (IMDUR) 30 MG extended release tablet Take 30 mg by mouth    TRUEplus Lancets 33G MISC     magnesium oxide (MAG-OX) 400 MG tablet Take 400 mg by mouth daily as needed    metFORMIN (GLUCOPHAGE) 500 MG tablet TAKE 1 TABLET BY MOUTH TWICE DAILY WITH MEALS    ondansetron (ZOFRAN) 4 MG tablet Take 4 mg by mouth once    TRUE METRIX BLOOD GLUCOSE TEST strip CHECK BLOOD SUGAR THREE TIMES DAILY    simvastatin (ZOCOR) 10 MG tablet TAKE 1 TABLET EVERY NIGHT    montelukast (SINGULAIR) 10 MG tablet TAKE 1 TABLET EVERY DAY    albuterol sulfate HFA (PROVENTIL;VENTOLIN;PROAIR) 108 (90 Base) MCG/ACT inhaler Inhale 1 puff into the lungs every 4 hours as needed    alfuzosin (UROXATRAL) 10 MG extended release tablet TAKE 1 TABLET BY MOUTH DAILY AFTER DINNER    amitriptyline (ELAVIL) 10 MG tablet TAKE 1 TABLET EVERY NIGHT    ascorbic acid (VITAMIN C) 500 MG tablet Take 500 mg by mouth daily    aspirin 81 MG EC tablet Take 81 mg by mouth daily    bumetanide (BUMEX) 1 MG tablet Take 1 mg by mouth daily    vitamin D (CHOLECALCIFEROL) 25 MCG (1000 UT) TABS tablet Take 1,000 Units by mouth daily    Dulaglutide (TRULICITY) 4.5 BL/8.9BI SOPN Inject 4.5 mg into the skin every 7 days dutasteride (AVODART) 0.5 MG capsule Take 0.5 mg by mouth    fluticasone (FLONASE) 50 MCG/ACT nasal spray ceived the following from [facility]: Outside name: fluticasone propionate (FLONASE) 50 mcg/actuation nasal spray    gabapentin (NEURONTIN) 300 MG capsule Take 300 mg by mouth 3 times daily. lisinopril (PRINIVIL;ZESTRIL) 20 MG tablet Take 20 mg by mouth daily    Melatonin 5 MG CAPS Take 5 mg by mouth    metoprolol succinate (TOPROL XL) 50 MG extended release tablet TAKE 1 TABLET EVERY DAY    naloxone 4 MG/0.1ML LIQD nasal spray 4 mg by Nasal route as needed    nitroGLYCERIN (NITROSTAT) 0.4 MG SL tablet [The details of the medication are not available because there are pending changes by a home health clinician.]    polyethylene glycol (GLYCOLAX) 17 GM/SCOOP powder Take 17 g by mouth daily    pramipexole (MIRAPEX) 0.75 MG tablet [The details of the medication are not available because there are pending changes by a home health clinician.]    testosterone cypionate (DEPOTESTOTERONE CYPIONATE) 200 MG/ML injection Inject 100 mg into the muscle every 7 days. trospium (SANCTURA) 20 MG tablet Take 20 mg by mouth 2 times daily (before meals)     No current facility-administered medications for this visit. Allergies: Allergies   Allergen Reactions    Oxymorphone Other (See Comments)     Feels like bug crawling under skin and drowziness  Other reaction(s): other/intolerance  Felt like bugs were crawling all over me  Felt like bugs were crawling all over me      Sulfamethoxazole-Trimethoprim Rash     Bactrim DS, per patient.        Blood Glucose Monitoring (BGM) or CGM:  - Had access to home glucometer/blood glucose log/CGM reader today:  No - pt report  - Conducts/scans: 2x daily   - Fasting BG today: 85  - Post-prandial: 120-140   -FB-125    ROS:  Today, Pt endorses:  - Symptoms of Hyperglycemia: none  - Symptoms of Hypoglycemia: none    Lifestyle modification(s):  - none    Medication Adherence/Access:  - Endorses adherence to current regimen?: Yes    Vitals/Labs:  No results found for: HBA1C  Hemoglobin A1C, POC   Date Value Ref Range Status   09/13/2022 7.0 % Final        Screenings/Prevention Parameters:  -Diabetic Eye and Foot Exams:      Diabetes Management   Topic Date Due    Diabetic foot exam  08/02/2018    Diabetic retinal exam  08/24/2019    Lipids  10/29/2022    A1C test (Diabetic or Prediabetic)  03/31/2023     -Microalbumin / Creatinine ratio:     No results found for: VALERIE [id number]  -ASCVD Risk Score Parameters and Calculation    The ASCVD Risk score (Peterson ZAMUDIO, et al., 2019) failed to calculate for the following reasons: The valid total cholesterol range is 130 to 320 mg/dL    -Immunizations:      Immunization History   Administered Date(s) Administered    Influenza, FLUAD, (age 72 y+), Adjuvanted, 0.5mL 02/01/2022    Influenza, FLUARIX, FLULAVAL, FLUZONE (age 10 mo+) AND AFLURIA, (age 1 y+), PF, 0.5mL 12/07/2016, 11/02/2017, 10/25/2018, 12/10/2019    Pneumococcal Conjugate 13-valent ([id number]) 08/08/2016    Pneumococcal Polysaccharide ([id number]) 02/01/2022    Tdap (Boostrix, Adacel) 08/08/2016       Additional Laboratory Parameters of Interest:   Estimation of renal function:  Lab Results   Component Value Date/Time    GFRAA >60 10/03/2022 04:20 AM    GFRAA >60 10/02/2022 03:32 AM    GFRAA >60 10/01/2022 09:17 AM     Wt Readings from Last 3 Encounters:   02/06/23 (!) 310 lb (140.6 kg)   12/07/22 (!) 320 lb (145.2 kg)   11/17/22 (!) 316 lb (143.3 kg)     Ht Readings from Last 1 Encounters:   02/06/23 6' 2\" (1.88 m)     Calculated estimated creatinine clearance: CrCl cannot be calculated (Unknown ideal weight.). Vital Signs Today:    There were no vitals taken for this visit. There are no discontinued medications. No orders of the defined types were placed in this encounter.       Future Appointments   Date Time Provider Donald Cameron   4/11/2023 10:00 AM ELHAM Connelly [facility] BS AMB   2023 11:00 AM Tati Meyer PA-C [facility] Felicia Barnes   2023  9:15 AM Myles Prince MD [facility] BS AMB       Patient verbalized understanding of the information presented and all of the patients questions were answered. AVS was gone over with the patient. Patient advised to call the office with any additional questions or concerns. Notifications of recommendations will be sent to Angus Fischer MD for review.       Thank you for the consult,  Maria Elena Abreu, PharmD, BCACP, BC-ADM        For Pharmacy Admin Tracking Only    Program: Medical Group  CPA in place:  Yes  Recommendation Provided To: Patient/Caregiver: 4 via In person  Intervention Detail: Adherence Monitorin, Discontinued Rx: 1, reason: Cost/Formulary Change, New Rx: 1, reason: Cost/Formulary Change, and Patient Access Assistance/Sample Provided  Intervention Accepted By: Patient/Caregiver: 4  Gap Closed?: No   Time Spent (min): 45

## 2023-03-14 ENCOUNTER — TELEMEDICINE (OUTPATIENT)
Age: 68
End: 2023-03-14

## 2023-03-14 DIAGNOSIS — E11.65 TYPE 2 DIABETES MELLITUS WITH HYPERGLYCEMIA, WITHOUT LONG-TERM CURRENT USE OF INSULIN (HCC): Primary | ICD-10-CM

## 2023-03-14 RX ORDER — SEMAGLUTIDE 2.68 MG/ML
2 INJECTION, SOLUTION SUBCUTANEOUS
Qty: 9 ML | Refills: 3
Start: 2023-03-14

## 2023-03-27 RX ORDER — GLIPIZIDE 10 MG/1
TABLET ORAL
Qty: 180 TABLET | Refills: 0 | Status: SHIPPED | OUTPATIENT
Start: 2023-03-27

## 2023-04-12 RX ORDER — METOPROLOL SUCCINATE 50 MG/1
TABLET, EXTENDED RELEASE ORAL
Qty: 90 TABLET | Refills: 0 | Status: SHIPPED | OUTPATIENT
Start: 2023-04-12

## 2023-04-12 RX ORDER — PRAMIPEXOLE DIHYDROCHLORIDE 0.75 MG/1
TABLET ORAL
Qty: 270 TABLET | Refills: 0 | Status: SHIPPED | OUTPATIENT
Start: 2023-04-12

## 2023-04-25 ENCOUNTER — PHARMACY VISIT (OUTPATIENT)
Age: 68
End: 2023-04-25

## 2023-04-25 DIAGNOSIS — E11.65 TYPE 2 DIABETES MELLITUS WITH HYPERGLYCEMIA, WITHOUT LONG-TERM CURRENT USE OF INSULIN (HCC): Primary | ICD-10-CM

## 2023-04-25 NOTE — PROGRESS NOTES
Pharmacy Progress Note - Diabetes Management       Assessment / Plan:   Diabetes Management:  Per ADA guidelines, Pt's A1c is not at goal of < 7%. Pt's FBG values are typically at goal while his post-prandial values following lunch and dinner are typically elevated. This is likely d/t his poor dietary choices since the last visit. Encouraged to improve his diet to improve his glycemic control and he verbalized understanding. He may be starting Ozempic 2mg weekly to replace his Trulicity 9.7OQ weekly in the interim of the next visit. Will reassess with SMBG logs at follow up in 4 weeks. Nutrition/Lifestyle Modifications:  - Educated pt on the importance of moderating carbohydrate intake. Reviewed sources of carbohydrates and method to help determine appropriate portion sizes (e.g., Diabetes Plate Method). - Advised patient to avoid sugar-sweetened beverages and replace with water or diet/zero sugar option.  - Recommend ~30 minutes consistent, moderately intensive, exercise/day or ~150 minutes/week. Start small, stay consistent, and increase length and types of exercise, as tolerated. Patient will return to clinic in 4 week(s) for follow up. S/O: Mr. Shay Mckinney, a 76 y.o. male referred by Erin Newby MD,  has a past medical history of Abdominal adhesions, BPH (benign prostatic hyperplasia), Cellulitis, Chronic pain, DDD (degenerative disc disease), lumbar, Depression, Diabetes ([address] Frequent falls, LORRAINE (generalized anxiety disorder), HLD (hyperlipidemia), Hypertension, Hypogonadism in male, Incomplete bladder emptying, Kidney stones, Lumbar facet arthropathy, Lumbar nerve root impingement, Lumbosacral radiculopathy at S1, Major depression, Neurological disorder, MEGHA (obstructive sleep apnea), S/P lumbar laminectomy, S/P lumbar spinal fusion, Sleep apnea, and Thoracic compression fracture ([address] Pt was seen today virtually via MyChart video visit for diabetes management.   Patient's last

## 2023-05-16 ENCOUNTER — CLINICAL DOCUMENTATION (OUTPATIENT)
Age: 68
End: 2023-05-16

## 2023-05-23 ENCOUNTER — PHARMACY VISIT (OUTPATIENT)
Age: 68
End: 2023-05-23

## 2023-05-23 DIAGNOSIS — E11.40 TYPE 2 DIABETES MELLITUS WITH DIABETIC NEUROPATHY, WITHOUT LONG-TERM CURRENT USE OF INSULIN (HCC): Primary | ICD-10-CM

## 2023-05-23 RX ORDER — METFORMIN HYDROCHLORIDE 500 MG/1
1000 TABLET, EXTENDED RELEASE ORAL
Qty: 360 TABLET | Refills: 3 | Status: SHIPPED | OUTPATIENT
Start: 2023-05-23

## 2023-05-23 RX ORDER — ONDANSETRON 4 MG/1
4 TABLET, ORALLY DISINTEGRATING ORAL EVERY 12 HOURS PRN
Qty: 21 TABLET | Refills: 0 | Status: SHIPPED | OUTPATIENT
Start: 2023-05-23

## 2023-05-23 NOTE — PROGRESS NOTES
Pharmacy Progress Note - Diabetes Management       Assessment / Plan:   Diabetes Management:  Per ADA guidelines, Pt's A1c is not at goal of < 7%. Pt's glycemic control has improved with the switch to Ozempic. Additionally, he is eating less as a result of the switch. Would prefer to maximize metformin tx in an attempt to target his FBG values. Will reassess with SMBG logs at follow up in 5 weeks. - Continue Ozempic 2mg weekly  - Increase metformin ER to 1000mg bid     Nutrition/Lifestyle Modifications:  - Educated pt on the importance of moderating carbohydrate intake. Reviewed sources of carbohydrates and method to help determine appropriate portion sizes (e.g., Diabetes Plate Method). - Advised patient to avoid sugar-sweetened beverages and replace with water or diet/zero sugar option.  - Recommend ~30 minutes consistent, moderately intensive, exercise/day or ~150 minutes/week. Start small, stay consistent, and increase length and types of exercise, as tolerated. Patient will return to clinic in 5 week(s) for follow up. S/O: Mr. Yfn Scott, a 76 y.o. male referred by Chivo Albrecht MD,  has a past medical history of Abdominal adhesions, BPH (benign prostatic hyperplasia), Cellulitis, Chronic pain, DDD (degenerative disc disease), lumbar, Depression, Diabetes ([address] Frequent falls, LORRAINE (generalized anxiety disorder), HLD (hyperlipidemia), Hypertension, Hypogonadism in male, Incomplete bladder emptying, Kidney stones, Lumbar facet arthropathy, Lumbar nerve root impingement, Lumbosacral radiculopathy at S1, Major depression, Neurological disorder, MEGHA (obstructive sleep apnea), S/P lumbar laminectomy, S/P lumbar spinal fusion, Sleep apnea, and Thoracic compression fracture ([address] Pt was seen today virtually via MyChart video visit for diabetes management.   Patient's last A1c was:   Hemoglobin A1C   Date Value Ref Range Status   12/31/2022 9.0 (H) 4.2 - 5.6 % Final     Comment:

## 2023-06-27 ENCOUNTER — PHARMACY VISIT (OUTPATIENT)
Age: 68
End: 2023-06-27

## 2023-06-27 DIAGNOSIS — E11.65 TYPE 2 DIABETES MELLITUS WITH HYPERGLYCEMIA, WITHOUT LONG-TERM CURRENT USE OF INSULIN (HCC): Primary | ICD-10-CM

## 2023-07-06 RX ORDER — MONTELUKAST SODIUM 10 MG/1
TABLET ORAL
Qty: 90 TABLET | Refills: 1 | Status: SHIPPED | OUTPATIENT
Start: 2023-07-06

## 2023-07-06 RX ORDER — BUPROPION HYDROCHLORIDE 150 MG/1
150 TABLET ORAL DAILY
Qty: 90 TABLET | Refills: 0 | Status: SHIPPED | OUTPATIENT
Start: 2023-07-06

## 2023-07-06 RX ORDER — BUPROPION HYDROCHLORIDE 150 MG/1
TABLET, EXTENDED RELEASE ORAL
Qty: 90 TABLET | Refills: 1 | OUTPATIENT
Start: 2023-07-06

## 2023-07-06 RX ORDER — SIMVASTATIN 10 MG
TABLET ORAL
Qty: 90 TABLET | Refills: 1 | Status: SHIPPED | OUTPATIENT
Start: 2023-07-06

## 2023-07-19 RX ORDER — CALCIUM CITRATE/VITAMIN D3 200MG-6.25
TABLET ORAL
Qty: 300 STRIP | Refills: 0 | Status: SHIPPED | OUTPATIENT
Start: 2023-07-19

## 2023-07-19 NOTE — TELEPHONE ENCOUNTER
Requested Prescriptions     Pending Prescriptions Disp Refills    TRUE METRIX BLOOD GLUCOSE TEST strip [Pharmacy Med Name: TRUE METRIX SELF MONITORING BLOOD GLUCOSE STRIPS   Strip] 300 strip 0     Sig: TEST BLOOD SUGAR THREE TIMES DAILY     Last OV: 2/6/2023  Last labs: 12/31/2022  Next OV and labs: 9/6/2023

## 2023-07-20 ENCOUNTER — HOSPITAL ENCOUNTER (OUTPATIENT)
Facility: HOSPITAL | Age: 68
Discharge: HOME | End: 2023-07-20
Payer: MEDICARE

## 2023-07-20 ENCOUNTER — HOSPITAL ENCOUNTER (OUTPATIENT)
Facility: HOSPITAL | Age: 68
End: 2023-07-20
Payer: MEDICARE

## 2023-07-20 DIAGNOSIS — E11.65 POORLY CONTROLLED DIABETES MELLITUS (HCC): ICD-10-CM

## 2023-07-20 LAB
ALBUMIN SERPL-MCNC: 4.3 G/DL (ref 3.4–5)
ALBUMIN SERPL-MCNC: 4.3 G/DL (ref 3.4–5)
ALBUMIN/GLOB SERPL: 1.1 (ref 0.8–1.7)
ALP SERPL-CCNC: 88 U/L (ref 45–117)
ALT SERPL-CCNC: 42 U/L (ref 16–61)
ANION GAP SERPL CALC-SCNC: 7 MMOL/L (ref 3–18)
ANION GAP SERPL CALC-SCNC: 8 MMOL/L (ref 3–18)
AST SERPL-CCNC: 28 U/L (ref 10–38)
BILIRUB SERPL-MCNC: 0.9 MG/DL (ref 0.2–1)
BUN SERPL-MCNC: 18 MG/DL (ref 7–18)
BUN SERPL-MCNC: 18 MG/DL (ref 7–18)
BUN/CREAT SERPL: 13 (ref 12–20)
BUN/CREAT SERPL: 14 (ref 12–20)
CALCIUM SERPL-MCNC: 10 MG/DL (ref 8.5–10.1)
CALCIUM SERPL-MCNC: 9.9 MG/DL (ref 8.5–10.1)
CHLORIDE SERPL-SCNC: 105 MMOL/L (ref 100–111)
CHLORIDE SERPL-SCNC: 106 MMOL/L (ref 100–111)
CO2 SERPL-SCNC: 26 MMOL/L (ref 21–32)
CO2 SERPL-SCNC: 29 MMOL/L (ref 21–32)
CREAT SERPL-MCNC: 1.29 MG/DL (ref 0.6–1.3)
CREAT SERPL-MCNC: 1.35 MG/DL (ref 0.6–1.3)
CREAT UR-MCNC: 170 MG/DL (ref 30–125)
GLOBULIN SER CALC-MCNC: 4 G/DL (ref 2–4)
GLUCOSE SERPL-MCNC: 100 MG/DL (ref 74–99)
GLUCOSE SERPL-MCNC: 98 MG/DL (ref 74–99)
MICROALBUMIN UR-MCNC: 1.92 MG/DL (ref 0–3)
MICROALBUMIN/CREAT UR-RTO: 11 MG/G (ref 0–30)
PHOSPHATE SERPL-MCNC: 3.7 MG/DL (ref 2.5–4.9)
POTASSIUM SERPL-SCNC: 4.2 MMOL/L (ref 3.5–5.5)
POTASSIUM SERPL-SCNC: 4.5 MMOL/L (ref 3.5–5.5)
PROT SERPL-MCNC: 8.3 G/DL (ref 6.4–8.2)
SODIUM SERPL-SCNC: 140 MMOL/L (ref 136–145)
SODIUM SERPL-SCNC: 141 MMOL/L (ref 136–145)

## 2023-07-20 PROCEDURE — 80069 RENAL FUNCTION PANEL: CPT

## 2023-07-20 PROCEDURE — 80053 COMPREHEN METABOLIC PANEL: CPT

## 2023-07-20 PROCEDURE — 36415 COLL VENOUS BLD VENIPUNCTURE: CPT

## 2023-07-20 PROCEDURE — 82570 ASSAY OF URINE CREATININE: CPT

## 2023-07-20 PROCEDURE — 82043 UR ALBUMIN QUANTITATIVE: CPT

## 2023-07-24 DIAGNOSIS — E11.40 TYPE 2 DIABETES MELLITUS WITH DIABETIC NEUROPATHY, WITHOUT LONG-TERM CURRENT USE OF INSULIN (HCC): Primary | ICD-10-CM

## 2023-07-24 NOTE — PROGRESS NOTES
Ranges  <5.7              Normal  5.7 - 6.4         Consider Prediabetes  >6.5              Consider Diabetes       Hemoglobin A1C, POC   Date Value Ref Range Status   09/13/2022 7.0 % Final   09/13/2019 8.1 % Final       Screenings/Prevention Parameters:  -Diabetic Eye and Foot Exams:      Diabetes Management   Topic Date Due    Diabetic foot exam  08/02/2018    Diabetic retinal exam  08/24/2019    Lipids  10/29/2022    A1C test (Diabetic or Prediabetic)  03/31/2023     -Microalbumin / Creatinine ratio:       Lab Results   Component Value Date/Time    MICROALBUR 1.44 12/31/2022 10:40 AM    LABCREA 170.00 07/20/2023 03:01 PM     -Immunizations:      Immunization History   Administered Date(s) Administered    Influenza, FLUAD, (age 72 y+), Adjuvanted, 0.5mL 02/01/2022    Influenza, FLUARIX, FLULAVAL, FLUZONE (age 10 mo+) AND AFLURIA, (age 1 y+), PF, 0.5mL 12/07/2016, 11/02/2017, 10/25/2018, 12/10/2019    Pneumococcal, PCV-13, PREVNAR 13, (age 6w+), IM, 0.5mL 08/08/2016    Pneumococcal, PPSV23, PNEUMOVAX 23, (age 2y+), SC/IM, 0.5mL 02/01/2022    TDaP, ADACEL (age 6y-58y), BOOSTRIX (age 10y+), IM, 0.5mL 08/08/2016       Additional Laboratory Parameters of Interest:   Estimation of renal function:  Lab Results   Component Value Date/Time    GFRAA >60 10/03/2022 04:20 AM    GFRAA >60 10/02/2022 03:32 AM    GFRAA >60 10/01/2022 09:17 AM     Wt Readings from Last 3 Encounters:   02/06/23 (!) 310 lb (140.6 kg)   12/07/22 (!) 320 lb (145.2 kg)   11/17/22 (!) 316 lb (143.3 kg)     Ht Readings from Last 1 Encounters:   02/06/23 6' 2\" (1.88 m)     Calculated estimated creatinine clearance: CrCl cannot be calculated (Unknown ideal weight.). Vital Signs Today:    There were no vitals taken for this visit. There are no discontinued medications. No orders of the defined types were placed in this encounter.       Future Appointments   Date Time Provider [address]   8/16/2023  9:30 AM Lazaro Ship, MD Mindy [place]

## 2023-07-25 ENCOUNTER — PHARMACY VISIT (OUTPATIENT)
Age: 68
End: 2023-07-25

## 2023-07-25 DIAGNOSIS — E11.65 TYPE 2 DIABETES MELLITUS WITH HYPERGLYCEMIA, WITHOUT LONG-TERM CURRENT USE OF INSULIN (HCC): Primary | ICD-10-CM

## 2023-08-11 ENCOUNTER — TELEPHONE (OUTPATIENT)
Age: 68
End: 2023-08-11

## 2023-08-11 NOTE — TELEPHONE ENCOUNTER
Pt is requesting a referral to a pain management as suggested by Dr Alexandr Ferguson at his last appt. Please advise.

## 2023-08-21 ENCOUNTER — OFFICE VISIT (OUTPATIENT)
Age: 68
End: 2023-08-21

## 2023-08-21 ENCOUNTER — TELEPHONE (OUTPATIENT)
Age: 68
End: 2023-08-21

## 2023-08-21 VITALS
OXYGEN SATURATION: 95 % | HEIGHT: 74 IN | BODY MASS INDEX: 38.63 KG/M2 | DIASTOLIC BLOOD PRESSURE: 80 MMHG | WEIGHT: 301 LBS | SYSTOLIC BLOOD PRESSURE: 136 MMHG | HEART RATE: 88 BPM

## 2023-08-21 DIAGNOSIS — I20.9 ANGINA PECTORIS, UNSPECIFIED (HCC): Primary | ICD-10-CM

## 2023-08-21 RX ORDER — BUMETANIDE 1 MG/1
TABLET ORAL
Qty: 90 TABLET | Refills: 0 | Status: SHIPPED | OUTPATIENT
Start: 2023-08-21

## 2023-08-21 RX ORDER — NITROGLYCERIN 0.4 MG/1
0.4 TABLET SUBLINGUAL EVERY 5 MIN PRN
Qty: 25 TABLET | Refills: 3 | Status: SHIPPED | OUTPATIENT
Start: 2023-08-21

## 2023-08-21 NOTE — TELEPHONE ENCOUNTER
Left patient a message letting him know medication has been received. Placed medication in big fridge with patient's information inside the bag.

## 2023-08-21 NOTE — PROGRESS NOTES
[facility]    Cleo Katz is 76 y.o. male with a history of diabetes, hypertension, hyperlipidemia and other multiple medical problems including sleep apnea      Patient is here today for follow-up appointment he denies any prior history of MI or CHF. Patient denies any history of recent ER visit f  Approximately 6 weeks ago, patient had chest discomfort pressure sensation for  which he had to take nitroglycerin relieved the pressure. He also took additional aspirin. He is taking his antianginal antihypertensive medication. 0 vomiting or diaphoresis. He has stable dyspnea on exertion. He did not have any chest pain or use of nitroglycerin prior to that. He is taking his medication as prescribed. Past Medical History:   Diagnosis Date    Abdominal adhesions 07/30/2014    BPH (benign prostatic hyperplasia)     Cellulitis     Chronic pain 10/08/11    Back pain due a work injury    DDD (degenerative disc disease), lumbar 07/30/2014    Depression     Diabetes ([address])     Frequent falls 07/30/2014    LORRAINE (generalized anxiety disorder) 07/30/2014    HLD (hyperlipidemia)     Hypertension     Hypogonadism in male     Incomplete bladder emptying     Kidney stones     Lumbar facet arthropathy 07/30/2014    Lumbar nerve root impingement 07/30/2014    Lumbosacral radiculopathy at S1 07/30/2014    Major depression 07/30/2014    Neurological disorder     sciatica    MEGHA (obstructive sleep apnea) 07/30/2014    S/P lumbar laminectomy 07/30/2014    S/P lumbar spinal fusion 07/30/2014    Sleep apnea     USES CPAP EVERY NIGHT    Thoracic compression fracture ([address]) 07/30/2014       Review of Systems:  Cardiac symptoms as noted above in HPI. All others negative.     Current Outpatient Medications   Medication Sig    TRUE METRIX BLOOD GLUCOSE TEST strip TEST BLOOD SUGAR THREE TIMES DAILY    alfuzosin (UROXATRAL) 10 MG extended release tablet TAKE 1 TABLET BY

## 2023-08-21 NOTE — TELEPHONE ENCOUNTER
Requested Prescriptions     Pending Prescriptions Disp Refills    bumetanide (BUMEX) 1 MG tablet [Pharmacy Med Name: BUMETANIDE 1 MG Tablet] 90 tablet      Sig: TAKE 1 TABLET EVERY DAY     Last OV: 2/6/2023  Last labs: 12/31/2022  Next OV and labs: 9/6/2023

## 2023-08-22 RX ORDER — SODIUM CHLORIDE 0.9 % (FLUSH) 0.9 %
5-40 SYRINGE (ML) INJECTION PRN
OUTPATIENT
Start: 2023-08-22

## 2023-08-22 RX ORDER — SODIUM CHLORIDE 0.9 % (FLUSH) 0.9 %
5-40 SYRINGE (ML) INJECTION EVERY 12 HOURS SCHEDULED
OUTPATIENT
Start: 2023-08-22

## 2023-08-22 RX ORDER — ASPIRIN 81 MG/1
81 TABLET, CHEWABLE ORAL ONCE
OUTPATIENT
Start: 2023-08-22 | End: 2023-08-22

## 2023-08-22 RX ORDER — SODIUM CHLORIDE 9 MG/ML
INJECTION, SOLUTION INTRAVENOUS PRN
OUTPATIENT
Start: 2023-08-22

## 2023-09-02 DIAGNOSIS — E11.40 TYPE 2 DIABETES MELLITUS WITH DIABETIC NEUROPATHY, WITHOUT LONG-TERM CURRENT USE OF INSULIN (HCC): ICD-10-CM

## 2023-09-04 SDOH — ECONOMIC STABILITY: HOUSING INSECURITY
IN THE LAST 12 MONTHS, WAS THERE A TIME WHEN YOU DID NOT HAVE A STEADY PLACE TO SLEEP OR SLEPT IN A SHELTER (INCLUDING NOW)?: NO

## 2023-09-04 SDOH — ECONOMIC STABILITY: FOOD INSECURITY: WITHIN THE PAST 12 MONTHS, YOU WORRIED THAT YOUR FOOD WOULD RUN OUT BEFORE YOU GOT MONEY TO BUY MORE.: NEVER TRUE

## 2023-09-04 SDOH — ECONOMIC STABILITY: TRANSPORTATION INSECURITY
IN THE PAST 12 MONTHS, HAS LACK OF TRANSPORTATION KEPT YOU FROM MEETINGS, WORK, OR FROM GETTING THINGS NEEDED FOR DAILY LIVING?: NO

## 2023-09-04 SDOH — ECONOMIC STABILITY: FOOD INSECURITY: WITHIN THE PAST 12 MONTHS, THE FOOD YOU BOUGHT JUST DIDN'T LAST AND YOU DIDN'T HAVE MONEY TO GET MORE.: NEVER TRUE

## 2023-09-04 SDOH — ECONOMIC STABILITY: INCOME INSECURITY: HOW HARD IS IT FOR YOU TO PAY FOR THE VERY BASICS LIKE FOOD, HOUSING, MEDICAL CARE, AND HEATING?: SOMEWHAT HARD

## 2023-09-05 RX ORDER — METOPROLOL SUCCINATE 50 MG/1
TABLET, EXTENDED RELEASE ORAL
Qty: 90 TABLET | Refills: 0 | OUTPATIENT
Start: 2023-09-05

## 2023-09-05 RX ORDER — METFORMIN HYDROCHLORIDE 500 MG/1
1000 TABLET, EXTENDED RELEASE ORAL
Qty: 360 TABLET | Refills: 1 | Status: SHIPPED | OUTPATIENT
Start: 2023-09-05

## 2023-09-05 RX ORDER — PRAMIPEXOLE DIHYDROCHLORIDE 0.75 MG/1
TABLET ORAL
Qty: 270 TABLET | Refills: 0 | Status: SHIPPED | OUTPATIENT
Start: 2023-09-05

## 2023-09-05 RX ORDER — GLIPIZIDE 10 MG/1
10 TABLET ORAL 2 TIMES DAILY
Qty: 180 TABLET | Refills: 0 | Status: SHIPPED | OUTPATIENT
Start: 2023-09-05

## 2023-09-05 RX ORDER — GLIPIZIDE 10 MG/1
TABLET ORAL
Qty: 180 TABLET | Refills: 0 | OUTPATIENT
Start: 2023-09-05

## 2023-09-05 RX ORDER — METOPROLOL SUCCINATE 50 MG/1
50 TABLET, EXTENDED RELEASE ORAL DAILY
Qty: 90 TABLET | Refills: 0 | Status: SHIPPED | OUTPATIENT
Start: 2023-09-05

## 2023-09-05 RX ORDER — PRAMIPEXOLE DIHYDROCHLORIDE 0.75 MG/1
TABLET ORAL
Qty: 270 TABLET | Refills: 0 | OUTPATIENT
Start: 2023-09-05

## 2023-09-06 ENCOUNTER — OFFICE VISIT (OUTPATIENT)
Age: 68
End: 2023-09-06
Payer: MEDICARE

## 2023-09-06 VITALS
HEART RATE: 86 BPM | TEMPERATURE: 97 F | OXYGEN SATURATION: 94 % | DIASTOLIC BLOOD PRESSURE: 64 MMHG | SYSTOLIC BLOOD PRESSURE: 120 MMHG | RESPIRATION RATE: 16 BRPM

## 2023-09-06 DIAGNOSIS — R97.20 ELEVATED PSA: ICD-10-CM

## 2023-09-06 DIAGNOSIS — Z96.89 SPINAL CORD STIMULATOR STATUS: ICD-10-CM

## 2023-09-06 DIAGNOSIS — J44.9 CHRONIC OBSTRUCTIVE PULMONARY DISEASE, UNSPECIFIED COPD TYPE (HCC): ICD-10-CM

## 2023-09-06 DIAGNOSIS — M96.1 LUMBAR POST-LAMINECTOMY SYNDROME: ICD-10-CM

## 2023-09-06 DIAGNOSIS — R05.1 ACUTE COUGH: ICD-10-CM

## 2023-09-06 DIAGNOSIS — Z91.81 AT HIGH RISK FOR FALLS: ICD-10-CM

## 2023-09-06 DIAGNOSIS — I10 PRIMARY HYPERTENSION: ICD-10-CM

## 2023-09-06 DIAGNOSIS — R06.02 SHORTNESS OF BREATH: ICD-10-CM

## 2023-09-06 DIAGNOSIS — I77.810 AORTIC ROOT DILATATION (HCC): ICD-10-CM

## 2023-09-06 DIAGNOSIS — E11.40 TYPE 2 DIABETES MELLITUS WITH DIABETIC NEUROPATHY, WITHOUT LONG-TERM CURRENT USE OF INSULIN (HCC): Primary | ICD-10-CM

## 2023-09-06 DIAGNOSIS — G89.4 CHRONIC PAIN SYNDROME: ICD-10-CM

## 2023-09-06 DIAGNOSIS — I25.119 ATHEROSCLEROSIS OF NATIVE CORONARY ARTERY OF NATIVE HEART WITH ANGINA PECTORIS (HCC): ICD-10-CM

## 2023-09-06 DIAGNOSIS — M51.34 DDD (DEGENERATIVE DISC DISEASE), THORACIC: ICD-10-CM

## 2023-09-06 DIAGNOSIS — G47.33 OSA (OBSTRUCTIVE SLEEP APNEA): ICD-10-CM

## 2023-09-06 PROBLEM — J96.01 ACUTE RESPIRATORY FAILURE WITH HYPOXIA (HCC): Status: RESOLVED | Noted: 2022-10-01 | Resolved: 2023-09-06

## 2023-09-06 LAB — HBA1C MFR BLD: 8.4 %

## 2023-09-06 PROCEDURE — 3046F HEMOGLOBIN A1C LEVEL >9.0%: CPT | Performed by: FAMILY MEDICINE

## 2023-09-06 PROCEDURE — 3023F SPIROM DOC REV: CPT | Performed by: FAMILY MEDICINE

## 2023-09-06 PROCEDURE — G8428 CUR MEDS NOT DOCUMENT: HCPCS | Performed by: FAMILY MEDICINE

## 2023-09-06 PROCEDURE — PBSHW AMB POC HEMOGLOBIN A1C: Performed by: FAMILY MEDICINE

## 2023-09-06 PROCEDURE — 99214 OFFICE O/P EST MOD 30 MIN: CPT | Performed by: FAMILY MEDICINE

## 2023-09-06 PROCEDURE — 3078F DIAST BP <80 MM HG: CPT | Performed by: FAMILY MEDICINE

## 2023-09-06 PROCEDURE — 3074F SYST BP LT 130 MM HG: CPT | Performed by: FAMILY MEDICINE

## 2023-09-06 PROCEDURE — 83036 HEMOGLOBIN GLYCOSYLATED A1C: CPT | Performed by: FAMILY MEDICINE

## 2023-09-06 PROCEDURE — 1123F ACP DISCUSS/DSCN MKR DOCD: CPT | Performed by: FAMILY MEDICINE

## 2023-09-06 PROCEDURE — 2022F DILAT RTA XM EVC RTNOPTHY: CPT | Performed by: FAMILY MEDICINE

## 2023-09-06 PROCEDURE — 1036F TOBACCO NON-USER: CPT | Performed by: FAMILY MEDICINE

## 2023-09-06 PROCEDURE — G8417 CALC BMI ABV UP PARAM F/U: HCPCS | Performed by: FAMILY MEDICINE

## 2023-09-06 PROCEDURE — 3017F COLORECTAL CA SCREEN DOC REV: CPT | Performed by: FAMILY MEDICINE

## 2023-09-06 RX ORDER — LISINOPRIL 20 MG/1
20 TABLET ORAL DAILY
Qty: 90 TABLET | Refills: 0 | Status: SHIPPED | OUTPATIENT
Start: 2023-09-06

## 2023-09-06 RX ORDER — AZITHROMYCIN 250 MG/1
250 TABLET, FILM COATED ORAL SEE ADMIN INSTRUCTIONS
Qty: 6 TABLET | Refills: 0 | Status: SHIPPED | OUTPATIENT
Start: 2023-09-06 | End: 2023-09-11

## 2023-09-06 RX ORDER — GLUCOSAM/CHON-MSM1/C/MANG/BOSW 500-416.6
TABLET ORAL
Qty: 300 EACH | Refills: 5 | Status: SHIPPED | OUTPATIENT
Start: 2023-09-06

## 2023-09-06 ASSESSMENT — PATIENT HEALTH QUESTIONNAIRE - PHQ9
2. FEELING DOWN, DEPRESSED OR HOPELESS: 0
SUM OF ALL RESPONSES TO PHQ9 QUESTIONS 1 & 2: 0
SUM OF ALL RESPONSES TO PHQ QUESTIONS 1-9: 0
1. LITTLE INTEREST OR PLEASURE IN DOING THINGS: 0
SUM OF ALL RESPONSES TO PHQ QUESTIONS 1-9: 0

## 2023-09-06 NOTE — PROGRESS NOTES
Liam Martin is a 76 y.o. male complains of   Chief Complaint   Patient presents with    Cough     Productive cough with dark green mucus x 10 days    Nasal Congestion     Yellow nasal discharge    Diabetes    Hypertension    Chronic Kidney Disease    Medication Adjustment     Ozempic is not helping, patient would like to know if he should go back to [facility]       HPI:  Here for follow-up. Diabetes. A1c 8.8. Compliant with medication. will be more compliant with diet modification. Stated today morning fasting sugar was in the 100. No hypo or hyperglycemic symptoms. Today complaining of cough with greenish sputum, runny nose, postnasal drip going on since last 10 days. No fever. No sick contact. Has not done any COVID test at home. No sore throat. No nausea vomiting or abdominal pain. History of COPD, CHF. Reviewed recent echo. Noted atrial enlargement and aortic root dilatation. His shortness of breath has been worsening lately. Seen cardiology. Plan for cardiac cath. No leg swelling. No audible wheezing. Chronic back pain. Using cane to ambulate. Risk of fall due to pain and rt foot deformity. Will send to podiatry. Elevated PSA. Follows with urology. Reviewed their notes. Denies any headache or dizziness. No chest pain. No palpitation.   CMP:  Lab Results   Component Value Date/Time     07/20/2023 03:00 PM    K 4.2 07/20/2023 03:00 PM     07/20/2023 03:00 PM    CO2 26 07/20/2023 03:00 PM    BUN 18 07/20/2023 03:00 PM    CREATININE 1.29 07/20/2023 03:00 PM    GLUCOSE 98 07/20/2023 03:00 PM    CALCIUM 9.9 07/20/2023 03:00 PM    PROT 8.3 07/20/2023 02:31 PM    LABALBU 4.3 07/20/2023 03:00 PM    BILITOT 0.9 07/20/2023 02:31 PM    AST 28 07/20/2023 02:31 PM    ALT 42 07/20/2023 02:31 PM        POC Glucose:   Lab Results   Component Value Date/Time    POCGLU 109 10/04/2022 12:16 PM        CBC:  Lab Results   Component Value Date/Time    WBC 7.2 12/31/2022 10:36 AM    RBC

## 2023-09-11 ENCOUNTER — HOSPITAL ENCOUNTER (OUTPATIENT)
Facility: HOSPITAL | Age: 68
End: 2023-09-11
Payer: MEDICARE

## 2023-09-11 ENCOUNTER — HOSPITAL ENCOUNTER (OUTPATIENT)
Facility: HOSPITAL | Age: 68
Discharge: HOME | End: 2023-09-14
Payer: MEDICARE

## 2023-09-11 ENCOUNTER — APPOINTMENT (OUTPATIENT)
Facility: HOSPITAL | Age: 68
End: 2023-09-11
Payer: MEDICARE

## 2023-09-11 DIAGNOSIS — R05.1 ACUTE COUGH: ICD-10-CM

## 2023-09-11 DIAGNOSIS — R06.02 SHORTNESS OF BREATH: ICD-10-CM

## 2023-09-11 LAB
ANION GAP SERPL CALC-SCNC: 4 MMOL/L (ref 3–18)
BASOPHILS # BLD: 0.1 K/UL (ref 0–0.1)
BASOPHILS NFR BLD: 1 % (ref 0–2)
BUN SERPL-MCNC: 27 MG/DL (ref 7–18)
BUN/CREAT SERPL: 21 (ref 12–20)
CALCIUM SERPL-MCNC: 9.2 MG/DL (ref 8.5–10.1)
CHLORIDE SERPL-SCNC: 106 MMOL/L (ref 100–111)
CO2 SERPL-SCNC: 28 MMOL/L (ref 21–32)
CREAT SERPL-MCNC: 1.3 MG/DL (ref 0.6–1.3)
DIFFERENTIAL METHOD BLD: ABNORMAL
EOSINOPHIL # BLD: 0.2 K/UL (ref 0–0.4)
EOSINOPHIL NFR BLD: 2 % (ref 0–5)
ERYTHROCYTE [DISTWIDTH] IN BLOOD BY AUTOMATED COUNT: 16.6 % (ref 11.6–14.5)
GLUCOSE SERPL-MCNC: 138 MG/DL (ref 74–99)
HCT VFR BLD AUTO: 45.2 % (ref 36–48)
HGB BLD-MCNC: 14.1 G/DL (ref 13–16)
IMM GRANULOCYTES # BLD AUTO: 0.1 K/UL (ref 0–0.04)
IMM GRANULOCYTES NFR BLD AUTO: 1 % (ref 0–0.5)
INR PPP: 1 (ref 0.9–1.1)
LYMPHOCYTES # BLD: 2.8 K/UL (ref 0.9–3.6)
LYMPHOCYTES NFR BLD: 27 % (ref 21–52)
MCH RBC QN AUTO: 27.2 PG (ref 24–34)
MCHC RBC AUTO-ENTMCNC: 31.2 G/DL (ref 31–37)
MCV RBC AUTO: 87.1 FL (ref 78–100)
MONOCYTES # BLD: 0.8 K/UL (ref 0.05–1.2)
MONOCYTES NFR BLD: 8 % (ref 3–10)
NEUTS SEG # BLD: 6.2 K/UL (ref 1.8–8)
NEUTS SEG NFR BLD: 61 % (ref 40–73)
NRBC # BLD: 0 K/UL (ref 0–0.01)
NRBC BLD-RTO: 0 PER 100 WBC
NT PRO BNP: 62 PG/ML (ref 0–900)
PLATELET # BLD AUTO: 287 K/UL (ref 135–420)
PMV BLD AUTO: 9.9 FL (ref 9.2–11.8)
POTASSIUM SERPL-SCNC: 4 MMOL/L (ref 3.5–5.5)
PROTHROMBIN TIME: 12.8 SEC (ref 11.9–14.7)
RBC # BLD AUTO: 5.19 M/UL (ref 4.35–5.65)
SODIUM SERPL-SCNC: 138 MMOL/L (ref 136–145)
WBC # BLD AUTO: 10.3 K/UL (ref 4.6–13.2)

## 2023-09-11 PROCEDURE — 85610 PROTHROMBIN TIME: CPT

## 2023-09-11 PROCEDURE — 83880 ASSAY OF NATRIURETIC PEPTIDE: CPT

## 2023-09-11 PROCEDURE — 36415 COLL VENOUS BLD VENIPUNCTURE: CPT

## 2023-09-11 PROCEDURE — 71046 X-RAY EXAM CHEST 2 VIEWS: CPT

## 2023-09-11 PROCEDURE — 85025 COMPLETE CBC W/AUTO DIFF WBC: CPT

## 2023-09-11 PROCEDURE — 80048 BASIC METABOLIC PNL TOTAL CA: CPT

## 2023-09-15 ENCOUNTER — HOSPITAL ENCOUNTER (OUTPATIENT)
Facility: HOSPITAL | Age: 68
Discharge: HOME | End: 2023-09-16
Attending: INTERNAL MEDICINE | Admitting: INTERNAL MEDICINE
Payer: MEDICARE

## 2023-09-15 DIAGNOSIS — I20.9 ANGINA PECTORIS, UNSPECIFIED (HCC): ICD-10-CM

## 2023-09-15 DIAGNOSIS — I20.0 INTERMEDIATE CORONARY SYNDROME (HCC): ICD-10-CM

## 2023-09-15 DIAGNOSIS — I25.83 CORONARY ARTERY DISEASE DUE TO LIPID RICH PLAQUE: Primary | ICD-10-CM

## 2023-09-15 DIAGNOSIS — I25.119 ATHEROSCLEROSIS OF NATIVE CORONARY ARTERY WITH ANGINA PECTORIS, UNSPECIFIED WHETHER NATIVE OR TRANSPLANTED HEART (HCC): ICD-10-CM

## 2023-09-15 DIAGNOSIS — I25.10 CORONARY ARTERY DISEASE DUE TO LIPID RICH PLAQUE: Primary | ICD-10-CM

## 2023-09-15 LAB
ACT BLD: 227 SECS (ref 79–138)
ANION GAP SERPL CALC-SCNC: 4 MMOL/L (ref 3–18)
BUN SERPL-MCNC: 17 MG/DL (ref 7–18)
BUN/CREAT SERPL: 15 (ref 12–20)
CALCIUM SERPL-MCNC: 8.9 MG/DL (ref 8.5–10.1)
CHLORIDE SERPL-SCNC: 108 MMOL/L (ref 100–111)
CO2 SERPL-SCNC: 27 MMOL/L (ref 21–32)
CREAT SERPL-MCNC: 1.1 MG/DL (ref 0.6–1.3)
ECHO BSA: 2.67 M2
EKG ATRIAL RATE: 71 BPM
EKG DIAGNOSIS: NORMAL
EKG P AXIS: -18 DEGREES
EKG P-R INTERVAL: 176 MS
EKG Q-T INTERVAL: 362 MS
EKG QRS DURATION: 84 MS
EKG QTC CALCULATION (BAZETT): 393 MS
EKG R AXIS: 67 DEGREES
EKG T AXIS: 54 DEGREES
EKG VENTRICULAR RATE: 71 BPM
ERYTHROCYTE [DISTWIDTH] IN BLOOD BY AUTOMATED COUNT: 16.5 % (ref 11.6–14.5)
GLUCOSE SERPL-MCNC: 137 MG/DL (ref 74–99)
HCT VFR BLD AUTO: 41.4 % (ref 36–48)
HGB BLD-MCNC: 13.3 G/DL (ref 13–16)
INR PPP: 1 (ref 0.9–1.1)
MCH RBC QN AUTO: 28.1 PG (ref 24–34)
MCHC RBC AUTO-ENTMCNC: 32.1 G/DL (ref 31–37)
MCV RBC AUTO: 87.3 FL (ref 78–100)
NRBC # BLD: 0 K/UL (ref 0–0.01)
NRBC BLD-RTO: 0 PER 100 WBC
PLATELET # BLD AUTO: 219 K/UL (ref 135–420)
PMV BLD AUTO: 9.9 FL (ref 9.2–11.8)
POTASSIUM SERPL-SCNC: 4.3 MMOL/L (ref 3.5–5.5)
PROTHROMBIN TIME: 12.9 SEC (ref 11.9–14.7)
RBC # BLD AUTO: 4.74 M/UL (ref 4.35–5.65)
SODIUM SERPL-SCNC: 139 MMOL/L (ref 136–145)
TROPONIN I SERPL HS-MCNC: 14 NG/L (ref 0–78)
WBC # BLD AUTO: 6.6 K/UL (ref 4.6–13.2)

## 2023-09-15 PROCEDURE — A4216 STERILE WATER/SALINE, 10 ML: HCPCS | Performed by: INTERNAL MEDICINE

## 2023-09-15 PROCEDURE — C1713 ANCHOR/SCREW BN/BN,TIS/BN: HCPCS | Performed by: INTERNAL MEDICINE

## 2023-09-15 PROCEDURE — 93005 ELECTROCARDIOGRAM TRACING: CPT | Performed by: INTERNAL MEDICINE

## 2023-09-15 PROCEDURE — 93458 L HRT ARTERY/VENTRICLE ANGIO: CPT | Performed by: INTERNAL MEDICINE

## 2023-09-15 PROCEDURE — 6360000002 HC RX W HCPCS: Performed by: INTERNAL MEDICINE

## 2023-09-15 PROCEDURE — 2580000003 HC RX 258: Performed by: INTERNAL MEDICINE

## 2023-09-15 PROCEDURE — 6370000000 HC RX 637 (ALT 250 FOR IP): Performed by: INTERNAL MEDICINE

## 2023-09-15 PROCEDURE — 6370000000 HC RX 637 (ALT 250 FOR IP): Performed by: NURSE PRACTITIONER

## 2023-09-15 PROCEDURE — 93571 IV DOP VEL&/PRESS C FLO 1ST: CPT | Performed by: INTERNAL MEDICINE

## 2023-09-15 PROCEDURE — C1769 GUIDE WIRE: HCPCS | Performed by: INTERNAL MEDICINE

## 2023-09-15 PROCEDURE — 94660 CPAP INITIATION&MGMT: CPT

## 2023-09-15 PROCEDURE — 84484 ASSAY OF TROPONIN QUANT: CPT

## 2023-09-15 PROCEDURE — 85610 PROTHROMBIN TIME: CPT

## 2023-09-15 PROCEDURE — C1894 INTRO/SHEATH, NON-LASER: HCPCS | Performed by: INTERNAL MEDICINE

## 2023-09-15 PROCEDURE — 6360000004 HC RX CONTRAST MEDICATION: Performed by: INTERNAL MEDICINE

## 2023-09-15 PROCEDURE — 85347 COAGULATION TIME ACTIVATED: CPT

## 2023-09-15 PROCEDURE — 94760 N-INVAS EAR/PLS OXIMETRY 1: CPT

## 2023-09-15 PROCEDURE — 76000 FLUOROSCOPY <1 HR PHYS/QHP: CPT | Performed by: INTERNAL MEDICINE

## 2023-09-15 PROCEDURE — C9113 INJ PANTOPRAZOLE SODIUM, VIA: HCPCS | Performed by: INTERNAL MEDICINE

## 2023-09-15 PROCEDURE — C1887 CATHETER, GUIDING: HCPCS | Performed by: INTERNAL MEDICINE

## 2023-09-15 PROCEDURE — 36415 COLL VENOUS BLD VENIPUNCTURE: CPT

## 2023-09-15 PROCEDURE — 99153 MOD SED SAME PHYS/QHP EA: CPT | Performed by: INTERNAL MEDICINE

## 2023-09-15 PROCEDURE — 2709999900 HC NON-CHARGEABLE SUPPLY: Performed by: INTERNAL MEDICINE

## 2023-09-15 PROCEDURE — 80048 BASIC METABOLIC PNL TOTAL CA: CPT

## 2023-09-15 PROCEDURE — 85027 COMPLETE CBC AUTOMATED: CPT

## 2023-09-15 PROCEDURE — 99152 MOD SED SAME PHYS/QHP 5/>YRS: CPT | Performed by: INTERNAL MEDICINE

## 2023-09-15 PROCEDURE — 93010 ELECTROCARDIOGRAM REPORT: CPT | Performed by: INTERNAL MEDICINE

## 2023-09-15 PROCEDURE — 2500000003 HC RX 250 WO HCPCS: Performed by: INTERNAL MEDICINE

## 2023-09-15 PROCEDURE — 85347 COAGULATION TIME ACTIVATED: CPT | Performed by: INTERNAL MEDICINE

## 2023-09-15 RX ORDER — NITROGLYCERIN 40 MG/100ML
INJECTION INTRAVENOUS CONTINUOUS PRN
Status: COMPLETED | OUTPATIENT
Start: 2023-09-15 | End: 2023-09-15

## 2023-09-15 RX ORDER — SODIUM CHLORIDE 9 MG/ML
INJECTION, SOLUTION INTRAVENOUS PRN
Status: DISCONTINUED | OUTPATIENT
Start: 2023-09-15 | End: 2023-09-15 | Stop reason: HOSPADM

## 2023-09-15 RX ORDER — METOPROLOL SUCCINATE 25 MG/1
25 TABLET, EXTENDED RELEASE ORAL DAILY
Status: DISCONTINUED | OUTPATIENT
Start: 2023-09-15 | End: 2023-09-16

## 2023-09-15 RX ORDER — SODIUM CHLORIDE 0.9 % (FLUSH) 0.9 %
5-40 SYRINGE (ML) INJECTION PRN
Status: DISCONTINUED | OUTPATIENT
Start: 2023-09-15 | End: 2023-09-15 | Stop reason: HOSPADM

## 2023-09-15 RX ORDER — HEPARIN SODIUM 1000 [USP'U]/ML
INJECTION, SOLUTION INTRAVENOUS; SUBCUTANEOUS PRN
Status: DISCONTINUED | OUTPATIENT
Start: 2023-09-15 | End: 2023-09-15 | Stop reason: HOSPADM

## 2023-09-15 RX ORDER — VERAPAMIL HYDROCHLORIDE 2.5 MG/ML
INJECTION, SOLUTION INTRAVENOUS PRN
Status: DISCONTINUED | OUTPATIENT
Start: 2023-09-15 | End: 2023-09-15 | Stop reason: HOSPADM

## 2023-09-15 RX ORDER — ASPIRIN 81 MG/1
81 TABLET, CHEWABLE ORAL ONCE
Status: COMPLETED | OUTPATIENT
Start: 2023-09-15 | End: 2023-09-15

## 2023-09-15 RX ORDER — SODIUM CHLORIDE 0.9 % (FLUSH) 0.9 %
5-40 SYRINGE (ML) INJECTION EVERY 12 HOURS SCHEDULED
Status: DISCONTINUED | OUTPATIENT
Start: 2023-09-15 | End: 2023-09-15 | Stop reason: HOSPADM

## 2023-09-15 RX ORDER — ASPIRIN 81 MG/1
81 TABLET, CHEWABLE ORAL DAILY
Status: DISCONTINUED | OUTPATIENT
Start: 2023-09-15 | End: 2023-09-16 | Stop reason: HOSPADM

## 2023-09-15 RX ORDER — NITROGLYCERIN 40 MG/100ML
20 INJECTION INTRAVENOUS CONTINUOUS
Status: DISCONTINUED | OUTPATIENT
Start: 2023-09-15 | End: 2023-09-15

## 2023-09-15 RX ORDER — MORPHINE SULFATE 2 MG/ML
2 INJECTION, SOLUTION INTRAMUSCULAR; INTRAVENOUS
Status: COMPLETED | OUTPATIENT
Start: 2023-09-15 | End: 2023-09-15

## 2023-09-15 RX ORDER — MORPHINE SULFATE 2 MG/ML
1 INJECTION, SOLUTION INTRAMUSCULAR; INTRAVENOUS EVERY 4 HOURS PRN
Status: DISCONTINUED | OUTPATIENT
Start: 2023-09-15 | End: 2023-09-16 | Stop reason: HOSPADM

## 2023-09-15 RX ORDER — CHOLECALCIFEROL (VITAMIN D3) 125 MCG
5 CAPSULE ORAL NIGHTLY
Status: DISCONTINUED | OUTPATIENT
Start: 2023-09-15 | End: 2023-09-16 | Stop reason: HOSPADM

## 2023-09-15 RX ORDER — ISOSORBIDE MONONITRATE 30 MG/1
30 TABLET, EXTENDED RELEASE ORAL DAILY
Status: DISCONTINUED | OUTPATIENT
Start: 2023-09-15 | End: 2023-09-16

## 2023-09-15 RX ORDER — NITROGLYCERIN 20 MG/100ML
INJECTION INTRAVENOUS PRN
Status: DISCONTINUED | OUTPATIENT
Start: 2023-09-15 | End: 2023-09-15 | Stop reason: HOSPADM

## 2023-09-15 RX ADMIN — ASPIRIN 81 MG: 81 TABLET, CHEWABLE ORAL at 10:31

## 2023-09-15 RX ADMIN — MORPHINE SULFATE 2 MG: 2 INJECTION, SOLUTION INTRAMUSCULAR; INTRAVENOUS at 15:43

## 2023-09-15 RX ADMIN — ALUMINUM HYDROXIDE, MAGNESIUM HYDROXIDE, AND SIMETHICONE 40 ML: 200; 200; 20 SUSPENSION ORAL at 14:32

## 2023-09-15 RX ADMIN — MORPHINE SULFATE 1 MG: 2 INJECTION, SOLUTION INTRAMUSCULAR; INTRAVENOUS at 19:50

## 2023-09-15 RX ADMIN — SODIUM CHLORIDE 40 MG: 9 INJECTION INTRAMUSCULAR; INTRAVENOUS; SUBCUTANEOUS at 18:49

## 2023-09-15 RX ADMIN — SODIUM CHLORIDE, PRESERVATIVE FREE 10 ML: 5 INJECTION INTRAVENOUS at 18:52

## 2023-09-15 RX ADMIN — Medication 5 MG: at 22:11

## 2023-09-15 RX ADMIN — SODIUM CHLORIDE 100 ML/HR: 9 INJECTION, SOLUTION INTRAVENOUS at 10:33

## 2023-09-15 ASSESSMENT — PAIN DESCRIPTION - ORIENTATION: ORIENTATION: ANTERIOR;MID

## 2023-09-15 ASSESSMENT — PAIN SCALES - GENERAL
PAINLEVEL_OUTOF10: 0
PAINLEVEL_OUTOF10: 5
PAINLEVEL_OUTOF10: 4
PAINLEVEL_OUTOF10: 0
PAINLEVEL_OUTOF10: 0
PAINLEVEL_OUTOF10: 5

## 2023-09-15 ASSESSMENT — PAIN DESCRIPTION - LOCATION
LOCATION: CHEST

## 2023-09-15 ASSESSMENT — PAIN DESCRIPTION - DESCRIPTORS: DESCRIPTORS: SHARP

## 2023-09-15 NOTE — PROGRESS NOTES
Right wrist band removed, no bleeding or swelling. Sterile hemostatic dressing applied. Safety splint applied. Normal radial pulse, normal distal circulation and neuro check. Safety instructions reviewed with the patient.

## 2023-09-15 NOTE — PRE SEDATION
Sedation Plan  ASA: class 2 - patient with mild systemic disease     Mallampati class: II - soft palate, uvula, fauces visible. Sedation plan: local anesthesia and moderate (conscious sedation)    Risks, benefits, and alternatives discussed with patient and spouse. Use of blood products discussed with patient who.

## 2023-09-15 NOTE — PROGRESS NOTES
Patient underwent cardiac catheterization to evaluate for chest pain  LAD, RCA and LCx did not have any obstructive disease  Diagonal branch had a proximal 50% hazy tubular borderline stenosis for which IFR was performed. IFR was 0.98 and 0.99 suggesting physiologically not significant lesion    Plan was to continue medical management    Patient started having chest discomfort, partially reproducible once the procedure was finished so patient underwent diagnostic catheterization again and no new lesion was noted. Unchanged diagnostic cardiac catheterization. Diagonal branch did not have any obstructed flow.     EKG x2 while patient was having 5 out of 10 chest pain was within normal limit without any ST changes    High-sensitivity troponin was performed which was normal despite having persistent chest pain for more than 3 hours    Suspect noncardiac pain in origin however because of ongoing nature, would observe patient in the hospital  IV nitroglycerin was started we will try to wean off  We will check another troponin in the morning  Restart aspirin, Imdur and beta-blocker and statin, home dose    Discussed with on-call physician

## 2023-09-15 NOTE — PROGRESS NOTES
1800:  Received pt via stretcher into CVT SD room 2310 from Cardiac Cath Holding s/p cath right radial approach. Settled into bed. Assessment completed. AAOX4. Denies CP, pressure, SOB or discomforts. NSR. VSS. Room air, lungs clear. Right radial cath site with wrist immobilizer c/d/I, site no oozing, no hematoma. Reinforced post cath site care with pt, he verbalizes understanding all info. Wrist placed on pillow. Oriented to room and plan of care. Call bell and phones at side. Dinner warmed and served to patient. 1900:  Denies CP, pressure or discomforts. Shift change report given to CAMILA Funez with bedside rounds.

## 2023-09-15 NOTE — PROGRESS NOTES
TRANSFER - OUT REPORT:    Verbal report given to Radha Dorantes on [employer]  being transferred to  for routine progression of patient care       Report consisted of patient's Situation, Background, Assessment and   Recommendations(SBAR). Information from the following report(s) Nurse Handoff Report was reviewed with the receiving nurse. Lines:   Peripheral IV 09/15/23 Proximal;Right; Anterior Forearm (Active)   Site Assessment Clean, dry & intact 09/15/23 1010   Line Status Brisk blood return;Flushed 09/15/23 1010   Infiltration Assessment 0 09/15/23 1010   Alcohol Cap Used Yes 09/15/23 1010   Dressing Type Transparent 09/15/23 1010       Peripheral IV 09/15/23 Left;Proximal;Anterior Forearm (Active)   Site Assessment Clean, dry & intact 09/15/23 1010   Line Status Brisk blood return;Flushed 09/15/23 1010   Phlebitis Assessment No symptoms 09/15/23 1010   Infiltration Assessment 0 09/15/23 1010   Dressing Type Transparent 09/15/23 1010        Opportunity for questions and clarification was provided. Patient transported with:  Registered Nurse: JUVENAL Jefferson

## 2023-09-15 NOTE — H&P
Please see clinic note from 08/2023 for detail. Chest pain despite use of antianginal, concerning for Estefani  I saw and examined patient and confirmed above. No interval change. Labs reviewed. Procedure explained to patient and all risk and benefit discussed with patient. Risk, benefit, complication of LHC and possible PCI ( including but not limited to bleeding, infection, heart failure, stroke, MI, emergent bypass surgery, kidney failure, dialysis and death ) were discussed with patient and willing to proceed with procedure. Proceed as planned. DW Patient, son and wife at bedside    History and physical has been reviewed.  There have been no significant clinical changes since the completion of the originally dated History and Physical.  Will be using moderate sedation.    ------------------------------------------------------------------------------------------------------------------

## 2023-09-15 NOTE — PROGRESS NOTES
Dr. Ford Wilson. Eufemia Lynch MD at bedside to reevaluate c/o chest pain. Reproducible with palpation. Morphine ordered.

## 2023-09-15 NOTE — PROGRESS NOTES
Pt. Reports mid sternal sharp chest pain, non radiating, onset about 30 min. ago while in cardiac cath lab. Dr. Sindy Ronquillo MD is aware of this complaint. GI cocktail is ordered.  ECG = NSR.

## 2023-09-15 NOTE — PROGRESS NOTES
1740- SBAR transfer report taken from Leonardo  RN in cath lab . Pt Dx, admission, medical Hx, HX of present complaint, review of systems, IV access, MAR, EKG, recent results reviewed.

## 2023-09-16 VITALS
RESPIRATION RATE: 25 BRPM | BODY MASS INDEX: 38.63 KG/M2 | SYSTOLIC BLOOD PRESSURE: 127 MMHG | TEMPERATURE: 98.5 F | DIASTOLIC BLOOD PRESSURE: 95 MMHG | HEIGHT: 74 IN | WEIGHT: 301 LBS | OXYGEN SATURATION: 95 % | HEART RATE: 80 BPM

## 2023-09-16 LAB
EKG ATRIAL RATE: 67 BPM
EKG ATRIAL RATE: 77 BPM
EKG DIAGNOSIS: NORMAL
EKG DIAGNOSIS: NORMAL
EKG P AXIS: 38 DEGREES
EKG P AXIS: 42 DEGREES
EKG P-R INTERVAL: 164 MS
EKG P-R INTERVAL: 184 MS
EKG Q-T INTERVAL: 358 MS
EKG Q-T INTERVAL: 370 MS
EKG QRS DURATION: 92 MS
EKG QRS DURATION: 94 MS
EKG QTC CALCULATION (BAZETT): 390 MS
EKG QTC CALCULATION (BAZETT): 405 MS
EKG R AXIS: 61 DEGREES
EKG R AXIS: 69 DEGREES
EKG T AXIS: 49 DEGREES
EKG T AXIS: 66 DEGREES
EKG VENTRICULAR RATE: 67 BPM
EKG VENTRICULAR RATE: 77 BPM
TROPONIN I SERPL HS-MCNC: 13 NG/L (ref 0–78)

## 2023-09-16 PROCEDURE — 6370000000 HC RX 637 (ALT 250 FOR IP): Performed by: PHYSICIAN ASSISTANT

## 2023-09-16 PROCEDURE — 99213 OFFICE O/P EST LOW 20 MIN: CPT | Performed by: INTERNAL MEDICINE

## 2023-09-16 PROCEDURE — 93005 ELECTROCARDIOGRAM TRACING: CPT | Performed by: INTERNAL MEDICINE

## 2023-09-16 PROCEDURE — 84484 ASSAY OF TROPONIN QUANT: CPT

## 2023-09-16 PROCEDURE — 6370000000 HC RX 637 (ALT 250 FOR IP): Performed by: INTERNAL MEDICINE

## 2023-09-16 PROCEDURE — 6360000002 HC RX W HCPCS: Performed by: INTERNAL MEDICINE

## 2023-09-16 PROCEDURE — 93010 ELECTROCARDIOGRAM REPORT: CPT | Performed by: INTERNAL MEDICINE

## 2023-09-16 PROCEDURE — 36415 COLL VENOUS BLD VENIPUNCTURE: CPT

## 2023-09-16 PROCEDURE — C9113 INJ PANTOPRAZOLE SODIUM, VIA: HCPCS | Performed by: INTERNAL MEDICINE

## 2023-09-16 PROCEDURE — 2580000003 HC RX 258: Performed by: INTERNAL MEDICINE

## 2023-09-16 PROCEDURE — A4216 STERILE WATER/SALINE, 10 ML: HCPCS | Performed by: INTERNAL MEDICINE

## 2023-09-16 RX ORDER — ISOSORBIDE MONONITRATE 60 MG/1
60 TABLET, EXTENDED RELEASE ORAL DAILY
Qty: 30 TABLET | Refills: 5 | Status: SHIPPED | OUTPATIENT
Start: 2023-09-16

## 2023-09-16 RX ORDER — ASPIRIN 81 MG/1
81 TABLET, CHEWABLE ORAL DAILY
Qty: 30 TABLET | Refills: 3 | Status: SHIPPED | OUTPATIENT
Start: 2023-09-16

## 2023-09-16 RX ORDER — METOPROLOL SUCCINATE 25 MG/1
50 TABLET, EXTENDED RELEASE ORAL DAILY
Status: DISCONTINUED | OUTPATIENT
Start: 2023-09-16 | End: 2023-09-16 | Stop reason: HOSPADM

## 2023-09-16 RX ORDER — SIMVASTATIN 20 MG
20 TABLET ORAL NIGHTLY
Qty: 30 TABLET | Refills: 5 | Status: SHIPPED | OUTPATIENT
Start: 2023-09-16

## 2023-09-16 RX ORDER — ISOSORBIDE MONONITRATE 60 MG/1
60 TABLET, EXTENDED RELEASE ORAL DAILY
Status: DISCONTINUED | OUTPATIENT
Start: 2023-09-16 | End: 2023-09-16 | Stop reason: HOSPADM

## 2023-09-16 RX ORDER — LISINOPRIL 10 MG/1
10 TABLET ORAL DAILY
Qty: 30 TABLET | Refills: 5 | Status: SHIPPED | OUTPATIENT
Start: 2023-09-16

## 2023-09-16 RX ADMIN — METOPROLOL SUCCINATE 50 MG: 25 TABLET, FILM COATED, EXTENDED RELEASE ORAL at 09:40

## 2023-09-16 RX ADMIN — SODIUM CHLORIDE 40 MG: 9 INJECTION INTRAMUSCULAR; INTRAVENOUS; SUBCUTANEOUS at 09:40

## 2023-09-16 RX ADMIN — ISOSORBIDE MONONITRATE 60 MG: 60 TABLET, EXTENDED RELEASE ORAL at 09:40

## 2023-09-16 RX ADMIN — ASPIRIN 81 MG: 81 TABLET, CHEWABLE ORAL at 09:40

## 2023-09-16 ASSESSMENT — PAIN SCALES - GENERAL: PAINLEVEL_OUTOF10: 0

## 2023-09-16 NOTE — PROGRESS NOTES
2308 Patient wears requested BIPAP machine, they use one at home. Patient able to tell this RT home settings 21/8. Placed on full face mask, patient can call if they need any more assistance.

## 2023-09-16 NOTE — PROGRESS NOTES
Bedside and Verbal shift change report given to USMAN Weeks RN (oncoming nurse) by Marry Jackson RN (offgoing nurse). Report included the following information Nurse Handoff Report, Recent Results, and Cardiac Rhythm NSR . Assumed care of patient, he is resting in bed. Daughter present at bedside. A&Ox4. Shift assessment completed. Assessed for safety precautions. 1950 patient c/o chest pain 4/10. PRN morphine given. Patient requests his normal night time meds and cpap. MD paged, spoke with Theodoro Prader, NP she reports okay to place order for melatonin and Cpap. Also reports patient's mild chest pain can be normal post cath. Patient made aware. 0000 reassessment of patient, he no longer c/o chest pain at this time. 0545 EKG completed. Patient provided with personal hygiene care. Bedside and Verbal shift change report given to Christian Kay RN (oncoming nurse) by [employer], RN (offgoing nurse). Report included the following information Nurse Handoff Report, Recent Results, and Cardiac Rhythm NSR .

## 2023-09-16 NOTE — PROGRESS NOTES
0730- Bedside and Verbal shift change report given to Philipp Alvarado (oncoming nurse) by Dee Dee JENSEN (offgoing nurse). Report included the following information Nurse Handoff Report, Adult Overview, Intake/Output, MAR, Recent Results, Cardiac Rhythm NSR, and Neuro Assessment. 0750- Pt in bed alert and oriented times 4 with bed locked and low and call bell in reach. Needs addressed, denies pain. 0216- pt up to bedside chair, condom cath removed. 400 mL out. Pt edu    0915- Pt up to toilet bowel and urine produced. 0940- Pt medicated per MAR.     1120- Pt up to toilet, urine produced. Pt ambulated in scruggs, walked the length of the unit and tolerated well. 1200- Pt educated on AVS, follow up appointments and medications. All questions answered. IVs removed and Pt getting dressed. 1300Pt off floor unit via wheel chair. Pt responsible for his belongings. His wife is driving him home.

## 2023-09-16 NOTE — CARE COORDINATION
D/C order noted for today. Orders reviewed. No needs identified at this time. Wife will transport home. CM remains available if needed.      Demetrice Bazan MSW  Care Management

## 2023-09-16 NOTE — DISCHARGE SUMMARY
Cardiology Discharge Summary      Discharge Summary     Patient: Carol Staples MRN: [id number]  SSN: [id number]    YOB: 1955  Age: 76 y.o. Sex: male       Admit Date: 9/15/2023    Discharge Date: 9/16/2023      Admission Diagnoses: Intermediate coronary syndrome ([address]) [I20.0]  CAD (coronary artery disease) [I25.10]    Discharge Diagnoses:  Non-obstructive CAD    Discharge Condition: Stable    Hospital Course: Carol Staples is a 76 y.o. male who presented to the hospital yesterday for cardiac catheterization due to c/o angina. He underwent procedure as planned and found to have 50% borderline stenosis for which iFR was performed (0.98 and 0.99 suggesting physiologically not significant lesion). Initial plan was to continue medical management, however pt started having chest discomfort, partially reproducible, and thus cath was repeated with unchanged findings. EKG's without acute change; HS-troponin was normal despite 3+ hours persistent chest pain. He was started on nitroglycerin infusion and admitted for observation. HS-troponin remains negative this morning (14 --> 13). Nitroglycerin infusion will be discontinued this AM with plans for discharge today. Pt was advised to hold Metformin over the weekend and can resume on Monday 9/18. Consults: None    Significant Diagnostic Studies: angiography:   LM: Angiographically normal  LAD: Minimal luminal irregularities  Diagonal branch: Medium caliber vessel with evidence of proximal hazy approximately 50% borderline lesion. iFR was performed and IFR was 0.98 and 0.99, physiologically not significant  LCx/OM: Minimal luminal irregularities  RCA: Minimal luminal irregularities     LVEF: 55%.   Normal wall motion  No evidence of aortic stenosis  LVEDP 12 mmHg     Medical management for physiologically not significant diagonal branch 50% stenosis    Disposition: home    Discharge Medications:      Medication List        START taking these medications XL  Take 1 tablet by mouth daily     montelukast 10 MG tablet  Commonly known as: SINGULAIR  TAKE 1 TABLET EVERY DAY     Ozempic (2 MG/DOSE) 8 MG/3ML Sopn  Generic drug: Semaglutide (2 MG/DOSE)  Inject 2 mg into the skin every 7 days PAP medication     polyethylene glycol 17 GM/SCOOP powder  Commonly known as: GLYCOLAX     pramipexole 0.75 MG tablet  Commonly known as: MIRAPEX  TAKE 3 TABLETS ONE TIME DAILY     trospium 20 MG tablet  Commonly known as: SANCTURA     True Metrix Air Glucose Meter w/Device Kit     True Metrix Blood Glucose Test strip  Generic drug: blood glucose test strips  TEST BLOOD SUGAR THREE TIMES DAILY     TRUEplus Lancets 33G Misc  TEST BLOOD SUGAR THREE TIMES DAILY     vitamin D 25 MCG (1000 UT) Tabs tablet  Commonly known as: CHOLECALCIFEROL            STOP taking these medications      aspirin 81 MG EC tablet  Replaced by: aspirin 81 MG chewable tablet            ASK your doctor about these medications      fluticasone 50 MCG/ACT nasal spray  Commonly known as: FLONASE     magnesium oxide 400 MG tablet  Commonly known as: MAG-OX     naloxone 4 MG/0.1ML Liqd nasal spray     ondansetron 4 MG disintegrating tablet  Commonly known as: ZOFRAN-ODT  Take 1 tablet by mouth every 12 hours as needed for Nausea or Vomiting     ondansetron 4 MG tablet  Commonly known as: ZOFRAN               Where to Get Your Medications        These medications were sent to [address] [place] [address], [address] [place] [id number]      Phone: [phone]   aspirin 81 MG chewable tablet  isosorbide mononitrate 60 MG extended release tablet  lisinopril 10 MG tablet  simvastatin 20 MG tablet         Activity: as directed  Diet: cardiac diet  Wound Care: as directed    No follow-ups on file.     Signed By: Rosa Mack PA-C     September 16, 2023

## 2023-09-18 RX ORDER — BUPROPION HYDROCHLORIDE 150 MG/1
150 TABLET ORAL DAILY
Qty: 90 TABLET | Refills: 1 | Status: SHIPPED | OUTPATIENT
Start: 2023-09-18

## 2023-09-26 ENCOUNTER — PHARMACY VISIT (OUTPATIENT)
Age: 68
End: 2023-09-26

## 2023-09-26 DIAGNOSIS — E11.40 TYPE 2 DIABETES MELLITUS WITH DIABETIC NEUROPATHY, WITHOUT LONG-TERM CURRENT USE OF INSULIN (HCC): Primary | ICD-10-CM

## 2023-09-26 RX ORDER — DULAGLUTIDE 4.5 MG/.5ML
4.5 INJECTION, SOLUTION SUBCUTANEOUS WEEKLY
COMMUNITY

## 2023-10-02 ENCOUNTER — TELEPHONE (OUTPATIENT)
Age: 68
End: 2023-10-02

## 2023-10-02 NOTE — TELEPHONE ENCOUNTER
Contacted patient to invite her to the lunch and learn schedule for Thursday, October 12th from 11:30 am -1 pm.

## 2023-10-09 ENCOUNTER — OFFICE VISIT (OUTPATIENT)
Age: 68
End: 2023-10-09
Payer: MEDICARE

## 2023-10-09 VITALS
SYSTOLIC BLOOD PRESSURE: 130 MMHG | OXYGEN SATURATION: 93 % | BODY MASS INDEX: 40.57 KG/M2 | DIASTOLIC BLOOD PRESSURE: 64 MMHG | HEART RATE: 93 BPM | WEIGHT: 315 LBS

## 2023-10-09 DIAGNOSIS — I25.119 ATHEROSCLEROSIS OF NATIVE CORONARY ARTERY OF NATIVE HEART WITH ANGINA PECTORIS (HCC): Primary | ICD-10-CM

## 2023-10-09 DIAGNOSIS — I10 ESSENTIAL HYPERTENSION WITH GOAL BLOOD PRESSURE LESS THAN 140/90: ICD-10-CM

## 2023-10-09 DIAGNOSIS — E78.00 PURE HYPERCHOLESTEROLEMIA: ICD-10-CM

## 2023-10-09 PROBLEM — G20.A1 PARKINSON'S DISEASE: Status: ACTIVE | Noted: 2023-10-09

## 2023-10-09 PROCEDURE — G8428 CUR MEDS NOT DOCUMENT: HCPCS | Performed by: INTERNAL MEDICINE

## 2023-10-09 PROCEDURE — 1123F ACP DISCUSS/DSCN MKR DOCD: CPT | Performed by: INTERNAL MEDICINE

## 2023-10-09 PROCEDURE — 1036F TOBACCO NON-USER: CPT | Performed by: INTERNAL MEDICINE

## 2023-10-09 PROCEDURE — 99214 OFFICE O/P EST MOD 30 MIN: CPT | Performed by: INTERNAL MEDICINE

## 2023-10-09 PROCEDURE — G8484 FLU IMMUNIZE NO ADMIN: HCPCS | Performed by: INTERNAL MEDICINE

## 2023-10-09 PROCEDURE — 3078F DIAST BP <80 MM HG: CPT | Performed by: INTERNAL MEDICINE

## 2023-10-09 PROCEDURE — 3017F COLORECTAL CA SCREEN DOC REV: CPT | Performed by: INTERNAL MEDICINE

## 2023-10-09 PROCEDURE — G8417 CALC BMI ABV UP PARAM F/U: HCPCS | Performed by: INTERNAL MEDICINE

## 2023-10-09 PROCEDURE — 3075F SYST BP GE 130 - 139MM HG: CPT | Performed by: INTERNAL MEDICINE

## 2023-10-09 NOTE — PROGRESS NOTES
09/15/2023 04:00 PM           Latest Ref Rng & Units 7/20/2023     2:31 PM 12/31/2022    10:25 AM 10/7/2022     2:30 PM 9/30/2022     8:20 PM 9/8/2022     2:10 AM   Lipids   ALT 16 - 61 U/L 42  35  65  35  53    AST 10 - 38 U/L 28  17  35  23  33      Lab Results   Component Value Date/Time    ALT 42 07/20/2023 02:31 PM     Hemoglobin A1C   Date Value Ref Range Status   12/31/2022 9.0 (H) 4.2 - 5.6 % Final     Comment:     (NOTE)  HbA1C Interpretive Ranges  <5.7              Normal  5.7 - 6.4         Consider Prediabetes  >6.5              Consider Diabetes       Lab Results   Component Value Date    TSH 0.66 12/31/2022       TRANSTHORACIC ECHOCARDIOGRAM (TTE) COMPLETE (CONTRAST/BUBBLE/3D PRN) 10/03/2022  1:39 PM, 10/03/2022 12:00 AM (Final)      Left Ventricle: Normal left ventricular systolic function with a visually estimated EF of 55 - 60%. Left ventricle size is normal. Increased wall thickness. Findings consistent with mild concentric hypertrophy. Normal wall motion. Right Atrium: Right atrium is dilated. Aorta: Normal sized ascending aorta. Dilated aortic root. Ao Root diameter is 3.5 cm. NM STRESS TEST WITH MYOCARDIAL PERFUSION 03/31/2022 12:34 PM, 03/31/2022 12:00 AM (Final)    Stress Test: A pharmacological stress test was performed using lexiscan. Nuclear Findings: LVEF measures 64%. TID ratio is 1.01. Nuclear Findings: LV perfusion is probably normal.  There is a small fixed basal inferior defect, that is likely artifact. No ischemia. ECG: Stress ECG was not diagnostic due to baseline ECG abnormality. Low risk stress test.    CARDIAC PROCEDURE 09/15/2023  4:10 PM (Final)  Conclusion  LM: Angiographically normal  LAD: Minimal luminal irregularities  Diagonal branch: Medium caliber vessel with evidence of proximal hazy approximately 50% borderline lesion.   iFR was performed and IFR was 0.98 and 0.99, physiologically not significant  LCx/OM: Minimal luminal irregularities  RCA: Minimal

## 2023-10-18 DIAGNOSIS — R06.02 SHORTNESS OF BREATH: ICD-10-CM

## 2023-10-18 DIAGNOSIS — R05.1 ACUTE COUGH: ICD-10-CM

## 2023-10-23 RX ORDER — AZITHROMYCIN 250 MG/1
TABLET, FILM COATED ORAL
Qty: 6 TABLET | Refills: 10 | OUTPATIENT
Start: 2023-10-23

## 2023-10-23 RX ORDER — DIPHENHYDRAMINE HCL 25 MG
TABLET ORAL
Qty: 1 KIT | Refills: 10 | Status: SHIPPED | OUTPATIENT
Start: 2023-10-23

## 2023-10-23 NOTE — PROGRESS NOTES
Pharmacy Progress Note - Diabetes Management       Assessment / Plan:   Diabetes Management:  Per ADA guidelines, Pt's A1c is not at goal of < 7%. Unknown current glycemic control. Will remove Trulicity from his med list as his cardiologist prefers Kamilah Rodriguez likely from the SUSTAIN-6 trial.  Will maintain his current tx and reassess with SMBG logs at follow up in 4 weeks. Nutrition/Lifestyle Modifications:  - Educated pt on the importance of moderating carbohydrate intake. Reviewed sources of carbohydrates and method to help determine appropriate portion sizes (e.g., Diabetes Plate Method). - Advised patient to avoid sugar-sweetened beverages and replace with water or diet/zero sugar option.  - Recommend ~30 minutes consistent, moderately intensive, exercise/day or ~150 minutes/week. Start small, stay consistent, and increase length and types of exercise, as tolerated. Patient will return to clinic in 4 week(s) for follow up. S/O: Mr. Radn Torres, a 76 y.o. male referred by Carmen Meyer MD,  has a past medical history of Abdominal adhesions, BPH (benign prostatic hyperplasia), Cellulitis, Chronic pain, DDD (degenerative disc disease), lumbar, Depression, Diabetes ([address]), Frequent falls, LORRAINE (generalized anxiety disorder), HLD (hyperlipidemia), Hypertension, Hypogonadism in male, Incomplete bladder emptying, Kidney stones, Lumbar facet arthropathy, Lumbar nerve root impingement, Lumbosacral radiculopathy at S1, Major depression, Neurological disorder, MEGHA (obstructive sleep apnea), S/P lumbar laminectomy, S/P lumbar spinal fusion, Sleep apnea, and Thoracic compression fracture ([address] Pt was seen today virtually via MyChart video visit for diabetes management.   Patient's last A1c was:   Hemoglobin A1C   Date Value Ref Range Status   12/31/2022 9.0 (H) 4.2 - 5.6 % Final     Comment:     (NOTE)  HbA1C Interpretive Ranges  <5.7              Normal  5.7 - 6.4         Consider Prediabetes  >6.5

## 2023-10-24 ENCOUNTER — PHARMACY VISIT (OUTPATIENT)
Age: 68
End: 2023-10-24

## 2023-10-24 DIAGNOSIS — E11.40 TYPE 2 DIABETES MELLITUS WITH DIABETIC NEUROPATHY, WITHOUT LONG-TERM CURRENT USE OF INSULIN (HCC): Primary | ICD-10-CM

## 2023-10-26 RX ORDER — ISOPROPYL ALCOHOL 70 ML/100ML
SWAB TOPICAL
Qty: 100 EACH | Refills: 5 | Status: SHIPPED | OUTPATIENT
Start: 2023-10-26

## 2023-10-26 RX ORDER — AMITRIPTYLINE HYDROCHLORIDE 10 MG/1
TABLET, FILM COATED ORAL
Qty: 90 TABLET | Refills: 10 | Status: SHIPPED | OUTPATIENT
Start: 2023-10-26

## 2023-11-01 ENCOUNTER — TELEMEDICINE (OUTPATIENT)
Age: 68
End: 2023-11-01
Payer: MEDICARE

## 2023-11-01 DIAGNOSIS — M25.572 CHRONIC PAIN OF BOTH ANKLES: ICD-10-CM

## 2023-11-01 DIAGNOSIS — G89.29 CHRONIC PAIN OF BOTH KNEES: Primary | ICD-10-CM

## 2023-11-01 DIAGNOSIS — M25.571 CHRONIC PAIN OF BOTH ANKLES: ICD-10-CM

## 2023-11-01 DIAGNOSIS — E11.65 POORLY CONTROLLED DIABETES MELLITUS (HCC): ICD-10-CM

## 2023-11-01 DIAGNOSIS — G47.30 SLEEP APNEA, UNSPECIFIED TYPE: ICD-10-CM

## 2023-11-01 DIAGNOSIS — M25.561 CHRONIC PAIN OF BOTH KNEES: Primary | ICD-10-CM

## 2023-11-01 DIAGNOSIS — M25.562 CHRONIC PAIN OF BOTH KNEES: Primary | ICD-10-CM

## 2023-11-01 DIAGNOSIS — R97.20 ELEVATED PSA: ICD-10-CM

## 2023-11-01 DIAGNOSIS — G89.29 CHRONIC PAIN OF BOTH ANKLES: ICD-10-CM

## 2023-11-01 PROCEDURE — 1123F ACP DISCUSS/DSCN MKR DOCD: CPT | Performed by: FAMILY MEDICINE

## 2023-11-01 PROCEDURE — 3046F HEMOGLOBIN A1C LEVEL >9.0%: CPT | Performed by: FAMILY MEDICINE

## 2023-11-01 PROCEDURE — G8417 CALC BMI ABV UP PARAM F/U: HCPCS | Performed by: FAMILY MEDICINE

## 2023-11-01 PROCEDURE — 2022F DILAT RTA XM EVC RTNOPTHY: CPT | Performed by: FAMILY MEDICINE

## 2023-11-01 PROCEDURE — G8428 CUR MEDS NOT DOCUMENT: HCPCS | Performed by: FAMILY MEDICINE

## 2023-11-01 PROCEDURE — 99213 OFFICE O/P EST LOW 20 MIN: CPT | Performed by: FAMILY MEDICINE

## 2023-11-01 PROCEDURE — 1036F TOBACCO NON-USER: CPT | Performed by: FAMILY MEDICINE

## 2023-11-01 PROCEDURE — G8484 FLU IMMUNIZE NO ADMIN: HCPCS | Performed by: FAMILY MEDICINE

## 2023-11-01 PROCEDURE — 3017F COLORECTAL CA SCREEN DOC REV: CPT | Performed by: FAMILY MEDICINE

## 2023-11-19 DIAGNOSIS — I20.9 ANGINA PECTORIS, UNSPECIFIED (HCC): ICD-10-CM

## 2023-11-20 RX ORDER — PRAMIPEXOLE DIHYDROCHLORIDE 0.75 MG/1
TABLET ORAL
Qty: 270 TABLET | Refills: 0 | Status: SHIPPED | OUTPATIENT
Start: 2023-11-20

## 2023-11-20 RX ORDER — METOPROLOL SUCCINATE 50 MG/1
50 TABLET, EXTENDED RELEASE ORAL DAILY
Qty: 90 TABLET | Refills: 0 | Status: SHIPPED | OUTPATIENT
Start: 2023-11-20

## 2023-11-20 RX ORDER — LISINOPRIL 20 MG/1
20 TABLET ORAL DAILY
Qty: 90 TABLET | Refills: 0 | OUTPATIENT
Start: 2023-11-20

## 2023-11-20 RX ORDER — GLIPIZIDE 10 MG/1
10 TABLET ORAL 2 TIMES DAILY
Qty: 180 TABLET | Refills: 0 | Status: SHIPPED | OUTPATIENT
Start: 2023-11-20

## 2023-11-21 RX ORDER — NITROGLYCERIN 0.4 MG/1
TABLET SUBLINGUAL
Qty: 25 TABLET | Refills: 2 | Status: SHIPPED | OUTPATIENT
Start: 2023-11-21

## 2023-11-26 SDOH — HEALTH STABILITY: PHYSICAL HEALTH: ON AVERAGE, HOW MANY MINUTES DO YOU ENGAGE IN EXERCISE AT THIS LEVEL?: 0 MIN

## 2023-11-26 SDOH — HEALTH STABILITY: PHYSICAL HEALTH: ON AVERAGE, HOW MANY DAYS PER WEEK DO YOU ENGAGE IN MODERATE TO STRENUOUS EXERCISE (LIKE A BRISK WALK)?: 0 DAYS

## 2023-11-27 ENCOUNTER — OFFICE VISIT (OUTPATIENT)
Age: 68
End: 2023-11-27
Payer: MEDICARE

## 2023-11-27 VITALS — WEIGHT: 305 LBS | BODY MASS INDEX: 39.14 KG/M2 | HEIGHT: 74 IN

## 2023-11-27 DIAGNOSIS — R60.9 PERIPHERAL EDEMA: ICD-10-CM

## 2023-11-27 DIAGNOSIS — Z91.81 AT HIGH RISK FOR INJURY RELATED TO FALL: ICD-10-CM

## 2023-11-27 DIAGNOSIS — M17.12 PRIMARY OSTEOARTHRITIS OF LEFT KNEE: ICD-10-CM

## 2023-11-27 DIAGNOSIS — M25.561 ACUTE PAIN OF BOTH KNEES: Primary | ICD-10-CM

## 2023-11-27 DIAGNOSIS — M25.562 ACUTE PAIN OF BOTH KNEES: Primary | ICD-10-CM

## 2023-11-27 DIAGNOSIS — M17.11 PRIMARY OSTEOARTHRITIS OF RIGHT KNEE: ICD-10-CM

## 2023-11-27 PROCEDURE — 20610 DRAIN/INJ JOINT/BURSA W/O US: CPT | Performed by: SPECIALIST

## 2023-11-27 PROCEDURE — 1123F ACP DISCUSS/DSCN MKR DOCD: CPT | Performed by: SPECIALIST

## 2023-11-27 PROCEDURE — G8484 FLU IMMUNIZE NO ADMIN: HCPCS | Performed by: SPECIALIST

## 2023-11-27 PROCEDURE — G8427 DOCREV CUR MEDS BY ELIG CLIN: HCPCS | Performed by: SPECIALIST

## 2023-11-27 PROCEDURE — 3017F COLORECTAL CA SCREEN DOC REV: CPT | Performed by: SPECIALIST

## 2023-11-27 PROCEDURE — 73564 X-RAY EXAM KNEE 4 OR MORE: CPT | Performed by: SPECIALIST

## 2023-11-27 PROCEDURE — 99214 OFFICE O/P EST MOD 30 MIN: CPT | Performed by: SPECIALIST

## 2023-11-27 PROCEDURE — G8417 CALC BMI ABV UP PARAM F/U: HCPCS | Performed by: SPECIALIST

## 2023-11-27 PROCEDURE — 1036F TOBACCO NON-USER: CPT | Performed by: SPECIALIST

## 2023-11-27 RX ORDER — BETAMETHASONE SODIUM PHOSPHATE AND BETAMETHASONE ACETATE 3; 3 MG/ML; MG/ML
3 INJECTION, SUSPENSION INTRA-ARTICULAR; INTRALESIONAL; INTRAMUSCULAR; SOFT TISSUE ONCE
Status: COMPLETED | OUTPATIENT
Start: 2023-11-27 | End: 2023-11-27

## 2023-11-27 RX ADMIN — BETAMETHASONE SODIUM PHOSPHATE AND BETAMETHASONE ACETATE 3 MG: 3; 3 INJECTION, SUSPENSION INTRA-ARTICULAR; INTRALESIONAL; INTRAMUSCULAR; SOFT TISSUE at 08:58

## 2023-11-27 RX ADMIN — BETAMETHASONE SODIUM PHOSPHATE AND BETAMETHASONE ACETATE 3 MG: 3; 3 INJECTION, SUSPENSION INTRA-ARTICULAR; INTRALESIONAL; INTRAMUSCULAR; SOFT TISSUE at 08:55

## 2023-11-27 SDOH — HEALTH STABILITY: PHYSICAL HEALTH: ON AVERAGE, HOW MANY DAYS PER WEEK DO YOU ENGAGE IN MODERATE TO STRENUOUS EXERCISE (LIKE A BRISK WALK)?: 0 DAYS

## 2023-11-27 ASSESSMENT — SOCIAL DETERMINANTS OF HEALTH (SDOH)
WITHIN THE LAST YEAR, HAVE YOU BEEN AFRAID OF YOUR PARTNER OR EX-PARTNER?: NO
WITHIN THE LAST YEAR, HAVE YOU BEEN KICKED, HIT, SLAPPED, OR OTHERWISE PHYSICALLY HURT BY YOUR PARTNER OR EX-PARTNER?: NO
WITHIN THE LAST YEAR, HAVE YOU BEEN HUMILIATED OR EMOTIONALLY ABUSED IN OTHER WAYS BY YOUR PARTNER OR EX-PARTNER?: NO
WITHIN THE LAST YEAR, HAVE TO BEEN RAPED OR FORCED TO HAVE ANY KIND OF SEXUAL ACTIVITY BY YOUR PARTNER OR EX-PARTNER?: NO

## 2023-11-27 NOTE — PROGRESS NOTES
err
betamethasone acetate-betamethasone sodium phosphate (CELESTONE) injection 3 mg      4. Peripheral edema  R60.9       5. At high risk for injury related to fall  Z91.81           PLAN:  After discussing multiple treatment options, knees were injected 4 cc 0.25% Marcaine and 0.5 cc Celestone. Patient was provided with physician-directed home exercise program in the office today. Consider viscosupplementation if pain continues. Follow up PRN.     Documentation by naldo Blackburn, as documented by Dre Watts MD.

## 2023-11-28 ENCOUNTER — OFFICE VISIT (OUTPATIENT)
Age: 68
End: 2023-11-28
Payer: MEDICARE

## 2023-11-28 VITALS — BODY MASS INDEX: 39.16 KG/M2 | HEIGHT: 74 IN

## 2023-11-28 DIAGNOSIS — G62.9 NEUROPATHY: ICD-10-CM

## 2023-11-28 DIAGNOSIS — G89.29 CHRONIC PAIN OF RIGHT ANKLE: Primary | ICD-10-CM

## 2023-11-28 DIAGNOSIS — M21.371 RIGHT FOOT DROP: ICD-10-CM

## 2023-11-28 DIAGNOSIS — M25.571 CHRONIC PAIN OF RIGHT ANKLE: Primary | ICD-10-CM

## 2023-11-28 PROCEDURE — 73610 X-RAY EXAM OF ANKLE: CPT | Performed by: ORTHOPAEDIC SURGERY

## 2023-11-28 PROCEDURE — 1036F TOBACCO NON-USER: CPT | Performed by: ORTHOPAEDIC SURGERY

## 2023-11-28 PROCEDURE — G8427 DOCREV CUR MEDS BY ELIG CLIN: HCPCS | Performed by: ORTHOPAEDIC SURGERY

## 2023-11-28 PROCEDURE — G8484 FLU IMMUNIZE NO ADMIN: HCPCS | Performed by: ORTHOPAEDIC SURGERY

## 2023-11-28 PROCEDURE — 99214 OFFICE O/P EST MOD 30 MIN: CPT | Performed by: ORTHOPAEDIC SURGERY

## 2023-11-28 PROCEDURE — 1123F ACP DISCUSS/DSCN MKR DOCD: CPT | Performed by: ORTHOPAEDIC SURGERY

## 2023-11-28 PROCEDURE — G8417 CALC BMI ABV UP PARAM F/U: HCPCS | Performed by: ORTHOPAEDIC SURGERY

## 2023-11-28 PROCEDURE — 3017F COLORECTAL CA SCREEN DOC REV: CPT | Performed by: ORTHOPAEDIC SURGERY

## 2023-12-14 RX ORDER — CALCIUM CITRATE/VITAMIN D3 200MG-6.25
TABLET ORAL
Qty: 300 STRIP | Refills: 3 | Status: SHIPPED | OUTPATIENT
Start: 2023-12-14

## 2024-01-15 RX ORDER — BUMETANIDE 1 MG/1
TABLET ORAL
Qty: 90 TABLET | Refills: 0 | Status: SHIPPED | OUTPATIENT
Start: 2024-01-15

## 2024-01-15 NOTE — TELEPHONE ENCOUNTER
Last OV 11/01/2023 (VV)  Next OV Canceled 01/15/2024 (lack of transportation)  Has not rescheduled  Last lab 09/15/2023 BMP

## 2024-01-22 ENCOUNTER — TELEPHONE (OUTPATIENT)
Age: 69
End: 2024-01-22

## 2024-01-22 NOTE — PROGRESS NOTES
Patient: Malcolm Brooks                MRN: [id number]       SSN: [id number]  YOB: 1955        AGE: 68 y.o.        SEX: male      PCP: Wilma Pettit MD  01/26/24    No chief complaint on file.    HISTORY:  Malcolm Brooks is a 68 y.o. male who is seen for bilateral knee pain. He presents today for his first injection in the Orthovisc visco supplementation series.    He was previously seen for bilateral knee pain (R=L).  Pt has been experiencing pain for over 5 years without any obvious injury. He feels pain with standing, walking and stair climbing.  He experiences startup pain after sitting. Endorses a giving way sensation. He notes that he is at risk for falls due to his knee problem.      He was previously seen by Dr. Yeager for right peroneal tendon injury.     Occupation, etc: Mr. Brooks  is retired. He previously worked as an  at [employer]. He sustained a vertebral compression fracture while at work as a result of lifting shingles. He is s/p Lumbar fusion under Dr. Grimaldo on 8/22/2012. He lives with his wife in [place]. 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 He has a h/o a cardiac blockage without a stent. He has 2 sons, one daughter, and 3 grandsons.   Wt Readings from Last 3 Encounters:   11/27/23 (!) 138.3 kg (305 lb)   11/16/23 136.1 kg (300 lb)   10/10/23 (!) 143.3 kg (316 lb)      There is no height or weight on file to calculate BMI.    Patient Active Problem List   Diagnosis    DDD (degenerative disc disease), thoracic    Hyperlipidemia    Advanced care planning/counseling discussion    Status post insertion of spinal cord stimulator    Kidney stone    Frequent falls    Abdominal adhesions    HTN (hypertension)    Anemia    Severe obesity (HCC)    Type 2 diabetes mellitus with diabetic neuropathy (HCC)    Lumbar post-laminectomy syndrome    Chronic pain syndrome    Back muscle spasm    Thoracic compression fracture (HCC)    Lumbar nerve root impingement

## 2024-01-22 NOTE — TELEPHONE ENCOUNTER
Patient called to be schedule for his knee injection, plz advise how many injections does he get? He was schedule for 1

## 2024-01-26 ENCOUNTER — OFFICE VISIT (OUTPATIENT)
Age: 69
End: 2024-01-26

## 2024-01-26 VITALS — TEMPERATURE: 96.9 F | HEIGHT: 74 IN | BODY MASS INDEX: 40.43 KG/M2 | WEIGHT: 315 LBS

## 2024-01-26 DIAGNOSIS — M17.11 UNILATERAL PRIMARY OSTEOARTHRITIS, RIGHT KNEE: ICD-10-CM

## 2024-01-26 DIAGNOSIS — M17.12 UNILATERAL PRIMARY OSTEOARTHRITIS, LEFT KNEE: Primary | ICD-10-CM

## 2024-01-26 DIAGNOSIS — M65.9 SYNOVITIS: ICD-10-CM

## 2024-01-26 NOTE — PROGRESS NOTES
Patient: Malcolm Brooks                MRN: [id number]       SSN: [id number]  YOB: 1955        AGE: 68 y.o.        SEX: male  Body mass index is 40.57 kg/m².    PCP: Wilma Pettit MD  02/02/24    Chief Complaint   Patient presents with    Knee Pain     bilateral     HISTORY:  Malcolm Brooks is a 68 y.o. male who is seen for bilateral knee pain. He presents today for his second injection in the Orthovisc visco supplementation series.      ICD-10-CM    1. Unilateral primary osteoarthritis, left knee  M17.12 DRAIN/INJECT LARGE JOINT/BURSA     hyaluronan (ORTHOVISC) 30 MG/2ML injection 30 mg      2. Unilateral primary osteoarthritis, right knee  M17.11 DRAIN/INJECT LARGE JOINT/BURSA     hyaluronan (ORTHOVISC) 30 MG/2ML injection 30 mg         PROCEDURE:  Mr. Moya knees injected with 2 cc of Orthovisc.     Chart reviewed for the following:   Manjinder SCHUSTER MD, have reviewed the History, Physical and updated the Allergic reactions for Malcolm Brooks     TIME OUT performed immediately prior to start of procedure:  Manjinder SCHUSTER MD, have performed the following reviews on Malcolm Brooks prior to the start of the procedure:            * Patient was identified by name and date of birth   * Agreement on procedure being performed was verified  * Risks and Benefits explained to the patient  * Procedure site verified and marked as necessary  * Patient was positioned for comfort  * Consent was obtained     Time: 11:03 AM     Date of procedure: 2/2/2024    Procedure performed by:  Manjinder Williamson MD    Mr. Brooks tolerated the procedure well with no complications.    PLAN:  Mr. Brooks's knees injected with 2 cc of Orthovisc.  Mr. Brooks will follow up in one week to continue his visco supplementation injection series.    Documentation by naldo Sofia, as documented by Manjinder Williamson MD.

## 2024-02-02 ENCOUNTER — OFFICE VISIT (OUTPATIENT)
Age: 69
End: 2024-02-02

## 2024-02-02 VITALS — HEIGHT: 74 IN | TEMPERATURE: 96.9 F | WEIGHT: 315 LBS | BODY MASS INDEX: 40.43 KG/M2

## 2024-02-02 DIAGNOSIS — M17.12 UNILATERAL PRIMARY OSTEOARTHRITIS, LEFT KNEE: Primary | ICD-10-CM

## 2024-02-02 DIAGNOSIS — M17.11 UNILATERAL PRIMARY OSTEOARTHRITIS, RIGHT KNEE: ICD-10-CM

## 2024-02-08 RX ORDER — GLIPIZIDE 10 MG/1
10 TABLET ORAL 2 TIMES DAILY
Qty: 180 TABLET | Refills: 3 | OUTPATIENT
Start: 2024-02-08

## 2024-02-08 RX ORDER — METOPROLOL SUCCINATE 50 MG/1
50 TABLET, EXTENDED RELEASE ORAL DAILY
Qty: 90 TABLET | Refills: 3 | OUTPATIENT
Start: 2024-02-08

## 2024-02-08 RX ORDER — PRAMIPEXOLE DIHYDROCHLORIDE 0.75 MG/1
TABLET ORAL
Qty: 270 TABLET | Refills: 3 | OUTPATIENT
Start: 2024-02-08

## 2024-02-09 ENCOUNTER — OFFICE VISIT (OUTPATIENT)
Age: 69
End: 2024-02-09

## 2024-02-09 VITALS — HEIGHT: 74 IN | BODY MASS INDEX: 40.57 KG/M2

## 2024-02-09 DIAGNOSIS — M17.12 UNILATERAL PRIMARY OSTEOARTHRITIS, LEFT KNEE: Primary | ICD-10-CM

## 2024-02-09 DIAGNOSIS — M17.11 UNILATERAL PRIMARY OSTEOARTHRITIS, RIGHT KNEE: ICD-10-CM

## 2024-02-09 RX ORDER — DOXYCYCLINE HYCLATE 100 MG
TABLET ORAL
COMMUNITY
Start: 2024-01-09

## 2024-02-12 ENCOUNTER — HOSPITAL ENCOUNTER (OUTPATIENT)
Facility: HOSPITAL | Age: 69
Discharge: HOME | End: 2024-02-15
Payer: MEDICARE

## 2024-02-12 LAB
ALBUMIN SERPL-MCNC: 3.8 G/DL (ref 3.4–5)
ALBUMIN SERPL-MCNC: 4 G/DL (ref 3.4–5)
ALBUMIN/GLOB SERPL: 1.4 (ref 0.8–1.7)
ALP SERPL-CCNC: 83 U/L (ref 45–117)
ALT SERPL-CCNC: 30 U/L (ref 16–61)
ANION GAP SERPL CALC-SCNC: 4 MMOL/L (ref 3–18)
ANION GAP SERPL CALC-SCNC: 6 MMOL/L (ref 3–18)
AST SERPL-CCNC: 18 U/L (ref 10–38)
BILIRUB SERPL-MCNC: 0.4 MG/DL (ref 0.2–1)
BUN SERPL-MCNC: 31 MG/DL (ref 7–18)
BUN SERPL-MCNC: 31 MG/DL (ref 7–18)
BUN/CREAT SERPL: 21 (ref 12–20)
BUN/CREAT SERPL: 22 (ref 12–20)
CALCIUM SERPL-MCNC: 9.1 MG/DL (ref 8.5–10.1)
CALCIUM SERPL-MCNC: 9.3 MG/DL (ref 8.5–10.1)
CHLORIDE SERPL-SCNC: 104 MMOL/L (ref 100–111)
CHLORIDE SERPL-SCNC: 106 MMOL/L (ref 100–111)
CO2 SERPL-SCNC: 25 MMOL/L (ref 21–32)
CO2 SERPL-SCNC: 26 MMOL/L (ref 21–32)
CREAT SERPL-MCNC: 1.44 MG/DL (ref 0.6–1.3)
CREAT SERPL-MCNC: 1.49 MG/DL (ref 0.6–1.3)
CREAT UR-MCNC: 64 MG/DL (ref 30–125)
EST. AVERAGE GLUCOSE BLD GHB EST-MCNC: 169 MG/DL
GLOBULIN SER CALC-MCNC: 2.9 G/DL (ref 2–4)
GLUCOSE SERPL-MCNC: 166 MG/DL (ref 74–99)
GLUCOSE SERPL-MCNC: 167 MG/DL (ref 74–99)
HBA1C MFR BLD: 7.5 % (ref 4.2–5.6)
MICROALBUMIN UR-MCNC: 0.7 MG/DL (ref 0–3)
MICROALBUMIN/CREAT UR-RTO: 11 MG/G (ref 0–30)
PHOSPHATE SERPL-MCNC: 3.8 MG/DL (ref 2.5–4.9)
POTASSIUM SERPL-SCNC: 4.2 MMOL/L (ref 3.5–5.5)
POTASSIUM SERPL-SCNC: 4.2 MMOL/L (ref 3.5–5.5)
PROT SERPL-MCNC: 6.9 G/DL (ref 6.4–8.2)
SODIUM SERPL-SCNC: 135 MMOL/L (ref 136–145)
SODIUM SERPL-SCNC: 136 MMOL/L (ref 136–145)

## 2024-02-12 PROCEDURE — 82570 ASSAY OF URINE CREATININE: CPT

## 2024-02-12 PROCEDURE — 80053 COMPREHEN METABOLIC PANEL: CPT

## 2024-02-12 PROCEDURE — 82784 ASSAY IGA/IGD/IGG/IGM EACH: CPT

## 2024-02-12 PROCEDURE — 86334 IMMUNOFIX E-PHORESIS SERUM: CPT

## 2024-02-12 PROCEDURE — 82043 UR ALBUMIN QUANTITATIVE: CPT

## 2024-02-12 PROCEDURE — 83521 IG LIGHT CHAINS FREE EACH: CPT

## 2024-02-12 PROCEDURE — 84165 PROTEIN E-PHORESIS SERUM: CPT

## 2024-02-12 PROCEDURE — 36415 COLL VENOUS BLD VENIPUNCTURE: CPT

## 2024-02-12 PROCEDURE — 80069 RENAL FUNCTION PANEL: CPT

## 2024-02-12 PROCEDURE — 83036 HEMOGLOBIN GLYCOSYLATED A1C: CPT

## 2024-02-12 RX ORDER — MONTELUKAST SODIUM 10 MG/1
TABLET ORAL
Qty: 90 TABLET | Refills: 0 | Status: SHIPPED | OUTPATIENT
Start: 2024-02-12

## 2024-02-12 RX ORDER — SIMVASTATIN 10 MG
10 TABLET ORAL NIGHTLY
Qty: 90 TABLET | Refills: 0 | Status: SHIPPED | OUTPATIENT
Start: 2024-02-12

## 2024-02-12 NOTE — PROGRESS NOTES
Patient: Malcolm Brooks                MRN: [id number]       SSN: [id number]  YOB: 1955        AGE: 68 y.o.        SEX: male  Body mass index is 40.57 kg/m².    PCP: Wilma Pettit MD  02/16/24    Chief Complaint   Patient presents with    Knee Pain     bilateral     HISTORY:  Malcolm Brooks is a 68 y.o. male who is seen for bilateral knee pain. He presents today for his fourth injection in the Orthovisc visco supplementation series.    PROCEDURE:  Mr. Moya knees injected with 2 cc of Orthovisc.     TIME OUT performed immediately prior to start of procedure:  IManjinder MD, have performed the following reviews on Malcolm Brooks prior to the start of the procedure:            * Patient was identified by name and date of birth   * Agreement on procedure being performed was verified  * Risks and Benefits explained to the patient  * Procedure site verified and marked as necessary  * Patient was positioned for comfort  * Consent was obtained     Time: 11:08 AM     Date of procedure: 2/16/2024    Procedure performed by:  Manjinder Williamson MD    Mr. Brooks tolerated the procedure well with no complications      ICD-10-CM    1. Unilateral primary osteoarthritis, left knee  M17.12 DRAIN/INJECT LARGE JOINT/BURSA     hyaluronan (ORTHOVISC) 30 MG/2ML injection 30 mg     [facility]      2. Unilateral primary osteoarthritis, right knee  M17.11 DRAIN/INJECT LARGE JOINT/BURSA     hyaluronan (ORTHOVISC) 30 MG/2ML injection 30 mg     [facility]      3. Right ankle pain, unspecified chronicity  M25.571 [facility] - [facility]        PLAN: He will start a brief course of outpatient physical therapy.  Mr. Brooks's knees injected with 2 cc of Orthovisc. Mr. Brooks will follow up PRN now that he has completed his visco supplementation injection series.     Documentation

## 2024-02-12 NOTE — TELEPHONE ENCOUNTER
Last OV 11/01/2023 (VV)  Next OV no f/u  Return in about 2 months (around 1/1/2024).   Last lab 02/12/2024

## 2024-02-13 LAB
KAPPA LC FREE SER-MCNC: 36.7 MG/L (ref 3.3–19.4)
KAPPA LC FREE/LAMBDA FREE SER: 1.68 (ref 0.26–1.65)
LAMBDA LC FREE SERPL-MCNC: 21.9 MG/L (ref 5.7–26.3)

## 2024-02-13 RX ORDER — LISINOPRIL 10 MG/1
10 TABLET ORAL DAILY
Qty: 90 TABLET | Refills: 3 | Status: SHIPPED | OUTPATIENT
Start: 2024-02-13

## 2024-02-13 RX ORDER — ISOSORBIDE MONONITRATE 60 MG/1
60 TABLET, EXTENDED RELEASE ORAL DAILY
Qty: 90 TABLET | Refills: 3 | Status: SHIPPED | OUTPATIENT
Start: 2024-02-13

## 2024-02-13 RX ORDER — SIMVASTATIN 20 MG
20 TABLET ORAL NIGHTLY
Qty: 90 TABLET | Refills: 3 | Status: SHIPPED | OUTPATIENT
Start: 2024-02-13

## 2024-02-16 ENCOUNTER — OFFICE VISIT (OUTPATIENT)
Age: 69
End: 2024-02-16

## 2024-02-16 VITALS — HEIGHT: 74 IN | TEMPERATURE: 97.6 F | WEIGHT: 315 LBS | BODY MASS INDEX: 40.43 KG/M2

## 2024-02-16 DIAGNOSIS — M17.11 UNILATERAL PRIMARY OSTEOARTHRITIS, RIGHT KNEE: ICD-10-CM

## 2024-02-16 DIAGNOSIS — M17.12 UNILATERAL PRIMARY OSTEOARTHRITIS, LEFT KNEE: Primary | ICD-10-CM

## 2024-02-16 DIAGNOSIS — M25.571 RIGHT ANKLE PAIN, UNSPECIFIED CHRONICITY: ICD-10-CM

## 2024-02-20 LAB
ALBUMIN SERPL ELPH-MCNC: 3.7 G/DL (ref 2.9–4.4)
ALBUMIN/GLOB SERPL: 1.3 (ref 0.7–1.7)
ALPHA1 GLOB SERPL ELPH-MCNC: 0.2 G/DL (ref 0–0.4)
ALPHA2 GLOB SERPL ELPH-MCNC: 0.8 G/DL (ref 0.4–1)
B-GLOBULIN SERPL ELPH-MCNC: 1 G/DL (ref 0.7–1.3)
GAMMA GLOB SERPL ELPH-MCNC: 0.9 G/DL (ref 0.4–1.8)
GLOBULIN SER-MCNC: 2.9 G/DL (ref 2.2–3.9)
IGA SERPL-MCNC: 158 MG/DL (ref 61–437)
IGG SERPL-MCNC: 919 MG/DL (ref 603–1613)
IGM SERPL-MCNC: 76 MG/DL (ref 20–172)
INTERPRETATION SERPL IEP-IMP: NORMAL
M PROTEIN SERPL ELPH-MCNC: NORMAL G/DL
PROT SERPL-MCNC: 6.6 G/DL (ref 6–8.5)

## 2024-02-21 ENCOUNTER — OFFICE VISIT (OUTPATIENT)
Age: 69
End: 2024-02-21
Payer: MEDICARE

## 2024-02-21 VITALS — HEIGHT: 74 IN | BODY MASS INDEX: 40.57 KG/M2

## 2024-02-21 DIAGNOSIS — G62.9 NEUROPATHY: ICD-10-CM

## 2024-02-21 DIAGNOSIS — M21.371 RIGHT FOOT DROP: Primary | ICD-10-CM

## 2024-02-21 PROCEDURE — G8484 FLU IMMUNIZE NO ADMIN: HCPCS | Performed by: ORTHOPAEDIC SURGERY

## 2024-02-21 PROCEDURE — 99214 OFFICE O/P EST MOD 30 MIN: CPT | Performed by: ORTHOPAEDIC SURGERY

## 2024-02-21 PROCEDURE — 3017F COLORECTAL CA SCREEN DOC REV: CPT | Performed by: ORTHOPAEDIC SURGERY

## 2024-02-21 PROCEDURE — 1036F TOBACCO NON-USER: CPT | Performed by: ORTHOPAEDIC SURGERY

## 2024-02-21 PROCEDURE — 1123F ACP DISCUSS/DSCN MKR DOCD: CPT | Performed by: ORTHOPAEDIC SURGERY

## 2024-02-21 PROCEDURE — G8417 CALC BMI ABV UP PARAM F/U: HCPCS | Performed by: ORTHOPAEDIC SURGERY

## 2024-02-21 PROCEDURE — G8427 DOCREV CUR MEDS BY ELIG CLIN: HCPCS | Performed by: ORTHOPAEDIC SURGERY

## 2024-02-21 NOTE — PROGRESS NOTES
AMBULATORY PROGRESS NOTE      Patient: Malcolm Brooks             MRN: [id number]     SSN: [id number] Body mass index is 40.57 kg/m².  YOB: 1955     AGE: 69 y.o.       EX: male  OFFICE VISIT DATE: 2/21/2024      PCP: Wilma Pettit MD       IMPRESSION //  DIAGNOSIS AND TREATMENT PLAN  2/21/2024       Malcolm Brooks has a diagnosis of:      He is in need of a custom right-sided AFO type brace.  Despite previous braces in the past, the previous ones are ill fitting, not providing any anterior medial and or circumferential support, and for the most part, not giving any protection to allow him to walk in a better fashion, due to his chronic foot drop for which she has cannot dorsiflex and elbert his foot on this right side.  Highly recommend hinged AFO brace for him.    DIAGNOSES    1. Right foot drop    2. Neuropathy          PLAN:    1.  Referral to [facility], for right AFO type device for his chronic foot drop  2.     RTO 8 weeks    Orders Placed This Encounter    DME Order for (Specify) as OP     - CUSTOM DME device ordered -  Right AFO CUSTOM  - Diagnosis: neuropathy, foot drop  - Length of Need: Lifetime    This item, is of medical necessity, in order to support and rebalance the ankle and hindfoot, thus improving overall gait mechanics, allowing for less pain, and improved time duration of walking standing.  This will provide better medial lateral support, and left foot for improved pushoff during the gait cycle.     This will raise the arch, improve the mechanics, and balance her posterior tibial tendon and peroneal brevis tendons as these are antagonistic tendons.    SILVIA Yeager MD  2/21/2024 1:20 PM        There are no Patient Instructions on file for this visit.      Please follow up with your PCP for any health maintenance as recommended.       Malcolm Brooks  expresses understanding of the diagnosis, treatment plan, and all of their proposed questions

## 2024-02-21 NOTE — PATIENT INSTRUCTIONS
If we order a Diagnostic test (such as MRI or CT) during your office visit please see below:     Coordination of Care will be calling you to schedule your diagnostic test. If you have not heard from Coordination of Care within 2 business days, please call [phone].     Once you have a date scheduled for your diagnostic test, you will need to contact our office to schedule a follow up appointment about 4 days following the exam, as this is when the physician will review your diagnostic test results with you. You can contact our office to schedule appointment by phone at [phone], or you can send a message via [employer] to request an appointment.

## 2024-02-27 ENCOUNTER — HOSPITAL ENCOUNTER (OUTPATIENT)
Facility: HOSPITAL | Age: 69
Discharge: HOME | End: 2024-02-29
Attending: ORTHOPAEDIC SURGERY
Payer: MEDICARE

## 2024-02-27 DIAGNOSIS — G62.9 NEUROPATHY: ICD-10-CM

## 2024-02-27 DIAGNOSIS — M21.371 RIGHT FOOT DROP: ICD-10-CM

## 2024-02-27 PROCEDURE — 93971 EXTREMITY STUDY: CPT

## 2024-02-29 ENCOUNTER — HOSPITAL ENCOUNTER (OUTPATIENT)
Facility: HOSPITAL | Age: 69
Setting detail: RECURRING SERIES
End: 2024-02-29
Payer: MEDICARE

## 2024-02-29 PROCEDURE — 97162 PT EVAL MOD COMPLEX 30 MIN: CPT

## 2024-02-29 NOTE — PROGRESS NOTES
PHYSICAL / OCCUPATIONAL THERAPY - DAILY TREATMENT NOTE     Patient Name: Malcolm Brooks    Date: 2024    : 1955  Insurance: Payor: [employer] MEDICARE / Plan: [employer] GOLD PLUS HMO / Product Type: *No Product type* /      Patient  verified Yes     Visit #   Current / Total 1 24   Time   In / Out 11:58 12:49   Pain   In / Out 10 10   Subjective Functional Status/Changes: Pt reported 10/10 pain today   Changes to:  Allergies, Med Hx, Sx Hx?   no       TREATMENT AREA =  Pain in right knee [M25.561]  Pain in right ankle and joints of right foot [M25.571]    OBJECTIVE    Therapeutic Procedures:  Tx Min Billable or 1:1 Min (if diff from Tx Min) Procedure, Rationale, Specifics   10  [id number] Therapeutic Exercise (timed):  increase ROM, strength, coordination, balance, and proprioception to improve patient's ability to progress to PLOF and address remaining functional goals. (see flow sheet as applicable)    Details if applicable:  HEP reviewed an demonstrated to the pt today, pt advised to perform the exercises in  a pain free range only.     10  [facility] Totals Reminder: bill using total billable min of TIMED therapeutic procedures (example: do not include dry needle or estim unattended, both untimed codes, in totals to left)  8-22 min = 1 unit; 23-37 min = 2 units; 38-52 min = 3 units; 53-67 min = 4 units; 68-82 min = 5 units   Total Total     TOTAL TREATMENT TIME:        51     [x]  Patient Education billed concurrently with other procedures   [x] Review HEP    [] Progressed/Changed HEP, detail:    [] Other detail:       Objective Information/Functional Measures/Assessment    Pt is a 70 y/o Male came for the physical therapy evaluation with a cane and CC of  bilateral knee pain and right ankle pain. Per pt, the pain started years ago and got worse with multiple falls. Pt presents with equinovarus deformity. Developed a callus on the lateral border of right foot secondary to overpressure. Pt advised to perform routine  improvements.  IE: FOTO score  30  2. Patient will improve the hip flexor strength of bilateral LE to 4+/5 to improve pt's ability to ambulate longer distances with LRAD  IE: Bilateral hip flexor strength 3+/5 grossly  3. Patient will improve the extension lag of right knee to 5 degrees and left knee to 10 deg as demonstrated by the pt's improved ability to perform prolong standing with ease.  IE: Extension lag 17 degrees left, right extension lag 10 deg  4. Patient will improve bilateral knee strength to 4+/5 as demonstrated by the improved ability to ascend/descend stairs with ease.  IE:  knee strength 4-/5, difficulty ascending/descending stairs   5. Patient will report the pain levels to be < 2/10 so pt can perform the moderate activities at home with ease.  IE: Pain 10/10  6. Patient will improve right ankle and hallux strength to 4-/5 in order to improve pt's ability to perform activities in standing with ease.  IE: Right ankle and hallux strength 3/5 grossly  7. Patient will improve right ankle and hallux DF and eversion to 10 degrees to improve pt's ability to ambulate safely.  IE: Right foot drop, hallux DF 2 deg and 30 deg inverted    PLAN  Yes  Continue plan of care  []  Upgrade activities as tolerated  []  Discharge due to :  []  Other:    Yvrose Bolden PT    2/29/2024    3:04 PM    Future Appointments   Date Time Provider Department Center   3/14/2024 10:30 AM Yvrose Bolden PT [facility]   8/20/2024  9:30 AM [facility] NURSE [facility] Sched   8/27/2024  1:00 PM Adolfo Espinoza PA-C [facility] Sched   10/14/2024 11:15 AM Myles Bolton MD [facility] BS AMB

## 2024-02-29 NOTE — PROGRESS NOTES
[facility]  [address] [facility] [address] [place] [address] Ph:[phone] Fx: [phone]    PLAN OF CARE/ Statement of Necessity for Physical Therapy Services           Patient name: Malcolm Brooks Start of Care: 2024   Referral source: Manjinder Williamson MD : 1955    Medical Diagnosis: Pain in right knee [M25.561]  Pain in right ankle and joints of right foot [M25.571]       Onset Date: 2021   Treatment Diagnosis: M25.561  RIGHT KNEE PAIN, M25.562  LEFT KNEE PAIN, and M25.571  RIGHT ANKLE PAIN                                      Prior Hospitalization: see medical history Provider#: [id number]   Medications: Verified on Patient Summary List     Comorbidities: DDD, Lumbar, Diabetes, Hyperlipidemia, frequent falls, s/p lumbar spinal fusion and laminectomy, thoracic compression fracture and hypertension  Prior Level of Function: Independent with functional mobility and ambulation with a cane with less pain    The Plan of Care and following information is based on the information from the initial evaluation.    Assessment / key information:  Pt is a 70 y/o Male came for the physical therapy evaluation with a cane and CC of  bilateral knee pain and right ankle pain. Per pt, the pain started years ago and got worse with multiple falls. Pt presents with equinovarus deformity. Developed a callus on the lateral border of right foot secondary to overpressure. Pt advised to perform routine skins checks using hand held mirror, pt verbalized the understanding. AFO was advised but due to ill fitting pt unable to use it. Edema noted in the right foot.   Pt presents with impairments including pain in bilateral knees, right ankle, decreased ROM in the right ankle, extension lag in the bilateral knees, decreased strength in the hip, knees and ankle musculature, impaired balance and gait. Due to these impairments, pt is unable to perform  the functional tasks such

## 2024-03-06 DIAGNOSIS — E11.40 TYPE 2 DIABETES MELLITUS WITH DIABETIC NEUROPATHY, WITHOUT LONG-TERM CURRENT USE OF INSULIN (HCC): ICD-10-CM

## 2024-03-07 ENCOUNTER — OFFICE VISIT (OUTPATIENT)
Age: 69
End: 2024-03-07
Payer: MEDICARE

## 2024-03-07 VITALS — BODY MASS INDEX: 40.57 KG/M2 | HEIGHT: 74 IN

## 2024-03-07 DIAGNOSIS — G62.9 NEUROPATHY: ICD-10-CM

## 2024-03-07 DIAGNOSIS — M21.371 RIGHT FOOT DROP: Primary | ICD-10-CM

## 2024-03-07 DIAGNOSIS — G89.29 CHRONIC PAIN OF RIGHT ANKLE: ICD-10-CM

## 2024-03-07 DIAGNOSIS — M25.571 CHRONIC PAIN OF RIGHT ANKLE: ICD-10-CM

## 2024-03-07 PROCEDURE — G8427 DOCREV CUR MEDS BY ELIG CLIN: HCPCS | Performed by: ORTHOPAEDIC SURGERY

## 2024-03-07 PROCEDURE — 3017F COLORECTAL CA SCREEN DOC REV: CPT | Performed by: ORTHOPAEDIC SURGERY

## 2024-03-07 PROCEDURE — 99214 OFFICE O/P EST MOD 30 MIN: CPT | Performed by: ORTHOPAEDIC SURGERY

## 2024-03-07 PROCEDURE — 1123F ACP DISCUSS/DSCN MKR DOCD: CPT | Performed by: ORTHOPAEDIC SURGERY

## 2024-03-07 PROCEDURE — G8484 FLU IMMUNIZE NO ADMIN: HCPCS | Performed by: ORTHOPAEDIC SURGERY

## 2024-03-07 PROCEDURE — G8417 CALC BMI ABV UP PARAM F/U: HCPCS | Performed by: ORTHOPAEDIC SURGERY

## 2024-03-07 PROCEDURE — 1036F TOBACCO NON-USER: CPT | Performed by: ORTHOPAEDIC SURGERY

## 2024-03-07 RX ORDER — PRAMIPEXOLE DIHYDROCHLORIDE 0.75 MG/1
TABLET ORAL
Qty: 270 TABLET | Refills: 0 | Status: CANCELLED | OUTPATIENT
Start: 2024-03-07

## 2024-03-07 NOTE — PROGRESS NOTES
current medication, or changing dose of current medication),    [] Decision regarding minor surgery with identified patient or procedure risk factors  [] Decision regarding elective major surgery without identified patient or procedure risk factors  [] Diagnosis or treatment significantly limited by social determinants of health diagnosis or treatment significantly limited by social determinants of health: Smoker, alcohol consumption, noncompliance, incarceration, limited access to care, medical literacy, environmental conditions, quality of housing, Food insecurity, Housing instability, Homelessness, transportation issues, Financial insecurity, Material hardship, Employment status hardship, Education,  status, Stress, Social connection, Health insurance coverage status issues, Health literacy, Elder abuse intimate partner violence, Medical cost burden.          I am reviewing the previous notes, independently interpreting results reviewing diagnostic studies [Advanced  Imaging, Diagnostic test results (x-rays)] and had a direct face to face with the patient discussing the diagnosis and importance of compliance with the treatment and plan.  The treatment plan is listed in the above plan section of this Office encounter. I answered all of his questions, as well as documenting patient care coordination for this individual on the day of the visit.      Disclaimer:     Sections of this note are dictated using utilizing voice recognition software, which may have resulted in some phonetic based errors in grammar and contents. Even though attempts were made to correct all the mistakes, some may have been missed, and remained in the body of the document. If questions arise, please contact our department.     An electronic signature was used to authenticate this note.    Malcolm Cecilia may have a reminder for a \"due or due soon\" health maintenance. I have asked that he contact his primary care provider for follow-up on

## 2024-03-07 NOTE — TELEPHONE ENCOUNTER
This patient contacted the office for the following prescriptions to be refilled:    Medication requested :   Requested Prescriptions     Pending Prescriptions Disp Refills    metFORMIN (GLUCOPHAGE-XR) 500 MG extended release tablet [Pharmacy Med Name: METFORMIN HYDROCHLORIDE  MG Tablet Extended Release 24 Hour] 360 tablet 3     Sig: TAKE 2 TABLETS TWICE DAILY BEFORE MEALS      LAST REFILLED: 09/05/2023 90 tabs 1 RF  PCP: Wilma Pettit MD  LOV: 11/1/2023  NOV: Visit date not found  Return in about 2 months (around 1/1/2024).   FUTURE APPT:   Future Appointments   Date Time Provider Department Center   3/7/2024 10:15 AM Johnson Yeager MD [facility] BS AMB   3/14/2024 10:30 AM Yvrose Bolden PT [facility]   8/20/2024  9:30 AM [facility] NURSE [facility] Sched   8/27/2024  1:00 PM Adolfo Espinoza, PA-C [facility] [place] Sched   10/14/2024 11:15 AM Myles Bolton MD [facility] AMB     LABS:   Results for orders placed or performed during the hospital encounter of 02/12/24 (from the past 2160 hour(s))   Renal Function Panel   Result Value Ref Range    Sodium 136 136 - 145 mmol/L    Potassium 4.2 3.5 - 5.5 mmol/L    Chloride 106 100 - 111 mmol/L    CO2 26 21 - 32 mmol/L    Anion Gap 4 3.0 - 18 mmol/L    Glucose 166 (H) 74 - 99 mg/dL    BUN 31 (H) 7.0 - 18 MG/DL    Creatinine 1.49 (H) 0.6 - 1.3 MG/DL    Bun/Cre Ratio 21 (H) 12 - 20      Est, Glom Filt Rate 51 (L) >60 ml/min/1.73m2    Calcium 9.3 8.5 - 10.1 MG/DL    Phosphorus 3.8 2.5 - 4.9 MG/DL    Albumin 3.8 3.4 - 5.0 g/dL   LUIS M and PE, Serum   Result Value Ref Range    IgG, Serum 919 603 - 1,613 mg/dL    IgA 158 61 - 437 mg/dL    IgM 76 20 - 172 mg/dL    Total Protein 6.6 6.0 - 8.5 g/dL    Albumin 3.7 2.9 - 4.4 g/dL    Alpha 1 Globulin 0.2 0.0 - 0.4 g/dL    Alpha 2 Globulin 0.8 0.4 - 1.0 g/dL    Beta Globulin 1.0 0.7 - 1.3 g/dL    GAMMA GLOBULIN 0.9 0.4 - 1.8 g/dL    M-Davy Not Observed Not Observed g/dL    Globulin 2.9 2.2 - 3.9 g/dL    A/G Ratio 1.3 0.7 -

## 2024-03-08 RX ORDER — METFORMIN HYDROCHLORIDE 500 MG/1
TABLET, EXTENDED RELEASE ORAL
Qty: 120 TABLET | Refills: 0 | Status: SHIPPED | OUTPATIENT
Start: 2024-03-08

## 2024-03-11 NOTE — TELEPHONE ENCOUNTER
Spoke with Mr. Malcolm Brooks who has confirmed he has changed PCP to Dr. Shahbaz Domingo as of 02/01/2024.

## 2024-03-14 ENCOUNTER — HOSPITAL ENCOUNTER (OUTPATIENT)
Facility: HOSPITAL | Age: 69
Setting detail: RECURRING SERIES
Discharge: HOME | End: 2024-03-17

## 2024-03-26 RX ORDER — ISOPROPYL ALCOHOL 70 ML/100ML
SWAB TOPICAL
Refills: 3 | OUTPATIENT
Start: 2024-03-26

## 2024-03-30 DIAGNOSIS — E11.40 TYPE 2 DIABETES MELLITUS WITH DIABETIC NEUROPATHY, WITHOUT LONG-TERM CURRENT USE OF INSULIN (HCC): ICD-10-CM

## 2024-04-01 RX ORDER — METFORMIN HYDROCHLORIDE 500 MG/1
TABLET, EXTENDED RELEASE ORAL
Qty: 120 TABLET | Refills: 11 | OUTPATIENT
Start: 2024-04-01

## 2024-04-01 RX ORDER — BUMETANIDE 1 MG/1
TABLET ORAL
Qty: 90 TABLET | Refills: 3 | OUTPATIENT
Start: 2024-04-01

## 2024-04-02 ENCOUNTER — HOSPITAL ENCOUNTER (OUTPATIENT)
Facility: HOSPITAL | Age: 69
Setting detail: RECURRING SERIES
Discharge: HOME | End: 2024-04-05
Payer: MEDICARE

## 2024-04-02 PROCEDURE — 97113 AQUATIC THERAPY/EXERCISES: CPT

## 2024-04-02 NOTE — PROGRESS NOTES
PHYSICAL / OCCUPATIONAL THERAPY - DAILY TREATMENT NOTE     Patient Name: Malcolm Brooks    Date: 2024    : 1955  Insurance: Payor: [employer] MEDICARE / Plan: [employer] GOLD PLUS HMO / Product Type: *No Product type* /      Patient  verified Yes     Visit #   Current / Total 2 24   Time   In / Out 9:10 9:50   Pain   In / Out 6 6   Subjective Functional Status/Changes: Pt c/o B knee pain, right ankle pain and LB pain. Pt states he has been doing his HEP.    Changes to:  Allergies, Med Hx, Sx Hx?   no       TREATMENT AREA =  Pain in right knee [M25.561]  Pain in right ankle and joints of right foot [M25.571]  Pain in left knee [M25.562]    OBJECTIVE      Therapeutic Procedures:  Tx Min Billable or 1:1 Min (if diff from Tx Min) Procedure, Rationale, Specifics   40  81038 Aquatic Therapy (timed):  decrease pain, improve strength, flexibility, and range of motion using the buoyancy, thermal properties and consistent resistance of the water to improve patient's ability to progress to PLOF and address remaining functional goals.  (see flow sheet as applicable)    Details if applicable:                           40  [facility] Totals Reminder: bill using total billable min of TIMED therapeutic procedures (example: do not include dry needle or estim unattended, both untimed codes, in totals to left)  8-22 min = 1 unit; 23-37 min = 2 units; 38-52 min = 3 units; 53-67 min = 4 units; 68-82 min = 5 units   Total Total     TOTAL TREATMENT TIME:        40     [x]  Patient Education billed concurrently with other procedures   [x] Review HEP    [] Progressed/Changed HEP, detail:    [] Other detail:       Objective Information/Functional Measures/Assessment    Initiated aquatic exercises; first f/u session.   FOTO: 41      Patient will continue to benefit from skilled PT / OT services to modify and progress therapeutic interventions, analyze and address functional mobility deficits, analyze and address ROM deficits, analyze and 
extension lag of right knee to 5 degrees and left knee to 10 deg as demonstrated by the pt's improved ability to perform prolong standing with ease.  IE: Extension lag 17 degrees left, right extension lag 10 deg  4. Patient will improve bilateral knee strength to 4+/5 as demonstrated by the improved ability to ascend/descend stairs with ease.  IE:  knee strength 4-/5, difficulty ascending/descending stairs   5. Patient will report the pain levels to be < 2/10 so pt can perform the moderate activities at home with ease.  IE: Pain 10/10  6. Patient will improve right ankle and hallux strength to 4-/5 in order to improve pt's ability to perform activities in standing with ease.  IE: Right ankle and hallux strength 3/5 grossly  7. Patient will improve right ankle and hallux DF and eversion to 10 degrees to improve pt's ability to ambulate safely.  IE: Right foot drop, hallux DF 2 deg and 30 deg inverted    Summary of Care/ Key Functional Changes: Pt was seen today for his first follow up visit since his initial evaluation on 2/29/24, due to today being the first available aquatic therapy appointment opening. Initially waiting on insurance authorization and then the pool has been down for the past two weeks. Pt reports he has been doing his HEP.       ASSESSMENT/RECOMMENDATIONS: Continue with aquatic therapy, progressing as able.     Continue per plan of care.     Thank you for this referral.   Carmina Boyce, PT 4/2/2024 10:18 AM

## 2024-04-04 ENCOUNTER — TELEPHONE (OUTPATIENT)
Age: 69
End: 2024-04-04

## 2024-04-04 NOTE — TELEPHONE ENCOUNTER
Kaiden jackson/[facility] is requesting the orders DME & [employer] faxed to [phone]

## 2024-04-11 ENCOUNTER — HOSPITAL ENCOUNTER (OUTPATIENT)
Facility: HOSPITAL | Age: 69
Setting detail: RECURRING SERIES
Discharge: HOME | End: 2024-04-14
Payer: MEDICARE

## 2024-04-11 ENCOUNTER — HOSPITAL ENCOUNTER (OUTPATIENT)
Facility: HOSPITAL | Age: 69
Discharge: HOME | End: 2024-04-11
Payer: MEDICARE

## 2024-04-11 LAB
CREAT UR-MCNC: 109 MG/DL (ref 30–125)
CREAT UR-MCNC: 110 MG/DL (ref 30–125)
MICROALBUMIN UR-MCNC: 1.53 MG/DL (ref 0–3)
MICROALBUMIN/CREAT UR-RTO: 14 MG/G (ref 0–30)
PROT UR-MCNC: 16 MG/DL
PROT/CREAT UR-RTO: 0.1

## 2024-04-11 PROCEDURE — 84156 ASSAY OF PROTEIN URINE: CPT

## 2024-04-11 PROCEDURE — 97113 AQUATIC THERAPY/EXERCISES: CPT

## 2024-04-11 PROCEDURE — 82043 UR ALBUMIN QUANTITATIVE: CPT

## 2024-04-11 PROCEDURE — 36415 COLL VENOUS BLD VENIPUNCTURE: CPT

## 2024-04-11 PROCEDURE — 82570 ASSAY OF URINE CREATININE: CPT

## 2024-04-11 NOTE — PROGRESS NOTES
PHYSICAL / OCCUPATIONAL THERAPY - DAILY TREATMENT NOTE     Patient Name: Malcolm Brooks    Date: 2024    : 1955  Insurance: Payor: [employer] MEDICARE / Plan: [employer] GOLD PLUS HMO / Product Type: *No Product type* /      Patient  verified Yes     Visit #   Current / Total 3 24   Time   In / Out 10:00 10:30   Pain   In / Out 7 9   Subjective Functional Status/Changes: Pt c/o back, B knee and right ankle pain.     Changes to:  Allergies, Med Hx, Sx Hx?   no       TREATMENT AREA =  Pain in right knee [M25.561]  Pain in right ankle and joints of right foot [M25.571]  Pain in left knee [M25.562]    OBJECTIVE        Therapeutic Procedures:  Tx Min Billable or 1:1 Min (if diff from Tx Min) Procedure, Rationale, Specifics   30  80457 Aquatic Therapy (timed):  decrease pain, improve strength, flexibility, and range of motion using the buoyancy, thermal properties and consistent resistance of the water to improve patient's ability to progress to PLOF and address remaining functional goals.  (see flow sheet as applicable)    Details if applicable:                           30  [facility] Totals Reminder: bill using total billable min of TIMED therapeutic procedures (example: do not include dry needle or estim unattended, both untimed codes, in totals to left)  8-22 min = 1 unit; 23-37 min = 2 units; 38-52 min = 3 units; 53-67 min = 4 units; 68-82 min = 5 units   Total Total     TOTAL TREATMENT TIME:        30     [x]  Patient Education billed concurrently with other procedures   [x] Review HEP    [] Progressed/Changed HEP, detail:    [] Other detail:       Objective Information/Functional Measures/Assessment    Pt arrived 10 minutes late for appointment, therefore omitted hamstring curls. Pt reported pain throughout all exercises and had facial grimacing with all exercises and had difficulty maintaining upright posture. He did report that the water felt better than doing any movement on land. Pt reported increased pain

## 2024-04-14 ENCOUNTER — APPOINTMENT (OUTPATIENT)
Facility: HOSPITAL | Age: 69
End: 2024-04-14
Payer: MEDICARE

## 2024-04-14 ENCOUNTER — HOSPITAL ENCOUNTER (EMERGENCY)
Facility: HOSPITAL | Age: 69
Discharge: HOME | End: 2024-04-14
Attending: EMERGENCY MEDICINE
Payer: MEDICARE

## 2024-04-14 ENCOUNTER — APPOINTMENT (OUTPATIENT)
Facility: HOSPITAL | Age: 69
End: 2024-04-14
Attending: EMERGENCY MEDICINE
Payer: MEDICARE

## 2024-04-14 VITALS
TEMPERATURE: 98.3 F | BODY MASS INDEX: 39.01 KG/M2 | WEIGHT: 304 LBS | OXYGEN SATURATION: 99 % | RESPIRATION RATE: 17 BRPM | HEIGHT: 74 IN | DIASTOLIC BLOOD PRESSURE: 72 MMHG | SYSTOLIC BLOOD PRESSURE: 130 MMHG | HEART RATE: 79 BPM

## 2024-04-14 DIAGNOSIS — R06.09 DYSPNEA ON EXERTION: ICD-10-CM

## 2024-04-14 DIAGNOSIS — M79.604 RIGHT LEG PAIN: Primary | ICD-10-CM

## 2024-04-14 LAB
ANION GAP SERPL CALC-SCNC: 8 MMOL/L (ref 3–18)
BASOPHILS # BLD: 0.1 K/UL (ref 0–0.1)
BASOPHILS NFR BLD: 1 % (ref 0–2)
BUN SERPL-MCNC: 21 MG/DL (ref 7–18)
BUN/CREAT SERPL: 14 (ref 12–20)
CALCIUM SERPL-MCNC: 9.3 MG/DL (ref 8.5–10.1)
CHLORIDE SERPL-SCNC: 106 MMOL/L (ref 100–111)
CO2 SERPL-SCNC: 28 MMOL/L (ref 21–32)
CREAT SERPL-MCNC: 1.52 MG/DL (ref 0.6–1.3)
DIFFERENTIAL METHOD BLD: ABNORMAL
EOSINOPHIL # BLD: 0.2 K/UL (ref 0–0.4)
EOSINOPHIL NFR BLD: 3 % (ref 0–5)
ERYTHROCYTE [DISTWIDTH] IN BLOOD BY AUTOMATED COUNT: 13.3 % (ref 11.6–14.5)
GLUCOSE SERPL-MCNC: 272 MG/DL (ref 74–99)
HCT VFR BLD AUTO: 38.6 % (ref 36–48)
HGB BLD-MCNC: 12.1 G/DL (ref 13–16)
IMM GRANULOCYTES # BLD AUTO: 0 K/UL (ref 0–0.04)
IMM GRANULOCYTES NFR BLD AUTO: 1 % (ref 0–0.5)
LYMPHOCYTES # BLD: 1 K/UL (ref 0.9–3.6)
LYMPHOCYTES NFR BLD: 14 % (ref 21–52)
MAGNESIUM SERPL-MCNC: 1.6 MG/DL (ref 1.6–2.6)
MCH RBC QN AUTO: 28.3 PG (ref 24–34)
MCHC RBC AUTO-ENTMCNC: 31.3 G/DL (ref 31–37)
MCV RBC AUTO: 90.2 FL (ref 78–100)
MONOCYTES # BLD: 0.4 K/UL (ref 0.05–1.2)
MONOCYTES NFR BLD: 6 % (ref 3–10)
NEUTS SEG # BLD: 5.4 K/UL (ref 1.8–8)
NEUTS SEG NFR BLD: 76 % (ref 40–73)
NRBC # BLD: 0 K/UL (ref 0–0.01)
NRBC BLD-RTO: 0 PER 100 WBC
NT PRO BNP: 199 PG/ML (ref 0–900)
PLATELET # BLD AUTO: 246 K/UL (ref 135–420)
PMV BLD AUTO: 10.1 FL (ref 9.2–11.8)
POTASSIUM SERPL-SCNC: 4.5 MMOL/L (ref 3.5–5.5)
RBC # BLD AUTO: 4.28 M/UL (ref 4.35–5.65)
SODIUM SERPL-SCNC: 142 MMOL/L (ref 136–145)
TROPONIN I SERPL HS-MCNC: 10 NG/L (ref 0–78)
WBC # BLD AUTO: 7.1 K/UL (ref 4.6–13.2)

## 2024-04-14 PROCEDURE — 85025 COMPLETE CBC W/AUTO DIFF WBC: CPT

## 2024-04-14 PROCEDURE — 83735 ASSAY OF MAGNESIUM: CPT

## 2024-04-14 PROCEDURE — 93971 EXTREMITY STUDY: CPT

## 2024-04-14 PROCEDURE — 6360000004 HC RX CONTRAST MEDICATION: Performed by: EMERGENCY MEDICINE

## 2024-04-14 PROCEDURE — 71045 X-RAY EXAM CHEST 1 VIEW: CPT

## 2024-04-14 PROCEDURE — 80048 BASIC METABOLIC PNL TOTAL CA: CPT

## 2024-04-14 PROCEDURE — 2580000003 HC RX 258: Performed by: EMERGENCY MEDICINE

## 2024-04-14 PROCEDURE — 84484 ASSAY OF TROPONIN QUANT: CPT

## 2024-04-14 PROCEDURE — 83880 ASSAY OF NATRIURETIC PEPTIDE: CPT

## 2024-04-14 PROCEDURE — 99285 EMERGENCY DEPT VISIT HI MDM: CPT

## 2024-04-14 PROCEDURE — 71275 CT ANGIOGRAPHY CHEST: CPT

## 2024-04-14 RX ORDER — 0.9 % SODIUM CHLORIDE 0.9 %
500 INTRAVENOUS SOLUTION INTRAVENOUS ONCE
Status: COMPLETED | OUTPATIENT
Start: 2024-04-14 | End: 2024-04-14

## 2024-04-14 RX ADMIN — SODIUM CHLORIDE 500 ML: 9 INJECTION, SOLUTION INTRAVENOUS at 16:45

## 2024-04-14 RX ADMIN — IOPAMIDOL 80 ML: 755 INJECTION, SOLUTION INTRAVENOUS at 16:36

## 2024-04-14 ASSESSMENT — PAIN SCALES - GENERAL: PAINLEVEL_OUTOF10: 10

## 2024-04-14 ASSESSMENT — LIFESTYLE VARIABLES: HOW OFTEN DO YOU HAVE A DRINK CONTAINING ALCOHOL: NEVER

## 2024-04-14 ASSESSMENT — PAIN - FUNCTIONAL ASSESSMENT: PAIN_FUNCTIONAL_ASSESSMENT: 0-10

## 2024-04-14 ASSESSMENT — PAIN DESCRIPTION - LOCATION: LOCATION: LEG

## 2024-04-14 ASSESSMENT — PAIN DESCRIPTION - ORIENTATION: ORIENTATION: RIGHT;UPPER

## 2024-04-14 NOTE — DISCHARGE INSTRUCTIONS
Activities as tolerated.  Continue over-the-counter Tylenol for your right leg pain.  I encouraged the use of a topical muscle rub such as capsaicin along with stretching activities.  Follow-up with your primary care doctor this week regarding the leg pain.  Perhaps evaluation by physical therapy would be helpful.  Regarding your shortness of breath, there is no evidence of blood clot today.  I strongly encourage you to call your cardiologist to arrange follow-up in the coming week.  Return for any acute concerns

## 2024-04-14 NOTE — ED TRIAGE NOTES
Pt to ED concerned he may have a blood clot in his lungs and/or right leg. Pt reports right thigh pain x 1 week, was seen at [facility] at that time for URI, given antibiotics and told to F/U to r/o blood clot in right leg. Pt seen at kidney doctor and told he may have blood clot in lung as well because he had increased SOB x 2 weeks. Told to be seen to r/o PE and DVT.

## 2024-04-16 ENCOUNTER — APPOINTMENT (OUTPATIENT)
Facility: HOSPITAL | Age: 69
End: 2024-04-16
Payer: MEDICARE

## 2024-04-18 ENCOUNTER — HOSPITAL ENCOUNTER (OUTPATIENT)
Facility: HOSPITAL | Age: 69
Setting detail: RECURRING SERIES
Discharge: HOME | End: 2024-04-21
Payer: MEDICARE

## 2024-04-18 LAB — ECHO BSA: 2.68 M2

## 2024-04-18 PROCEDURE — 97113 AQUATIC THERAPY/EXERCISES: CPT

## 2024-04-18 NOTE — PROGRESS NOTES
exercises and continues to have difficulty maintaining upright posture.     Patient will continue to benefit from skilled PT / OT services to modify and progress therapeutic interventions, analyze and address functional mobility deficits, analyze and address ROM deficits, analyze and address strength deficits, analyze and address soft tissue restrictions, analyze and cue for proper movement patterns, and analyze and modify for postural abnormalities to address functional deficits and attain remaining goals.    Progress toward goals / Updated goals:  []  See Progress Note/Recertification    Short Term Goals: To be accomplished in 3 weeks  Patient will be independent and compliant with the HEP to maximize the therapeutic benefit of the exercises.  IE: HEP reviewed and demonstrated  Current: Met; pt reports compliance with HEP. (4/2/24)  2. Patient will improve bilateral knee AROM flexion to 120 degrees in order to be able to perform the functional transfers with ease.  IE: Right knee AROM flexion 10 deg - 75  degrees, left knee AROM 17 deg- 115 deg     Long Term Goals: To be accomplished in 12 weeks  Patient will improve FOTO score to 40  to demonstrate functional improvements.  IE: FOTO score  30  Current: Met; 41. (4/2/24)  2. Patient will improve the hip flexor strength of bilateral LE to 4+/5 to improve pt's ability to ambulate longer distances with LRAD  IE: Bilateral hip flexor strength 3+/5 grossly  3. Patient will improve the extension lag of right knee to 5 degrees and left knee to 10 deg as demonstrated by the pt's improved ability to perform prolong standing with ease.  IE: Extension lag 17 degrees left, right extension lag 10 deg  4. Patient will improve bilateral knee strength to 4+/5 as demonstrated by the improved ability to ascend/descend stairs with ease.  IE:  knee strength 4-/5, difficulty ascending/descending stairs   5. Patient will report the pain levels to be < 2/10 so pt can perform the

## 2024-04-22 RX ORDER — BUPROPION HYDROCHLORIDE 150 MG/1
150 TABLET ORAL DAILY
Qty: 90 TABLET | Refills: 3 | OUTPATIENT
Start: 2024-04-22

## 2024-04-22 RX ORDER — MONTELUKAST SODIUM 10 MG/1
TABLET ORAL
Qty: 90 TABLET | Refills: 3 | OUTPATIENT
Start: 2024-04-22

## 2024-04-25 ENCOUNTER — APPOINTMENT (OUTPATIENT)
Facility: HOSPITAL | Age: 69
End: 2024-04-25
Payer: MEDICARE

## 2024-04-29 ENCOUNTER — OFFICE VISIT (OUTPATIENT)
Age: 69
End: 2024-04-29
Payer: MEDICARE

## 2024-04-29 VITALS
WEIGHT: 306 LBS | OXYGEN SATURATION: 92 % | HEIGHT: 74 IN | BODY MASS INDEX: 39.27 KG/M2 | DIASTOLIC BLOOD PRESSURE: 60 MMHG | SYSTOLIC BLOOD PRESSURE: 128 MMHG | HEART RATE: 87 BPM

## 2024-04-29 DIAGNOSIS — E78.49 OTHER HYPERLIPIDEMIA: Primary | ICD-10-CM

## 2024-04-29 PROCEDURE — 3017F COLORECTAL CA SCREEN DOC REV: CPT | Performed by: INTERNAL MEDICINE

## 2024-04-29 PROCEDURE — 3074F SYST BP LT 130 MM HG: CPT | Performed by: INTERNAL MEDICINE

## 2024-04-29 PROCEDURE — 1123F ACP DISCUSS/DSCN MKR DOCD: CPT | Performed by: INTERNAL MEDICINE

## 2024-04-29 PROCEDURE — 99214 OFFICE O/P EST MOD 30 MIN: CPT | Performed by: INTERNAL MEDICINE

## 2024-04-29 PROCEDURE — G8428 CUR MEDS NOT DOCUMENT: HCPCS | Performed by: INTERNAL MEDICINE

## 2024-04-29 PROCEDURE — 1036F TOBACCO NON-USER: CPT | Performed by: INTERNAL MEDICINE

## 2024-04-29 PROCEDURE — 3078F DIAST BP <80 MM HG: CPT | Performed by: INTERNAL MEDICINE

## 2024-04-29 PROCEDURE — G8417 CALC BMI ABV UP PARAM F/U: HCPCS | Performed by: INTERNAL MEDICINE

## 2024-04-29 RX ORDER — ATORVASTATIN CALCIUM 40 MG/1
40 TABLET, FILM COATED ORAL DAILY
Qty: 90 TABLET | Refills: 2 | Status: SHIPPED | OUTPATIENT
Start: 2024-04-29

## 2024-04-29 RX ORDER — PRAMIPEXOLE DIHYDROCHLORIDE 1 MG/1
3 TABLET ORAL DAILY
COMMUNITY
Start: 2024-03-26

## 2024-04-29 RX ORDER — TRAZODONE HYDROCHLORIDE 50 MG/1
50 TABLET ORAL
COMMUNITY
Start: 2024-03-26

## 2024-04-29 NOTE — PROGRESS NOTES
PATIENT IS TO INJECT 0.3ML AND TITRATE UP AS DIRECTED    buPROPion (WELLBUTRIN XL) 150 MG extended release tablet TAKE 1 TABLET EVERY DAY    aspirin 81 MG chewable tablet Take 1 tablet by mouth daily    TRUEplus Lancets 33G MISC TEST BLOOD SUGAR THREE TIMES DAILY    alfuzosin (UROXATRAL) 10 MG extended release tablet TAKE 1 TABLET BY MOUTH DAILY AFTER AND DINNER    ondansetron (ZOFRAN-ODT) 4 MG disintegrating tablet Take 1 tablet by mouth every 12 hours as needed for Nausea or Vomiting    Semaglutide, 2 MG/DOSE, (OZEMPIC, 2 MG/DOSE,) 8 MG/3ML SOPN Inject 2 mg into the skin every 7 days PAP medication    fluticasone-salmeterol (ADVAIR) 250-50 MCG/ACT AEPB diskus inhaler Inhale 1 puff into the lungs 2 times daily    magnesium oxide (MAG-OX) 400 MG tablet Take 1 tablet by mouth daily as needed    ondansetron (ZOFRAN) 4 MG tablet Take 1 tablet by mouth once    albuterol sulfate HFA (PROVENTIL;VENTOLIN;PROAIR) 108 (90 Base) MCG/ACT inhaler Inhale 1 puff into the lungs every 4 hours as needed    ascorbic acid (VITAMIN C) 500 MG tablet Take 1 tablet by mouth daily    vitamin D (CHOLECALCIFEROL) 25 MCG (1000 UT) TABS tablet Take 1 tablet by mouth daily    dutasteride (AVODART) 0.5 MG capsule Take 1 capsule by mouth    fluticasone (FLONASE) 50 MCG/ACT nasal spray ceived the following from [facility]: Outside name: fluticasone propionate (FLONASE) 50 mcg/actuation nasal spray    Melatonin 5 MG CAPS Take 10 mg by mouth nightly    naloxone 4 MG/0.1ML LIQD nasal spray 1 spray by Nasal route as needed    polyethylene glycol (GLYCOLAX) 17 GM/SCOOP powder Take 17 g by mouth daily    trospium (SANCTURA) 20 MG tablet Take 1 tablet by mouth 2 times daily (before meals)     No current facility-administered medications for this visit.       Past Surgical History:   Procedure Laterality Date    APPENDECTOMY      BACK SURGERY      Lumbar fusion    CARDIAC PROCEDURE N/A 9/15/2023    Left heart cath performed by Myles

## 2024-05-02 ENCOUNTER — HOSPITAL ENCOUNTER (OUTPATIENT)
Facility: HOSPITAL | Age: 69
Setting detail: RECURRING SERIES
Discharge: HOME | End: 2024-05-05
Payer: MEDICARE

## 2024-05-02 PROCEDURE — 97113 AQUATIC THERAPY/EXERCISES: CPT

## 2024-05-02 NOTE — PROGRESS NOTES
PHYSICAL / OCCUPATIONAL THERAPY - DAILY TREATMENT NOTE     Patient Name: Malcolm Brooks    Date: 2024    : 1955  Insurance: Payor: [employer] MEDICARE / Plan: [employer] GOLD PLUS HMO / Product Type: *No Product type* /      Patient  verified Yes     Visit #   Current / Total 5 24   Time   In / Out 10:30 11;10   Pain   In / Out 6/10 6/10   Subjective Functional Status/Changes: Pt reports he has followed up with cardiology and he   Changes to:  Allergies, Med Hx, Sx Hx?   no       TREATMENT AREA =  Pain in right knee [M25.561]  Pain in right ankle and joints of right foot [M25.571]  Pain in left knee [M25.562]    OBJECTIVE    Therapeutic Procedures:  Tx Min Billable or 1:1 Min (if diff from Tx Min) Procedure, Rationale, Specifics   40  66898 Aquatic Therapy (timed):  decrease pain, improve strength, flexibility, and range of motion using the buoyancy, thermal properties and consistent resistance of the water to improve patient's ability to progress to PLOF and address remaining functional goals.  (see flow sheet as applicable)    Details if applicable:       40  [facility] Totals Reminder: bill using total billable min of TIMED therapeutic procedures (example: do not include dry needle or estim unattended, both untimed codes, in totals to left)  8-22 min = 1 unit; 23-37 min = 2 units; 38-52 min = 3 units; 53-67 min = 4 units; 68-82 min = 5 units   Total Total     TOTAL TREATMENT TIME:        40     [x]  Patient Education billed concurrently with other procedures   [x] Review HEP    [] Progressed/Changed HEP, detail:    [] Other detail:       Objective Information/Functional Measures/Assessment    Pt has made minimal progress toward goals with no change in strength and minimal improvement in knee AROM. Pt has had limited attendance to PT due to other health concerns.     Patient will continue to benefit from skilled PT / OT services to modify and progress therapeutic interventions, analyze and address functional  [see HPI] : see HPI [Negative] : Heme/Lymph

## 2024-05-02 NOTE — PROGRESS NOTES
[facility]  [address] [facility] [address] [place] [address] - Ph: [phone]   Fx: [phone]    PHYSICAL THERAPY PROGRESS NOTE      Patient name: Malcolm Brooks Start of Care: 2024   Referral source: Manjinder Williamson MD : 1955               Medical Diagnosis: Pain in right knee [M25.561]  Pain in right ankle and joints of right foot [M25.571]        Onset Date: 2021   Treatment Diagnosis: M25.561  RIGHT KNEE PAIN, M25.562  LEFT KNEE PAIN, and M25.571  RIGHT ANKLE PAIN                                      Prior Hospitalization: see medical history Provider#: [id number]   Medications: Verified on Patient Summary List      Comorbidities: DDD, Lumbar, Diabetes, Hyperlipidemia, frequent falls, s/p lumbar spinal fusion and laminectomy, thoracic compression fracture and hypertension  Prior Level of Function: Independent with functional mobility and ambulation with a cane with less pain    Visits from Start of Care: 5    Missed Visits: 0    Goals/Measure of Progress: To be achieved in 12 weeks:    Short Term Goals: To be accomplished in 3 weeks  Patient will be independent and compliant with the HEP to maximize the therapeutic benefit of the exercises.  IE: HEP reviewed and demonstrated  PN: Met; pt reports compliance with HEP.   2. Patient will improve bilateral knee AROM flexion to 120 degrees in order to be able to perform the functional transfers with ease.  IE: Right knee AROM flexion 10 deg - 75  degrees, left knee AROM 17 deg- 115 deg   PN: Progressing Right 11-80 degrees; left 15- 118 degrees.   Long Term Goals: To be accomplished in 12 weeks  Patient will improve FOTO score to 40  to demonstrate functional improvements.  IE: FOTO score  30  PN: Met; 41.   2. Patient will improve the hip flexor strength of bilateral LE to 4+/5 to improve pt's ability to ambulate longer distances with LRAD  IE: Bilateral hip flexor strength 3+/5

## 2024-05-07 ENCOUNTER — HOSPITAL ENCOUNTER (OUTPATIENT)
Facility: HOSPITAL | Age: 69
Setting detail: RECURRING SERIES
Discharge: HOME | End: 2024-05-10
Payer: MEDICARE

## 2024-05-07 PROCEDURE — 97113 AQUATIC THERAPY/EXERCISES: CPT

## 2024-05-07 NOTE — PROGRESS NOTES
PHYSICAL / OCCUPATIONAL THERAPY - DAILY TREATMENT NOTE     Patient Name: Malcolm Brooks    Date: 2024    : 1955  Insurance: Payor: [employer] MEDICARE / Plan: [employer] GOLD PLUS HMO / Product Type: *No Product type* /      Patient  verified Yes     Visit #   Current / Total 6 24   Time   In / Out 11:10 11:55   Pain   In / Out 5 4   Subjective Functional Status/Changes: Pt reports moderate pain in bilateral knees today. He reports doing his HEP regularly.    Changes to:  Allergies, Med Hx, Sx Hx?   no       TREATMENT AREA =  Pain in right knee [M25.561]  Pain in right ankle and joints of right foot [M25.571]  Pain in left knee [M25.562]    OBJECTIVE        Therapeutic Procedures:  Tx Min Billable or 1:1 Min (if diff from Tx Min) Procedure, Rationale, Specifics   45  55677 Aquatic Therapy (timed):  decrease pain, improve strength, flexibility, and range of motion using the buoyancy, thermal properties and consistent resistance of the water to improve patient's ability to progress to PLOF and address remaining functional goals.  (see flow sheet as applicable)    Details if applicable:                           45  [facility] Totals Reminder: bill using total billable min of TIMED therapeutic procedures (example: do not include dry needle or estim unattended, both untimed codes, in totals to left)  8-22 min = 1 unit; 23-37 min = 2 units; 38-52 min = 3 units; 53-67 min = 4 units; 68-82 min = 5 units   Total Total     TOTAL TREATMENT TIME:        45     [x]  Patient Education billed concurrently with other procedures   [x] Review HEP    [] Progressed/Changed HEP, detail:    [] Other detail:       Objective Information/Functional Measures/Assessment    Discussed discharge planning with pt. Pt would like to have a few land based PT sessions before discharge. Gave pt Post Rehab handouts/information and discussed this program. Pt is interested in this and/or [facility] program at the [facility] to continue on his own after

## 2024-05-09 ENCOUNTER — HOSPITAL ENCOUNTER (OUTPATIENT)
Facility: HOSPITAL | Age: 69
Setting detail: RECURRING SERIES
Discharge: HOME | End: 2024-05-12
Payer: MEDICARE

## 2024-05-09 PROCEDURE — 97113 AQUATIC THERAPY/EXERCISES: CPT

## 2024-05-09 NOTE — PROGRESS NOTES
difficulty maintaining upright posture and using proper technique during exercises due to chronic low back pain.     Patient will continue to benefit from skilled PT / OT services to modify and progress therapeutic interventions, analyze and address functional mobility deficits, analyze and address ROM deficits, analyze and address strength deficits, analyze and address soft tissue restrictions, analyze and cue for proper movement patterns, analyze and modify for postural abnormalities, analyze and address imbalance/dizziness, and instruct in home and community integration to address functional deficits and attain remaining goals.    Progress toward goals / Updated goals:  []  See Progress Note/Recertification    Short Term Goals: To be accomplished in 3 weeks  Patient will be independent and compliant with the HEP to maximize the therapeutic benefit of the exercises.  IE: HEP reviewed and demonstrated  Current: Met; pt reports compliance with HEP. (4/2/24)  2. Patient will improve bilateral knee AROM flexion to 120 degrees in order to be able to perform the functional transfers with ease.  IE: Right knee AROM flexion 10 deg - 75  degrees, left knee AROM 17 deg- 115 deg   Current: Progressing Right 11-80 degrees; left 15- 118 degrees. 05/02/2024  Long Term Goals: To be accomplished in 12 weeks  Patient will improve FOTO score to 40  to demonstrate functional improvements.  IE: FOTO score  30  Current: Met; 41. (4/2/24)  2. Patient will improve the hip flexor strength of bilateral LE to 4+/5 to improve pt's ability to ambulate longer distances with LRAD  IE: Bilateral hip flexor strength 3+/5 grossly  Current:Remains 3+/5. 05/02/2024  3. Patient will improve the extension lag of right knee to 5 degrees and left knee to 10 deg as demonstrated by the pt's improved ability to perform prolong standing with ease.  IE: Extension lag 17 degrees left, right extension lag 10 deg  Current:Left 15 degrees, right 11 degrees.

## 2024-05-12 ENCOUNTER — APPOINTMENT (OUTPATIENT)
Facility: HOSPITAL | Age: 69
DRG: 313 | End: 2024-05-12
Payer: MEDICARE

## 2024-05-12 ENCOUNTER — HOSPITAL ENCOUNTER (INPATIENT)
Facility: HOSPITAL | Age: 69
LOS: 1 days | Discharge: HOME | DRG: 313 | End: 2024-05-14
Attending: STUDENT IN AN ORGANIZED HEALTH CARE EDUCATION/TRAINING PROGRAM | Admitting: HOSPITALIST
Payer: MEDICARE

## 2024-05-12 DIAGNOSIS — R07.9 CHEST PAIN, UNSPECIFIED TYPE: Primary | ICD-10-CM

## 2024-05-12 LAB
ANION GAP SERPL CALC-SCNC: 5 MMOL/L (ref 3–18)
BASOPHILS # BLD: 0.1 K/UL (ref 0–0.1)
BASOPHILS NFR BLD: 1 % (ref 0–2)
BUN SERPL-MCNC: 26 MG/DL (ref 7–18)
BUN/CREAT SERPL: 15 (ref 12–20)
CALCIUM SERPL-MCNC: 9.1 MG/DL (ref 8.5–10.1)
CHLORIDE SERPL-SCNC: 102 MMOL/L (ref 100–111)
CO2 SERPL-SCNC: 27 MMOL/L (ref 21–32)
CREAT SERPL-MCNC: 1.7 MG/DL (ref 0.6–1.3)
DIFFERENTIAL METHOD BLD: ABNORMAL
EOSINOPHIL # BLD: 0.3 K/UL (ref 0–0.4)
EOSINOPHIL NFR BLD: 3 % (ref 0–5)
ERYTHROCYTE [DISTWIDTH] IN BLOOD BY AUTOMATED COUNT: 13.2 % (ref 11.6–14.5)
GLUCOSE SERPL-MCNC: 360 MG/DL (ref 74–99)
HCT VFR BLD AUTO: 43.2 % (ref 36–48)
HGB BLD-MCNC: 13.1 G/DL (ref 13–16)
IMM GRANULOCYTES # BLD AUTO: 0.1 K/UL (ref 0–0.04)
IMM GRANULOCYTES NFR BLD AUTO: 1 % (ref 0–0.5)
LYMPHOCYTES # BLD: 1.6 K/UL (ref 0.9–3.6)
LYMPHOCYTES NFR BLD: 17 % (ref 21–52)
MCH RBC QN AUTO: 27.4 PG (ref 24–34)
MCHC RBC AUTO-ENTMCNC: 30.3 G/DL (ref 31–37)
MCV RBC AUTO: 90.4 FL (ref 78–100)
MONOCYTES # BLD: 0.6 K/UL (ref 0.05–1.2)
MONOCYTES NFR BLD: 7 % (ref 3–10)
NEUTS SEG # BLD: 7 K/UL (ref 1.8–8)
NEUTS SEG NFR BLD: 73 % (ref 40–73)
NRBC # BLD: 0 K/UL (ref 0–0.01)
NRBC BLD-RTO: 0 PER 100 WBC
NT PRO BNP: 104 PG/ML (ref 0–900)
PLATELET # BLD AUTO: 254 K/UL (ref 135–420)
PMV BLD AUTO: 10.1 FL (ref 9.2–11.8)
POTASSIUM SERPL-SCNC: 4.6 MMOL/L (ref 3.5–5.5)
RBC # BLD AUTO: 4.78 M/UL (ref 4.35–5.65)
SODIUM SERPL-SCNC: 134 MMOL/L (ref 136–145)
TROPONIN I SERPL HS-MCNC: 15 NG/L (ref 0–78)
WBC # BLD AUTO: 9.6 K/UL (ref 4.6–13.2)

## 2024-05-12 PROCEDURE — 71046 X-RAY EXAM CHEST 2 VIEWS: CPT

## 2024-05-12 PROCEDURE — 83690 ASSAY OF LIPASE: CPT

## 2024-05-12 PROCEDURE — 96374 THER/PROPH/DIAG INJ IV PUSH: CPT

## 2024-05-12 PROCEDURE — 80076 HEPATIC FUNCTION PANEL: CPT

## 2024-05-12 PROCEDURE — 93005 ELECTROCARDIOGRAM TRACING: CPT | Performed by: STUDENT IN AN ORGANIZED HEALTH CARE EDUCATION/TRAINING PROGRAM

## 2024-05-12 PROCEDURE — 85025 COMPLETE CBC W/AUTO DIFF WBC: CPT

## 2024-05-12 PROCEDURE — 83880 ASSAY OF NATRIURETIC PEPTIDE: CPT

## 2024-05-12 PROCEDURE — 99285 EMERGENCY DEPT VISIT HI MDM: CPT

## 2024-05-12 PROCEDURE — 84484 ASSAY OF TROPONIN QUANT: CPT

## 2024-05-12 PROCEDURE — 80048 BASIC METABOLIC PNL TOTAL CA: CPT

## 2024-05-12 PROCEDURE — 6360000002 HC RX W HCPCS

## 2024-05-12 PROCEDURE — 96375 TX/PRO/DX INJ NEW DRUG ADDON: CPT

## 2024-05-12 RX ORDER — MORPHINE SULFATE 4 MG/ML
4 INJECTION, SOLUTION INTRAMUSCULAR; INTRAVENOUS ONCE
Status: COMPLETED | OUTPATIENT
Start: 2024-05-12 | End: 2024-05-12

## 2024-05-12 RX ORDER — ONDANSETRON 2 MG/ML
4 INJECTION INTRAMUSCULAR; INTRAVENOUS
Status: COMPLETED | OUTPATIENT
Start: 2024-05-12 | End: 2024-05-12

## 2024-05-12 RX ADMIN — MORPHINE SULFATE 4 MG: 4 INJECTION, SOLUTION INTRAMUSCULAR; INTRAVENOUS at 23:15

## 2024-05-12 RX ADMIN — ONDANSETRON 4 MG: 2 INJECTION INTRAMUSCULAR; INTRAVENOUS at 23:16

## 2024-05-13 ENCOUNTER — APPOINTMENT (OUTPATIENT)
Facility: HOSPITAL | Age: 69
DRG: 313 | End: 2024-05-13
Payer: MEDICARE

## 2024-05-13 ENCOUNTER — TELEPHONE (OUTPATIENT)
Age: 69
End: 2024-05-13

## 2024-05-13 ENCOUNTER — TELEPHONE (OUTPATIENT)
Facility: HOSPITAL | Age: 69
End: 2024-05-13

## 2024-05-13 PROBLEM — R07.9 CHEST PAIN: Status: ACTIVE | Noted: 2024-05-13

## 2024-05-13 PROBLEM — I16.9 HYPERTENSIVE CRISIS: Status: ACTIVE | Noted: 2024-05-13

## 2024-05-13 LAB
ALBUMIN SERPL-MCNC: 3.9 G/DL (ref 3.4–5)
ALBUMIN/GLOB SERPL: 1.1 (ref 0.8–1.7)
ALP SERPL-CCNC: 90 U/L (ref 45–117)
ALT SERPL-CCNC: 31 U/L (ref 16–61)
AST SERPL-CCNC: 21 U/L (ref 10–38)
BILIRUB DIRECT SERPL-MCNC: 0.1 MG/DL (ref 0–0.2)
BILIRUB SERPL-MCNC: 0.5 MG/DL (ref 0.2–1)
ECHO AO ASC DIAM: 3 CM
ECHO AO ASCENDING AORTA INDEX: 1.15 CM/M2
ECHO AO ROOT DIAM: 3.5 CM
ECHO AO ROOT INDEX: 1.35 CM/M2
ECHO AV AREA PEAK VELOCITY: 1.9 CM2
ECHO AV AREA/BSA PEAK VELOCITY: 0.7 CM2/M2
ECHO AV PEAK GRADIENT: 8 MMHG
ECHO AV PEAK VELOCITY: 1.4 M/S
ECHO AV VELOCITY RATIO: 0.64
ECHO BSA: 2.69 M2
ECHO LA VOL A-L A2C: 57 ML (ref 18–58)
ECHO LA VOL A-L A4C: 55 ML (ref 18–58)
ECHO LA VOL MOD A2C: 55 ML (ref 18–58)
ECHO LA VOL MOD A4C: 53 ML (ref 18–58)
ECHO LA VOLUME AREA LENGTH: 57 ML
ECHO LA VOLUME INDEX A-L A2C: 22 ML/M2 (ref 16–34)
ECHO LA VOLUME INDEX A-L A4C: 21 ML/M2 (ref 16–34)
ECHO LA VOLUME INDEX AREA LENGTH: 22 ML/M2 (ref 16–34)
ECHO LA VOLUME INDEX MOD A2C: 21 ML/M2 (ref 16–34)
ECHO LA VOLUME INDEX MOD A4C: 20 ML/M2 (ref 16–34)
ECHO LV E' LATERAL VELOCITY: 9 CM/S
ECHO LV E' SEPTAL VELOCITY: 6 CM/S
ECHO LV FRACTIONAL SHORTENING: 21 % (ref 28–44)
ECHO LV INTERNAL DIMENSION DIASTOLE INDEX: 1.62 CM/M2
ECHO LV INTERNAL DIMENSION DIASTOLIC: 4.2 CM (ref 4.2–5.9)
ECHO LV INTERNAL DIMENSION SYSTOLIC INDEX: 1.27 CM/M2
ECHO LV INTERNAL DIMENSION SYSTOLIC: 3.3 CM
ECHO LV IVSD: 1.1 CM (ref 0.6–1)
ECHO LV MASS 2D: 157.1 G (ref 88–224)
ECHO LV MASS INDEX 2D: 60.4 G/M2 (ref 49–115)
ECHO LV POSTERIOR WALL DIASTOLIC: 1.1 CM (ref 0.6–1)
ECHO LV RELATIVE WALL THICKNESS RATIO: 0.52
ECHO LVOT AREA: 3.1 CM2
ECHO LVOT DIAM: 2 CM
ECHO LVOT MEAN GRADIENT: 1 MMHG
ECHO LVOT PEAK GRADIENT: 3 MMHG
ECHO LVOT PEAK VELOCITY: 0.9 M/S
ECHO LVOT STROKE VOLUME INDEX: 23.3 ML/M2
ECHO LVOT SV: 60.6 ML
ECHO LVOT VTI: 19.3 CM
ECHO MV A VELOCITY: 0.75 M/S
ECHO MV E DECELERATION TIME (DT): 312.3 MS
ECHO MV E VELOCITY: 0.57 M/S
ECHO MV E/A RATIO: 0.76
ECHO MV E/E' LATERAL: 6.33
ECHO MV E/E' RATIO (AVERAGED): 7.92
ECHO MV E/E' SEPTAL: 9.5
ECHO RA AREA 4C: 17.7 CM2
ECHO RV BASAL DIMENSION: 3.6 CM
ECHO RV TAPSE: 2.2 CM (ref 1.7–?)
EKG ATRIAL RATE: 86 BPM
EKG DIAGNOSIS: NORMAL
EKG P AXIS: 37 DEGREES
EKG P-R INTERVAL: 148 MS
EKG Q-T INTERVAL: 338 MS
EKG QRS DURATION: 76 MS
EKG QTC CALCULATION (BAZETT): 404 MS
EKG R AXIS: 72 DEGREES
EKG T AXIS: 53 DEGREES
EKG VENTRICULAR RATE: 86 BPM
GLOBULIN SER CALC-MCNC: 3.4 G/DL (ref 2–4)
GLUCOSE BLD STRIP.AUTO-MCNC: 165 MG/DL (ref 70–110)
GLUCOSE BLD STRIP.AUTO-MCNC: 178 MG/DL (ref 70–110)
GLUCOSE BLD STRIP.AUTO-MCNC: 184 MG/DL (ref 70–110)
GLUCOSE BLD STRIP.AUTO-MCNC: 207 MG/DL (ref 70–110)
LIPASE SERPL-CCNC: 46 U/L (ref 13–75)
PROT SERPL-MCNC: 7.3 G/DL (ref 6.4–8.2)
TROPONIN I SERPL HS-MCNC: 13 NG/L (ref 0–78)
TROPONIN I SERPL HS-MCNC: 15 NG/L (ref 0–78)

## 2024-05-13 PROCEDURE — 94761 N-INVAS EAR/PLS OXIMETRY MLT: CPT

## 2024-05-13 PROCEDURE — 36415 COLL VENOUS BLD VENIPUNCTURE: CPT

## 2024-05-13 PROCEDURE — 84484 ASSAY OF TROPONIN QUANT: CPT

## 2024-05-13 PROCEDURE — 6360000004 HC RX CONTRAST MEDICATION

## 2024-05-13 PROCEDURE — 1100000000 HC RM PRIVATE

## 2024-05-13 PROCEDURE — 71275 CT ANGIOGRAPHY CHEST: CPT

## 2024-05-13 PROCEDURE — 5A09357 ASSISTANCE WITH RESPIRATORY VENTILATION, LESS THAN 24 CONSECUTIVE HOURS, CONTINUOUS POSITIVE AIRWAY PRESSURE: ICD-10-PCS | Performed by: HOSPITALIST

## 2024-05-13 PROCEDURE — 6370000000 HC RX 637 (ALT 250 FOR IP): Performed by: HOSPITALIST

## 2024-05-13 PROCEDURE — 94640 AIRWAY INHALATION TREATMENT: CPT

## 2024-05-13 PROCEDURE — 70450 CT HEAD/BRAIN W/O DYE: CPT

## 2024-05-13 PROCEDURE — 93306 TTE W/DOPPLER COMPLETE: CPT | Performed by: INTERNAL MEDICINE

## 2024-05-13 PROCEDURE — 6370000000 HC RX 637 (ALT 250 FOR IP): Performed by: INTERNAL MEDICINE

## 2024-05-13 PROCEDURE — 82962 GLUCOSE BLOOD TEST: CPT

## 2024-05-13 PROCEDURE — G0378 HOSPITAL OBSERVATION PER HR: HCPCS

## 2024-05-13 PROCEDURE — 99221 1ST HOSP IP/OBS SF/LOW 40: CPT | Performed by: PSYCHIATRY & NEUROLOGY

## 2024-05-13 PROCEDURE — 96375 TX/PRO/DX INJ NEW DRUG ADDON: CPT

## 2024-05-13 PROCEDURE — 2580000003 HC RX 258: Performed by: INTERNAL MEDICINE

## 2024-05-13 PROCEDURE — 6370000000 HC RX 637 (ALT 250 FOR IP): Performed by: PHYSICIAN ASSISTANT

## 2024-05-13 PROCEDURE — 6370000000 HC RX 637 (ALT 250 FOR IP)

## 2024-05-13 PROCEDURE — 6360000002 HC RX W HCPCS: Performed by: INTERNAL MEDICINE

## 2024-05-13 PROCEDURE — 94660 CPAP INITIATION&MGMT: CPT

## 2024-05-13 PROCEDURE — 93010 ELECTROCARDIOGRAM REPORT: CPT | Performed by: INTERNAL MEDICINE

## 2024-05-13 PROCEDURE — 6360000004 HC RX CONTRAST MEDICATION: Performed by: HOSPITALIST

## 2024-05-13 PROCEDURE — 6360000002 HC RX W HCPCS

## 2024-05-13 PROCEDURE — 70498 CT ANGIOGRAPHY NECK: CPT

## 2024-05-13 PROCEDURE — 93306 TTE W/DOPPLER COMPLETE: CPT

## 2024-05-13 PROCEDURE — 6360000002 HC RX W HCPCS: Performed by: HOSPITALIST

## 2024-05-13 RX ORDER — TRAZODONE HYDROCHLORIDE 50 MG/1
50 TABLET ORAL NIGHTLY
Status: DISCONTINUED | OUTPATIENT
Start: 2024-05-13 | End: 2024-05-14 | Stop reason: HOSPADM

## 2024-05-13 RX ORDER — DULOXETIN HYDROCHLORIDE 60 MG/1
60 CAPSULE, DELAYED RELEASE ORAL NIGHTLY
Status: DISCONTINUED | OUTPATIENT
Start: 2024-05-13 | End: 2024-05-14 | Stop reason: HOSPADM

## 2024-05-13 RX ORDER — POLYETHYLENE GLYCOL 3350 17 G/17G
17 POWDER, FOR SOLUTION ORAL DAILY
Status: DISCONTINUED | OUTPATIENT
Start: 2024-05-13 | End: 2024-05-14 | Stop reason: HOSPADM

## 2024-05-13 RX ORDER — ONDANSETRON 4 MG/1
4 TABLET, ORALLY DISINTEGRATING ORAL EVERY 8 HOURS PRN
Status: DISCONTINUED | OUTPATIENT
Start: 2024-05-13 | End: 2024-05-14 | Stop reason: HOSPADM

## 2024-05-13 RX ORDER — ISOSORBIDE MONONITRATE 60 MG/1
60 TABLET, EXTENDED RELEASE ORAL DAILY
Status: DISCONTINUED | OUTPATIENT
Start: 2024-05-13 | End: 2024-05-14 | Stop reason: HOSPADM

## 2024-05-13 RX ORDER — ALBUTEROL SULFATE 90 UG/1
1 AEROSOL, METERED RESPIRATORY (INHALATION) EVERY 4 HOURS PRN
Status: DISCONTINUED | OUTPATIENT
Start: 2024-05-13 | End: 2024-05-13 | Stop reason: CLARIF

## 2024-05-13 RX ORDER — ATORVASTATIN CALCIUM 40 MG/1
40 TABLET, FILM COATED ORAL DAILY
Qty: 90 TABLET | Refills: 3 | Status: SHIPPED | OUTPATIENT
Start: 2024-05-13

## 2024-05-13 RX ORDER — PRAMIPEXOLE DIHYDROCHLORIDE 1 MG/1
3 TABLET ORAL DAILY
Status: DISCONTINUED | OUTPATIENT
Start: 2024-05-13 | End: 2024-05-14 | Stop reason: HOSPADM

## 2024-05-13 RX ORDER — ASPIRIN 81 MG/1
81 TABLET, CHEWABLE ORAL DAILY
Status: DISCONTINUED | OUTPATIENT
Start: 2024-05-13 | End: 2024-05-14 | Stop reason: HOSPADM

## 2024-05-13 RX ORDER — MULTIVIT WITH MINERALS/LUTEIN
500 TABLET ORAL DAILY
Status: DISCONTINUED | OUTPATIENT
Start: 2024-05-13 | End: 2024-05-14 | Stop reason: HOSPADM

## 2024-05-13 RX ORDER — CARVEDILOL 3.12 MG/1
3.12 TABLET ORAL 2 TIMES DAILY WITH MEALS
Status: DISCONTINUED | OUTPATIENT
Start: 2024-05-13 | End: 2024-05-14 | Stop reason: HOSPADM

## 2024-05-13 RX ORDER — LISINOPRIL 10 MG/1
10 TABLET ORAL DAILY
Status: DISCONTINUED | OUTPATIENT
Start: 2024-05-13 | End: 2024-05-14 | Stop reason: HOSPADM

## 2024-05-13 RX ORDER — ACETAMINOPHEN 325 MG/1
650 TABLET ORAL EVERY 6 HOURS PRN
Status: DISCONTINUED | OUTPATIENT
Start: 2024-05-13 | End: 2024-05-14 | Stop reason: HOSPADM

## 2024-05-13 RX ORDER — INSULIN LISPRO 100 [IU]/ML
0-8 INJECTION, SOLUTION INTRAVENOUS; SUBCUTANEOUS 4 TIMES DAILY
Status: DISCONTINUED | OUTPATIENT
Start: 2024-05-13 | End: 2024-05-14 | Stop reason: HOSPADM

## 2024-05-13 RX ORDER — BUMETANIDE 1 MG/1
1 TABLET ORAL DAILY
Status: DISCONTINUED | OUTPATIENT
Start: 2024-05-13 | End: 2024-05-14 | Stop reason: HOSPADM

## 2024-05-13 RX ORDER — FAMOTIDINE 20 MG/1
20 TABLET, FILM COATED ORAL 2 TIMES DAILY
Status: COMPLETED | OUTPATIENT
Start: 2024-05-13 | End: 2024-05-13

## 2024-05-13 RX ORDER — CYCLOBENZAPRINE HCL 10 MG
10 TABLET ORAL 3 TIMES DAILY PRN
Status: DISCONTINUED | OUTPATIENT
Start: 2024-05-13 | End: 2024-05-14 | Stop reason: HOSPADM

## 2024-05-13 RX ORDER — ACETAMINOPHEN 650 MG/1
650 SUPPOSITORY RECTAL EVERY 6 HOURS PRN
Status: DISCONTINUED | OUTPATIENT
Start: 2024-05-13 | End: 2024-05-14 | Stop reason: HOSPADM

## 2024-05-13 RX ORDER — UREA 10 %
10 LOTION (ML) TOPICAL
Status: DISCONTINUED | OUTPATIENT
Start: 2024-05-13 | End: 2024-05-14 | Stop reason: HOSPADM

## 2024-05-13 RX ORDER — INSULIN LISPRO 100 [IU]/ML
0-4 INJECTION, SOLUTION INTRAVENOUS; SUBCUTANEOUS NIGHTLY
Status: DISCONTINUED | OUTPATIENT
Start: 2024-05-13 | End: 2024-05-13 | Stop reason: SDUPTHER

## 2024-05-13 RX ORDER — POLYETHYLENE GLYCOL 3350 17 G/17G
17 POWDER, FOR SOLUTION ORAL DAILY PRN
Status: DISCONTINUED | OUTPATIENT
Start: 2024-05-13 | End: 2024-05-14 | Stop reason: HOSPADM

## 2024-05-13 RX ORDER — MECLIZINE HCL 12.5 MG/1
12.5 TABLET ORAL
Status: COMPLETED | OUTPATIENT
Start: 2024-05-13 | End: 2024-05-13

## 2024-05-13 RX ORDER — ONDANSETRON 2 MG/ML
4 INJECTION INTRAMUSCULAR; INTRAVENOUS EVERY 6 HOURS PRN
Status: DISCONTINUED | OUTPATIENT
Start: 2024-05-13 | End: 2024-05-14 | Stop reason: HOSPADM

## 2024-05-13 RX ORDER — BUDESONIDE AND FORMOTEROL FUMARATE DIHYDRATE 160; 4.5 UG/1; UG/1
2 AEROSOL RESPIRATORY (INHALATION)
Status: DISCONTINUED | OUTPATIENT
Start: 2024-05-13 | End: 2024-05-13 | Stop reason: CLARIF

## 2024-05-13 RX ORDER — NITROGLYCERIN 0.4 MG/1
0.4 TABLET SUBLINGUAL EVERY 5 MIN PRN
Status: DISCONTINUED | OUTPATIENT
Start: 2024-05-13 | End: 2024-05-14 | Stop reason: HOSPADM

## 2024-05-13 RX ORDER — VITAMIN B COMPLEX
1000 TABLET ORAL DAILY
Status: DISCONTINUED | OUTPATIENT
Start: 2024-05-13 | End: 2024-05-14 | Stop reason: HOSPADM

## 2024-05-13 RX ORDER — BUPROPION HYDROCHLORIDE 150 MG/1
150 TABLET ORAL DAILY
Status: DISCONTINUED | OUTPATIENT
Start: 2024-05-13 | End: 2024-05-14 | Stop reason: HOSPADM

## 2024-05-13 RX ORDER — SODIUM CHLORIDE 0.9 % (FLUSH) 0.9 %
5-40 SYRINGE (ML) INJECTION PRN
Status: DISCONTINUED | OUTPATIENT
Start: 2024-05-13 | End: 2024-05-14 | Stop reason: HOSPADM

## 2024-05-13 RX ORDER — POTASSIUM CHLORIDE 7.45 MG/ML
10 INJECTION INTRAVENOUS PRN
Status: DISCONTINUED | OUTPATIENT
Start: 2024-05-13 | End: 2024-05-14 | Stop reason: HOSPADM

## 2024-05-13 RX ORDER — ENOXAPARIN SODIUM 100 MG/ML
30 INJECTION SUBCUTANEOUS 2 TIMES DAILY
Status: DISCONTINUED | OUTPATIENT
Start: 2024-05-13 | End: 2024-05-14 | Stop reason: HOSPADM

## 2024-05-13 RX ORDER — DEXTROSE MONOHYDRATE 100 MG/ML
INJECTION, SOLUTION INTRAVENOUS CONTINUOUS PRN
Status: DISCONTINUED | OUTPATIENT
Start: 2024-05-13 | End: 2024-05-14 | Stop reason: HOSPADM

## 2024-05-13 RX ORDER — SODIUM CHLORIDE 0.9 % (FLUSH) 0.9 %
5-40 SYRINGE (ML) INJECTION EVERY 12 HOURS SCHEDULED
Status: DISCONTINUED | OUTPATIENT
Start: 2024-05-13 | End: 2024-05-14 | Stop reason: HOSPADM

## 2024-05-13 RX ORDER — PANTOPRAZOLE SODIUM 40 MG/1
40 TABLET, DELAYED RELEASE ORAL
Status: DISCONTINUED | OUTPATIENT
Start: 2024-05-14 | End: 2024-05-14 | Stop reason: HOSPADM

## 2024-05-13 RX ORDER — TAMSULOSIN HYDROCHLORIDE 0.4 MG/1
0.4 CAPSULE ORAL DAILY
Status: DISCONTINUED | OUTPATIENT
Start: 2024-05-13 | End: 2024-05-14 | Stop reason: HOSPADM

## 2024-05-13 RX ORDER — SODIUM CHLORIDE 9 MG/ML
INJECTION, SOLUTION INTRAVENOUS PRN
Status: DISCONTINUED | OUTPATIENT
Start: 2024-05-13 | End: 2024-05-14 | Stop reason: HOSPADM

## 2024-05-13 RX ORDER — MONTELUKAST SODIUM 10 MG/1
10 TABLET ORAL DAILY
Status: DISCONTINUED | OUTPATIENT
Start: 2024-05-13 | End: 2024-05-14 | Stop reason: HOSPADM

## 2024-05-13 RX ORDER — ALBUTEROL SULFATE 2.5 MG/3ML
2.5 SOLUTION RESPIRATORY (INHALATION) EVERY 4 HOURS PRN
Status: DISCONTINUED | OUTPATIENT
Start: 2024-05-13 | End: 2024-05-14 | Stop reason: HOSPADM

## 2024-05-13 RX ORDER — FINASTERIDE 5 MG/1
5 TABLET, FILM COATED ORAL DAILY
Status: DISCONTINUED | OUTPATIENT
Start: 2024-05-13 | End: 2024-05-14 | Stop reason: HOSPADM

## 2024-05-13 RX ORDER — FAMOTIDINE 20 MG/1
20 TABLET, FILM COATED ORAL 2 TIMES DAILY
Status: DISCONTINUED | OUTPATIENT
Start: 2024-05-13 | End: 2024-05-13

## 2024-05-13 RX ORDER — POTASSIUM CHLORIDE 20 MEQ/1
40 TABLET, EXTENDED RELEASE ORAL PRN
Status: DISCONTINUED | OUTPATIENT
Start: 2024-05-13 | End: 2024-05-14 | Stop reason: HOSPADM

## 2024-05-13 RX ORDER — TROSPIUM CHLORIDE 20 MG/1
20 TABLET, FILM COATED ORAL NIGHTLY
Status: DISCONTINUED | OUTPATIENT
Start: 2024-05-13 | End: 2024-05-14 | Stop reason: HOSPADM

## 2024-05-13 RX ORDER — BUMETANIDE 0.25 MG/ML
0.5 INJECTION INTRAMUSCULAR; INTRAVENOUS ONCE
Status: DISCONTINUED | OUTPATIENT
Start: 2024-05-13 | End: 2024-05-13

## 2024-05-13 RX ORDER — MAGNESIUM SULFATE IN WATER 40 MG/ML
2000 INJECTION, SOLUTION INTRAVENOUS PRN
Status: DISCONTINUED | OUTPATIENT
Start: 2024-05-13 | End: 2024-05-14 | Stop reason: HOSPADM

## 2024-05-13 RX ORDER — BUMETANIDE 0.25 MG/ML
1 INJECTION INTRAMUSCULAR; INTRAVENOUS ONCE
Status: DISCONTINUED | OUTPATIENT
Start: 2024-05-13 | End: 2024-05-13

## 2024-05-13 RX ORDER — AMITRIPTYLINE HYDROCHLORIDE 10 MG/1
10 TABLET, FILM COATED ORAL NIGHTLY
Status: DISCONTINUED | OUTPATIENT
Start: 2024-05-13 | End: 2024-05-14 | Stop reason: HOSPADM

## 2024-05-13 RX ORDER — ONDANSETRON 4 MG/1
4 TABLET, ORALLY DISINTEGRATING ORAL EVERY 12 HOURS PRN
Status: DISCONTINUED | OUTPATIENT
Start: 2024-05-13 | End: 2024-05-13 | Stop reason: SDUPTHER

## 2024-05-13 RX ORDER — ATORVASTATIN CALCIUM 40 MG/1
40 TABLET, FILM COATED ORAL DAILY
Status: DISCONTINUED | OUTPATIENT
Start: 2024-05-13 | End: 2024-05-14 | Stop reason: HOSPADM

## 2024-05-13 RX ORDER — ONDANSETRON 4 MG/1
4 TABLET, FILM COATED ORAL ONCE
Status: DISCONTINUED | OUTPATIENT
Start: 2024-05-13 | End: 2024-05-13 | Stop reason: ALTCHOICE

## 2024-05-13 RX ADMIN — SODIUM CHLORIDE, PRESERVATIVE FREE 10 ML: 5 INJECTION INTRAVENOUS at 22:58

## 2024-05-13 RX ADMIN — ATORVASTATIN CALCIUM 40 MG: 40 TABLET, FILM COATED ORAL at 10:33

## 2024-05-13 RX ADMIN — TAMSULOSIN HYDROCHLORIDE 0.4 MG: 0.4 CAPSULE ORAL at 10:54

## 2024-05-13 RX ADMIN — INSULIN LISPRO 2 UNITS: 100 INJECTION, SOLUTION INTRAVENOUS; SUBCUTANEOUS at 12:45

## 2024-05-13 RX ADMIN — BUMETANIDE 1 MG: 1 TABLET ORAL at 10:33

## 2024-05-13 RX ADMIN — LISINOPRIL 10 MG: 10 TABLET ORAL at 10:32

## 2024-05-13 RX ADMIN — CHOLECALCIFEROL TAB 25 MCG (1000 UNIT) 1000 UNITS: 25 TAB at 10:32

## 2024-05-13 RX ADMIN — CARVEDILOL 3.12 MG: 3.12 TABLET, FILM COATED ORAL at 10:45

## 2024-05-13 RX ADMIN — SODIUM CHLORIDE, PRESERVATIVE FREE 10 ML: 5 INJECTION INTRAVENOUS at 10:35

## 2024-05-13 RX ADMIN — NITROGLYCERIN 0.4 MG: 0.4 TABLET, ORALLY DISINTEGRATING SUBLINGUAL at 18:45

## 2024-05-13 RX ADMIN — FINASTERIDE 5 MG: 5 TABLET, FILM COATED ORAL at 10:33

## 2024-05-13 RX ADMIN — ENOXAPARIN SODIUM 30 MG: 100 INJECTION SUBCUTANEOUS at 22:56

## 2024-05-13 RX ADMIN — MECLIZINE 12.5 MG: 12.5 TABLET ORAL at 00:23

## 2024-05-13 RX ADMIN — IOPAMIDOL 80 ML: 755 INJECTION, SOLUTION INTRAVENOUS at 01:29

## 2024-05-13 RX ADMIN — ASPIRIN 81 MG CHEWABLE TABLET 81 MG: 81 TABLET CHEWABLE at 10:32

## 2024-05-13 RX ADMIN — ENOXAPARIN SODIUM 30 MG: 100 INJECTION SUBCUTANEOUS at 10:38

## 2024-05-13 RX ADMIN — Medication 500 MG: at 10:32

## 2024-05-13 RX ADMIN — TROSPIUM CHLORIDE 20 MG: 20 TABLET, FILM COATED ORAL at 22:55

## 2024-05-13 RX ADMIN — CARVEDILOL 3.12 MG: 3.12 TABLET, FILM COATED ORAL at 23:01

## 2024-05-13 RX ADMIN — FAMOTIDINE 20 MG: 20 TABLET ORAL at 10:49

## 2024-05-13 RX ADMIN — Medication 10 MG: at 22:54

## 2024-05-13 RX ADMIN — HYDROMORPHONE HYDROCHLORIDE 0.5 MG: 1 INJECTION, SOLUTION INTRAMUSCULAR; INTRAVENOUS; SUBCUTANEOUS at 02:34

## 2024-05-13 RX ADMIN — MONTELUKAST 10 MG: 10 TABLET, FILM COATED ORAL at 10:46

## 2024-05-13 RX ADMIN — BUPROPION HYDROCHLORIDE 150 MG: 150 TABLET, EXTENDED RELEASE ORAL at 10:32

## 2024-05-13 RX ADMIN — TRAZODONE HYDROCHLORIDE 50 MG: 50 TABLET ORAL at 22:54

## 2024-05-13 RX ADMIN — ISOSORBIDE MONONITRATE 60 MG: 60 TABLET, EXTENDED RELEASE ORAL at 10:33

## 2024-05-13 RX ADMIN — DULOXETINE HYDROCHLORIDE 60 MG: 60 CAPSULE, DELAYED RELEASE ORAL at 22:55

## 2024-05-13 RX ADMIN — HYDROMORPHONE HYDROCHLORIDE 0.5 MG: 1 INJECTION, SOLUTION INTRAMUSCULAR; INTRAVENOUS; SUBCUTANEOUS at 06:06

## 2024-05-13 RX ADMIN — IOPAMIDOL 100 ML: 755 INJECTION, SOLUTION INTRAVENOUS at 21:43

## 2024-05-13 RX ADMIN — PRAMIPEXOLE DIHYDROCHLORIDE 3 MG: 1 TABLET ORAL at 18:45

## 2024-05-13 RX ADMIN — AMITRIPTYLINE HYDROCHLORIDE 10 MG: 10 TABLET, FILM COATED ORAL at 22:55

## 2024-05-13 RX ADMIN — ARFORMOTEROL TARTRATE: 15 SOLUTION RESPIRATORY (INHALATION) at 20:25

## 2024-05-13 ASSESSMENT — PAIN - FUNCTIONAL ASSESSMENT
PAIN_FUNCTIONAL_ASSESSMENT: ACTIVITIES ARE NOT PREVENTED
PAIN_FUNCTIONAL_ASSESSMENT: ACTIVITIES ARE NOT PREVENTED

## 2024-05-13 ASSESSMENT — PAIN DESCRIPTION - LOCATION
LOCATION: CHEST

## 2024-05-13 ASSESSMENT — PAIN DESCRIPTION - ONSET: ONSET: GRADUAL

## 2024-05-13 ASSESSMENT — PAIN DESCRIPTION - DESCRIPTORS
DESCRIPTORS: ACHING
DESCRIPTORS: ACHING

## 2024-05-13 ASSESSMENT — PAIN SCALES - GENERAL
PAINLEVEL_OUTOF10: 6
PAINLEVEL_OUTOF10: 7
PAINLEVEL_OUTOF10: 0
PAINLEVEL_OUTOF10: 0
PAINLEVEL_OUTOF10: 6

## 2024-05-13 ASSESSMENT — PAIN DESCRIPTION - ORIENTATION
ORIENTATION: MID

## 2024-05-13 ASSESSMENT — PAIN DESCRIPTION - PAIN TYPE
TYPE: CHRONIC PAIN
TYPE: ACUTE PAIN

## 2024-05-13 ASSESSMENT — PAIN DESCRIPTION - FREQUENCY
FREQUENCY: INTERMITTENT
FREQUENCY: INTERMITTENT

## 2024-05-13 NOTE — ED TRIAGE NOTES
Received patient from EMS stretcher for c/o extreme dizziness and chest pressure. EMS stated that he is on a home BP monitor and it notified the company's nurse that his SBP got to around 190 and proceeded to call 911. Pt was given 324 mg of ASA and 2 SL Nitro prior to ED arrival.

## 2024-05-13 NOTE — PROGRESS NOTES
[facility]  Progress Note    Patient: Malcolm Brooks Age: 69 y.o. : 1955 MR#: [id number] SSN: [id number]  Date/Time: 2024    Subjective:     Patient seen and examined at bedside, he reports chest pain feels better.  He has also been experiencing dizziness, with associated blurred vision.  He request that CPAP be ordered, he did not bring his home machine and his wife is not able to bring it in.    Denies personal use of tobacco or secondhand smoke exposure.  Resides at home with his wife.  Retired from [employer], he worked in retail for many years.    Toprol changed to cardiology per cardiology.    Assessment/Plan:     1. chest pain   CAD, with history of cardiac cath 9/15/23  Trend cardiac enzymes  Aspirin and beta-blocker  Cardiology consult.  Follow echo.  2. Hypertensive crisis.  Toprol changed to Coreg.  Continue Imdur, lisinopril, Bumex. Follow echo.   3. Diabetes mellitus  metformin and glipizide held. Ssi. Check A1c.  4. Dizziness, with associated blurred vision. Neurology consult .  Y-Klub device not safe for MRI per MRI tech.  Follow CTA head and neck.  Follow echo.  Check B12, TFTs.  5.  Obesity Body mass index is 39.16 kg/m².  6.  MEGHA, CPAP ordered.  Discussed with RT.  7.  DVT prophylaxis  8.  Full code.  PT OT.  .      Additional Notes:      Case discussed with:  [x]patient  []family  []nursing  [x]case management   [] discussed on IDT.   DVT Prophylaxis:  []Lovenox  []Hep SQ  []SCDs  []Coumadin   []On Heparin gtt   [] noac    Objective:   VS: BP (!) 148/71   Pulse 88   Temp 98.9 °F (37.2 °C) (Oral)   Resp 19   Ht 1.88 m (6' 2\")   Wt (!) 138.3 kg (305 lb)   SpO2 97%   BMI 39.16 kg/m²    Tmax/24hrs: Temp (24hrs), Av.9 °F (37.2 °C), Min:98.9 °F (37.2 °C), Max:98.9 °F (37.2 °C)    Input/Output: No intake or output data in the 24 hours ending 24 0928    Constitutional: The patient is oriented x4. Lying in bed, speaking in

## 2024-05-13 NOTE — CONSULTS
[facility] - Consult Note    Cardiology consultation request from Dr. Villegas for evaluation and management/treatment of CP    Date of  Admission: 5/12/2024 11:00 PM   Primary Care Physician:  Shahbaz Domingo MD      Attending Cardiologist: Dr. Viera      Assessment:     -Dizziness, vision changes, HA. CT Head negative for acute process.   -Chest pain, MI r/o. ECG SR without acute ischemic changes.   -Hypertensive crisis, 208/190 mmHg. On Bumex, lisinopril, Imdur and BB as outpatient.   -Mild CAD, s/p LHC in 09/2023: Normal except diagonal branch is a 50% borderline lesion, IFR 0.98 and 0.99 suggesting physiologically not significant lesion-Medical management recommended.   -Echo 10/2022, normal LVEF.   -HLD, on statin.   -CKD3  -DM, last HgbA1c 7.5  (2/12/24)  -MEGHA, on cpap  -Chronic pain, s/p spinal stimulator.       Primary cardiologist is Dr. Myles Bolton      Plan:     -Remains dizzy with associated vision changes and HA, CT head negative for acute process. Consider neuro consult.  -MI ruled out, he had a cath in September that was otherwise normal despite a diag lesion that was physiologically insignificant by IFR criteria. Continue ASA, statin, BB.   -Will repeat Echo, further cardiac workup pending results.   -Continue ASA, statin.   -Trial Protonix.    -Optimize BP, start Coreg 3.125 mg BID in place of Toprol XL. Continue Imudr, lisinopril, bumex.   -Monitor rhythm on tele.   -Further recommendations pending Echo results.      History of Present Illness:     This is a 69 y.o. male admitted for Chest pain [R07.9].    Patient complains of: dizziness, lightheadedness, vision changes, CP   Malcolm Brooks is a 69 y.o. male, pmhx as stated above, who we are seeing in consult for CP. Patient reports elevated BP readings on home monitoring device over the last week. States he was advised to go to the ER. Over the last 2 days, he endorses dizziness, HA and vision changes. Dizziness worse with positional

## 2024-05-13 NOTE — PROGRESS NOTES
MRI screening form needs to be filled out and faxed to [phone] BEFORE MRI can be scheduled.  If unable to obtain information from patient , [employer] needs to be contacted . If patient is claustrophobic or will needs pain meds, please have ordered in advance in order to facilitate exam.

## 2024-05-13 NOTE — H&P
HISTORY & PHYSICAL      Patient: Malcolm Brooks MRN: [id number]  CSN: [id number]    YOB: 1955  Age: 69 y.o.  Sex: male    DOA: 5/12/2024 LOS:  LOS: 0 days        DOA: 5/12/2024        Assessment/Plan     60-year-old presented with complaint of having chest pain.  Atypical, reproducible on exam, likely of musculoskeletal etiology  CAD, with history of cardiac cath 9/15/23 as below  He is placed under observation  Trend cardiac enzymes  Aspirin and beta-blocker  Pain control, Dilaudid and Flexeril  Keep patient n.p.o.  Further recommendation per cardiology, consulted by ED    Hypertension  Continue home antihypertensives  Monitor blood pressure.    Diabetes mellitus  Hold metformin and glipizide  Sliding scale insulin while n.p.o.    Other medical problems as below  Home medications and outpatient follow-up    Admission plan was discussed      Patient Active Problem List   Diagnosis    DDD (degenerative disc disease), thoracic    Hyperlipidemia    Advanced care planning/counseling discussion    Status post insertion of spinal cord stimulator    Kidney stone    Frequent falls    Abdominal adhesions    HTN (hypertension)    Anemia    Severe obesity ([facility])    Type 2 diabetes mellitus with diabetic neuropathy ([facility])    Lumbar post-laminectomy syndrome    Chronic pain syndrome    Back muscle spasm    Thoracic compression fracture ([facility])    Lumbar nerve root impingement    Lumbosacral radiculopathy at S1    LORRAINE (generalized anxiety disorder)    S/P lumbar spinal fusion    Constipation, chronic    MEGHA (obstructive sleep apnea)    History of depression    Chronic midline low back pain with sciatica    DDD (degenerative disc disease), lumbar    Lumbar facet arthropathy    Insomnia secondary to chronic pain    Postlaminectomy syndrome, lumbar region    Lumbar and sacral osteoarthritis    UTI (urinary tract infection)    S/P lumbar laminectomy    Spinal cord stimulator status    Chronic renal disease, stage III ([facility])  Chloride 102 100 - 111 mmol/L    CO2 27 21 - 32 mmol/L    Anion Gap 5 3.0 - 18 mmol/L    Glucose 360 (H) 74 - 99 mg/dL    BUN 26 (H) 7.0 - 18 MG/DL    Creatinine 1.70 (H) 0.6 - 1.3 MG/DL    Bun/Cre Ratio 15 12 - 20      Est, Glom Filt Rate 43 (L) >60 ml/min/1.73m2    Calcium 9.1 8.5 - 10.1 MG/DL   CBC with Auto Differential    Collection Time: 05/12/24 11:10 PM   Result Value Ref Range    WBC 9.6 4.6 - 13.2 K/uL    RBC 4.78 4.35 - 5.65 M/uL    Hemoglobin 13.1 13.0 - 16.0 g/dL    Hematocrit 43.2 36.0 - 48.0 %    MCV 90.4 78.0 - 100.0 FL    MCH 27.4 24.0 - 34.0 PG    MCHC 30.3 (L) 31.0 - 37.0 g/dL    RDW 13.2 11.6 - 14.5 %    Platelets 254 135 - 420 K/uL    MPV 10.1 9.2 - 11.8 FL    Nucleated RBCs 0.0 0  WBC    nRBC 0.00 0.00 - 0.01 K/uL    Neutrophils % 73 40 - 73 %    Lymphocytes % 17 (L) 21 - 52 %    Monocytes % 7 3 - 10 %    Eosinophils % 3 0 - 5 %    Basophils % 1 0 - 2 %    Immature Granulocytes % 1 (H) 0.0 - 0.5 %    Neutrophils Absolute 7.0 1.8 - 8.0 K/UL    Lymphocytes Absolute 1.6 0.9 - 3.6 K/UL    Monocytes Absolute 0.6 0.05 - 1.2 K/UL    Eosinophils Absolute 0.3 0.0 - 0.4 K/UL    Basophils Absolute 0.1 0.0 - 0.1 K/UL    Immature Granulocytes Absolute 0.1 (H) 0.00 - 0.04 K/UL    Differential Type AUTOMATED     Troponin Now and Q 1 Hour    Collection Time: 05/12/24 11:10 PM   Result Value Ref Range    Troponin, High Sensitivity 15 0 - 78 ng/L   Brain Natriuretic Peptide    Collection Time: 05/12/24 11:10 PM   Result Value Ref Range    NT Pro- 0 - 900 PG/ML   Hepatic Function Panel    Collection Time: 05/12/24 11:10 PM   Result Value Ref Range    Total Protein 7.3 6.4 - 8.2 g/dL    Albumin 3.9 3.4 - 5.0 g/dL    Globulin 3.4 2.0 - 4.0 g/dL    Albumin/Globulin Ratio 1.1 0.8 - 1.7      Total Bilirubin 0.5 0.2 - 1.0 MG/DL    Bilirubin, Direct 0.1 0.0 - 0.2 MG/DL    Alk Phosphatase 90 45 - 117 U/L    AST 21 10 - 38 U/L    ALT 31 16 - 61 U/L   Lipase    Collection Time: 05/12/24 11:10 PM   Result  Value Ref Range    Lipase 46 13 - 75 U/L   Troponin Now and Q 1 Hour    Collection Time: 05/13/24  1:30 AM   Result Value Ref Range    Troponin, High Sensitivity 15 0 - 78 ng/L       Imaging Reviewed:  CTA CHEST W WO CONTRAST    Result Date: 5/13/2024  : No evidence of pulmonary embolism. Multisegmental atelectasis versus possibly mild atypical pulmonary edema. Thank you for this referral.    CT Head W/O Contrast    Result Date: 5/13/2024  No acute intracranial findings. No etiology for the patient's clinical symptoms.. Mild presumed chronic microvascular ischemic change.          Quentin Hutchins MD  5/13/2024, 4:22 AM        Disclaimer: Sections of this note are dictated using utilizing voice recognition software.  Minor typographical errors may be present. If questions arise, please do not hesitate to contact me or call our department.

## 2024-05-13 NOTE — PROGRESS NOTES
completed the initial Spiritual Assessment of the patient, and offered Pastoral Care support to the patient in bed 4 of the emergency room where he will be admitted to the hospital from.  Patient is not  involved in any Sikhism denomination or Baptism group. There is no spiritual concerns at this time. There is no advance directive on file. Patient does not have any Sikhism/cultural needs that will affect patient’s preferences in health care. Chaplains will continue to follow and will provide pastoral care on an as needed/requested basis.    nia Bazan  Board Certified   Spiritual Care Department  [phone]

## 2024-05-13 NOTE — TELEPHONE ENCOUNTER
Yadira with [facility] stated Radiologist is recommending to cancel CTA of head and neck and to order MRA instead.    Contact # [phone]

## 2024-05-13 NOTE — ED PROVIDER NOTES
EMERGENCY DEPARTMENT HISTORY AND PHYSICAL EXAM      Date: 5/12/2024  Patient Name: Malcolm Brooks    History of Presenting Illness     Chief Complaint   Patient presents with    Dizziness    Chest Pain       History (Context): Malcolm Brooks is a 69 y.o. male with PMH of HTN, DM, obesity, CKD, and CAD who presents to the ED today with chief complaint of chest pain (pressure and tightness) that began around 2000 tonight. Patient states that he was out getting food for dinner and when he got home and out of his car he began to feel dizzy and his chest got \"tight\", worse with exertion. He went inside to take his blood pressure and reports it was around 190s systolic. He checked it several times and got elevated readings consistently, and then was advised by his blood pressure monitoring service that he should present to the emergency department. Patient describes his dizziness as a room spinning sensation that is worse with head movements. Also endorses some blurry vision when he is focusing on things, such as his cell phone. Denies weakness, numbness, headache, and syncope.      PCP: Shahbaz Domingo MD    No current facility-administered medications for this encounter.     Current Outpatient Medications   Medication Sig Dispense Refill    traZODone (DESYREL) 50 MG tablet Take 1 tablet by mouth      CYMBALTA 60 MG extended release capsule Take 1 capsule by mouth nightly      pramipexole (MIRAPEX) 1 MG tablet Take 3 tablets by mouth daily      atorvastatin (LIPITOR) 40 MG tablet Take 1 tablet by mouth daily 90 tablet 2    metFORMIN (GLUCOPHAGE-XR) 500 MG extended release tablet TAKE 2 TABLETS TWICE DAILY BEFORE MEALS 120 tablet 0    isosorbide mononitrate (IMDUR) 60 MG extended release tablet Take 1 tablet by mouth daily 90 tablet 3    lisinopril (PRINIVIL;ZESTRIL) 10 MG tablet Take 1 tablet by mouth daily 90 tablet 3    montelukast (SINGULAIR) 10 MG tablet TAKE 1 TABLET EVERY DAY 90 tablet 0    testosterone cypionate  compression fracture (HCC) 07/30/2014       Past Surgical History:  Past Surgical History:   Procedure Laterality Date    APPENDECTOMY      BACK SURGERY      Lumbar fusion    CARDIAC PROCEDURE N/A 9/15/2023    Left heart cath performed by Myles FAITH MD at [facility] CARDIAC CATH LAB    CARDIAC PROCEDURE N/A 9/15/2023    Coronary angiography performed by Myles FAITH MD at [facility] CARDIAC CATH LAB    CARDIAC PROCEDURE N/A 9/15/2023    Left ventriculography performed by Myles FAITH MD at [facility] CARDIAC CATH LAB    CARDIAC PROCEDURE N/A 9/15/2023    Instantaneous wave-free ratio (iFR) performed by Myles FAITH MD at [facility] CARDIAC CATH LAB    HERNIA REPAIR  1992    OTHER SURGICAL HISTORY      EMG - S1 disorder    OTHER SURGICAL HISTORY  10/2017    spinal cord stimulator    VASECTOMY  1985       Family History:  Family History   Problem Relation Age of Onset    Heart Disease Mother     Heart Attack Father     Stroke Mother     Hypertension Mother     Diabetes Mother     Stroke Brother     Diabetes Sister     Heart Disease Father     Stroke Father     Diabetes Father        Social History:   Social History     Tobacco Use    Smoking status: Never     Passive exposure: Never    Smokeless tobacco: Never   Substance Use Topics    Alcohol use: Not Currently    Drug use: Never       Allergies:  Allergies   Allergen Reactions    Oxymorphone Other (See Comments)     Feels like bug crawling under skin and drowziness  Other reaction(s): other/intolerance  Felt like bugs were crawling all over me  Felt like bugs were crawling all over me      Sulfamethoxazole-Trimethoprim Rash     Bactrim DS, per patient.         Physical Exam     Vitals:    05/12/24 2300   BP: (!) 167/80   Pulse: 83   Resp: 24   Temp: 98.9 °F (37.2 °C)   TempSrc: Oral   SpO2: 97%   Weight: (!) 138.3 kg (305 lb)   Height: 1.88 m (6' 2\")       Physical Exam  Vitals and nursing note reviewed.   Constitutional:       Appearance: He is obese.   HENT:      Head:

## 2024-05-13 NOTE — PROGRESS NOTES
[facility] Neuro Radiologist Dr Lala recommending MRA to be done instead of CTA Head and Neck. Urgent Epic message and Epic chat message sent to ordering provider Dr Parker - currently still pending response.     No contact number on file for MD - also contacted [facility] at [facility]    who also sent message to Dr Parker to call [facility]

## 2024-05-13 NOTE — PROGRESS NOTES
Ms Ellison from MRI called me regarding his MRI and informed me that its not safe for the patient to do it because of his implanted spinal cord stimulator. She will perfect serve Dr. Villegas about it.

## 2024-05-13 NOTE — CONSULTS
CONSULT - Neurology      Name:  Malcolm Brooks       MRN: [id number]  Location: 470/01    Date: 5/13/2024  Time:  1:46 PM        Chief Complaint:   Chief Complaint   Patient presents with    Dizziness    Chest Pain       HPI:  It is a great pleasure to see Malcolm Brooks, a 69 y.o. male today in the hospital . Briefly these are the events happened as per the chart and taken from the chart.The patient was doing fine and the symptoms started with dizziness, HA and vision changes. For 2 days . The symptoms fluctuated in the beginning. The patient was brought to the emergency room for further evaluation. CT head negative for any acute intracranial abnormality.      PAST MEDICAL HISTORY:  Past Medical History:   Diagnosis Date    Abdominal adhesions 07/30/2014    BPH (benign prostatic hyperplasia)     Cellulitis     Chronic pain 10/08/11    Back pain due a work injury    DDD (degenerative disc disease), lumbar 07/30/2014    Depression     Diabetes (HCC)     Frequent falls 07/30/2014    LORRAINE (generalized anxiety disorder) 07/30/2014    HLD (hyperlipidemia)     Hypertension     Hypogonadism in male     Incomplete bladder emptying     Kidney stones     Lumbar facet arthropathy 07/30/2014    Lumbar nerve root impingement 07/30/2014    Lumbosacral radiculopathy at S1 07/30/2014    Major depression 07/30/2014    Neurological disorder     sciatica    MEGHA (obstructive sleep apnea) 07/30/2014    S/P lumbar laminectomy 07/30/2014    S/P lumbar spinal fusion 07/30/2014    Sleep apnea     USES CPAP EVERY NIGHT    Thoracic compression fracture (HCC) 07/30/2014     PAST SURGICAL HISTORY:    Past Surgical History:   Procedure Laterality Date    APPENDECTOMY      BACK SURGERY      Lumbar fusion    CARDIAC PROCEDURE N/A 9/15/2023    Left heart cath performed by Myles FAITH MD at [facility] CARDIAC CATH LAB    CARDIAC PROCEDURE N/A 9/15/2023    Coronary angiography performed by Myles FAITH MD at [facility] CARDIAC CATH LAB    CARDIAC PROCEDURE N/A  times daily (before meals) 10/21/22   Automatic Reconciliation, Ar         CURRENT MEDICATIONS:  Note that completed medications (per the MAR) continue to display for 24 hours. Ordered medications to be given in the future also display.      I've reviewed the Past Medical History, Past Surgical History, Allergies, Medications, Family History (relevant to this case) and Social History and have updated these data in the electronic medical record.  ROS:  All system were reviewed and were negative except for the pertinent positives listed in the HPI.    PHYSICAL EXAMINATION:  Most Recent Vital Signs: Reviewed    Constitutional: Appearance normally developed  Head and face: normocephalic   Eyes:  no dysconjugate gaze  Respiratory: normal effort  Skin: no lesions noted  Carotids: No bruits.  Extremities: Negative for cyanosis,     Neurologic Examination:   Mental status: Alert, attentive, and oriented x3. Speech fluent with intact naming, repetition and comprehension. Normal fund of knowledge present. Normal recent and remote memory. CN: PERRL. EOMI. No nystagmus. Partial and limited fundoscopic exam as pupils are non-dilated. No gross abnormality noticed on fundoscopic exam. VF full to confrontation. Face symmetric with intact sensation. Hearing intact to finger rub bilaterally. Tongue & uvula midline. Palate elevates symmetrically. Symmetric shoulder shrug.  Motor: No drift. Normal tone, bulk and strength 5/5 in bilateral upper & lower extremities  Sensory: Grossly intact to LT.  Coord: Normal FTN testing      STUDIES:  LABS:   Last 24 hours of labs are   Recent Results (from the past 24 hour(s))   EKG 12 Lead    Collection Time: 05/12/24 11:04 PM   Result Value Ref Range    Ventricular Rate 86 BPM    Atrial Rate 86 BPM    P-R Interval 148 ms    QRS Duration 76 ms    Q-T Interval 338 ms    QTc Calculation (Bazett) 404 ms    P Axis 37 degrees    R Axis 72 degrees    T Axis 53 degrees    Diagnosis       Normal sinus

## 2024-05-14 ENCOUNTER — APPOINTMENT (OUTPATIENT)
Facility: HOSPITAL | Age: 69
End: 2024-05-14
Payer: MEDICARE

## 2024-05-14 VITALS
OXYGEN SATURATION: 94 % | TEMPERATURE: 97.8 F | BODY MASS INDEX: 39.14 KG/M2 | WEIGHT: 305 LBS | HEIGHT: 74 IN | RESPIRATION RATE: 20 BRPM | DIASTOLIC BLOOD PRESSURE: 79 MMHG | HEART RATE: 90 BPM | SYSTOLIC BLOOD PRESSURE: 123 MMHG

## 2024-05-14 LAB
ANION GAP SERPL CALC-SCNC: 6 MMOL/L (ref 3–18)
BASOPHILS # BLD: 0.1 K/UL (ref 0–0.1)
BASOPHILS NFR BLD: 1 % (ref 0–2)
BUN SERPL-MCNC: 21 MG/DL (ref 7–18)
BUN/CREAT SERPL: 14 (ref 12–20)
CALCIUM SERPL-MCNC: 9.6 MG/DL (ref 8.5–10.1)
CHLORIDE SERPL-SCNC: 105 MMOL/L (ref 100–111)
CO2 SERPL-SCNC: 25 MMOL/L (ref 21–32)
CREAT SERPL-MCNC: 1.54 MG/DL (ref 0.6–1.3)
DIFFERENTIAL METHOD BLD: ABNORMAL
EOSINOPHIL # BLD: 0.3 K/UL (ref 0–0.4)
EOSINOPHIL NFR BLD: 3 % (ref 0–5)
ERYTHROCYTE [DISTWIDTH] IN BLOOD BY AUTOMATED COUNT: 13.3 % (ref 11.6–14.5)
EST. AVERAGE GLUCOSE BLD GHB EST-MCNC: 151 MG/DL
FOLATE SERPL-MCNC: >20 NG/ML (ref 3.1–17.5)
GLUCOSE BLD STRIP.AUTO-MCNC: 171 MG/DL (ref 70–110)
GLUCOSE BLD STRIP.AUTO-MCNC: 223 MG/DL (ref 70–110)
GLUCOSE SERPL-MCNC: 171 MG/DL (ref 74–99)
HBA1C MFR BLD: 6.9 % (ref 4.2–5.6)
HCT VFR BLD AUTO: 44.6 % (ref 36–48)
HGB BLD-MCNC: 13.6 G/DL (ref 13–16)
IMM GRANULOCYTES # BLD AUTO: 0 K/UL (ref 0–0.04)
IMM GRANULOCYTES NFR BLD AUTO: 0 % (ref 0–0.5)
LYMPHOCYTES # BLD: 1.6 K/UL (ref 0.9–3.6)
LYMPHOCYTES NFR BLD: 18 % (ref 21–52)
MAGNESIUM SERPL-MCNC: 2.2 MG/DL (ref 1.6–2.6)
MCH RBC QN AUTO: 26.7 PG (ref 24–34)
MCHC RBC AUTO-ENTMCNC: 30.5 G/DL (ref 31–37)
MCV RBC AUTO: 87.5 FL (ref 78–100)
MONOCYTES # BLD: 0.8 K/UL (ref 0.05–1.2)
MONOCYTES NFR BLD: 9 % (ref 3–10)
NEUTS SEG # BLD: 6.2 K/UL (ref 1.8–8)
NEUTS SEG NFR BLD: 69 % (ref 40–73)
NRBC # BLD: 0 K/UL (ref 0–0.01)
NRBC BLD-RTO: 0 PER 100 WBC
PHOSPHATE SERPL-MCNC: 4.1 MG/DL (ref 2.5–4.9)
PLATELET # BLD AUTO: 273 K/UL (ref 135–420)
PMV BLD AUTO: 10.2 FL (ref 9.2–11.8)
POTASSIUM SERPL-SCNC: 4.1 MMOL/L (ref 3.5–5.5)
RBC # BLD AUTO: 5.1 M/UL (ref 4.35–5.65)
SODIUM SERPL-SCNC: 136 MMOL/L (ref 136–145)
T4 FREE SERPL-MCNC: 1.3 NG/DL (ref 0.7–1.5)
TSH SERPL DL<=0.05 MIU/L-ACNC: 0.81 UIU/ML (ref 0.36–3.74)
VIT B12 SERPL-MCNC: 738 PG/ML (ref 211–911)
WBC # BLD AUTO: 9 K/UL (ref 4.6–13.2)

## 2024-05-14 PROCEDURE — 82962 GLUCOSE BLOOD TEST: CPT

## 2024-05-14 PROCEDURE — 85025 COMPLETE CBC W/AUTO DIFF WBC: CPT

## 2024-05-14 PROCEDURE — 36415 COLL VENOUS BLD VENIPUNCTURE: CPT

## 2024-05-14 PROCEDURE — 6370000000 HC RX 637 (ALT 250 FOR IP): Performed by: INTERNAL MEDICINE

## 2024-05-14 PROCEDURE — 84439 ASSAY OF FREE THYROXINE: CPT

## 2024-05-14 PROCEDURE — 82607 VITAMIN B-12: CPT

## 2024-05-14 PROCEDURE — 84100 ASSAY OF PHOSPHORUS: CPT

## 2024-05-14 PROCEDURE — 2580000003 HC RX 258: Performed by: INTERNAL MEDICINE

## 2024-05-14 PROCEDURE — 6360000002 HC RX W HCPCS: Performed by: HOSPITALIST

## 2024-05-14 PROCEDURE — 83036 HEMOGLOBIN GLYCOSYLATED A1C: CPT

## 2024-05-14 PROCEDURE — 84443 ASSAY THYROID STIM HORMONE: CPT

## 2024-05-14 PROCEDURE — 99239 HOSP IP/OBS DSCHRG MGMT >30: CPT | Performed by: HOSPITALIST

## 2024-05-14 PROCEDURE — 99232 SBSQ HOSP IP/OBS MODERATE 35: CPT | Performed by: INTERNAL MEDICINE

## 2024-05-14 PROCEDURE — 6370000000 HC RX 637 (ALT 250 FOR IP): Performed by: HOSPITALIST

## 2024-05-14 PROCEDURE — 6360000002 HC RX W HCPCS: Performed by: INTERNAL MEDICINE

## 2024-05-14 PROCEDURE — 6370000000 HC RX 637 (ALT 250 FOR IP): Performed by: PHYSICIAN ASSISTANT

## 2024-05-14 PROCEDURE — 83735 ASSAY OF MAGNESIUM: CPT

## 2024-05-14 PROCEDURE — 97166 OT EVAL MOD COMPLEX 45 MIN: CPT

## 2024-05-14 PROCEDURE — 82746 ASSAY OF FOLIC ACID SERUM: CPT

## 2024-05-14 PROCEDURE — 94640 AIRWAY INHALATION TREATMENT: CPT

## 2024-05-14 PROCEDURE — 80048 BASIC METABOLIC PNL TOTAL CA: CPT

## 2024-05-14 PROCEDURE — 97161 PT EVAL LOW COMPLEX 20 MIN: CPT

## 2024-05-14 RX ORDER — LISINOPRIL 10 MG/1
10 TABLET ORAL DAILY
Qty: 90 TABLET | Refills: 1 | Status: SHIPPED | OUTPATIENT
Start: 2024-05-14

## 2024-05-14 RX ORDER — NALOXONE HYDROCHLORIDE 4 MG/.1ML
1 SPRAY NASAL PRN
Qty: 2 EACH | Refills: 0 | Status: SHIPPED | OUTPATIENT
Start: 2024-05-14

## 2024-05-14 RX ORDER — ONDANSETRON 4 MG/1
4 TABLET, ORALLY DISINTEGRATING ORAL EVERY 12 HOURS PRN
Qty: 21 TABLET | Refills: 0 | Status: SHIPPED | OUTPATIENT
Start: 2024-05-14

## 2024-05-14 RX ORDER — ISOSORBIDE MONONITRATE 60 MG/1
60 TABLET, EXTENDED RELEASE ORAL DAILY
Qty: 30 TABLET | Refills: 1 | Status: SHIPPED | OUTPATIENT
Start: 2024-05-14

## 2024-05-14 RX ORDER — CARVEDILOL 3.12 MG/1
3.12 TABLET ORAL 2 TIMES DAILY WITH MEALS
Qty: 60 TABLET | Refills: 1 | Status: SHIPPED | OUTPATIENT
Start: 2024-05-14

## 2024-05-14 RX ORDER — TROSPIUM CHLORIDE 20 MG/1
20 TABLET, FILM COATED ORAL
Qty: 60 TABLET | Refills: 0 | Status: SHIPPED | OUTPATIENT
Start: 2024-05-14

## 2024-05-14 RX ADMIN — ISOSORBIDE MONONITRATE 60 MG: 60 TABLET, EXTENDED RELEASE ORAL at 09:15

## 2024-05-14 RX ADMIN — CARVEDILOL 3.12 MG: 3.12 TABLET, FILM COATED ORAL at 09:14

## 2024-05-14 RX ADMIN — ENOXAPARIN SODIUM 30 MG: 100 INJECTION SUBCUTANEOUS at 09:16

## 2024-05-14 RX ADMIN — MONTELUKAST 10 MG: 10 TABLET, FILM COATED ORAL at 09:15

## 2024-05-14 RX ADMIN — ARFORMOTEROL TARTRATE: 15 SOLUTION RESPIRATORY (INHALATION) at 08:01

## 2024-05-14 RX ADMIN — LISINOPRIL 10 MG: 10 TABLET ORAL at 09:14

## 2024-05-14 RX ADMIN — PANTOPRAZOLE SODIUM 40 MG: 40 TABLET, DELAYED RELEASE ORAL at 06:13

## 2024-05-14 RX ADMIN — BUMETANIDE 1 MG: 1 TABLET ORAL at 09:13

## 2024-05-14 RX ADMIN — CHOLECALCIFEROL TAB 25 MCG (1000 UNIT) 1000 UNITS: 25 TAB at 09:13

## 2024-05-14 RX ADMIN — FINASTERIDE 5 MG: 5 TABLET, FILM COATED ORAL at 09:15

## 2024-05-14 RX ADMIN — Medication 500 MG: at 09:13

## 2024-05-14 RX ADMIN — TAMSULOSIN HYDROCHLORIDE 0.4 MG: 0.4 CAPSULE ORAL at 09:13

## 2024-05-14 RX ADMIN — SODIUM CHLORIDE, PRESERVATIVE FREE 10 ML: 5 INJECTION INTRAVENOUS at 09:17

## 2024-05-14 RX ADMIN — ATORVASTATIN CALCIUM 40 MG: 40 TABLET, FILM COATED ORAL at 09:14

## 2024-05-14 RX ADMIN — INSULIN LISPRO 2 UNITS: 100 INJECTION, SOLUTION INTRAVENOUS; SUBCUTANEOUS at 12:30

## 2024-05-14 RX ADMIN — BUPROPION HYDROCHLORIDE 150 MG: 150 TABLET, EXTENDED RELEASE ORAL at 09:14

## 2024-05-14 RX ADMIN — ASPIRIN 81 MG CHEWABLE TABLET 81 MG: 81 TABLET CHEWABLE at 09:14

## 2024-05-14 ASSESSMENT — PAIN SCALES - GENERAL
PAINLEVEL_OUTOF10: 0

## 2024-05-14 NOTE — PROGRESS NOTES
Progress Summary:  Assumed care of patient down to radiology. Uneventful night VSS, call bell within reach.

## 2024-05-14 NOTE — DISCHARGE SUMMARY
Discharge Summary    Patient: Malcolm Brooks               Sex: male          DOA: 5/12/2024         YOB: 1955      Age:  69 y.o.        LOS:  LOS: 1 day                Admit Date: 5/12/2024    Discharge Date: 5/14/2024    Admission Diagnoses: Chest pain [R07.9]  Hypertensive crisis [I16.9]    Reason for admission:    69 y.o. male with medical history significant for HTN, DM, obesity, CKD, and CAD who presented to the emergency department with chief complaint of chest pain that he described as pressure and tightness.the chest pain began around 8 PM last night.  Pain worse with movement.  He endorsed associated headache and dizziness.  No shortness of breath, diaphoresis or chest pain radiation.  He stated his blood pressure was elevated with systolic around 190.  CT scan of the head, and CTA of the chest showed no acute findings.  He received aspirin, nitroglycerin and morphine in ED.  Cardiology was consulted by ED who recommended observation admission and possible stress testing.  The patient was admitted last September for similar complaints and had cardiac cath on 9/15/2023.    Discharge Condition:  good    Hospital Course:  1. chest pain, resolving.  EKG sinus rhythm without acute ischemic changes.  Echo noted.  No concern for angina at this time, no decompensated CHF, blood pressure well-controlled.  No further cardiac workup recommended at this time.  Outpatient follow-up in 6 weeks.  2. Hypertensive crisis, improving.  Echo noted Toprol changed to Coreg.   Other discharge medications as below.  3. Diabetes mellitus, A1c 6.9%.  Resume oral hypoglycemics at discharge.  4. Dizziness, with associated blurred vision, improving. [employer] device not safe for MRI per MRI tech.  CTA head and neck patent intracranial brain arteries, no LVO, no significant stenosis in cervical carotid or vertebral arteries . echo noted.  B12, TFTs WNL no further workup recommended per neurology.  5.  Obesity Body  Angina pectoris, unspecified ([facility])      glipiZIDE (GLUCOTROL) 10 MG tablet TAKE 1 TABLET TWICE DAILY  Qty: 180 tablet, Refills: 0      Alcohol Swabs (DROPSAFE ALCOHOL PREP) 70 % PADS USE AS DIRECTED WHEN CHECKING BLOOD SUGAR THREE TIMES DAILY  Qty: 100 each, Refills: 5      amitriptyline (ELAVIL) 10 MG tablet TAKE 1 TABLET EVERY NIGHT  Qty: 90 tablet, Refills: 10      Blood Glucose Monitoring Suppl (TRUE METRIX AIR GLUCOSE METER) w/Device KIT USE AS DIRECTED  Qty: 1 kit, Refills: 10      buPROPion (WELLBUTRIN XL) 150 MG extended release tablet TAKE 1 TABLET EVERY DAY  Qty: 90 tablet, Refills: 1      aspirin 81 MG chewable tablet Take 1 tablet by mouth daily  Qty: 30 tablet, Refills: 3    Associated Diagnoses: Atherosclerosis of native coronary artery with angina pectoris, unspecified whether native or transplanted heart ([facility])      TRUEplus Lancets 33G MISC TEST BLOOD SUGAR THREE TIMES DAILY  Qty: 300 each, Refills: 5      alfuzosin (UROXATRAL) 10 MG extended release tablet TAKE 1 TABLET BY MOUTH DAILY AFTER AND DINNER  Qty: 30 tablet, Refills: 0      fluticasone-salmeterol (ADVAIR) 250-50 MCG/ACT AEPB diskus inhaler Inhale 1 puff into the lungs 2 times daily      magnesium oxide (MAG-OX) 400 MG tablet Take 1 tablet by mouth daily as needed      albuterol sulfate HFA (PROVENTIL;VENTOLIN;PROAIR) 108 (90 Base) MCG/ACT inhaler Inhale 1 puff into the lungs every 4 hours as needed      ascorbic acid (VITAMIN C) 500 MG tablet Take 1 tablet by mouth daily      vitamin D (CHOLECALCIFEROL) 25 MCG (1000 UT) TABS tablet Take 1 tablet by mouth daily      dutasteride (AVODART) 0.5 MG capsule Take 1 capsule by mouth      fluticasone (FLONASE) 50 MCG/ACT nasal spray ceived the following from [facility]: Outside name: fluticasone propionate (FLONASE) 50 mcg/actuation nasal spray      Melatonin 5 MG CAPS Take 10 mg by mouth nightly      polyethylene glycol (GLYCOLAX) 17 GM/SCOOP powder Take 17 g by mouth daily

## 2024-05-14 NOTE — CASE COMMUNICATION
COMMUNICATION NOTE - Neurology Service    Name:  Malcolm Brooks     MRN: [id number]         Communication Note:   MRI Brain with and without contrast cannot be done due to AICD  CTA head and neck pending  Cardiology on board  Aspirin 81 mg po q day and high intensity statin  I explained in detail about the current condition.   Rest of the management per primary team.  Please do not hesitate to call with questions or concerns.  Please let us know if we can provide any additional information.

## 2024-05-14 NOTE — DISCHARGE INSTRUCTIONS
DISCHARGE SUMMARY from Nurse    PATIENT INSTRUCTIONS:    After general anesthesia or intravenous sedation, for 24 hours or while taking prescription Narcotics:  Limit your activities  Do not drive and operate hazardous machinery  Do not make important personal or business decisions  Do  not drink alcoholic beverages  If you have not urinated within 8 hours after discharge, please contact your surgeon on call.    Report the following to your surgeon:  Excessive pain, swelling, redness or odor of or around the surgical area  Temperature over 100.5  Nausea and vomiting lasting longer than 4 hours or if unable to take medications  Any signs of decreased circulation or nerve impairment to extremity: change in color, persistent  numbness, tingling, coldness or increase pain  Any questions    What to do at Home:  Recommended activity: activity as tolerated,     If you experience any of the following symptoms chest pain, shortness of breath, fever, chills, nausea, vomiting, pain unrelieved by medication, please follow up with Dr Domingo, Dr DEDRICK Bolton 6 weeks.    *  Please give a list of your current medications to your Primary Care Provider.    *  Please update this list whenever your medications are discontinued, doses are      changed, or new medications (including over-the-counter products) are added.    *  Please carry medication information at all times in case of emergency situations.    These are general instructions for a healthy lifestyle:    No smoking/ No tobacco products/ Avoid exposure to second hand smoke  Surgeon General's Warning:  Quitting smoking now greatly reduces serious risk to your health.    Obesity, smoking, and sedentary lifestyle greatly increases your risk for illness    A healthy diet, regular physical exercise & weight monitoring are important for maintaining a healthy lifestyle    You may be retaining fluid if you have a history of heart failure or if you experience any of the following  symptoms:  Weight gain of 3 pounds or more overnight or 5 pounds in a week, increased swelling in our hands or feet or shortness of breath while lying flat in bed.  Please call your doctor as soon as you notice any of these symptoms; do not wait until your next office visit.  Patient armband removed and shredded   Thank you for enrolling in [employer]. Please follow the instructions below to securely access your online medical record. [employer] allows you to send messages to your doctor, view your test results, renew your prescriptions, schedule appointments, and more.     How Do I Sign Up?  In your Internet browser, go to [email or web address]  Click on the Sign Up Now link in the Sign In box. You will see the New Member Sign Up page.  Enter your [facility] [facility] Access Code exactly as it appears below. You will not need to use this code after you’ve completed the sign-up process. If you do not sign up before the expiration date, you must request a new code.  [employer] Access Code: Activation code not generated  Current [facility] Status: Active    Enter your Social Security Number ([id number]) and Date of Birth (mm/dd/yyyy) as indicated and click Submit. You will be taken to the next sign-up page.  Create a [facility] [facility] ID. This will be your [facility] [facility] login ID and cannot be changed, so think of one that is secure and easy to remember.  Create a [facility] password. You can change your password at any time.  Enter your Password Reset Question and Answer. This can be used at a later time if you forget your password.   Enter your e-mail address. You will receive e-mail notification when new information is available in [employer].  Click Sign Up. You can now view your medical record.     Additional Information  If you have questions, please contact your physician practice where you receive care. Remember, [employer] is NOT to be used for urgent needs. For medical emergencies, dial 911.     The discharge information has been

## 2024-05-14 NOTE — PROGRESS NOTES
PHYSICAL THERAPY EVALUATION/DISCHARGE    Patient: Malcolm Brooks (69 y.o. male)  Date: 5/14/2024  Primary Diagnosis: Chest pain [R07.9]  Hypertensive crisis [I16.9]       Precautions: Fall Risk,  ,  ,  ,  ,  ,  ,    PLOF:  Independent with ADLs and mobility using cane. Lives with spouse in 2 story home with chair lift.     ASSESSMENT AND RECOMMENDATIONS:  Patient resting in bed, alert, and agreeable to PT evaluation. Patient is independent with functional mobility using AD. He ambulates around the room using RW, typically uses cane at home, but also has access to walkers. Patient has been going to aquatic OP PT and plans to resume. Patient left sitting EOB with all needs in reach at end of session.     Patient does not require further skilled physical therapy intervention at this level of care.    Further Equipment Recommendations for Discharge: none    AMPAC:    24    At this time and based on an AM-PAC score, no further PT is recommended upon discharge due to (i.e. patient at baseline functional status).  Recommend patient returns to prior setting with prior services.    This AMPAC score should be considered in conjunction with interdisciplinary team recommendations to determine the most appropriate discharge setting. Patient's social support, diagnosis, medical stability, and prior level of function should also be taken into consideration.     SUBJECTIVE:   Patient stated “I take care of my wife.”    OBJECTIVE DATA SUMMARY:     Past Medical History:   Diagnosis Date    Abdominal adhesions 07/30/2014    BPH (benign prostatic hyperplasia)     Cellulitis     Chronic pain 10/08/11    Back pain due a work injury    DDD (degenerative disc disease), lumbar 07/30/2014    Depression     Diabetes (HCC)     Frequent falls 07/30/2014    LORRAINE (generalized anxiety disorder) 07/30/2014    HLD (hyperlipidemia)     Hypertension     Hypogonadism in male     Incomplete bladder emptying     Kidney stones     Lumbar facet arthropathy  07/30/2014    Lumbar nerve root impingement 07/30/2014    Lumbosacral radiculopathy at S1 07/30/2014    Major depression 07/30/2014    Neurological disorder     sciatica    MEGHA (obstructive sleep apnea) 07/30/2014    S/P lumbar laminectomy 07/30/2014    S/P lumbar spinal fusion 07/30/2014    Sleep apnea     USES CPAP EVERY NIGHT    Thoracic compression fracture (HCC) 07/30/2014     Past Surgical History:   Procedure Laterality Date    APPENDECTOMY      BACK SURGERY      Lumbar fusion    CARDIAC PROCEDURE N/A 9/15/2023    Left heart cath performed by Myles FAITH MD at [facility] CARDIAC CATH LAB    CARDIAC PROCEDURE N/A 9/15/2023    Coronary angiography performed by Myles FAITH MD at [facility] CARDIAC CATH LAB    CARDIAC PROCEDURE N/A 9/15/2023    Left ventriculography performed by Myles FAITH MD at [facility] CARDIAC CATH LAB    CARDIAC PROCEDURE N/A 9/15/2023    Instantaneous wave-free ratio (iFR) performed by Myles FAITH MD at [facility] CARDIAC CATH LAB    HERNIA REPAIR  1992    OTHER SURGICAL HISTORY      EMG - S1 disorder    OTHER SURGICAL HISTORY  10/2017    spinal cord stimulator    VASECTOMY  1985       Home Situation:  Social/Functional History  Lives With: Spouse  Type of Home: House  Home Layout: Two level (chair lift)  Home Access: Stairs to enter without rails, Ramped entrance  Entrance Stairs - Number of Steps: 5  Bathroom Shower/Tub: Tub/Shower unit  Bathroom Toilet: Standard  Bathroom Equipment: None  Bathroom Accessibility: Accessible  Home Equipment: Cane, Electric scooter, Rollator, Walker - Rolling  Receives Help From: Family  ADL Assistance: Independent  Homemaking Assistance: Independent  Homemaking Responsibilities: Yes  Ambulation Assistance: Independent  Transfer Assistance: Independent  Active : Yes  Mode of Transportation: Car  Occupation: Retired  Critical Behavior:  Orientation  Overall Orientation Status: Within Normal Limits  Orientation Level: Oriented X4  Cognition  Overall Cognitive

## 2024-05-14 NOTE — PLAN OF CARE
Problem: Pain  Goal: Verbalizes/displays adequate comfort level or baseline comfort level  Outcome: Progressing     Problem: Chronic Conditions and Co-morbidities  Goal: Patient's chronic conditions and co-morbidity symptoms are monitored and maintained or improved  Outcome: Progressing     Problem: Safety - Adult  Goal: Free from fall injury  Outcome: Progressing  Flowsheets (Taken 5/14/2024 1872)  Free From Fall Injury: Instruct family/caregiver on patient safety

## 2024-05-14 NOTE — CARE COORDINATION
05/14/24 1101   Service Assessment   Patient Orientation Alert and Oriented;Person;Place;Situation;Self   Cognition Alert   History Provided By Patient   Accompanied By/Relationship None   Support Systems Spouse/Significant Other   Patient's Healthcare Decision Maker is: Legal Next of Kin   PCP Verified by CM Yes   Last Visit to PCP Within last 3 months   Prior Functional Level Independent in ADLs/IADLs   Current Functional Level Independent in ADLs/IADLs   Can patient return to prior living arrangement Yes   Ability to make needs known: Good   Family able to assist with home care needs: Yes   Financial Resources Medicare  ([employer] MEDICARE)   Community Resources None   Social/Functional History   Lives With Spouse   Type of Home House   Home Layout Two level   Home Access Stairs to enter without rails   Entrance Stairs - Number of Steps 5   Bathroom Shower/Tub Tub/Shower unit   Bathroom Toilet Standard   Bathroom Equipment None   Bathroom Accessibility Accessible   Home Equipment Cane;Electric scooter   Receives Help From Family   ADL Assistance Independent   Homemaking Assistance Independent   Homemaking Responsibilities Yes   Ambulation Assistance Independent   Transfer Assistance Independent   Active  Yes   Mode of Transportation Car   Occupation Retired   Discharge Planning   Type of Residence House   Living Arrangements Spouse/Significant Other   Current Services Prior To Admission None   Potential Assistance Needed N/A   DME Ordered? No   Potential Assistance Purchasing Medications No   Type of Home Care Services None   Patient expects to be discharged to: House   One/Two Story Residence Two story   # of Interior Steps 5   Interior Rails Both   Lift Chair Available Yes   History of falls? 0   Services At/After Discharge   Transition of Care Consult (CM Consult) Discharge Planning   Services At/After Discharge None   [place] Resource Information Provided? No   Mode of Transport at Discharge Self    Hospital Transport Time of Discharge   (TBD)   Confirm Follow Up Transport Self   Condition of Participation: Discharge Planning   The Plan for Transition of Care is related to the following treatment goals: Anticiapate to home when stable.   The Patient and/or Patient Representative was provided with a Choice of Provider? Patient   The Patient and/Or Patient Representative agree with the Discharge Plan? No   Freedom of Choice list was provided with basic dialogue that supports the patient's individualized plan of care/goals, treatment preferences, and shares the quality data associated with the providers?  No   Kailey Bravo BSW  Case Management

## 2024-05-14 NOTE — PROGRESS NOTES
[facility] - Progress Note    Admit Date: 5/12/2024  Attending Cardiologist: Dr. Myles Bolton     Assessment:     -Dizziness, vision changes, HA. CT Head negative for acute process.   -Chest pain, MI r/o. ECG SR without acute ischemic changes.   -Hypertensive crisis, 208/190 mmHg. On Bumex, lisinopril, Imdur and BB as outpatient.   -Mild CAD, s/p LHC in 09/2023: Normal except diagonal branch is a 50% borderline lesion, IFR 0.98 and 0.99 suggesting physiologically not significant lesion-Medical management recommended.   -Echo 05/2024, EF 55-60%, mildly increased wall thickness, no RWMA.   -HLD, on statin.   -CKD3  -DM, last HgbA1c 7.5  (2/12/24)  -MEGHA, on cpap  -Chronic pain, s/p spinal stimulator.         Primary cardiologist is Dr. Myles Bolton     Plan:       I saw, evaluated, interviewed and examined the patient personally.  Patient feels back to normal this morning.  No chest pain or chest tightness concerning for angina at this time.  No decompensated heart failure  Blood pressure is well-controlled on current medication regimen as outlined.  I would recommend to continue same  Workup of dizziness in progress.  CTA done, results pending  No further cardiac workup is planned at this time.  Once discharged, patient will be seen in the office in 6 weeks.  Please call because question    Myles Bolton MD       -Echo with normal LVEF and no RWMA. Continue antianginal medications, currently on Coreg 3.125 mg BID and Imdur; also on Lisinopril.   -Continue ASA, statin.  -Ongoing workup for dizziness and HA per neuro, appreciate involvement.   -increase activity as tolerated.   -No further recommendations from a CV standpoint, will sign off and be available as needed. Patient to follow up with Dr. Bolton in the office 4-6 weeks post discharge.      Subjective:     No further episodes of CP this admission.     Objective:      Patient Vitals for the past 8 hrs:   Temp Pulse Resp BP SpO2   05/14/24 0858 97.4 °F  tablet 40 mEq  40 mEq Oral PRN    Or    potassium chloride 10 mEq/100 mL IVPB (Peripheral Line)  10 mEq IntraVENous PRN    magnesium sulfate 2000 mg in 50 mL IVPB premix  2,000 mg IntraVENous PRN    enoxaparin Sodium (LOVENOX) injection 30 mg  30 mg SubCUTAneous BID    ondansetron (ZOFRAN-ODT) disintegrating tablet 4 mg  4 mg Oral Q8H PRN    Or    ondansetron (ZOFRAN) injection 4 mg  4 mg IntraVENous Q6H PRN    polyethylene glycol (GLYCOLAX) packet 17 g  17 g Oral Daily PRN    acetaminophen (TYLENOL) tablet 650 mg  650 mg Oral Q6H PRN    Or    acetaminophen (TYLENOL) suppository 650 mg  650 mg Rectal Q6H PRN    insulin lispro (HUMALOG,ADMELOG) injection vial 0-8 Units  0-8 Units SubCUTAneous 4x daily    cyclobenzaprine (FLEXERIL) tablet 10 mg  10 mg Oral TID PRN    albuterol (PROVENTIL) (2.5 MG/3ML) 0.083% nebulizer solution 2.5 mg  2.5 mg Nebulization Q4H PRN    glucose chewable tablet 16 g  4 tablet Oral PRN    dextrose bolus 10% 125 mL  125 mL IntraVENous PRN    Or    dextrose bolus 10% 250 mL  250 mL IntraVENous PRN    glucagon (rDNA) injection 1 mg  1 mg SubCUTAneous PRN    dextrose 10 % infusion   IntraVENous Continuous PRN    arformoterol 15 mcg-budesonide 0.5 mg neb solution   Nebulization BID RT    pantoprazole (PROTONIX) tablet 40 mg  40 mg Oral QAM AC    carvedilol (COREG) tablet 3.125 mg  3.125 mg Oral BID WC         Intake/Output Summary (Last 24 hours) at 5/14/2024 0903  Last data filed at 5/14/2024 0858  Gross per 24 hour   Intake 120 ml   Output 600 ml   Net -480 ml       Physical Exam:  General:  alert, appears stated age, and cooperative  Neck:  no JVD  Lungs:  clear to auscultation bilaterally  Heart:  RRR  Abdomen:  soft, obese   Extremities:  extremities normal, atraumatic, no cyanosis or edema    Visit Vitals  /80   Pulse 92   Temp 97.4 °F (36.3 °C) (Axillary)   Resp 18   Ht 1.88 m (6' 2\")   Wt (!) 138.3 kg (305 lb)   SpO2 93%   BMI 39.16 kg/m²       Data Review:     Labs: Results:        Chemistry Recent Labs     05/12/24 2310 05/14/24  0451   * 136   K 4.6 4.1    105   CO2 27 25   BUN 26* 21*   CREATININE 1.70* 1.54*   MG  --  2.2   PHOS  --  4.1   GLOB 3.4  --       CBC w/Diff Recent Labs     05/12/24 2310 05/14/24  0451   WBC 9.6 9.0   RBC 4.78 5.10   HGB 13.1 13.6   HCT 43.2 44.6    273      Cardiac Enzymes Lab Results   Component Value Date/Time    TROPHS 13 05/13/2024 11:00 AM    TROPHS 15 05/13/2024 01:30 AM    TROPHS 15 05/12/2024 11:10 PM      Coagulation No results for input(s): \"INR\", \"APTT\" in the last 72 hours.    Lipid Panel Lab Results   Component Value Date/Time    CHOL 104 10/29/2021 10:30 AM    HDL 38 10/29/2021 10:30 AM    LDL 47 10/29/2021 10:30 AM    VLDL 19 10/29/2021 10:30 AM      BNP Lab Results   Component Value Date/Time    NTPROBNP 104 05/12/2024 11:10 PM      Liver Enzymes Lab Results   Component Value Date    ALT 31 05/12/2024    AST 21 05/12/2024    ALKPHOS 90 05/12/2024    BILITOT 0.5 05/12/2024      Thyroid Studies Lab Results   Component Value Date/Time    TSH 0.66 12/31/2022 10:21 AM          Signed By: Chana Black PA-C     May 14, 2024

## 2024-05-14 NOTE — PROGRESS NOTES
05/13/24 2041   NIV Type   $NIV $Daily Charge   Ventilator ID [id number]   Suction Setup and Functional Yes   NIV Started/Stopped On   Equipment Type Resmed   Mode CPAP   Mask Type Full face mask   Mask Size Large   Assessment   Respirations 27   Settings/Measurements   CPAP/EPAP 20 cmH2O  (home settings per patient)   Vt (Measured) 515 mL   FiO2  21 %   Minute Volume (L/min) 10.3 Liters   Mask Leak (lpm) 35 lpm   Patient's Home Machine No   Alarm Settings   Alarms On Y

## 2024-05-14 NOTE — CARE COORDINATION
D/C order noted for today. Orders reviewed. No needs identified at this time.  Patient do not have any discharge needs at this time. SW communicated to  patient about  his scheduled Cardiology follow up appointment with Dr. Bolton on 06/10/2024 at 11:30 am. Patient stated \"he was aware of his upcoming appointment.\"     SW also informed patient that he would need to contact his PCP office to make an appointment. Patient neighbor to transport. CM/SW remains available if needed.     Kailey Bravo BSW  Case Management

## 2024-05-14 NOTE — PROGRESS NOTES
OCCUPATIONAL THERAPY EVALUATION/DISCHARGE    Patient: Malcolm Brooks (69 y.o. male)  Date: 5/14/2024  Primary Diagnosis: Chest pain [R07.9]  Hypertensive crisis [I16.9]       Precautions: Fall Risk, General Precautions,  ,  ,  ,  ,  ,  ,    PLOF: Mod I with ADLs and functional mobility     ASSESSMENT AND RECOMMENDATIONS:  Bed mobility: Mod I supine <-> sit edge of bed. LB dress: Mod I doff and don slipper socks with good balance using cross leg technique. Toilet transfer: Mod I using RW, pt declined need to void, good balance. Pt laying semi-reclined in bed at end of tx session, call bell within reach & pt verbalized understanding to utilize for assist e.g. functional transfers in order to prevent falls.       Maximum therapeutic gains met at current level of care and patient will be discharged from occupational therapy at this time.    Further Equipment Recommendations for Discharge: patient has all recommended DME    AMPA: AM-PAC Inpatient Daily Activity Raw Score: 24      At this time and based on an AM-PAC score, no further OT is recommended upon discharge due to patient at baseline functional status.  Recommend patient returns to prior setting with prior services.    This [facility] score should be considered in conjunction with interdisciplinary team recommendations to determine the most appropriate discharge setting. Patient's social support, diagnosis, medical stability, and prior level of function should also be taken into consideration.     SUBJECTIVE:   Patient stated “I still drive.”    OBJECTIVE DATA SUMMARY:     Past Medical History:   Diagnosis Date    Abdominal adhesions 07/30/2014    BPH (benign prostatic hyperplasia)     Cellulitis     Chronic pain 10/08/11    Back pain due a work injury    DDD (degenerative disc disease), lumbar 07/30/2014    Depression     Diabetes (HCC)     Frequent falls 07/30/2014    LORRAINE (generalized anxiety disorder) 07/30/2014    HLD (hyperlipidemia)     Hypertension

## 2024-05-15 ENCOUNTER — CARE COORDINATION (OUTPATIENT)
Facility: CLINIC | Age: 69
End: 2024-05-15

## 2024-05-15 NOTE — CARE COORDINATION
Care Transitions Initial Follow Up Call    Call within 2 business days of discharge: Yes    Patient Current Location:  [place]    Care Transition Nurse contacted the patient by telephone to perform post hospital discharge assessment. Verified name and  with patient as identifiers. Provided introduction to self, and explanation of the Care Transition Nurse role.     Patient: Malcolm Brooks Patient : 1955   MRN: [id number]      Reason for Admission, per chart review  - Chest pain       Discharge Date: 24 RARS: Readmission Risk Score: 12.3      Last Discharge Facility       Date Complaint Diagnosis Description Type Department Provider    24 Dizziness; Chest Pain Chest pain, unspecified type ED to Hosp-Admission (Discharged) (ADMITTED) [id number] Lulu Villegas MD; Shoaib Sarah...            Was this an external facility discharge? No     Discharge Facility: [facility]     Challenges to be reviewed by the provider   Additional needs identified to be addressed with provider: No  none               Method of communication with provider: none.  Patient advises that PCP is Dr Domingo       Patient reports:   -  He is feeling fine   -  He is having an issue with getting Carvedilol filled.      Patient reports that the pharmacy staff wants to fill it for a 90 day supply verus a 30 day     Supply.      Patient     Care Transition Nurse reviewed discharge instructions, medical action plan, and red flags with patient who verbalized understanding. The patient was given an opportunity to ask questions and does not have any further questions or concerns at this time. Were discharge instructions available to patient? Yes. Reviewed appropriate site of care based on symptoms and resources available to patient including:   PCP  Specialist  After hours contact number-medical provider office phone number   When to call [phone]. The patient agrees to contact the PCP office for questions related to their

## 2024-05-16 ENCOUNTER — TELEPHONE (OUTPATIENT)
Facility: HOSPITAL | Age: 69
End: 2024-05-16

## 2024-05-16 ENCOUNTER — APPOINTMENT (OUTPATIENT)
Facility: HOSPITAL | Age: 69
End: 2024-05-16
Payer: MEDICARE

## 2024-05-21 ENCOUNTER — APPOINTMENT (OUTPATIENT)
Facility: HOSPITAL | Age: 69
End: 2024-05-21
Payer: MEDICARE

## 2024-05-21 ENCOUNTER — TELEPHONE (OUTPATIENT)
Facility: HOSPITAL | Age: 69
End: 2024-05-21

## 2024-05-22 ENCOUNTER — CARE COORDINATION (OUTPATIENT)
Facility: CLINIC | Age: 69
End: 2024-05-22

## 2024-05-22 NOTE — CARE COORDINATION
Care Transitions Follow Up Call    Patient Current Location:  [place]    Care Transition Nurse contacted the patient by telephone to follow up after admission on 2024 .  Verified name  with patient as identifier.   CTN verified that the phone number called is the same phone number as listed in patient demographics in the [facility] electronic medical record.     Patient advised that this was not a good time to speak with him and the phone call was concluded.        Patient: Malcolm Brooks  Patient : 1955   MRN: [id number]    Discharge Date: 24 RARS: Readmission Risk Score: 12.3      Follow Up  Future Appointments   Date Time Provider Department Center   2024 10:30 AM [facility]   2024 11:10 AM Kathy Rivas PTA [facility]   6/10/2024 11:30 AM Myles Bolton MD [facility] BS AMB   2024  9:30 AM [facility] NURSE [facility] [place] Sched   2024  1:00 PM Adolfo Espinoza PA-C [facility] [place] Sched   10/14/2024 11:15 AM Myles Bolton MD [facility] BS AMB       Offered patient enrollment in the Remote Patient Monitoring (RPM) program for in-home monitoring:  Unable to discuss with patient at this time.      Care Transitions Subsequent and Final Call    Subsequent and Final Calls  Care Transitions Interventions  Other Interventions:             CTN to follow up in 7-10 days or sooner as needed   based on severity of symptoms and risk factors.  Plan for next call:   - Attempt to review Remote patient monitoring     - which provider is following for BP     -  Who is following for diabetes management   - Assist as needed  - Follow up with appointments   - Follow up with medications?  Does patient have all necessary medications?     Christine Faust RN

## 2024-05-26 NOTE — PROGRESS NOTES
Physician Progress Note      PATIENT:               KAYCEE BAINS  [facility] #:                  [id number]  :                       1955  ADMIT DATE:       2024 11:00 PM  DISCH DATE:        2024 4:00 PM  RESPONDING  PROVIDER #:        Lulu Villegas MD          QUERY TEXT:    Pt admitted with chest pain and dizziness.  Pt noted to have Hypertensive   crisis documented. If possible, please document in progress notes and   discharge summary if you are evaluating and/or treating any of the following:    The medical record reflects the following:  Risk Factors: CAD, GERD, HTN,    Clinical Indicators: chest pain, dizziness, Headache, associated vision   changes  per Cardiology consult: \"Atypical chest pain with normal troponin/EKG\" \"MI   ruled out with normal troponin,  Ongoing dizziness, possibly from HTN.\"    -CT head negative for acute process    Treatment: Cardiology consulted  -Start Coreg today in place of Toprol  -Followup echo  -Continue imdure, lisinopril, bumex  -Continue ASA, statin  -Trial Protonix  -Monitor rhythm on tele    Please call if you have any questions or need assistance. I can also be   reached via [employer] or [employer] # [phone].  Thank you,  Benita iMchele, RN/CDI  Options provided:  -- Chest pain and dizziness due to CAD with unstable angina  -- Chest pain and dizziness due to Hypertensive crisis  -- Chest pain and dizziness due to GERD  -- Chest pain and dizziness due to, please specify etiology.  -- Other - I will add my own diagnosis  -- Disagree - Not applicable / Not valid  -- Disagree - Clinically unable to determine / Unknown  -- Refer to Clinical Documentation Reviewer    PROVIDER RESPONSE TEXT:    This patient has chest pain and dizziness due to Hypertensive crisis.    Query created by: Benita Michele on 2024 9:25 AM      Electronically signed by:  Lulu Villegas MD 2024 6:57 PM

## 2024-05-28 ENCOUNTER — APPOINTMENT (OUTPATIENT)
Facility: HOSPITAL | Age: 69
End: 2024-05-28
Payer: MEDICARE

## 2024-05-28 ENCOUNTER — HOSPITAL ENCOUNTER (OUTPATIENT)
Facility: HOSPITAL | Age: 69
Setting detail: RECURRING SERIES
End: 2024-05-28
Payer: MEDICARE

## 2024-05-28 ENCOUNTER — TELEPHONE (OUTPATIENT)
Facility: HOSPITAL | Age: 69
End: 2024-05-28

## 2024-05-29 ENCOUNTER — CARE COORDINATION (OUTPATIENT)
Facility: CLINIC | Age: 69
End: 2024-05-29

## 2024-05-29 NOTE — CARE COORDINATION
Care Transitions Follow Up Call    Patient Current Location:  [place]    Care Transition Nurse contacted the patient by telephone to follow up after admission on 24 .  Verified name and  with patient as identifiers.    Patient: Malcolm Brooks  Patient : 1955   MRN: [id number]    Discharge Date: 24 RARS: Readmission Risk Score: 12.3      Needs to be reviewed by the provider   Additional needs identified to be addressed with provider: No  none             Method of communication with provider: none.    Patient reports that he is feeling fine.     Follow Up  Future Appointments   Date Time Provider Department Center   6/10/2024 11:30 AM Myles Bolton MD [facility] BS AMB   2024  9:30 AM [facility] NURSE [facility] Sched   2024  1:00 PM Adolfo Espinoza PA-C [facility] [place] Sched   10/14/2024 11:15 AM Myles Bolton MD [facility] BS AMB     .     Patients top risk factors for readmission:   Medical Condition:   -  Chest pain   -  Hypertensive crisis       Interventions to address risk factors:   CTN inquired as to which medical provider is managing patients blood pressure? Patient advises that the kidney doctor is managing his Blood Pressure.  Per chart review, patient care team listing, it appears that Dr. KAYLEE Bolton with [facility] provides nephrology follow up for patient.     Offered patient enrollment in the Remote Patient Monitoring (RPM) program for in-home monitoring: Patient is not eligible for RPM program because: affiliate provider.  CTN briefly reviewed remote patient monitoring with patient and discussed follow up with his nephrologist  if this is something hat might be offered by his nephrology provider if patient is interested in this type of program.         Care Transitions Subsequent and Final Call    Subsequent and Final Calls  Care Transitions Interventions  Other Interventions:             Care Transition Nurse provided contact information for future needs. Plan for

## 2024-05-30 ENCOUNTER — APPOINTMENT (OUTPATIENT)
Facility: HOSPITAL | Age: 69
End: 2024-05-30
Payer: MEDICARE

## 2024-06-05 ENCOUNTER — CARE COORDINATION (OUTPATIENT)
Facility: CLINIC | Age: 69
End: 2024-06-05

## 2024-06-05 NOTE — CARE COORDINATION
Care Transitions Follow Up Call      Care Transition Nurse contacted the patient by telephone to follow up after admission on 24 .  Verified name with patient as identifier.   CTN also verified that phone number called is the same phone number as listed in patient demographics in the [facility] electronic medical record.     Patient: Malcolm Brooks  Patient : 1955   MRN: [id number]  Discharge Date: 24 RARS: Readmission Risk Score: 12.3      Needs to be reviewed by the provider   Additional needs identified to be addressed with provider: No  none             Method of communication with provider:  n/a  .  PCP does not appear to be a member with the [facility] group [place] [facility]     Addressed changes since last contact:    Patient reports:   - He is ok   - BP continues to run high at times   - BP last evening was 171/90   - He has not checked BP today.   - He has a follow up with cardiology scheduled for 6/10/2024.    - He has medications available and is taking medications.     Discussed follow-up appointments.       Follow Up  Future Appointments   Date Time Provider Department Center   6/10/2024 11:30 AM Myles Bolton MD [facility] BS AMB   2024  9:30 AM [facility] NURSE [facility] [place] Sched   2024  1:00 PM Adolfo Espinoza, PA-C [facility] Sched   10/14/2024 11:15 AM Myles Bolton MD [facility] BS AMB         Care Transition Nurse reviewed medical action plan and red flags with patient and discussed any barriers to care and/or understanding of plan of care after discharge. Discussed appropriate site of care based on symptoms and resources available to patient including:   PCP  Specialist  When to call 911. The patient agrees to contact the PCP office for questions related to their healthcare.     Advance Care Planning:   Patient advises that he has completed an advanced directive.   Patient alerted that he can bring ACP to his next appointment/ interaction with [facility].   The

## 2024-06-10 ENCOUNTER — OFFICE VISIT (OUTPATIENT)
Age: 69
End: 2024-06-10
Payer: MEDICARE

## 2024-06-10 VITALS
BODY MASS INDEX: 39.16 KG/M2 | OXYGEN SATURATION: 93 % | SYSTOLIC BLOOD PRESSURE: 140 MMHG | WEIGHT: 305 LBS | DIASTOLIC BLOOD PRESSURE: 80 MMHG | HEART RATE: 88 BPM

## 2024-06-10 DIAGNOSIS — E78.00 PURE HYPERCHOLESTEROLEMIA: ICD-10-CM

## 2024-06-10 DIAGNOSIS — I25.10 CORONARY ARTERY DISEASE DUE TO LIPID RICH PLAQUE: ICD-10-CM

## 2024-06-10 DIAGNOSIS — I25.119 ATHEROSCLEROSIS OF NATIVE CORONARY ARTERY OF NATIVE HEART WITH ANGINA PECTORIS (HCC): ICD-10-CM

## 2024-06-10 DIAGNOSIS — I77.810 AORTIC ROOT DILATATION (HCC): ICD-10-CM

## 2024-06-10 DIAGNOSIS — I10 ESSENTIAL HYPERTENSION WITH GOAL BLOOD PRESSURE LESS THAN 140/90: Primary | ICD-10-CM

## 2024-06-10 DIAGNOSIS — I25.83 CORONARY ARTERY DISEASE DUE TO LIPID RICH PLAQUE: ICD-10-CM

## 2024-06-10 PROCEDURE — 3079F DIAST BP 80-89 MM HG: CPT | Performed by: INTERNAL MEDICINE

## 2024-06-10 PROCEDURE — 3077F SYST BP >= 140 MM HG: CPT | Performed by: INTERNAL MEDICINE

## 2024-06-10 PROCEDURE — 99214 OFFICE O/P EST MOD 30 MIN: CPT | Performed by: INTERNAL MEDICINE

## 2024-06-10 PROCEDURE — 1036F TOBACCO NON-USER: CPT | Performed by: INTERNAL MEDICINE

## 2024-06-10 PROCEDURE — G8417 CALC BMI ABV UP PARAM F/U: HCPCS | Performed by: INTERNAL MEDICINE

## 2024-06-10 PROCEDURE — 1123F ACP DISCUSS/DSCN MKR DOCD: CPT | Performed by: INTERNAL MEDICINE

## 2024-06-10 PROCEDURE — 1111F DSCHRG MED/CURRENT MED MERGE: CPT | Performed by: INTERNAL MEDICINE

## 2024-06-10 PROCEDURE — 3017F COLORECTAL CA SCREEN DOC REV: CPT | Performed by: INTERNAL MEDICINE

## 2024-06-10 PROCEDURE — G8428 CUR MEDS NOT DOCUMENT: HCPCS | Performed by: INTERNAL MEDICINE

## 2024-06-10 RX ORDER — CARVEDILOL 6.25 MG/1
6.25 TABLET ORAL 2 TIMES DAILY
Qty: 180 TABLET | Refills: 2 | Status: SHIPPED | OUTPATIENT
Start: 2024-06-10

## 2024-06-10 NOTE — PROGRESS NOTES
[facility]    Malcolm Brooks is 69 y.o. male with a history of diabetes, hypertension, hyperlipidemia and other multiple medical problems including sleep apnea      Patient is here today for follow-up appointment   Because of recurrent chest pain and dyspnea, patient underwent NST in 03/2022 which was low risk in nature  Because of ongoing symptoms, patient underwent LHC in 09/2023 which showed diagonal branch 50% borderline stenosis and IFR was 0.98 and 0.99 suggesting physiologically not significant lesion.      Patient is here today for follow-up appointment.  Patient has debilitating back pain and the knee pain.  He is able to perform exercise regularly.  His blood pressure has remained elevated.  He has dyspnea on exertion occasional swelling.    Past Medical History:   Diagnosis Date    Abdominal adhesions 07/30/2014    BPH (benign prostatic hyperplasia)     Cellulitis     Chronic pain 10/08/11    Back pain due a work injury    DDD (degenerative disc disease), lumbar 07/30/2014    Depression     Diabetes (HCC)     Frequent falls 07/30/2014    LORRAINE (generalized anxiety disorder) 07/30/2014    HLD (hyperlipidemia)     Hypertension     Hypogonadism in male     Incomplete bladder emptying     Kidney stones     Lumbar facet arthropathy 07/30/2014    Lumbar nerve root impingement 07/30/2014    Lumbosacral radiculopathy at S1 07/30/2014    Major depression 07/30/2014    Neurological disorder     sciatica    MEGHA (obstructive sleep apnea) 07/30/2014    S/P lumbar laminectomy 07/30/2014    S/P lumbar spinal fusion 07/30/2014    Sleep apnea     USES CPAP EVERY NIGHT    Thoracic compression fracture (HCC) 07/30/2014       Review of Systems:  Cardiac symptoms as noted above in HPI. All others negative.    Current Outpatient Medications   Medication Sig    trospium (SANCTURA) 20 MG tablet Take 1 tablet by mouth 2 times daily (before meals)    isosorbide

## 2024-06-10 NOTE — PATIENT INSTRUCTIONS
Increase coreg to 6.25mg twice daily  Stop lisinopril  Start entresto 24/26mg twice daily (start entresto two days after stopping lisinopril)

## 2024-06-12 ENCOUNTER — CARE COORDINATION (OUTPATIENT)
Facility: CLINIC | Age: 69
End: 2024-06-12

## 2024-06-12 NOTE — CARE COORDINATION
Care Transitions Note  Follow Up Call     Patient Current Location:  Home: [address] [place], [place] [address]rodri Hernandez  [facility] [id number]    LPN Care Coordinator contacted the patient by telephone. Verified name and  as identifiers.    Additional needs identified to be addressed with provider   No needs identified                 Method of communication with provider: none.    Care Summary Note: Spoke with patient. Patient state she is doing okay. Patient followed up with pcp today and cardiology on 6/10/24.  Patient's only concern is not receiving shower chair. See below    Plan of care updates since last contact:  DME: patient states he requested a shower chair while in the hospital. Reviewed chart there was no DME equipment ordered. Patient advised to contact pcp for order. Patient verbalizes understanding.       Advance Care Planning:   Does patient have an Advance Directive: reviewed during previous call, see note. .    Medication Review:  Medications changed since last call, reviewed today.       Follow Up Appointment:   NATHAN appointment attended as scheduled   Future Appointments         Provider Specialty Dept Phone    2024 9:30 AM [facility] NURSE Urology [phone]    2024 1:00 PM Adolfo Espinoza PA-C Urology [phone]    10/14/2024 11:15 AM Myles Bolton MD Cardiology [phone]            N Care Coordinator provided contact information.  Plan for follow-up call in 6-10 days based on severity of symptoms and risk factors.  Plan for next call:  assess as neded, was shower chair ordered/received      Cherry Menon LPN

## 2024-06-19 ENCOUNTER — CARE COORDINATION (OUTPATIENT)
Facility: CLINIC | Age: 69
End: 2024-06-19

## 2024-06-19 NOTE — CARE COORDINATION
Care Transitions Note    Final Call     Patient Current Location:  [place]    Care Transition Nurse contacted the spouse/partner  by telephone. Verified name and  as identifiers.    Location:  [place]     Patient graduated from the Care Transitions program on 2024 .  Patient/family has the ability to self manage at this time..      .    Handoff:   Patient was not referred to the ACM team due to no additional needs identified.       Care Summary Note:     CTN spoke with patient via phone call.     Patient reports:  - Everything is fine   - No needs at this time     Patient referred to PCP regarding order for shower chair         Upcoming Appointments:    Future Appointments         Provider Specialty Dept Phone    2024 9:30 AM [facility] NURSE Urology [phone]    2024 1:00 PM Adolfo Espinoza, PAKYRA Urology [phone]    10/14/2024 11:15 AM Myles Bolton MD Cardiology [phone]          CTN contact information provided for follow up as needed.     Christine Faust RN

## 2024-06-20 NOTE — TELEPHONE ENCOUNTER
Called pt to inform him to log BP daily and send results via [facility] to the office. Pt verbalized understanding to entresto increase to 49/51mg BID. Pt confirmed he d/c lisinopril.

## 2024-06-20 NOTE — TELEPHONE ENCOUNTER
Pt called in and left a vm that BP meds are not working, his BP was reading 175/93. Please advise.     Per visit note on 6/10/24    Hypertension: /80.  Currently on Imdur, lisinopril, Coreg.  Increasing carvedilol to 6.25 mg twice daily.  Switching lisinopril to Entresto after 48 hours hold

## 2024-10-02 ENCOUNTER — TELEPHONE (OUTPATIENT)
Age: 69
End: 2024-10-02

## 2024-10-02 NOTE — TELEPHONE ENCOUNTER
Patient called stating that his Entresto is costing him about $400 a month. He was taken off Lisinopril 10mg daily and put on Entresto. He wants to know if he can go back on the Lisinopril. He has about 2 days left of his Entresto.

## 2024-10-03 RX ORDER — VALSARTAN 40 MG/1
40 TABLET ORAL DAILY
Qty: 90 TABLET | Refills: 3 | Status: SHIPPED | OUTPATIENT
Start: 2024-10-03

## 2024-10-14 ENCOUNTER — OFFICE VISIT (OUTPATIENT)
Age: 69
End: 2024-10-14
Payer: MEDICARE

## 2024-10-14 VITALS
BODY MASS INDEX: 39.66 KG/M2 | HEIGHT: 74 IN | WEIGHT: 309 LBS | HEART RATE: 92 BPM | DIASTOLIC BLOOD PRESSURE: 60 MMHG | OXYGEN SATURATION: 93 % | SYSTOLIC BLOOD PRESSURE: 126 MMHG

## 2024-10-14 DIAGNOSIS — I25.83 CORONARY ARTERY DISEASE DUE TO LIPID RICH PLAQUE: Primary | ICD-10-CM

## 2024-10-14 DIAGNOSIS — E78.00 PURE HYPERCHOLESTEROLEMIA: ICD-10-CM

## 2024-10-14 DIAGNOSIS — I25.10 CORONARY ARTERY DISEASE DUE TO LIPID RICH PLAQUE: Primary | ICD-10-CM

## 2024-10-14 DIAGNOSIS — I10 ESSENTIAL HYPERTENSION WITH GOAL BLOOD PRESSURE LESS THAN 140/90: ICD-10-CM

## 2024-10-14 PROCEDURE — 3078F DIAST BP <80 MM HG: CPT | Performed by: INTERNAL MEDICINE

## 2024-10-14 PROCEDURE — 1123F ACP DISCUSS/DSCN MKR DOCD: CPT | Performed by: INTERNAL MEDICINE

## 2024-10-14 PROCEDURE — 99214 OFFICE O/P EST MOD 30 MIN: CPT | Performed by: INTERNAL MEDICINE

## 2024-10-14 PROCEDURE — 3017F COLORECTAL CA SCREEN DOC REV: CPT | Performed by: INTERNAL MEDICINE

## 2024-10-14 PROCEDURE — G8428 CUR MEDS NOT DOCUMENT: HCPCS | Performed by: INTERNAL MEDICINE

## 2024-10-14 PROCEDURE — G8417 CALC BMI ABV UP PARAM F/U: HCPCS | Performed by: INTERNAL MEDICINE

## 2024-10-14 PROCEDURE — G8484 FLU IMMUNIZE NO ADMIN: HCPCS | Performed by: INTERNAL MEDICINE

## 2024-10-14 PROCEDURE — 3074F SYST BP LT 130 MM HG: CPT | Performed by: INTERNAL MEDICINE

## 2024-10-14 PROCEDURE — 1036F TOBACCO NON-USER: CPT | Performed by: INTERNAL MEDICINE

## 2024-10-14 RX ORDER — CARVEDILOL 12.5 MG/1
12.5 TABLET ORAL 2 TIMES DAILY
Qty: 180 TABLET | Refills: 2 | Status: SHIPPED | OUTPATIENT
Start: 2024-10-14 | End: 2024-10-16

## 2024-10-14 RX ORDER — ATORVASTATIN CALCIUM 40 MG/1
40 TABLET, FILM COATED ORAL DAILY
Qty: 90 TABLET | Refills: 3 | Status: SHIPPED | OUTPATIENT
Start: 2024-10-14 | End: 2024-10-16

## 2024-10-14 RX ORDER — EMPAGLIFLOZIN 25 MG/1
25 TABLET, FILM COATED ORAL DAILY
COMMUNITY
Start: 2024-10-04

## 2024-10-14 NOTE — PROGRESS NOTES
[facility]    Malcolm Brooks is 69 y.o. male       Patient is here today for follow-up appointment   Because of recurrent chest pain and dyspnea, patient underwent NST in 03/2022 which was low risk in nature  Because of ongoing symptoms, patient underwent LHC in 09/2023 which showed diagonal branch 50% borderline stenosis and IFR was 0.98 and 0.99 suggesting physiologically not significant lesion.      Patient is here today for follow-up appointment.  Patient blood pressure usually in the morning has been normal.  In the evening time blood pressure is elevated.  He takes his evening medication around 10 PM  No chest pain or chest tightness.  Mild dyspnea.  Unchanged.  Overall no new symptoms    Past Medical History:   Diagnosis Date    Abdominal adhesions 07/30/2014    BPH (benign prostatic hyperplasia)     Cellulitis     Chronic pain 10/08/11    Back pain due a work injury    DDD (degenerative disc disease), lumbar 07/30/2014    Depression     Diabetes (HCC)     Frequent falls 07/30/2014    LORRAINE (generalized anxiety disorder) 07/30/2014    HLD (hyperlipidemia)     Hypertension     Hypogonadism in male     Incomplete bladder emptying     Kidney stones     Lumbar facet arthropathy 07/30/2014    Lumbar nerve root impingement 07/30/2014    Lumbosacral radiculopathy at S1 07/30/2014    Major depression 07/30/2014    Neurological disorder     sciatica    MEGHA (obstructive sleep apnea) 07/30/2014    S/P lumbar laminectomy 07/30/2014    S/P lumbar spinal fusion 07/30/2014    Sleep apnea     USES CPAP EVERY NIGHT    Thoracic compression fracture (HCC) 07/30/2014       Review of Systems:  Cardiac symptoms as noted above in HPI. All others negative.    Current Outpatient Medications   Medication Sig    JARDIANCE 25 MG tablet Take 1 tablet by mouth daily    valsartan (DIOVAN) 40 MG tablet Take 1 tablet by mouth daily    vibegron (GEMTESA) 75 MG TABS tablet Take 1

## 2024-10-16 RX ORDER — VALSARTAN 40 MG/1
TABLET ORAL
Qty: 90 TABLET | Refills: 3 | Status: SHIPPED | OUTPATIENT
Start: 2024-10-16

## 2024-10-16 RX ORDER — CARVEDILOL 12.5 MG/1
TABLET ORAL
Qty: 180 TABLET | Refills: 3 | Status: SHIPPED | OUTPATIENT
Start: 2024-10-16

## 2024-10-16 RX ORDER — ATORVASTATIN CALCIUM 40 MG/1
TABLET, FILM COATED ORAL
Qty: 90 TABLET | Refills: 0 | Status: SHIPPED | OUTPATIENT
Start: 2024-10-16

## 2024-10-16 RX ORDER — CARVEDILOL 12.5 MG/1
TABLET ORAL
Qty: 180 TABLET | Refills: 0 | Status: SHIPPED | OUTPATIENT
Start: 2024-10-16

## 2024-10-16 RX ORDER — ATORVASTATIN CALCIUM 40 MG/1
TABLET, FILM COATED ORAL
Qty: 90 TABLET | Refills: 3 | Status: SHIPPED | OUTPATIENT
Start: 2024-10-16

## 2024-10-16 RX ORDER — VALSARTAN 40 MG/1
TABLET ORAL
Qty: 90 TABLET | Refills: 0 | Status: SHIPPED | OUTPATIENT
Start: 2024-10-16

## 2024-11-01 ENCOUNTER — HOSPITAL ENCOUNTER (EMERGENCY)
Facility: HOSPITAL | Age: 69
Discharge: HOME | End: 2024-11-01
Attending: EMERGENCY MEDICINE
Payer: MEDICARE

## 2024-11-01 VITALS
OXYGEN SATURATION: 95 % | RESPIRATION RATE: 18 BRPM | SYSTOLIC BLOOD PRESSURE: 126 MMHG | BODY MASS INDEX: 38.5 KG/M2 | DIASTOLIC BLOOD PRESSURE: 68 MMHG | HEART RATE: 82 BPM | WEIGHT: 300 LBS | TEMPERATURE: 98.5 F | HEIGHT: 74 IN

## 2024-11-01 DIAGNOSIS — H10.9 CONJUNCTIVITIS OF BOTH EYES, UNSPECIFIED CONJUNCTIVITIS TYPE: Primary | ICD-10-CM

## 2024-11-01 PROCEDURE — 99283 EMERGENCY DEPT VISIT LOW MDM: CPT

## 2024-11-01 PROCEDURE — 6370000000 HC RX 637 (ALT 250 FOR IP): Performed by: EMERGENCY MEDICINE

## 2024-11-01 RX ORDER — PREDNISONE 20 MG/1
40 TABLET ORAL
Status: COMPLETED | OUTPATIENT
Start: 2024-11-01 | End: 2024-11-01

## 2024-11-01 RX ORDER — CIPROFLOXACIN HYDROCHLORIDE 3.5 MG/ML
1 SOLUTION/ DROPS TOPICAL
Qty: 10 ML | Refills: 0 | Status: SHIPPED | OUTPATIENT
Start: 2024-11-01 | End: 2024-11-11

## 2024-11-01 RX ORDER — CIPROFLOXACIN HYDROCHLORIDE 3.5 MG/ML
1 SOLUTION/ DROPS TOPICAL ONCE
Status: COMPLETED | OUTPATIENT
Start: 2024-11-01 | End: 2024-11-01

## 2024-11-01 RX ADMIN — PREDNISONE 40 MG: 20 TABLET ORAL at 12:52

## 2024-11-01 RX ADMIN — CIPROFLOXACIN 1 DROP: 3 SOLUTION OPHTHALMIC at 12:53

## 2024-11-01 ASSESSMENT — PAIN DESCRIPTION - ORIENTATION: ORIENTATION: RIGHT;LEFT

## 2024-11-01 ASSESSMENT — PAIN - FUNCTIONAL ASSESSMENT: PAIN_FUNCTIONAL_ASSESSMENT: 0-10

## 2024-11-01 ASSESSMENT — VISUAL ACUITY
OU: 20/40
OS: 20/70
OD: 20/30

## 2024-11-01 ASSESSMENT — ENCOUNTER SYMPTOMS
EYE PAIN: 0
EYE DISCHARGE: 1
PHOTOPHOBIA: 0
EYE REDNESS: 1
EYE ITCHING: 1

## 2024-11-01 ASSESSMENT — LIFESTYLE VARIABLES
HOW MANY STANDARD DRINKS CONTAINING ALCOHOL DO YOU HAVE ON A TYPICAL DAY: PATIENT DOES NOT DRINK
HOW OFTEN DO YOU HAVE A DRINK CONTAINING ALCOHOL: NEVER

## 2024-11-01 ASSESSMENT — PAIN DESCRIPTION - LOCATION: LOCATION: EYE

## 2024-11-01 ASSESSMENT — PAIN SCALES - GENERAL: PAINLEVEL_OUTOF10: 7

## 2024-11-01 NOTE — ED PROVIDER NOTES
[facility] EMERGENCY DEPT  EMERGENCY DEPARTMENT ENCOUNTER      Pt Name: Malcolm Brooks  MRN: [id number]  Birthdate 1955  Date of evaluation: 11/1/2024  Provider: DANE LOUIS MD  1:10 PM    CHIEF COMPLAINT       Chief Complaint   Patient presents with    Eye Problem         HISTORY OF PRESENT ILLNESS    Malcolm Brooks is a 69 y.o. male who presents to the emergency department     69-year-old male past medical history of depression sciatica BPH hypertension hypergonadism and kidney stones presents emergency department with 4 days of drainage to bilateral eyes and redness.  Patient states she has had this before and [facility]  He says because he is disabled and has issues walking around he puts his hands on a lot of objects to assist with walking and may have rubbed something in his eye.  Patient has no visual complaints.  No fevers no chills.  Discharge is clear.  Will treat with medications over-the-counter however he did use baby shampoo no tears in his eyes the first day it happened and he had relief.    The history is provided by the patient and medical records. No  was used.       Nursing Notes were reviewed.    REVIEW OF SYSTEMS       Review of Systems   Eyes:  Positive for discharge, redness and itching. Negative for photophobia, pain and visual disturbance.       Except as noted above the remainder of the review of systems was reviewed and negative.       PAST MEDICAL HISTORY     Past Medical History:   Diagnosis Date    Abdominal adhesions 07/30/2014    BPH (benign prostatic hyperplasia)     Cellulitis     Chronic pain 10/08/11    Back pain due a work injury    DDD (degenerative disc disease), lumbar 07/30/2014    Depression     Diabetes (HCC)     Frequent falls 07/30/2014    LORRAINE (generalized anxiety disorder) 07/30/2014    HLD (hyperlipidemia)     Hypertension     Hypogonadism in male     Incomplete bladder emptying     Kidney stones     Lumbar  membranes are moist.   Eyes:      General: No scleral icterus.        Right eye: Discharge present.         Left eye: Discharge present.     Extraocular Movements: Extraocular movements intact.      Comments: Bilateral conjunctival injection   Cardiovascular:      Rate and Rhythm: Normal rate.   Pulmonary:      Effort: Pulmonary effort is normal.   Skin:     General: Skin is warm.      Capillary Refill: Capillary refill takes less than 2 seconds.   Neurological:      General: No focal deficit present.      Mental Status: He is alert and oriented to person, place, and time.         DIAGNOSTIC RESULTS     EKG: All EKG's are interpreted by the Emergency Department Physician who either signs or Co-signs this chart in the absence of a cardiologist.        RADIOLOGY:   Non-plain film images such as CT, Ultrasound and MRI are read by the radiologist. Plain radiographic images are visualized and preliminarily interpreted by the emergency physician with the below findings:        Interpretation per the Radiologist below, if available at the time of this note:    No orders to display         ED BEDSIDE ULTRASOUND:   Performed by ED Physician - none    LABS:  Labs Reviewed - No data to display    All other labs were within normal range or not returned as of this dictation.    EMERGENCY DEPARTMENT COURSE and DIFFERENTIAL DIAGNOSIS/MDM:   Vitals:    Vitals:    11/01/24 1241   BP: 126/68   Pulse: 82   Resp: 18   Temp: 98.5 °F (36.9 °C)   TempSrc: Oral   SpO2: 95%   Weight: 136.1 kg (300 lb)   Height: 1.88 m (6' 2\")           Medical Decision Making  69-year-old gentleman disabled and wheelchair presents emergency department with allergic reaction versus bacterial conjunctivitis.  Patient was given prednisone told to use over-the-counter antihistamine and eyedrops.    Risk  Prescription drug management.            REASSESSMENT          CRITICAL CARE TIME       CONSULTS:  None    PROCEDURES:  Unless otherwise noted below, none

## 2024-11-04 RX ORDER — AMITRIPTYLINE HYDROCHLORIDE 10 MG/1
TABLET ORAL
Qty: 90 TABLET | Refills: 3 | OUTPATIENT
Start: 2024-11-04

## 2024-11-07 RX ORDER — CARVEDILOL 12.5 MG/1
TABLET ORAL
Qty: 180 TABLET | Refills: 3 | Status: SHIPPED | OUTPATIENT
Start: 2024-11-07

## 2024-11-07 RX ORDER — VALSARTAN 40 MG/1
TABLET ORAL
Qty: 90 TABLET | Refills: 3 | Status: SHIPPED | OUTPATIENT
Start: 2024-11-07

## 2024-11-07 NOTE — PROGRESS NOTES
BLOOD SUGAR THREE TIMES DAILY    amitriptyline (ELAVIL) 10 MG tablet TAKE 1 TABLET EVERY NIGHT    Blood Glucose Monitoring Suppl (TRUE METRIX AIR GLUCOSE METER) w/Device KIT USE AS DIRECTED    aspirin 81 MG chewable tablet Take 1 tablet by mouth daily    TRUEplus Lancets 33G MISC TEST BLOOD SUGAR THREE TIMES DAILY    fluticasone-salmeterol (ADVAIR) 250-50 MCG/ACT AEPB diskus inhaler Inhale 1 puff into the lungs 2 times daily    magnesium oxide (MAG-OX) 400 MG tablet Take 1 tablet by mouth daily as needed    albuterol sulfate HFA (PROVENTIL;VENTOLIN;PROAIR) 108 (90 Base) MCG/ACT inhaler Inhale 1 puff into the lungs every 4 hours as needed    ascorbic acid (VITAMIN C) 500 MG tablet Take 1 tablet by mouth daily    vitamin D (CHOLECALCIFEROL) 25 MCG (1000 UT) TABS tablet Take 1 tablet by mouth daily    fluticasone (FLONASE) 50 MCG/ACT nasal spray ceived the following from [facility]: Outside name: fluticasone propionate (FLONASE) 50 mcg/actuation nasal spray    Melatonin 5 MG CAPS Take 10 mg by mouth nightly    polyethylene glycol (GLYCOLAX) 17 GM/SCOOP powder Take 17 g by mouth daily     Current Facility-Administered Medications   Medication Dose Route Frequency    betamethasone acetate-betamethasone sodium phosphate (CELESTONE) injection 3 mg  3 mg Intra-artICUlar Once    betamethasone acetate-betamethasone sodium phosphate (CELESTONE) injection 3 mg  3 mg Intra-artICUlar Once    BUPivacaine (MARCAINE) 0.5 % injection 20 mg  4 mL Intra-artICUlar Once    BUPivacaine (MARCAINE) 0.5 % injection 20 mg  4 mL Intra-artICUlar Once        PHYSICAL EXAMINATION:  Temp 97 °F (36.1 °C) (Temporal)   Ht 1.88 m (6' 2\")   Wt (!) 140.6 kg (310 lb)   BMI 39.80 kg/m²      ORTHO EXAMINATION:  Examination Right knee Left knee   Skin Intact Intact   Range of motion 110-0 110-0   Effusion ++ ++   Medial joint line tenderness + +   Lateral joint line tenderness - -   Popliteal tenderness - -   Osteophytes palpable + +

## 2024-11-08 ENCOUNTER — OFFICE VISIT (OUTPATIENT)
Age: 69
End: 2024-11-08

## 2024-11-08 VITALS — TEMPERATURE: 97 F | HEIGHT: 74 IN | BODY MASS INDEX: 39.78 KG/M2 | WEIGHT: 310 LBS

## 2024-11-08 DIAGNOSIS — M17.11 UNILATERAL PRIMARY OSTEOARTHRITIS, RIGHT KNEE: Primary | ICD-10-CM

## 2024-11-08 DIAGNOSIS — M25.561 CHRONIC PAIN OF RIGHT KNEE: ICD-10-CM

## 2024-11-08 DIAGNOSIS — M25.562 CHRONIC PAIN OF LEFT KNEE: ICD-10-CM

## 2024-11-08 DIAGNOSIS — G89.29 CHRONIC PAIN OF RIGHT KNEE: ICD-10-CM

## 2024-11-08 DIAGNOSIS — M17.12 UNILATERAL PRIMARY OSTEOARTHRITIS, LEFT KNEE: ICD-10-CM

## 2024-11-08 DIAGNOSIS — G89.29 CHRONIC PAIN OF LEFT KNEE: ICD-10-CM

## 2024-11-08 RX ORDER — BUPIVACAINE HYDROCHLORIDE 5 MG/ML
4 INJECTION, SOLUTION PERINEURAL ONCE
Status: COMPLETED | OUTPATIENT
Start: 2024-11-08 | End: 2024-11-08

## 2024-11-08 RX ORDER — BETAMETHASONE SODIUM PHOSPHATE AND BETAMETHASONE ACETATE 3; 3 MG/ML; MG/ML
3 INJECTION, SUSPENSION INTRA-ARTICULAR; INTRALESIONAL; INTRAMUSCULAR; SOFT TISSUE ONCE
Status: COMPLETED | OUTPATIENT
Start: 2024-11-08 | End: 2024-11-08

## 2024-11-08 RX ADMIN — BETAMETHASONE SODIUM PHOSPHATE AND BETAMETHASONE ACETATE 3 MG: 3; 3 INJECTION, SUSPENSION INTRA-ARTICULAR; INTRALESIONAL; INTRAMUSCULAR; SOFT TISSUE at 10:51

## 2024-11-08 RX ADMIN — BUPIVACAINE HYDROCHLORIDE 20 MG: 5 INJECTION, SOLUTION PERINEURAL at 10:50

## 2024-11-08 RX ADMIN — BETAMETHASONE SODIUM PHOSPHATE AND BETAMETHASONE ACETATE 3 MG: 3; 3 INJECTION, SUSPENSION INTRA-ARTICULAR; INTRALESIONAL; INTRAMUSCULAR; SOFT TISSUE at 10:52

## 2024-11-08 RX ADMIN — BUPIVACAINE HYDROCHLORIDE 20 MG: 5 INJECTION, SOLUTION PERINEURAL at 10:51

## 2024-11-27 ENCOUNTER — CLINICAL DOCUMENTATION (OUTPATIENT)
Age: 69
End: 2024-11-27

## 2024-12-05 NOTE — PROGRESS NOTES
Patient: Malcolm Brooks                MRN: [id number]       SSN: [id number]  YOB: 1955        AGE: 69 y.o.        SEX: male      PCP: Shahbaz Domingo MD  12/09/24    Chief Complaint   Patient presents with    Knee Pain     bilateral     HISTORY:  Malcolm Brooks is a 69 y.o. male who is seen for bilateral knee pain. He presents today for his first injection in the Orthovisc visco supplementation series.    He was previously seen for increased bilateral knee pain. Patient successfully completed a bilateral Orthovisc series on 2/16/24 but his knee pains have returned. He denies any recent knee injury. Pt has been experiencing pain for over 5 years. He feels pain with standing, walking and stair climbing.  He experiences startup pain after sitting.  His knees give way when the pain hits.  He feels that he is at risk for falls.      He was previously seen by Dr. Yeager for right peroneal tendon injury.      Occupation, etc: Mr. Brooks  is retired. He previously worked as an  at [employer]. He sustained a vertebral compression fracture while at work as a result of lifting shingles. He is s/p lumbar fusion under Dr. Grimaldo on 8/22/2012. He lives with his wife in [place]. 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 He has a h/o a cardiac blockage without a stent. He has 2 sons, one daughter, and 3 grandsons. He weighs 310 lbs and is 6'2\". He is unable to do a lot of walking for exercise. His wife accompanies him to today's visit. He is an insulin controlled diabetic.   Wt Readings from Last 3 Encounters:   12/09/24 (!) 140.6 kg (310 lb)   11/08/24 (!) 140.6 kg (310 lb)   11/01/24 136.1 kg (300 lb)      Body mass index is 39.8 kg/m².    Patient Active Problem List   Diagnosis    DDD (degenerative disc disease), thoracic    Hyperlipidemia    Advanced care planning/counseling discussion    Status post insertion of spinal cord stimulator    Kidney stone    Frequent falls    Abdominal

## 2024-12-09 ENCOUNTER — OFFICE VISIT (OUTPATIENT)
Age: 69
End: 2024-12-09
Payer: MEDICARE

## 2024-12-09 VITALS — TEMPERATURE: 97.1 F | WEIGHT: 310 LBS | BODY MASS INDEX: 39.78 KG/M2 | HEIGHT: 74 IN

## 2024-12-09 DIAGNOSIS — M25.461 EFFUSION OF RIGHT KNEE: ICD-10-CM

## 2024-12-09 DIAGNOSIS — M17.11 UNILATERAL PRIMARY OSTEOARTHRITIS, RIGHT KNEE: Primary | ICD-10-CM

## 2024-12-09 DIAGNOSIS — M17.12 UNILATERAL PRIMARY OSTEOARTHRITIS, LEFT KNEE: ICD-10-CM

## 2024-12-09 PROCEDURE — 20610 DRAIN/INJ JOINT/BURSA W/O US: CPT | Performed by: SPECIALIST

## 2024-12-09 NOTE — PROGRESS NOTES
Kidney stone    Frequent falls    Abdominal adhesions    HTN (hypertension)    Anemia    Severe obesity    Type 2 diabetes mellitus with diabetic neuropathy (HCC)    Lumbar post-laminectomy syndrome    Chronic pain syndrome    Back muscle spasm    Thoracic compression fracture (HCC)    Lumbar nerve root impingement    Lumbosacral radiculopathy at S1    LORRAINE (generalized anxiety disorder)    S/P lumbar spinal fusion    Constipation, chronic    MEGHA (obstructive sleep apnea)    History of depression    Chronic midline low back pain with sciatica    DDD (degenerative disc disease), lumbar    Lumbar facet arthropathy    Insomnia secondary to chronic pain    Postlaminectomy syndrome, lumbar region    Lumbar and sacral osteoarthritis    UTI (urinary tract infection)    S/P lumbar laminectomy    Spinal cord stimulator status    Chronic renal disease, stage III (HCC)    Fever    Type 2 diabetes mellitus with chronic kidney disease (HCC)    Adverse effect of other systemic antibiotics, initial encounter    Closed fracture of phalanx of toe    Gen skin eruption due to drugs and meds taken internally    Pneumonia, unspecified organism    Shortness of breath    Chronic obstructive pulmonary disease, unspecified (HCC)    Episode of recurrent major depressive disorder, unspecified depression episode severity ([facility])    Angina pectoris, unspecified ([facility])    Atherosclerotic heart disease of native coronary artery with unspecified angina pectoris ([facility])    Elevated PSA    CAD (coronary artery disease)    Parkinson's disease ([facility])    Chest pain    Hypertensive crisis    Aortic root dilatation ([facility])       Social History     Tobacco Use    Smoking status: Never     Passive exposure: Never    Smokeless tobacco: Never   Substance Use Topics    Alcohol use: Not Currently    Drug use: Never        Allergies   Allergen Reactions    Oxymorphone Other (See Comments)     Feels like bug crawling under skin and drowziness  Other reaction(s):

## 2024-12-16 ENCOUNTER — OFFICE VISIT (OUTPATIENT)
Age: 69
End: 2024-12-16

## 2024-12-16 DIAGNOSIS — M17.11 UNILATERAL PRIMARY OSTEOARTHRITIS, RIGHT KNEE: Primary | ICD-10-CM

## 2024-12-16 DIAGNOSIS — M54.50 LUMBAR PAIN: ICD-10-CM

## 2024-12-16 DIAGNOSIS — M79.10 MYALGIA: ICD-10-CM

## 2024-12-16 DIAGNOSIS — M17.12 UNILATERAL PRIMARY OSTEOARTHRITIS, LEFT KNEE: ICD-10-CM

## 2024-12-16 RX ORDER — BETAMETHASONE SODIUM PHOSPHATE AND BETAMETHASONE ACETATE 3; 3 MG/ML; MG/ML
3 INJECTION, SUSPENSION INTRA-ARTICULAR; INTRALESIONAL; INTRAMUSCULAR; SOFT TISSUE ONCE
Status: COMPLETED | OUTPATIENT
Start: 2024-12-16 | End: 2024-12-16

## 2024-12-16 RX ADMIN — BETAMETHASONE SODIUM PHOSPHATE AND BETAMETHASONE ACETATE 3 MG: 3; 3 INJECTION, SUSPENSION INTRA-ARTICULAR; INTRALESIONAL; INTRAMUSCULAR; SOFT TISSUE at 10:07

## 2024-12-19 NOTE — PROGRESS NOTES
Patient: Malcolm Brooks                MRN: [id number]       SSN: [id number]  YOB: 1955        AGE: 69 y.o.        SEX: male  Body mass index is 39.8 kg/m².    PCP: Shahbaz Domingo MD  12/23/24    Chief Complaint   Patient presents with    Knee Pain     Bilateral knee orthovisc #3     HISTORY:  Malcolm Brooks is a 69 y.o. male who is seen for bilateral knee pain. He presents today for his third injection in the Orthovisc visco supplementation series.      ICD-10-CM    1. Unilateral primary osteoarthritis, right knee  M17.11       2. Unilateral primary osteoarthritis, left knee  M17.12       3. Lumbar pain  M54.50          PROCEDURE:  Mr. Moya knees injected with 2 cc of Orthovisc.     Chart reviewed for the following:   Manjinder SCHUSTER MD, have reviewed the History, Physical and updated the Allergic reactions for Malcolm Brooks     TIME OUT performed immediately prior to start of procedure:  Manjinder SCHUSTER MD, have performed the following reviews on Malcolm Brooks prior to the start of the procedure:            * Patient was identified by name and date of birth   * Agreement on procedure being performed was verified  * Risks and Benefits explained to the patient  * Procedure site verified and marked as necessary  * Patient was positioned for comfort  * Consent was obtained     Time: 10:22 AM     Date of procedure: 12/23/2024    Procedure performed by:  Manjinder Williamson MD    Mr. Brooks tolerated the procedure well with no complications.    PLAN:  Mr. Brooks's knees injected with 2 cc of Orthovisc. Mr. Brooks will follow up in one week to complete his visco supplementation injection series.    Documentation by naldo Sofia, as documented by Manjinder Williamson MD.

## 2024-12-20 RX ORDER — DIPHENHYDRAMINE HCL 25 MG
TABLET ORAL
Qty: 1 KIT | Refills: 3 | OUTPATIENT
Start: 2024-12-20

## 2024-12-23 ENCOUNTER — OFFICE VISIT (OUTPATIENT)
Age: 69
End: 2024-12-23
Payer: MEDICARE

## 2024-12-23 VITALS — HEIGHT: 74 IN | BODY MASS INDEX: 39.78 KG/M2 | TEMPERATURE: 98.4 F | WEIGHT: 310 LBS

## 2024-12-23 DIAGNOSIS — M17.11 UNILATERAL PRIMARY OSTEOARTHRITIS, RIGHT KNEE: Primary | ICD-10-CM

## 2024-12-23 DIAGNOSIS — M54.50 LUMBAR PAIN: ICD-10-CM

## 2024-12-23 DIAGNOSIS — M17.12 UNILATERAL PRIMARY OSTEOARTHRITIS, LEFT KNEE: ICD-10-CM

## 2024-12-23 PROCEDURE — 20610 DRAIN/INJ JOINT/BURSA W/O US: CPT | Performed by: SPECIALIST

## 2024-12-23 RX ORDER — CARVEDILOL 12.5 MG/1
TABLET ORAL
Qty: 60 TABLET | Refills: 0 | Status: SHIPPED | OUTPATIENT
Start: 2024-12-23

## 2024-12-23 NOTE — PROGRESS NOTES
Patient: Malcolm Brooks                MRN: [id number]       SSN: [id number]  YOB: 1955        AGE: 69 y.o.        SEX: male  There is no height or weight on file to calculate BMI.    PCP: Shahbaz Domingo MD  12/27/24    No chief complaint on file.    HISTORY:  Malcolm Brooks is a 69 y.o. male who is seen for bilateral knee pain. He presents today for his fourth injection in the Orthovisc visco supplementation series.    PROCEDURE:  Mr. Moya knees injected with 2 cc of Orthovisc.     TIME OUT performed immediately prior to start of procedure:  IManjinder MD, have performed the following reviews on Malcolm Brooks prior to the start of the procedure:            * Patient was identified by name and date of birth   * Agreement on procedure being performed was verified  * Risks and Benefits explained to the patient  * Procedure site verified and marked as necessary  * Patient was positioned for comfort  * Consent was obtained     Time: 9:31 AM     Date of procedure: 12/27/2024    Procedure performed by:  Manjinder Williamson MD    Mr. Brooks tolerated the procedure well with no complications      ICD-10-CM    1. Unilateral primary osteoarthritis, right knee  M17.11       2. Unilateral primary osteoarthritis, left knee  M17.12         PLAN: Mr. Brooks's knees injected with 2 cc of Orthovisc. Mr. Brooks will follow up PRN now that he has completed his visco supplementation injection series.     Documentation by naldo Sofia, as documented by Manjinder Williamson MD.

## 2024-12-27 ENCOUNTER — OFFICE VISIT (OUTPATIENT)
Age: 69
End: 2024-12-27

## 2024-12-27 VITALS — WEIGHT: 310 LBS | HEIGHT: 74 IN | BODY MASS INDEX: 39.78 KG/M2

## 2024-12-27 DIAGNOSIS — M17.12 UNILATERAL PRIMARY OSTEOARTHRITIS, LEFT KNEE: ICD-10-CM

## 2024-12-27 DIAGNOSIS — M17.11 UNILATERAL PRIMARY OSTEOARTHRITIS, RIGHT KNEE: Primary | ICD-10-CM

## 2025-01-27 ENCOUNTER — HOSPITAL ENCOUNTER (OUTPATIENT)
Facility: HOSPITAL | Age: 70
Setting detail: RECURRING SERIES
Discharge: HOME | End: 2025-01-30
Payer: MEDICARE

## 2025-01-27 PROCEDURE — 97162 PT EVAL MOD COMPLEX 30 MIN: CPT

## 2025-01-27 PROCEDURE — 97110 THERAPEUTIC EXERCISES: CPT

## 2025-01-27 PROCEDURE — 97535 SELF CARE MNGMENT TRAINING: CPT

## 2025-01-27 NOTE — THERAPY EVALUATION
[facility]  [address], [address], [place] [address]  Ph:[phone] Fx:[phone]  Plan of Care / Statement of Necessity for Physical Therapy Services     Patient Name: Malcolm Brooks : 1955   Medical   Diagnosis: Right knee pain [M25.561]  Other low back pain [M54.59]  Treatment Diagnosis:  M25.561  RIGHT KNEE PAIN  and M54.59  OTHER LOWER BACK PAIN   Onset Date: 1 1/2 years, MD order 24     Referral Source: Manjinder Williamson MD Start of Care (SOC): 2025   Prior Hospitalization: See medical history Provider #: [id number]   Prior Level of Function:  I all areas of ADLS and activities, no AD, drove, disability, care 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 Onset ankle pain 3 years ago and as a result B knee and LBP onset 1 1/2 years ago, notes L knee gives out on him causing LOB and near falls or falls. Pain 8/10 today, and using R SC. Note B LE LOM, decrease flexibility and strength, decrease I and ease with functional mobility and sit to stand transfers, recurrent falls. Patient demonstrates the potential to make gains with improved ROM, strength, endurance/activity tolerance, functional LEFS and OSW survey score baseline survey 17 and 78% and all within a reasonable time frame so as to increase their functional independence with ADLs and activities for carryover to  improved quality of life, tolerance to household and community activities and caring for his disabled wife. Patient requires skilled Physical Therapy so as to monitor their response to and modify their treatment plan accordingly.

## 2025-01-27 NOTE — THERAPY EVALUATION
PT DAILY TREATMENT NOTE/KNEE EVAL       Patient Name: Malcolm Brooks    Date: 2025    : 1955  Insurance: Payor: [place] [employer] / Plan: AET MEDICARE ADVANTAGE HMO / Product Type: Medicare /      Patient  verified yes     Visit #   Current / Total 1 16   Time   In / Out 1250 138   Pain   In / Out 8 9 1/2 lumbar   Subjective Functional Status/Changes:       Treatment Area: Left knee pain [M25.562]  Right knee pain [M25.561]  Other low back pain [M54.59]    SUBJECTIVE  Pain Level (0-10 scale): 8    [x]constant []intermittent []improving [x]worsening []no change since onset  Worse activity,    better sleep   Subjective functional status/changes:     PLOF: I all areas of ADLS and activities, no AD, drove, disability, care giver for spouse  Limitations to PLOF: pain  Mechanism of Injury:  right ankle pain onset 3 years ago, knees and LBP 1 1/2 years, insideous onset for all areas, notes knees and LBP attributed to the R ankle pain  Current symptoms/Complaints: 68 YO male diagnosed as above and with S/S consistent with above diagnosis presents to skilled outpatient PT CCO pain in the R ankle , knee and thigh, as well as L knee pain and LBP. Onset ankle pain 3 years ago and as a result B knee and LBP onset 1 1/2 years ago, notes L knee gives out on him causing LOB and near falls or falls. Pain 8/10 today, and using R SC.   Previous Treatment/Compliance: MD, medication, aquatic PT,  injections in Lsp and B knees- gel   PMHx/Surgical Hx: DM, HTN, hearing impairment, Heart disease/high cholesterol  Work Hx: disability  Living Situation: lives in a house, not alone, 2 STY, 5 ERVIN with rails  Pt Goals: to walk and get out of pain  Barriers: [x]pain []financial []time []transportation []other  Motivation: good  Substance use: []Alcohol []Tobacco []other:   FABQ Score: []low []elevate  Cognition: A & O x 3    Other:    OBJECTIVE/EXAMINATION  Domestic Life: care giver for spouse, household activities, drive,

## 2025-02-03 ENCOUNTER — HOSPITAL ENCOUNTER (OUTPATIENT)
Facility: HOSPITAL | Age: 70
Setting detail: RECURRING SERIES
Discharge: HOME | End: 2025-02-06
Payer: MEDICARE

## 2025-02-03 PROCEDURE — 97110 THERAPEUTIC EXERCISES: CPT

## 2025-02-03 PROCEDURE — 97530 THERAPEUTIC ACTIVITIES: CPT

## 2025-02-03 NOTE — PROGRESS NOTES
PHYSICAL / OCCUPATIONAL THERAPY - DAILY TREATMENT NOTE    Patient Name: Malcolm Brooks    Date: 2/3/2025    : 1955  Insurance: Payor: [employer] [employer] / Plan: [employer]ET[employer] MEDIC[employer] ADVANTAGE HMO / Product Type: Medicare /      Patient  verified Yes     Visit #   Current / Total 2 16   Time   In / Out 1250 130   Pain   In / Out 8 6   Subjective Functional Status/Changes: Patient complains of Low back pain  and R  knee pain today . Pt also reports that his Low back pain goes down to both legs     TREATMENT AREA =  Right knee pain [M25.561]  Other low back pain [M54.59]     OBJECTIVE        Therapeutic Procedures:    Tx Min Billable or 1:1 Min (if diff from Tx Min) Procedure, Rationale, Specifics   32  06170 Therapeutic Exercise (timed):  increase ROM, strength, coordination, balance, and proprioception to improve patient's ability to progress to PLOF and address remaining functional goals. (see flow sheet as applicable)     Details if applicable:       8  56804 Therapeutic Activity (timed):  use of dynamic activities replicating functional movements to increase ROM, strength, coordination, balance, and proprioception in order to improve patient's ability to progress to PLOF and address remaining functional goals.  (see flow sheet as applicable)     Details if applicable:            Details if applicable:            Details if applicable:            Details if applicable:     40  MC BC Totals Reminder: bill using total billable min of TIMED therapeutic procedures (example: do not include dry needle or estim unattended, both untimed codes, in totals to left)  8-22 min = 1 unit; 23-37 min = 2 units; 38-52 min = 3 units; 53-67 min = 4 units; 68-82 min = 5 units   Total Total     [x]  Patient Education billed concurrently with other procedures   [x] Review HEP    [] Progressed/Changed HEP, detail:    [] Other detail:       Objective Information/Functional Measures/Assessment  Patient  reports that he is doing his HEP every

## 2025-02-06 ENCOUNTER — TELEPHONE (OUTPATIENT)
Facility: HOSPITAL | Age: 70
End: 2025-02-06

## 2025-02-06 ENCOUNTER — HOSPITAL ENCOUNTER (OUTPATIENT)
Facility: HOSPITAL | Age: 70
Setting detail: RECURRING SERIES
Discharge: HOME | End: 2025-02-09
Payer: MEDICARE

## 2025-02-06 PROCEDURE — 97110 THERAPEUTIC EXERCISES: CPT

## 2025-02-06 PROCEDURE — 97140 MANUAL THERAPY 1/> REGIONS: CPT

## 2025-02-06 NOTE — PROGRESS NOTES
PHYSICAL / OCCUPATIONAL THERAPY - DAILY TREATMENT NOTE    Patient Name: Malcolm Brooks    Date: 2025    : 1955  Insurance: Payor: [place] [employer] / Plan: [employer] MEDICARE ADVANTAGE HMO / Product Type: Medicare /      Patient  verified Yes     Visit #   Current / Total 3 16   Time   In / Out 1010 1100   Pain   In / Out 9 7   Subjective Functional Status/Changes: Patient complains of Low back pain  and R  knee pain today . Pt also reports that his Low back pain goes down to both his legs     TREATMENT AREA =  Right knee pain [M25.561]  Other low back pain [M54.59]     OBJECTIVE    Modalities Rationale:     decrease inflammation, decrease pain, and increase tissue extensibility to improve patient's ability to progress to PLOF and address remaining functional goals.     min [] Estim Unattended, type/location:                                      []  w/ice    []  w/heat    min [] Estim Attended, type/location:                                     []  w/US     []  w/ice    []  w/heat    []  TENS insruct      min []  Mechanical Traction: type/lbs                   []  pro   []  sup   []  int   []  cont    []  before manual    []  after manual    min []  Ultrasound, settings/location:     10 min  unbill []  Ice     [x]  Heat    location/position: R knee, Low back area/ Reclined    min []  Paraffin,  details:     min []  Vasopneumatic Device, press/temp:     min []  Whirlpool / Fluido:    If using vaso (only need to measure limb vaso being performed on)      pre-treatment girth :       post-treatment girth :       measured at (landmark location) :      min []  Other:    Skin assessment post-treatment:   Intact      Therapeutic Procedures:    Tx Min Billable or 1:1 Min (if diff from Tx Min) Procedure, Rationale, Specifics   28  88321 Therapeutic Exercise (timed):  increase ROM, strength, coordination, balance, and proprioception to improve patient's ability to progress to PLOF and address remaining functional goals.

## 2025-02-10 ENCOUNTER — APPOINTMENT (OUTPATIENT)
Facility: HOSPITAL | Age: 70
End: 2025-02-10
Payer: MEDICARE

## 2025-02-13 ENCOUNTER — APPOINTMENT (OUTPATIENT)
Facility: HOSPITAL | Age: 70
End: 2025-02-13
Payer: MEDICARE

## 2025-02-13 ENCOUNTER — TELEPHONE (OUTPATIENT)
Facility: HOSPITAL | Age: 70
End: 2025-02-13

## 2025-02-13 NOTE — TELEPHONE ENCOUNTER
Patient cancelled appt. on 2/13/25 at 9:52am due to the patient stating that the therapist told him he can't come without his chair and his chair is not working, he is not able to make his appt. today.

## 2025-02-17 ENCOUNTER — HOSPITAL ENCOUNTER (OUTPATIENT)
Facility: HOSPITAL | Age: 70
Setting detail: RECURRING SERIES
Discharge: HOME | End: 2025-02-20
Payer: MEDICARE

## 2025-02-17 PROCEDURE — 97110 THERAPEUTIC EXERCISES: CPT

## 2025-02-17 PROCEDURE — 97140 MANUAL THERAPY 1/> REGIONS: CPT

## 2025-02-17 PROCEDURE — 97035 APP MDLTY 1+ULTRASOUND EA 15: CPT

## 2025-02-17 SDOH — HEALTH STABILITY: PHYSICAL HEALTH: ON AVERAGE, HOW MANY DAYS PER WEEK DO YOU ENGAGE IN MODERATE TO STRENUOUS EXERCISE (LIKE A BRISK WALK)?: 0 DAYS

## 2025-02-17 SDOH — HEALTH STABILITY: PHYSICAL HEALTH: ON AVERAGE, HOW MANY MINUTES DO YOU ENGAGE IN EXERCISE AT THIS LEVEL?: 0 MIN

## 2025-02-20 ENCOUNTER — APPOINTMENT (OUTPATIENT)
Facility: HOSPITAL | Age: 70
End: 2025-02-20
Payer: MEDICARE

## 2025-03-13 ENCOUNTER — TELEPHONE (OUTPATIENT)
Facility: HOSPITAL | Age: 70
End: 2025-03-13

## 2025-03-13 NOTE — TELEPHONE ENCOUNTER
LM to schedule f/u appts. on 3/13/25 at 2:41pm, patient has not been seen since 2/17//25, DC on 3/17/25 if we have not heard from the patient.

## 2025-04-01 NOTE — TELEPHONE ENCOUNTER
Pt called in about a cough he was seen for on 4/5/17 with Dr. Simran Shoemaker and requesting cough rx or antibiotic because it has not got any better.
Spoke with the patient today. Advised patient that CXR was clear. Will need an appointment for his complaints. Patient declines offer at this time.
Spoke with the patient. States he is still experiencing. Cough. Described as productive of dark yellow/green sputum. No fevers chills, sweats or body aches noted at this time. Has tried delsym, robitussin, mucinex and nyquil with no relief. Has not gotten chest xray done. Will go have this completed on 4/20/2017. Will give information to the provider for review.
Yes

## 2025-04-17 ENCOUNTER — OFFICE VISIT (OUTPATIENT)
Age: 70
End: 2025-04-17
Payer: MEDICARE

## 2025-04-17 VITALS
SYSTOLIC BLOOD PRESSURE: 120 MMHG | HEART RATE: 84 BPM | WEIGHT: 315 LBS | HEIGHT: 74 IN | BODY MASS INDEX: 40.43 KG/M2 | DIASTOLIC BLOOD PRESSURE: 80 MMHG | OXYGEN SATURATION: 96 %

## 2025-04-17 DIAGNOSIS — I25.83 CORONARY ARTERY DISEASE DUE TO LIPID RICH PLAQUE: Primary | ICD-10-CM

## 2025-04-17 DIAGNOSIS — I10 ESSENTIAL HYPERTENSION WITH GOAL BLOOD PRESSURE LESS THAN 140/90: ICD-10-CM

## 2025-04-17 DIAGNOSIS — I25.10 CORONARY ARTERY DISEASE DUE TO LIPID RICH PLAQUE: Primary | ICD-10-CM

## 2025-04-17 PROCEDURE — 99214 OFFICE O/P EST MOD 30 MIN: CPT | Performed by: PHYSICIAN ASSISTANT

## 2025-04-17 PROCEDURE — 1123F ACP DISCUSS/DSCN MKR DOCD: CPT | Performed by: PHYSICIAN ASSISTANT

## 2025-04-17 PROCEDURE — 3074F SYST BP LT 130 MM HG: CPT | Performed by: PHYSICIAN ASSISTANT

## 2025-04-17 PROCEDURE — 3079F DIAST BP 80-89 MM HG: CPT | Performed by: PHYSICIAN ASSISTANT

## 2025-04-17 RX ORDER — VALSARTAN 160 MG/1
160 TABLET ORAL DAILY
COMMUNITY
Start: 2025-03-13

## 2025-04-17 RX ORDER — INSULIN GLARGINE 100 [IU]/ML
INJECTION, SOLUTION SUBCUTANEOUS
COMMUNITY
Start: 2025-03-28

## 2025-04-17 RX ORDER — GUANFACINE 1 MG/1
1 TABLET, EXTENDED RELEASE ORAL DAILY
COMMUNITY
Start: 2025-03-17

## 2025-04-17 RX ORDER — CARVEDILOL 25 MG/1
25 TABLET ORAL 2 TIMES DAILY WITH MEALS
Qty: 90 TABLET | Refills: 1 | Status: SHIPPED | OUTPATIENT
Start: 2025-04-17

## 2025-04-17 RX ORDER — INSULIN ASPART 100 [IU]/ML
INJECTION, SOLUTION INTRAVENOUS; SUBCUTANEOUS
COMMUNITY
Start: 2025-04-02

## 2025-04-17 NOTE — PROGRESS NOTES
[facility]    Malcolm Brooks is 70 y.o. male      Patient is here today for follow-up appointment   Because of recurrent chest pain and dyspnea, patient underwent NST in 03/2022 which was low risk in nature  Because of ongoing symptoms, patient underwent LHC in 09/2023 which showed diagonal branch 50% borderline stenosis and IFR was 0.98 and 0.99 suggesting physiologically not significant lesion.      Patient is here today for follow-up appointment.  Pt states his blood pressure in AM is usually around 120 mmHg, however at nighttime his BP is up to 160 mmHg.  He says his Valsartan was recently increased to 160 mg daily, as his nighttime BP was up to ~210 mmHg at times.  He admits that his PM dose of Coreg is sometimes delayed a few hours.  He had not had any chest pain in a long time, other than one episode of right-sided chest pain/tightness last night while emotionally upset.  He denies any radiation of pain or associated symptoms and did not feel like it warranted taking PRN SL NTG.  He has chronic LE edema and is followed by Dr. Harman Bolton.  He denies any PND/orthopnea; reports he sleeps well with his CPAP.      Past Medical History:   Diagnosis Date    Abdominal adhesions 07/30/2014    BPH (benign prostatic hyperplasia)     Cellulitis     Chronic pain 10/08/11    Back pain due a work injury    DDD (degenerative disc disease), lumbar 07/30/2014    Depression     Diabetes (HCC)     Frequent falls 07/30/2014    LORRAINE (generalized anxiety disorder) 07/30/2014    HLD (hyperlipidemia)     Hypertension     Hypogonadism in male     Incomplete bladder emptying     Kidney stones     Lumbar facet arthropathy 07/30/2014    Lumbar nerve root impingement 07/30/2014    Lumbosacral radiculopathy at S1 07/30/2014    Major depression 07/30/2014    Neurological disorder     sciatica    MEGHA (obstructive sleep apnea) 07/30/2014    S/P lumbar laminectomy 07/30/2014    S/P lumbar spinal fusion 07/30/2014

## 2025-04-23 ENCOUNTER — OFFICE VISIT (OUTPATIENT)
Age: 70
End: 2025-04-23
Payer: MEDICARE

## 2025-04-23 VITALS
TEMPERATURE: 97.4 F | OXYGEN SATURATION: 93 % | HEIGHT: 74 IN | DIASTOLIC BLOOD PRESSURE: 67 MMHG | BODY MASS INDEX: 40.43 KG/M2 | HEART RATE: 92 BPM | WEIGHT: 315 LBS | SYSTOLIC BLOOD PRESSURE: 110 MMHG

## 2025-04-23 DIAGNOSIS — M47.816 LUMBAR FACET ARTHROPATHY: ICD-10-CM

## 2025-04-23 DIAGNOSIS — M54.50 LUMBAR PAIN: Primary | ICD-10-CM

## 2025-04-23 DIAGNOSIS — G89.29 CHRONIC SACROILIAC JOINT PAIN: ICD-10-CM

## 2025-04-23 DIAGNOSIS — Z96.89 STATUS POST INSERTION OF SPINAL CORD STIMULATOR: ICD-10-CM

## 2025-04-23 DIAGNOSIS — E66.01 MORBID OBESITY WITH BMI OF 40.0-44.9, ADULT (HCC): ICD-10-CM

## 2025-04-23 DIAGNOSIS — M43.26 FUSION OF LUMBAR SPINE: ICD-10-CM

## 2025-04-23 DIAGNOSIS — G89.4 CHRONIC PAIN SYNDROME: ICD-10-CM

## 2025-04-23 DIAGNOSIS — M53.3 CHRONIC SACROILIAC JOINT PAIN: ICD-10-CM

## 2025-04-23 PROCEDURE — 72110 X-RAY EXAM L-2 SPINE 4/>VWS: CPT | Performed by: PHYSICAL MEDICINE & REHABILITATION

## 2025-04-23 PROCEDURE — 3074F SYST BP LT 130 MM HG: CPT | Performed by: PHYSICAL MEDICINE & REHABILITATION

## 2025-04-23 PROCEDURE — 99204 OFFICE O/P NEW MOD 45 MIN: CPT | Performed by: PHYSICAL MEDICINE & REHABILITATION

## 2025-04-23 PROCEDURE — 1123F ACP DISCUSS/DSCN MKR DOCD: CPT | Performed by: PHYSICAL MEDICINE & REHABILITATION

## 2025-04-23 PROCEDURE — 3078F DIAST BP <80 MM HG: CPT | Performed by: PHYSICAL MEDICINE & REHABILITATION

## 2025-04-23 NOTE — PROGRESS NOTES
Malcolm Brooks presents today for   Chief Complaint   Patient presents with    Back Pain     LBP BLE R>L       Is someone accompanying this pt? Yes, wife    Is the patient using any DME equipment during OV? Yes, rolling walker      Coordination of Care:  1. Have you been to the ER, urgent care clinic since your last visit? no  Hospitalized since your last visit? no    2. Have you seen or consulted any other health care providers outside of the [facility] since your last visit? no Include any pap smears or colon screening. no

## 2025-04-23 NOTE — PROGRESS NOTES
[facility]  [address] [place] [address]., [address]  [place] [address]  Phone: [phone]  Fax: [phone]    NEW PATIENT  Patient's YOB: 1955    ASSESSMENT   Malcolm was seen today for back pain.    Diagnoses and all orders for this visit:    Lumbar pain  -     AMB POC XRAY, SPINE, LUMBOSACRAL; 4+  -     External Referral To Neurosurgery    Lumbar facet arthropathy  -     External Referral To Neurosurgery    Chronic sacroiliac joint pain  -     Amb External Referral To Pain Medicine    Chronic pain syndrome  -     External Referral To Neurosurgery    Fusion of lumbar spine  -     External Referral To Neurosurgery    Status post insertion of spinal cord stimulator  -     External Referral To Neurosurgery         IMPRESSION AND PLAN:  Malcolm Brooks is a 70 y.o. male with history of DM, HTN, and MEGHA. Pt complains of lower back pain.     Patient was given information on back arthritis and sacroiliac exercises.   Discussed the benefits of chair yoga.   Patient was given information on diabetic foot care.   Patient was educated on the benefits of intermittent fasting. Recommended weight loss and that patient discuss with PCP.   Ordered and reviewed 4V AP/LAT/FLEX/EXT  Lumbosacral XR images with patient.   Mr. Brooks has a reminder for a \"due or due soon\" health maintenance. I have asked that he contact his primary care provider, Shahbaz Domingo MD, for follow-up on this health maintenance.   demonstrated consistency with prescribing.   Referral to Neurosurgery for removal of spinal cord stimulator to obtain Lumbar MRI for further evaluation. Patient has had spinal cord stimulator for >10 years which has offered no relief.   Referral to Pain Medicine for B/L SI Injection. Patient has completed PT within the last 6 months.     Return in about 6 weeks (around 6/4/2025) for follow up.      HISTORY OF PRESENT ILLNESS:  Malcolm Brooks is a 70 y.o. male who presents for

## 2025-04-28 NOTE — PROGRESS NOTES
injection series.    Documentation by naldo Sofia, as documented by Manjinder Williamson MD.

## 2025-07-31 ENCOUNTER — HOSPITAL ENCOUNTER (OUTPATIENT)
Facility: HOSPITAL | Age: 70
Discharge: HOME | End: 2025-08-03

## 2025-07-31 ENCOUNTER — OFFICE VISIT (OUTPATIENT)
Age: 70
End: 2025-07-31
Payer: MEDICARE

## 2025-07-31 VITALS — WEIGHT: 310 LBS | RESPIRATION RATE: 14 BRPM | HEIGHT: 74 IN | BODY MASS INDEX: 39.78 KG/M2

## 2025-07-31 DIAGNOSIS — T14.8XXA BLOOD BLISTER: ICD-10-CM

## 2025-07-31 DIAGNOSIS — S90.31XA CONTUSION OF RIGHT FOOT, INITIAL ENCOUNTER: ICD-10-CM

## 2025-07-31 DIAGNOSIS — T14.8XXA BLOOD BLISTER: Primary | ICD-10-CM

## 2025-07-31 PROCEDURE — 99204 OFFICE O/P NEW MOD 45 MIN: CPT | Performed by: FAMILY MEDICINE

## 2025-07-31 PROCEDURE — 1123F ACP DISCUSS/DSCN MKR DOCD: CPT | Performed by: FAMILY MEDICINE

## 2025-07-31 RX ORDER — HYDROCODONE BITARTRATE AND ACETAMINOPHEN 5; 325 MG/1; MG/1
1 TABLET ORAL EVERY 6 HOURS PRN
Qty: 28 TABLET | Refills: 0 | Status: SHIPPED | OUTPATIENT
Start: 2025-07-31 | End: 2025-08-07

## 2025-07-31 RX ORDER — DICLOFENAC SODIUM 25 MG/1
25 TABLET, DELAYED RELEASE ORAL 2 TIMES DAILY WITH MEALS
Qty: 60 TABLET | Refills: 1 | Status: SHIPPED | OUTPATIENT
Start: 2025-07-31

## 2025-08-21 ENCOUNTER — OFFICE VISIT (OUTPATIENT)
Age: 70
End: 2025-08-21
Payer: MEDICARE

## 2025-08-21 VITALS — BODY MASS INDEX: 39.8 KG/M2 | WEIGHT: 310 LBS

## 2025-08-21 DIAGNOSIS — S90.31XA CONTUSION OF RIGHT FOOT, INITIAL ENCOUNTER: Primary | ICD-10-CM

## 2025-08-21 DIAGNOSIS — T14.8XXA BLOOD BLISTER: ICD-10-CM

## 2025-08-21 DIAGNOSIS — M21.371 RIGHT FOOT DROP: ICD-10-CM

## 2025-08-21 PROCEDURE — 99214 OFFICE O/P EST MOD 30 MIN: CPT | Performed by: FAMILY MEDICINE

## 2025-08-21 PROCEDURE — 1123F ACP DISCUSS/DSCN MKR DOCD: CPT | Performed by: FAMILY MEDICINE

## 2025-08-21 RX ORDER — HYDROCODONE BITARTRATE AND ACETAMINOPHEN 5; 325 MG/1; MG/1
1 TABLET ORAL EVERY 6 HOURS PRN
Qty: 28 TABLET | Refills: 0 | Status: SHIPPED | OUTPATIENT
Start: 2025-08-21 | End: 2025-08-28

## 2025-08-21 RX ORDER — DICLOFENAC SODIUM 25 MG/1
25 TABLET, DELAYED RELEASE ORAL 2 TIMES DAILY WITH MEALS
Qty: 60 TABLET | Refills: 1 | Status: SHIPPED | OUTPATIENT
Start: 2025-08-21

## 2025-08-22 RX ORDER — CARVEDILOL 12.5 MG/1
12.5 TABLET ORAL 2 TIMES DAILY
Qty: 243 TABLET | Refills: 2 | Status: SHIPPED | OUTPATIENT
Start: 2025-08-22

## 2025-08-27 ENCOUNTER — HOSPITAL ENCOUNTER (OUTPATIENT)
Facility: HOSPITAL | Age: 70
Setting detail: RECURRING SERIES
Discharge: HOME | End: 2025-08-30
Attending: FAMILY MEDICINE
Payer: MEDICARE

## 2025-08-27 PROCEDURE — 97161 PT EVAL LOW COMPLEX 20 MIN: CPT

## (undated) DEVICE — GOWN,PREVENTION PLUS,XLN/XL,ST,24/CS: Brand: MEDLINE

## (undated) DEVICE — ELECTRODE ES AD DISPER HYDRGEL THN FOAM ADH SCALLOPED EDGE

## (undated) DEVICE — SUTURE VCRL SZ 2-0 L18IN ABSRB VLT CT-1 L36MM 1/2 CIR J739D

## (undated) DEVICE — DRAPE,REIN 53X77,STERILE: Brand: MEDLINE

## (undated) DEVICE — PACK PROCEDURE SURG VASC CATH 161 MMC LF

## (undated) DEVICE — NITINOL STONE RETRIEVAL BASKET: Brand: ZERO TIP

## (undated) DEVICE — SOLUTION IV 1000ML 0.9% SOD CHL

## (undated) DEVICE — KENDALL SCD EXPRESS SLEEVES, KNEE LENGTH, MEDIUM: Brand: KENDALL SCD

## (undated) DEVICE — TRAY SUPP STD NO DRUG W EXTENSION SET

## (undated) DEVICE — UROLOGY SET

## (undated) DEVICE — CATHETER ANGIO 5FR L100CM GRY S STL NYL JR4 3 SEG BRAID L

## (undated) DEVICE — GAUZE SPONGES,USP TYPE VII GAUZE, 12 PLY: Brand: CURITY

## (undated) DEVICE — PRESSURE MONITORING SET: Brand: TRUWAVE

## (undated) DEVICE — 3M™ BAIR PAWS FLEX™ WARMING GOWN, STANDARD, 20 PER CASE 81003: Brand: BAIR PAWS™

## (undated) DEVICE — MEDI-VAC NON-CONDUCTIVE SUCTION TUBING: Brand: CARDINAL HEALTH

## (undated) DEVICE — CHECK-FLO ADAPTER: Brand: CHECK-FLO

## (undated) DEVICE — DUAL LUMEN URETERAL CATHETER

## (undated) DEVICE — SYR 10ML LUER LOK 1/5ML GRAD --

## (undated) DEVICE — SUTURE PERMAHAND SZ 0 L30IN NONABSORBABLE BLK SILK BRAID A306H

## (undated) DEVICE — KIT CLN UP BON SECOURS MARYV

## (undated) DEVICE — GLIDESHEATH SLENDER STAINLESS STEEL KIT: Brand: GLIDESHEATH SLENDER

## (undated) DEVICE — AVANOS* SHORT BEVEL NEEDLE: Brand: AVANOS

## (undated) DEVICE — URETERAL ACCESS SHEATH SET: Brand: NAVIGATOR

## (undated) DEVICE — GDWIRE UROL STR 150CM FLX TP -- BX/5 SENSOR

## (undated) DEVICE — Device: Brand: OMNIWIRE PRESSURE GUIDE WIRE

## (undated) DEVICE — PROCEDURE KIT FLUID MGMT 10 FR CUST MAINFOLD

## (undated) DEVICE — 3M™ IOBAN™ 2 ANTIMICROBIAL INCISE DRAPE 6650EZ: Brand: IOBAN™ 2

## (undated) DEVICE — 3M™ STERI-STRIP™ COMPOUND BENZOIN TINCTURE 40 BAGS/CARTON 4 CARTONS/CASE C1544: Brand: 3M™ STERI-STRIP™

## (undated) DEVICE — LUB SURG MEDC STRL 2OZ TUBE MC -- MEDICHOICE

## (undated) DEVICE — NEEDLE SPNL 22GAX3 1 2IN

## (undated) DEVICE — TUBING IRRIG L77IN DIA0.241IN L BOR FOR CYSTO W/ NVENT

## (undated) DEVICE — SUTURE VCRL SZ 2-0 L36IN ABSRB UD L36MM CT-1 1/2 CIR J945H

## (undated) DEVICE — SOLUTION IRRIG 3000ML 0.9% SOD CHL FLX CONT 0797208] ICU MEDICAL INC]

## (undated) DEVICE — BAG DRAINAGE CUST DISP

## (undated) DEVICE — (D)STRIP SKN CLSR 0.5X4IN WHT --

## (undated) DEVICE — DRAPE,ANGIO,BRACH,STERILE,38X44: Brand: MEDLINE

## (undated) DEVICE — MIRAGE SWIFT II PILLOW LGE: Brand: MIRAGE SWIFT II

## (undated) DEVICE — BSHR MAJOR BASIN PACK-LF: Brand: MEDLINE INDUSTRIES, INC.

## (undated) DEVICE — 3M™ TEGADERM™ TRANSPARENT FILM DRESSING FRAME STYLE, 1626, 4 IN X 4-3/4 IN (10 CM X 12 CM), 50/CT 4CT/CASE: Brand: 3M™ TEGADERM™

## (undated) DEVICE — SET FLD ADMIN 3 W STPCOCK FIX FEM L BOR 1IN

## (undated) DEVICE — RADIFOCUS OPTITORQUE ANGIOGRAPHIC CATHETER: Brand: OPTITORQUE

## (undated) DEVICE — Device

## (undated) DEVICE — SOLUTION IRRIG 3000ML H2O STRL BAG

## (undated) DEVICE — STERILE NEOPRENE POWDER-FREE SURGICAL GLOVES WITH NITRILE COATING: Brand: PROTEXIS

## (undated) DEVICE — SUTURE MCRYL SZ 4-0 L18IN ABSRB UD L19MM PS-2 3/8 CIR PRIM Y496G

## (undated) DEVICE — TRAY PREP DRY W/ PREM GLV 2 APPL 6 SPNG 2 UNDPD 1 OVERWRAP

## (undated) DEVICE — GAUZE SPONGES,16 PLY: Brand: CURITY

## (undated) DEVICE — (D)BNDG ADHESIVE FABRIC 3/4X3 -- DISC BY MFR USE ITEM 357960

## (undated) DEVICE — CABLE OR EXT STIM 1X16-60CM -- SC-4116 PRECISION SPECTRA

## (undated) DEVICE — SUT PROL 2-0 30IN SH BLU --

## (undated) DEVICE — DRAPE TWL SURG 16X26IN BLU ORB04] ALLCARE INC]

## (undated) DEVICE — STERILE POLYISOPRENE POWDER-FREE SURGICAL GLOVES: Brand: PROTEXIS

## (undated) DEVICE — SHEET,DRAPE,70X100,STERILE: Brand: MEDLINE

## (undated) DEVICE — CATHETER GUID EBU3.5 6 FRX100 CM VISTA BRT TIP

## (undated) DEVICE — Z DISCONTINUED BY MEDLINE USE 2711682 TRAY SKIN PREP DRY W/ PREM GLV

## (undated) DEVICE — BAND COMPR L29CM XLN CLR PLAS HEMSTAT EXT HK AND LOOP RETEN

## (undated) DEVICE — CUFF BLD PRESSURE MONITORING LNG AD 23-33 CM 1 TUBE MY CUF

## (undated) DEVICE — URETHRAL DILATOR SET: Brand: COOK

## (undated) DEVICE — 3M™ MEDIPORE™ H SOFT CLOTH SURGICAL TAPE 2864, 4 INCH X 10 YARD (10CM X 9,14M), 12 ROLLS/CASE: Brand: 3M™ MEDIPORE™

## (undated) DEVICE — SYRINGE MED 8ML PLAS LOSS OF RESISTANCE SYR LUERSLIP

## (undated) DEVICE — SUTURE PERMA-HAND 2-0 L30IN NONABSORBABLE BLK MH L36MM 1/2 K843H

## (undated) DEVICE — Z DUP USE 2565107 PACK SURG PROC LEG CYSTO T-DRAPE REINF TBL CVR HND TWL

## (undated) DEVICE — STER SINGLE BASIN SET W/BOWLS: Brand: CARDINAL HEALTH

## (undated) DEVICE — KIT TRIAL NEUROSTIM PAT UNIV -- SC-6500-52

## (undated) DEVICE — CURVED SHARP RF CANNULA, RADIOPAQUE MARKER: Brand: RADIOPAQUE RADIOFREQUENCY CANNULA

## (undated) DEVICE — Y-TYPE TUR/BLADDER IRRIGATION SET, REGULATING CLAMP

## (undated) DEVICE — FLEX ADVANTAGE 3000CC: Brand: FLEX ADVANTAGE

## (undated) DEVICE — ROOM TURNOVER PACK